# Patient Record
Sex: FEMALE | Race: WHITE | Employment: OTHER | URBAN - METROPOLITAN AREA
[De-identification: names, ages, dates, MRNs, and addresses within clinical notes are randomized per-mention and may not be internally consistent; named-entity substitution may affect disease eponyms.]

---

## 2017-01-02 ENCOUNTER — THERAPY VISIT (OUTPATIENT)
Dept: PHYSICAL THERAPY | Facility: CLINIC | Age: 77
End: 2017-01-02
Payer: COMMERCIAL

## 2017-01-02 DIAGNOSIS — M25.511 RIGHT SHOULDER PAIN: Primary | ICD-10-CM

## 2017-01-02 PROCEDURE — 97110 THERAPEUTIC EXERCISES: CPT | Mod: GP | Performed by: PHYSICAL THERAPIST

## 2017-01-02 PROCEDURE — 97112 NEUROMUSCULAR REEDUCATION: CPT | Mod: GP | Performed by: PHYSICAL THERAPIST

## 2017-01-04 ENCOUNTER — TELEPHONE (OUTPATIENT)
Dept: INTERNAL MEDICINE | Facility: CLINIC | Age: 77
End: 2017-01-04

## 2017-01-04 NOTE — TELEPHONE ENCOUNTER
Patient left a message on voice mail saying she is thinking of changing to the [facility] because it is so much closer to her home.  Just wants your recommendation on who to see?

## 2017-01-30 DIAGNOSIS — A41.01 METHICILLIN SUSCEPTIBLE STAPHYLOCOCCUS AUREUS SEPTICEMIA (H): Primary | ICD-10-CM

## 2017-01-30 NOTE — TELEPHONE ENCOUNTER
Minocycline      Last Written Prescription Date: 04/26/16  Last Fill Quantity: 60,  # refills: 5   Last Office Visit with G, P or [facility] prescribing provider: 11/29/16

## 2017-02-01 RX ORDER — MINOCYCLINE HYDROCHLORIDE 50 MG/1
50 CAPSULE ORAL 2 TIMES DAILY
Qty: 60 CAPSULE | Refills: 5 | Status: SHIPPED | OUTPATIENT
Start: 2017-02-01 | End: 2017-08-30

## 2017-02-08 NOTE — PROGRESS NOTES
Subjective:    HPI                    Objective:    System    Physical Exam    General     ROS    Assessment/Plan:      DISCHARGE REPORT    Progress reporting period is from 12/26/2016 to 1/2/2017.       SUBJECTIVE  Patient's current status is unknown.  She did not complete therapy as planned.  Subjective note when patient was last seen on 1/2/2017: Shoulder is feeling better.  HEP is going well.      OBJECTIVE  Patient has failed to return to therapy so current objective findings are unknown.  The objective findings below are from DOS 1/2/2017: AROM: 120 deg elevation.     ASSESSMENT/PLAN  Updated problem list and treatment plan: Diagnosis 1:  Right Shoulder Pain    STG/LTGs have been met or progress has been made towards goals:  Yes  Assessment of Progress: The patient has not returned to therapy. Current status is unknown.  Self Management Plans:  Patient has been instructed in a home treatment program.  Patient is independent in a home treatment program.    D/C PT since Berenice has not returned.

## 2017-03-15 ENCOUNTER — OFFICE VISIT (OUTPATIENT)
Dept: PEDIATRICS | Facility: CLINIC | Age: 77
End: 2017-03-15
Payer: COMMERCIAL

## 2017-03-15 VITALS
SYSTOLIC BLOOD PRESSURE: 130 MMHG | WEIGHT: 293 LBS | HEART RATE: 86 BPM | TEMPERATURE: 98.4 F | HEIGHT: 64 IN | OXYGEN SATURATION: 97 % | BODY MASS INDEX: 50.02 KG/M2 | DIASTOLIC BLOOD PRESSURE: 84 MMHG

## 2017-03-15 DIAGNOSIS — E78.5 HYPERLIPIDEMIA LDL GOAL <100: ICD-10-CM

## 2017-03-15 DIAGNOSIS — Z23 NEED FOR PROPHYLACTIC VACCINATION AGAINST STREPTOCOCCUS PNEUMONIAE (PNEUMOCOCCUS): ICD-10-CM

## 2017-03-15 DIAGNOSIS — B02.29 POSTHERPETIC NEURALGIA: Primary | ICD-10-CM

## 2017-03-15 PROCEDURE — 90670 PCV13 VACCINE IM: CPT | Performed by: PEDIATRICS

## 2017-03-15 PROCEDURE — 99214 OFFICE O/P EST MOD 30 MIN: CPT | Mod: 25 | Performed by: PEDIATRICS

## 2017-03-15 PROCEDURE — 90471 IMMUNIZATION ADMIN: CPT | Performed by: PEDIATRICS

## 2017-03-15 RX ORDER — SIMVASTATIN 40 MG
40 TABLET ORAL AT BEDTIME
Qty: 90 TABLET | Refills: 3 | Status: ON HOLD | OUTPATIENT
Start: 2017-03-15 | End: 2017-08-09

## 2017-03-15 RX ORDER — LIDOCAINE 50 MG/G
PATCH TOPICAL
Qty: 60 PATCH | Refills: 2 | Status: ON HOLD | OUTPATIENT
Start: 2017-03-15 | End: 2017-06-20

## 2017-03-15 RX ORDER — GABAPENTIN 600 MG/1
1200 TABLET ORAL 3 TIMES DAILY
Qty: 540 TABLET | Refills: 3 | Status: ON HOLD | OUTPATIENT
Start: 2017-03-15 | End: 2017-06-21

## 2017-03-15 RX ORDER — PREGABALIN 75 MG/1
75 CAPSULE ORAL 2 TIMES DAILY
Qty: 90 CAPSULE | Refills: 1 | Status: ON HOLD | OUTPATIENT
Start: 2017-03-15 | End: 2017-06-21

## 2017-03-15 NOTE — MR AVS SNAPSHOT
"              After Visit Summary   3/15/2017    Berenice Chaudhari    MRN: [id number]           Patient Information     Date Of Birth          1940        Visit Information        Provider Department      3/15/2017 11:00 AM Nelyl Pearl MD [facility]        Today's Diagnoses     Post herpetic neuralgia        Postherpetic neuralgia        Hyperlipidemia LDL goal <100          Care Instructions    Please let me know how your are doing in 3-4 weeks.  If still not relief I can get you a referral to the pain clinic.    Follow up with ID, Dr. Judge    Follow up in 6 months or sooner if needed.        Follow-ups after your visit        Who to contact     If you have questions or need follow up information about today's clinic visit or your schedule please contact [facility] directly at [phone].  Normal or non-critical lab and imaging results will be communicated to you by MyChart, letter or phone within 4 business days after the clinic has received the results. If you do not hear from us within 7 days, please contact the clinic through MyChart or phone. If you have a critical or abnormal lab result, we will notify you by phone as soon as possible.  Submit refill requests through [address] or call your pharmacy and they will forward the refill request to us. Please allow 3 business days for your refill to be completed.          Additional Information About Your Visit        MyChart Information     [facility] lets you send messages to your doctor, view your test results, renew your prescriptions, schedule appointments and more. To sign up, go to [email or web address] . Click on \"Log in\" on the left side of the screen, which will take you to the Welcome page. Then click on \"Sign up Now\" on the right side of the page.     You will be asked to enter the access code listed below, as well as some personal information. Please follow the directions to create your username and password.   " "  Your access code is: [email or web address]  Expires: 2017 11:28 AM     Your access code will  in 90 days. If you need help or a new code, please call your [facility] or [phone].        Care EveryWhere ID     This is your Care EveryWhere ID. This could be used by other organizations to access your [place] medical records  [id number]        Your Vitals Were     Pulse Temperature Height Pulse Oximetry Breastfeeding? BMI (Body Mass Index)    86 98.4  F (36.9  C) (Oral) 5' 4\" (1.626 m) 97% No 55.96 kg/m2       Blood Pressure from Last 3 Encounters:   03/15/17 130/84   16 128/72   16 130/72    Weight from Last 3 Encounters:   03/15/17 (!) 326 lb (147.9 kg)   03/10/16 (!) 326 lb (147.9 kg)   03/31/15 (!) 340 lb (154.2 kg)              Today, you had the following     No orders found for display         Today's Medication Changes          These changes are accurate as of: 3/15/17 11:28 AM.  If you have any questions, ask your nurse or doctor.               Start taking these medicines.        Dose/Directions    pregabalin 75 MG capsule   Commonly known as:  LYRICA   Used for:  Post herpetic neuralgia   Started by:  Nelly Pearl MD        Dose:  75 mg   Take 1 capsule (75 mg) by mouth 2 times daily After 1 week, increase to 150mg (2 tablets) twice daily   Quantity:  90 capsule   Refills:  1         Stop taking these medicines if you haven't already. Please contact your care team if you have questions.     cephALEXin 500 MG capsule   Commonly known as:  KEFLEX   Stopped by:  Nelly Pearl MD                Where to get your medicines      These medications were sent to [id number] - [place] - [address]  AT [address]  [address] [place] [address]     Phone:  [phone]     gabapentin 600 MG tablet    lidocaine 5 % Patch    simvastatin 40 MG tablet         Some of these will need a paper prescription and others can be bought " over the counter.  Ask your nurse if you have questions.     Bring a paper prescription for each of these medications     pregabalin 75 MG capsule                Primary Care Provider Fax #    Cecille [facility] [phone]       No address on file        Thank you!     Thank you for choosing [facility]  for your care. Our goal is always to provide you with excellent care. Hearing back from our patients is one way we can continue to improve our services. Please take a few minutes to complete the written survey that you may receive in the mail after your visit with us. Thank you!             Your Updated Medication List - Protect others around you: Learn how to safely use, store and throw away your medicines at www.disposemymeds.org.          This list is accurate as of: 3/15/17 11:28 AM.  Always use your most recent med list.                   Brand Name Dispense Instructions for use    amLODIPine 10 MG tablet    NORVASC    90 tablet    Take 1 tablet (10 mg) by mouth every evening       atenolol 50 MG tablet    TENORMIN    90 tablet    Take 1 tablet (50 mg) by mouth daily       buPROPion 300 MG 24 hr tablet    WELLBUTRIN XL    90 tablet    Take 1 tablet (300 mg) by mouth every morning       CENTRUM SILVER per tablet      Take 1 tablet by mouth daily       gabapentin 600 MG tablet    NEURONTIN    540 tablet    Take 2 tablets (1,200 mg) by mouth 3 times daily       hydrOXYzine 25 MG tablet    ATARAX     Take 25 mg by mouth nightly as needed       lidocaine 5 % Patch    LIDODERM    60 patch    Apply up to 3 patches to painful area at once for up to 12 h within a 24 h period.  Remove after 12 hours.       minocycline 50 MG capsule    MINOCIN    60 capsule    Take 1 capsule (50 mg) by mouth 2 times daily       nortriptyline 25 MG capsule    PAMELOR    60 capsule    Take 2 capsules (50 mg) by mouth At Bedtime       pantoprazole 40 MG EC tablet    PROTONIX    90 tablet    Take 1 tablet (40 mg) by mouth  daily       pregabalin 75 MG capsule    LYRICA    90 capsule    Take 1 capsule (75 mg) by mouth 2 times daily After 1 week, increase to 150mg (2 tablets) twice daily       simvastatin 40 MG tablet    ZOCOR    90 tablet    Take 1 tablet (40 mg) by mouth At Bedtime       venlafaxine 150 MG 24 hr capsule    EFFEXOR-XR    90 capsule    Take 1 capsule (150 mg) by mouth daily

## 2017-03-15 NOTE — PROGRESS NOTES
Screening Questionnaire for Adult Immunization    Are you sick today?   No   Do you have allergies to medications, food, a vaccine component or latex?   Yes   Have you ever had a serious reaction after receiving a vaccination?   No   Do you have a long-term health problem with heart disease, lung disease, asthma, kidney disease, metabolic disease (e.g. diabetes), anemia, or other blood disorder?   No   Do you have cancer, leukemia, HIV/AIDS, or any other immune system problem?   No   In the past 3 months, have you taken medications that affect  your immune system, such as prednisone, other steroids, or anticancer drugs; drugs for the treatment of rheumatoid arthritis, Crohn s disease, or psoriasis; or have you had radiation treatments?   No   Have you had a seizure, or a brain or other nervous system problem?   No   During the past year, have you received a transfusion of blood or blood     products, or been given immune (gamma) globulin or antiviral drug?   No   For women: Are you pregnant or is there a chance you could become        pregnant during the next month?   No   Have you received any vaccinations in the past 4 weeks?   No     Immunization questionnaire was positive for at least one answer.  Notified AF.      MNVFC doesn't apply on this patient    Per orders of Dr. Pearl, injection of Prevnar 13 given by Roopa Tomlinson. Patient instructed to remain in clinic for 20 minutes afterwards, and to report any adverse reaction to me immediately.       Screening performed by Roopa Tomlinson on 3/15/2017 at 11:34 AM.

## 2017-03-15 NOTE — PATIENT INSTRUCTIONS
Please let me know how your are doing in 3-4 weeks.  If still not relief I can get you a referral to the pain clinic.    Follow up with ID, Dr. Judge    Follow up in 6 months or sooner if needed.

## 2017-03-15 NOTE — NURSING NOTE
"Chief Complaint   Patient presents with     Derm Problem       Initial /84 (BP Location: Right arm, Patient Position: Chair, Cuff Size: Adult Regular)  Pulse 86  Temp 98.4  F (36.9  C) (Oral)  Ht 5' 4\" (1.626 m)  Wt (!) 326 lb (147.9 kg)  SpO2 97%  Breastfeeding? No  BMI 55.96 kg/m2 Estimated body mass index is 55.96 kg/(m^2) as calculated from the following:    Height as of this encounter: 5' 4\" (1.626 m).    Weight as of this encounter: 326 lb (147.9 kg).  Medication Reconciliation: complete  "

## 2017-03-15 NOTE — PROGRESS NOTES
"  SUBJECTIVE:                                                    Berenice Chaudhari is a 77 year old female who presents to clinic today for the following health issues:    Pain management for neuralgia in left shoulder. Taking max dose of Gabapentin, Nortriptyline, Tylenol PRN with 6 hr short term relief, waking up at hs in pain. Has tried Lidocaine patches and Salon pas. She is interested in medical marijuana - states friend with similar issues and it is helping her.  She has not been to the pain clinic for this.    Would like to continue care with you instead of Dr Corrigan, our location is more convenient.     Patient states she is fairly healthy overall, but when she gets sick she often ends up in the hospital.  Commonly for cellulitits - has many anitbiotics allergies.    She is supposed to follow with ID q6-12 months for chronic minocycline for recurrent left hip infection.  She has not seen them in over 2 years.     Problem list and histories reviewed & adjusted, as indicated.  Additional history: as documented    Reviewed and updated as needed this visit by clinical staff  Tobacco       Reviewed and updated as needed this visit by Provider         ROS:  Constitutional, HEENT, cardiovascular, pulmonary, gi and gu systems are negative, except as otherwise noted.    OBJECTIVE:                                                    /84 (BP Location: Right arm, Patient Position: Chair, Cuff Size: Adult Regular)  Pulse 86  Temp 98.4  F (36.9  C) (Oral)  Ht 5' 4\" (1.626 m)  Wt (!) 326 lb (147.9 kg)  SpO2 97%  Breastfeeding? No  BMI 55.96 kg/m2  Body mass index is 55.96 kg/(m^2).  GENERAL APPEARANCE: healthy, alert and no distress  CV: regular rates and rhythm, normal S1 S2, no S3 or S4 and no murmur, click or rub    Diagnostic Test Results:  none      ASSESSMENT/PLAN:                                                        1. Postherpetic neuralgia  Discussed with patient that I do not prescribe medical marijuana - I " recommend trying lyrica to see if this can help.  We did discuss her seeing the pain clinic.  Reviewed state of [place] indications for medical ángel - she may fall under intractable pain, but there are still treatment options that haven't been tried and she would need evaluation by pain specicialist.    - gabapentin (NEURONTIN) 600 MG tablet; Take 2 tablets (1,200 mg) by mouth 3 times daily  Dispense: 540 tablet; Refill: 3  - lidocaine (LIDODERM) 5 % Patch; Apply up to 3 patches to painful area at once for up to 12 h within a 24 h period.  Remove after 12 hours.  Dispense: 60 patch; Refill: 2  - pregabalin (LYRICA) 75 MG capsule; Take 1 capsule (75 mg) by mouth 2 times daily After 1 week, increase to 150mg (2 tablets) twice daily  Dispense: 90 capsule; Refill: 1    2. Hyperlipidemia LDL goal <100  - simvastatin (ZOCOR) 40 MG tablet; Take 1 tablet (40 mg) by mouth At Bedtime  Dispense: 90 tablet; Refill: 3    3. Need for prophylactic vaccination against Streptococcus pneumoniae (pneumococcus)  - PNEUMOCOCCAL CONJ VACCINE 13 VALENT IM (PREVNAR 13)  - ADMIN 1st VACCINE    Patient Instructions   Please let me know how your are doing in 3-4 weeks.  If still not relief I can get you a referral to the pain clinic.    Follow up with ID, Dr. Judge    Follow up in 6 months or sooner if needed.      Nelly Pearl MD  [facility] [place]

## 2017-03-15 NOTE — LETTER
My Depression Action Plan  Name: Berenice Chaudhari   Date of Birth 1940  Date: 3/15/2017    My doctor: [facility]   My clinic: [facility]  [address]  [address]  [place] [address] [phone]          GREEN    ZONE   Good Control    What it looks like:     Things are going generally well. You have normal up s and down s. You may even feel depressed from time to time, but bad moods usually last less than a day.   What you need to do:  1. Continue to care for yourself (see self care plan)  2. Check your depression survival kit and update it as needed  3. Follow your physician s recommendations including any medication.  4. Do not stop taking medication unless you consult with your physician first.           YELLOW         ZONE Getting Worse    What it looks like:     Depression is starting to interfere with your life.     It may be hard to get out of bed; you may be starting to isolate yourself from others.    Symptoms of depression are starting to last most all day and this has happened for several days.     You may have suicidal thoughts but they are not constant.   What you need to do:     1. Call your care team, your response to treatment will improve if you keep your care team informed of your progress. Yellow periods are signs an adjustment may need to be made.     2. Continue your self-care, even if you have to fake it!    3. Talk to someone in your support network    4. Open up your depression survival kit           RED    ZONE Medical Alert - Get Help    What it looks like:     Depression is seriously interfering with your life.     You may experience these or other symptoms: You can t get out of bed most days, can t work or engage in other necessary activities, you have trouble taking care of basic hygiene, or basic responsibilities, thoughts of suicide or death that will not go away, self-injurious behavior.     What you need to do:  1. Call  your care team and request a same-day appointment. If they are not available (weekends or after hours) call your local crisis line, emergency room or 911.      Electronically signed by: Roopa Tomlinson, March 15, 2017    Depression Self Care Plan / Survival Kit    Self-Care for Depression  Here s the deal. Your body and mind are really not as separate as most people think.  What you do and think affects how you feel and how you feel influences what you do and think. This means if you do things that people who feel good do, it will help you feel better.  Sometimes this is all it takes.  There is also a place for medication and therapy depending on how severe your depression is, so be sure to consult with your medical provider and/ or Behavioral Health Consultant if your symptoms are worsening or not improving.     In order to better manage my stress, I will:    Exercise  Get some form of exercise, every day. This will help reduce pain and release endorphins, the  feel good  chemicals in your brain. This is almost as good as taking antidepressants!  This is not the same as joining a gym and then never going! (they count on that by the way ) It can be as simple as just going for a walk or doing some gardening, anything that will get you moving.      Hygiene   Maintain good hygiene (Get out of bed in the morning, Make your bed, Brush your teeth, Take a shower, and Get dressed like you were going to work, even if you are unemployed).  If your clothes don't fit try to get ones that do.    Diet  I will strive to eat foods that are good for me, drink plenty of water, and avoid excessive sugar, caffeine, alcohol, and other mood-altering substances.  Some foods that are helpful in depression are: complex carbohydrates, B vitamins, flaxseed, fish or fish oil, fresh fruits and vegetables.    Psychotherapy  I agree to participate in Individual Therapy (if recommended).    Medication  If prescribed medications, I agree to  take them.  Missing doses can result in serious side effects.  I understand that drinking alcohol, or other illicit drug use, may cause potential side effects.  I will not stop my medication abruptly without first discussing it with my provider.    Staying Connected With Others  I will stay in touch with my friends, family members, and my primary care provider/team.    Use your imagination  Be creative.  We all have a creative side; it doesn t matter if it s oil painting, sand castles, or mud pies! This will also kick up the endorphins.    Witness Beauty  (AKA stop and smell the roses) Take a look outside, even in mid-winter. Notice colors, textures. Watch the squirrels and birds.     Service to others  Be of service to others.  There is always someone else in need.  By helping others we can  get out of ourselves  and remember the really important things.  This also provides opportunities for practicing all the other parts of the program.    Humor  Laugh and be silly!  Adjust your TV habits for less news and crime-drama and more comedy.    Control your stress  Try breathing deep, massage therapy, biofeedback, and meditation. Find time to relax each day.     My support system    Clinic Contact:  Phone number:    Contact 1:  Phone number:    Contact 2:  Phone number:    Pentecostal/:  Phone number:    Therapist:  Phone number:    Local crisis center:    Phone number:    Other community support:  Phone number:

## 2017-03-16 ASSESSMENT — PATIENT HEALTH QUESTIONNAIRE - PHQ9: SUM OF ALL RESPONSES TO PHQ QUESTIONS 1-9: 3

## 2017-03-30 ENCOUNTER — TELEPHONE (OUTPATIENT)
Dept: PEDIATRICS | Facility: CLINIC | Age: 77
End: 2017-03-30

## 2017-03-30 NOTE — TELEPHONE ENCOUNTER
Patient calls.states she was supposed to call with an update on how Lyrica is working for her.  She states that it is working great.  She has increased the dose as directed and is currently taking 150 mg bid.  There is a refill on this, she is aware.    Melissa Abdullahi RN  Message handled by Nurse Triage.

## 2017-04-04 ENCOUNTER — TELEPHONE (OUTPATIENT)
Dept: INTERNAL MEDICINE | Facility: CLINIC | Age: 77
End: 2017-04-04

## 2017-04-04 NOTE — TELEPHONE ENCOUNTER
Panel Management Review      Patient has the following on her problem list: None      Composite cancer screening  Chart review shows that this patient is due/due soon for the following DEXA  Summary:    Patient is due/failing the following:   DEXA    Action needed:   Patient needs referral/order: DEXA    Type of outreach:    Sent letter.    Questions for provider review:    None                                                                                                                                     Eleanor Infante CMA       Chart routed to CLOSED .

## 2017-04-04 NOTE — LETTER
[facility]  [address], [address]  [place], [place]  [address]                                            TEL:[phone]  FAX:[phone]      Berenice Chaudhari  [address]  [place] [address]          April 4, 2017      Dear Berenice,    In order to ensure we are providing the best quality care, we have reviewed your chart and see that you are due for:     1. DEXA    Please call the clinic at your earliest convenience to schedule an appointment.   Thank you for trusting us with your health care.          Sincerely,      Seda Suresh M.D.

## 2017-05-14 DIAGNOSIS — B02.29 POST HERPETIC NEURALGIA: ICD-10-CM

## 2017-05-15 DIAGNOSIS — E78.5 HYPERLIPIDEMIA LDL GOAL <100: ICD-10-CM

## 2017-05-15 RX ORDER — SIMVASTATIN 40 MG
TABLET ORAL
Qty: 90 TABLET | Refills: 3 | Status: ON HOLD | OUTPATIENT
Start: 2017-05-15 | End: 2017-06-21

## 2017-05-15 RX ORDER — NORTRIPTYLINE HCL 25 MG
CAPSULE ORAL
Qty: 60 CAPSULE | Refills: 11 | Status: SHIPPED | OUTPATIENT
Start: 2017-05-15 | End: 2017-11-27

## 2017-05-20 ENCOUNTER — TELEPHONE (OUTPATIENT)
Dept: INTERNAL MEDICINE | Facility: CLINIC | Age: 77
End: 2017-05-20

## 2017-05-20 DIAGNOSIS — B02.29 POSTHERPETIC NEURALGIA: ICD-10-CM

## 2017-05-20 RX ORDER — PREGABALIN 75 MG/1
75 CAPSULE ORAL 2 TIMES DAILY
Qty: 90 CAPSULE | Refills: 1 | Status: CANCELLED | OUTPATIENT
Start: 2017-05-20

## 2017-05-20 NOTE — TELEPHONE ENCOUNTER
Clinic Action Needed: Yes.     Reason for Call: Please call patient's , Ricardo at [phone]. Requests to discuss patient's refill for Lyrica that was requested on 05/16/17 by Pharmacy.     Patient Recommendations/Teaching:Please call back if you do not hear from clinic or any further concerns.     Routed to:RN Pool.     Thank you.     Mónica Joshi RN [facility]

## 2017-05-20 NOTE — TELEPHONE ENCOUNTER
pregabalin (LYRICA) 75 MG capsule      Last Written Prescription Date:  03-  Last Fill Quantity: 90,   # refills: 1  Last Office Visit with [facility], [facility] or [facility] prescribing provider: 03-  Future Office visit:       Routing refill request to provider for review/approval because:  pregabalin (LYRICA) 75 MG capsule

## 2017-05-22 ENCOUNTER — TELEPHONE (OUTPATIENT)
Dept: PEDIATRICS | Facility: CLINIC | Age: 77
End: 2017-05-22

## 2017-05-22 RX ORDER — PREGABALIN 150 MG/1
150 CAPSULE ORAL 2 TIMES DAILY
Qty: 60 CAPSULE | Refills: 5 | Status: SHIPPED | OUTPATIENT
Start: 2017-05-22 | End: 2017-11-27

## 2017-05-22 NOTE — TELEPHONE ENCOUNTER
Printed, signed, in my outbox.    Nelly Pearl MD  Internal Medicine/Pediatrics  [facility]

## 2017-05-22 NOTE — TELEPHONE ENCOUNTER
Did PA under cover my meds. Medication was denied states it cannot be covered by plan. Routing to pcp.    Berenice Chaudhari ([place]: [id number])  Lyrica 75MG capsules  Status: Question Response - N/A  Created: May 22nd, 2017    Tamika Daniels MA

## 2017-05-22 NOTE — TELEPHONE ENCOUNTER
Received PA form. Filled out form and had Dr. Pearl sign it. I faxed the form to [phone] and placed in PA waiting bin until we receive a response.    Tamika Daniels MA

## 2017-05-22 NOTE — TELEPHONE ENCOUNTER
Please let patient know.  She may want to talk with her insurance since it looks like she was getting this before.    She may have to pay out of pocket if not covered.    Nelly Pearl MD  Internal Medicine/Pediatrics  [facility]

## 2017-05-22 NOTE — TELEPHONE ENCOUNTER
PA needed for Lyrica 75 mg capsules  Insurance is [employer] [place].    I will call insurance # listed in chart and ask for a PA form. Once received I will start the PA process.    Tamika Daniels MA

## 2017-05-22 NOTE — TELEPHONE ENCOUNTER
Refill request not received until 5/20. Sent to provider for approval in refill encounter. Will call once approved by provider.   Catarina Diggs RN

## 2017-05-22 NOTE — TELEPHONE ENCOUNTER
Insurance # listed in chart is not correct. I will call the patient pharmacy to get the insurance information. Patient's pharmacy is [facility] in [facility].      Tamika Daniels MA

## 2017-05-22 NOTE — TELEPHONE ENCOUNTER
"Spoke to the help desk on cover my meds because I was confused why I didn't get a \"Denied\" icon it just states it is not covered by the patients plan. The  said sometimes this is a error in the site and that they will send me a hard copy via fax. Once PA form is received I will start PA process. Sorry for the confusion. Routing to Dr. Pearl for a FYI.    Tamika Daniels MA    "

## 2017-05-22 NOTE — TELEPHONE ENCOUNTER
I spoke to pharmacy the Insurance is [employer] # [phone]. I will start the PA process under cover my meds.    Tamika Daniels MA

## 2017-05-22 NOTE — TELEPHONE ENCOUNTER
Routing refill request to provider for review/approval because:  Drug not on the [facility] refill protocol And instructions need to be changed    Catarina Diggs RN

## 2017-05-23 NOTE — TELEPHONE ENCOUNTER
"PA response received. Insurance states \" The plan does not require a PA. The plans limit for this medication is not being exceeded at the current prescribed dose. Please verify the quanity and day supply. This medication is covered for 8 capsules per day.\" Routing response to Dr. Pearl. Placed PA in abstraction.    Tamika Daniels MA    "

## 2017-06-20 ENCOUNTER — HOSPITAL ENCOUNTER (INPATIENT)
Facility: CLINIC | Age: 77
LOS: 2 days | Discharge: HOME | DRG: 871 | End: 2017-06-23
Attending: INTERNAL MEDICINE | Admitting: INTERNAL MEDICINE
Payer: COMMERCIAL

## 2017-06-20 DIAGNOSIS — B02.29 POSTHERPETIC NEURALGIA: ICD-10-CM

## 2017-06-20 DIAGNOSIS — L03.116 CELLULITIS OF LEFT LOWER EXTREMITY: ICD-10-CM

## 2017-06-20 PROCEDURE — 36415 COLL VENOUS BLD VENIPUNCTURE: CPT | Performed by: INTERNAL MEDICINE

## 2017-06-20 PROCEDURE — 80053 COMPREHEN METABOLIC PANEL: CPT | Performed by: INTERNAL MEDICINE

## 2017-06-20 PROCEDURE — 96366 THER/PROPH/DIAG IV INF ADDON: CPT

## 2017-06-20 PROCEDURE — 87088 URINE BACTERIA CULTURE: CPT | Performed by: INTERNAL MEDICINE

## 2017-06-20 PROCEDURE — 36415 COLL VENOUS BLD VENIPUNCTURE: CPT

## 2017-06-20 PROCEDURE — 87186 SC STD MICRODIL/AGAR DIL: CPT | Performed by: INTERNAL MEDICINE

## 2017-06-20 PROCEDURE — 99285 EMERGENCY DEPT VISIT HI MDM: CPT | Mod: 25

## 2017-06-20 PROCEDURE — 85025 COMPLETE CBC W/AUTO DIFF WBC: CPT | Performed by: INTERNAL MEDICINE

## 2017-06-20 PROCEDURE — 87086 URINE CULTURE/COLONY COUNT: CPT | Performed by: INTERNAL MEDICINE

## 2017-06-20 PROCEDURE — 96365 THER/PROPH/DIAG IV INF INIT: CPT

## 2017-06-20 PROCEDURE — 87040 BLOOD CULTURE FOR BACTERIA: CPT | Performed by: INTERNAL MEDICINE

## 2017-06-20 PROCEDURE — 81001 URINALYSIS AUTO W/SCOPE: CPT | Performed by: INTERNAL MEDICINE

## 2017-06-20 RX ORDER — AZTREONAM 2 G/1
2 INJECTION, POWDER, LYOPHILIZED, FOR SOLUTION INTRAMUSCULAR; INTRAVENOUS ONCE
Status: DISCONTINUED | OUTPATIENT
Start: 2017-06-20 | End: 2017-06-20

## 2017-06-20 RX ORDER — LEVOFLOXACIN 5 MG/ML
750 INJECTION, SOLUTION INTRAVENOUS ONCE
Status: COMPLETED | OUTPATIENT
Start: 2017-06-20 | End: 2017-06-21

## 2017-06-20 RX ORDER — SODIUM CHLORIDE 9 MG/ML
1000 INJECTION, SOLUTION INTRAVENOUS CONTINUOUS
Status: DISCONTINUED | OUTPATIENT
Start: 2017-06-20 | End: 2017-06-21

## 2017-06-20 ASSESSMENT — ENCOUNTER SYMPTOMS
FEVER: 1
CONFUSION: 1
HALLUCINATIONS: 1

## 2017-06-20 NOTE — LETTER
Transition Communication Hand-off for Care Transitions to Next Level of Care Provider    Name: Berenice Chaudhari  MRN #: [id number]  Primary Care Provider: Nelly Pearl     Primary Clinic: [facility] [address] [place] [address]     Reason for Hospitalization:  Cellulitis of left lower extremity [L03.116]  Admit Date/Time: 6/20/2017 10:05 PM  Discharge Date: 6/23/17  Payor Source: Payor: BCBS / Plan: BCBS OUT OF STATE / Product Type: Indemnity /     Readmission Assessment Measure (HAJA) Risk Score/category:  NONE            Reason for Communication Hand-off Referral: Fragility, Obesity --345lbs, admitted for LLE cellulitis     Discharge Plan:       Concern for non-adherence with plan of care:   Y/N No  Discharge Needs Assessment:      Already enrolled in Tele-monitoring program and name of program:  n/a  Follow-up specialty is recommended: No    Follow-up plan:  No future appointments.    Any outstanding tests or procedures:  The patient does need to follow up with her primary care physician within 1 week, needs a basic metabolic panel done at that time.            Key Recommendations:  Please speak to and work on a weight loss plan with this pt.   Should she be recommended for a Bariatric Physician  Nutrition consult, support on meeting weight loss goals,    Noemi Sanchez, RN, BSN, PHN, CTS  Care Transitions Specialist  [facility]      AVS/Discharge Summary is the source of truth; this is a helpful guide for improved communication of patient story

## 2017-06-20 NOTE — TELEPHONE ENCOUNTER
lidocaine (LIDODERM) 5 % Patch      Last Written Prescription Date: 3/15/2017  Last Fill Quantity: 60, # refills: 2  Last Office Visit with [facility] or [facility] prescribing provider: 3/15/2017       Creatinine   Date Value Ref Range Status   10/27/2016 1.19 (H) 0.52 - 1.04 mg/dL Final

## 2017-06-20 NOTE — IP AVS SNAPSHOT
MRN:[id number]                      After Visit Summary   6/20/2017    Berenice Chaudhari    MRN: [id number]           Thank you!     Thank you for choosing [facility] for your care. Our goal is always to provide you with excellent care. Hearing back from our patients is one way we can continue to improve our services. Please take a few minutes to complete the written survey that you may receive in the mail after you visit. If you would like to speak to someone directly about your visit please contact Patient Relations at [phone]. Thank you!          Patient Information     Date Of Birth          1940        Designated Caregiver       Most Recent Value    Caregiver    Will someone help with your care after discharge? yes    Name of designated caregiver Ricardo Chaudhari    Phone number of caregiver [phone]    Caregiver address [place], [place]      About your hospital stay     You were admitted on:  June 21, 2017 You last received care in the:  [facility]    You were discharged on:  June 23, 2017       Who to Call     For medical emergencies, please call 911.  For non-urgent questions about your medical care, please call your primary care provider or clinic, [phone]          Attending Provider     Provider Specialty    Heather Harris MD Emergency Medicine    AustwellReinaldo MD Internal Medicine       Primary Care Provider Office Phone # Fax #    Nelly Pearl [phone]      After Care Instructions     Activity       Your activity upon discharge: activity as tolerated            Diet       Follow this diet upon discharge: Regular                  Follow-up Appointments     Follow-up and recommended labs and tests        Follow up with primary care provider, Nelly Pearl, within 7 days for hospital follow- up.  The following labs/tests are recommended: BMP in 7 days.                  Your next 10 appointments already  "scheduled     2017 11:00 AM CDT   Office Visit with Nelly Pearl MD   [facility] [place] ([facility])    [address]  [address]  [place] [address] [phone]           Bring a current list of meds and any records pertaining to this visit.  For Physicals, please bring immunization records and any forms needing to be filled out.  Please arrive 10 minutes early to complete paperwork.              Pending Results     Date and Time Order Name Status Description    2017 2250 Blood culture Preliminary     2017 2225 Blood culture Preliminary     2017 2225 Urine Culture Aerobic Bacterial Preliminary             Statement of Approval     Ordered          17 2432  I have reviewed and agree with all the recommendations and orders detailed in this document.  EFFECTIVE NOW     Approved and electronically signed by:  Wilder Odom DO             Admission Information     Date & Time Provider Department Dept. Phone    2017 Reinaldo Guzmán MD [facility] [phone]      Your Vitals Were     Blood Pressure Pulse Temperature Respirations Weight Pulse Oximetry    136/43 (BP Location: Left arm) 78 97.8  F (36.6  C) (Oral) 18 156.5 kg (345 lb) 95%    BMI (Body Mass Index)                   59.22 kg/m2           MyChart Information     [employer] lets you send messages to your doctor, view your test results, renew your prescriptions, schedule appointments and more. To sign up, go to [email or web address] . Click on \"Log in\" on the left side of the screen, which will take you to the Welcome page. Then click on \"Sign up Now\" on the right side of the page.     You will be asked to enter the access code listed below, as well as some personal information. Please follow the directions to create your username and password.     Your access code is: [email or web address]  Expires: 2017 10:12 PM     Your access code will  in 90 days. If " you need help or a new code, please call your [facility] clinic or [phone].        Care EveryWhere ID     This is your Care EveryWhere ID. This could be used by other organizations to access your [facility] medical records  [id number]        Equal Access to Services     LAILAPAULINE ANDRE : Hadii aad ku hadnatividadranjith Monicajohn, wacliftonda luqadaha, qaybta kaalmada adekeshiada, calin mckayfredericmariano hoyt. So [facility] [phone].    ATENCIÓN: Si habla español, tiene a dickey disposición servicios gratuitos de asistencia lingüística. Juan Antonioame al [phone].    We comply with applicable federal civil rights laws and [place] laws. We do not discriminate on the basis of race, color, national origin, age, disability sex, sexual orientation or gender identity.               Review of your medicines      START taking        Dose / Directions    levofloxacin 500 MG tablet   Commonly known as:  LEVAQUIN   Used for:  Cellulitis of left lower extremity        Dose:  500 mg   Take 1 tablet (500 mg) by mouth daily for 7 days   Quantity:  7 tablet   Refills:  0         CONTINUE these medicines which have NOT CHANGED        Dose / Directions    amLODIPine 10 MG tablet   Commonly known as:  NORVASC   Used for:  Essential hypertension with goal blood pressure less than 140/90        Dose:  10 mg   Take 1 tablet (10 mg) by mouth every evening   Quantity:  90 tablet   Refills:  3       atenolol 50 MG tablet   Commonly known as:  TENORMIN   Used for:  Essential hypertension        Dose:  50 mg   Take 1 tablet (50 mg) by mouth daily   Quantity:  90 tablet   Refills:  3       buPROPion 300 MG 24 hr tablet   Commonly known as:  WELLBUTRIN XL   Used for:  Major depressive disorder, recurrent, in full remission (H)        Dose:  300 mg   Take 1 tablet (300 mg) by mouth every morning   Quantity:  90 tablet   Refills:  1       CENTRUM SILVER per tablet        Dose:  1 tablet   Take 1 tablet by mouth daily   Refills:  0       CEPHALEXIN PO         Dose:  500 mg   Take 500 mg by mouth 3 times daily as needed (when cellulitis is coming on)   Refills:  0       gabapentin 600 MG tablet   Commonly known as:  NEURONTIN   Used for:  Postherpetic neuralgia        Dose:  1200 mg   Take 2 tablets (1,200 mg) by mouth 3 times daily   Quantity:  540 tablet   Refills:  3       hydrOXYzine 25 MG tablet   Commonly known as:  ATARAX        Dose:  25 mg   Take 25 mg by mouth nightly as needed   Refills:  0       lidocaine 5 % Patch   Commonly known as:  LIDODERM   Used for:  Postherpetic neuralgia        Apply up to 3 patches to painful area at once for up to 12 h within a 24 h period.  Remove after 12 hours.   Quantity:  60 patch   Refills:  2       minocycline 50 MG capsule   Commonly known as:  MINOCIN   Used for:  Methicillin susceptible Staphylococcus aureus septicemia (H)        Dose:  50 mg   Take 1 capsule (50 mg) by mouth 2 times daily   Quantity:  60 capsule   Refills:  5       nortriptyline 25 MG capsule   Commonly known as:  PAMELOR   Used for:  Post herpetic neuralgia        TAKE 2 CAPSULES(50 MG) BY MOUTH AT BEDTIME   Quantity:  60 capsule   Refills:  11       pantoprazole 40 MG EC tablet   Commonly known as:  PROTONIX   Used for:  GERD (gastroesophageal reflux disease)        Dose:  40 mg   Take 1 tablet (40 mg) by mouth daily   Quantity:  90 tablet   Refills:  3       pregabalin 150 MG capsule   Commonly known as:  LYRICA   Used for:  Postherpetic neuralgia        Dose:  150 mg   Take 1 capsule (150 mg) by mouth 2 times daily   Quantity:  60 capsule   Refills:  5       simvastatin 40 MG tablet   Commonly known as:  ZOCOR   Used for:  Hyperlipidemia LDL goal <100        Dose:  40 mg   Take 1 tablet (40 mg) by mouth At Bedtime   Quantity:  90 tablet   Refills:  3       venlafaxine 150 MG 24 hr capsule   Commonly known as:  EFFEXOR-XR   Used for:  Major depressive disorder, recurrent episode, moderate (H)        Dose:  150 mg   Take 1 capsule (150 mg) by mouth  daily   Quantity:  90 capsule   Refills:  1            Where to get your medicines      These medications were sent to [place], [place] - [address] [address]  [address] [address]     Phone:  [phone]     levofloxacin 500 MG tablet         These medications were sent to [id number] - [place] - [address]  AT [address]  [address] [place] [address]     Phone:  [phone]     gabapentin 600 MG tablet    lidocaine 5 % Patch                Protect others around you: Learn how to safely use, store and throw away your medicines at www.disposemymeds.org.             Medication List: This is a list of all your medications and when to take them. Check marks below indicate your daily home schedule. Keep this list as a reference.      Medications           Morning Afternoon Evening Bedtime As Needed    amLODIPine 10 MG tablet   Commonly known as:  NORVASC   Take 1 tablet (10 mg) by mouth every evening   Last time this was given:  10 mg on 6/22/2017  7:53 PM                                atenolol 50 MG tablet   Commonly known as:  TENORMIN   Take 1 tablet (50 mg) by mouth daily   Last time this was given:  50 mg on 6/23/2017  9:16 AM                                buPROPion 300 MG 24 hr tablet   Commonly known as:  WELLBUTRIN XL   Take 1 tablet (300 mg) by mouth every morning   Last time this was given:  300 mg on 6/23/2017  9:15 AM                                CENTRUM SILVER per tablet   Take 1 tablet by mouth daily                                CEPHALEXIN PO   Take 500 mg by mouth 3 times daily as needed (when cellulitis is coming on)                                gabapentin 600 MG tablet   Commonly known as:  NEURONTIN   Take 2 tablets (1,200 mg) by mouth 3 times daily   Last time this was given:  1,200 mg on 6/23/2017  2:18 PM                                hydrOXYzine 25 MG tablet   Commonly known as:   ATARAX   Take 25 mg by mouth nightly as needed                                levofloxacin 500 MG tablet   Commonly known as:  LEVAQUIN   Take 1 tablet (500 mg) by mouth daily for 7 days                                lidocaine 5 % Patch   Commonly known as:  LIDODERM   Apply up to 3 patches to painful area at once for up to 12 h within a 24 h period.  Remove after 12 hours.   Last time this was given:  1 patch on 6/22/2017  8:48 AM                                minocycline 50 MG capsule   Commonly known as:  MINOCIN   Take 1 capsule (50 mg) by mouth 2 times daily   Last time this was given:  50 mg on 6/23/2017  9:17 AM                                nortriptyline 25 MG capsule   Commonly known as:  PAMELOR   TAKE 2 CAPSULES(50 MG) BY MOUTH AT BEDTIME                                pantoprazole 40 MG EC tablet   Commonly known as:  PROTONIX   Take 1 tablet (40 mg) by mouth daily   Last time this was given:  40 mg on 6/23/2017  9:15 AM                                pregabalin 150 MG capsule   Commonly known as:  LYRICA   Take 1 capsule (150 mg) by mouth 2 times daily   Last time this was given:  150 mg on 6/23/2017  9:15 AM                                simvastatin 40 MG tablet   Commonly known as:  ZOCOR   Take 1 tablet (40 mg) by mouth At Bedtime   Last time this was given:  40 mg on 6/22/2017  9:15 PM                                venlafaxine 150 MG 24 hr capsule   Commonly known as:  EFFEXOR-XR   Take 1 capsule (150 mg) by mouth daily                                          More Information        Methicillin-Resistant Staphylococcus aureus (MRSA)  What is MRSA?  Staphylococcus aureus bacteria or  staph  is a very common germ. It is found on the skin or in the nose of many people. Sometimes the bacteria causes no problem, or it causes a mild infection. But it can also cause severe infections of the skin, lungs, blood, or other organs or tissues. Some staph infections can be easily treated with antibiotics.  But one type of staph, Methicillin-Resistant staphylococcus areas (MRSA) can t. It s called Methicillin-Resistant because the antibiotic methicillin, which used to be effective treatment, no longer works. MRSA is common in hospitals and nursing homes or long-term care facilities. It is also spreading among healthy children and adults outside the health care system. There are two different ways MRSA can appear in the body:    Colonization: When a person carries the MRSA bacteria (often in nose or on skin), but is healthy. This person can spread MRSA to others.    Infection: When a person gets sick because of the bacteria, it's called being infected with MRSA. This person can also spread MRSA to others. If not treated properly, MRSA infections can be very serious.    What are the symptoms of MRSA infection?  MRSA skin infections start as small red bumps on the skin that look like pimples or spider bites. The small bumps usually get larger and become swollen, painful, warm to the touch and filled with pus. MRSA can also start in other ways, and it can spread deeper into the body. Common places and symptoms include:    Urinary tract: pain and burning when urinating, the need to urinate more often, fever    Blood: high fever, chills, nausea and vomiting, shortness of breath, confusion    Bone, muscle, or other tissue infections    Lungs: pneumonia in one or both lungs    Surgical wound infections    Heart: Infection of the lining of the heart called endocarditis  Who s at risk?  Anyone can get a staph infection. But certain factors make infection more likely, including:    A current or recent stay in the hospital    Living in a nursing home or long-term care facility or other crowded areas, like  barracks or care home    Taking antibiotics    Having certain health conditions, such as diabetes or HIV    Getting kidney dialysis    Sharing sports equipment, razors or other sharp objects  How does MRSA spread?  MRSA  usually spreads through skin-to-skin contact, whether through contact with an infected person or on the hands of health care workers who work with infected patients. MRSA can also spread through contact with contaminated objects, such as cart handles and bedrails or shared towels or athletic equipment.  How is MRSA treated?  MRSA infections are usually treated with antibiotics. These may be in pill form or through a vein (intravenous or IV). If you have a skin abscess, we may drain it.   Patients who test positive for MRSA colonization may go through a process called decolonization. We apply a topical antibiotic inside your nose to kill the bacteria. We may also wash your skin with a special soap.  Controlling and Preventing MRSA: In the hospital  Hospitals and nursing homes control and prevent MRSA by doing the following:    Handwashing. This is the single most important way to prevent the spread of germs.    Protective clothing. Health care workers and visitors may wear gloves and a gown when entering the room of a patient with MRSA. They remove these items before leaving.    Avoid antibiotic overuse. Too much use can cause germs to resist some antibiotics.    Monitoring. Hospitals monitor the spread of MRSA and educate all staff on the best ways to prevent it.  What you can do as a patient    Ask all hospital staff to wash their hands before touching you. Don t be afraid to speak up!    Wash your own hands often with soap and warm water, or use an alcohol-based hand gel. This is especially important:    After using the bathroom    After touching a bandage    Before eating    Encourage family and friends to wash hands as well    If you need to have a test done, such as an X-ray, follow instructions from staff. You may need to change into a clean hospital gown and wash your hands just before leaving your room.  Controlling and Preventing MRSA: at Home  Patients:    Wash your hands often with soap and warm water, or  use an alcohol-based hand gel. This is especially important:    After using the bathroom    After touching a bandage    Before eating    Follow instructions we give you for caring for surgical wounds or any tubes that you have, such as a catheter or dialysis port.    Keep cuts and scrapes clean and covered until they heal.    Do not share towels, razors, clothing or athletic equipment.    Take all antibiotics your doctor prescribed. Don t take half doses or stop the antibiotics, even if you feel better.  Caregivers:    Wash your hands well with soap and warm water before and after any contact with the patient. You can use an alcohol-based hand gel if your hands aren t visibly dirty.    Wear disposable gloves when changing a bandage, touching an infected wound or handling dirty laundry. Throw away the gloves after each use. Then wash your hands well.    Change the patient s bedding once a week, or more often if it s soiled with feces or body fluids. Wash and dry it alone in a washer and dryer using the warmest temperatures recommended on the labels. Use liquid bleach during the wash cycle if the label permits.    Clean surfaces like tabletops and sinks really well. Keep bathrooms, toilets and bedside commodes clean. A mixture of 1/4 cup of bleach to 1 quart of water works great for this.  Understanding drug resistance  Hard-to-kill (resistant) germs, such as MRSA, develop when antibiotics are taken longer than needed. They can also develop when antibiotics are taken when they aren't needed, or are not taken exactly as directed. This is because any germs that survive treatment with an antibiotic can multiply and thus create more resistant germs. The more often antibiotics are used, the more chances resistant germs have to develop. This is why your care team may not prescribe antibiotics unless he or she is certain that they are needed.  Date Last Reviewed: 10/1/2016    3273-1080 [employer]. [address] [place] [address]  [place] [address]. All rights reserved. This information is not intended as a substitute for professional medical care. Always follow your healthcare professional's instructions.                Weight Management: Getting Started  Healthy bodies come in all shapes and sizes. Not all bodies are made to be thin. For some people, a healthy weight is higher than the average weight listed on weight charts. Your healthcare provider can help you decide on a healthy weight for you.    Reasons to lose weight  Losing weight can help with some health problems, such as high blood pressure, heart disease, diabetes, sleep apnea, and arthritis. You may also feel more energy.  Set your long-term goal  Your goal doesn't even have to be a specific weight. You may decide on a fitness goal (such as being able to walk 10 miles a week), or a health goal (such as lowering your blood pressure). Choose a goal that is measurable and reasonable, so you know when you've reached it. A goal of reaching a BMI of less than 25 is not always reasonable (or possible).   Make an action plan  Habits don t change overnight. Setting your goals too high can leave you feeling discouraged if you can t reach them. Be realistic. Choose one or two small changes you can make now. Set an action plan for how you are going to make these changes. When you can stick to this plan, keep making a few more small changes. Taking small steps will help you stay on the path to success.  Track your progress  Write down your goals. Then, keep a daily record of your progress. Write down what you eat and how active you are. This record lets you look back on how much you ve done. It may also help when you re feeling frustrated. Reward yourself for success. Even if you don t reach every goal, give yourself credit for what you do get done.  Get support  Encouragement from others can help make losing weight easier. Ask your family members and friends for support. They may  even want to join you. Also look to your healthcare provider, registered dietitian, and  for help. Your local hospital can give you more information about nutrition, exercise, and weight loss.  Date Last Reviewed: 1/31/2016 2000-2017 [employer]. [address] [place] [address], [place] [address]. All rights reserved. This information is not intended as a substitute for professional medical care. Always follow your healthcare professional's instructions.                Weight Management: Overcoming Your Barriers  You may have many reasons why you re not ready to lose weight. You may not feel you have the time or the skills. You may be afraid of losing weight and gaining it back again. Well, you can lose weight. And you can keep the weight off, if you make changes slowly and stick with them. Remember that you may never find the perfect time to lose weight. Decide that the right time to be healthier is now.    Common barriers  Barrier 1: I don t want to deny myself.  Barrier Buster: You don t have to! Moderation is the key:    Watch portion sizes and know when you're eating more than one serving.    Plan to ask for a doggy bag when you eat out.    Have just one.    Choose lower-fat and lower-calorie versions of your favorites.    Use a small plate instead of a normal-sized plate.   Barrier 2: I lost weight before but I gained it right back.  Barrier Buster: Make this time different:    List what worked and didn t work last time and what you can try this time.    Choose changes that you are willing to stick with.    Work exercise into your weight-loss plan.    Be realistic about what is possible. Your plan has to fit into your life in a balanced way that works for you.   Barrier 3: I don t have the time to be active.  Barrier Buster: It takes just a few minutes a day!    Be active with a pet or the kids.    Block off activity time in your schedule.    Borrow some time that you usually spend  watching TV.    You are too important not to take time to exercise--it is your life!   Feel good about yourself  Do you eat more because you feel bad about yourself, then feel even worse as you gain weight? This is a  vicious cycle.  Breaking this cycle is not easy. You may need group support or counseling. Always remember that you are a valuable person, no matter what size or shape you are.  Do you have a health problem? If so, don t use it as an excuse for not losing weight. Ask your healthcare provider or dietitian about methods to lose weight that are safe for you. For example, even if you have severe arthritis, it may be easier for you to exercise in a pool. Get advice from a .    Date Last Reviewed: 2/2/2016 2000-2017 [employer]. [address] [place] [address]. All rights reserved. This information is not intended as a substitute for professional medical care. Always follow your healthcare professional's instructions.                Working with a Healthcare Provider Who Specializes in Obesity  Some healthcare providers focus on treating people who are obese. They are called bariatric healthcare providers or bariatricians. Some may also be bariatric surgeons. These healthcare providers are trained to do surgery that aids in weight loss.  A general healthcare provider can offer some treatments for weight loss. But a bariatric healthcare provider has more training in how to treat obesity. These healthcare providers have had special training after medical school. Many of them have additional training to do weight loss surgery.  What is obesity?  Obesity is when body fat is above a certain level. Body mass index (BMI) is a common way to measure obesity. BMI is a method of screening for a weight category using height and weight for calculation. A BMI of 25 to 29.9 means overweight. A BMI higher than 30 means that the patient is obese. Your healthcare provider can  calculate your BMI for you. You can also ask your healthcare provider to teach you how to calculate BMI yourself.  Why see a healthcare provider who specializes in obesity?  If you are obese, it s important to you get treatment. Obesity can lead to a number of serious health conditions, including:    Diabetes    Arthritis    High blood pressure    Heart disease    Stroke    Sleep apnea    Liver disease    Certain lung diseases    Certain cancers  You may begin your treatment with your primary healthcare provider. But if you need more help, you may want to see a bariatric healthcare provider. He or she may have new ideas or methods for weight loss that can help you. Some bariatric healthcare providers can also serve as a primary healthcare provider.  What to expect at your first visit  During your initial visit, your bariatric healthcare provider may:    Take a medical history, including your history of nutrition, exercise, and weight loss    Do a physical exam, including BMI, waist circumference, and blood pressure    Look at your health problems related to obesity    Look at you for other medical problems that might cause weight gain    Find out how ready you are to begin an exercise program    Find out if you need tests    Help you make realistic weight loss goals    Give you a nutrition plan    Tell you to keep a food diary    Find out if you need a weight-loss medicine  Your bariatric healthcare provider will also give you information about:    Healthy eating habits    Healthy exercise habits    How to change health behaviors    How mental health affects obesity    The complications of obesity    The benefits and risks of medicines  Creating a treatment plan for you  Your healthcare provider will create a treatment plan for you based on your medical needs and preferences. At each follow-up visit, your healthcare provider will check your progress. He or she will make changes to your treatment as needed. If you  aren t losing enough weight, your healthcare provider will advise other changes. As you lose weight and your health improves, your healthcare provider might change some of your medicines.  Your bariatric healthcare provider may order some tests to check health factors related to obesity, such as:    Tests for diabetes, like fasting blood glucose    Lipid and cholesterol levels    Thyroid-stimulating hormone levels    Liver blood tests    Kidney function blood tests    Vitamin D levels    Electrocardiogram, to look at your heart rhythm    Exercise testing, to see how well your heart works during exercise    Resting metabolic rate, to look at how many calories you burn at rest  Your healthcare provider will also talk with you about your changing needs. For example, if your weight loss stops or you regain weight, he or she may talk with you about weight loss surgery.     How to find a healthcare provider  Talk with your primary healthcare provider. He or she may be able to refer you to a bariatric healthcare provider. The [employer], formerly known as American Society of Bariat[employer]icians, has an online listing of healthcare providers. You can search for a healthcare provider in your area.   Date Last Reviewed: 2/14/2016 2000-2017 [employer]. [address] [place] [address], [place] [address]. All rights reserved. This information is not intended as a substitute for professional medical care. Always follow your healthcare professional's instructions.

## 2017-06-20 NOTE — IP AVS SNAPSHOT
"    [facility]: [phone]                                              INTERAGENCY TRANSFER FORM - PHYSICIAN ORDERS   2017                    Hospital Admission Date: 2017  EDDIE MONTEIRO   : 1940  Sex: Female        Attending Provider: Reinaldo Guzmán MD     Allergies:  Ceftriaxone, Nafcillin, Penicillins, Vancomycin, Codeine, Clindamycin, Zithromax [Azithromycin]    Infection:  MRSA-Contact Isolation   Service:  GENERAL [facility]    Ht:  1.626 m (5' 4\")   Wt:  156.5 kg (345 lb)   Admission Wt:  156.5 kg (345 lb)    BMI:  59.22 kg/m 2   BSA:  2.66 m 2            Patient PCP Information     Provider PCP Type    Nelly Pearl MD General      ED Clinical Impression     Diagnosis Description Comment Added By Time Added    Cellulitis of left lower extremity [L03.116] Cellulitis of left lower extremity [L03.116]  Heather Harris MD 2017  1:21 AM      Hospital Problems as of 2017              Priority Class Noted POA    Cellulitis Medium  2017 Yes      Non-Hospital Problems as of 2017              Priority Class Noted    Urticaria   1993    Generalized osteoarthrosis, unspecified site   2005    Obesity   2005    Essential hypertension with goal blood pressure less than 140/90   2005    Allergic rhinitis   2005    Erythroderma desquamativum   Unknown    Advanced directives, counseling/discussion   2011    Hyperlipidemia LDL goal <100   2011    Post herpetic neuralgia   3/15/2012    Health Care Home   2012    Chronic renal disease   2013    Anxiety   2013    GERD (gastroesophageal reflux disease) Medium  11/15/2013    Major depressive disorder, recurrent episode, in full remission (HCC)   2014    Severe obesity (BMI >= 40) (H) Medium  2014    Cough   2014    Cellulitis of right lower extremity   2014    CKD (chronic kidney disease) stage 3, GFR 30-59 ml/min Medium  3/4/2016    Right shoulder " pain Medium  12/26/2016      Code Status History     Date Active Date Inactive Code Status Order ID Comments User Context    6/23/2017  4:43 PM  Full Code [id number]  Wilder Odom,  Outpatient    6/21/2017  4:11 AM 6/23/2017  4:43 PM Full Code [id number]  Reinaldo Guzmán MD Inpatient    11/4/2014  8:31 PM 11/7/2014  7:23 PM Full Code [id number]  Randy Alas MD Inpatient    9/30/2014  3:57 PM 11/4/2014  8:31 PM Full Code [id number]  Ruddy Chavez MD Outpatient    9/26/2014  2:35 PM 9/30/2014  3:57 PM Full Code [id number]  Fani Ortiz PA-C Inpatient    8/12/2012 12:51 AM 8/25/2012  7:24 PM DNR [id number]  Skip Moe DO Inpatient    8/12/2012 12:23 AM 8/12/2012 12:51 AM Full Code [id number]  Skip Moe DO ED         Medication Review      START taking        Dose / Directions Comments    levofloxacin 500 MG tablet   Commonly known as:  LEVAQUIN   Used for:  Cellulitis of left lower extremity        Dose:  500 mg   Take 1 tablet (500 mg) by mouth daily for 7 days   Quantity:  7 tablet   Refills:  0          CONTINUE these medications which have NOT CHANGED        Dose / Directions Comments    amLODIPine 10 MG tablet   Commonly known as:  NORVASC   Used for:  Essential hypertension with goal blood pressure less than 140/90        Dose:  10 mg   Take 1 tablet (10 mg) by mouth every evening   Quantity:  90 tablet   Refills:  3        atenolol 50 MG tablet   Commonly known as:  TENORMIN   Used for:  Essential hypertension        Dose:  50 mg   Take 1 tablet (50 mg) by mouth daily   Quantity:  90 tablet   Refills:  3    Pt due back to clinic Oct 2017       buPROPion 300 MG 24 hr tablet   Commonly known as:  WELLBUTRIN XL   Used for:  Major depressive disorder, recurrent, in full remission (H)        Dose:  300 mg   Take 1 tablet (300 mg) by mouth every morning   Quantity:  90 tablet   Refills:  1        CENTRUM SILVER per tablet        Dose:  1 tablet   Take 1 tablet by mouth daily    Refills:  0        CEPHALEXIN PO        Dose:  500 mg   Take 500 mg by mouth 3 times daily as needed (when cellulitis is coming on)   Refills:  0        gabapentin 600 MG tablet   Commonly known as:  NEURONTIN   Used for:  Postherpetic neuralgia        Dose:  1200 mg   Take 2 tablets (1,200 mg) by mouth 3 times daily   Quantity:  540 tablet   Refills:  3        hydrOXYzine 25 MG tablet   Commonly known as:  ATARAX        Dose:  25 mg   Take 25 mg by mouth nightly as needed   Refills:  0        lidocaine 5 % Patch   Commonly known as:  LIDODERM   Used for:  Postherpetic neuralgia        Apply up to 3 patches to painful area at once for up to 12 h within a 24 h period.  Remove after 12 hours.   Quantity:  60 patch   Refills:  2        minocycline 50 MG capsule   Commonly known as:  MINOCIN   Used for:  Methicillin susceptible Staphylococcus aureus septicemia (H)        Dose:  50 mg   Take 1 capsule (50 mg) by mouth 2 times daily   Quantity:  60 capsule   Refills:  5        nortriptyline 25 MG capsule   Commonly known as:  PAMELOR   Used for:  Post herpetic neuralgia        TAKE 2 CAPSULES(50 MG) BY MOUTH AT BEDTIME   Quantity:  60 capsule   Refills:  11        pantoprazole 40 MG EC tablet   Commonly known as:  PROTONIX   Used for:  GERD (gastroesophageal reflux disease)        Dose:  40 mg   Take 1 tablet (40 mg) by mouth daily   Quantity:  90 tablet   Refills:  3        pregabalin 150 MG capsule   Commonly known as:  LYRICA   Used for:  Postherpetic neuralgia        Dose:  150 mg   Take 1 capsule (150 mg) by mouth 2 times daily   Quantity:  60 capsule   Refills:  5        simvastatin 40 MG tablet   Commonly known as:  ZOCOR   Used for:  Hyperlipidemia LDL goal <100        Dose:  40 mg   Take 1 tablet (40 mg) by mouth At Bedtime   Quantity:  90 tablet   Refills:  3        venlafaxine 150 MG 24 hr capsule   Commonly known as:  EFFEXOR-XR   Used for:  Major depressive disorder, recurrent episode, moderate (H)         Dose:  150 mg   Take 1 capsule (150 mg) by mouth daily   Quantity:  90 capsule   Refills:  1                After Care     Activity       Your activity upon discharge: activity as tolerated       Diet       Follow this diet upon discharge: Regular             Your next 10 appointments already scheduled     Jun 30, 2017 11:00 AM CDT   [facility]    [address] [place] [address]  [address]  [facility] [id number] [phone]           Bring a current list of meds and any records pertaining to this visit.  For Physicals, please bring immunization records and any forms needing to be filled out.  Please arrive 10 minutes early to complete paperwork.              Follow-Up Appointment Instructions     Future Labs/Procedures    Follow-up and recommended labs and tests      Comments:    Follow up with primary care provider, Nelly Pearl, within 7 days for hospital follow- up.  The following labs/tests are recommended: BMP in 7 days.      Follow-Up Appointment Instructions     Follow-up and recommended labs and tests        Follow up with primary care provider, Nelly Pearl, within 7 days for hospital follow- up.  The following labs/tests are recommended: BMP in 7 days.             Statement of Approval     Ordered          06/23/17 1644  I have reviewed and agree with all the recommendations and orders detailed in this document.  EFFECTIVE NOW     Approved and electronically signed by:  Wilder Odom DO

## 2017-06-21 ENCOUNTER — APPOINTMENT (OUTPATIENT)
Dept: GENERAL RADIOLOGY | Facility: CLINIC | Age: 77
DRG: 871 | End: 2017-06-21
Attending: INTERNAL MEDICINE
Payer: COMMERCIAL

## 2017-06-21 DIAGNOSIS — B02.29 POSTHERPETIC NEURALGIA: ICD-10-CM

## 2017-06-21 PROBLEM — L03.90 CELLULITIS: Status: ACTIVE | Noted: 2017-06-21

## 2017-06-21 LAB
ALBUMIN SERPL-MCNC: 3.5 G/DL (ref 3.4–5)
ALBUMIN UR-MCNC: NEGATIVE MG/DL
ALP SERPL-CCNC: 111 U/L (ref 40–150)
ALT SERPL W P-5'-P-CCNC: 45 U/L (ref 0–50)
ANION GAP SERPL CALCULATED.3IONS-SCNC: 8 MMOL/L (ref 3–14)
APPEARANCE UR: CLEAR
AST SERPL W P-5'-P-CCNC: 41 U/L (ref 0–45)
BACTERIA #/AREA URNS HPF: ABNORMAL /HPF
BASOPHILS # BLD AUTO: 0.1 10E9/L (ref 0–0.2)
BASOPHILS NFR BLD AUTO: 0.2 %
BILIRUB SERPL-MCNC: 0.9 MG/DL (ref 0.2–1.3)
BILIRUB UR QL STRIP: NEGATIVE
BUN SERPL-MCNC: 20 MG/DL (ref 7–30)
CALCIUM SERPL-MCNC: 7.9 MG/DL (ref 8.5–10.1)
CHLORIDE SERPL-SCNC: 103 MMOL/L (ref 94–109)
CO2 BLDCOV-SCNC: 25 MMOL/L (ref 21–28)
CO2 SERPL-SCNC: 26 MMOL/L (ref 20–32)
COLOR UR AUTO: YELLOW
CREAT SERPL-MCNC: 1.11 MG/DL (ref 0.52–1.04)
CREAT SERPL-MCNC: 1.14 MG/DL (ref 0.52–1.04)
DIFFERENTIAL METHOD BLD: ABNORMAL
EOSINOPHIL # BLD AUTO: 0 10E9/L (ref 0–0.7)
EOSINOPHIL NFR BLD AUTO: 0.1 %
ERYTHROCYTE [DISTWIDTH] IN BLOOD BY AUTOMATED COUNT: 14.2 % (ref 10–15)
GFR SERPL CREATININE-BSD FRML MDRD: 46 ML/MIN/1.7M2
GFR SERPL CREATININE-BSD FRML MDRD: 48 ML/MIN/1.7M2
GLUCOSE SERPL-MCNC: 123 MG/DL (ref 70–99)
GLUCOSE UR STRIP-MCNC: NEGATIVE MG/DL
HCT VFR BLD AUTO: 39.6 % (ref 35–47)
HGB BLD-MCNC: 12.5 G/DL (ref 11.7–15.7)
HGB UR QL STRIP: NEGATIVE
IMM GRANULOCYTES # BLD: 0.2 10E9/L (ref 0–0.4)
IMM GRANULOCYTES NFR BLD: 1.1 %
KETONES UR STRIP-MCNC: 5 MG/DL
LACTATE BLD-SCNC: 0.7 MMOL/L (ref 0.7–2.1)
LEUKOCYTE ESTERASE UR QL STRIP: NEGATIVE
LYMPHOCYTES # BLD AUTO: 0.7 10E9/L (ref 0.8–5.3)
LYMPHOCYTES NFR BLD AUTO: 3 %
MAGNESIUM SERPL-MCNC: 1.7 MG/DL (ref 1.6–2.3)
MCH RBC QN AUTO: 29.8 PG (ref 26.5–33)
MCHC RBC AUTO-ENTMCNC: 31.6 G/DL (ref 31.5–36.5)
MCV RBC AUTO: 95 FL (ref 78–100)
MONOCYTES # BLD AUTO: 0.8 10E9/L (ref 0–1.3)
MONOCYTES NFR BLD AUTO: 3.5 %
MUCOUS THREADS #/AREA URNS LPF: PRESENT /LPF
NEUTROPHILS # BLD AUTO: 20.6 10E9/L (ref 1.6–8.3)
NEUTROPHILS NFR BLD AUTO: 92.1 %
NITRATE UR QL: NEGATIVE
NRBC # BLD AUTO: 0 10*3/UL
NRBC BLD AUTO-RTO: 0 /100
PCO2 BLDV: 41 MM HG (ref 40–50)
PH BLDV: 7.4 PH (ref 7.32–7.43)
PH UR STRIP: 7 PH (ref 5–7)
PLATELET # BLD AUTO: 127 10E9/L (ref 150–450)
PLATELET # BLD AUTO: 156 10E9/L (ref 150–450)
PO2 BLDV: 35 MM HG (ref 25–47)
POTASSIUM SERPL-SCNC: 4.2 MMOL/L (ref 3.4–5.3)
PROT SERPL-MCNC: 7.9 G/DL (ref 6.8–8.8)
RBC # BLD AUTO: 4.19 10E12/L (ref 3.8–5.2)
RBC #/AREA URNS AUTO: <1 /HPF (ref 0–2)
SAO2 % BLDV FROM PO2: 67 %
SODIUM SERPL-SCNC: 137 MMOL/L (ref 133–144)
SP GR UR STRIP: 1.01 (ref 1–1.03)
URN SPEC COLLECT METH UR: ABNORMAL
UROBILINOGEN UR STRIP-MCNC: NORMAL MG/DL (ref 0–2)
WBC # BLD AUTO: 22.4 10E9/L (ref 4–11)
WBC #/AREA URNS AUTO: <1 /HPF (ref 0–2)

## 2017-06-21 PROCEDURE — 99207 ZZC CDG-MDM COMPONENT: MEETS LOW - DOWN CODED: CPT | Performed by: INTERNAL MEDICINE

## 2017-06-21 PROCEDURE — 36415 COLL VENOUS BLD VENIPUNCTURE: CPT | Performed by: INTERNAL MEDICINE

## 2017-06-21 PROCEDURE — 25000132 ZZH RX MED GY IP 250 OP 250 PS 637: Performed by: INTERNAL MEDICINE

## 2017-06-21 PROCEDURE — 87040 BLOOD CULTURE FOR BACTERIA: CPT | Performed by: INTERNAL MEDICINE

## 2017-06-21 PROCEDURE — 82565 ASSAY OF CREATININE: CPT | Performed by: INTERNAL MEDICINE

## 2017-06-21 PROCEDURE — 85049 AUTOMATED PLATELET COUNT: CPT | Performed by: INTERNAL MEDICINE

## 2017-06-21 PROCEDURE — 25000128 H RX IP 250 OP 636: Performed by: INTERNAL MEDICINE

## 2017-06-21 PROCEDURE — 83735 ASSAY OF MAGNESIUM: CPT | Performed by: INTERNAL MEDICINE

## 2017-06-21 PROCEDURE — 82803 BLOOD GASES ANY COMBINATION: CPT

## 2017-06-21 PROCEDURE — 12000000 ZZH R&B MED SURG/OB

## 2017-06-21 PROCEDURE — 99222 1ST HOSP IP/OBS MODERATE 55: CPT | Mod: AI | Performed by: INTERNAL MEDICINE

## 2017-06-21 PROCEDURE — 83605 ASSAY OF LACTIC ACID: CPT

## 2017-06-21 PROCEDURE — 71020 XR CHEST 2 VW: CPT

## 2017-06-21 RX ORDER — GABAPENTIN 600 MG/1
1200 TABLET ORAL 3 TIMES DAILY
Qty: 540 TABLET | Refills: 3 | Status: ON HOLD | OUTPATIENT
Start: 2017-06-21 | End: 2017-08-10

## 2017-06-21 RX ORDER — PROCHLORPERAZINE MALEATE 5 MG
5 TABLET ORAL EVERY 6 HOURS PRN
Status: DISCONTINUED | OUTPATIENT
Start: 2017-06-21 | End: 2017-06-23 | Stop reason: HOSPADM

## 2017-06-21 RX ORDER — LEVOFLOXACIN 5 MG/ML
500 INJECTION, SOLUTION INTRAVENOUS EVERY 24 HOURS
Status: DISCONTINUED | OUTPATIENT
Start: 2017-06-21 | End: 2017-06-23 | Stop reason: HOSPADM

## 2017-06-21 RX ORDER — POTASSIUM CHLORIDE 1.5 G/1.58G
20-40 POWDER, FOR SOLUTION ORAL
Status: DISCONTINUED | OUTPATIENT
Start: 2017-06-21 | End: 2017-06-23 | Stop reason: HOSPADM

## 2017-06-21 RX ORDER — AMOXICILLIN 250 MG
1-2 CAPSULE ORAL 2 TIMES DAILY PRN
Status: DISCONTINUED | OUTPATIENT
Start: 2017-06-21 | End: 2017-06-23 | Stop reason: HOSPADM

## 2017-06-21 RX ORDER — GABAPENTIN 600 MG/1
1200 TABLET ORAL 3 TIMES DAILY
Status: DISCONTINUED | OUTPATIENT
Start: 2017-06-21 | End: 2017-06-23 | Stop reason: HOSPADM

## 2017-06-21 RX ORDER — PREGABALIN 75 MG/1
75 CAPSULE ORAL 2 TIMES DAILY
Status: DISCONTINUED | OUTPATIENT
Start: 2017-06-21 | End: 2017-06-21

## 2017-06-21 RX ORDER — LIDOCAINE 50 MG/G
1-3 PATCH TOPICAL
Status: DISCONTINUED | OUTPATIENT
Start: 2017-06-22 | End: 2017-06-23 | Stop reason: HOSPADM

## 2017-06-21 RX ORDER — POTASSIUM CHLORIDE 29.8 MG/ML
20 INJECTION INTRAVENOUS
Status: DISCONTINUED | OUTPATIENT
Start: 2017-06-21 | End: 2017-06-23 | Stop reason: HOSPADM

## 2017-06-21 RX ORDER — ACETAMINOPHEN 325 MG/1
650 TABLET ORAL EVERY 4 HOURS PRN
Status: DISCONTINUED | OUTPATIENT
Start: 2017-06-21 | End: 2017-06-23 | Stop reason: HOSPADM

## 2017-06-21 RX ORDER — PANTOPRAZOLE SODIUM 40 MG/1
40 TABLET, DELAYED RELEASE ORAL DAILY
Status: DISCONTINUED | OUTPATIENT
Start: 2017-06-21 | End: 2017-06-23 | Stop reason: HOSPADM

## 2017-06-21 RX ORDER — BUPROPION HYDROCHLORIDE 150 MG/1
300 TABLET ORAL EVERY MORNING
Status: DISCONTINUED | OUTPATIENT
Start: 2017-06-21 | End: 2017-06-23 | Stop reason: HOSPADM

## 2017-06-21 RX ORDER — ONDANSETRON 4 MG/1
4 TABLET, ORALLY DISINTEGRATING ORAL EVERY 6 HOURS PRN
Status: DISCONTINUED | OUTPATIENT
Start: 2017-06-21 | End: 2017-06-23 | Stop reason: HOSPADM

## 2017-06-21 RX ORDER — MAGNESIUM SULFATE HEPTAHYDRATE 40 MG/ML
4 INJECTION, SOLUTION INTRAVENOUS EVERY 4 HOURS PRN
Status: DISCONTINUED | OUTPATIENT
Start: 2017-06-21 | End: 2017-06-23 | Stop reason: HOSPADM

## 2017-06-21 RX ORDER — PREGABALIN 75 MG/1
150 CAPSULE ORAL 2 TIMES DAILY
Status: DISCONTINUED | OUTPATIENT
Start: 2017-06-21 | End: 2017-06-23 | Stop reason: HOSPADM

## 2017-06-21 RX ORDER — SIMVASTATIN 40 MG
40 TABLET ORAL AT BEDTIME
Status: DISCONTINUED | OUTPATIENT
Start: 2017-06-21 | End: 2017-06-23 | Stop reason: HOSPADM

## 2017-06-21 RX ORDER — ATENOLOL 50 MG/1
50 TABLET ORAL DAILY
Status: DISCONTINUED | OUTPATIENT
Start: 2017-06-21 | End: 2017-06-23 | Stop reason: HOSPADM

## 2017-06-21 RX ORDER — POTASSIUM CHLORIDE 7.45 MG/ML
10 INJECTION INTRAVENOUS
Status: DISCONTINUED | OUTPATIENT
Start: 2017-06-21 | End: 2017-06-23 | Stop reason: HOSPADM

## 2017-06-21 RX ORDER — NALOXONE HYDROCHLORIDE 0.4 MG/ML
.1-.4 INJECTION, SOLUTION INTRAMUSCULAR; INTRAVENOUS; SUBCUTANEOUS
Status: DISCONTINUED | OUTPATIENT
Start: 2017-06-21 | End: 2017-06-23 | Stop reason: HOSPADM

## 2017-06-21 RX ORDER — BISACODYL 10 MG
10 SUPPOSITORY, RECTAL RECTAL DAILY PRN
Status: DISCONTINUED | OUTPATIENT
Start: 2017-06-21 | End: 2017-06-23 | Stop reason: HOSPADM

## 2017-06-21 RX ORDER — PROCHLORPERAZINE 25 MG
12.5 SUPPOSITORY, RECTAL RECTAL EVERY 12 HOURS PRN
Status: DISCONTINUED | OUTPATIENT
Start: 2017-06-21 | End: 2017-06-23 | Stop reason: HOSPADM

## 2017-06-21 RX ORDER — AMLODIPINE BESYLATE 10 MG/1
10 TABLET ORAL EVERY EVENING
Status: DISCONTINUED | OUTPATIENT
Start: 2017-06-21 | End: 2017-06-23 | Stop reason: HOSPADM

## 2017-06-21 RX ORDER — VENLAFAXINE HYDROCHLORIDE 150 MG/1
150 TABLET, EXTENDED RELEASE ORAL DAILY
Status: DISCONTINUED | OUTPATIENT
Start: 2017-06-21 | End: 2017-06-23 | Stop reason: HOSPADM

## 2017-06-21 RX ORDER — SODIUM CHLORIDE 9 MG/ML
INJECTION, SOLUTION INTRAVENOUS CONTINUOUS
Status: DISCONTINUED | OUTPATIENT
Start: 2017-06-21 | End: 2017-06-22

## 2017-06-21 RX ORDER — ONDANSETRON 2 MG/ML
4 INJECTION INTRAMUSCULAR; INTRAVENOUS EVERY 6 HOURS PRN
Status: DISCONTINUED | OUTPATIENT
Start: 2017-06-21 | End: 2017-06-23 | Stop reason: HOSPADM

## 2017-06-21 RX ORDER — NORTRIPTYLINE HCL 25 MG
25-50 CAPSULE ORAL
Status: DISCONTINUED | OUTPATIENT
Start: 2017-06-21 | End: 2017-06-23 | Stop reason: HOSPADM

## 2017-06-21 RX ORDER — POTASSIUM CHLORIDE 1500 MG/1
20-40 TABLET, EXTENDED RELEASE ORAL
Status: DISCONTINUED | OUTPATIENT
Start: 2017-06-21 | End: 2017-06-23 | Stop reason: HOSPADM

## 2017-06-21 RX ORDER — POTASSIUM CL/LIDO/0.9 % NACL 10MEQ/0.1L
10 INTRAVENOUS SOLUTION, PIGGYBACK (ML) INTRAVENOUS
Status: DISCONTINUED | OUTPATIENT
Start: 2017-06-21 | End: 2017-06-23 | Stop reason: HOSPADM

## 2017-06-21 RX ADMIN — GABAPENTIN 1200 MG: 600 TABLET, FILM COATED ORAL at 16:03

## 2017-06-21 RX ADMIN — LEVOFLOXACIN 500 MG: 5 INJECTION, SOLUTION INTRAVENOUS at 21:27

## 2017-06-21 RX ADMIN — BUPROPION HYDROCHLORIDE 300 MG: 150 TABLET, FILM COATED, EXTENDED RELEASE ORAL at 16:03

## 2017-06-21 RX ADMIN — ACETAMINOPHEN 650 MG: 325 TABLET, FILM COATED ORAL at 08:48

## 2017-06-21 RX ADMIN — SODIUM CHLORIDE 1000 ML: 9 INJECTION, SOLUTION INTRAVENOUS at 01:40

## 2017-06-21 RX ADMIN — ENOXAPARIN SODIUM 40 MG: 40 INJECTION SUBCUTANEOUS at 08:49

## 2017-06-21 RX ADMIN — LEVOFLOXACIN 750 MG: 5 INJECTION, SOLUTION INTRAVENOUS at 00:09

## 2017-06-21 RX ADMIN — SODIUM CHLORIDE: 9 INJECTION, SOLUTION INTRAVENOUS at 21:03

## 2017-06-21 RX ADMIN — SODIUM CHLORIDE: 9 INJECTION, SOLUTION INTRAVENOUS at 11:10

## 2017-06-21 RX ADMIN — PREGABALIN 150 MG: 75 CAPSULE ORAL at 21:01

## 2017-06-21 RX ADMIN — ACETAMINOPHEN 650 MG: 325 TABLET, FILM COATED ORAL at 04:32

## 2017-06-21 RX ADMIN — PANTOPRAZOLE SODIUM 40 MG: 40 TABLET, DELAYED RELEASE ORAL at 15:09

## 2017-06-21 RX ADMIN — SODIUM CHLORIDE 1000 ML: 9 INJECTION, SOLUTION INTRAVENOUS at 00:10

## 2017-06-21 RX ADMIN — AMLODIPINE BESYLATE 10 MG: 10 TABLET ORAL at 21:01

## 2017-06-21 RX ADMIN — SIMVASTATIN 40 MG: 40 TABLET, FILM COATED ORAL at 21:01

## 2017-06-21 RX ADMIN — ATENOLOL 50 MG: 50 TABLET ORAL at 15:09

## 2017-06-21 RX ADMIN — SODIUM CHLORIDE: 9 INJECTION, SOLUTION INTRAVENOUS at 04:24

## 2017-06-21 RX ADMIN — VENLAFAXINE HYDROCHLORIDE 150 MG: 150 TABLET, EXTENDED RELEASE ORAL at 16:04

## 2017-06-21 RX ADMIN — GABAPENTIN 1200 MG: 600 TABLET, FILM COATED ORAL at 21:01

## 2017-06-21 RX ADMIN — ACETAMINOPHEN 650 MG: 325 TABLET, FILM COATED ORAL at 17:32

## 2017-06-21 ASSESSMENT — ACTIVITIES OF DAILY LIVING (ADL)
COGNITION: 1 - ATTENTION OR MEMORY DEFICITS
RETIRED_EATING: 0-->INDEPENDENT
TRANSFERRING: 1-->ASSISTIVE EQUIPMENT
NUMBER_OF_TIMES_PATIENT_HAS_FALLEN_WITHIN_LAST_SIX_MONTHS: 3
BATHING: 1-->ASSISTIVE EQUIPMENT
WHICH_OF_THE_ABOVE_FUNCTIONAL_RISKS_HAD_A_RECENT_ONSET_OR_CHANGE?: AMBULATION;TRANSFERRING;TOILETING;BATHING
AMBULATION: 3-->ASSISTIVE EQUIPMENT AND PERSON
RETIRED_COMMUNICATION: 0-->UNDERSTANDS/COMMUNICATES WITHOUT DIFFICULTY
SWALLOWING: 2-->DIFFICULTY SWALLOWING FOODS
FALL_HISTORY_WITHIN_LAST_SIX_MONTHS: YES
TOILETING: 1-->ASSISTIVE EQUIPMENT
DRESS: 0-->INDEPENDENT

## 2017-06-21 NOTE — ED NOTES
[facility]  ED Nurse Handoff Report    Berenice Chaudhari is a 77 year old female   ED Chief complaint: Fever  . ED Diagnosis:   Final diagnoses:   None     Allergies:   Allergies   Allergen Reactions     Ceftriaxone Anaphylaxis and Rash     Rocephin given. Developed rash to back and abd, awaiting to see if it improves with d/c of rocephin.        Nafcillin Rash     diffuse severe rash in hospital 2/05     Penicillins Anaphylaxis     Vancomycin Anaphylaxis and Rash     Codeine Fatigue     Sleeps continuously     Clindamycin Rash     received information from patient     Zithromax [Azithromycin] Rash       Code Status: Full Code  Activity level - Baseline/Home:  Stand with Assist. Activity Level - Current:   Total Care. Lift room needed: Yes. Bariatric: No (345lbs)    Needed: No   Isolation: No. Infection: MRSA in past. Pt states had DNA/blood test done in 2008 and was cleared of MRSA. Test was done at a different hospital and hasn't carried over to here.    Vital Signs:   Vitals:    06/20/17 2345 06/21/17 0015 06/21/17 0017 06/21/17 0056   BP:  160/81     Pulse:       Resp:       Temp:       TempSrc:       SpO2: 100%  100%    Weight:    (!) 156.5 kg (345 lb)       Cardiac Rhythm:  ,      Pain level:    Patient confused: Yes. Patient Falls Risk: Yes.   Elimination Status: Has voided (Straight cath for UA)   Patient Report - Initial Complaint: Fall, Confusion. Focused Assessment:   Cardiac Review of Systems (Cardiac) - Cardiac Signs/Symptoms: fatigue; denies; other (see comments) (HTN)  Chest Pain Assessment - Rating (0-10): 0  Cardiac - Cardiac WDL:  WDL except AB     22:20 Respiratory Respiratory - Respiratory WDL:  WDL except; all Rhythm/Pattern: tachypnea; no shortness of breath reported; shallow Lung Fields: All Fields Cough Frequency: frequent Cough Type: productive Sputum Color: yellow; green Throughout All Lung Fields: crackles coarse; rhonchi AB    22:20 Musculoskeletal Musculoskeletal -  Musculoskeletal WDL:  WDL except; all General Mobility: significantly impaired LLE Extremity Movement: active ROM moderately impaired RLE Extremity Movement: active ROM moderately impaired LLE Weight-Bearing Status: non weight-bearing (No head of femur) RLE Weight-Bearing Status: partial weight-bearing AB    22:20 Neurological Cognitive/Perceptual/Neuro - Cognitive/Neuro/Behavioral WDL:  WDL except Neurological Comment: Per , has been hallucinating today. Pt asked him to put a blanket on the people in the corner while at home in the bathroom.  Pupils (CN II) - Pupil PERRLA: yes  Kilmichael Coma Scale - Best Eye Response: 4-->(E4) spontaneous Best Motor Response: 6-->(M6) obeys commands Best Verbal Response: 4-->(V4) confused Kilmichael Coma Scale Score: 14       22:20 Skin Color/Condition Skin - Skin WDL:  WDL except Skin Integrity: abrasion(s); other (see comments) (L Knee, Redness) AB         Tests Performed: UA, Labs, Xray. Abnormal Results: WBC 22.4. Labs not resulted. UA normal, xray no infiltrate.  Treatments provided: Fluid, Abx   Family Comments: Baseline oriented per family. Some hallucinations  OBS brochure/video discussed/provided to patient:  N/A  ED Medications:   Medications   0.9% sodium chloride BOLUS (1,000 mLs Intravenous New Bag 6/21/17 0010)     Followed by   0.9% sodium chloride infusion (not administered)   levofloxacin (LEVAQUIN) infusion 750 mg (750 mg Intravenous New Bag 6/21/17 0009)     Drips infusing:  Yes (IVF and Abx)         ED Nurse Name/Phone Number: Lakisha Cabrera,   1:03 AM    RECEIVING UNIT ED HANDOFF REVIEW    Above ED Nurse Handoff Report was reviewed: Yes  Reviewed by: Randy Christianson on June 21, 2017 at 3:20 AM

## 2017-06-21 NOTE — PROGRESS NOTES
Patient seen and examined.  Chart reviewed.  Please see admission H&P by Dr. Guzmán from earlier today for details.

## 2017-06-21 NOTE — ED NOTES
Pt fell today around 1730, skin tear on left foot. C/o low grade fevers today. Pt c/o productive cough for 2 weeks of greenish yellow phlegm.

## 2017-06-21 NOTE — PLAN OF CARE
Problem: Goal Outcome Summary  Goal: Goal Outcome Summary  Outcome: Improving  A&O. VSS; fever from nights resolved. No complaints of pain. RLE cellulitis improving, mild reddened area behind R knee. Continues on IV Levaquin. Wheelchair bound at baseline; up to BSC w/ A2 and walker. Voiding in small amounts; bladder scanned for a PVR of 875cc; straight cathed for 800c. Pt DTV. Tolerating diet, minimal appetite. BC pending. ID consult pending. Possible discharge home this evening. Will continue to monitor.

## 2017-06-21 NOTE — PLAN OF CARE
Problem: Goal Outcome Summary  Goal: Goal Outcome Summary  Outcome: No Change  VS: BP slightly elevated. Tmax 101, given tylenol, down to 100.9, blankets removed. Oriented x4 but confused and hallucinating at times. Assist x2 with lift. Heavy assist x3 to commode with walker, partial weight bearing of left extremity per pt. Pt unable to void on commode, bladder scanned for >999, straight cathed for 950 maycol urine. RLE cellulitis red/hot. Denied pain.

## 2017-06-21 NOTE — CONSULTS
IMPRESSION:   1.  A 77-year-old female, very well known to me with complicated long-term infection and medical history, now admitted with apparent acute sepsis including confusion and low-grade fever, appears to be evolving a probable right leg cellulitis, although not entirely clear, cultures are all pending, is clinically improved after a dose of Levaquin.   2.  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  Has not had any episodes the last 2-1/2 years.   3.  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 Judge who is retired.   4.  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   5.  Listed allergies to penicillin, cephalosporins, clindamycin, Zithromax, vancomycin, has tolerated carbapenems and daptomycin, has been able to tolerate vancomycin with proper preventive measures.   6.  History of congestive heart failure.     7.  Mild chronic renal insufficiency.   8.  Chronic postherpetic neuralgia.   9.  Morbid obesity.      RECOMMENDATIONS:   1.  Will do meropenem as though treating a leg cellulitis obviously over broad but based on her prior tolerance is a logical drug, Levaquin probably not ideal if she truly is evolving the leg cellulitis.   2.  Follow up on blood cultures, fever, mentation, appearance of her right leg and labs and readjust treatment accordingly.   3.  For now, continue the minocycline, she said she is open to the idea of possibly stopping, overall it seems likely that that infection is not an active problem.      HISTORY:  This is a 74-year-old female seen in consultation due to apparent leg cellulitis and sepsis.  She is extremely well known to me over several years.  She has a complex long-term medical history among other things, having a chronic total hip arthroplasty infection which has  been on suppressive minocycline due to Staph aureus.  She has had prior MRSA, but not for many years.  She has had a history of multiple episodes of sepsis and leg cellulitis.  She has multiple drug allergies complicating treatment, but has generally tolerated carbapenems, quinolones, macrolides and daptomycin as options to use.  She currently presents with acute episode of confusion without any focal or localizing symptoms.  She does not really think her leg is that much different, although there is some appearance of erythema and slightly more swollen in her right leg.  She has no other localizing symptoms and feels better today.      PAST MEDICAL HISTORY:  Long and complicated.  See multiple other notes for details.  She has had some degree of congestive heart failure and postherpetic neuralgia and has definite chronic stasis disease, history of multiply recurrent cellulitis.      ALLERGIES:  Numerous listed allergies.  Penicillin and cephalosporins seem clearcut and true allergies.  Has tolerated minocycline long-term and listed clindamycin allergy, I am not familiar with the results occur there.  She cannot remember exactly but thinks it was a rash.  She has tolerated linezolid and daptomycin when given.  Vancomycin at one point cause major renal failure, in fact, on a second occasion did as well, so does not have a true allergy, but has been avoided generally.  History of Zithromax allergy, but I think she has had that previously and subsequently without reaction, although it is unclear.      SOCIAL AND FAMILY HISTORY:  The prior MRSA as noted.  No significant alcohol or tobacco use.  Long-term health care contact, has been extensive, but not in recent years, not as much.      MEDICATIONS:  Medication list reviewed.      REVIEW OF SYSTEMS:  Developed acute confusion and malaise that brought her to the hospital.  No particular focal or localizing symptoms currently.  States she feels well and wants to go home.       PHYSICAL EXAMINATION:   GENERAL:  The patient appears her usual self, does not seem that confused at present.   VITAL SIGNS:  She is afebrile.  Vital signs otherwise currently normal.   HEENT:  No thrush or intraoral lesions.  Pupils reactive.   NECK:  Supple and nontender, no lymphadenopathy.   HEART:  Regular rhythm, no significant murmur, not that tachycardic at present.   LUNGS:  Clear.   ABDOMEN:  Soft and nontender.  She is quite obese.   EXTREMITIES:  Has an area of erythema in her right leg and slightly warm to the touch, somewhat hard to tell, but probably early cellulitis as a logical unifying diagnosis.      LABORATORY DATA:  White count 22,000.  Blood cultures pending.  UA unremarkable.  Chest x-ray negative.      Thank you very much for consultation.  I will follow the patient with you.         TOOTIE SUNSHINE MD             D: 2017 16:57   T: 2017 17:14   MT: [id number]      Name:     EDDIE MONTEIRO   MRN:      [id number]        Account:       [id number]   :      1940           Consult Date:  2017      Document: [id number]       cc: Nelly Pearl MD

## 2017-06-21 NOTE — H&P
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  She currently does not have a ball component to that joint and is pivot only status on that leg. She has multiple drug allergies, hypertension, interstitial lung disease, hypercholesterolemia, history of shingles, GERD, depression, allergic rhinitis, and recurrent left lower extremity cellulitis.  She presented to the emergency department for evaluation of fever that started at 2 PM yesterday, fall at home, hallucinations, and confusion.  Emergency department workup suggests infection probably due to right lower extremity cellulitis.    Problem list:    1.  Suspect right lower extremity cellulitis.  There is erythema behind the knee and in the lateral lower leg.  She has associated leukocytosis and low-grade fever.  She has numerous drug allergies including penicillins, cephalosporins, clindamycin, and vancomycin.  She's been told by her infectious disease doctor that the vancomycin reaction was probably not an allergy.  She was, evidently, on prolonged vancomycin as part of her MRSA hip infection.  Levaquin was started in the emergency department and I will continue that for now.  If condition worsens, consider addition of vancomycin.      2.  Metabolic encephalopathy with hallucinations and confusion.  This is likely due to early cellulitis.      3.  History of MRSA of left hip.    4. Weakness.    5.  Stable medical conditions include hypertension, hypercholesterolemia, and GERD.    Full code  Lovenox for DVT prophylaxis  Disposition: Admit as inpatient       Code Status:   Full Code         Primary Care  Physician:   Nelly Pearl [phone]         Chief Complaint:   Fever, hallucinations, confusion    History is obtained from Berenice, her , Dr. 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  She currently does not have a ball component to that joint and is pivot only status on that leg. She has multiple drug allergies, hypertension, interstitial lung disease, hypercholesterolemia, history of shingles, GERD, depression, allergic rhinitis, and recurrent left lower extremity cellulitis.  She presented to the emergency department for evaluation of fever that started at 2 PM yesterday, fall at home, hallucinations, and confusion.  Emergency department workup Showed a low-grade fever with temperature 99.8 degrees.  White blood cell count was 22.4.  Physical exam showed evolving erythema medial and right lower leg.           Past Medical History:     Patient Active Problem List   Diagnosis     Generalized osteoarthrosis, unspecified site     Urticaria     Obesity     Essential hypertension with goal blood pressure less than 140/90     Allergic rhinitis     Erythroderma desquamativum     Advanced directives, counseling/discussion     Hyperlipidemia LDL goal <100     Post herpetic neuralgia     Health Care Home     Chronic renal disease     Anxiety     GERD (gastroesophageal reflux disease)     Major depressive disorder, recurrent episode, in full remission (HCC)     Cough     Severe obesity (BMI >= 40) (H)     Cellulitis of right lower extremity     CKD (chronic kidney disease) stage 3, GFR 30-59 ml/min     Right shoulder pain      Past Medical History:   Diagnosis Date     Allergic rhinitis, cause unspecified      Cellulitis and abscess of leg, except foot recurrent , both legs    begins with fever, nausea,  diarrhea, vomiting & & the cellulitis may be visible a day or 2 later     Chronic pain     On chronic fentanyl patch.     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  less likely = raction to vancomycin      Generalized osteoarthrosis, unspecified site     films 9/04: L hhip bad; both knees mod severe medial OA     Generalized osteoarthrosis, unspecified site     L hip = advanced OA;; R knee bad also     GERD (gastroesophageal reflux disease) 11/15/2013     Herpes zoster without mention of complication 9/03    L shoulder; still has neuralgia     Hyperlipidemia      Hypertension      Methicillin susceptible Staphylococcus aureus septicemia (H) after L hip injection 205    ARDS, renal failure, staph sepsis after a hip joint injection     Obesity, unspecified      Other diseases of lung, not elsewhere classified 07/93    Interstitial lung process     Pneumonia, organism unspecified      Unspecified essential hypertension      Urticaria, unspecified 07/93    Diffuse severe (hospitalized).  (+) skin biopsy by Dr. Figueroa             Past Surgical History:     Past Surgical History:   Procedure Laterality Date     BIOPSY       C NONSPECIFIC PROCEDURE      Extensive oral (dental) surgery     C NONSPECIFIC PROCEDURE      Remote T&A     C NONSPECIFIC PROCEDURE  1/03    LAP CHOLY     C NONSPECIFIC PROCEDURE      L hip I + D x 2  4/05     C NONSPECIFIC PROCEDURE  2/05    colonoscopy     C NONSPECIFIC PROCEDURE      L hip I + D several other times      CATARACT IOL, RT/LT Right 2012     CHOLECYSTECTOMY       ENT SURGERY       PHACOEMULSIFICATION CLEAR CORNEA WITH STANDARD INTRAOCULAR LENS IMPLANT Left 3/10/2016    Procedure: PHACOEMULSIFICATION CLEAR CORNEA WITH STANDARD INTRAOCULAR LENS IMPLANT;  Surgeon: Dwight Metcalf MD;  Location:   [facility]            Home Medications:     Prior to Admission medications    Medication Sig Last Dose Taking? Auth Provider   CEPHALEXIN PO Take 500 mg by mouth daily as needed  Yes Reported, Patient   pregabalin (LYRICA) 150 MG capsule Take 1 capsule (150 mg) by mouth 2 times daily   Nelly Pearl MD   nortriptyline (PAMELOR) 25 MG capsule TAKE 2 CAPSULES(50 MG) BY MOUTH AT BEDTIME   Nelly Pearl MD   simvastatin (ZOCOR) 40 MG tablet TAKE 1 TABLET(40 MG) BY MOUTH AT BEDTIME   Nelly Pearl MD   gabapentin (NEURONTIN) 600 MG tablet Take 2 tablets (1,200 mg) by mouth 3 times daily   Nelly Pearl MD   lidocaine (LIDODERM) 5 % Patch Apply up to 3 patches to painful area at once for up to 12 h within a 24 h period.  Remove after 12 hours.   Nelly Pearl MD   simvastatin (ZOCOR) 40 MG tablet Take 1 tablet (40 mg) by mouth At Bedtime   Nelly Pearl MD   pregabalin (LYRICA) 75 MG capsule Take 1 capsule (75 mg) by mouth 2 times daily After 1 week, increase to 150mg (2 tablets) twice daily   Nelly Pearl MD   minocycline (MINOCIN) 50 MG capsule Take 1 capsule (50 mg) by mouth 2 times daily   Jim Corrigan MD   buPROPion (WELLBUTRIN XL) 300 MG 24 hr tablet Take 1 tablet (300 mg) by mouth every morning   Jim Corrigan MD   atenolol (TENORMIN) 50 MG tablet Take 1 tablet (50 mg) by mouth daily   Jim Corrigan MD   venlafaxine (EFFEXOR-XR) 150 MG 24 hr capsule Take 1 capsule (150 mg) by mouth daily   Jim Corrigan MD   pantoprazole (PROTONIX) 40 MG enteric coated tablet Take 1 tablet (40 mg) by mouth daily   Jim Corrigan MD   amLODIPine (NORVASC) 10 MG tablet Take 1 tablet (10 mg) by mouth every evening   Jim Corrigan MD   Multiple Vitamins-Minerals (CENTRUM SILVER) per tablet Take 1 tablet by mouth daily   Jim Corrigan MD   hydrOXYzine (ATARAX) 25 MG tablet Take 25 mg by mouth nightly as needed    Reported, Patient            Allergies:     Allergies    Allergen Reactions     Ceftriaxone Anaphylaxis and Rash     Rocephin given. Developed rash to back and abd, awaiting to see if it improves with d/c of rocephin.        Nafcillin Rash     diffuse severe rash in hospital 2/05     Penicillins Anaphylaxis     Vancomycin Anaphylaxis and Rash     Codeine Fatigue     Sleeps continuously     Clindamycin Rash     received information from patient     Zithromax [Azithromycin] Rash            Social History:     Social History   Substance Use Topics     Smoking status: Former Smoker     Smokeless tobacco: Never Used      Comment: started smoking 20's- quit in 1990     Alcohol use No      Comment: RARELY             Family History:     Family History   Problem Relation Age of Onset     Myocardial Infarction Father 63              Review of Systems:   The 10 point Review of Systems is negative other than as noted in the HPI.           Physical Exam:   Blood pressure 139/73, pulse 78, temperature 99.8  F (37.7  C), temperature source Oral, resp. rate 24, weight (!) 156.5 kg (345 lb), SpO2 99 %, not currently breastfeeding.  345 lbs 0 oz      GENERAL: Pleasant and cooperative. No acute distress. Severe obesity.  Some confusion.  EYES: Pupils equal and round. No scleral erythema or icterus.  ENT: External ears are normal without deformity. Posterior oropharynx is without erythem, swelling, or exudate.  NECK: Supple. No masses or swelling. No tenderness. Thyroid is normal without mass or tenderness.  CHEST: Clear to auscultation. Normal breath sounds. No retractions.   CV: Regular rate and rhythm. No JVD. Pulses normal.  ABDOMEN: Bowel sounds present. No tenderness. No masses or hernia.  EXTREMETIES: No clubbing, cyanosis, or ischemia.  SKIN: Warm and dry to touch. No wounds or rashes. Right leg is with emerging erythema medially behind the right knee and laterally in the lower leg  NEUROLOGIC: Strength and sensation are normal. Deep tendon reflexes are normal. Cranial nerves are  normal.             Data:   All new lab and imaging data was reviewed.     Results for orders placed or performed during the hospital encounter of 06/20/17 (from the past 24 hour(s))   UA with Microscopic   Result Value Ref Range    Color Urine Yellow     Appearance Urine Clear     Glucose Urine Negative NEG mg/dL    Bilirubin Urine Negative NEG    Ketones Urine 5 (A) NEG mg/dL    Specific Gravity Urine 1.012 1.003 - 1.035    Blood Urine Negative NEG    pH Urine 7.0 5.0 - 7.0 pH    Protein Albumin Urine Negative NEG mg/dL    Urobilinogen mg/dL Normal 0.0 - 2.0 mg/dL    Nitrite Urine Negative NEG    Leukocyte Esterase Urine Negative NEG    Source Catheterized Urine     WBC Urine <1 0 - 2 /HPF    RBC Urine <1 0 - 2 /HPF    Bacteria Urine Few (A) NEG /HPF    Mucous Urine Present (A) NEG /LPF   Urine Culture Aerobic Bacterial   Result Value Ref Range    Specimen Description Catheterized Urine     Special Requests Specimen received in preservative     Culture Micro Pending     Micro Report Status Pending    CBC with platelets differential   Result Value Ref Range    WBC 22.4 (H) 4.0 - 11.0 10e9/L    RBC Count 4.19 3.8 - 5.2 10e12/L    Hemoglobin 12.5 11.7 - 15.7 g/dL    Hematocrit 39.6 35.0 - 47.0 %    MCV 95 78 - 100 fl    MCH 29.8 26.5 - 33.0 pg    MCHC 31.6 31.5 - 36.5 g/dL    RDW 14.2 10.0 - 15.0 %    Platelet Count 156 150 - 450 10e9/L    Diff Method Automated Method     % Neutrophils 92.1 %    % Lymphocytes 3.0 %    % Monocytes 3.5 %    % Eosinophils 0.1 %    % Basophils 0.2 %    % Immature Granulocytes 1.1 %    Nucleated RBCs 0 0 /100    Absolute Neutrophil 20.6 (H) 1.6 - 8.3 10e9/L    Absolute Lymphocytes 0.7 (L) 0.8 - 5.3 10e9/L    Absolute Monocytes 0.8 0.0 - 1.3 10e9/L    Absolute Eosinophils 0.0 0.0 - 0.7 10e9/L    Absolute Basophils 0.1 0.0 - 0.2 10e9/L    Abs Immature Granulocytes 0.2 0 - 0.4 10e9/L    Absolute Nucleated RBC 0.0    Comprehensive metabolic panel   Result Value Ref Range    Sodium 137 133 -  144 mmol/L    Potassium 4.2 3.4 - 5.3 mmol/L    Chloride 103 94 - 109 mmol/L    Carbon Dioxide 26 20 - 32 mmol/L    Anion Gap 8 3 - 14 mmol/L    Glucose 123 (H) 70 - 99 mg/dL    Urea Nitrogen 20 7 - 30 mg/dL    Creatinine 1.14 (H) 0.52 - 1.04 mg/dL    GFR Estimate 46 (L) >60 mL/min/1.7m2    GFR Estimate If Black 56 (L) >60 mL/min/1.7m2    Calcium 7.9 (L) 8.5 - 10.1 mg/dL    Bilirubin Total 0.9 0.2 - 1.3 mg/dL    Albumin 3.5 3.4 - 5.0 g/dL    Protein Total 7.9 6.8 - 8.8 g/dL    Alkaline Phosphatase 111 40 - 150 U/L    ALT 45 0 - 50 U/L    AST 41 0 - 45 U/L   Blood culture   Result Value Ref Range    Specimen Description Blood Left Hand     Special Requests Aerobic and anaerobic bottles received     Culture Micro Pending     Micro Report Status Pending    Blood culture   Result Value Ref Range    Specimen Description Blood Right Shoulder     Special Requests Aerobic and anaerobic bottles received     Culture Micro Pending     Micro Report Status Pending    XR Chest 2 Views    Narrative    XR CHEST 2 VW  6/21/2017 12:34 AM     INDICATION: Fever.    COMPARISON: 3/31/2015.      Impression    IMPRESSION: No new focal air-space disease or other significant  change. Mild interstitial prominence in both lungs is stable. Heart  size is near the upper limits of normal. Degenerative changes thoracic  spine.    DANG DOUGLAS MD   ISTAT gases lactate madeleine POCT   Result Value Ref Range    Ph Venous 7.40 7.32 - 7.43 pH    PCO2 Venous 41 40 - 50 mm Hg    PO2 Venous 35 25 - 47 mm Hg    Bicarbonate Venous 25 21 - 28 mmol/L    O2 Sat Venous 67 %    Lactic Acid 0.7 0.7 - 2.1 mmol/L

## 2017-06-21 NOTE — PROGRESS NOTES
SPIRITUAL HEALTH SERVICES Progress Note  [facility]      I attempted to visit the Patient Berenice per  request and informed her Yesi no longer works here at [place]. I explained [facility] is here for her support and offered to return when her family was gone. Berenice agreed. I will f/u tomorrow.         Alba Frausto   Intern  [phone]

## 2017-06-21 NOTE — PROGRESS NOTES
Patient requires contact precautions due to MRSA in Toe 08/04/06, please contact Infection Prevention with any questions or concerns at [phone].

## 2017-06-21 NOTE — TELEPHONE ENCOUNTER
gabapentin (NEURONTIN) 600 MG tablet  Last Written Prescription Date:  3/15/17  Last Fill Quantity: 540,   # refills: 3  [place] [facility] or  Health prescribing provider: 3/15/17  Future Office visit:       Routing refill request to provider for review/approval because:  Drug not on the [facility] or [facility] refill protocol or controlled substance

## 2017-06-21 NOTE — ED PROVIDER NOTES
History     Chief Complaint:  Fever    HPI     History is provided by the patient's  as the patient is confused.     Berenice Chaudhari is a 77 year old female who presents with her  for fall evaluation and a fever. The patient first noticed a fever at 1400 today. She got in her recliner to rest. She then wanted to go to the bathroom. Her  notes transferring the patient to her wheelchair was difficult. At 170, the patient was in the bathroom, did not sit on toilet right, so she slid down the wall, scraped her left foot, and hurt her left knee. After the fall, the patient thought there was other people in the bathroom and told her  put a blanket over them. Her  states visual hallucinations like this are typical for the patient when she has a fever and infection. Also, the patient typically has fevers when she has cellulitis. Of note, the patient is being treated with Minocycline for infection control as she has immune suppression.     Allergies:  Ceftriaxone, anaphylaxis and rash  Nafcillin, rash  Penicillins, anaphylaxis  Vancomycin, anaphylaxis and rash  Codeine, fatigue  Clindamycin, rash  Zithromax, rash     Medications:    Pregabalin (lyrica) 150 mg capsule  Nortriptyline (pamelor) 25 mg capsule  Simvastatin (zocor) 40 mg tablet  Gabapentin (neurontin) 600 mg tablet  Lidocaine (lidoderm) 5 % patch  Simvastatin (zocor) 40 mg tablet  Pregabalin (lyrica) 75 mg capsule  Minocycline (minocin) 50 mg capsule  Bupropion (wellbutrin xl) 300 mg 24 hr tablet  Atenolol (tenormin) 50 mg tablet  Venlafaxine (effexor-xr) 150 mg 24 hr capsule  Pantoprazole (protonix) 40 mg enteric coated tablet  Amlodipine (norvasc) 10 mg tablet  Multiple vitamins-minerals (centrum silver) per tablet  Hydroxyzine (atarax) 25 mg tablet    Past Medical History:    CKD  Anxiety  Post herpetic neuralgia  Chronic renal disease  Allergic rhinitis, cause unspecified   Cellulitis and abscess of leg, except foot    Chronic pain   Contact dermatitis and other eczema, due to unspecified cause   Depression   Erythroderma desquamativum   Generalized osteoarthrosis, unspecified site   Generalized osteoarthrosis, unspecified site   GERD (gastroesophageal reflux disease)   Herpes zoster without mention of complication   Hyperlipidemia   Hypertension   Methicillin susceptible Staphylococcus aureus septicemia (H)   Obesity, unspecified   Other diseases of lung, not elsewhere classified   Pneumonia, organism unspecified   Unspecified essential hypertension   Urticaria, unspecified       Past Surgical History:    Remote T&A  Phacoemulsification clear cornea with standard intraocular lens implant  Colonoscopy  Cataract  Cholecystectomy    Family History:    Father: MI    Social History:  Former Smoker, started smoking 20's, quit in 1990  Negative for alcohol use.  Marital Status:   [2]     Review of Systems   Constitutional: Positive for fever.   Psychiatric/Behavioral: Positive for confusion and hallucinations.   All other systems reviewed and are negative.      Physical Exam   First Vitals:  Patient Vitals for the past 24 hrs:   BP Temp Temp src Pulse Resp SpO2   06/20/17 2218 (!) 113/91 99.4  F (37.4  C) Oral 78 24 98 %          Physical Exam   Constitutional: She is cooperative.   Very heavy   HENT:   Right Ear: Tympanic membrane normal.   Left Ear: Tympanic membrane normal.   Mouth/Throat: Oropharynx is clear and moist and mucous membranes are normal.   Eyes: Conjunctivae are normal.   Neck: Normal range of motion.   Cardiovascular: Regular rhythm and normal heart sounds.    Pulmonary/Chest:   occasional cough and upper airway congestion   Abdominal: Soft. Normal appearance and bowel sounds are normal. There is no rebound and no guarding.   Musculoskeletal:        Left knee: She exhibits erythema.   Lymphadenopathy:     She has no cervical adenopathy.   Neurological: She is alert.   Skin: Skin is warm and dry. Abrasion  noted.   Abrasion left heel   Psychiatric: Cognition and memory are impaired.   Clearly confused at times       Emergency Department Course   Imaging:  Radiographic findings were communicated with the patient who voiced understanding of the findings.    XR Chest 2 Views  No new focal air-space disease or other significant change. Mild interstitial prominence in both lungs is stable. Heart size is near the upper limits of normal. Degenerative changes thoracic spine. As per radiology.     Laboratory:  Urinalysis: Ketones: 5, Bacteria: Few, Mucous: Present, o/w negative.   Urine culture: pending    Blood culture: pending  Blood culture: pending    CBC: pending  CMP: pending    ISTAT Lactic acid: 0.72    Interventions:  0009 Levofloxacin 750 mg IV  0010 NS 1 L IV    Emergency Department Course:  Nursing notes and vitals reviewed. I performed an exam of the patient as documented above.     The above workup was undertaken.    0000 I reevaluated the patient and provided an update in regards to her ED course.      0120: I consulted with Dr. Guzmán regarding the appropriate course of treatment for the patient.     Findings and plan explained to the Patient who consents to admission. Discussed the patient with Dr. Guzmán, who will admit the patient to a medical bed for further monitoring, evaluation, and treatment.      Impression & Plan    Medical Decision Making:  Berenice Chaudhari is a 77 year old female with a history of a recurrent cellulitis, presents now with fever, weakness, and altered mental status. She has some fairly unimpressive erythema in the left knee area that  says is often how her cellulitis starts, but is typically rapidly progressive. Here she is obviously confused. With her history I treated aggressively. She has multiple antibiotic allergies, so IV Levofloxacin was given as well as an IV bolus. I think she requires hospital admission. I discussed the case with Dr. Guzmán, of the hospitalist  service, who has kindly agreed to admit the patient for further evaluation and management.     Diagnosis:    ICD-10-CM    1. Cellulitis of left lower extremity L03.116        Disposition:  Admitted to Medicine    I, Linda Pinon, am serving as a scribe on 6/20/2017 at 10:19 PM to personally document services performed by Heather Harris MD based on my observations and the provider's statements to me.     Linda Pinon  6/20/2017   [facility] EMERGENCY DEPARTMENT       Heather Harris MD  06/21/17 0127

## 2017-06-21 NOTE — PHARMACY-ADMISSION MEDICATION HISTORY
Admission medication history interview status for this patient is complete. See [facility] admission navigator for allergy information, prior to admission medications and immunization status.     Medication history interview source(s):Patient  Medication history resources (including written lists, pill bottles, clinic record):[facility]  Primary pharmacy:[facility]    Changes made to PTA medication list:  Added: none  Deleted: none  Changed: none    Actions taken by pharmacist (provider contacted, etc):None     Additional medication history information:None    Medication reconciliation/reorder completed by provider prior to medication history? No    For patients on insulin therapy: N (Y/N)  Lantus/levemir/NPH/Mix 70/30 dose:   (Y/N) (see below)   Sliding scale Novolog Y/N  If Yes, do you have a baseline novolog pre-meal dose:  units with meals   Patients eat three meals a day:   Y/N     Any Barriers to therapy:  cost of medications/comfortable with giving injections (if applicable)/ comfortable and confident with current diabetes regimen:       Prior to Admission medications    Medication Sig Last Dose Taking? Auth Provider   gabapentin (NEURONTIN) 600 MG tablet Take 2 tablets (1,200 mg) by mouth 3 times daily 6/20/2017 at Unknown time Yes Nelly Pearl MD   CEPHALEXIN PO Take 500 mg by mouth 3 times daily as needed (when cellulitis is coming on)   Yes Reported, Patient   pregabalin (LYRICA) 150 MG capsule Take 1 capsule (150 mg) by mouth 2 times daily 6/20/2017 at Unknown time Yes Nelly Pearl MD   nortriptyline (PAMELOR) 25 MG capsule TAKE 2 CAPSULES(50 MG) BY MOUTH AT BEDTIME 6/19/2017 at Unknown time Yes Nelly Pearl MD   lidocaine (LIDODERM) 5 % Patch Apply up to 3 patches to painful area at once for up to 12 h within a 24 h period.  Remove after 12 hours. prn Yes Nelly Pearl MD   simvastatin (ZOCOR) 40 MG tablet Take 1 tablet (40 mg) by mouth At Bedtime 6/20/2017 at Unknown  time Yes Nelly Pearl MD   minocycline (MINOCIN) 50 MG capsule Take 1 capsule (50 mg) by mouth 2 times daily 6/20/2017 at Unknown time Yes Jim Corrigan MD   buPROPion (WELLBUTRIN XL) 300 MG 24 hr tablet Take 1 tablet (300 mg) by mouth every morning 6/20/2017 at Unknown time Yes Jim Corrigan MD   atenolol (TENORMIN) 50 MG tablet Take 1 tablet (50 mg) by mouth daily 6/20/2017 at Unknown time Yes Jim Corrigan MD   venlafaxine (EFFEXOR-XR) 150 MG 24 hr capsule Take 1 capsule (150 mg) by mouth daily 6/20/2017 at Unknown time Yes Jim Corrigan MD   pantoprazole (PROTONIX) 40 MG enteric coated tablet Take 1 tablet (40 mg) by mouth daily 6/20/2017 at Unknown time Yes Jim Corrigan MD   amLODIPine (NORVASC) 10 MG tablet Take 1 tablet (10 mg) by mouth every evening 6/19/2017 at Unknown time Yes Jim Corrigan MD   Multiple Vitamins-Minerals (CENTRUM SILVER) per tablet Take 1 tablet by mouth daily 6/20/2017 at Unknown time Yes Jim Corrigan MD   hydrOXYzine (ATARAX) 25 MG tablet Take 25 mg by mouth nightly as needed    Reported, Patient

## 2017-06-21 NOTE — ED NOTES
Bed: ED39  Expected date: 6/20/17  Expected time: 9:48 PM  Means of arrival: Ambulance  Comments:  BRIEN

## 2017-06-21 NOTE — CONSULTS
ID consult dictated IMP 1 76 yo acute sepsis, ? R leg cellulitis    REc frank (has historically james). Await cxs

## 2017-06-22 LAB
ANION GAP SERPL CALCULATED.3IONS-SCNC: 6 MMOL/L (ref 3–14)
BASOPHILS # BLD AUTO: 0.1 10E9/L (ref 0–0.2)
BASOPHILS NFR BLD AUTO: 0.7 %
BUN SERPL-MCNC: 13 MG/DL (ref 7–30)
CALCIUM SERPL-MCNC: 8.5 MG/DL (ref 8.5–10.1)
CHLORIDE SERPL-SCNC: 109 MMOL/L (ref 94–109)
CO2 SERPL-SCNC: 27 MMOL/L (ref 20–32)
CREAT SERPL-MCNC: 1.13 MG/DL (ref 0.52–1.04)
DIFFERENTIAL METHOD BLD: ABNORMAL
EOSINOPHIL # BLD AUTO: 0.4 10E9/L (ref 0–0.7)
EOSINOPHIL NFR BLD AUTO: 5.4 %
ERYTHROCYTE [DISTWIDTH] IN BLOOD BY AUTOMATED COUNT: 14.4 % (ref 10–15)
GFR SERPL CREATININE-BSD FRML MDRD: 47 ML/MIN/1.7M2
GLUCOSE SERPL-MCNC: 86 MG/DL (ref 70–99)
HCT VFR BLD AUTO: 38.2 % (ref 35–47)
HGB BLD-MCNC: 12.2 G/DL (ref 11.7–15.7)
IMM GRANULOCYTES # BLD: 0 10E9/L (ref 0–0.4)
IMM GRANULOCYTES NFR BLD: 0.3 %
LYMPHOCYTES # BLD AUTO: 1.1 10E9/L (ref 0.8–5.3)
LYMPHOCYTES NFR BLD AUTO: 14.7 %
MAGNESIUM SERPL-MCNC: 1.7 MG/DL (ref 1.6–2.3)
MCH RBC QN AUTO: 30.3 PG (ref 26.5–33)
MCHC RBC AUTO-ENTMCNC: 31.9 G/DL (ref 31.5–36.5)
MCV RBC AUTO: 95 FL (ref 78–100)
MONOCYTES # BLD AUTO: 0.7 10E9/L (ref 0–1.3)
MONOCYTES NFR BLD AUTO: 9.4 %
NEUTROPHILS # BLD AUTO: 5 10E9/L (ref 1.6–8.3)
NEUTROPHILS NFR BLD AUTO: 69.5 %
NRBC # BLD AUTO: 0 10*3/UL
NRBC BLD AUTO-RTO: 0 /100
PLATELET # BLD AUTO: 121 10E9/L (ref 150–450)
POTASSIUM SERPL-SCNC: 3.8 MMOL/L (ref 3.4–5.3)
RBC # BLD AUTO: 4.02 10E12/L (ref 3.8–5.2)
SODIUM SERPL-SCNC: 142 MMOL/L (ref 133–144)
WBC # BLD AUTO: 7.2 10E9/L (ref 4–11)

## 2017-06-22 PROCEDURE — 25000125 ZZHC RX 250: Performed by: INTERNAL MEDICINE

## 2017-06-22 PROCEDURE — 25000132 ZZH RX MED GY IP 250 OP 250 PS 637: Performed by: INTERNAL MEDICINE

## 2017-06-22 PROCEDURE — 85025 COMPLETE CBC W/AUTO DIFF WBC: CPT | Performed by: INTERNAL MEDICINE

## 2017-06-22 PROCEDURE — 80048 BASIC METABOLIC PNL TOTAL CA: CPT | Performed by: INTERNAL MEDICINE

## 2017-06-22 PROCEDURE — 83735 ASSAY OF MAGNESIUM: CPT | Performed by: INTERNAL MEDICINE

## 2017-06-22 PROCEDURE — 84132 ASSAY OF SERUM POTASSIUM: CPT | Performed by: INTERNAL MEDICINE

## 2017-06-22 PROCEDURE — 12000000 ZZH R&B MED SURG/OB

## 2017-06-22 PROCEDURE — 99232 SBSQ HOSP IP/OBS MODERATE 35: CPT | Performed by: INTERNAL MEDICINE

## 2017-06-22 PROCEDURE — 25000128 H RX IP 250 OP 636: Performed by: INTERNAL MEDICINE

## 2017-06-22 PROCEDURE — 36415 COLL VENOUS BLD VENIPUNCTURE: CPT | Performed by: INTERNAL MEDICINE

## 2017-06-22 RX ORDER — LIDOCAINE 50 MG/G
PATCH TOPICAL
Qty: 60 PATCH | Refills: 2 | Status: ON HOLD | OUTPATIENT
Start: 2017-06-22 | End: 2018-04-10

## 2017-06-22 RX ORDER — MINOCYCLINE HYDROCHLORIDE 50 MG/1
50 CAPSULE ORAL 2 TIMES DAILY
Status: DISCONTINUED | OUTPATIENT
Start: 2017-06-22 | End: 2017-06-23 | Stop reason: HOSPADM

## 2017-06-22 RX ADMIN — SODIUM CHLORIDE: 9 INJECTION, SOLUTION INTRAVENOUS at 08:58

## 2017-06-22 RX ADMIN — ENOXAPARIN SODIUM 40 MG: 40 INJECTION SUBCUTANEOUS at 08:48

## 2017-06-22 RX ADMIN — ENOXAPARIN SODIUM 40 MG: 40 INJECTION SUBCUTANEOUS at 21:15

## 2017-06-22 RX ADMIN — PANTOPRAZOLE SODIUM 40 MG: 40 TABLET, DELAYED RELEASE ORAL at 08:47

## 2017-06-22 RX ADMIN — Medication 2 G: at 09:12

## 2017-06-22 RX ADMIN — PREGABALIN 150 MG: 75 CAPSULE ORAL at 19:53

## 2017-06-22 RX ADMIN — LIDOCAINE 1 PATCH: 50 PATCH CUTANEOUS at 08:48

## 2017-06-22 RX ADMIN — ATENOLOL 50 MG: 50 TABLET ORAL at 08:53

## 2017-06-22 RX ADMIN — VENLAFAXINE HYDROCHLORIDE 150 MG: 150 TABLET, EXTENDED RELEASE ORAL at 08:47

## 2017-06-22 RX ADMIN — AMLODIPINE BESYLATE 10 MG: 10 TABLET ORAL at 19:53

## 2017-06-22 RX ADMIN — GABAPENTIN 1200 MG: 600 TABLET, FILM COATED ORAL at 14:59

## 2017-06-22 RX ADMIN — PREGABALIN 150 MG: 75 CAPSULE ORAL at 08:47

## 2017-06-22 RX ADMIN — SIMVASTATIN 40 MG: 40 TABLET, FILM COATED ORAL at 21:15

## 2017-06-22 RX ADMIN — GABAPENTIN 1200 MG: 600 TABLET, FILM COATED ORAL at 19:53

## 2017-06-22 RX ADMIN — MINOCYCLINE HYDROCHLORIDE 50 MG: 50 CAPSULE ORAL at 19:53

## 2017-06-22 RX ADMIN — MINOCYCLINE HYDROCHLORIDE 50 MG: 50 CAPSULE ORAL at 09:12

## 2017-06-22 RX ADMIN — LEVOFLOXACIN 500 MG: 5 INJECTION, SOLUTION INTRAVENOUS at 21:15

## 2017-06-22 RX ADMIN — GUAIFENESIN AND DEXTROMETHORPHAN HYDROBROMIDE 1 TABLET: 600; 30 TABLET, EXTENDED RELEASE ORAL at 22:33

## 2017-06-22 RX ADMIN — GABAPENTIN 1200 MG: 600 TABLET, FILM COATED ORAL at 08:47

## 2017-06-22 RX ADMIN — POTASSIUM CHLORIDE 20 MEQ: 1500 TABLET, EXTENDED RELEASE ORAL at 08:47

## 2017-06-22 RX ADMIN — BUPROPION HYDROCHLORIDE 300 MG: 150 TABLET, FILM COATED, EXTENDED RELEASE ORAL at 08:47

## 2017-06-22 NOTE — TELEPHONE ENCOUNTER
Prescription approved per [facility] Refill Protocol.    Due for follow up 9/2017 per last office visit. Refilled till then.    Dennise Nick, MSN, RN-BC

## 2017-06-22 NOTE — PROGRESS NOTES
[facility]  Hospitalist Progress Note  Wilder Odom, DO 06/22/2017    Reason for Stay (Diagnosis): Right leg cellulitis         Assessment and Plan:      Summary of Stay: Berenice Chaudhari is a 77 year old female admitted on 6/20/2017 with Right leg cellulitis    Problem List:   1. Right leg cellulitis.  Continue Levaquin.  Restart Minocycline.  ID following.  2. HTN.  Continue Atenolol, Norvasc.  3. Hyperlipidemia.  Continue Zocor.  4. GERD.  Protonix.  5. Depression.  Effexor, Wellbutrin.  6. Urinary retention.  Place mtz catheter.  7. Obesity.  Would benefit from weight loss long term.  8. Chronic kidney disease.  Stage 3.  Creatinine at baseline.  Avoid nephrotoxins.  DVT Prophylaxis: Enoxaparin (Lovenox) SQ  Code Status: Full Code  Discharge Dispo: Home  Estimated Disch Date / # of Days until Disch: 2-4        Interval History (Subjective):      Denies CP, SOB, F/C, N/V, or diarrhea.                  Physical Exam:      Last Vital Signs:  /78  Pulse 78  Temp 97.6  F (36.4  C) (Oral)  Resp 18  Wt (!) 156.5 kg (345 lb)  SpO2 100%  BMI 59.22 kg/m2    Gen:  NAD, A&Ox3.  Eyes:  PERRL, sclera anicteric.  OP:  MMM, no lesions.  Neck:  Supple.  CV:  Regular, no murmurs.  Lung:  CTA b/l, normal effort.  Ab:  +BS, soft.  Skin:  Warm, dry to touch.  Right leg has area of erythema.  Ext:  Moderate non pitting edema LE b/l.           Medications:      All current medications were reviewed with changes reflected in problem list.         Data:      All new lab and imaging data was reviewed.   Labs:    Recent Labs  Lab 06/22/17  0703      POTASSIUM 3.8   CHLORIDE 109   CO2 27   ANIONGAP 6   GLC 86   BUN 13   CR 1.13*   GFRESTIMATED 47*   GFRESTBLACK 57*   SHILOH 8.5       Recent Labs  Lab 06/22/17  0703   WBC 7.2   HGB 12.2   HCT 38.2   MCV 95   *      Imaging:   No results found for this or any previous visit (from the past 24 hour(s)).

## 2017-06-22 NOTE — PLAN OF CARE
Problem: Goal Outcome Summary  Goal: Goal Outcome Summary  Outcome: No Change  Pt alert and oriented. Denied pain and declined repositioning per 1st rounds.  Right leg slight redness noted lower leg moving up to above knee, warm to touch.   Left heal bruise noted and redness under breasts noted and gauze intact.    96.7, 16, 84, 126/51, 94%RA. Will continue to monitor.

## 2017-06-22 NOTE — PLAN OF CARE
Problem: Goal Outcome Summary  Goal: Goal Outcome Summary  Outcome: No Change  Day RN  VS monitored, contact ISO, blanchable redness to side/back of R LE, bruises on L side of body from fall at home, 2 open skin tears on L heel, pt c/o of these the most, covered w/mepilex, up w/A2 stand pivot to w/c and toilet, IV Levaquin and PO Minocycline cont, voiding but retaining, obtained order from Dr. Odom for mtz, placed this afternoon, Potassium and Magnesium replaced, rechecks ordered for tomorrow, denies pain, home upon d/c, will cont to monitor.

## 2017-06-22 NOTE — PROGRESS NOTES
SPIRITUAL HEALTH SERVICES Progress Note  [facility]     Attempted to visit with Pt. Berenice, she no longer needed [facility] services.    I and other Chaplains remain available per pt/family/staff request.     Alba Frausto   Intern  [phone]

## 2017-06-22 NOTE — PLAN OF CARE
Problem: Goal Outcome Summary  Goal: Goal Outcome Summary  Outcome: Improving  Vital signs stable.  Lungs clear, diminished in bases, dyspnea with activity.  Bowel sounds hyperactive, bm this evening.Voiding in large amounts  Pt uses wheelchair at home.Bilateral leg edema with slight redness to right leg.Medicated with tylenol for pain in legs.  On iv levaquin, awaiting blood cultures.  Max assist of 2 to transfer from bed to wheelchair.  Contact isolation precautions.for MRSA  Discussed with pt.Handout given to pt with information.

## 2017-06-22 NOTE — PROGRESS NOTES
[facility]  Infectious Disease Progress Note          Assessment and Plan:   IMPRESSION:   1.  A 77-year-old female, very well known to me with complicated long-term infection and medical history, now admitted with apparent acute sepsis including confusion and low-grade fever, appears to be evolving a probable right leg cellulitis, although not entirely clear, cultures are all pending, is clinically improved after a dose of Levaquin.   2.  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  Has not had any episodes the last 2-1/2 years.   3.  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 Judge who is retired.   4.  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   5.  Listed allergies to penicillin, cephalosporins, clindamycin, Zithromax, vancomycin, has tolerated carbapenems and daptomycin, has been able to tolerate vancomycin with proper preventive measures.   6.  History of congestive heart failure.     7.  Mild chronic renal insufficiency.   8.  Chronic postherpetic neuralgia.   9.  Morbid obesity.       RECOMMENDATIONS:   1.  Usually on her do meropenem as though treating a leg cellulitis obviously excess broad, but based on her prior tolerance is a logical drug, Levaquin probably not ideal if she truly is evolving the leg cellulitis, but is improved and allows oral conversion so same for now, if gets worse switch to frank   2.  Follow up on blood cultures, fever, mentation, appearance of her right leg and labs and readjust treatment accordingly.   3.  For now, continue the minocycline long term, she said she is open to the idea of possibly stopping, overall it seems likely that that infection is not an active problem.   4 Anxious to go but would keep until tomorrow at least and get BC back  etc        Interval History:   no new complaints and doing well; no cp, sob, n/v/d, or abd pain.leg feels Ok, no further fever , BC neg so far              Medications:       amLODIPine  10 mg Oral QPM     atenolol  50 mg Oral Daily     buPROPion  300 mg Oral QAM     gabapentin  1,200 mg Oral TID     lidocaine  1-3 patch Transdermal Q24H     pantoprazole  40 mg Oral Daily     pregabalin  150 mg Oral BID     simvastatin  40 mg Oral At Bedtime     venlafaxine  150 mg Oral Daily     enoxaparin  40 mg Subcutaneous Q24H     levofloxacin  500 mg Intravenous Q24H     lidocaine   Transdermal Q24h     lidocaine   Transdermal Q8H                  Physical Exam:   Blood pressure 126/51, pulse 78, temperature 96.7  F (35.9  C), resp. rate 16, weight (!) 156.5 kg (345 lb), SpO2 94 %, not currently breastfeeding.  Wt Readings from Last 2 Encounters:   06/21/17 (!) 156.5 kg (345 lb)   03/15/17 (!) 147.9 kg (326 lb)     Vital Signs with Ranges  Temp:  [96.7  F (35.9  C)-99.1  F (37.3  C)] 96.7  F (35.9  C)  Heart Rate:  [66-84] 84  Resp:  [16-22] 16  BP: (113-142)/(39-54) 126/51  SpO2:  [94 %-100 %] 94 %    Constitutional: Awake, alert, cooperative, no apparent distress   Lungs: Clear to auscultation bilaterally, no crackles or wheezing   Cardiovascular: Regular rate and rhythm, normal S1 and S2, and no murmur noted   Abdomen: Normal bowel sounds, soft, non-distended, non-tender   Skin: No rashes, no cyanosis, same edema sl red R leg about same   Other:           Data:   All microbiology laboratory data reviewed.  Recent Labs   Lab Test  06/22/17   0703  06/21/17 0658  06/20/17   2330  10/27/16   1133   WBC  7.2   --   22.4*  6.2   HGB  12.2   --   12.5  12.7   HCT  38.2   --   39.6  39.4   MCV  95   --   95  96   PLT  121*  127*  156  163     Recent Labs   Lab Test  06/21/17 0658  06/20/17   2330  10/27/16   1133   CR  1.11*  1.14*  1.19*     Recent Labs   Lab Test  03/18/15   1600   SED  27     Recent Labs   Lab Test   06/21/17   0001  06/20/17   2330  06/20/17   2304  03/31/15   2035  03/31/15   2033  11/04/14   1850  11/04/14   1825  09/26/14   1058  09/26/14   0855   CULT  No growth after 1 day  No growth after 1 day  Culture in progress  No growth  No growth  No growth  No growth  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

## 2017-06-23 ENCOUNTER — APPOINTMENT (OUTPATIENT)
Dept: GENERAL RADIOLOGY | Facility: CLINIC | Age: 77
DRG: 871 | End: 2017-06-23
Attending: INTERNAL MEDICINE
Payer: COMMERCIAL

## 2017-06-23 VITALS
DIASTOLIC BLOOD PRESSURE: 43 MMHG | WEIGHT: 293 LBS | OXYGEN SATURATION: 95 % | HEART RATE: 78 BPM | SYSTOLIC BLOOD PRESSURE: 136 MMHG | RESPIRATION RATE: 18 BRPM | TEMPERATURE: 97.8 F | BODY MASS INDEX: 59.22 KG/M2

## 2017-06-23 LAB
MAGNESIUM SERPL-MCNC: 1.9 MG/DL (ref 1.6–2.3)
NT-PROBNP SERPL-MCNC: 2571 PG/ML (ref 0–1800)
POTASSIUM SERPL-SCNC: 3.7 MMOL/L (ref 3.4–5.3)

## 2017-06-23 PROCEDURE — 25000132 ZZH RX MED GY IP 250 OP 250 PS 637: Performed by: INTERNAL MEDICINE

## 2017-06-23 PROCEDURE — 83880 ASSAY OF NATRIURETIC PEPTIDE: CPT | Performed by: INTERNAL MEDICINE

## 2017-06-23 PROCEDURE — 83735 ASSAY OF MAGNESIUM: CPT | Performed by: INTERNAL MEDICINE

## 2017-06-23 PROCEDURE — 99239 HOSP IP/OBS DSCHRG MGMT >30: CPT | Performed by: INTERNAL MEDICINE

## 2017-06-23 PROCEDURE — 84132 ASSAY OF SERUM POTASSIUM: CPT | Performed by: INTERNAL MEDICINE

## 2017-06-23 PROCEDURE — 25000128 H RX IP 250 OP 636: Performed by: INTERNAL MEDICINE

## 2017-06-23 PROCEDURE — 71010 XR CHEST PORT 1 VW: CPT

## 2017-06-23 PROCEDURE — 36415 COLL VENOUS BLD VENIPUNCTURE: CPT | Performed by: INTERNAL MEDICINE

## 2017-06-23 PROCEDURE — 25000125 ZZHC RX 250: Performed by: INTERNAL MEDICINE

## 2017-06-23 RX ORDER — LEVOFLOXACIN 500 MG/1
500 TABLET, FILM COATED ORAL DAILY
Qty: 7 TABLET | Refills: 0 | Status: SHIPPED | OUTPATIENT
Start: 2017-06-23 | End: 2017-06-30

## 2017-06-23 RX ADMIN — ENOXAPARIN SODIUM 40 MG: 40 INJECTION SUBCUTANEOUS at 09:17

## 2017-06-23 RX ADMIN — ACETAMINOPHEN 650 MG: 325 TABLET, FILM COATED ORAL at 09:15

## 2017-06-23 RX ADMIN — POTASSIUM CHLORIDE 20 MEQ: 1500 TABLET, EXTENDED RELEASE ORAL at 11:50

## 2017-06-23 RX ADMIN — PANTOPRAZOLE SODIUM 40 MG: 40 TABLET, DELAYED RELEASE ORAL at 09:15

## 2017-06-23 RX ADMIN — MINOCYCLINE HYDROCHLORIDE 50 MG: 50 CAPSULE ORAL at 09:17

## 2017-06-23 RX ADMIN — PREGABALIN 150 MG: 75 CAPSULE ORAL at 09:15

## 2017-06-23 RX ADMIN — GABAPENTIN 1200 MG: 600 TABLET, FILM COATED ORAL at 14:18

## 2017-06-23 RX ADMIN — Medication 2 G: at 11:50

## 2017-06-23 RX ADMIN — GABAPENTIN 1200 MG: 600 TABLET, FILM COATED ORAL at 09:15

## 2017-06-23 RX ADMIN — BUPROPION HYDROCHLORIDE 300 MG: 150 TABLET, FILM COATED, EXTENDED RELEASE ORAL at 09:15

## 2017-06-23 RX ADMIN — VENLAFAXINE HYDROCHLORIDE 150 MG: 150 TABLET, EXTENDED RELEASE ORAL at 09:15

## 2017-06-23 RX ADMIN — ATENOLOL 50 MG: 50 TABLET ORAL at 09:16

## 2017-06-23 NOTE — CONSULTS
"Care Transitions Team:  met with pt. at bedside. Pt. Identified as high risk for readmission identified by Obesity/BMI 59.22 admitted with suspected LE cellulitis.   Care Transition assessment completed, demographics verified as correct.  Pt.'s PCP is Nelly Pearl of [facility] clinic.   Pt. reports living with a supportive . She continues to add that her mobility at baseline is a \"few steps and then pivot transfers.\"   Pt identified as needing 2 assist with pivot, over night shift,  Pt. explains that with a fever, \"which I have now, I am weaker.\"  Pt. is not anticipating any increased needs at home in regards to her mobility.  She has identifying  her  as a support, equipment in home is Zachary lift, Stair -chair elevator, grab bars in all bathrooms.   Pt weight is identified as a barrier.  BMI 59.22.  Information on getting started and support has been added to AVS.    If pt increases in weakness, PT/OT consult would be recommended.   Discharge follow up appointment has been scheduled     Jun 30, 2017 11:00 AM CDT   Office Visit with Nelly Pearl MD   [facility]    [address]  [address]  [place] [address] [phone]          This has been verified with pt and agree'd upon.   PCP handoff to be completed on discharge   Danielle Gann RN BSN CTS  Care Transitions Team  [phone]          "

## 2017-06-23 NOTE — PLAN OF CARE
Problem: Skin and Soft Tissue Infection (Adult)  Goal: Signs and Symptoms of Listed Potential Problems Will be Absent or Manageable (Skin and Soft Tissue Infection)  Signs and symptoms of listed potential problems will be absent or manageable by discharge/transition of care (reference Skin and Soft Tissue Infection (Adult) CPG).   Outcome: Improving  A&O x4. VSS. LS CTA all fields. BS active x4. Mild pinkness noted to RLE behind knee. CMS intact. Interdry placed under bilateral breasts and bilateral knees. Miconazole powder ordered to be placed under breasts and behind knees when arrives on floor. james PO well. up with A2 and pivot to W/C. Denied pain. voiding in good amts via mtz catheter, patient has urinary retention. May d/c home today if blood cx are negative.

## 2017-06-23 NOTE — PLAN OF CARE
Problem: Goal Outcome Summary  Goal: Goal Outcome Summary  Outcome: Improving  A&O. VSS. No complaints of pain. Up with A2 pivoting to wheelchair, pivoting from wheelchair to toilet. Parks patent, draining. Pt had large BM this shift. Redness to RLE improving. Tolerating diet, minimal appetite. Continues on IV Levaquin and Oral Minocycline. BC and UC pending. Possible discharge to home tomorrow. Will continue to monitor.

## 2017-06-23 NOTE — PROGRESS NOTES
[facility]  Infectious Disease Progress Note          Assessment and Plan:   IMPRESSION:   1.  A 77-year-old female, very well known to me with complicated long-term infection and medical history, now admitted with apparent acute sepsis including confusion and low-grade fever, appears to be evolving a probable right leg cellulitis, although not entirely clear, cultures are all pending, is clinically improved after a dose of Levaquin.   2.  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  Has not had any episodes the last 2-1/2 years.   3.  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 Judge who is retired.   4.  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   5.  Listed allergies to penicillin, cephalosporins, clindamycin, Zithromax, vancomycin, has tolerated carbapenems and daptomycin, has been able to tolerate vancomycin with proper preventive measures.   6.  History of congestive heart failure.     7.  Mild chronic renal insufficiency.   8.  Chronic postherpetic neuralgia.   9.  Morbid obesity.   10 UC +, UA nl, colonization      RECOMMENDATIONS:   1. UC not likely sig, but covered  2 fever resolved, cxs neg,OK home 1 week polevaquin         Interval History:   no new complaints and doing well; no cp, sob, n/v/d, or abd pain.leg feels Ok, no further fever , BC neg so far UC E coli              Medications:       minocycline  50 mg Oral BID     enoxaparin  40 mg Subcutaneous Q12H     amLODIPine  10 mg Oral QPM     atenolol  50 mg Oral Daily     buPROPion  300 mg Oral QAM     gabapentin  1,200 mg Oral TID     lidocaine  1-3 patch Transdermal Q24H     pantoprazole  40 mg Oral Daily     pregabalin  150 mg Oral BID     simvastatin  40 mg Oral At Bedtime     venlafaxine  150 mg Oral Daily      levofloxacin  500 mg Intravenous Q24H     lidocaine   Transdermal Q24h     lidocaine   Transdermal Q8H                  Physical Exam:   Blood pressure 154/50, pulse 78, temperature 98.7  F (37.1  C), temperature source Oral, resp. rate 18, weight (!) 156.5 kg (345 lb), SpO2 92 %, not currently breastfeeding.  Wt Readings from Last 2 Encounters:   06/21/17 (!) 156.5 kg (345 lb)   03/15/17 (!) 147.9 kg (326 lb)     Vital Signs with Ranges  Temp:  [98.7  F (37.1  C)-100  F (37.8  C)] 98.7  F (37.1  C)  Heart Rate:  [72-79] 79  Resp:  [18] 18  BP: ()/(50-68) 154/50  SpO2:  [92 %-97 %] 92 %    Constitutional: Awake, alert, cooperative, no apparent distress   Lungs: Clear to auscultation bilaterally, no crackles or wheezing   Cardiovascular: Regular rate and rhythm, normal S1 and S2, and no murmur noted   Abdomen: Normal bowel sounds, soft, non-distended, non-tender   Skin: No rashes, no cyanosis, same edema sl red R leg about same   Other:           Data:   All microbiology laboratory data reviewed.  Recent Labs   Lab Test  06/22/17   0703  06/21/17   0658  06/20/17   2330  10/27/16   1133   WBC  7.2   --   22.4*  6.2   HGB  12.2   --   12.5  12.7   HCT  38.2   --   39.6  39.4   MCV  95   --   95  96   PLT  121*  127*  156  163     Recent Labs   Lab Test  06/22/17   0703  06/21/17   0658  06/20/17   2330   CR  1.13*  1.11*  1.14*     Recent Labs   Lab Test  03/18/15   1600   SED  27     Recent Labs   Lab Test  06/21/17   0001  06/20/17   2330  06/20/17   2304  03/31/15   2035  03/31/15   2033  11/04/14   1850  11/04/14   1825  09/26/14   1058  09/26/14   0855   CULT  No growth after 2 days  No growth after 2 days  >100,000 colonies/mL Escherichia coli Additional susceptibilities in progress  6/23  <10,000 colonies/mL Escherichia coli Susceptibility testing in progress  Additional susceptibilities in progress 6/23  <10,000 colonies/mL Strain 2 Escherichia coli Susceptibility testing in progress  *  No growth  No  growth  No growth  No growth  Cultured on the 3rd day of incubation: Facchristineamia hominis  Critical Value/Significant Value, preliminary result only, called to and read   back by Ruthie Nixon RN [id number] 9/29/14 @0330 AV  *  No growth

## 2017-06-23 NOTE — DISCHARGE SUMMARY
PRIMARY CARE PHYSICIAN:  NONE GIVEN.        DATE OF ADMISSION:  06/20/2017.        DATE OF DISCHARGE:  06/23/2017.        DISCHARGE DIAGNOSES:   1.  Right lower extremity cellulitis   2.  Urinary retention.   3.  Obesity.   4.  Chronic kidney disease, stage III.   5.  Hypertension.   6.  Hyperlipidemia.   7.  Gastroesophageal reflux disease.   8.  Depression.   9.  Acute encephalopathy, resolved.   10.  Sepsis due to cellulitis.       PROCEDURES PERFORMED DURING HOSPITALIZATION:  Include:    1.  On 06/20/2017, a urinalysis that did have a few bacteria.    2.  Urine culture has grown greater than 100,000 colonies of E. coli.    3.  Blood cultures have shown no growth to date, but are still in the preliminary stages.   4.  Chest x-ray from 06/21/2017 shows no acute cardiopulmonary disease.  There is mild interstitial prominence of both lungs that was stable from previous.    5.  Repeat chest x-ray on 06/23/2017 showed mixed interstitial and airspace opacities over both lungs that was not significantly changed from previous.      HISTORY OF HOSPITALIZATION:  Berenice Chaudhari is a 77-year-old female who initially presented to the hospital with fever.  Please see admission history and physical for details.  The patient was found to have acute right lower extremity cellulitis.  Also noted to have encephalopathy.  The patient was started on antibiotics with Levaquin.  Was seen in consultation by Infectious Disease specialist due to previous history of multiple infections.  She was continued on Levaquin during hospital stay, also restarted on her usual home minocycline.  Has improved markedly by the day of discharge and will be allowed to discharge home with 7 more days of Levaquin in addition to her chronic minocycline dosing.        PHYSICAL EXAMINATION ON THE DAY OF DISCHARGE, 06/23/2017:   VITAL SIGNS:  Temperature 97.8, heart rate 71, blood pressure 136/43, respiratory rate 18, oxygen saturation 95%.   IN GENERAL:  No  acute distress, awake, alert and oriented x3.   HEENT:  Eyes:  Pupils equal, round, react to light.  Sclerae are nonicteric.  Oropharynx has moist mucous membranes, no lesions.   NECK:  Supple.   HEART:  Regular, no murmurs.   LUNGS:  Clear to auscultation bilaterally.  The patient is using normal respiratory effort to breathe.   ABDOMEN:  Has positive bowel sounds, soft.   SKIN:  Warm and dry to touch, mild erythema on right leg that is improved from previous.      DISCHARGE MEDICATIONS:   1.  Levaquin 500 mg a day for the next 7 days.   2.  Gabapentin 1200 mg 3 times a day.   3.  Cephalexin that she takes just if needed to try to prevent cellulitis, can continue to use this in the future if needed.   4.  Lyrica 150 mg twice a day.   5.  Nortriptyline 50 mg a day.   6.  Simvastatin 40 mg a day.   7.  Lidoderm patch as needed.   8.  Minocycline 50 mg twice a day.   9.  Wellbutrin extended release 300 mg a day.   10.  Atenolol 50 mg a day.   11.  Effexor extended release 150 mg a day.   12.  Protonix 40 mg a day.   13.  Norvasc 10 mg a day.   14.  Multivitamin once a day.      DISCHARGE DIET:  Is as tolerated.      DISCHARGE ACTIVITY:  Is as tolerated.      DISCHARGE FOLLOWUP:  The patient does need to follow up with her primary care physician within 1 week, needs a basic metabolic panel done at that time.      ALLERGIES:  The patient is allergic to ceftriaxone, nafcillin, penicillin, vancomycin, codeine, clindamycin and azithromycin.      TESTS PENDING AT TIME OF DISCHARGE:  The patient does have blood cultures from 06/20/2017 and 06/21/2017 that have shown no growth to date, but are still in the preliminary stages.  Also a urine culture that is still in the preliminary stages has shown E. coli.       Time spent working with the patient working on discharge on the day of discharge is 35 minutes.         SEJAL ALEJANDRA DO             D: 06/23/2017 16:49   T: 06/23/2017 17:13   MT: #[id number]      Name:     CAYETANO  EDDIE   MRN:      [id number]        Account:        [id number]   :      1940           Admit Date:                                       Discharge Date:       Document: [id number]

## 2017-06-23 NOTE — PLAN OF CARE
Problem: Goal Outcome Summary  Goal: Goal Outcome Summary  Outcome: Improving  Slight fever, given Tylenol. Coarse, infrequent nonproductive cough, denies sob, 92% on room air. Portable chest xray is ordered. Denies any pain. K and Mg replaced this shift.  Parks in place due to retention. ID following. Continue to monitor.

## 2017-06-24 LAB
BACTERIA SPEC CULT: ABNORMAL
Lab: ABNORMAL
MICRO REPORT STATUS: ABNORMAL
MICROORGANISM SPEC CULT: ABNORMAL
SPECIMEN SOURCE: ABNORMAL

## 2017-06-24 NOTE — PROGRESS NOTES
Transition Communication Hand-off for Care Transitions to Next Level of Care Provider    Name: Berenice Chaudhari  MRN #: [id number]  Primary Care Provider: Nelly Pearl     Primary Clinic: [facility] [address] [place] [address]     Reason for Hospitalization:  Cellulitis of left lower extremity [L03.116]  Admit Date/Time: 6/20/2017 10:05 PM  Discharge Date: 6/23/17  Payor Source: Payor: BCBS / Plan: BCBS OUT OF STATE / Product Type: Indemnity /     Readmission Assessment Measure (HAJA) Risk Score/category:  NONE            Reason for Communication Hand-off Referral: Fragility, Obesity --345lbs, admitted for LLE cellulitis     Discharge Plan:       Concern for non-adherence with plan of care:   Y/N No  Discharge Needs Assessment:      Already enrolled in Tele-monitoring program and name of program:  n/a  Follow-up specialty is recommended: No    Follow-up plan:  No future appointments.    Any outstanding tests or procedures:  The patient does need to follow up with her primary care physician within 1 week, needs a basic metabolic panel done at that time.            Key Recommendations:  Please speak to and work on a weight loss plan with this pt.   Should she be recommended for a Bariatric Physician  Nutrition consult, support on meeting weight loss goals,    Noemi Sanchez, RN, BSN, PHN, CTS  Care Transitions Specialist  [facility]      AVS/Discharge Summary is the source of truth; this is a helpful guide for improved communication of patient story

## 2017-06-24 NOTE — PLAN OF CARE
Problem: Goal Outcome Summary  Goal: Goal Outcome Summary  Outcome: Adequate for Discharge Date Met:  06/23/17  Pt ready for dc.  here and reviewed discharge instructions. levaquin filled and given to pt. Pt will take one tablet starting tonight. Iv sl dc'd. Up with sba of one to transfer from bed to w/c. Then w/c to bathroom. Voided x 1 after mtz dc'd. Pt feels she emptied completely. 200 cc output. Tolerating reg diet. mepilex drsg on L heel, intact, c/d/i, no drainage. Cloths under folds of abdomen and under breasts. scds on. Lungs coarse, nonprod cough, on  Room air, had cxr done earlier. Pt states she has nasal drip. Denies pain and nothing given for pain. K and MG both replaced earlier today. Pt really wanted to dc home today.

## 2017-06-26 ENCOUNTER — CARE COORDINATION (OUTPATIENT)
Dept: CARE COORDINATION | Facility: CLINIC | Age: 77
End: 2017-06-26

## 2017-06-26 ENCOUNTER — TELEPHONE (OUTPATIENT)
Dept: PEDIATRICS | Facility: CLINIC | Age: 77
End: 2017-06-26

## 2017-06-26 NOTE — TELEPHONE ENCOUNTER
Please contact patient for In-patient follow up.  [phone] (home)     Visit date: [id number]  Diagnosis listed: Cellulitis Of Left Lower Extremity  Number of visits in past 12 months: 0 ED / 1 IP

## 2017-06-26 NOTE — LETTER
[place] CARE COORDINATION  Critical access hospital0 [facility] [address]  Phone: [phone]      June 26, 2017      Berenice Chaudhari  [address] [place] [address]    Dear Berenice,  I am the Clinic Care Coordinator that works with your primary care provider's clinic. I wanted to thank you for spending the time to talk with me.  Below is a description of what Clinic Care Coordination is and how I can further assist you.     The Clinic Care Coordinator role is a Registered Nurse and/or  who understands the health care system. The goal of Clinic Care Coordination is to help you manage your health and improve access to the [facility] system in the most efficient manner.  The Registered Nurse can assist you in meeting your health care goals by providing education, coordinating services, and strengthening the communication among your providers. The  can assist you with financial, behavioral, psychosocial, and chemical dependency and counseling/psychiatric resources.    Please feel free to keep this letter and contact information to contact me at [phone] with any further questions or concerns that may arise. We at [facility] are focused on providing you with the highest-quality healthcare experience possible and that all starts with you.       Sincerely,     Claire Raes    Enclosed: I have enclosed a copy of a 24 Hour Access Plan. This has helpful phone numbers for you to call when needed. Please keep this in an easy to access place to use as needed.        24 Hour Access Plan    Presenting Problem Signs and Symptoms Treatment Plan    Questions or concerns during clinic hours    I will call the clinic directly     Questions or concerns outside clinic hours    I will call the 24 hour nurse line at [phone]    Patient needs to schedule an appointment    I will call the 24 hour scheduling team at [phone] or clinic directly    Same day treatment     I will call the  clinic first, nurse line if after hours, urgent care and express care if needed                          Clinic Care Coordinator: Claire Barclay, RN Care Coordinator [phone]

## 2017-06-26 NOTE — PROGRESS NOTES
Clinic Care Coordination Contact  [facility]/Voicemail    Referral Source: CTS - see under letters tab - dated 06/21/2017.  Patient admitted 06/20-06/23/2017 for cellulitis of LLE.   Reason for hand-off:  Fragility, obesity - 345lb  HAJA Risk - NONE  Clinical Data: Care Coordinator Outreach  Outreach attempted x 1.  Spoke briefly to patient.  Introduced her to Care Coordination.   She reports that she already spoke to Triage RN earlier today for Hospital F/U and has future appointment for Hospital F/U scheduled for Friday 06/30/2017.  She denied any current questions, concerns or needs from writer.   DECLINED care coordination services at this time.     Plan: Care Coordinator will mail out care coordination introduction letter with care coordinator contact information and explanation of care coordination services. Care Coordinator will do no further outreaches at this time.  Placed on DECLINE status.     Claire Barclay, RN  [facility]  Clinic Care Coordinator - [place] Locations   Direct:  [phone] (voicemail available)

## 2017-06-26 NOTE — TELEPHONE ENCOUNTER
"Hospital/TCU/ED for chronic condition Discharge Protocol    \"Hi, my name is Torie Allison, a registered nurse, and I am calling from [facility].  I am calling to follow up and see how things are going for you after your recent emergency visit/hospital/TCU stay.\"    Tell me how you are doing now that you are home?\" Doing fine\". Test came back negative for cellulitis\".      Discharge Instructions    \"Let's review your discharge instructions.  What is/are the follow-up recommendations?  Pt. Response: f/u with PCP w/in 7days    \"Has an appointment with your primary care provider been scheduled?\"   Yes. (confirm)    \"When you see the provider, I would recommend that you bring your medications with you.\"    Medications    \"Tell me what changed about your medicines when you discharged?\"    Changes to chronic meds?    0-1    \"What questions do you have about your medications?\"    None     New diagnoses of heart failure, COPD, diabetes, or MI?    No              Medication reconciliation completed? Yes  Was MTM referral placed (*Make sure to put transitions as reason for referral)?   No    Call Summary    \"What questions or concerns do you have about your recent visit and your follow-up care?\"     none    \"If you have questions or things don't continue to improve, we encourage you contact us through the main clinic number (give number).  Even if the clinic is not open, triage nurses are available 24/7 to help you.     We would like you to know that our clinic has extended hours (provide information).  We also have urgent care (provide details on closest location and hours/contact info)\"      \"Thank you for your time and take care!\"       Torie TOWNSEND RN, BSN, PHN  [facility] RN        "

## 2017-06-27 LAB
BACTERIA SPEC CULT: NO GROWTH
BACTERIA SPEC CULT: NO GROWTH
Lab: NORMAL
Lab: NORMAL
MICRO REPORT STATUS: NORMAL
MICRO REPORT STATUS: NORMAL
SPECIMEN SOURCE: NORMAL
SPECIMEN SOURCE: NORMAL

## 2017-06-30 ENCOUNTER — OFFICE VISIT (OUTPATIENT)
Dept: PEDIATRICS | Facility: CLINIC | Age: 77
End: 2017-06-30
Payer: COMMERCIAL

## 2017-06-30 VITALS
DIASTOLIC BLOOD PRESSURE: 72 MMHG | HEART RATE: 78 BPM | OXYGEN SATURATION: 94 % | HEIGHT: 64 IN | WEIGHT: 293 LBS | SYSTOLIC BLOOD PRESSURE: 110 MMHG | BODY MASS INDEX: 50.02 KG/M2 | TEMPERATURE: 98.1 F

## 2017-06-30 DIAGNOSIS — R09.82 POST-NASAL DRIP: ICD-10-CM

## 2017-06-30 DIAGNOSIS — R79.89 ELEVATED SERUM CREATININE: Primary | ICD-10-CM

## 2017-06-30 DIAGNOSIS — A49.9 RECURRENT BACTERIAL INFECTION: ICD-10-CM

## 2017-06-30 DIAGNOSIS — M62.81 GENERALIZED MUSCLE WEAKNESS: ICD-10-CM

## 2017-06-30 DIAGNOSIS — L03.115 CELLULITIS OF RIGHT LOWER EXTREMITY: ICD-10-CM

## 2017-06-30 DIAGNOSIS — R25.1 TREMOR: ICD-10-CM

## 2017-06-30 DIAGNOSIS — R53.81 PHYSICAL DECONDITIONING: ICD-10-CM

## 2017-06-30 PROCEDURE — 84439 ASSAY OF FREE THYROXINE: CPT | Performed by: PEDIATRICS

## 2017-06-30 PROCEDURE — 99214 OFFICE O/P EST MOD 30 MIN: CPT | Performed by: PEDIATRICS

## 2017-06-30 PROCEDURE — 80053 COMPREHEN METABOLIC PANEL: CPT | Performed by: PEDIATRICS

## 2017-06-30 PROCEDURE — 36415 COLL VENOUS BLD VENIPUNCTURE: CPT | Performed by: PEDIATRICS

## 2017-06-30 PROCEDURE — 84443 ASSAY THYROID STIM HORMONE: CPT | Performed by: PEDIATRICS

## 2017-06-30 RX ORDER — FLUTICASONE PROPIONATE 50 MCG
2 SPRAY, SUSPENSION (ML) NASAL DAILY
Qty: 3 BOTTLE | Refills: 11 | Status: ON HOLD | OUTPATIENT
Start: 2017-06-30 | End: 2018-04-10

## 2017-06-30 NOTE — NURSING NOTE
"No chief complaint on file.      Initial /72 (BP Location: Right arm, Cuff Size: Adult Large)  Pulse 78  Temp 98.1  F (36.7  C) (Oral)  Ht 5' 4\" (1.626 m)  Wt (!) 582 lb (264 kg)  SpO2 94%  BMI 99.9 kg/m2 Estimated body mass index is 99.9 kg/(m^2) as calculated from the following:    Height as of this encounter: 5' 4\" (1.626 m).    Weight as of this encounter: 582 lb (264 kg).  Medication Reconciliation: complete   Snehal Henry MA    "

## 2017-06-30 NOTE — PROGRESS NOTES
"  SUBJECTIVE:                                                    Berenice Chaudhari is a 77 year old female who presents to clinic today for the following health issues:          Hospital Follow-up Visit:    Hospital/Nursing Home/IP Rehab Facility: [facility]  Date of Admission: 06-  Date of Discharge: 06-  Reason(s) for Admission: Fever.            Problems taking medications regularly:  None       Medication changes since discharge: Pt was put on Levaquin        Problems adhering to non-medication therapy:  None    Summary of hospitalization:  [facility] discharge summary reviewed  Diagnostic Tests/Treatments reviewed.  Follow up needed: none  Other Healthcare Providers Involved in Patient s Care:         None  Update since discharge: improved.     Patient feeling good today - eating drinking normally.  She has no fever/chills since discharge.  No leg symptoms - she states they weren't convinced it was cellulitis in the hospital, but due to her past infections they were concerned.    Reviewed final lab cultures w/ patient - blood negative - urine with e. Coli sensitive to levoquin.    Patient c/o today of ongoing chronic post nasal drip - currently using mucinex.  Has not used nasal sprays for this.    Also c/o occassional tremor - when going from sitting to standing sometimes her legs will shake a little bit and then goes away.  She has not fallen.  Occ arms involved too.  She states there is a FH of \"familial tremors\" mostly in the men in her family though.  This started before she went into the hospital.  Denies seizures, no new medications.  She does not do much activity and it is hard for her to transfer from one place to another. Reviewed labs in hospital - electrolytes stable.     Post Discharge Medication Reconciliation: discharge medications reconciled, continue medications without change.  Plan of care communicated with patient and family     Coding guidelines for this " "visit:  Type of Medical   Decision Making Face-to-Face Visit       within 7 Days of discharge Face-to-Face Visit        within 14 days of discharge   Moderate Complexity 04148 [id number]   High Complexity 50177 [id number]            Problem list and histories reviewed & adjusted, as indicated.  Additional history: as documented        Reviewed and updated as needed this visit by clinical staff       Reviewed and updated as needed this visit by Provider         ROS:  Constitutional, HEENT, cardiovascular, pulmonary, gi and gu systems are negative, except as otherwise noted.    OBJECTIVE:     /72 (BP Location: Right arm, Cuff Size: Adult Large)  Pulse 78  Temp 98.1  F (36.7  C) (Oral)  Ht 5' 4\" (1.626 m)  Wt (!) 582 lb (264 kg)  SpO2 94%  BMI 99.9 kg/m2  Body mass index is 99.9 kg/(m^2).  GENERAL APPEARANCE: healthy, alert and no distress  EYES: PERRL, EOMI  RESP: lungs clear to auscultation - no rales, rhonchi or wheezes  CV: regular rates and rhythm, normal S1 S2, no S3 or S4 and no murmur, click or rub  SKIN: no suspicious lesions or rashes  NEURO: Normal strength and tone, mentation intact, speech normal, cranial nerves 2-12 intact and rapid alternating movements normal.  Very slight resting tremor in extended hands, no intention tremor with finger/nose/finger    Diagnostic Test Results:  none     ASSESSMENT/PLAN:       1. Elevated serum creatinine  Recheck - basline is slightly above normal  - Comprehensive metabolic panel    2. Cellulitis of right lower extremity  resolved    3. Post-nasal drip  Try flonase and use over the counter antihistamines seasonally.  - fluticasone (FLONASE) 50 MCG/ACT spray; Spray 2 sprays into both nostrils daily  Dispense: 3 Bottle; Refill: 11    4. Tremor  Most likely from deconditining - will check thyroid and hepatics function - I would like to try physical therapy first and see if this helps improve this.  Neuro exam unremarkable.  If continues will have her see neurology.  - " Comprehensive metabolic panel  - TSH with free T4 reflex  - HOME CARE NURSING REFERRAL    5. Generalized muscle weakness  - HOME CARE NURSING REFERRAL    6. Physical deconditioning  - HOME CARE NURSING REFERRAL    7. Recurrent bacterial infection  Patient ID retired, Nu - need new referral for ongoing recurrent infections and chronic suppression with minocycline.  - INFECTIOUS DISEASE REFERRAL    Patient Instructions   Labs today.    Start flonase for post nasal drip.    You will be contacted by homecare for physical therapy and occupational therapy.    Nelly Pearl MD  Internal Medicine/Pediatrics  [facility]          Nelly Pearl MD  [facility]

## 2017-06-30 NOTE — MR AVS SNAPSHOT
After Visit Summary   6/30/2017    Berenice Chaudhari    MRN: [id number]           Patient Information     Date Of Birth          1940        Visit Information        Provider Department      6/30/2017 11:00 AM Nelly Pearl MD [facility]        Today's Diagnoses     Elevated serum creatinine    -  1    Cellulitis of right lower extremity        Post-nasal drip        Tremor        Generalized muscle weakness        Physical deconditioning          Care Instructions    Labs today.    Start flonase for post nasal drip.    [facility].    Nelly Pearl MD  Internal Medicine/Pediatrics  [facility]              Follow-ups after your visit        Additional Services     HOME CARE NURSING REFERRAL       **Order classes of: [place] [facility], [facility] and [facility] will route to the Home Care and Hospice Referral Pool.  Home Care or Hospice will then contact the patient to schedule their appointment.**    If you do not hear from Home Care and Hospice, or you would like to call to schedule, please call the referring place of service that your provider has listed below.  ______________________________________________________________________    Your provider has referred you to: [facility]: [facility] [phone]   [email or web address]    Extended Emergency Contact Information  Primary Emergency Contact: Ricardo Chaudhari  Address: [address] [place] [address] [place] [address] [facility]  Home Phone: [phone]  Work Phone: [phone]  Mobile Phone: [phone]  Relation: Spouse    Patient Anticipated Discharge Date: discharge 6/23/17   RN, PT, HHA to begin 24 - 48 hours after discharge.  PLEASE EVALUATE AND TREAT (Evaluation timeline is 24 - 48 hrs. Please call if there is need for a variance to this timeline).    REASON FOR REFERRAL: Assessment &  Treatment: PT and OT    ADDITIONAL SERVICES NEEDED: OT    OTHER PERTINENT INFORMATION: Patient was last seen by provider on 6/30/17 for  Hospital f/u.    Current Outpatient Prescriptions:  fluticasone (FLONASE) 50 MCG/ACT spray, Spray 2 sprays into both nostrils daily, Disp: 3 Bottle, Rfl: 11  levofloxacin (LEVAQUIN) 500 MG tablet, Take 1 tablet (500 mg) by mouth daily for 7 days, Disp: 7 tablet, Rfl: 0  lidocaine (LIDODERM) 5 % Patch, Apply up to 3 patches to painful area at once for up to 12 h within a 24 h period.  Remove after 12 hours., Disp: 60 patch, Rfl: 2  gabapentin (NEURONTIN) 600 MG tablet, Take 2 tablets (1,200 mg) by mouth 3 times daily, Disp: 540 tablet, Rfl: 3  pregabalin (LYRICA) 150 MG capsule, Take 1 capsule (150 mg) by mouth 2 times daily, Disp: 60 capsule, Rfl: 5  nortriptyline (PAMELOR) 25 MG capsule, TAKE 2 CAPSULES(50 MG) BY MOUTH AT BEDTIME, Disp: 60 capsule, Rfl: 11  simvastatin (ZOCOR) 40 MG tablet, Take 1 tablet (40 mg) by mouth At Bedtime, Disp: 90 tablet, Rfl: 3  minocycline (MINOCIN) 50 MG capsule, Take 1 capsule (50 mg) by mouth 2 times daily, Disp: 60 capsule, Rfl: 5  atenolol (TENORMIN) 50 MG tablet, Take 1 tablet (50 mg) by mouth daily, Disp: 90 tablet, Rfl: 3  venlafaxine (EFFEXOR-XR) 150 MG 24 hr capsule, Take 1 capsule (150 mg) by mouth daily, Disp: 90 capsule, Rfl: 1  pantoprazole (PROTONIX) 40 MG enteric coated tablet, Take 1 tablet (40 mg) by mouth daily, Disp: 90 tablet, Rfl: 3  amLODIPine (NORVASC) 10 MG tablet, Take 1 tablet (10 mg) by mouth every evening, Disp: 90 tablet, Rfl: 3  Multiple Vitamins-Minerals (CENTRUM SILVER) per tablet, Take 1 tablet by mouth daily, Disp: , Rfl:   hydrOXYzine (ATARAX) 25 MG tablet, Take 25 mg by mouth nightly as needed , Disp: , Rfl:   CEPHALEXIN PO, Take 500 mg by mouth 3 times daily as needed (when cellulitis is coming on) , Disp: , Rfl:   buPROPion (WELLBUTRIN XL) 300 MG 24 hr tablet, Take 1 tablet (300 mg) by mouth every morning, Disp:  90 tablet, Rfl: 1      Patient Active Problem List:     Generalized osteoarthrosis, unspecified site     Urticaria     Obesity     Essential hypertension with goal blood pressure less than 140/90     Allergic rhinitis     Erythroderma desquamativum     Advanced directives, counseling/discussion     Hyperlipidemia LDL goal <100     Post herpetic neuralgia     Health Care Home     Chronic renal disease     Anxiety     GERD (gastroesophageal reflux disease)     Major depressive disorder, recurrent episode, in full remission (HCC)     Cough     Severe obesity (BMI >= 40) (H)     Cellulitis of right lower extremity     CKD (chronic kidney disease) stage 3, GFR 30-59 ml/min     Right shoulder pain     Cellulitis      Documentation of Face to Face and Certification for Home Health Services    I certify that patient, Berenice Chaudhari is under my care and that I, or a Nurse Practitioner or Physician's Assistant working with me, had a face-to-face encounter that meets the physician face-to-face encounter requirements with this patient on: 6/30/2017.    This encounter with the patient was in whole, or in part, for the following medical condition, which is the primary reason for Home Health Care: weakness and tremor, deconditiong. I would like home physical therapy and OT help with showers and any other needs deemed necessary.  Patient cannot leave house unless  home and he works.    I certify that, based on my findings, the following services are medically necessary Home Health Services: Occupational Therapy and Physical Therapy    My clinical findings support the need for the above services because: Occupational Therapy Services are needed to assess and treat cognitive ability and address ADL safety due to impairment in deconditioning/weakness/weight. and Physical Therapy Services are needed to assess and treat the following functional impairments: weakness/tremors.    Further, I certify that my clinical findings support  that this patient is homebound (i.e. absences from home require considerable and taxing effort and are for medical reasons or Mandaen services or infrequently or of short duration when for other reasons) because: Requires assistance of another person or specialized equipment to access medical services because patient: Is unable to exit home safely on own due to: wheelchair..    Based on the above findings, I certify that this patient is confined to the home and needs intermittent skilled nursing care, physical therapy and/or speech therapy.  The patient is under my care, and I have initiated the establishment of the plan of care.  This patient will be followed by a physician who will periodically review the plan of care.    Physician/Provider to provide follow up care: Nelly Pearl [place] certified Physician at time of discharge: Nelly Pearl    Please be aware that coverage of these services is subject to the terms and limitations of your health insurance plan.  Call member services at your health plan with any benefit or coverage questions.                  Who to contact     If you have questions or need follow up information about today's clinic visit or your schedule please contact [facility] directly at [phone].  Normal or non-critical lab and imaging results will be communicated to you by MyChart, letter or phone within 4 business days after the clinic has received the results. If you do not hear from us within 7 days, please contact the clinic through [employer] or phone. If you have a critical or abnormal lab result, we will notify you by phone as soon as possible.  Submit refill requests through [employer] or call your pharmacy and they will forward the refill request to us. Please allow 3 business days for your refill to be completed.          Additional Information About Your Visit        [employer] Information     [employer] lets you send messages to your doctor, view your  "test results, renew your prescriptions, schedule appointments and more. To sign up, go to [email or web address] . Click on \"Log in\" on the left side of the screen, which will take you to the Welcome page. Then click on \"Sign up Now\" on the right side of the page.     You will be asked to enter the access code listed below, as well as some personal information. Please follow the directions to create your username and password.     Your access code is: [email or web address]  Expires: 2017 10:12 PM     Your access code will  in 90 days. If you need help or a new code, please call your [facility] clinic or [phone].        Care EveryWhere ID     This is your Care EveryWhere ID. This could be used by other organizations to access your [facility] medical records  [id number]        Your Vitals Were     Pulse Temperature Height Pulse Oximetry BMI (Body Mass Index)       78 98.1  F (36.7  C) (Oral) 5' 4\" (1.626 m) 94% 99.9 kg/m2        Blood Pressure from Last 3 Encounters:   17 110/72   17 136/43   03/15/17 130/84    Weight from Last 3 Encounters:   17 (!) 582 lb (264 kg)   17 (!) 345 lb (156.5 kg)   03/15/17 (!) 326 lb (147.9 kg)              We Performed the Following     Comprehensive metabolic panel     HOME CARE NURSING REFERRAL     TSH with free T4 reflex          Today's Medication Changes          These changes are accurate as of: 17 11:50 AM.  If you have any questions, ask your nurse or doctor.               Start taking these medicines.        Dose/Directions    fluticasone 50 MCG/ACT spray   Commonly known as:  FLONASE   Used for:  Post-nasal drip   Started by:  Nelly Pearl MD        Dose:  2 spray   Spray 2 sprays into both nostrils daily   Quantity:  3 Bottle   Refills:  11            Where to get your medicines      These medications were sent to [facility] - [place], [place] - [facility]  AT [facility] & [address] [place] [address]     Phone:  [phone]     fluticasone 50 MCG/ACT spray                Primary Care Provider Office Phone # Fax #    Nelly Pearl [phone]       [facility] [address] [place] [address]        Equal Access to Services     [facility]: Hadii aad ku hadasho Soomaali, waaxda luqadaha, qaybta kaalmada adeegyada, calin cappsin hayaan aderadha rejimariano devinen . So [facility] [phone].    ATENCIÓN: Si habla español, tiene a dickey disposición servicios gratuitos de asistencia lingüística. [facility] [phone].    We comply with applicable federal civil rights laws and [place] laws. We do not discriminate on the basis of race, color, national origin, age, disability sex, sexual orientation or gender identity.            Thank you!     [facility]. Our goal is always to provide you with excellent care. Hearing back from our patients is one way we can continue to improve our services. Please take a few minutes to complete the written survey that you may receive in the mail after your visit with us. Thank you!             Your Updated Medication List - Protect others around you: Learn how to safely use, store and throw away your medicines at www.disposemymeds.org.          This list is accurate as of: 6/30/17 11:50 AM.  Always use your most recent med list.                   Brand Name Dispense Instructions for use Diagnosis    amLODIPine 10 MG tablet    NORVASC    90 tablet    Take 1 tablet (10 mg) by mouth every evening    Essential hypertension with goal blood pressure less than 140/90       atenolol 50 MG tablet    TENORMIN    90 tablet    Take 1 tablet (50 mg) by mouth daily    Essential hypertension       buPROPion 300 MG 24 hr tablet    WELLBUTRIN XL    90 tablet    Take 1 tablet (300 mg) by mouth every morning    Major depressive disorder, recurrent, in full remission (H)       CENTRUM SILVER per tablet       Take 1 tablet by mouth daily        CEPHALEXIN PO      Take 500 mg by mouth 3 times daily as needed (when cellulitis is coming on)        fluticasone 50 MCG/ACT spray    FLONASE    3 Bottle    Spray 2 sprays into both nostrils daily    Post-nasal drip       gabapentin 600 MG tablet    NEURONTIN    540 tablet    Take 2 tablets (1,200 mg) by mouth 3 times daily    Postherpetic neuralgia       hydrOXYzine 25 MG tablet    ATARAX     Take 25 mg by mouth nightly as needed        levofloxacin 500 MG tablet    LEVAQUIN    7 tablet    Take 1 tablet (500 mg) by mouth daily for 7 days    Cellulitis of left lower extremity       lidocaine 5 % Patch    LIDODERM    60 patch    Apply up to 3 patches to painful area at once for up to 12 h within a 24 h period.  Remove after 12 hours.    Postherpetic neuralgia       minocycline 50 MG capsule    MINOCIN    60 capsule    Take 1 capsule (50 mg) by mouth 2 times daily    Methicillin susceptible Staphylococcus aureus septicemia (H)       nortriptyline 25 MG capsule    PAMELOR    60 capsule    TAKE 2 CAPSULES(50 MG) BY MOUTH AT BEDTIME    Post herpetic neuralgia       pantoprazole 40 MG EC tablet    PROTONIX    90 tablet    Take 1 tablet (40 mg) by mouth daily    GERD (gastroesophageal reflux disease)       pregabalin 150 MG capsule    LYRICA    60 capsule    Take 1 capsule (150 mg) by mouth 2 times daily    Postherpetic neuralgia       simvastatin 40 MG tablet    ZOCOR    90 tablet    Take 1 tablet (40 mg) by mouth At Bedtime    Hyperlipidemia LDL goal <100       venlafaxine 150 MG 24 hr capsule    EFFEXOR-XR    90 capsule    Take 1 capsule (150 mg) by mouth daily    Major depressive disorder, recurrent episode, moderate (H)

## 2017-06-30 NOTE — PATIENT INSTRUCTIONS
Labs today.    Start flonase for post nasal drip.    You will be contacted by homecare for physical therapy and occupational therapy.    Nelly Pearl MD  Internal Medicine/Pediatrics  [facility]

## 2017-07-01 LAB
ALBUMIN SERPL-MCNC: 3.3 G/DL (ref 3.4–5)
ALP SERPL-CCNC: 97 U/L (ref 40–150)
ALT SERPL W P-5'-P-CCNC: 33 U/L (ref 0–50)
ANION GAP SERPL CALCULATED.3IONS-SCNC: 9 MMOL/L (ref 3–14)
AST SERPL W P-5'-P-CCNC: 31 U/L (ref 0–45)
BILIRUB SERPL-MCNC: 0.5 MG/DL (ref 0.2–1.3)
BUN SERPL-MCNC: 18 MG/DL (ref 7–30)
CALCIUM SERPL-MCNC: 8.8 MG/DL (ref 8.5–10.1)
CHLORIDE SERPL-SCNC: 107 MMOL/L (ref 94–109)
CO2 SERPL-SCNC: 26 MMOL/L (ref 20–32)
CREAT SERPL-MCNC: 1.29 MG/DL (ref 0.52–1.04)
GFR SERPL CREATININE-BSD FRML MDRD: 40 ML/MIN/1.7M2
GLUCOSE SERPL-MCNC: 122 MG/DL (ref 70–99)
POTASSIUM SERPL-SCNC: 4 MMOL/L (ref 3.4–5.3)
PROT SERPL-MCNC: 7.7 G/DL (ref 6.8–8.8)
SODIUM SERPL-SCNC: 142 MMOL/L (ref 133–144)
T4 FREE SERPL-MCNC: 1.37 NG/DL (ref 0.76–1.46)
TSH SERPL DL<=0.005 MIU/L-ACNC: 4.27 MU/L (ref 0.4–4)

## 2017-07-03 DIAGNOSIS — R79.89 ELEVATED SERUM CREATININE: Primary | ICD-10-CM

## 2017-07-03 DIAGNOSIS — E03.8 SUBCLINICAL HYPOTHYROIDISM: ICD-10-CM

## 2017-07-05 ENCOUNTER — TELEPHONE (OUTPATIENT)
Dept: PEDIATRICS | Facility: CLINIC | Age: 77
End: 2017-07-05

## 2017-07-05 NOTE — TELEPHONE ENCOUNTER
[facility] Nurse Kamini calling from [phone]:    Requesting orders for:  - PT eval    Verbal OK given as per nursing protocol.    Altagracia VANCE, RN  Triage Nurse

## 2017-07-13 DIAGNOSIS — R79.89 ELEVATED SERUM CREATININE: ICD-10-CM

## 2017-07-13 LAB
ANION GAP SERPL CALCULATED.3IONS-SCNC: 6 MMOL/L (ref 3–14)
BUN SERPL-MCNC: 13 MG/DL (ref 7–30)
CALCIUM SERPL-MCNC: 8.7 MG/DL (ref 8.5–10.1)
CHLORIDE SERPL-SCNC: 109 MMOL/L (ref 94–109)
CO2 SERPL-SCNC: 27 MMOL/L (ref 20–32)
CREAT SERPL-MCNC: 1.09 MG/DL (ref 0.52–1.04)
GFR SERPL CREATININE-BSD FRML MDRD: 49 ML/MIN/1.7M2
GLUCOSE SERPL-MCNC: 117 MG/DL (ref 70–99)
POTASSIUM SERPL-SCNC: 4.1 MMOL/L (ref 3.4–5.3)
SODIUM SERPL-SCNC: 142 MMOL/L (ref 133–144)

## 2017-07-13 PROCEDURE — 80048 BASIC METABOLIC PNL TOTAL CA: CPT | Performed by: PEDIATRICS

## 2017-07-13 PROCEDURE — 36415 COLL VENOUS BLD VENIPUNCTURE: CPT | Performed by: PEDIATRICS

## 2017-07-21 ENCOUNTER — TELEPHONE (OUTPATIENT)
Dept: PEDIATRICS | Facility: CLINIC | Age: 77
End: 2017-07-21

## 2017-07-21 NOTE — TELEPHONE ENCOUNTER
Alba, OT with [facility].  She can be reached at .  Asking for OT orders for 2 x week for 4 weeks for home safety, adaptive equipment, ADLS, wheelchair., evaluation and pressure relief.  A verbal ok as given for the order.    Melissa Abdullahi, LEXIE  Message handled by Nurse Triage.

## 2017-07-24 DIAGNOSIS — Z71.89 ADVANCED DIRECTIVES, COUNSELING/DISCUSSION: Primary | ICD-10-CM

## 2017-07-25 NOTE — PROGRESS NOTES
Completed POLST dated 7/24/17 received. Please clarify that pending code status is appropriate and sign. . Please close encounter when done.     Routing to Dr. Pearl.    BRYANNA Gamble RN

## 2017-07-26 DIAGNOSIS — Z71.89 ADVANCED DIRECTIVES, COUNSELING/DISCUSSION: Primary | ICD-10-CM

## 2017-07-31 DIAGNOSIS — Z53.9 DIAGNOSIS NOT YET DEFINED: Primary | ICD-10-CM

## 2017-07-31 PROCEDURE — G0180 MD CERTIFICATION HHA PATIENT: HCPCS | Performed by: PEDIATRICS

## 2017-08-01 DIAGNOSIS — F33.1 MAJOR DEPRESSIVE DISORDER, RECURRENT EPISODE, MODERATE (H): ICD-10-CM

## 2017-08-01 RX ORDER — VENLAFAXINE HYDROCHLORIDE 150 MG/1
CAPSULE, EXTENDED RELEASE ORAL
Qty: 90 CAPSULE | Refills: 0 | Status: SHIPPED | OUTPATIENT
Start: 2017-08-01 | End: 2017-10-28

## 2017-08-01 NOTE — TELEPHONE ENCOUNTER
Venlafaxine    Last Written Prescription Date: 10/19/16  Last Fill Quantity: 90, # refills: 1  Last Office Visit with [facility], [facility] or [facility] prescribing provider: 11/29/16        BP Readings from Last 3 Encounters:   06/30/17 110/72   06/23/17 136/43   03/15/17 130/84     Pulse: (for Fetzima)  Creatinine   Date Value Ref Range Status   07/13/2017 1.09 (H) 0.52 - 1.04 mg/dL Final   ]    Last PHQ-9 score on record=   PHQ-9 SCORE 3/15/2017   Total Score -   Total Score 3         Labs showing if normal/abnormal  Data Unavailable  Lab Results   Component Value Date    AST 31 06/30/2017    ALT 33 06/30/2017      Lab Results   Component Value Date    CHOL 133 10/27/2016    TRIG 102 10/27/2016    HDL 53 10/27/2016    LDL 60 10/27/2016    VLDL 31 (H) 09/04/2014    CHOLHDLRATIO 3.5 09/04/2014

## 2017-08-02 DIAGNOSIS — I10 ESSENTIAL HYPERTENSION WITH GOAL BLOOD PRESSURE LESS THAN 140/90: Primary | ICD-10-CM

## 2017-08-02 RX ORDER — METOPROLOL SUCCINATE 50 MG/1
50 TABLET, EXTENDED RELEASE ORAL DAILY
Qty: 90 TABLET | Refills: 3 | Status: ON HOLD | OUTPATIENT
Start: 2017-08-02 | End: 2017-08-10

## 2017-08-07 ENCOUNTER — TELEPHONE (OUTPATIENT)
Dept: PEDIATRICS | Facility: CLINIC | Age: 77
End: 2017-08-07

## 2017-08-07 ENCOUNTER — APPOINTMENT (OUTPATIENT)
Dept: NUCLEAR MEDICINE | Facility: CLINIC | Age: 77
DRG: 309 | End: 2017-08-07
Attending: EMERGENCY MEDICINE
Payer: COMMERCIAL

## 2017-08-07 ENCOUNTER — HOSPITAL ENCOUNTER (INPATIENT)
Facility: CLINIC | Age: 77
LOS: 3 days | Discharge: HOME HEALTH | DRG: 309 | End: 2017-08-10
Attending: EMERGENCY MEDICINE | Admitting: INTERNAL MEDICINE
Payer: COMMERCIAL

## 2017-08-07 ENCOUNTER — APPOINTMENT (OUTPATIENT)
Dept: GENERAL RADIOLOGY | Facility: CLINIC | Age: 77
DRG: 309 | End: 2017-08-07
Attending: EMERGENCY MEDICINE
Payer: COMMERCIAL

## 2017-08-07 DIAGNOSIS — I48.92 ATRIAL FLUTTER WITH RAPID VENTRICULAR RESPONSE (H): ICD-10-CM

## 2017-08-07 DIAGNOSIS — N17.9 AKI (ACUTE KIDNEY INJURY) (H): ICD-10-CM

## 2017-08-07 DIAGNOSIS — E78.5 HYPERLIPIDEMIA LDL GOAL <100: ICD-10-CM

## 2017-08-07 DIAGNOSIS — B02.29 POSTHERPETIC NEURALGIA: ICD-10-CM

## 2017-08-07 DIAGNOSIS — E66.01 MORBID OBESITY, UNSPECIFIED OBESITY TYPE (H): Primary | ICD-10-CM

## 2017-08-07 DIAGNOSIS — I10 ESSENTIAL HYPERTENSION WITH GOAL BLOOD PRESSURE LESS THAN 140/90: ICD-10-CM

## 2017-08-07 LAB
ALBUMIN UR-MCNC: NEGATIVE MG/DL
AMORPH CRY #/AREA URNS HPF: ABNORMAL /HPF
ANION GAP SERPL CALCULATED.3IONS-SCNC: 10 MMOL/L (ref 3–14)
APPEARANCE UR: ABNORMAL
BACTERIA #/AREA URNS HPF: ABNORMAL /HPF
BASOPHILS # BLD AUTO: 0.1 10E9/L (ref 0–0.2)
BASOPHILS NFR BLD AUTO: 1.1 %
BILIRUB UR QL STRIP: NEGATIVE
BUN SERPL-MCNC: 18 MG/DL (ref 7–30)
CALCIUM SERPL-MCNC: 9.1 MG/DL (ref 8.5–10.1)
CHLORIDE SERPL-SCNC: 105 MMOL/L (ref 94–109)
CO2 SERPL-SCNC: 23 MMOL/L (ref 20–32)
COLOR UR AUTO: YELLOW
CREAT SERPL-MCNC: 1.73 MG/DL (ref 0.52–1.04)
D DIMER PPP FEU-MCNC: 0.8 UG/ML FEU (ref 0–0.5)
DIFFERENTIAL METHOD BLD: NORMAL
EOSINOPHIL # BLD AUTO: 0.4 10E9/L (ref 0–0.7)
EOSINOPHIL NFR BLD AUTO: 6.2 %
ERYTHROCYTE [DISTWIDTH] IN BLOOD BY AUTOMATED COUNT: 13.6 % (ref 10–15)
GFR SERPL CREATININE-BSD FRML MDRD: 29 ML/MIN/1.7M2
GLUCOSE SERPL-MCNC: 153 MG/DL (ref 70–99)
GLUCOSE UR STRIP-MCNC: NEGATIVE MG/DL
HCT VFR BLD AUTO: 43.3 % (ref 35–47)
HGB BLD-MCNC: 14.2 G/DL (ref 11.7–15.7)
HGB UR QL STRIP: NEGATIVE
HYALINE CASTS #/AREA URNS LPF: 6 /LPF (ref 0–2)
IMM GRANULOCYTES # BLD: 0 10E9/L (ref 0–0.4)
IMM GRANULOCYTES NFR BLD: 0.5 %
KETONES UR STRIP-MCNC: NEGATIVE MG/DL
LACTATE SERPL-SCNC: 0.8 MMOL/L (ref 0.4–2)
LACTATE SERPL-SCNC: 2.7 MMOL/L (ref 0.4–2)
LEUKOCYTE ESTERASE UR QL STRIP: NEGATIVE
LYMPHOCYTES # BLD AUTO: 1.6 10E9/L (ref 0.8–5.3)
LYMPHOCYTES NFR BLD AUTO: 24.6 %
MCH RBC QN AUTO: 30.1 PG (ref 26.5–33)
MCHC RBC AUTO-ENTMCNC: 32.8 G/DL (ref 31.5–36.5)
MCV RBC AUTO: 92 FL (ref 78–100)
MONOCYTES # BLD AUTO: 0.8 10E9/L (ref 0–1.3)
MONOCYTES NFR BLD AUTO: 11.8 %
MUCOUS THREADS #/AREA URNS LPF: PRESENT /LPF
NEUTROPHILS # BLD AUTO: 3.5 10E9/L (ref 1.6–8.3)
NEUTROPHILS NFR BLD AUTO: 55.8 %
NITRATE UR QL: NEGATIVE
NRBC # BLD AUTO: 0 10*3/UL
NRBC BLD AUTO-RTO: 0 /100
NT-PROBNP SERPL-MCNC: 3423 PG/ML (ref 0–1800)
PH UR STRIP: 5 PH (ref 5–7)
PLATELET # BLD AUTO: 289 10E9/L (ref 150–450)
POTASSIUM SERPL-SCNC: 4 MMOL/L (ref 3.4–5.3)
RBC # BLD AUTO: 4.72 10E12/L (ref 3.8–5.2)
RBC #/AREA URNS AUTO: <1 /HPF (ref 0–2)
SODIUM SERPL-SCNC: 138 MMOL/L (ref 133–144)
SP GR UR STRIP: 1.01 (ref 1–1.03)
SQUAMOUS #/AREA URNS AUTO: <1 /HPF (ref 0–1)
TROPONIN I SERPL-MCNC: 0.02 UG/L (ref 0–0.04)
TROPONIN I SERPL-MCNC: 0.03 UG/L (ref 0–0.04)
URN SPEC COLLECT METH UR: ABNORMAL
UROBILINOGEN UR STRIP-MCNC: 0 MG/DL (ref 0–2)
WBC # BLD AUTO: 6.3 10E9/L (ref 4–11)
WBC #/AREA URNS AUTO: 3 /HPF (ref 0–2)

## 2017-08-07 PROCEDURE — 99285 EMERGENCY DEPT VISIT HI MDM: CPT | Mod: 25

## 2017-08-07 PROCEDURE — 83605 ASSAY OF LACTIC ACID: CPT | Performed by: EMERGENCY MEDICINE

## 2017-08-07 PROCEDURE — 36415 COLL VENOUS BLD VENIPUNCTURE: CPT

## 2017-08-07 PROCEDURE — 96372 THER/PROPH/DIAG INJ SC/IM: CPT

## 2017-08-07 PROCEDURE — 25000128 H RX IP 250 OP 636: Performed by: EMERGENCY MEDICINE

## 2017-08-07 PROCEDURE — A9540 TC99M MAA: HCPCS | Performed by: EMERGENCY MEDICINE

## 2017-08-07 PROCEDURE — 87040 BLOOD CULTURE FOR BACTERIA: CPT | Performed by: EMERGENCY MEDICINE

## 2017-08-07 PROCEDURE — 25000125 ZZHC RX 250: Performed by: EMERGENCY MEDICINE

## 2017-08-07 PROCEDURE — 96365 THER/PROPH/DIAG IV INF INIT: CPT

## 2017-08-07 PROCEDURE — 96376 TX/PRO/DX INJ SAME DRUG ADON: CPT

## 2017-08-07 PROCEDURE — 84484 ASSAY OF TROPONIN QUANT: CPT | Performed by: EMERGENCY MEDICINE

## 2017-08-07 PROCEDURE — 85379 FIBRIN DEGRADATION QUANT: CPT | Performed by: EMERGENCY MEDICINE

## 2017-08-07 PROCEDURE — 71020 XR CHEST 2 VW: CPT

## 2017-08-07 PROCEDURE — 81001 URINALYSIS AUTO W/SCOPE: CPT | Performed by: EMERGENCY MEDICINE

## 2017-08-07 PROCEDURE — 99223 1ST HOSP IP/OBS HIGH 75: CPT | Mod: AI | Performed by: INTERNAL MEDICINE

## 2017-08-07 PROCEDURE — 78580 LUNG PERFUSION IMAGING: CPT

## 2017-08-07 PROCEDURE — 85025 COMPLETE CBC W/AUTO DIFF WBC: CPT | Performed by: EMERGENCY MEDICINE

## 2017-08-07 PROCEDURE — 83880 ASSAY OF NATRIURETIC PEPTIDE: CPT | Performed by: EMERGENCY MEDICINE

## 2017-08-07 PROCEDURE — 93005 ELECTROCARDIOGRAM TRACING: CPT

## 2017-08-07 PROCEDURE — 12000007 ZZH R&B INTERMEDIATE

## 2017-08-07 PROCEDURE — 36415 COLL VENOUS BLD VENIPUNCTURE: CPT | Performed by: EMERGENCY MEDICINE

## 2017-08-07 PROCEDURE — 34300033 ZZH RX 343: Performed by: EMERGENCY MEDICINE

## 2017-08-07 PROCEDURE — 80048 BASIC METABOLIC PNL TOTAL CA: CPT | Performed by: EMERGENCY MEDICINE

## 2017-08-07 RX ORDER — ADENOSINE 3 MG/ML
6 INJECTION, SOLUTION INTRAVENOUS ONCE
Status: COMPLETED | OUTPATIENT
Start: 2017-08-07 | End: 2017-08-07

## 2017-08-07 RX ORDER — DILTIAZEM HYDROCHLORIDE 5 MG/ML
25 INJECTION INTRAVENOUS ONCE
Status: COMPLETED | OUTPATIENT
Start: 2017-08-07 | End: 2017-08-07

## 2017-08-07 RX ADMIN — Medication 3.2 MCI.: at 18:12

## 2017-08-07 RX ADMIN — ENOXAPARIN SODIUM 160 MG: 80 INJECTION SUBCUTANEOUS at 22:26

## 2017-08-07 RX ADMIN — DILTIAZEM HYDROCHLORIDE 5 MG/HR: 5 INJECTION INTRAVENOUS at 22:25

## 2017-08-07 RX ADMIN — ADENOSINE 6 MG: 3 INJECTION, SOLUTION INTRAVENOUS at 21:06

## 2017-08-07 RX ADMIN — DILTIAZEM HYDROCHLORIDE 25 MG: 5 INJECTION INTRAVENOUS at 21:27

## 2017-08-07 RX ADMIN — SODIUM CHLORIDE 2000 ML: 9 INJECTION, SOLUTION INTRAVENOUS at 19:30

## 2017-08-07 ASSESSMENT — ENCOUNTER SYMPTOMS
VOMITING: 0
SHORTNESS OF BREATH: 1
DIARRHEA: 1
PALPITATIONS: 0
FEVER: 1
NAUSEA: 0

## 2017-08-07 NOTE — TELEPHONE ENCOUNTER
[facility] calling. Has 6-7 days of on and off fevers. . Clammy and sweaty. Cold. Recently treated for cellulitis. Patient feels tired and weak. Hasn't been eating well. /78 and very difficult to hear. Skin tone is slow to respond. Denies pain. Recommended ER for patient. Advised concern for dehydration or infection. Nurse advised patient.   Catarina Diggs RN

## 2017-08-07 NOTE — IP AVS SNAPSHOT
[place] [address] [facility]    [address]    [facility] [address]    Phone:  [phone]    Fax:  [phone]                                       After Visit Summary   8/7/2017    Berenice Chaudhari    MRN: [id number]           After Visit Summary Signature Page     I have received my discharge instructions, and my questions have been answered. I have discussed any challenges I see with this plan with the nurse or doctor.    ..........................................................................................................................................  Patient/Patient Representative Signature      ..........................................................................................................................................  Patient Representative Print Name and Relationship to Patient    ..................................................               ................................................  Date                                            Time    ..........................................................................................................................................  Reviewed by Signature/Title    ...................................................              ..............................................  Date                                                            Time

## 2017-08-07 NOTE — IP AVS SNAPSHOT
MRN:[id number]                      After Visit Summary   8/7/2017    Berenice Chaudhari    MRN: [id number]           Thank you!     Thank you for choosing [facility] for your care. Our goal is always to provide you with excellent care. Hearing back from our patients is one way we can continue to improve our services. Please take a few minutes to complete the written survey that you may receive in the mail after you visit. If you would like to speak to someone directly about your visit please contact Patient Relations at [phone]. Thank you!          Patient Information     Date Of Birth          1940        Designated Caregiver       Most Recent Value    Caregiver    Will someone help with your care after discharge? no      About your hospital stay     You were admitted on:  August 7, 2017 You last received care in the:  [place] [address] [facility]    You were discharged on:  August 10, 2017        Reason for your hospital stay       Atrial flutter with RVR                  Who to Call     For medical emergencies, please call 911.  For non-urgent questions about your medical care, please call your primary care provider or clinic, [phone]          Attending Provider     Provider Specialty    Sarah Michael MD Emergency Medicine    [facility], Chris Fernandez MD Internal Medicine    [facility]       Primary Care Provider Office Phone # Fax #    Nelly Pearl [phone]      After Care Instructions     Activity       Your activity upon discharge: activity as tolerated            Diet       Follow this diet upon discharge: Orders Placed This Encounter      Combination Diet Low Saturated Fat Na <2400mg Diet, No Caffeine Diet                  Follow-up Appointments     Follow-up and recommended labs and tests        Follow up with primary care provider, Nelly Pearl, within 7 days   Follow up with cardiology as  scheduled  Resume Home health services            Follow-up and recommended labs and tests        INR recheck within 3-5 days with Home care RN                  Additional Services     Home Care OT Referral for Hospital Discharge       Resume home OT visits.    Your provider has ordered home care - occupational therapy. If you have not been contacted within 2 days of your discharge please call the department phone number listed on the top of this document.            Home Care PT Referral for Hospital Discharge       PT to eval and treat    Your provider has ordered home care - physical therapy. If you have not been contacted within 2 days of your discharge please call the department phone number listed on the top of this document.            Home care nursing referral       RN skilled nursing visit. RN to assess vital signs and weight and respiratory and cardiac status.  RN to provide lab draws.    Your provider has ordered home care nursing services. If you have not been contacted within 2 days of your discharge please call the inpatient department phone number at [phone] .            Sleep Home Study Referral       Please arrange sleep studyy as outpatient            Follow-Up with Cardiac Advanced Practice Provider                 Future tests that were ordered for you     Holter Monitor 24 hour - Adult                 Further instructions from your care team         Atrial Flutter    Atrial flutter means that your heart is beating very fast. It is caused by a problem in the electrical pathways of the heart. It can be a sign of heart disease or other health problems that affect your heart.  Palpitations are the most common symptom of atrial flutter. This is the feeling that your heart is fluttering or beating fast or hard. When your heart beats too fast, it doesn t pump blood very well. This can cause other symptoms, including:    Anxiety    Fatigue    Shortness of breath    Chest  pain    Dizziness    Fainting  If this is the first time you ve had atrial flutter, and you don t have heart or lung disease, it may never happen again. But in most cases, atrial flutter comes and goes. It can last from a few hours to a couple of days. Sometimes the atrial flutter doesn t ever go away. This is chronic atrial flutter.  Atrial flutter may be caused by heart disease. It may also be caused by other conditions that affect the heart:    Coronary artery disease (arteriosclerosis)    High blood pressure    Disease of the heart valves    Enlarged heart  Atrial flutter can occur without heart disease. This may be because of:    Overactive thyroid (hyperthyroid)    Chronic lung disease (COPD, emphysema, or bronchitis)    Alcohol use    Drugs or medicines that stimulate the heart. These include cocaine, amphetamines, diet pills, some decongestant cold medicines, caffeine, and nicotine.    Infection  Treating or removing these causes will make it more likely that treatment for atrial flutter will work. It will also make it less likely that atrial flutter will come back.  Atrial flutter can happen along with another abnormal rhythm called atrial fibrillation. The risk for stroke is higher with these conditions. Getting treatment can reduce your risk.  Home care  Follow these guidelines when caring for yourself at home:    Go back to your usual activities as soon as you feel back to normal.    If you smoke, stop smoking. Talk with your healthcare provider or call a local stop-smoking program for help.    Don t take drugs or medicines that stimulate your heart. These include cocaine, amphetamines, diet pills, some decongestant cold medicines, caffeine, and nicotine.    Your provider may have prescribed medicine to stop atrial fibrillation from coming back. Take this medicine exactly as directed. Some medicines must be taken every day to work as they should.    Your provider may also have prescribed warfarin to  lower your risk for stroke. Have your blood tested regularly as advised by your healthcare provider. This will help make sure the dose is right for you.  Follow-up care  Follow up with your healthcare provider, or as advised.  When should I call my healthcare provider?  Call your healthcare provider right away if any of these occur:    Shortness of breath gets worse    Swelling in either leg    Unexpected weight gain    Chest pain or pressure    Near fainting or fainting    You feel like your heart is fluttering, or beating fast or hard    Fever of 100.4 F (38 C) or higher, or as directed by your healthcare provider    Cough with dark-colored or bloody mucus  Also call your provider right away if you have signs of stroke:    Weakness or numbness of an arm or leg or one side of the face    Difficulty speaking or seeing    Extreme drowsiness, confusion, dizziness, or fainting   Date Last Reviewed: 5/1/2016 2000-2017 [employer]. [address] [place] [address]. All rights reserved. This information is not intended as a substitute for professional medical care. Always follow your healthcare professional's instructions.      Review new medication infor given if review needed or questions    Warfarin Instruction     You have started taking a medicine called warfarin. This is a blood-thinning medicine (anticoagulant). It helps prevent and treat blood clots.      Before leaving the hospital, make sure you know how much to take and how long to take it.      You will need regular blood tests to make sure your blood is clotting safely. It is very important to see your doctor for regular blood tests.    Talk to your doctor before taking any new medicine (this includes over-the-counter drugs and herbal products). Many medicines can interact with warfarin. This may cause more bleeding or too much clotting.     Eating a lot of vitamin K--found in green, leafy vegetables--can change the way warfarin  "works in your body. Do NOT avoid these foods. Instead, try to eat the same amount each day.     Bleeding is the most common side-effect of warfarin. You may notice bleeding gums, a bloody nose, bruises and bleeding longer when you cut yourself. See a doctor at once if:   o You cough up blood  o You find blood in your stool (poop)  o You have a deep cut, or a cut that bleeds longer than 10 minutes   o You have a bad cut, hard fall, accident or hit your head (go to urgent care or the emergency room).    For women who can get pregnant: This medicine can harm an unborn baby. Be very careful not to get pregnant while taking this medicine. If you think you might be pregnant, call your doctor right away.    For more information, read \"Guide to Warfarin Therapy,  the booklet you received in the hospital.        Pending Results     Date and Time Order Name Status Description    8/7/2017 1622 Blood culture Preliminary     8/7/2017 1622 Blood culture Preliminary             Statement of Approval     Ordered          08/10/17 1209  I have reviewed and agree with all the recommendations and orders detailed in this document.  EFFECTIVE NOW     Approved and electronically signed by:  Michelle Jennings MD             Admission Information     Date & Time Provider Department Dept. Phone    8/7/2017 Zaynab Stephenson MD [place] [address] [facility] [phone]      Your Vitals Were     Blood Pressure Pulse Temperature Respirations Weight Pulse Oximetry    108/87 (BP Location: Right arm) 94 97.1  F (36.2  C) (Oral) 20 151 kg (332 lb 14.4 oz) 96%    BMI (Body Mass Index)                   57.14 kg/m2           [employer] Information     [employer] lets you send messages to your doctor, view your test results, renew your prescriptions, schedule appointments and more. To sign up, go to [email or web address] . Click on \"Log in\" on the left side of the screen, which will take you to the Welcome page. Then click on \"Sign up Now\" on the " right side of the page.     You will be asked to enter the access code listed below, as well as some personal information. Please follow the directions to create your username and password.     Your access code is: [email or web address]  Expires: 2017 10:12 PM     Your access code will  in 90 days. If you need help or a new code, please call your [facility] or [phone].        Care EveryWhere ID     This is your Care EveryWhere ID. This could be used by other organizations to access your [facility] medical records  [id number]        Equal Access to Services     [facility]: Hadsanchez osman Sojohn, wayamilet caicedo, nemesio walters, calin sue . So [facility] [phone].    ATENCIÓN: Si habla español, tiene a dickey disposición servicios gratuitos de asistencia lingüística. Jonathon al [phone].    We comply with applicable federal civil rights laws and [place] laws. We do not discriminate on the basis of race, color, national origin, age, disability sex, sexual orientation or gender identity.               Review of your medicines      START taking        Dose / Directions    diltiazem 30 MG tablet   Commonly known as:  CARDIZEM        Dose:  30 mg   Take 1 tablet (30 mg) by mouth 4 times daily   Quantity:  120 tablet   Refills:  0       pravastatin 10 MG tablet   Commonly known as:  PRAVACHOL   Used for:  Hyperlipidemia LDL goal <100        Dose:  10 mg   Take 1 tablet (10 mg) by mouth At Bedtime   Quantity:  30 tablet   Refills:  0       warfarin 5 MG tablet   Commonly known as:  COUMADIN        Dose:  5 mg   Take 1 tablet (5 mg) by mouth daily   Quantity:  30 tablet   Refills:  0         CONTINUE these medicines which may have CHANGED, or have new prescriptions. If we are uncertain of the size of tablets/capsules you have at home, strength may be listed as something that might have changed.        Dose / Directions    gabapentin 600 MG tablet   Commonly known  as:  NEURONTIN   This may have changed:  how much to take   Used for:  Postherpetic neuralgia        Dose:  600 mg   Take 1 tablet (600 mg) by mouth 3 times daily   Quantity:  540 tablet   Refills:  3       metoprolol 50 MG 24 hr tablet   Commonly known as:  TOPROL-XL   This may have changed:  when to take this   Used for:  Essential hypertension with goal blood pressure less than 140/90        Dose:  50 mg   Take 1 tablet (50 mg) by mouth 2 times daily   Quantity:  60 tablet   Refills:  0         CONTINUE these medicines which have NOT CHANGED        Dose / Directions    buPROPion 300 MG 24 hr tablet   Commonly known as:  WELLBUTRIN XL   Used for:  Major depressive disorder, recurrent, in full remission (H)        Dose:  300 mg   Take 1 tablet (300 mg) by mouth every morning   Quantity:  90 tablet   Refills:  1       CENTRUM SILVER per tablet        Dose:  1 tablet   Take 1 tablet by mouth daily   Refills:  0       CEPHALEXIN PO        Dose:  500 mg   Take 500 mg by mouth 3 times daily as needed (when cellulitis is coming on)   Refills:  0       fluticasone 50 MCG/ACT spray   Commonly known as:  FLONASE   Used for:  Post-nasal drip        Dose:  2 spray   Spray 2 sprays into both nostrils daily   Quantity:  3 Bottle   Refills:  11       lidocaine 5 % Patch   Commonly known as:  LIDODERM   Used for:  Postherpetic neuralgia        Apply up to 3 patches to painful area at once for up to 12 h within a 24 h period.  Remove after 12 hours.   Quantity:  60 patch   Refills:  2       minocycline 50 MG capsule   Commonly known as:  MINOCIN   Used for:  Methicillin susceptible Staphylococcus aureus septicemia (H)        Dose:  50 mg   Take 1 capsule (50 mg) by mouth 2 times daily   Quantity:  60 capsule   Refills:  5       nortriptyline 25 MG capsule   Commonly known as:  PAMELOR   Used for:  Post herpetic neuralgia        TAKE 2 CAPSULES(50 MG) BY MOUTH AT BEDTIME   Quantity:  60 capsule   Refills:  11       pantoprazole 40  MG EC tablet   Commonly known as:  PROTONIX   Used for:  GERD (gastroesophageal reflux disease)        Dose:  40 mg   Take 1 tablet (40 mg) by mouth daily   Quantity:  90 tablet   Refills:  3       pregabalin 150 MG capsule   Commonly known as:  LYRICA   Used for:  Postherpetic neuralgia        Dose:  150 mg   Take 1 capsule (150 mg) by mouth 2 times daily   Quantity:  60 capsule   Refills:  5       venlafaxine 150 MG 24 hr capsule   Commonly known as:  EFFEXOR-XR   Used for:  Major depressive disorder, recurrent episode, moderate (H)        TAKE 1 CAPSULE(150 MG) BY MOUTH DAILY   Quantity:  90 capsule   Refills:  0         STOP taking     amLODIPine 10 MG tablet   Commonly known as:  NORVASC           atenolol 50 MG tablet   Commonly known as:  TENORMIN           simvastatin 40 MG tablet   Commonly known as:  ZOCOR                Where to get your medicines      These medications were sent to [id number] - [place] - [address] [facility]  AT [address] [place] [address]     Phone:  [phone]     diltiazem 30 MG tablet    metoprolol 50 MG 24 hr tablet    pravastatin 10 MG tablet    warfarin 5 MG tablet         Some of these will need a paper prescription and others can be bought over the counter. Ask your nurse if you have questions.     You don't need a prescription for these medications     gabapentin 600 MG tablet                Protect others around you: Learn how to safely use, store and throw away your medicines at www.disposemymeds.org.             Medication List: This is a list of all your medications and when to take them. Check marks below indicate your daily home schedule. Keep this list as a reference.      Medications           Morning Afternoon Evening Bedtime As Needed    buPROPion 300 MG 24 hr tablet   Commonly known as:  WELLBUTRIN XL   Take 1 tablet (300 mg) by mouth every morning   Last time this was given:  300 mg on 8/10/2017  8:03 AM             8am                       CENTRUM SILVER per tablet   Take 1 tablet by mouth daily            8am                       CEPHALEXIN PO   Take 500 mg by mouth 3 times daily as needed (when cellulitis is coming on)                            As instructed       diltiazem 30 MG tablet   Commonly known as:  CARDIZEM   Take 1 tablet (30 mg) by mouth 4 times daily   Last time this was given:  30 mg on 8/10/2017  8:02 AM            6am       12 noon       6pm       [place]           fluticasone 50 MCG/ACT spray   Commonly known as:  FLONASE   Spray 2 sprays into both nostrils daily   Last time this was given:  2 sprays on 8/10/2017  8:11 AM            8am                       gabapentin 600 MG tablet   Commonly known as:  NEURONTIN   Take 1 tablet (600 mg) by mouth 3 times daily   Last time this was given:  600 mg on 8/10/2017  8:03 AM            8am       2pm       8pm               lidocaine 5 % Patch   Commonly known as:  LIDODERM   Apply up to 3 patches to painful area at once for up to 12 h within a 24 h period.  Remove after 12 hours.            Per instructions                       metoprolol 50 MG 24 hr tablet   Commonly known as:  TOPROL-XL   Take 1 tablet (50 mg) by mouth 2 times daily   Last time this was given:  50 mg on 8/10/2017  8:03 AM            8am           8pm               minocycline 50 MG capsule   Commonly known as:  MINOCIN   Take 1 capsule (50 mg) by mouth 2 times daily            8am           8pm               nortriptyline 25 MG capsule   Commonly known as:  PAMELOR   TAKE 2 CAPSULES(50 MG) BY MOUTH AT BEDTIME   Last time this was given:  50 mg on 8/9/2017  9:57 PM                        10 pm           pantoprazole 40 MG EC tablet   Commonly known as:  PROTONIX   Take 1 tablet (40 mg) by mouth daily   Last time this was given:  40 mg on 8/10/2017  6:39 AM            7am                       pravastatin 10 MG tablet   Commonly known as:  PRAVACHOL   Take 1 tablet (10 mg) by mouth At  Bedtime                                pregabalin 150 MG capsule   Commonly known as:  LYRICA   Take 1 capsule (150 mg) by mouth 2 times daily   Last time this was given:  150 mg on 8/10/2017  8:03 AM            8am           8pm               venlafaxine 150 MG 24 hr capsule   Commonly known as:  EFFEXOR-XR   TAKE 1 CAPSULE(150 MG) BY MOUTH DAILY            8am                       warfarin 5 MG tablet   Commonly known as:  COUMADIN   Take 1 tablet (5 mg) by mouth daily   Last time this was given:  5 mg on 8/9/2017  5:23 PM                    6pm

## 2017-08-08 ENCOUNTER — APPOINTMENT (OUTPATIENT)
Dept: CARDIOLOGY | Facility: CLINIC | Age: 77
DRG: 309 | End: 2017-08-08
Attending: INTERNAL MEDICINE
Payer: COMMERCIAL

## 2017-08-08 PROBLEM — I48.92 ATRIAL FLUTTER (H): Status: ACTIVE | Noted: 2017-08-08

## 2017-08-08 PROBLEM — I48.92 ATRIAL FLUTTER WITH RAPID VENTRICULAR RESPONSE (H): Status: ACTIVE | Noted: 2017-08-08

## 2017-08-08 LAB
ANION GAP SERPL CALCULATED.3IONS-SCNC: 8 MMOL/L (ref 3–14)
BUN SERPL-MCNC: 18 MG/DL (ref 7–30)
CALCIUM SERPL-MCNC: 8.2 MG/DL (ref 8.5–10.1)
CHLORIDE SERPL-SCNC: 109 MMOL/L (ref 94–109)
CO2 SERPL-SCNC: 25 MMOL/L (ref 20–32)
CREAT SERPL-MCNC: 1.75 MG/DL (ref 0.52–1.04)
GFR SERPL CREATININE-BSD FRML MDRD: 28 ML/MIN/1.7M2
GLUCOSE BLDC GLUCOMTR-MCNC: 120 MG/DL (ref 70–99)
GLUCOSE SERPL-MCNC: 121 MG/DL (ref 70–99)
INR PPP: 1.19 (ref 0.86–1.14)
INR PPP: 1.2 (ref 0.86–1.14)
INTERPRETATION ECG - MUSE: NORMAL
INTERPRETATION ECG - MUSE: NORMAL
MAGNESIUM SERPL-MCNC: 1.7 MG/DL (ref 1.6–2.3)
POTASSIUM SERPL-SCNC: 3.5 MMOL/L (ref 3.4–5.3)
SODIUM SERPL-SCNC: 142 MMOL/L (ref 133–144)
TSH SERPL DL<=0.005 MIU/L-ACNC: 1.36 MU/L (ref 0.4–4)

## 2017-08-08 PROCEDURE — 25500064 ZZH RX 255 OP 636: Performed by: INTERNAL MEDICINE

## 2017-08-08 PROCEDURE — 25000128 H RX IP 250 OP 636: Performed by: INTERNAL MEDICINE

## 2017-08-08 PROCEDURE — 84443 ASSAY THYROID STIM HORMONE: CPT | Performed by: INTERNAL MEDICINE

## 2017-08-08 PROCEDURE — 25000132 ZZH RX MED GY IP 250 OP 250 PS 637: Performed by: INTERNAL MEDICINE

## 2017-08-08 PROCEDURE — 83735 ASSAY OF MAGNESIUM: CPT | Performed by: INTERNAL MEDICINE

## 2017-08-08 PROCEDURE — 00000146 ZZHCL STATISTIC GLUCOSE BY METER IP

## 2017-08-08 PROCEDURE — 40000264 ECHO COMPLETE WITH LUMASON

## 2017-08-08 PROCEDURE — 25000125 ZZHC RX 250: Performed by: INTERNAL MEDICINE

## 2017-08-08 PROCEDURE — 12000007 ZZH R&B INTERMEDIATE

## 2017-08-08 PROCEDURE — 36415 COLL VENOUS BLD VENIPUNCTURE: CPT | Performed by: INTERNAL MEDICINE

## 2017-08-08 PROCEDURE — 99233 SBSQ HOSP IP/OBS HIGH 50: CPT | Performed by: INTERNAL MEDICINE

## 2017-08-08 PROCEDURE — 99222 1ST HOSP IP/OBS MODERATE 55: CPT | Mod: 25 | Performed by: INTERNAL MEDICINE

## 2017-08-08 PROCEDURE — 80048 BASIC METABOLIC PNL TOTAL CA: CPT | Performed by: INTERNAL MEDICINE

## 2017-08-08 PROCEDURE — 93306 TTE W/DOPPLER COMPLETE: CPT | Mod: 26 | Performed by: INTERNAL MEDICINE

## 2017-08-08 PROCEDURE — 85610 PROTHROMBIN TIME: CPT | Performed by: INTERNAL MEDICINE

## 2017-08-08 PROCEDURE — G0378 HOSPITAL OBSERVATION PER HR: HCPCS

## 2017-08-08 RX ORDER — NORTRIPTYLINE HYDROCHLORIDE 50 MG/1
50 CAPSULE ORAL AT BEDTIME
Status: DISCONTINUED | OUTPATIENT
Start: 2017-08-08 | End: 2017-08-10 | Stop reason: HOSPADM

## 2017-08-08 RX ORDER — AMOXICILLIN 250 MG
1-2 CAPSULE ORAL 2 TIMES DAILY
Status: DISCONTINUED | OUTPATIENT
Start: 2017-08-08 | End: 2017-08-10 | Stop reason: HOSPADM

## 2017-08-08 RX ORDER — GABAPENTIN 600 MG/1
1200 TABLET ORAL 3 TIMES DAILY
Status: DISCONTINUED | OUTPATIENT
Start: 2017-08-08 | End: 2017-08-09

## 2017-08-08 RX ORDER — METOPROLOL TARTRATE 1 MG/ML
2.5 INJECTION, SOLUTION INTRAVENOUS EVERY 4 HOURS PRN
Status: DISCONTINUED | OUTPATIENT
Start: 2017-08-08 | End: 2017-08-10 | Stop reason: HOSPADM

## 2017-08-08 RX ORDER — DOXYCYCLINE HYCLATE 50 MG/1
50 CAPSULE ORAL DAILY
Status: DISCONTINUED | OUTPATIENT
Start: 2017-08-08 | End: 2017-08-10 | Stop reason: HOSPADM

## 2017-08-08 RX ORDER — POLYETHYLENE GLYCOL 3350 17 G/17G
17 POWDER, FOR SOLUTION ORAL DAILY PRN
Status: DISCONTINUED | OUTPATIENT
Start: 2017-08-08 | End: 2017-08-10 | Stop reason: HOSPADM

## 2017-08-08 RX ORDER — DILTIAZEM HYDROCHLORIDE 30 MG/1
30 TABLET, FILM COATED ORAL EVERY 6 HOURS SCHEDULED
Status: DISCONTINUED | OUTPATIENT
Start: 2017-08-08 | End: 2017-08-10 | Stop reason: HOSPADM

## 2017-08-08 RX ORDER — NALOXONE HYDROCHLORIDE 0.4 MG/ML
.1-.4 INJECTION, SOLUTION INTRAMUSCULAR; INTRAVENOUS; SUBCUTANEOUS
Status: DISCONTINUED | OUTPATIENT
Start: 2017-08-08 | End: 2017-08-10 | Stop reason: HOSPADM

## 2017-08-08 RX ORDER — ONDANSETRON 2 MG/ML
4 INJECTION INTRAMUSCULAR; INTRAVENOUS EVERY 6 HOURS PRN
Status: DISCONTINUED | OUTPATIENT
Start: 2017-08-08 | End: 2017-08-10 | Stop reason: HOSPADM

## 2017-08-08 RX ORDER — SIMVASTATIN 10 MG
10 TABLET ORAL AT BEDTIME
Status: DISCONTINUED | OUTPATIENT
Start: 2017-08-08 | End: 2017-08-10

## 2017-08-08 RX ORDER — ONDANSETRON 4 MG/1
4 TABLET, ORALLY DISINTEGRATING ORAL EVERY 6 HOURS PRN
Status: DISCONTINUED | OUTPATIENT
Start: 2017-08-08 | End: 2017-08-10 | Stop reason: HOSPADM

## 2017-08-08 RX ORDER — VENLAFAXINE HYDROCHLORIDE 150 MG/1
150 TABLET, EXTENDED RELEASE ORAL
Status: DISCONTINUED | OUTPATIENT
Start: 2017-08-08 | End: 2017-08-10 | Stop reason: HOSPADM

## 2017-08-08 RX ORDER — WARFARIN SODIUM 7.5 MG/1
7.5 TABLET ORAL
Status: COMPLETED | OUTPATIENT
Start: 2017-08-08 | End: 2017-08-08

## 2017-08-08 RX ORDER — BUPROPION HYDROCHLORIDE 150 MG/1
300 TABLET ORAL EVERY MORNING
Status: DISCONTINUED | OUTPATIENT
Start: 2017-08-08 | End: 2017-08-10 | Stop reason: HOSPADM

## 2017-08-08 RX ORDER — FLUTICASONE PROPIONATE 50 MCG
2 SPRAY, SUSPENSION (ML) NASAL DAILY
Status: DISCONTINUED | OUTPATIENT
Start: 2017-08-08 | End: 2017-08-10 | Stop reason: HOSPADM

## 2017-08-08 RX ORDER — SIMVASTATIN 40 MG
40 TABLET ORAL AT BEDTIME
Status: DISCONTINUED | OUTPATIENT
Start: 2017-08-08 | End: 2017-08-08

## 2017-08-08 RX ORDER — LIDOCAINE 50 MG/G
3 PATCH TOPICAL
Status: DISCONTINUED | OUTPATIENT
Start: 2017-08-08 | End: 2017-08-10 | Stop reason: HOSPADM

## 2017-08-08 RX ORDER — PREGABALIN 75 MG/1
150 CAPSULE ORAL 2 TIMES DAILY
Status: DISCONTINUED | OUTPATIENT
Start: 2017-08-08 | End: 2017-08-10 | Stop reason: HOSPADM

## 2017-08-08 RX ORDER — METOPROLOL SUCCINATE 50 MG/1
50 TABLET, EXTENDED RELEASE ORAL 2 TIMES DAILY
Status: DISCONTINUED | OUTPATIENT
Start: 2017-08-08 | End: 2017-08-10 | Stop reason: HOSPADM

## 2017-08-08 RX ORDER — WARFARIN SODIUM 10 MG/1
10 TABLET ORAL ONCE
Status: COMPLETED | OUTPATIENT
Start: 2017-08-08 | End: 2017-08-08

## 2017-08-08 RX ORDER — ACETAMINOPHEN 325 MG/1
650 TABLET ORAL EVERY 4 HOURS PRN
Status: DISCONTINUED | OUTPATIENT
Start: 2017-08-08 | End: 2017-08-10 | Stop reason: HOSPADM

## 2017-08-08 RX ORDER — PANTOPRAZOLE SODIUM 40 MG/1
40 TABLET, DELAYED RELEASE ORAL
Status: DISCONTINUED | OUTPATIENT
Start: 2017-08-08 | End: 2017-08-10 | Stop reason: HOSPADM

## 2017-08-08 RX ORDER — SODIUM CHLORIDE 9 MG/ML
INJECTION, SOLUTION INTRAVENOUS CONTINUOUS
Status: ACTIVE | OUTPATIENT
Start: 2017-08-08 | End: 2017-08-08

## 2017-08-08 RX ADMIN — NORTRIPTYLINE HYDROCHLORIDE 50 MG: 50 CAPSULE ORAL at 21:27

## 2017-08-08 RX ADMIN — GABAPENTIN 1200 MG: 600 TABLET, FILM COATED ORAL at 07:55

## 2017-08-08 RX ADMIN — METOPROLOL SUCCINATE 50 MG: 50 TABLET, EXTENDED RELEASE ORAL at 20:22

## 2017-08-08 RX ADMIN — DILTIAZEM HYDROCHLORIDE 30 MG: 30 TABLET, FILM COATED ORAL at 18:07

## 2017-08-08 RX ADMIN — VENLAFAXINE HYDROCHLORIDE 150 MG: 150 TABLET, EXTENDED RELEASE ORAL at 07:58

## 2017-08-08 RX ADMIN — SIMVASTATIN 10 MG: 10 TABLET, FILM COATED ORAL at 00:48

## 2017-08-08 RX ADMIN — GABAPENTIN 1200 MG: 600 TABLET, FILM COATED ORAL at 00:40

## 2017-08-08 RX ADMIN — PANTOPRAZOLE SODIUM 40 MG: 40 TABLET, DELAYED RELEASE ORAL at 07:02

## 2017-08-08 RX ADMIN — PREGABALIN 150 MG: 75 CAPSULE ORAL at 07:56

## 2017-08-08 RX ADMIN — SULFUR HEXAFLUORIDE 2 ML: KIT at 11:37

## 2017-08-08 RX ADMIN — SODIUM CHLORIDE: 9 INJECTION, SOLUTION INTRAVENOUS at 00:31

## 2017-08-08 RX ADMIN — WARFARIN SODIUM 10 MG: 10 TABLET ORAL at 02:48

## 2017-08-08 RX ADMIN — WARFARIN SODIUM 7.5 MG: 7.5 TABLET ORAL at 17:38

## 2017-08-08 RX ADMIN — METOPROLOL SUCCINATE 50 MG: 50 TABLET, EXTENDED RELEASE ORAL at 00:41

## 2017-08-08 RX ADMIN — DILTIAZEM HYDROCHLORIDE 5 MG/HR: 5 INJECTION INTRAVENOUS at 11:48

## 2017-08-08 RX ADMIN — PREGABALIN 150 MG: 75 CAPSULE ORAL at 01:07

## 2017-08-08 RX ADMIN — GABAPENTIN 1200 MG: 600 TABLET, FILM COATED ORAL at 15:27

## 2017-08-08 RX ADMIN — PREGABALIN 150 MG: 75 CAPSULE ORAL at 20:22

## 2017-08-08 RX ADMIN — METOPROLOL SUCCINATE 50 MG: 50 TABLET, EXTENDED RELEASE ORAL at 07:56

## 2017-08-08 RX ADMIN — DILTIAZEM HYDROCHLORIDE 30 MG: 30 TABLET, FILM COATED ORAL at 11:45

## 2017-08-08 RX ADMIN — BUPROPION HYDROCHLORIDE 300 MG: 150 TABLET, FILM COATED, EXTENDED RELEASE ORAL at 07:54

## 2017-08-08 RX ADMIN — GABAPENTIN 1200 MG: 600 TABLET, FILM COATED ORAL at 21:27

## 2017-08-08 RX ADMIN — DOXYCYCLINE HYCLATE 50 MG: 50 CAPSULE ORAL at 07:55

## 2017-08-08 RX ADMIN — SODIUM CHLORIDE: 9 INJECTION, SOLUTION INTRAVENOUS at 10:04

## 2017-08-08 RX ADMIN — SIMVASTATIN 10 MG: 10 TABLET, FILM COATED ORAL at 21:27

## 2017-08-08 RX ADMIN — NORTRIPTYLINE HYDROCHLORIDE 50 MG: 50 CAPSULE ORAL at 00:41

## 2017-08-08 RX ADMIN — FLUTICASONE PROPIONATE 2 SPRAY: 50 SPRAY, METERED NASAL at 07:59

## 2017-08-08 RX ADMIN — METOPROLOL TARTRATE 2.5 MG: 5 INJECTION INTRAVENOUS at 20:31

## 2017-08-08 NOTE — H&P
CHIEF COMPLAINT:  Tachycardia.  The patient was found by a home health nurse to have an elevated heart rate.  She was asymptomatic.      HISTORY OF PRESENT ILLNESS:  Ms. Berenice Chaudhari is a 77-year-old female who presented to the hospital today for tachycardia.  She apparently had a home health care worker at her house today.  They were checking her heart rate and routine vital signs, and found that her heart rate was in the 140s.  They directed her to come to the [facility] Emergency Department for evaluation.  The patient had no symptoms.  She has never had an elevated heart rate like this in the past that she is aware of.      She has no known history of cardiac arrhythmia.      On arrival to the ER, vital signs were notable for a heart rate of 145.  Blood pressure 130/85.  Afebrile.  Saturation 100% on room air.  Heart rate remained consistently in the 145 range while in the Emergency Department.  Initially it was felt to be sinus tachycardia by the ER provider, but after adenosine was given, the ventricular rate slowed down, and clear flutter waves were visible.  I did review this rhythm strip as well, showing what appeared to be saw tooth waves after adenosine was given.  Rhythm strip appeared most consistent with atrial flutter.  The patient has no symptoms.  She is being admitted to the Hospitalist Service.      PAST MEDICAL HISTORY:   1.  Cellulitis.   2.  Morbid obesity.   3.  Hypertension.   4.  Hyperlipidemia.   5.  Apparent staph infection of hip in the past, leaving her essentially wheelchair-bound.  She can stand and pivot, however.  She is cared for at home by her .   6.  Morbid obesity with BMI of 62.   7.  History of reflux disease.   8.  Previous cholecystectomy.      CURRENT MEDICATIONS:   1.  Bupropion.   2.  Flonase.   3.  Gabapentin.   4.  Lidoderm pain patch.   5.  Metoprolol XL.   6.  Minocycline.   7.  Nortriptyline.   8.  Pantoprazole.   9.  Lyrica.   10.  Simvastatin.   11.   Effexor.   12.  Amlodipine.   13.  Atenolol.   14.  Keflex as needed when cellulitis occurs.   15.  Multivitamin.      ALLERGIES:   1.  Ceftriaxone.   2.  Nafcillin.   3.  Penicillin.   4.  Vancomycin.   5.  Codeine.   6.  Clindamycin.   7.  Azithromycin.      FAMILY HISTORY:  Reviewed.  Nothing contributory to this admission.      SOCIAL HISTORY:  The patient denies smoking or drinking.      REVIEW OF SYSTEMS:  See HPI for details.  Comprehensive greater than 10-point review of systems otherwise negative besides that detailed above.      PHYSICAL EXAM:   VITAL SIGNS:  Blood pressure is currently 115/90 with a heart rate of 145.  Respiratory rate 21.  Saturation 98% on room air.  Afebrile.   GENERAL:  The patient appears nontoxic and in no acute distress.  She appears awake, alert and oriented x3.  She feels well.  Does not notice that her heart rate is in the 140s.   HEENT:  Head is atraumatic.  Sclerae white.  Eyelids normal.  Conjunctivae normal.  Extraocular movements are intact.   NECK:  Supple.  No cervical or supraclavicular lymphadenopathy.   HEART:  Regular rhythm.  Tachycardic.  No significant murmurs.  No lower extremity edema.   LUNGS:  Clear to auscultation bilaterally.  No intercostal retractions.  No conversational dyspnea.   ABDOMEN:  Nontender, nondistended.  Soft.  No masses.  No organomegaly.   EXTREMITIES:  No edema.   SKIN:  No rash.  No jaundice.  Skin is dry to touch.   NEUROLOGIC:  Cranial nerves II through XII are intact.  Moves all extremities appropriately.  Sensation intact to light touch in the upper and lower extremities bilaterally.   PSYCHIATRIC:  The patient is awake, alert and oriented x3.  Mood and affect are normal and appropriate.      LABORATORY AND IMAGING DATA:  Reviewed above in HPI.      IMPRESSION:  Ms. Chaudhari is a 77-year-old female with a history of obesity, hypertension, hyperlipidemia, who presents to the hospital today for asymptomatic tachycardia noted by a home health  aide.  She came to [facility] Emergency Department for evaluation.      Labs notable for an elevated creatinine consistent with acute renal failure, and rhythm strip findings consistent with atrial flutter.      1.  Atrial flutter with rapid ventricular response.  No known history of this, but the patient is completely asymptomatic currently.   2.  Acute renal failure.   3.  Morbid obesity.   4.  Hypertension.   5.  Hyperlipidemia.   6.  Recent admission to the hospital in 2017 for right lower extremity cellulitis.   7.  Chronic kidney disease, baseline creatinine near 1.0 to 1.2.      PLAN:   1.  Admit to inpatient status in the setting of atrial flutter with rapid ventricular response and need for diltiazem drip.   2.  Diltiazem drip for rate control.   3.  Continue metoprolol XL, but we will increase her dose from 50 once a day to 50 twice a day.   4.  Check magnesium and TSH levels.   5.  I discussed with her the role of anticoagulation.  I recommend anticoagulation, given her risk factors, for stroke prevention.  She is agreeable to this.  We discussed warfarin versus the newer anticoagulants.  She prefers to try warfarin.  We will order a dose tonight.  She was given a dose of therapeutic Lovenox by the Emergency Department physician this evening.   6.  If fails to control heart rate with medications or fails to convert to a sinus rhythm, would consult Cardiology.  I have not placed a consult yet.   7.  Telemetry monitoring.   8.  Check echocardiogram.   9.  IV fluid hydration.  Repeat labs in the morning.   10.  Hold home dose of amlodipine.  Would instead prefer to use an AV erich rate blocking medications for both heart rate control and control of her blood pressure in the setting of her atrial flutter.         PRIMITIVO BROCK MD             D: 2017 23:34   T: 2017 01:04   MT: [id number]      Name:     EDDIE MONTEIRO   MRN:      [id number]        Account:      [id number]   :       1940           Admitted:     [id number]      Document: [id number]       cc: Nelly Pearl MD

## 2017-08-08 NOTE — CONSULTS
REQUESTING PHYSICIAN:  Dr. Stephenson.      REASON FOR CONSULTATION:  Atrial fibrillation, asymptomatic.      HISTORY OF PRESENT ILLNESS:  Ms. Berenice Chaudhari is a pleasant 77-year-old lady who was noted to have an elevated heart rate, completely asymptomatic.  Her home health care worker checked her heart rate and routine vitals and found it to be in the 140s and directed her to the emergency room.  Ms. Chaudhari had no cardiorespiratory complaints of chest pain, shortness of breath, lightheadedness, presyncope, syncope, PND, orthopnea, palpitations or pedal edema.  She has no known cardiac history or history of cardiac arrhythmia.  She has never been checked for obstructive sleep apnea but does have morbid obesity with body mass index of 57.      In the emergency room adenosine was given for what was felt to be sinus tachycardia and flutter waves became visible.  She was admitted for atrial fibrillation.      She was appropriately started on systemic anticoagulation and a diltiazem drip was started.  She is on metoprolol at home reportedly as well as atenolol for some reason according to her intake medication list.      PAST MEDICAL HISTORY:  Remarkable for cellulitis, morbid obesity, hypertension, dyslipidemia, staph infection of the hip leaving her wheelchair bound, history of GERD, prior cholecystectomy.      MEDICATIONS:  As an outpatient include bupropion, Flonase, Gabapentin, Lidoderm patch, metoprolol XL, minocycline, nortriptyline, pantoprazole, Lyrica, simvastatin, Effexor, amlodipine, atenolol, Keflex and multivitamin.      ALLERGIES:  Ceftriaxone, Astelin, penicillin, vancomycin, codeine, clindamycin and azithromycin.      FAMILY HISTORY:  Reviewed and noncontributory to this admission.      SOCIAL HISTORY:  The patient denies tobacco or significant alcohol use.      REVIEW OF SYSTEMS:  Per admitting H&P of Dr. Booker dated 8/7/2017 and included in the patient's online medical record.      PHYSICAL EXAMINATION:    VITAL SIGNS:  Blood pressure is 139/58, heart rate is 90 and irregularly irregular, oxygen saturation is 98% on room air.   GENERAL:  This is a morbidly obese lady in no apparent distress.  She is alert and oriented x3.   HEENT:  Unremarkable.  Pupils are equal, equal and reactive to light and accommodation.  Extraocular motions are intact.  Mucous membranes are moist.  There is no jaundice or icterus.   CHEST:  Clear to auscultation bilaterally anteriorly.  The patient is having an echocardiogram done.   CARDIAC:  S1, S2 are regular without appreciable murmurs, rubs or gallops but are distant and sensitivity is reduced.  Jugular venous pressure cannot be assessed.  Carotids are normal in upstroke, volume and contour.   ABDOMEN:  Benign.  Bowel sounds are normal.   EXTREMITIES:  Without edema.   NEUROLOGIC:  Motor and sensory grossly intact.   MUSCULOSKELETAL:  No cyanosis or clubbing.  The patient moves all extremities equally.     SKIN:  There are no rashes or cyanosis.      LABORATORY DATA:  Reviewed with a potassium of 3.5, sodium 142, creatinine is 1.75, BUN 18, glucose 121.  Calcium is 8.2.  Magnesium was 1.7.  TSH was 1.36, D-dimer 0.8.  Troponin 0.024.  White count 6.3, hemoglobin 14.2 and platelets 289.  A nuclear medicine lung scan showed no evidence of pulmonary embolus.      ASSESSMENT AND PLAN:  A 77-year-old woman with completely asymptomatic atrial fibrillation.  She started on anticoagulation by her primary service in the form of warfarin.  She is on her home metoprolol as well as a diltiazem drip.      I would recommend rate control strategy and changing to diltiazem 30 mg q.i.d. to begin with and titrating down the drip to off as tolerated.  This can be done in combination with her metoprolol.      Simvastatin, dose needs to be adjusted and already has in the setting of a concomitant diltiazem use, appropriately.      I would recommend overnight oximetry if she is still in the hospital and  certainly a full sleep study at discharge.  She can follow up with the nurse practitioner in 2 weeks post-discharge with a 24-hour Holter to be done to assess rate control sometime following hospital discharge.      We will continue to follow from a distance and checkup her heart rate.  Otherwise, we will return her to the care of her primary service and be available for questions as needed.      TOTAL TIME:  45 minutes, 40 in coordination of care and counseling.         MAXIME HERNDON MD             D: 2017 11:08   T: 2017 11:33   MT: MARK#[id number]      Name:     EDDIE MONTEIRO   MRN:      [id number]        Account:       [id number]   :      1940           Consult Date:  2017      Document: [id number]

## 2017-08-08 NOTE — UTILIZATION REVIEW
"    Admission Status; Secondary Review Determination         Under the authority of the Utilization Management Committee, the utilization review process indicated a secondary review on the above patient.  The review outcome is based on review of the medical records, discussions with staff, and applying clinical experience noted on the date of the review.        ()      Inpatient Status Appropriate - This patient's medical care is consistent with medical management for inpatient care and reasonable inpatient medical practice.      (x) Observation Status Appropriate - This patient does not meet hospital inpatient criteria and is placed in observation status. If this patient's primary payer is Medicare and was admitted as an inpatient, Condition Code 44 should be used and patient status changed to \"observation\".   () Admission Status NOT Appropriate - This patient's medical care is not consistent with medical management for Inpatient or Observation Status.          RATIONALE FOR DETERMINATION     \"77-year-old female with a history of obesity, hypertension, hyperlipidemia, who presents to the hospital today for asymptomatic tachycardia noted by a home health aide.  She came to [facility] Emergency Department for evaluation.\"    Pt was noted to have a flutter and was started on IV diltiazem drip. Her troponin was stable at 0.026. Cardiology had switched the patient to oral diltiazem and she has been started on PO coumadin. Her TTE is pending. The patient may be discharging later today if her rate is well controlled on oral meds and hence observation status is appropriate. I discussed with Dr Stephenson, the attending MD.    The severity of illness, intensity of service provided, expected LOS make it appropriate for hospital observation.        The information on this document is developed by the utilization review team in order for the business office to ensure compliance.  This only denotes the appropriateness of " proper admission status and does not reflect the quality of care rendered.         The definitions of Inpatient Status and Observation Status used in making the determination above are those provided in the CMS Coverage Manual, Chapter 1 and Chapter 6, section 70.4.      Sincerely,     FLAQUITO OLVERA MD    Physician Advisor  Utilization Review/ Case Management  [facility].

## 2017-08-08 NOTE — ED NOTES
[facility]  ED Nurse Handoff Report    Berenice Chaudhari is a 77 year old female who comes in from PT when they noted she was tachycardic.  EKG (after adenosine admin) shows atrial flutter. Diltiazem bolus and drip administered.  Pt is normally wheelchair bound.  Alert and oriented.    ED Chief complaint: Tachycardia  . ED Diagnosis:   Final diagnoses:   None     Allergies:   Allergies   Allergen Reactions     Ceftriaxone Anaphylaxis and Rash     Rocephin given. Developed rash to back and abd, awaiting to see if it improves with d/c of rocephin.        Nafcillin Rash     diffuse severe rash in hospital 2/05     Penicillins Anaphylaxis     Vancomycin Anaphylaxis and Rash     Codeine Fatigue     Sleeps continuously     Clindamycin Rash     received information from patient     Zithromax [Azithromycin] Rash       Code Status: DNR  Activity level - Baseline/Home:  Total Care. Activity Level - Current:   Total Care. Lift room needed: Yes. Bariatric: Yes   Needed: No   Isolation: Yes Infection: Not Applicable  MRSA.     Vital Signs:   Vitals:    08/07/17 2140 08/07/17 2142 08/07/17 2150 08/07/17 2200   BP: 109/88  118/76 (!) 127/94   Pulse:       Resp: 17 18 16 17   Temp:       TempSrc:       SpO2: 98% 95% 95% 96%   Weight:           Cardiac Rhythm:  ,   Cardiac  Cardiac Rhythm: Sinus tachycardia  Pain level:    Patient confused: No. Patient Falls Risk: Yes.   Elimination Status: Has voided   Patient Report - Initial Complaint: tachycardia. Focused Assessment: tachycardia, generalized weakness  Tests Performed: EKG, blood, urine, VQ Abnormal Results: atrial flutter on EKG  Treatments provided: Diltiazem drip, lovenox,  Family Comments:  at bedside  OBS brochure/video discussed/provided to patient:  N/A  ED Medications:   Medications   diltiazem (CARDIZEM) 125 mg in NaCl 0.9 % 125 mL infusion (5 mg/hr Intravenous New Bag 8/7/17 0725)   0.9% sodium chloride BOLUS (0 mLs Intravenous Stopped  8/7/17 2235)   technetium pertechnetate with albumin (Tc99m MAA) radioisotope injection 3 mCi (3.2 mCi Intravenous Given 8/7/17 1812)   adenosine (ADENOCARD) injection 6 mg (6 mg Intravenous Given 8/7/17 2106)   diltiazem (CARDIZEM) injection 25 mg (25 mg Intravenous Given 8/7/17 2127)   enoxaparin (LOVENOX) injection 160 mg (160 mg Subcutaneous Given 8/7/17 2226)     Drips infusing:  Yes  For the majority of the shift this patient was Green. Interventions performed were ..     Severe Sepsis OR Septic Shock Diagnosis Present: No      ED Nurse Name/Phone Number: Jonathan Seaver,   10:37 PM  RECEIVING UNIT ED HANDOFF REVIEW    Above ED Nurse Handoff Report was reviewed: YES  Reviewed by: Catalina Ceron on August 7, 2017 at 11:32 PM

## 2017-08-08 NOTE — PLAN OF CARE
Pevited on rt leg to cammode x1.  Declines getting up to chair.  Appetite good.  HR down to 70-80s consistantly once po dilt started.  IV dilt. stopped. at 1300  Tele flutter

## 2017-08-08 NOTE — PROGRESS NOTES
[facility]    Hospitalist Progress Note    Date of Service (when I saw the patient): 08/08/2017    Assessment & Plan   Ms. Chaudhari is a 77-year-old female with a history of obesity, hypertension, hyperlipidemia, who presents to the hospital today for asymptomatic tachycardia noted by a home health aide. She came to [facility] Emergency Department for evaluation. Labs notable for an elevated creatinine consistent with acute renal failure, and rhythm strip findings consistent with atrial flutter.       1.  Atrial flutter with rapid ventricular response.  No known history of this, but the patient is completely asymptomatic currently. She was started on Cardizem drip. Metoprolol XL  was also increased to 50 mg BID. Cardiology was consulted and added Cardizem 30 mg q6. Appreciate input. Will wean off Cardizem drip as able. She was started on coumadin for anticoagulation.     2.  CKD. Baseline creatinine around 1.1-1.3. Creatinine 1.75 today. Will monitor    3.  Morbid obesity.     4.  Hypertension. Metoprolol XL increased to 50 mg BID for aflutter with RVR. Also added po Cardizem. Will monitor BP    5.  Hyperlipidemia. Will continue statin     6.  Recent admission to the hospital in 06/2017 for right lower extremity cellulitis.     DVT Prophylaxis: Warfarin  Code Status: Full Code    Disposition: Expected discharge in 1-2 days if her heart rate is controlled.     Zaynab Stephenson MD    Interval History   Patient seen and examined. She stated that she has no shortness of breath or palpitations. She has no fever. Denies cough or fever    -Data reviewed today: I reviewed all new labs and imaging results over the last 24 hours. I personally reviewed the EKG tracing showing atrial flutter with RVR.    Physical Exam   Temp: 96.6  F (35.9  C) Temp src: Oral BP: 139/58 Pulse: 146 Heart Rate: 81 Resp: 20 SpO2: 98 % O2 Device: None (Room air)    Vitals:    08/07/17 2115 08/08/17 0509   Weight: (!) 165.1 kg (364  lb) (!) 151 kg (332 lb 14.4 oz)     Vital Signs with Ranges  Temp:  [95.7  F (35.4  C)-97.8  F (36.6  C)] 96.6  F (35.9  C)  Pulse:  [146] 146  Heart Rate:  [] 81  Resp:  [16-34] 20  BP: ()/() 139/58  SpO2:  [93 %-100 %] 98 %  I/O last 3 completed shifts:  In: 784 [P.O.:250; I.V.:534]  Out: -     GEN:  Alert, oriented x 3, appears comfortable, NAD.  HEENT:  Normocephalic/atraumatic, no scleral icterus, no nasal discharge, mouth moist.  CV:  Regular rate and rhythm, no murmur or JVD.  S1 + S2 noted, no S3 or S4.  LUNGS:  Clear to auscultation bilaterally without rales/rhonchi/wheezing/retractions.  Symmetric chest rise on inhalation noted.  ABD:  Active bowel sounds, soft, non-tender/non-distended.  No rebound/guarding/rigidity.  EXT:  No edema or cyanosis.  Hands/feet warm to touch with good signs of peripheral perfusion.  No joint synovitis noted.  SKIN:  Dry to touch, no exanthems noted in the visualized areas.  NEURO:  Symmetric muscle strength, sensation to touch grossly intact.  No new focal deficits appreciated.    Medications     diltiazem (CARDIZEM) infusion ADULT 5 mg/hr (08/08/17 1148)     - MEDICATION INSTRUCTIONS -       Warfarin Therapy Reminder         buPROPion  300 mg Oral QAM     fluticasone  2 spray Both Nostrils Daily     gabapentin  1,200 mg Oral TID     metoprolol  50 mg Oral BID     doxycycline  50 mg Oral Daily     nortriptyline  50 mg Oral At Bedtime     pantoprazole  40 mg Oral QAM AC     pregabalin  150 mg Oral BID     venlafaxine  150 mg Oral Daily with breakfast     senna-docusate  1-2 tablet Oral BID     simvastatin  10 mg Oral At Bedtime     [START ON 8/9/2017] lidocaine   Transdermal Q24H     lidocaine   Transdermal Q8H     warfarin  7.5 mg Oral ONCE at 18:00     diltiazem  30 mg Oral Q6H DAVID       Data     Recent Labs  Lab 08/08/17  0725 08/08/17  0105 08/07/17  2215 08/07/17  1616   WBC  --   --   --  6.3   HGB  --   --   --  14.2   MCV  --   --   --  92   PLT  --    --   --  289   INR 1.19* 1.20*  --   --      --   --  138   POTASSIUM 3.5  --   --  4.0   CHLORIDE 109  --   --  105   CO2 25  --   --  23   BUN 18  --   --  18   CR 1.75*  --   --  1.73*   ANIONGAP 8  --   --  10   SHILOH 8.2*  --   --  9.1   *  --   --  153*   TROPI  --   --  0.026 0.024       Recent Results (from the past 24 hour(s))   XR Chest 2 Views    Narrative    CHEST TWO VIEWS  8/7/2017 5:10 PM     HISTORY: Shortness of breath.    COMPARISON: 6/23/2017.      Impression    IMPRESSION: Borderline cardiomegaly, unchanged. Lungs are clear.  Normal pulmonary vascularity, improved since the previous exam.  Infiltrates have resolved. No pleural effusions.    INOCENCIO LITTLE MD   NM Lung Scan Perfusion Particulate    Narrative    NM LUNG SCAN PERFUSION PARTICULATE  8/7/2017 6:57 PM      HISTORY: Tachycardia. Elevated D-dimer.    TECHNIQUE: Perfusion imaging of the lungs performed following  intravenous administration of 3.2 mCi technetium 99m MAA.    COMPARISON: Chest x-ray 8/7/2017.    FINDINGS: Images are somewhat degraded by tissue attenuation. No  definite perfusion defects. The accompanying chest x-ray shows no  pulmonary parenchymal abnormalities.      Impression    IMPRESSION: No evidence of pulmonary embolus.      SEJAL RAMOS MD

## 2017-08-08 NOTE — H&P
See dictated H&P    Admit for aflutter with RVR    Plan  dilt gtt  PO metoprolol  TTE   Tele monitoring  Warfarin for AC  If unable to control HR obtain cards consult (not yet ordered)

## 2017-08-08 NOTE — PROGRESS NOTES
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

## 2017-08-08 NOTE — PHARMACY-ADMISSION MEDICATION HISTORY
Admission medication history interview status for this patient is complete. See [facility] admission navigator for allergy information, prior to admission medications and immunization status.     Medication history interview source(s):Patient  Medication history resources (including written lists, pill bottles, clinic record):[facility] List  Primary pharmacy:[facility]    Changes made to [facility] medication list:  Added: None  Deleted: Hydroxyzine  Changed: None    Actions taken by pharmacist (provider contacted, etc):None     Additional medication history information:None    Medication reconciliation/reorder completed by provider prior to medication history? No    Do you take OTC medications (eg tylenol, ibuprofen, fish oil, eye/ear drops, etc)? Y    Prior to Admission medications    Medication Sig Last Dose Taking? Auth Provider   metoprolol (TOPROL-XL) 50 MG 24 hr tablet Take 1 tablet (50 mg) by mouth daily Past Week at Unknown time Yes Nelly Pearl MD   venlafaxine (EFFEXOR-XR) 150 MG 24 hr capsule TAKE 1 CAPSULE(150 MG) BY MOUTH DAILY 8/7/2017 at am Yes Nelly Pearl MD   fluticasone (FLONASE) 50 MCG/ACT spray Spray 2 sprays into both nostrils daily 8/5/2017 at Unknown time Yes Nelly Pearl MD   lidocaine (LIDODERM) 5 % Patch Apply up to 3 patches to painful area at once for up to 12 h within a 24 h period.  Remove after 12 hours. Past Week at Unknown time Yes Nelly Pearl MD   gabapentin (NEURONTIN) 600 MG tablet Take 2 tablets (1,200 mg) by mouth 3 times daily 8/7/2017 at x1 Yes Nelly Pearl MD   pregabalin (LYRICA) 150 MG capsule Take 1 capsule (150 mg) by mouth 2 times daily 8/7/2017 at am Yes Nelly Pearl MD   nortriptyline (PAMELOR) 25 MG capsule TAKE 2 CAPSULES(50 MG) BY MOUTH AT BEDTIME Past Week at Unknown time Yes Nelly Pearl MD   simvastatin (ZOCOR) 40 MG tablet Take 1 tablet (40 mg) by mouth At Bedtime Past Week at Unknown time Yes Dae  Nelly Kramer MD   minocycline (MINOCIN) 50 MG capsule Take 1 capsule (50 mg) by mouth 2 times daily 8/7/2017 at am Yes Jim Corrigan MD   buPROPion (WELLBUTRIN XL) 300 MG 24 hr tablet Take 1 tablet (300 mg) by mouth every morning 8/7/2017 at am Yes Jim Corrigan MD   atenolol (TENORMIN) 50 MG tablet Take 1 tablet (50 mg) by mouth daily 8/7/2017 at am Yes Jim Corrigan MD   pantoprazole (PROTONIX) 40 MG enteric coated tablet Take 1 tablet (40 mg) by mouth daily 8/7/2017 at am Yes Jim Corrigan MD   amLODIPine (NORVASC) 10 MG tablet Take 1 tablet (10 mg) by mouth every evening Past Week at Unknown time Yes Jim Corrigan MD   Multiple Vitamins-Minerals (CENTRUM SILVER) per tablet Take 1 tablet by mouth daily 8/7/2017 at am Yes Jim Corrigan MD   CEPHALEXIN PO Take 500 mg by mouth 3 times daily as needed (when cellulitis is coming on)  Unknown at Unknown time  Reported, Patient

## 2017-08-08 NOTE — PLAN OF CARE
Problem: Goal Outcome Summary  Goal: Goal Outcome Summary  Outcome: Improving  Tele remains A-Fib with HR now in the lower 100's on 10 mg/hr of IV Diltiazem.  BP is stable at 129/56.  Oral metoprolol and coumadin started tonight.  Patient was given written information for both the medications.  Patient is wheel chair bound at baseline.  Has not voided this shift, was bladder scanned for 115 cc's.  Patient stated that she urinated prior to coming up to the floor when in the ED.  Blood cultures are pending.  Skin folds are red and powder was applied.  Plan is for an Echo today and to continue with heart rate control.  Continue with POC.

## 2017-08-08 NOTE — PHARMACY-ANTICOAGULATION SERVICE
Clinical Pharmacy - Warfarin Dosing Consult     Pharmacy has been consulted to manage this patient s warfarin therapy.  Indication: Atrial Fibrillation  Therapy Goal: INR 2-3  Warfarin Prior to Admission: No  Significant drug interactions: Doxycycline  Recent documented change in oral intake/nutrition: Unknown  Dose Comments: New Dx A Fib    INR   Date Value Ref Range Status   08/08/2017 1.20 (H) 0.86 - 1.14 Final   08/17/2012 1.18 (H) 0.86 - 1.14 Final       Recommend warfarin 10mg overnight [overnight admission].  Pharmacy will monitor Berenice Chaudhari daily and order warfarin doses to achieve specified goal.      Please contact pharmacy as soon as possible if the warfarin needs to be held for a procedure or if the warfarin goals change.

## 2017-08-09 LAB
ANION GAP SERPL CALCULATED.3IONS-SCNC: 8 MMOL/L (ref 3–14)
ANION GAP SERPL CALCULATED.3IONS-SCNC: 9 MMOL/L (ref 3–14)
BUN SERPL-MCNC: 20 MG/DL (ref 7–30)
BUN SERPL-MCNC: 22 MG/DL (ref 7–30)
CALCIUM SERPL-MCNC: 8.1 MG/DL (ref 8.5–10.1)
CALCIUM SERPL-MCNC: 8.1 MG/DL (ref 8.5–10.1)
CHLORIDE SERPL-SCNC: 109 MMOL/L (ref 94–109)
CHLORIDE SERPL-SCNC: 111 MMOL/L (ref 94–109)
CO2 SERPL-SCNC: 23 MMOL/L (ref 20–32)
CO2 SERPL-SCNC: 23 MMOL/L (ref 20–32)
CREAT SERPL-MCNC: 1.6 MG/DL (ref 0.52–1.04)
CREAT SERPL-MCNC: 1.75 MG/DL (ref 0.52–1.04)
CREAT UR-MCNC: 162 MG/DL
FRACT EXCRET NA UR+SERPL-RTO: 0.2 %
GFR SERPL CREATININE-BSD FRML MDRD: 28 ML/MIN/1.7M2
GFR SERPL CREATININE-BSD FRML MDRD: 31 ML/MIN/1.7M2
GLUCOSE SERPL-MCNC: 110 MG/DL (ref 70–99)
GLUCOSE SERPL-MCNC: 130 MG/DL (ref 70–99)
INR PPP: 1.44 (ref 0.86–1.14)
LACTATE BLD-SCNC: 1.2 MMOL/L (ref 0.7–2.1)
POTASSIUM SERPL-SCNC: 3.8 MMOL/L (ref 3.4–5.3)
POTASSIUM SERPL-SCNC: 4.3 MMOL/L (ref 3.4–5.3)
SODIUM SERPL-SCNC: 141 MMOL/L (ref 133–144)
SODIUM SERPL-SCNC: 142 MMOL/L (ref 133–144)
SODIUM UR-SCNC: 31 MMOL/L

## 2017-08-09 PROCEDURE — 25000125 ZZHC RX 250: Performed by: HOSPITALIST

## 2017-08-09 PROCEDURE — 25000128 H RX IP 250 OP 636: Performed by: INTERNAL MEDICINE

## 2017-08-09 PROCEDURE — 84300 ASSAY OF URINE SODIUM: CPT | Performed by: INTERNAL MEDICINE

## 2017-08-09 PROCEDURE — 12000007 ZZH R&B INTERMEDIATE

## 2017-08-09 PROCEDURE — 80048 BASIC METABOLIC PNL TOTAL CA: CPT | Performed by: INTERNAL MEDICINE

## 2017-08-09 PROCEDURE — 25000132 ZZH RX MED GY IP 250 OP 250 PS 637: Performed by: INTERNAL MEDICINE

## 2017-08-09 PROCEDURE — 99233 SBSQ HOSP IP/OBS HIGH 50: CPT | Performed by: INTERNAL MEDICINE

## 2017-08-09 PROCEDURE — 83605 ASSAY OF LACTIC ACID: CPT | Performed by: INTERNAL MEDICINE

## 2017-08-09 PROCEDURE — 25000128 H RX IP 250 OP 636: Performed by: HOSPITALIST

## 2017-08-09 PROCEDURE — G0378 HOSPITAL OBSERVATION PER HR: HCPCS

## 2017-08-09 PROCEDURE — 99212 OFFICE O/P EST SF 10 MIN: CPT

## 2017-08-09 PROCEDURE — 82570 ASSAY OF URINE CREATININE: CPT | Performed by: INTERNAL MEDICINE

## 2017-08-09 PROCEDURE — 25000125 ZZHC RX 250: Performed by: INTERNAL MEDICINE

## 2017-08-09 PROCEDURE — 36415 COLL VENOUS BLD VENIPUNCTURE: CPT | Performed by: INTERNAL MEDICINE

## 2017-08-09 PROCEDURE — 85610 PROTHROMBIN TIME: CPT | Performed by: INTERNAL MEDICINE

## 2017-08-09 RX ORDER — WARFARIN SODIUM 5 MG/1
5 TABLET ORAL DAILY
Qty: 30 TABLET | Refills: 0 | Status: SHIPPED | OUTPATIENT
Start: 2017-08-09 | End: 2017-08-10

## 2017-08-09 RX ORDER — WARFARIN SODIUM 5 MG/1
5 TABLET ORAL
Status: COMPLETED | OUTPATIENT
Start: 2017-08-09 | End: 2017-08-09

## 2017-08-09 RX ORDER — GABAPENTIN 600 MG/1
600 TABLET ORAL 3 TIMES DAILY
Status: DISCONTINUED | OUTPATIENT
Start: 2017-08-09 | End: 2017-08-10 | Stop reason: HOSPADM

## 2017-08-09 RX ORDER — SODIUM CHLORIDE 9 MG/ML
INJECTION, SOLUTION INTRAVENOUS CONTINUOUS
Status: DISCONTINUED | OUTPATIENT
Start: 2017-08-09 | End: 2017-08-10

## 2017-08-09 RX ORDER — SIMVASTATIN 10 MG
10 TABLET ORAL AT BEDTIME
Qty: 30 TABLET | Refills: 0 | Status: SHIPPED | OUTPATIENT
Start: 2017-08-09 | End: 2017-08-10

## 2017-08-09 RX ORDER — DILTIAZEM HYDROCHLORIDE 30 MG/1
30 TABLET, FILM COATED ORAL 4 TIMES DAILY
Qty: 120 TABLET | Refills: 0 | Status: SHIPPED | OUTPATIENT
Start: 2017-08-09 | End: 2017-08-10

## 2017-08-09 RX ADMIN — BUPROPION HYDROCHLORIDE 300 MG: 150 TABLET, FILM COATED, EXTENDED RELEASE ORAL at 07:47

## 2017-08-09 RX ADMIN — DILTIAZEM HYDROCHLORIDE 5 MG/HR: 5 INJECTION INTRAVENOUS at 01:18

## 2017-08-09 RX ADMIN — GABAPENTIN 600 MG: 600 TABLET, FILM COATED ORAL at 15:54

## 2017-08-09 RX ADMIN — METOPROLOL TARTRATE 2.5 MG: 5 INJECTION INTRAVENOUS at 18:33

## 2017-08-09 RX ADMIN — DOXYCYCLINE HYCLATE 50 MG: 50 CAPSULE ORAL at 07:48

## 2017-08-09 RX ADMIN — SODIUM CHLORIDE: 9 INJECTION, SOLUTION INTRAVENOUS at 07:46

## 2017-08-09 RX ADMIN — GABAPENTIN 600 MG: 600 TABLET, FILM COATED ORAL at 21:57

## 2017-08-09 RX ADMIN — NORTRIPTYLINE HYDROCHLORIDE 50 MG: 50 CAPSULE ORAL at 21:57

## 2017-08-09 RX ADMIN — PREGABALIN 150 MG: 75 CAPSULE ORAL at 07:48

## 2017-08-09 RX ADMIN — SODIUM CHLORIDE 250 ML: 9 INJECTION, SOLUTION INTRAVENOUS at 10:05

## 2017-08-09 RX ADMIN — METOPROLOL TARTRATE 2.5 MG: 5 INJECTION INTRAVENOUS at 00:32

## 2017-08-09 RX ADMIN — DILTIAZEM HYDROCHLORIDE 30 MG: 30 TABLET, FILM COATED ORAL at 10:24

## 2017-08-09 RX ADMIN — VENLAFAXINE HYDROCHLORIDE 150 MG: 150 TABLET, EXTENDED RELEASE ORAL at 07:47

## 2017-08-09 RX ADMIN — WARFARIN SODIUM 5 MG: 5 TABLET ORAL at 17:23

## 2017-08-09 RX ADMIN — PANTOPRAZOLE SODIUM 40 MG: 40 TABLET, DELAYED RELEASE ORAL at 06:41

## 2017-08-09 RX ADMIN — FLUTICASONE PROPIONATE 2 SPRAY: 50 SPRAY, METERED NASAL at 07:48

## 2017-08-09 RX ADMIN — DILTIAZEM HYDROCHLORIDE 30 MG: 30 TABLET, FILM COATED ORAL at 17:23

## 2017-08-09 RX ADMIN — SODIUM CHLORIDE: 9 INJECTION, SOLUTION INTRAVENOUS at 16:06

## 2017-08-09 RX ADMIN — PREGABALIN 150 MG: 75 CAPSULE ORAL at 20:14

## 2017-08-09 RX ADMIN — METOPROLOL SUCCINATE 50 MG: 50 TABLET, EXTENDED RELEASE ORAL at 20:14

## 2017-08-09 RX ADMIN — GABAPENTIN 600 MG: 600 TABLET, FILM COATED ORAL at 07:48

## 2017-08-09 RX ADMIN — METOPROLOL SUCCINATE 50 MG: 50 TABLET, EXTENDED RELEASE ORAL at 10:24

## 2017-08-09 RX ADMIN — DILTIAZEM HYDROCHLORIDE 30 MG: 30 TABLET, FILM COATED ORAL at 00:31

## 2017-08-09 RX ADMIN — SIMVASTATIN 10 MG: 10 TABLET, FILM COATED ORAL at 21:57

## 2017-08-09 NOTE — PROGRESS NOTES
[facility]    Hospitalist Progress Note    Date of Service (when I saw the patient): 08/09/2017    Assessment & Plan   Ms. Chaudhari is a 77-year-old female with a history of obesity, hypertension, hyperlipidemia, who presents to the hospital today for asymptomatic tachycardia noted by a home health aide. She came to [facility] Emergency Department for evaluation. Labs notable for an elevated creatinine consistent with acute renal failure, and rhythm strip findings consistent with atrial flutter.       1.  Atrial flutter with rapid ventricular response. Heart rate improving  -- No known history of this, but the patient is completely asymptomatic.  --She was initially started on Cardizem drip. Metoprolol XL  was also increased to 50 mg BID. Cardiology was consulted and added Cardizem 30 mg q6. Now off Cardizem drip.   --Started on coumadin for anticoagulation.   --BP dropped earlier today due to the above meds. On IV fluid.     2.  Acute kidney injury on CKD.   --Baseline creatinine around 1.1-1.3. Creatinine 1.75 today.  %FENA 0.2 suggestive prerenal JAMI.  --On normal saline at 100 ml/hr.   --Will monitor renal function    3.  Morbid obesity.     4.  Hypertension.   --Metoprolol XL increased to 50 mg BID for aflutter with RVR. Also added po Cardizem 30 mg po q6.   --Her blood pressure dropped with Cardizem drip. She was given IV fluid bolus. Now off Cardizem drip.   --Discussed with Dr. Huitron who recommended discharge on Toprol XL 50 mg bid and Cardizem 30 mg q6. Will d/c amlodipine because of addition of Cardizem po.     5.  Hyperlipidemia. Simvastatin dose decreased from 40 to 10 mg po daily due to interaction with Cardizem.     6.  Recent admission to the hospital in 06/2017 for right lower extremity cellulitis.     DVT Prophylaxis: Warfarin    Code Status: Full Code    Disposition: Expected discharge tomorrow if HR remains controlled and  BP and renal function improves.     Zaynab  MD Seema    Interval History   Patient seen and examined. She stated that she has no shortness of breath or palpitations. She has no fever. Denies cough or fever    -Data reviewed today: I reviewed all new labs and imaging results over the last 24 hours. I personally reviewed the EKG tracing showing atrial flutter with RVR.    Physical Exam   Temp: 97.1  F (36.2  C) Temp src: Oral BP: 119/66 Pulse: 125 Heart Rate: 115 Resp: 18 SpO2: 96 % O2 Device: None (Room air)    Vitals:    08/07/17 2115 08/08/17 0509   Weight: (!) 165.1 kg (364 lb) (!) 151 kg (332 lb 14.4 oz)     Vital Signs with Ranges  Temp:  [96.2  F (35.7  C)-98.4  F (36.9  C)] 97.1  F (36.2  C)  Pulse:  [125-126] 125  Heart Rate:  [] 115  Resp:  [12-18] 18  BP: ()/() 119/66  SpO2:  [95 %-98 %] 96 %  I/O last 3 completed shifts:  In: 700 [P.O.:700]  Out: 150 [Urine:150]    GEN:  Alert, oriented x 3, appears comfortable, NAD.  HEENT:  Normocephalic/atraumatic, no scleral icterus, no nasal discharge, mouth moist.  CV:  Regular rate and rhythm, no murmur or JVD.  S1 + S2 noted, no S3 or S4.  LUNGS:  Clear to auscultation bilaterally without rales/rhonchi/wheezing/retractions.  Symmetric chest rise on inhalation noted.  ABD:  Active bowel sounds, soft, non-tender/non-distended.  No rebound/guarding/rigidity.  EXT:  No edema or cyanosis.  Hands/feet warm to touch with good signs of peripheral perfusion.  No joint synovitis noted.  SKIN:  Dry to touch, no exanthems noted in the visualized areas.  NEURO:  Symmetric muscle strength, sensation to touch grossly intact.  No new focal deficits appreciated.    Medications     diltiazem (CARDIZEM) infusion ADULT Stopped (08/09/17 0405)     NaCl 100 mL/hr at 08/09/17 1606     - MEDICATION INSTRUCTIONS -       Warfarin Therapy Reminder         gabapentin  600 mg Oral TID     warfarin  5 mg Oral ONCE at 18:00     buPROPion  300 mg Oral QAM     fluticasone  2 spray Both Nostrils Daily     metoprolol  50  mg Oral BID     doxycycline  50 mg Oral Daily     nortriptyline  50 mg Oral At Bedtime     pantoprazole  40 mg Oral QAM AC     pregabalin  150 mg Oral BID     venlafaxine  150 mg Oral Daily with breakfast     senna-docusate  1-2 tablet Oral BID     simvastatin  10 mg Oral At Bedtime     lidocaine   Transdermal Q24H     lidocaine   Transdermal Q8H     diltiazem  30 mg Oral Q6H [facility]       Data     Recent Labs  Lab 08/09/17  0510 08/08/17  0725 08/08/17  0105 08/07/17  2215 08/07/17  1616   WBC  --   --   --   --  6.3   HGB  --   --   --   --  14.2   MCV  --   --   --   --  92   PLT  --   --   --   --  289   INR 1.44* 1.19* 1.20*  --   --     142  --   --  138   POTASSIUM 3.8 3.5  --   --  4.0   CHLORIDE 109 109  --   --  105   CO2 23 25  --   --  23   BUN 20 18  --   --  18   CR 1.75* 1.75*  --   --  1.73*   ANIONGAP 9 8  --   --  10   SHILOH 8.1* 8.2*  --   --  9.1   * 121*  --   --  153*   TROPI  --   --   --  0.026 0.024       No results found for this or any previous visit (from the past 24 hour(s)).

## 2017-08-09 NOTE — PLAN OF CARE
Pevit to chair ,per her baseline ability. IVF infusing with bp improved to 110/65  -120 range a-flutter .  Chronic rash/red area behind knees.   Wnd  used and dry clothes applied to keep skin apart.

## 2017-08-09 NOTE — PROGRESS NOTES
Focus: skin folds  D: Per MD note: 77-year-old female with a history of morbid obesity, hypertension, hyperlipidemia, who presents to the hospital today for asymptomatic tachycardia noted by a home health aide. She came to [facility] Emergency Department for evaluation. Labs notable for an elevated creatinine consistent with acute renal failure, and rhythm strip findings consistent with atrial flutter.     Nurse concerned regarding skin folds and possible pressure injury or infection.   Bilateral breast skin folds: Left scattered superficial open wounds, both odorous, erosion, moisture, erythema consistent with fungal.  Bilateral posterior knee skin folds: superficial epidermal erosion, odorous, fungal, urticaria.    I: 30 min to assess and provide skin care , discussion POC with pt, nurses  A/P: superficial skin erosion due to moisture, friction, obesity, fungal infection.    Skin care to all skin folds: BID  1. Swipe to cleanse skin with warm moist cloth and Flores cleanser  2. Lightly dust with MicroGuard powder, wipe into skin to spread, Less is More!  3. Apply light layer of Critic aid paste to any open wounds or raw skin  4. Encourage pt to turn side to side to prevent pressure injury    Consult completed.

## 2017-08-09 NOTE — PLAN OF CARE
PRIMARY DIAGNOSIS: CHEST PAIN  OUTPATIENT/OBSERVATION GOALS TO BE MET BEFORE DISCHARGE:  1. Ruled out acute coronary syndrome (negative or stable Troponin):  No, trops 0.024 then 0.026  2. Pain Status: Denies  3. Appropriate provocative testing performed:   - Interpretation of cardiac rhythm per telemetry tech: Uncontrolled Atrial flutter (hr 130's)    4. Cleared by Consultants (if applicable): No  5. Return to near baseline physical activity: Yes  Discharge Planner Nurse   Safe discharge environment identified: Yes  Barriers to discharge: HR uncontrolled, currently on cardizem drip       Entered by: Mariella Brizuela 08/09/2017 1:33 AM     Nurse to notify provider when observation goals have been met and patient is ready for discharge.  Pt restarted on cardizem gtt @ 0120 8/9, currently running at 5 mL/hr.

## 2017-08-09 NOTE — PLAN OF CARE
RN- VSS, afeb. Denies pain. Alert and oriented. Up to BSC with pivot and assist of 1. Does not want to sit in chair. Afib/Flutter? Metoprolol 2.5mg IV given. Also on PO metoprolol. Pt hoping to DC home tomorrow.

## 2017-08-09 NOTE — PROGRESS NOTES
Spoke to pt re: overnight sleep study. Pt is observation status and thus declined the study due to the fact that insurance would not pay for it. Pt states she will see her own doctor for study. RN was present in room during this discussion.     Nery Bee RRT

## 2017-08-09 NOTE — PLAN OF CARE
PRIMARY DIAGNOSIS: CHEST PAIN  OUTPATIENT/OBSERVATION GOALS TO BE MET BEFORE DISCHARGE:  1. Ruled out acute coronary syndrome (negative or stable Troponin):  No, trops 0.024 then 0.026  2. Pain Status: Denies  3. Appropriate provocative testing performed:   - Interpretation of cardiac rhythm per telemetry tech: Uncontrolled Atrial flutter (hr 130's)     4. Cleared by Consultants (if applicable): No  5. Return to near baseline physical activity: Yes  Discharge Planner Nurse   Safe discharge environment identified: Yes  Barriers to discharge: HR uncontrolled, possible ANA this AM.  Pt NPO since 0430.       Entered by: Mariella Brizuela 08/09/2017 1:33 AM     Nurse to notify provider when observation goals have been met and patient is ready for discharge.  Cardizem gtt stopped at 0400, BP 80's systolic.    HR still uncontrolled 110-130, BP fluctuating, see flowsheet for details.  Will continue with POC.

## 2017-08-10 VITALS
BODY MASS INDEX: 57.14 KG/M2 | DIASTOLIC BLOOD PRESSURE: 113 MMHG | TEMPERATURE: 97.1 F | OXYGEN SATURATION: 96 % | WEIGHT: 293 LBS | RESPIRATION RATE: 20 BRPM | HEART RATE: 94 BPM | SYSTOLIC BLOOD PRESSURE: 136 MMHG

## 2017-08-10 LAB
ANION GAP SERPL CALCULATED.3IONS-SCNC: 8 MMOL/L (ref 3–14)
BUN SERPL-MCNC: 21 MG/DL (ref 7–30)
CALCIUM SERPL-MCNC: 8.3 MG/DL (ref 8.5–10.1)
CHLORIDE SERPL-SCNC: 109 MMOL/L (ref 94–109)
CO2 SERPL-SCNC: 24 MMOL/L (ref 20–32)
CREAT SERPL-MCNC: 1.45 MG/DL (ref 0.52–1.04)
GFR SERPL CREATININE-BSD FRML MDRD: 35 ML/MIN/1.7M2
GLUCOSE SERPL-MCNC: 118 MG/DL (ref 70–99)
INR PPP: 2.49 (ref 0.86–1.14)
POTASSIUM SERPL-SCNC: 3.9 MMOL/L (ref 3.4–5.3)
SODIUM SERPL-SCNC: 141 MMOL/L (ref 133–144)

## 2017-08-10 PROCEDURE — 25000132 ZZH RX MED GY IP 250 OP 250 PS 637: Performed by: INTERNAL MEDICINE

## 2017-08-10 PROCEDURE — 80048 BASIC METABOLIC PNL TOTAL CA: CPT | Performed by: INTERNAL MEDICINE

## 2017-08-10 PROCEDURE — 36415 COLL VENOUS BLD VENIPUNCTURE: CPT | Performed by: INTERNAL MEDICINE

## 2017-08-10 PROCEDURE — 93226 XTRNL ECG REC<48 HR SCAN A/R: CPT | Performed by: INTERNAL MEDICINE

## 2017-08-10 PROCEDURE — 25000128 H RX IP 250 OP 636: Performed by: INTERNAL MEDICINE

## 2017-08-10 PROCEDURE — 93227 XTRNL ECG REC<48 HR R&I: CPT | Performed by: INTERNAL MEDICINE

## 2017-08-10 PROCEDURE — 99238 HOSP IP/OBS DSCHRG MGMT 30/<: CPT | Performed by: INTERNAL MEDICINE

## 2017-08-10 PROCEDURE — 85610 PROTHROMBIN TIME: CPT | Performed by: INTERNAL MEDICINE

## 2017-08-10 RX ORDER — DILTIAZEM HYDROCHLORIDE 30 MG/1
30 TABLET, FILM COATED ORAL 4 TIMES DAILY
Qty: 120 TABLET | Refills: 0 | Status: SHIPPED | OUTPATIENT
Start: 2017-08-10 | End: 2017-08-14

## 2017-08-10 RX ORDER — METOPROLOL SUCCINATE 50 MG/1
50 TABLET, EXTENDED RELEASE ORAL 2 TIMES DAILY
Qty: 60 TABLET | Refills: 0 | Status: SHIPPED | OUTPATIENT
Start: 2017-08-10 | End: 2017-08-14

## 2017-08-10 RX ORDER — GABAPENTIN 600 MG/1
600 TABLET ORAL 3 TIMES DAILY
Qty: 540 TABLET | Refills: 3
Start: 2017-08-10 | End: 2017-08-11

## 2017-08-10 RX ORDER — WARFARIN SODIUM 1 MG/1
1 TABLET ORAL
Status: DISCONTINUED | OUTPATIENT
Start: 2017-08-10 | End: 2017-08-10 | Stop reason: HOSPADM

## 2017-08-10 RX ORDER — PRAVASTATIN SODIUM 10 MG
10 TABLET ORAL AT BEDTIME
Status: DISCONTINUED | OUTPATIENT
Start: 2017-08-10 | End: 2017-08-10 | Stop reason: HOSPADM

## 2017-08-10 RX ORDER — PRAVASTATIN SODIUM 10 MG
10 TABLET ORAL AT BEDTIME
Qty: 30 TABLET | Refills: 0 | Status: SHIPPED | OUTPATIENT
Start: 2017-08-10 | End: 2017-08-14

## 2017-08-10 RX ORDER — WARFARIN SODIUM 5 MG/1
5 TABLET ORAL DAILY
Qty: 30 TABLET | Refills: 0 | Status: SHIPPED | OUTPATIENT
Start: 2017-08-10 | End: 2017-08-14

## 2017-08-10 RX ADMIN — GABAPENTIN 600 MG: 600 TABLET, FILM COATED ORAL at 08:03

## 2017-08-10 RX ADMIN — METOPROLOL SUCCINATE 50 MG: 50 TABLET, EXTENDED RELEASE ORAL at 08:03

## 2017-08-10 RX ADMIN — DOXYCYCLINE HYCLATE 50 MG: 50 CAPSULE ORAL at 08:03

## 2017-08-10 RX ADMIN — DILTIAZEM HYDROCHLORIDE 30 MG: 30 TABLET, FILM COATED ORAL at 00:58

## 2017-08-10 RX ADMIN — BUPROPION HYDROCHLORIDE 300 MG: 150 TABLET, FILM COATED, EXTENDED RELEASE ORAL at 08:03

## 2017-08-10 RX ADMIN — PREGABALIN 150 MG: 75 CAPSULE ORAL at 08:03

## 2017-08-10 RX ADMIN — PANTOPRAZOLE SODIUM 40 MG: 40 TABLET, DELAYED RELEASE ORAL at 06:39

## 2017-08-10 RX ADMIN — DILTIAZEM HYDROCHLORIDE 30 MG: 30 TABLET, FILM COATED ORAL at 13:46

## 2017-08-10 RX ADMIN — DILTIAZEM HYDROCHLORIDE 30 MG: 30 TABLET, FILM COATED ORAL at 08:02

## 2017-08-10 RX ADMIN — VENLAFAXINE HYDROCHLORIDE 150 MG: 150 TABLET, EXTENDED RELEASE ORAL at 08:04

## 2017-08-10 RX ADMIN — FLUTICASONE PROPIONATE 2 SPRAY: 50 SPRAY, METERED NASAL at 08:11

## 2017-08-10 RX ADMIN — SODIUM CHLORIDE: 9 INJECTION, SOLUTION INTRAVENOUS at 02:02

## 2017-08-10 NOTE — PLAN OF CARE
Problem: Goal Outcome Summary  Goal: Goal Outcome Summary  Outcome: No Change  Patient A&Ox3.  VSS with exception of HR.  On telemetry:  Uncont Afib 120s/130s w/BBB at start of shift. Was made inpatient. See MAR for meds given.  BP stable, 107/64 initially.  Patient vitals were assessed prior to 0000 dose of PO cardizem.  BP was 104/59 and .  Parameter on PO cardizem was for the cardizem drip only and to hold for HR <110.  Discussed with fellow nurses, decided to give PO does or cardizem.  BP was rechecked later prior to getting OOB to use BSC, was 158/86.  BP rechecked 25 minutes after returning to bed, 99/67, pt sleeping.  Remains on room air.  Has NS running at 100ml/hr left upper arm.  Tolerating low fat, low NA, no caffeine diet.  Abdominal folds remain red, moist and odorous.  Skin care provided.  WOC following.  Up stand/pivot to BSC.  Plan is to monitor HR.  Monitor INR, goal 2-3.  WOC to follow. Will continue to monitor this patient.

## 2017-08-10 NOTE — PLAN OF CARE
dischged after instructions reviewed with understanding acknowledged.  Halter monitor placed  with instructions per RT

## 2017-08-10 NOTE — PLAN OF CARE
Problem: Discharge Planning  Goal: Discharge Planning (Adult, OB, Behavioral, Peds)  Outcome: No Change  PRIMARY DIAGNOSIS: CHEST PAIN Afib/RVR  OUTPATIENT/OBSERVATION GOALS TO BE MET BEFORE DISCHARGE:  1. Ruled out acute coronary syndrome (negative or stable Troponin):  Yes  2. Pain Status: Denies pain  3. Appropriate provocative testing performed:  - Interpretation of cardiac rhythm per telemetry tech: Uncont afib 120's/130s, BBB     4. Cleared by Consultants (if applicable):No, WOC seeing pt for redness under skin folds  5. Return to near baseline physical activity: No Up S/P to BSC with two assist  Discharge Planner Nurse   Safe discharge environment identified: No  Barriers to discharge: Yes, heart rate still not controlled       Entered by: Mara Summers 08/09/2017 9:42 PM     Please review provider order for any additional goals.   Nurse to notify provider when observation goals have been met and patient is ready for discharge.  Received this patient at start of shift.  A&Ox4.  VSS with exception of uncontrolled HR in 120s.  Uncont Afib 120s w/BBB on telemetry.  Asymptomatic.  See MAR for meds given. Denies chest pain.  Remains on room air.  Low fat low NA diet, no caffeine.  Multiple areas of redness under skin folds, seen by WOC.  Continues with IVF at 100ml/hr.  Up S/P to the BSC only.  Per prior notes, the plan is to dc amanda on toporol XL 50mg bid, Cardizem 30mg Q6.  Will follow up with cardiologist.  Needs sleep study at home.  Will continue to monitor this patient.

## 2017-08-10 NOTE — PROGRESS NOTES
Pt asx. HR better controlled in 70-80s... Afib/flutter. inr therapeutic will d/c home on po dilt 30mg qid with increased dose of toprol xl 50mg po bid. JAMI also improved. Will switch simvastatin to pravastatin due to risk of toxicity with dilt and simva. OK to d/c home. Rx e-prescribed. Home care Rn to assist with lab draws.

## 2017-08-10 NOTE — PROGRESS NOTES
[facility]  Met with pt to discuss plans to resume home care.  Pt to be discharged home today and has agreed to have [facility] resume services.  Patient care support center processing referral.  Pt verbalized understanding that resumption of care visit is scheduled for 8/12/17.  Pt has 24 hour phone number for [facility] for any questions or concerns.

## 2017-08-10 NOTE — PHARMACY-ANTICOAGULATION SERVICE
Clinical Pharmacy- Warfarin Discharge Note  This patient is currently on warfarin for the treatment of Atrial fibrillation.  INR Goal= 2-3  Expected length of therapy undetermined.    Anticoagulation Dose History     Recent Dosing and Labs Latest Ref Rng & Units 8/17/2012 8/8/2017 8/8/2017 8/8/2017 8/8/2017 8/9/2017 8/10/2017    Warfarin 5 mg - - - - - - 5 mg -    Warfarin 7.5 mg - - - - - 7.5 mg - -    Warfarin 10 mg - - - 10 mg - - - -    INR 0.86 - 1.14 1.18(H) 1.20(H) - 1.19(H) - 1.44(H) 2.49(H)            Agree with discharging the patient on a warfarin regimen of 5 mg daily with a prescription for warfarin 5mg tablets.      The patient should have an INR checked in 3 days. (set up to do with home care RN).

## 2017-08-10 NOTE — DISCHARGE INSTRUCTIONS
Atrial Flutter    Atrial flutter means that your heart is beating very fast. It is caused by a problem in the electrical pathways of the heart. It can be a sign of heart disease or other health problems that affect your heart.  Palpitations are the most common symptom of atrial flutter. This is the feeling that your heart is fluttering or beating fast or hard. When your heart beats too fast, it doesn t pump blood very well. This can cause other symptoms, including:    Anxiety    Fatigue    Shortness of breath    Chest pain    Dizziness    Fainting  If this is the first time you ve had atrial flutter, and you don t have heart or lung disease, it may never happen again. But in most cases, atrial flutter comes and goes. It can last from a few hours to a couple of days. Sometimes the atrial flutter doesn t ever go away. This is chronic atrial flutter.  Atrial flutter may be caused by heart disease. It may also be caused by other conditions that affect the heart:    Coronary artery disease (arteriosclerosis)    High blood pressure    Disease of the heart valves    Enlarged heart  Atrial flutter can occur without heart disease. This may be because of:    Overactive thyroid (hyperthyroid)    Chronic lung disease (COPD, emphysema, or bronchitis)    Alcohol use    Drugs or medicines that stimulate the heart. These include cocaine, amphetamines, diet pills, some decongestant cold medicines, caffeine, and nicotine.    Infection  Treating or removing these causes will make it more likely that treatment for atrial flutter will work. It will also make it less likely that atrial flutter will come back.  Atrial flutter can happen along with another abnormal rhythm called atrial fibrillation. The risk for stroke is higher with these conditions. Getting treatment can reduce your risk.  Home care  Follow these guidelines when caring for yourself at home:    Go back to your usual activities as soon as you feel back to normal.    If  you smoke, stop smoking. Talk with your healthcare provider or call a local stop-smoking program for help.    Don t take drugs or medicines that stimulate your heart. These include cocaine, amphetamines, diet pills, some decongestant cold medicines, caffeine, and nicotine.    Your provider may have prescribed medicine to stop atrial fibrillation from coming back. Take this medicine exactly as directed. Some medicines must be taken every day to work as they should.    Your provider may also have prescribed warfarin to lower your risk for stroke. Have your blood tested regularly as advised by your healthcare provider. This will help make sure the dose is right for you.  Follow-up care  Follow up with your healthcare provider, or as advised.  When should I call my healthcare provider?  Call your healthcare provider right away if any of these occur:    Shortness of breath gets worse    Swelling in either leg    Unexpected weight gain    Chest pain or pressure    Near fainting or fainting    You feel like your heart is fluttering, or beating fast or hard    Fever of 100.4 F (38 C) or higher, or as directed by your healthcare provider    Cough with dark-colored or bloody mucus  Also call your provider right away if you have signs of stroke:    Weakness or numbness of an arm or leg or one side of the face    Difficulty speaking or seeing    Extreme drowsiness, confusion, dizziness, or fainting   Date Last Reviewed: 5/1/2016 2000-2017 [employer]. [address] [place] [address], [place] [address]. All rights reserved. This information is not intended as a substitute for professional medical care. Always follow your healthcare professional's instructions.      Review new medication infor given if review needed or questions

## 2017-08-11 ENCOUNTER — TELEPHONE (OUTPATIENT)
Dept: PEDIATRICS | Facility: CLINIC | Age: 77
End: 2017-08-11

## 2017-08-11 ENCOUNTER — CARE COORDINATION (OUTPATIENT)
Dept: CARDIOLOGY | Facility: CLINIC | Age: 77
End: 2017-08-11

## 2017-08-11 DIAGNOSIS — B02.29 POSTHERPETIC NEURALGIA: ICD-10-CM

## 2017-08-11 RX ORDER — GABAPENTIN 600 MG/1
1200 TABLET ORAL 3 TIMES DAILY
Qty: 540 TABLET | Refills: 3 | COMMUNITY
Start: 2017-08-11 | End: 2017-11-20

## 2017-08-11 NOTE — TELEPHONE ENCOUNTER
Ok - that makes sense.    I can't find any documentation or reason why dose was decreased in hospital.    I think safe to resume previous dose.    Nelly Pearl MD  Internal Medicine/Pediatrics  [facility]

## 2017-08-11 NOTE — PROGRESS NOTES
Called patient's  Ricardo to discuss any post hospital d/c questions he may have, review medication changes, and confirm f/u appts. Patient's  denied any questions regarding new medications or changes to some of patient's current medications that patient was taking prior to admission. Patient's  denied that patient had any SOB, chest pain, or light headedness. RN confirmed with patient's  that we would like patient to schedule a f/u apt with IMMANUEL. Patient's  advised to call clinic with any cardiac related questions or concerns and to schedule f/u apt. Patient's  verbalized understanding and agreed with plan. RN offered to transfer patient's  to scheduling to arrange f/u apt, but he advised he would call back to schedule. RN confirmed that patient's  has correct number for scheduling.

## 2017-08-11 NOTE — TELEPHONE ENCOUNTER
"Dr. Pearl - patient was taking Gabapentin 600 mg tid before IP stay, was discharged with 300 mg tid. They have a call out to the hospital. Would you advise on ok to go back to 600 mg tid.   Call back patient on [phone].         Hospital/TCU/ED for chronic condition Discharge Protocol    \"Hi, my name is Natypily Escobar, a registered nurse, and I am calling from [facility].  I am calling to follow up and see how things are going for you after your recent emergency visit/hospital/TCU stay.\"    Tell me how you are doing now that you are home?\" Doing well.       Discharge Instructions    \"Let's review your discharge instructions.  What is/are the follow-up recommendations?  Pt. Response: w/ PCP & Cardiology    \"Has an appointment with your primary care provider been scheduled?\"   Scheduled office visit on 8/14/17. Cardiology is scheduled in 2 weeks per .     \"When you see the provider, I would recommend that you bring your medications with you.\"    Medications    \"Tell me what changed about your medicines when you discharged?\"    Changes to chronic meds?    0-1    \"What questions do you have about your medications?\"    None     New diagnoses of heart failure, COPD, diabetes, or MI?    No            On warfarin: \"Were you given any recommendations for follow-up with the anticoagulation clinic?\" Yes - Anticoagulation clinic appointment is already scheduled at appropriate interval    Medication reconciliation completed? Yes  Was MTM referral placed (*Make sure to put transitions as reason for referral)?   No    Call Summary    \"What questions or concerns do you have about your recent visit and your follow-up care?\"     none    \"If you have questions or things don't continue to improve, we encourage you contact us through the main clinic number (give number).  Even if the clinic is not open, triage nurses are available 24/7 to help you.     We would like you to know that our clinic has extended hours " "(provide information).  We also have urgent care (provide details on closest location and hours/contact info)\"      \"Thank you for your time and take care!\"           "

## 2017-08-11 NOTE — TELEPHONE ENCOUNTER
I apologize, I did not transcribe the right prescription doses in my previous message.     Previously pt was on Gabapentin 1,200 mg tid. On discharge it was decreased to 600 mg tid. Patient was unsure if the medication was decreased on error or not.     Should patient change to Gabapentin 600 mg tid or go back to her pre-hospital dose of 1,200 mg tid?

## 2017-08-11 NOTE — DISCHARGE SUMMARY
[facility]  Discharge Summary        Berenice Chaudhari MRN# [id number]   YOB: 1940 Age: 77 year old     Date of Admission:  8/7/2017  Date of Discharge:  8/10/2017  2:32 PM  Admitting Physician:  Zaynab Stephenson MD  Discharge Physician: Michelle Jennings MD  Discharging Service: Hospitalist     Primary Provider: Nelly Mg  Primary Care Physician Phone Number: [phone]         Discharge Diagnoses/Problem Oriented Hospital Course (Providers):    Berenice Chaudhari was admitted on 8/7/2017 by Zaynab Stephenson MD and I would refer you to their history and physical.       I saw and evaluated the patient     Discharge diagnoses:  1. Atrial flutter with RVR: New diagnosis  2. Acute kidney injury on chronic kidney disease  3. Morbid obesity  4. Hypertension  5. Hyperlipidemia    Hospital course:  1. Atrial flutter with RVR: Patient is a 77-year-old female with a history of hypertension, hyperlipidemia, and chronic kidney disease who presents with asymptomatic tachycardia that was noted by a home physical therapist.  She was encouraged to come to the ED for evaluation.  On evaluation, she was noted to be in atrial flutter with RVR. Patient was admitted and placed on a diltiazem drip.  She is chronically on Toprol-XL 50 mg p.o. daily but was increased to b.i.d.  Cardiology was consulted and recommended having short acting diltiazem 30 mg p.o. q.i.d.  Her heart rate did improve and it was in the 70s to 80s on the day of discharge.  She was low normotensive but otherwise asymptomatic.  Cardiology did recommend a Holter monitor to assess her heart rate as she was asymptomatic.  Cardiology follow-up has already been arranged.  Stroke prophylaxis was also discussed and patient was placed on Coumadin.  Her INR was therapeutic on the day of discharge.  2. Acute kidney injury on chronic kidney disease: this was felt to be prerenal.  A FENA was checked which came back a 0.2% which was  suggestive of prerenal acute kidney injury.  With gentle volume resuscitation, her creatinine did improve to 1.45 on the day of discharge.  Baseline creatinine is 1.1-1.3.  3. Hyperlipidemia: Patient was chronically on simvastatin 40 mg p.o. q.h.s. but with the addition of diltiazem, this was transitioned to pravastatin 10 mg p.o. q.h.s. due to concerns of toxicities and possibility of statin-induced myopathy.  Pharmacy input was appreciated.    Disposition: Discharged to home with .  Home RN and PT was ordered at discharge.  She will need an INR check within the next 3-5 days.             Pending Results:        Unresulted Labs Ordered in the Past 30 Days of this Admission     Date and Time Order Name Status Description    8/7/2017 1622 Blood culture Preliminary     8/7/2017 1622 Blood culture Preliminary             Discharge Instructions and Follow-Up:      Follow-up Appointments     Follow-up and recommended labs and tests        Follow up with primary care provider, Nelly Pearl, within 7 days   Follow up with cardiology as scheduled  Resume Home health services            Follow-up and recommended labs and tests        INR recheck within 3-5 days with Home care RN                      Discharge Disposition:      Discharged to home         Discharge Medications:        Discharge Medication List as of 8/10/2017  1:37 PM      START taking these medications    Details   pravastatin (PRAVACHOL) 10 MG tablet Take 1 tablet (10 mg) by mouth At Bedtime, Disp-30 tablet, R-0, E-Prescribe         CONTINUE these medications which have CHANGED    Details   gabapentin (NEURONTIN) 600 MG tablet Take 1 tablet (600 mg) by mouth 3 times daily, Disp-540 tablet, R-3, No Print Out      metoprolol (TOPROL-XL) 50 MG 24 hr tablet Take 1 tablet (50 mg) by mouth 2 times daily, Disp-60 tablet, R-0, E-Prescribe      warfarin (COUMADIN) 5 MG tablet Take 1 tablet (5 mg) by mouth daily, Disp-30 tablet, R-0, E-Prescribe       diltiazem (CARDIZEM) 30 MG tablet Take 1 tablet (30 mg) by mouth 4 times daily, Disp-120 tablet, R-0, E-Prescribe         CONTINUE these medications which have NOT CHANGED    Details   venlafaxine (EFFEXOR-XR) 150 MG 24 hr capsule TAKE 1 CAPSULE(150 MG) BY MOUTH DAILY, Disp-90 capsule, R-0, E-Prescribe      fluticasone (FLONASE) 50 MCG/ACT spray Spray 2 sprays into both nostrils daily, Disp-3 Bottle, R-11, E-Prescribe      lidocaine (LIDODERM) 5 % Patch Apply up to 3 patches to painful area at once for up to 12 h within a 24 h period.  Remove after 12 hours.Disp-60 patch, [id number]      pregabalin (LYRICA) 150 MG capsule Take 1 capsule (150 mg) by mouth 2 times daily, Disp-60 capsule, R-5, Local Print      nortriptyline (PAMELOR) 25 MG capsule TAKE 2 CAPSULES(50 MG) BY MOUTH AT BEDTIME, Disp-60 capsule, R-11, E-Prescribe      minocycline (MINOCIN) 50 MG capsule Take 1 capsule (50 mg) by mouth 2 times daily, Disp-60 capsule, R-5, E-Prescribe      buPROPion (WELLBUTRIN XL) 300 MG 24 hr tablet Take 1 tablet (300 mg) by mouth every morning, Disp-90 tablet, R-1, E-Prescribe      pantoprazole (PROTONIX) 40 MG enteric coated tablet Take 1 tablet (40 mg) by mouth daily, Disp-90 tablet, R-3, E-Prescribe      Multiple Vitamins-Minerals (CENTRUM SILVER) per tablet Take 1 tablet by mouth daily, Historical      CEPHALEXIN PO Take 500 mg by mouth 3 times daily as needed (when cellulitis is coming on) , Historical         STOP taking these medications       simvastatin (ZOCOR) 10 MG tablet Comments:   Reason for Stopping:         simvastatin (ZOCOR) 40 MG tablet Comments:   Reason for Stopping:         atenolol (TENORMIN) 50 MG tablet Comments:   Reason for Stopping:         amLODIPine (NORVASC) 10 MG tablet Comments:   Reason for Stopping:                 Allergies:         Allergies   Allergen Reactions     Ceftriaxone Anaphylaxis and Rash     Rocephin given. Developed rash to back and abd, awaiting to see if it improves  with d/c of rocephin.        Nafcillin Rash     diffuse severe rash in hospital 2/05     Penicillins Anaphylaxis     Vancomycin Anaphylaxis and Rash     Codeine Fatigue     Sleeps continuously     Clindamycin Rash     received information from patient     Zithromax [Azithromycin] Rash           Consultations This Hospital Stay:      Consultation during this admission received from cardiology           Image Results From This Hospital Stay (For Non-EPIC Providers):        Results for orders placed or performed during the hospital encounter of 08/07/17   XR Chest 2 Views    Narrative    CHEST TWO VIEWS  8/7/2017 5:10 PM     HISTORY: Shortness of breath.    COMPARISON: 6/23/2017.      Impression    IMPRESSION: Borderline cardiomegaly, unchanged. Lungs are clear.  Normal pulmonary vascularity, improved since the previous exam.  Infiltrates have resolved. No pleural effusions.    INOCENCIO LITTLE MD   NM Lung Scan Perfusion Particulate    Narrative    NM LUNG SCAN PERFUSION PARTICULATE  8/7/2017 6:57 PM      HISTORY: Tachycardia. Elevated D-dimer.    TECHNIQUE: Perfusion imaging of the lungs performed following  intravenous administration of 3.2 mCi technetium 99m MAA.    COMPARISON: Chest x-ray 8/7/2017.    FINDINGS: Images are somewhat degraded by tissue attenuation. No  definite perfusion defects. The accompanying chest x-ray shows no  pulmonary parenchymal abnormalities.      Impression    IMPRESSION: No evidence of pulmonary embolus.      SEJAL RAMOS MD

## 2017-08-11 NOTE — TELEPHONE ENCOUNTER
Please contact patient for In-patient follow up.  [phone] (home) NONE (work)    Visit date: 8/10/17  Diagnosis listed:Morbid Obesity, Unspecified Obesity Type (H), Atrial Flutter With Rapid Ventricular Response  Number of visits in past 12 months:2    Randee Hinojosa,   [facility]

## 2017-08-11 NOTE — TELEPHONE ENCOUNTER
D/C summary states 600mg TID and so does last prescription sent by hospital.    Not sure where she is seeing the 300mg TID, but she should still be on 600mg TID.    Nelly Pearl MD  Internal Medicine/Pediatrics  [facility]

## 2017-08-14 ENCOUNTER — OFFICE VISIT (OUTPATIENT)
Dept: PEDIATRICS | Facility: CLINIC | Age: 77
End: 2017-08-14
Payer: COMMERCIAL

## 2017-08-14 ENCOUNTER — DOCUMENTATION ONLY (OUTPATIENT)
Dept: PEDIATRICS | Facility: CLINIC | Age: 77
End: 2017-08-14

## 2017-08-14 VITALS
DIASTOLIC BLOOD PRESSURE: 70 MMHG | HEART RATE: 93 BPM | TEMPERATURE: 97.9 F | SYSTOLIC BLOOD PRESSURE: 132 MMHG | OXYGEN SATURATION: 98 %

## 2017-08-14 DIAGNOSIS — E78.5 HYPERLIPIDEMIA LDL GOAL <100: ICD-10-CM

## 2017-08-14 DIAGNOSIS — I10 ESSENTIAL HYPERTENSION WITH GOAL BLOOD PRESSURE LESS THAN 140/90: ICD-10-CM

## 2017-08-14 DIAGNOSIS — I48.92 ATRIAL FLUTTER WITH RAPID VENTRICULAR RESPONSE (H): Primary | ICD-10-CM

## 2017-08-14 DIAGNOSIS — N17.9 ACUTE KIDNEY INJURY (H): ICD-10-CM

## 2017-08-14 PROCEDURE — 85610 PROTHROMBIN TIME: CPT | Performed by: PEDIATRICS

## 2017-08-14 PROCEDURE — 36415 COLL VENOUS BLD VENIPUNCTURE: CPT | Performed by: PEDIATRICS

## 2017-08-14 PROCEDURE — 99496 TRANSJ CARE MGMT HIGH F2F 7D: CPT | Performed by: PEDIATRICS

## 2017-08-14 PROCEDURE — 80048 BASIC METABOLIC PNL TOTAL CA: CPT | Performed by: PEDIATRICS

## 2017-08-14 RX ORDER — DILTIAZEM HYDROCHLORIDE 30 MG/1
30 TABLET, FILM COATED ORAL 4 TIMES DAILY
Qty: 480 TABLET | Refills: 3 | Status: SHIPPED | OUTPATIENT
Start: 2017-08-14 | End: 2017-09-14 | Stop reason: DRUGHIGH

## 2017-08-14 RX ORDER — METOPROLOL SUCCINATE 50 MG/1
50 TABLET, EXTENDED RELEASE ORAL 2 TIMES DAILY
Qty: 180 TABLET | Refills: 3 | Status: SHIPPED | OUTPATIENT
Start: 2017-08-14 | End: 2017-11-20

## 2017-08-14 RX ORDER — WARFARIN SODIUM 5 MG/1
5 TABLET ORAL DAILY
Qty: 90 TABLET | Refills: 3 | Status: SHIPPED | OUTPATIENT
Start: 2017-08-14 | End: 2017-11-20

## 2017-08-14 RX ORDER — PRAVASTATIN SODIUM 10 MG
10 TABLET ORAL AT BEDTIME
Qty: 90 TABLET | Refills: 3 | Status: SHIPPED | OUTPATIENT
Start: 2017-08-14 | End: 2017-11-17

## 2017-08-14 NOTE — PATIENT INSTRUCTIONS
Labs today.    INR clinic referral - you will start working with the INR clinic nurse to adjust your coumadin (warfarin).    Follow up with cardiology as previously schedule - see below.

## 2017-08-14 NOTE — NURSING NOTE
"No chief complaint on file.      Initial /70  Pulse 93  Temp 97.9  F (36.6  C) (Oral)  SpO2 98% Estimated body mass index is 57.14 kg/(m^2) as calculated from the following:    Height as of 6/30/17: 5' 4\" (1.626 m).    Weight as of 8/8/17: 332 lb 14.4 oz (151 kg).  Medication Reconciliation: complete   Snehal Henry MA    "

## 2017-08-14 NOTE — PROGRESS NOTES
Alexa with [facility] calling that they need to resume home care orders for 2 times a week for 3 weeks. New to Coumadin. PT and OT Eval. HHA 2 times a week for 3 weeks to assist with shower. Verbal given.   Catarina Diggs RN

## 2017-08-14 NOTE — MR AVS SNAPSHOT
After Visit Summary   8/14/2017    Berenice Chaudhari    MRN: [id number]           Patient Information     Date Of Birth          1940        Visit Information        Provider Department      8/14/2017 4:20 PM Nelly Pearl MD [facility]        Today's Diagnoses     Atrial flutter with rapid ventricular response (H)    -  1    Acute kidney injury (H)        Hyperlipidemia LDL goal <100        Essential hypertension with goal blood pressure less than 140/90          Care Instructions    Labs today.    INR clinic referral - you will start working with the INR clinic nurse to adjust your coumadin (warfarin).    Follow up with cardiology as previously schedule - see below.          Follow-ups after your visit        Additional Services     INR CLINIC REFERRAL       Your provider has referred you to INR Services.    Please be aware that coverage of these services is subject to the terms and limitations of your health insurance plan.  Call member services at your health plan with any benefit or coverage questions.    Indication for Anticoagulation: Other: atrial flutter  If nonstandard INR is desired, indicate goal range and explanation:   Expected Duration of Therapy: Lifetime                  Your next 10 appointments already scheduled     Aug 29, 2017  2:30 PM CDT   Return Discharge with PANCHO Clark CNP   [facility] [place] [facility] ([facility])    [address] [place] [id number] [phone]              Who to contact     If you have questions or need follow up information about today's clinic visit or your schedule please contact [facility] directly at [phone].  Normal or non-critical lab and imaging results will be communicated to you by MyChart, letter or phone within 4 business days after the clinic has received the results. If you do not hear from us within 7 days, please contact the clinic  "through [employer] or phone. If you have a critical or abnormal lab result, we will notify you by phone as soon as possible.  Submit refill requests through [employer] or call your pharmacy and they will forward the refill request to us. Please allow 3 business days for your refill to be completed.          Additional Information About Your Visit        [employer] Information     [employer] lets you send messages to your doctor, view your test results, renew your prescriptions, schedule appointments and more. To sign up, go to [email or web address] . Click on \"Log in\" on the left side of the screen, which will take you to the Welcome page. Then click on \"Sign up Now\" on the right side of the page.     You will be asked to enter the access code listed below, as well as some personal information. Please follow the directions to create your username and password.     Your access code is: [email or web address]  Expires: 2017 10:12 PM     Your access code will  in 90 days. If you need help or a new code, please call your [place] clinic or [phone].        Care EveryWhere ID     This is your Care EveryWhere ID. This could be used by other organizations to access your [place] medical records  [id number]        Your Vitals Were     Pulse Temperature Pulse Oximetry             93 97.9  F (36.6  C) (Oral) 98%          Blood Pressure from Last 3 Encounters:   17 132/70   08/10/17 (!) 136/113   17 110/72    Weight from Last 3 Encounters:   17 (!) 332 lb 14.4 oz (151 kg)   17 (!) 582 lb (264 kg)   17 (!) 345 lb (156.5 kg)              We Performed the Following     Basic metabolic panel     INR CLINIC REFERRAL     INR          Where to get your medicines      These medications were sent to [id number] - [place] - [address]  AT [address] [facility]  [address] [place] [address]     Phone:  [phone]     diltiazem 30 MG tablet    metoprolol 50 MG 24 hr " tablet    pravastatin 10 MG tablet    warfarin 5 MG tablet          Primary Care Provider Office Phone # Fax #    Nelly Pearl [phone] [address] [place] [address]        Equal Access to Services     PAULINE POWELL : Hadii aad ku mary aliceo Soalenaali, waaxda luqadaha, qaybta kaalmada aderadhayada, waxkarson lazaron jayjay severino lavalentinalatoya evelina. So [facility] [phone].    ATENCIÓN: Si habla español, tiene a dickey disposición servicios gratuitos de asistencia lingüística. Llame al [phone].    We comply with applicable federal civil rights laws and [place] laws. We do not discriminate on the basis of race, color, national origin, age, disability sex, sexual orientation or gender identity.            Thank you!     Thank you for choosing [facility]  for your care. Our goal is always to provide you with excellent care. Hearing back from our patients is one way we can continue to improve our services. Please take a few minutes to complete the written survey that you may receive in the mail after your visit with us. Thank you!             Your Updated Medication List - Protect others around you: Learn how to safely use, store and throw away your medicines at www.disposemymeds.org.          This list is accurate as of: 8/14/17  4:46 PM.  Always use your most recent med list.                   Brand Name Dispense Instructions for use Diagnosis    buPROPion 300 MG 24 hr tablet    WELLBUTRIN XL    90 tablet    Take 1 tablet (300 mg) by mouth every morning    Major depressive disorder, recurrent, in full remission (H)       CENTRUM SILVER per tablet      Take 1 tablet by mouth daily        CEPHALEXIN PO      Take 500 mg by mouth 3 times daily as needed (when cellulitis is coming on)        diltiazem 30 MG tablet    CARDIZEM    480 tablet    Take 1 tablet (30 mg) by mouth 4 times daily    Atrial flutter with rapid ventricular response (H)       fluticasone 50 MCG/ACT spray     FLONASE    3 Bottle    Spray 2 sprays into both nostrils daily    Post-nasal drip       lidocaine 5 % Patch    LIDODERM    60 patch    Apply up to 3 patches to painful area at once for up to 12 h within a 24 h period.  Remove after 12 hours.    Postherpetic neuralgia       metoprolol 50 MG 24 hr tablet    TOPROL-XL    180 tablet    Take 1 tablet (50 mg) by mouth 2 times daily    Essential hypertension with goal blood pressure less than 140/90       minocycline 50 MG capsule    MINOCIN    60 capsule    Take 1 capsule (50 mg) by mouth 2 times daily    Methicillin susceptible Staphylococcus aureus septicemia (H)       NEURONTIN 600 MG tablet   Generic drug:  gabapentin     540 tablet    Take 2 tablets (1,200 mg) by mouth 3 times daily    Postherpetic neuralgia       nortriptyline 25 MG capsule    PAMELOR    60 capsule    TAKE 2 CAPSULES(50 MG) BY MOUTH AT BEDTIME    Post herpetic neuralgia       pantoprazole 40 MG EC tablet    PROTONIX    90 tablet    Take 1 tablet (40 mg) by mouth daily    GERD (gastroesophageal reflux disease)       pravastatin 10 MG tablet    PRAVACHOL    90 tablet    Take 1 tablet (10 mg) by mouth At Bedtime    Hyperlipidemia LDL goal <100       pregabalin 150 MG capsule    LYRICA    60 capsule    Take 1 capsule (150 mg) by mouth 2 times daily    Postherpetic neuralgia       venlafaxine 150 MG 24 hr capsule    EFFEXOR-XR    90 capsule    TAKE 1 CAPSULE(150 MG) BY MOUTH DAILY    Major depressive disorder, recurrent episode, moderate (H)       warfarin 5 MG tablet    COUMADIN    90 tablet    Take 1 tablet (5 mg) by mouth daily    Atrial flutter with rapid ventricular response (H)

## 2017-08-14 NOTE — PROGRESS NOTES
SUBJECTIVE:                                                    Berenice Chaudhari is a 77 year old female who presents to clinic today for the following health issues:          Hospital Follow-up Visit:    Hospital/Nursing Home/IP Rehab Facility: [facility]  Date of Admission: 8-7-2017  Date of Discharge: 08-  Reason(s) for Admission: Atrial flutter            Problems taking medications regularly:  None       Medication changes since discharge: None       Problems adhering to non-medication therapy:  None    Summary of hospitalization:  [facility] discharge summary reviewed  Diagnostic Tests/Treatments reviewed.  Follow up needed: none  Other Healthcare Providers Involved in Patient s Care:         Homecare and Specialist appointment - cardiology  Update since discharge: improved.   Patient doing well at home.  Home nursing has been out.  She is asymptomatic (as she was at the onset).    Tolerated new medications well.  No chest pain , sob or headaches.     She is very concerned about her kidney function.    Had mild lower extremity swelling after discharge, slowly improving.     Post Discharge Medication Reconciliation: discharge medications reconciled, continue medications without change.  Plan of care communicated with patient     Coding guidelines for this visit:  Type of Medical   Decision Making Face-to-Face Visit       within 7 Days of discharge Face-to-Face Visit        within 14 days of discharge   Moderate Complexity [id number]   High Complexity [id number]                  Problem list and histories reviewed & adjusted, as indicated.  Additional history: as documented        Reviewed and updated as needed this visit by clinical staff       Reviewed and updated as needed this visit by Provider         ROS:  Constitutional, HEENT, cardiovascular, pulmonary, gi and gu systems are negative, except as otherwise noted.      OBJECTIVE:   /70  Pulse 93  Temp 97.9  F (36.6  C)  (Oral)  SpO2 98%  There is no height or weight on file to calculate BMI.  GENERAL APPEARANCE: healthy, alert and no distress  RESP: lungs clear to auscultation - no rales, rhonchi or wheezes  CV: regular rates and rhythm, normal S1 S2, no S3 or S4 and no murmur, click or rub  EXT: trace edema    Diagnostic Test Results:  none     ASSESSMENT/PLAN:       1. Atrial flutter with rapid ventricular response (H)  Rate controlled, needs INR check, goal 2-3. FOLLOW UP with cardiology. Holter results pending.  - INR  - INR CLINIC REFERRAL  - warfarin (COUMADIN) 5 MG tablet; Take 1 tablet (5 mg) by mouth daily  Dispense: 90 tablet; Refill: 3  - diltiazem (CARDIZEM) 30 MG tablet; Take 1 tablet (30 mg) by mouth 4 times daily  Dispense: 480 tablet; Refill: 3    2. Acute kidney injury (H)  Recheck today - will call with results - she is very worried.  - Basic metabolic panel    3. Hyperlipidemia LDL goal <100  refill  - pravastatin (PRAVACHOL) 10 MG tablet; Take 1 tablet (10 mg) by mouth At Bedtime  Dispense: 90 tablet; Refill: 3    4. Essential hypertension with goal blood pressure less than 140/90  Controlled, refill  - metoprolol (TOPROL-XL) 50 MG 24 hr tablet; Take 1 tablet (50 mg) by mouth 2 times daily  Dispense: 180 tablet; Refill: 3    Patient Instructions   Labs today.    INR clinic referral - you will start working with the INR clinic nurse to adjust your coumadin (warfarin).    Follow up with cardiology as previously schedule - see below.      Nelly Pearl MD  [facility]

## 2017-08-15 ENCOUNTER — ANTICOAGULATION THERAPY VISIT (OUTPATIENT)
Dept: PEDIATRICS | Facility: CLINIC | Age: 77
End: 2017-08-15
Payer: COMMERCIAL

## 2017-08-15 DIAGNOSIS — Z79.01 LONG-TERM (CURRENT) USE OF ANTICOAGULANTS: ICD-10-CM

## 2017-08-15 DIAGNOSIS — I48.92 ATRIAL FLUTTER WITH RAPID VENTRICULAR RESPONSE (H): ICD-10-CM

## 2017-08-15 DIAGNOSIS — I48.92 ATRIAL FLUTTER (H): ICD-10-CM

## 2017-08-15 LAB
ANION GAP SERPL CALCULATED.3IONS-SCNC: 8 MMOL/L (ref 3–14)
BUN SERPL-MCNC: 11 MG/DL (ref 7–30)
CALCIUM SERPL-MCNC: 8.9 MG/DL (ref 8.5–10.1)
CHLORIDE SERPL-SCNC: 106 MMOL/L (ref 94–109)
CO2 SERPL-SCNC: 26 MMOL/L (ref 20–32)
CREAT SERPL-MCNC: 1.14 MG/DL (ref 0.52–1.04)
GFR SERPL CREATININE-BSD FRML MDRD: 46 ML/MIN/1.7M2
GLUCOSE SERPL-MCNC: 87 MG/DL (ref 70–99)
INR PPP: 3.75 (ref 0.86–1.14)
POTASSIUM SERPL-SCNC: 4 MMOL/L (ref 3.4–5.3)
SODIUM SERPL-SCNC: 140 MMOL/L (ref 133–144)

## 2017-08-15 PROCEDURE — 99207 ZZC NO CHARGE NURSE ONLY: CPT | Performed by: PEDIATRICS

## 2017-08-15 NOTE — PROGRESS NOTES
ANTICOAGULATION FOLLOW-UP CLINIC VISIT    Patient Name:  Berenice Chaudhari  Date:  8/15/2017  Contact Type:  Telephone/ to pt.  INR done 8-14-17 per PMD    SUBJECTIVE:     Patient Findings     Positives Initiation of therapy    Comments Pt says [facility] will be making first HV 8-16-17           OBJECTIVE    INR   Date Value Ref Range Status   08/14/2017 3.75 (H) 0.86 - 1.14 Final       ASSESSMENT / PLAN  INR assessment SUPRA    Recheck INR In: 1 DAY    INR Location Clinic      Anticoagulation Summary as of 8/15/2017     INR goal 2.0-3.0   Today's INR 3.75! (8/14/2017)   Maintenance plan No maintenance plan   Full instructions 8/15: 2.5 mg; Otherwise No maintenance plan   Next INR check 8/16/2017   Target end date Indefinite    Indications   Atrial flutter (H) [I48.92]  Atrial flutter with rapid ventricular response (H) [I48.92]  Long-term (current) use of anticoagulants [Z79.01] [Z79.01]         Anticoagulation Episode Summary     INR check location     Preferred lab     Send INR reminders to [facility]    Comments 5mg tabs; HS dosing, [facility] 8-15-17      Anticoagulation Care Providers     Provider Role Specialty Phone number    Nelly Pearl MD Referring Internal Medicine [phone]            See the Encounter Report to view Anticoagulation Flowsheet and Dosing Calendar (Go to Encounters tab in chart review, and find the Anticoagulation Therapy Visit)    Dosage adjustment made based on physician directed care plan.    Emperatriz Garcia RN

## 2017-08-15 NOTE — MR AVS SNAPSHOT
Berenice RAJIV Chaudhari   8/15/2017   Anticoagulation Therapy Visit    Description:  77 year old female   Provider:  Nelly Pearl MD   Department:  [facility]           INR as of 8/15/2017     Today's INR 3.75! (8/14/2017)      Anticoagulation Summary as of 8/15/2017     INR goal 2.0-3.0   Today's INR 3.75! (8/14/2017)   Full instructions 8/15: 2.5 mg; Otherwise No maintenance plan   Next INR check 8/16/2017    Indications   Atrial flutter (H) [I48.92]  Atrial flutter with rapid ventricular response (H) [I48.92]  Long-term (current) use of anticoagulants [Z79.01] [Z79.01]         August 2017 Details    Sun Mon Tue Wed Thu Fri Sat       1               2               3               4               5                 6               7               8               9               10               11               12                 13               14               15      2.5 mg   See details      16            17               18               19                 20               21               22               23               24               25               26                 27               28               29               30               31                  Date Details   08/15 This INR check       Date of next INR:  8/16/2017         How to take your warfarin dose     To take:  2.5 mg Take 0.5 of a 5 mg tablet.

## 2017-08-16 ENCOUNTER — ANTICOAGULATION THERAPY VISIT (OUTPATIENT)
Dept: NURSING | Facility: CLINIC | Age: 77
End: 2017-08-16
Payer: COMMERCIAL

## 2017-08-16 ENCOUNTER — TELEPHONE (OUTPATIENT)
Dept: PEDIATRICS | Facility: CLINIC | Age: 77
End: 2017-08-16

## 2017-08-16 ENCOUNTER — DOCUMENTATION ONLY (OUTPATIENT)
Dept: CARE COORDINATION | Facility: CLINIC | Age: 77
End: 2017-08-16

## 2017-08-16 DIAGNOSIS — I48.92 ATRIAL FLUTTER WITH RAPID VENTRICULAR RESPONSE (H): ICD-10-CM

## 2017-08-16 DIAGNOSIS — Z79.01 LONG-TERM (CURRENT) USE OF ANTICOAGULANTS: ICD-10-CM

## 2017-08-16 DIAGNOSIS — I48.92 ATRIAL FLUTTER (H): ICD-10-CM

## 2017-08-16 LAB — INR PPP: 4.5

## 2017-08-16 PROCEDURE — 99207 ZZC NO CHARGE NURSE ONLY: CPT | Performed by: INTERNAL MEDICINE

## 2017-08-16 NOTE — PROGRESS NOTES
[facility] now requests orders and shares plan of care/discharge summaries for some patients through [facility].  Please REPLY TO THIS MESSAGE in order to give authorization for orders when needed.  This is considered a verbal order, you will still receive a faxed copy of orders for signature.  Thank you for your assistance in improving collaboration for our patients.    ORDER, OT tx 1w3 for equipment needs, home safety and equipment for ADL/IADLs, use of stair glide, shower transfers.     MD SUMMARY/PLAN OF CARE, The plan will also include falls prevention plan, monitor and treat pain, monitor skin integrity, and to achieve the following goals. Goals//1. Pt will demo safety with transfers to/from bed, toilet, shower, stair lift in 4 wks. 2. Pt will demo safety and ind with g/h tasks and UE/LE dressing using appropriate equipment/mod techniques in 4 wks.     Shahrzad Jesus, OTR/L  [email or web address]  [phone]

## 2017-08-16 NOTE — TELEPHONE ENCOUNTER
[facility] calling Jenni. Her INR today is 4.5   Dose is 2.5 yesterday but normally takes 5 daily. No changes in diet. [phone] If after 5:00 call patient.  Catarina Diggs RN

## 2017-08-16 NOTE — TELEPHONE ENCOUNTER
[facility] nurse contacted at 4:30 pm with Warfarin instructions and to recheck INR in 1 day.   Contacted patient and instructed to hold Warfarin today; also should eat dark green veggies today.     See INR encounter for further details.     Bee Campuzano RN  [facility]

## 2017-08-16 NOTE — MR AVS SNAPSHOT
Berenice RAJIV Chaudhari   8/16/2017   Anticoagulation Therapy Visit    Description:  77 year old female   Provider:  Nelly Pearl MD   Department:  Rm Nurse           INR as of 8/16/2017     Today's INR 4.5!      Anticoagulation Summary as of 8/16/2017     INR goal 2.0-3.0   Today's INR 4.5!   Full instructions 8/16: Hold; Otherwise No maintenance plan   Next INR check 8/17/2017    Indications   Atrial flutter (H) [I48.92]  Atrial flutter with rapid ventricular response (H) [I48.92]  Long-term (current) use of anticoagulants [Z79.01] [Z79.01]         Contact Numbers     [facility]  Please call to cancel and/or reschedule your appointment, or with any problems or questions regarding your therapy.  Anticoagulation Nurse: [phone]  Main Clinic: [phone]             August 2017 Details    Sun Mon Tue Wed Thu Fri Sat       1               2               3               4               5                 6               7               8               9               10               11               12                 13               14               15               16      Hold   See details      17            18               19                 20               21               22               23               24               25               26                 27               28               29               30               31                  Date Details   08/16 This INR check       Date of next INR:  8/17/2017         How to take your warfarin dose     Hold Do not take your warfarin dose. See the Details table to the right for additional instructions.

## 2017-08-16 NOTE — PROGRESS NOTES
ANTICOAGULATION FOLLOW-UP    Patient Name:  Berenice Chaudhari  Date:  8/16/2017  Contact Type:  Telephone/  Homecare    SUBJECTIVE:     Patient Findings     Positives Change in medications (Warfarin started 8/8/17; 10mg at 4 am and 7.5mg in evening), Hospital admission (discharged from [facility] 8/10/17)           OBJECTIVE    INR   Date Value Ref Range Status   08/16/2017 4.5  Final       ASSESSMENT / PLAN  INR assessment SUPRA    Recheck INR In: 1 DAY    INR Location Home[facility] INR      Anticoagulation Summary as of 8/16/2017     INR goal 2.0-3.0   Today's INR 4.5!   Maintenance plan No maintenance plan   Full instructions 8/16: Hold; Otherwise No maintenance plan   Next INR check 8/17/2017   Target end date Indefinite    Indications   Atrial flutter (H) [I48.92]  Atrial flutter with rapid ventricular response (H) [I48.92]  Long-term (current) use of anticoagulants [Z79.01] [Z79.01]         Anticoagulation Episode Summary     INR check location     Preferred lab     Send INR reminders to [facility]    Comments 5mg tabs; HS dosing, [facility] 8-15-17      Anticoagulation Care Providers     Provider Role Specialty Phone number    Nelly Pearl MD Referring Internal Medicine [phone]            See the Encounter Report to view Anticoagulation Flowsheet and Dosing Calendar (Go to Encounters tab in chart review, and find the Anticoagulation Therapy Visit)    Dosage adjustment made based on physician directed care plan.    Pt visited by [facility] today, call received from Jenni with INR result.   Warfarin orders and f/u INR discussed with the Homecare nurse.       Bee Campuzano RN

## 2017-08-17 ENCOUNTER — ANTICOAGULATION THERAPY VISIT (OUTPATIENT)
Dept: PEDIATRICS | Facility: CLINIC | Age: 77
End: 2017-08-17
Payer: COMMERCIAL

## 2017-08-17 DIAGNOSIS — I48.92 ATRIAL FLUTTER (H): ICD-10-CM

## 2017-08-17 DIAGNOSIS — I48.92 ATRIAL FLUTTER WITH RAPID VENTRICULAR RESPONSE (H): ICD-10-CM

## 2017-08-17 DIAGNOSIS — Z79.01 LONG-TERM (CURRENT) USE OF ANTICOAGULANTS: ICD-10-CM

## 2017-08-17 LAB — INR PPP: 3.3

## 2017-08-17 PROCEDURE — 99207 ZZC NO CHARGE NURSE ONLY: CPT | Performed by: PEDIATRICS

## 2017-08-17 NOTE — PROGRESS NOTES
ANTICOAGULATION FOLLOW-UP CLINIC VISIT    Patient Name:  Berenice Chaudhari  Date:  8/17/2017  Contact Type:  Telephone/ [facility] nurse, Ruthie, calling from [phone]  INR today at 3.3, held one dose yesterday as advised, increased greens in diet yesterday, denies any complications/concerns.  After huddling with LEXIE Awan advised [facility] nurse: warfarin 2.5 mg today, 5 mg on Fri, Sat & Sun. Recheck INR on Mon & call us back in the afternoon with result. Sending to pcp as FYI as well.    SUBJECTIVE:     Patient Findings     Positives No Problem Findings           OBJECTIVE    INR   Date Value Ref Range Status   08/16/2017 4.5  Final       ASSESSMENT / PLAN  No question data found.  Anticoagulation Summary as of 8/17/2017     INR goal 2.0-3.0   Today's INR    Next INR check    Target end date Indefinite    Indications   Atrial flutter (H) [I48.92]  Atrial flutter with rapid ventricular response (H) [I48.92]  Long-term (current) use of anticoagulants [Z79.01] [Z79.01]         Anticoagulation Episode Summary     INR check location     Preferred lab     Send INR reminders to [facility]    Comments 5mg tabs; HS dosing, [facility] 8-15-17      Anticoagulation Care Providers     Provider Role Specialty Phone number    Nelly Pearl MD Referring Internal Medicine [phone]            See the Encounter Report to view Anticoagulation Flowsheet and Dosing Calendar (Go to Encounters tab in chart review, and find the Anticoagulation Therapy Visit)      Nani Coffman RN

## 2017-08-17 NOTE — MR AVS SNAPSHOT
Berenice RAJIV Chaudhari   8/17/2017   Anticoagulation Therapy Visit    Description:  77 year old female   Provider:  Nelly Pearl MD   Department:  [facility]           INR as of 8/17/2017     Today's INR 3.3!      Anticoagulation Summary as of 8/17/2017     INR goal 2.0-3.0   Today's INR 3.3!   Full instructions 8/17: 2.5 mg; 8/18: 5 mg; 8/19: 5 mg; 8/20: 5 mg; Otherwise No maintenance plan   Next INR check 8/21/2017    Indications   Atrial flutter (H) [I48.92]  Atrial flutter with rapid ventricular response (H) [I48.92]  Long-term (current) use of anticoagulants [Z79.01] [Z79.01]         August 2017 Details    Sun Mon Tue Wed Thu Fri Sat       1               2               3               4               5                 6               7               8               9               10               11               12                 13               14               15               16               17      2.5 mg   See details      18      5 mg         19      5 mg           20      5 mg         21            22               23               24               25               26                 27               28               29               30               31                  Date Details   08/17 This INR check       Date of next INR:  8/21/2017         How to take your warfarin dose     To take:  2.5 mg Take 0.5 of a 5 mg tablet.    To take:  5 mg Take 1 of the 5 mg tablets.

## 2017-08-17 NOTE — PROGRESS NOTES
jules Rogers with [facility] calls.  She can be reached at [phone].  States that patient is still trying to manage her other medical issues and the IRN as she is on warfarin and is asking to d/c the physical therapy order and the nurse manager will resume care and reorder physical therapy when patient is stable.  Patient has just completed home physical therapy already and want sot wait to restart when stable.  The order is epic.    Melissa Abdullahi RN  Message handled by Nurse Triage.

## 2017-08-21 ENCOUNTER — TELEPHONE (OUTPATIENT)
Dept: PEDIATRICS | Facility: CLINIC | Age: 77
End: 2017-08-21

## 2017-08-21 ENCOUNTER — DOCUMENTATION ONLY (OUTPATIENT)
Dept: PEDIATRICS | Facility: CLINIC | Age: 77
End: 2017-08-21

## 2017-08-21 ENCOUNTER — ANTICOAGULATION THERAPY VISIT (OUTPATIENT)
Dept: NURSING | Facility: CLINIC | Age: 77
End: 2017-08-21
Payer: COMMERCIAL

## 2017-08-21 DIAGNOSIS — I48.92 ATRIAL FLUTTER (H): ICD-10-CM

## 2017-08-21 DIAGNOSIS — Z79.01 LONG-TERM (CURRENT) USE OF ANTICOAGULANTS: ICD-10-CM

## 2017-08-21 DIAGNOSIS — I48.92 ATRIAL FLUTTER WITH RAPID VENTRICULAR RESPONSE (H): ICD-10-CM

## 2017-08-21 LAB — INR PPP: 5

## 2017-08-21 PROCEDURE — 99207 ZZC NO CHARGE NURSE ONLY: CPT | Performed by: PEDIATRICS

## 2017-08-21 NOTE — TELEPHONE ENCOUNTER
Call received from [facility] nurse, Jocelyne, at [phone]:    - INR today is 5.4  - took coumadin 2.5 mg on 08/17, 5 mg on 08/18, 08/19 & 08/20  - didn't miss dose  - denies any bleeding or bruising    Please advise on coumadin dosing. Thanks.     Notes from last INR reading on 08/17:  INR today at 3.3, held one dose yesterday as advised, increased greens in diet yesterday, denies any complications/concerns.  After huddling with LEXIE Awan advised [facility] nurse: warfarin 2.5 mg today, 5 mg on Fri, Sat & Sun. Recheck INR on Mon & call us back in the afternoon with result. Sending to pcp as OSCAR as well.    Cha RN  Triage Nurse

## 2017-08-21 NOTE — PROGRESS NOTES
Jocelyne, RN with [facility] calling to report INR of 5.0. She left a voicemail on the INR RN line.     Let her know INR RN comes at 1 pm. Will send high priority note for her to address. She agrees with plan.

## 2017-08-21 NOTE — TELEPHONE ENCOUNTER
Contacted Jocelyne with [facility]; Jocelyne states the INR was 5.0 today.     Bee Campuzano RN  [facility]

## 2017-08-21 NOTE — MR AVS SNAPSHOT
Berenice RAJIV Chaudhari   8/21/2017   Anticoagulation Therapy Visit    Description:  77 year old female   Provider:  Nelly Pearl MD   Department:  [facility] Nurse           INR as of 8/21/2017     Today's INR 5.0!      Anticoagulation Summary as of 8/21/2017     INR goal 2.0-3.0   Today's INR 5.0!   Full instructions 8/21: Hold; 8/22: Hold; 8/23: 2.5 mg; Otherwise No maintenance plan   Next INR check 8/24/2017    Indications   Atrial flutter (H) [I48.92]  Atrial flutter with rapid ventricular response (H) [I48.92]  Long-term (current) use of anticoagulants [Z79.01] [Z79.01]         Contact Numbers     [facility]  Please call  [phone] to cancel and/or reschedule your appointment   Please call  [phone] with any problems or questions regarding your therapy.        August 2017 Details    Sun Mon Tue Wed Thu Fri Sat       1               2               3               4               5                 6               7               8               9               10               11               12                 13               14               15               16               17               18               19                 20               21      Hold   See details      22      Hold         23      2.5 mg         24            25               26                 27               28               29               30               31                  Date Details   08/21 This INR check       Date of next INR:  8/24/2017         How to take your warfarin dose     To take:  2.5 mg Take 0.5 of a 5 mg tablet.    Hold Do not take your warfarin dose. See the Details table to the right for additional instructions.

## 2017-08-22 NOTE — PROGRESS NOTES
Contacted Jocelyne earlier today.   See INR encounter for further details.     Bee Campuzano RN  [facility]

## 2017-08-22 NOTE — PROGRESS NOTES
ANTICOAGULATION FOLLOW-UP    Patient Name:  Berenice Chaudhari  Date:  8/21/2017  Contact Type:  Telephone/ [facility]    SUBJECTIVE:     Patient Findings     Positives Change in diet/appetite (eating green veggies 3x weekly)           OBJECTIVE    INR   Date Value Ref Range Status   08/21/2017 5.0  Final       ASSESSMENT / PLAN  INR assessment SUPRA    Recheck INR In: 3 DAYS    INR Location [facility] INR      Anticoagulation Summary as of 8/21/2017     INR goal 2.0-3.0   Today's INR 5.0!   Maintenance plan No maintenance plan   Full instructions 8/21: Hold; 8/22: Hold; 8/23: 2.5 mg; Otherwise No maintenance plan   Next INR check 8/24/2017   Target end date Indefinite    Indications   Atrial flutter (H) [I48.92]  Atrial flutter with rapid ventricular response (H) [I48.92]  Long-term (current) use of anticoagulants [Z79.01] [Z79.01]         Anticoagulation Episode Summary     INR check location     Preferred lab     Send INR reminders to  ANTICOAG CLINIC    Comments 5mg tabs; HS dosing, [facility] 8-15-17      Anticoagulation Care Providers     Provider Role Specialty Phone number    Nelly Pearl MD Referring Internal Medicine [phone]            See the Encounter Report to view Anticoagulation Flowsheet and Dosing Calendar (Go to Encounters tab in chart review, and find the Anticoagulation Therapy Visit)    Dosage adjustment made based on physician directed care plan.    Pt visited by [facility] today, call received from Jocelyne with INR result.   Warfarin orders and f/u INR discussed with the [facility] nurse.       Bee Campuzano RN

## 2017-08-24 ENCOUNTER — ANTICOAGULATION THERAPY VISIT (OUTPATIENT)
Dept: NURSING | Facility: CLINIC | Age: 77
End: 2017-08-24
Payer: COMMERCIAL

## 2017-08-24 DIAGNOSIS — I48.92 ATRIAL FLUTTER WITH RAPID VENTRICULAR RESPONSE (H): ICD-10-CM

## 2017-08-24 DIAGNOSIS — I48.92 ATRIAL FLUTTER (H): ICD-10-CM

## 2017-08-24 DIAGNOSIS — Z79.01 LONG-TERM (CURRENT) USE OF ANTICOAGULANTS: ICD-10-CM

## 2017-08-24 LAB — INR PPP: 1.7

## 2017-08-24 PROCEDURE — 99207 ZZC NO CHARGE NURSE ONLY: CPT

## 2017-08-24 NOTE — PROGRESS NOTES
ANTICOAGULATION FOLLOW-UP     Patient Name:  Berenice Chaudhari  Date:  8/24/2017  Contact Type:  Telephone  Call received from [facility] nurse, with INR result.   Follow up instructions given over the phone to Home Care nurse for coumadin management.    SUBJECTIVE:     Patient Findings     Positives Initiation of therapy           OBJECTIVE    INR   Date Value Ref Range Status   08/24/2017 1.7  Final       ASSESSMENT / PLAN  INR assessment SUB    Recheck INR In: 4 DAYS    INR Location [facility] INR      Anticoagulation Summary as of 8/24/2017     INR goal 2.0-3.0   Today's INR 1.7!   Maintenance plan No maintenance plan   Full instructions 8/24: 5 mg; 8/25: 2.5 mg; 8/26: 2.5 mg; 8/27: 2.5 mg; Otherwise No maintenance plan   Next INR check 8/28/2017   Target end date Indefinite    Indications   Atrial flutter (H) [I48.92]  Atrial flutter with rapid ventricular response (H) [I48.92]  Long-term (current) use of anticoagulants [Z79.01] [Z79.01]         Anticoagulation Episode Summary     INR check location     Preferred lab     Send INR reminders to  [facility] CL[facility]    Comments 5mg tabs; HS dosing, [facility] 8-15-17      Anticoagulation Care Providers     Provider Role Specialty Phone number    Nelly Pearl MD Referring Internal Medicine [phone]            See the Encounter Report to view Anticoagulation Flowsheet and Dosing Calendar (Go to Encounters tab in chart review, and find the Anticoagulation Therapy Visit)        Mary Butcher RN

## 2017-08-24 NOTE — MR AVS SNAPSHOT
Berenice RAJIV Chaudhari   8/24/2017 10:00 AM   Anticoagulation Therapy Visit    Description:  77 year old female   Provider:   ANTICOAGULATION CLINIC   Department:   Nurse           INR as of 8/24/2017     Today's INR 1.7!      Anticoagulation Summary as of 8/24/2017     INR goal 2.0-3.0   Today's INR 1.7!   Full instructions 8/24: 5 mg; 8/25: 2.5 mg; 8/26: 2.5 mg; 8/27: 2.5 mg; Otherwise No maintenance plan   Next INR check 8/28/2017    Indications   Atrial flutter (H) [I48.92]  Atrial flutter with rapid ventricular response (H) [I48.92]  Long-term (current) use of anticoagulants [Z79.01] [Z79.01]         Your next Anticoagulation Clinic appointment(s)     Aug 28, 2017  2:45 PM CDT   Anticoagulation Visit with  ANTICOAGULATION CLINIC   [facility] ([facility])    [address] [place] [address]  [address]  [facility] [address] [phone]              Contact Numbers     [facility]  Please call  [phone] to cancel and/or reschedule your appointment   Please call  [phone] with any problems or questions regarding your therapy.        August 2017 Details    Sun Mon Tue Wed Thu Fri Sat       1               2               3               4               5                 6               7               8               9               10               11               12                 13               14               15               16               17               18               19                 20               21               22               23               24      5 mg   See details      25      2.5 mg         26      2.5 mg           27      2.5 mg         28            29               30               31                  Date Details   08/24 This INR check       Date of next INR:  8/28/2017         How to take your warfarin dose     To take:  2.5 mg Take 0.5 of a 5 mg tablet.    To take:  5 mg Take 1 of the 5 mg tablets.

## 2017-08-28 ENCOUNTER — ANTICOAGULATION THERAPY VISIT (OUTPATIENT)
Dept: NURSING | Facility: CLINIC | Age: 77
End: 2017-08-28
Payer: COMMERCIAL

## 2017-08-28 DIAGNOSIS — I48.92 ATRIAL FLUTTER WITH RAPID VENTRICULAR RESPONSE (H): ICD-10-CM

## 2017-08-28 DIAGNOSIS — Z79.01 LONG-TERM (CURRENT) USE OF ANTICOAGULANTS: ICD-10-CM

## 2017-08-28 DIAGNOSIS — I48.92 ATRIAL FLUTTER (H): ICD-10-CM

## 2017-08-28 LAB — INR PPP: 1.6

## 2017-08-28 PROCEDURE — 99207 ZZC NO CHARGE NURSE ONLY: CPT

## 2017-08-28 NOTE — PROGRESS NOTES
ANTICOAGULATION FOLLOW-UP     Patient Name:  Berenice Chaudhari  Date:  8/28/2017  Contact Type:  Telephone  Call received from [facility] nurse, with INR result.   Follow up instructions given over the phone to Home Care nurse for coumadin management.    SUBJECTIVE:     Patient Findings     Positives No Problem Findings, Unexplained INR or factor level change           OBJECTIVE    INR   Date Value Ref Range Status   08/28/2017 1.6  Final       ASSESSMENT / PLAN  INR assessment SUB    Recheck INR In: 3 DAYS    INR Location [facility] INR      Anticoagulation Summary as of 8/28/2017     INR goal 2.0-3.0   Today's INR 1.6!   Maintenance plan No maintenance plan   Full instructions 8/28: 5 mg; 8/29: 2.5 mg; 8/30: 2.5 mg; Otherwise No maintenance plan   Next INR check 8/31/2017   Target end date Indefinite    Indications   Atrial flutter (H) [I48.92]  Atrial flutter with rapid ventricular response (H) [I48.92]  Long-term (current) use of anticoagulants [Z79.01] [Z79.01]         Anticoagulation Episode Summary     INR check location     Preferred lab     Send INR reminders to  [facility]    Comments 5mg tabs; HS dosing, [facility] 8-15-17      Anticoagulation Care Providers     Provider Role Specialty Phone number    Nelly Pearl MD Referring Internal Medicine [phone]            See the Encounter Report to view Anticoagulation Flowsheet and Dosing Calendar (Go to Encounters tab in chart review, and find the Anticoagulation Therapy Visit)        Mary Butcher RN

## 2017-08-28 NOTE — MR AVS SNAPSHOT
Berenice VANCE Fara   8/28/2017 2:45 PM   Anticoagulation Therapy Visit    Description:  77 year old female   Provider:  [facility]   Department:  [facility] Nurse           INR as of 8/28/2017     Today's INR 1.6!      Anticoagulation Summary as of 8/28/2017     INR goal 2.0-3.0   Today's INR 1.6!   Full instructions 8/28: 5 mg; 8/29: 2.5 mg; 8/30: 2.5 mg; Otherwise No maintenance plan   Next INR check 8/31/2017    Indications   Atrial flutter (H) [I48.92]  Atrial flutter with rapid ventricular response (H) [I48.92]  Long-term (current) use of anticoagulants [Z79.01] [Z79.01]         Contact Numbers     [facility]  Please call  [phone] to cancel and/or reschedule your appointment   Please call  [phone] with any problems or questions regarding your therapy.        August 2017 Details    Sun Mon Tue Wed Thu Fri Sat       1               2               3               4               5                 6               7               8               9               10               11               12                 13               14               15               16               17               18               19                 20               21               22               23               24               25               26                 27               28      5 mg   See details      29      2.5 mg         30      2.5 mg         31               Date Details   08/28 This INR check       Date of next INR:  8/31/2017         How to take your warfarin dose     To take:  2.5 mg Take 0.5 of a 5 mg tablet.    To take:  5 mg Take 1 of the 5 mg tablets.

## 2017-08-30 DIAGNOSIS — A41.01 METHICILLIN SUSCEPTIBLE STAPHYLOCOCCUS AUREUS SEPTICEMIA (H): ICD-10-CM

## 2017-08-30 RX ORDER — MINOCYCLINE HYDROCHLORIDE 50 MG/1
CAPSULE ORAL
Qty: 60 CAPSULE | Refills: 11 | Status: SHIPPED | OUTPATIENT
Start: 2017-08-30 | End: 2017-11-27

## 2017-08-30 NOTE — TELEPHONE ENCOUNTER
Monocycline      Last Written Prescription Date: 02/01/17  Last Fill Quantity: 60,  # refills: 5   Last Office Visit with [facility] or [facility] prescribing provider: 08/14/17 Nelly Pearl in [place]    This should probably be forwarded to Nelly Pearl in [place], since she is the patient's PCP  Patient has not seen  since 07/28/16

## 2017-08-31 ENCOUNTER — ANTICOAGULATION THERAPY VISIT (OUTPATIENT)
Dept: NURSING | Facility: CLINIC | Age: 77
End: 2017-08-31
Payer: COMMERCIAL

## 2017-08-31 DIAGNOSIS — Z79.01 LONG-TERM (CURRENT) USE OF ANTICOAGULANTS: ICD-10-CM

## 2017-08-31 DIAGNOSIS — I48.92 ATRIAL FLUTTER WITH RAPID VENTRICULAR RESPONSE (H): ICD-10-CM

## 2017-08-31 DIAGNOSIS — I48.92 ATRIAL FLUTTER (H): ICD-10-CM

## 2017-08-31 LAB — INR PPP: 1.6

## 2017-08-31 PROCEDURE — 99207 ZZC NO CHARGE NURSE ONLY: CPT

## 2017-09-01 ENCOUNTER — TELEPHONE (OUTPATIENT)
Dept: PEDIATRICS | Facility: CLINIC | Age: 77
End: 2017-09-01

## 2017-09-01 NOTE — PROGRESS NOTES
ANTICOAGULATION FOLLOW-UP     Patient Name:  Berenice Chaudhari  Date:  8/31/2017  Contact Type:  Telephone  Call received from [facility] nurse, with INR result.   Follow up instructions given over the phone to Home Care nurse for coumadin management.    SUBJECTIVE:     Patient Findings     Positives Initiation of therapy           OBJECTIVE    INR   Date Value Ref Range Status   08/31/2017 1.6  Final       ASSESSMENT / PLAN  INR assessment SUB    Recheck INR In: 1 WEEK    INR Location [facility] INR      Anticoagulation Summary as of 8/31/2017     INR goal 2.0-3.0   Today's INR 1.6!   Maintenance plan 2.5 mg (5 mg x 0.5) on Mon, Wed, Fri; 5 mg (5 mg x 1) all other days   Full instructions 2.5 mg on Mon, Wed, Fri; 5 mg all other days   Weekly total 27.5 mg   Plan last modified Mary Butcher, RN (8/31/2017)   Next INR check 9/7/2017   Target end date Indefinite    Indications   Atrial flutter (H) [I48.92]  Atrial flutter with rapid ventricular response (H) [I48.92]  Long-term (current) use of anticoagulants [Z79.01] [Z79.01]         Anticoagulation Episode Summary     INR check location     Preferred lab     Send INR reminders to [facility]    Comments 5mg tabs; HS dosing, [facility] 8-15-17      Anticoagulation Care Providers     Provider Role Specialty Phone number    Nelly Pearl MD Referring Internal Medicine [phone]            See the Encounter Report to view Anticoagulation Flowsheet and Dosing Calendar (Go to Encounters tab in chart review, and find the Anticoagulation Therapy Visit)        Mary Butcher RN

## 2017-09-01 NOTE — TELEPHONE ENCOUNTER
[facility] Nurse Merlene calling from [phone]:    Requesting orders for:  - Nursing once a week x 2 weeks, 2 prns  - HHA twice a week x weeks    Verbal OK given as per nursing protocol.    Altagracia VANCE RN  Triage Nurse

## 2017-09-05 ENCOUNTER — OFFICE VISIT (OUTPATIENT)
Dept: PEDIATRICS | Facility: CLINIC | Age: 77
End: 2017-09-05
Payer: COMMERCIAL

## 2017-09-05 VITALS
SYSTOLIC BLOOD PRESSURE: 132 MMHG | OXYGEN SATURATION: 98 % | TEMPERATURE: 97.7 F | HEART RATE: 63 BPM | DIASTOLIC BLOOD PRESSURE: 78 MMHG

## 2017-09-05 DIAGNOSIS — M79.89 LEG SWELLING: Primary | ICD-10-CM

## 2017-09-05 DIAGNOSIS — R13.10 DYSPHAGIA, UNSPECIFIED TYPE: ICD-10-CM

## 2017-09-05 DIAGNOSIS — R05.9 COUGH: ICD-10-CM

## 2017-09-05 DIAGNOSIS — N18.30 CKD (CHRONIC KIDNEY DISEASE) STAGE 3, GFR 30-59 ML/MIN (H): ICD-10-CM

## 2017-09-05 LAB
ANION GAP SERPL CALCULATED.3IONS-SCNC: 8 MMOL/L (ref 3–14)
BUN SERPL-MCNC: 19 MG/DL (ref 7–30)
CALCIUM SERPL-MCNC: 8.8 MG/DL (ref 8.5–10.1)
CHLORIDE SERPL-SCNC: 107 MMOL/L (ref 94–109)
CO2 SERPL-SCNC: 27 MMOL/L (ref 20–32)
CREAT SERPL-MCNC: 1.14 MG/DL (ref 0.52–1.04)
GFR SERPL CREATININE-BSD FRML MDRD: 46 ML/MIN/1.7M2
GLUCOSE SERPL-MCNC: 90 MG/DL (ref 70–99)
NT-PROBNP SERPL-MCNC: 1088 PG/ML (ref 0–450)
POTASSIUM SERPL-SCNC: 4.2 MMOL/L (ref 3.4–5.3)
SODIUM SERPL-SCNC: 142 MMOL/L (ref 133–144)

## 2017-09-05 PROCEDURE — 80048 BASIC METABOLIC PNL TOTAL CA: CPT | Performed by: INTERNAL MEDICINE

## 2017-09-05 PROCEDURE — 83880 ASSAY OF NATRIURETIC PEPTIDE: CPT | Performed by: INTERNAL MEDICINE

## 2017-09-05 PROCEDURE — 36415 COLL VENOUS BLD VENIPUNCTURE: CPT | Performed by: INTERNAL MEDICINE

## 2017-09-05 PROCEDURE — 99214 OFFICE O/P EST MOD 30 MIN: CPT | Performed by: INTERNAL MEDICINE

## 2017-09-05 ASSESSMENT — PATIENT HEALTH QUESTIONNAIRE - PHQ9: SUM OF ALL RESPONSES TO PHQ QUESTIONS 1-9: 7

## 2017-09-05 NOTE — NURSING NOTE
"Chief Complaint   Patient presents with     Swelling     Palpitations     URI       Initial /78  Pulse 63  Temp 97.7  F (36.5  C) (Oral)  SpO2 98% Estimated body mass index is 57.14 kg/(m^2) as calculated from the following:    Height as of 6/30/17: 5' 4\" (1.626 m).    Weight as of 8/8/17: 332 lb 14.4 oz (151 kg).  Medication Reconciliation: complete     Tamika Daniels MA    "

## 2017-09-05 NOTE — MR AVS SNAPSHOT
After Visit Summary   9/5/2017    Berenice Chaudhari    MRN: [id number]           Patient Information     Date Of Birth          1940        Visit Information        Provider Department      9/5/2017 1:40 PM Ruba Cuevas MD [facility]        Today's Diagnoses     Leg swelling    -  1    Cough        Dysphagia, unspecified type        CKD (chronic kidney disease) stage 3, GFR 30-59 ml/min           Follow-ups after your visit        Additional Services     GASTROENTEROLOGY ADULT REF PROCEDURE ONLY       Last Lab Result: Creatinine (mg/dL)       Date                     Value                 08/14/2017               1.14 (H)         ----------  There is no height or weight on file to calculate BMI.      Patient will be contacted to schedule procedure.     Please be aware that coverage of these services is subject to the terms and limitations of your health insurance plan.  Call member services at your health plan with any benefit or coverage questions.  Any procedures must be performed at a [place] facility OR coordinated by your clinic's referral office.    Please bring the following with you to your appointment:    (1) Any X-Rays, CTs or MRIs which have been performed.  Contact the facility where they were done to arrange for  prior to your scheduled appointment.    (2) List of current medications   (3) This referral request   (4) Any documents/labs given to you for this referral                  Your next 10 appointments already scheduled     Sep 06, 2017  3:30 PM CDT   New Sleep Patient with Nitish Patel MD   [facility] ([facility])    [address]  [address]  [facility] [place] [facility] [address] [phone]            Sep 07, 2017  4:00 PM CDT   Anticoagulation Visit with  ANTICOAGULATION CLINIC   [facility]    [address]  [address]  [facility] [place] [facility] [id number] [phone]     "        Sep 12, 2017  3:00 PM CDT   Return Discharge with PANCHO Trevino CNP   [facility] [place] ([facility])    [id number]  [place] [address] [phone]              Who to contact     If you have questions or need follow up information about today's clinic visit or your schedule please contact [facility] directly at [phone].  Normal or non-critical lab and imaging results will be communicated to you by MyChart, letter or phone within 4 business days after the clinic has received the results. If you do not hear from us within 7 days, please contact the clinic through MyChart or phone. If you have a critical or abnormal lab result, we will notify you by phone as soon as possible.  Submit refill requests through [email or web address] or call your pharmacy and they will forward the refill request to us. Please allow 3 business days for your refill to be completed.          Additional Information About Your Visit        [facility] Information     [email or web address] lets you send messages to your doctor, view your test results, renew your prescriptions, schedule appointments and more. To sign up, go to [email or web address] . Click on \"Log in\" on the left side of the screen, which will take you to the Welcome page. Then click on \"Sign up Now\" on the right side of the page.     You will be asked to enter the access code listed below, as well as some personal information. Please follow the directions to create your username and password.     Your access code is: [email or web address]  Expires: 2017 10:12 PM     Your access code will  in 90 days. If you need help or a new code, please call your [place] clinic or [phone].        Care EveryWhere ID     This is your Care EveryWhere ID. This could be used by other organizations to access your [place] medical records  [id number]        Your Vitals Were     Pulse Temperature Pulse Oximetry "             63 97.7  F (36.5  C) (Oral) 98%          Blood Pressure from Last 3 Encounters:   09/05/17 132/78   08/14/17 132/70   08/10/17 (!) 136/113    Weight from Last 3 Encounters:   08/08/17 (!) 332 lb 14.4 oz (151 kg)   06/30/17 (!) 582 lb (264 kg)   06/21/17 (!) 345 lb (156.5 kg)              We Performed the Following     Basic metabolic panel     BNP-N terminal pro     GASTROENTEROLOGY ADULT REF PROCEDURE ONLY        Primary Care Provider Office Phone # Fax #    Nelly Pearl [phone] [address] [place] [address]        Equal Access to Services     [facility] : Hadii irvin wheato Estephanie, waaxda luqadaha, qaybta kaalmada adeegyada, calin sue . So [facility] [phone].    ATENCIÓN: Si habla español, tiene a dickey disposición servicios gratuitos de asistencia lingüística. [facility] [phone].    We comply with applicable federal civil rights laws and [place] laws. We do not discriminate on the basis of race, color, national origin, age, disability sex, sexual orientation or gender identity.            Thank you!     Thank you for choosing [facility]  for your care. Our goal is always to provide you with excellent care. Hearing back from our patients is one way we can continue to improve our services. Please take a few minutes to complete the written survey that you may receive in the mail after your visit with us. Thank you!             Your Updated Medication List - Protect others around you: Learn how to safely use, store and throw away your medicines at www.disposemymeds.org.          This list is accurate as of: 9/5/17  2:31 PM.  Always use your most recent med list.                   Brand Name Dispense Instructions for use Diagnosis    buPROPion 300 MG 24 hr tablet    WELLBUTRIN XL    90 tablet    Take 1 tablet (300 mg) by mouth every morning    Major depressive disorder, recurrent, in full remission (H)        CENTRUM SILVER per tablet      Take 1 tablet by mouth daily        CEPHALEXIN PO      Take 500 mg by mouth 3 times daily as needed (when cellulitis is coming on)        diltiazem 30 MG tablet    CARDIZEM    480 tablet    Take 1 tablet (30 mg) by mouth 4 times daily    Atrial flutter with rapid ventricular response (H)       fluticasone 50 MCG/ACT spray    FLONASE    3 Bottle    Spray 2 sprays into both nostrils daily    Post-nasal drip       lidocaine 5 % Patch    LIDODERM    60 patch    Apply up to 3 patches to painful area at once for up to 12 h within a 24 h period.  Remove after 12 hours.    Postherpetic neuralgia       metoprolol 50 MG 24 hr tablet    TOPROL-XL    180 tablet    Take 1 tablet (50 mg) by mouth 2 times daily    Essential hypertension with goal blood pressure less than 140/90       minocycline 50 MG capsule    MINOCIN/DYNACIN    60 capsule    TAKE 1 CAPSULE(50 MG) BY MOUTH TWICE DAILY    Methicillin susceptible Staphylococcus aureus septicemia (H)       NEURONTIN 600 MG tablet   Generic drug:  gabapentin     540 tablet    Take 2 tablets (1,200 mg) by mouth 3 times daily    Postherpetic neuralgia       nortriptyline 25 MG capsule    PAMELOR    60 capsule    TAKE 2 CAPSULES(50 MG) BY MOUTH AT BEDTIME    Post herpetic neuralgia       pantoprazole 40 MG EC tablet    PROTONIX    90 tablet    Take 1 tablet (40 mg) by mouth daily    GERD (gastroesophageal reflux disease)       pravastatin 10 MG tablet    PRAVACHOL    90 tablet    Take 1 tablet (10 mg) by mouth At Bedtime    Hyperlipidemia LDL goal <100       pregabalin 150 MG capsule    LYRICA    60 capsule    Take 1 capsule (150 mg) by mouth 2 times daily    Postherpetic neuralgia       venlafaxine 150 MG 24 hr capsule    EFFEXOR-XR    90 capsule    TAKE 1 CAPSULE(150 MG) BY MOUTH DAILY    Major depressive disorder, recurrent episode, moderate (H)       warfarin 5 MG tablet    COUMADIN    90 tablet    Take 1 tablet (5 mg) by mouth daily    Atrial flutter  with rapid ventricular response (H)

## 2017-09-05 NOTE — PROGRESS NOTES
"  SUBJECTIVE:   Berenice Chaudhari is a 77 year old female who presents to clinic today for the following health issues:  Berenice has a past medical history of hypertension, hyperlipidemia, morbid obesity, Acute kidney injury on chronic kidney disease. On 08/07/2017 the patient was admitted for a fib with RVR, as well as acute on chronic renal failure.  She was rate controlled with verapamil QID and metoprolol, and started on coumadin. Her renal function appeared to be prerenal and improved with improvement of her heart function as well as gentle rehydration.      She presents to the clinic for the chief complaint of leg swelling and  Cough.   Since her admission to the hospital, she has had edema in her lower extermities that she did not have prior. She states she has had swelling in the past \"due to my kidneys\", but it typically resolves on its own.  She denies any chest pain or shortness of breath.  She denies any palpitations but states she was asymptomatic when in afib with rvr as well.      She has also developed a persistent cough that developed sometime last year. She states that she feels like popcorn is stuck in the back of her throat which causes her to cough and sneeze.  She feels there is constantly something in the back of her throat that is \"rattling\". She saw Dr. Campa who prescribed her Flonase which she uses in the morning and night time. She notes that this has helped some but has not resolved the cough.  Over the past month she feels the cough has gotten worse, causing her to have spells of coughing associated with not being able to catch her breath.  She had a CXR and VQ scan while hospitalized that were both normal.       She notes that one consistent trigger for her cough is eating. When she tries to eat she feels the food gets stuck and then she chokes and her food comes back up. She typically has significant coughing while this is happening.  She notes this occurs with solids, and drinking " after she eats helps the food go down.  The cough is worse when she is lying down, eating and sometimes talking. It keeps her up at night which is sometimes accompanied by shortness of breath . If she coughs long enough she gets winded.  The cough is productive; the phlegm is described as green and thick. Sometimes in the morning there will be a stripe of blood in it. Eliminating different types of food had no effect on the cough.     The patient denies any heart burn or reflux. Her  feels that her stamina hasn't returned since being in the hospital and worries that this is her new low.      RESPIRATORY SYMPTOMS      Duration: 5 months and more    Description  nasal congestion and feels like it is in her lower neck upper chest. And it makes her choke. It makes a popping noise when she coughs.    Severity: severe    Accompanying signs and symptoms: None    History (predisposing factors):  none    Precipitating or alleviating factors: None    Therapies tried and outcome:  flonase and it helps slightly        Problem list and histories reviewed & adjusted, as indicated.  Additional history: as documented    Patient Active Problem List   Diagnosis     Generalized osteoarthrosis, unspecified site     Urticaria     Obesity     Essential hypertension with goal blood pressure less than 140/90     Allergic rhinitis     Erythroderma desquamativum     Advanced directives, counseling/discussion     Hyperlipidemia LDL goal <100     Post herpetic neuralgia     Health Care Home     Chronic renal disease     Anxiety     GERD (gastroesophageal reflux disease)     Major depressive disorder, recurrent episode, in full remission (HCC)     Cough     Severe obesity (BMI >= 40) (H)     Cellulitis of right lower extremity     CKD (chronic kidney disease) stage 3, GFR 30-59 ml/min     Right shoulder pain     Cellulitis     Atrial flutter (H)     Atrial flutter with rapid ventricular response (H)     Long-term (current) use of  anticoagulants [Z79.01]     Past Surgical History:   Procedure Laterality Date     BIOPSY       C NONSPECIFIC PROCEDURE      Extensive oral (dental) surgery     C NONSPECIFIC PROCEDURE      Remote T&A     C NONSPECIFIC PROCEDURE  1/03    LAP CHOLY     C NONSPECIFIC PROCEDURE      L hip I + D x 2  4/05     C NONSPECIFIC PROCEDURE  2/05    colonoscopy     C NONSPECIFIC PROCEDURE      L hip I + D several other times      CATARACT IOL, RT/LT Right 2012     CHOLECYSTECTOMY       ENT SURGERY       PHACOEMULSIFICATION CLEAR CORNEA WITH STANDARD INTRAOCULAR LENS IMPLANT Left 3/10/2016    Procedure: PHACOEMULSIFICATION CLEAR CORNEA WITH STANDARD INTRAOCULAR LENS IMPLANT;  Surgeon: Dwight Metcalf MD;  Location[facility]       Social History   Substance Use Topics     Smoking status: Former Smoker     Smokeless tobacco: Never Used      Comment: started smoking 20's- quit in 1990     Alcohol use No      Comment: RARELY     Family History   Problem Relation Age of Onset     Myocardial Infarction Father 63         Current Outpatient Prescriptions   Medication Sig Dispense Refill     minocycline (MINOCIN/DYNACIN) 50 MG capsule TAKE 1 CAPSULE(50 MG) BY MOUTH TWICE DAILY 60 capsule 11     pravastatin (PRAVACHOL) 10 MG tablet Take 1 tablet (10 mg) by mouth At Bedtime 90 tablet 3     metoprolol (TOPROL-XL) 50 MG 24 hr tablet Take 1 tablet (50 mg) by mouth 2 times daily 180 tablet 3     warfarin (COUMADIN) 5 MG tablet Take 1 tablet (5 mg) by mouth daily 90 tablet 3     diltiazem (CARDIZEM) 30 MG tablet Take 1 tablet (30 mg) by mouth 4 times daily 480 tablet 3     gabapentin (NEURONTIN) 600 MG tablet Take 2 tablets (1,200 mg) by mouth 3 times daily 540 tablet 3     venlafaxine (EFFEXOR-XR) 150 MG 24 hr capsule TAKE 1 CAPSULE(150 MG) BY MOUTH DAILY 90 capsule 0     fluticasone (FLONASE) 50 MCG/ACT spray Spray 2 sprays into both nostrils daily 3 Bottle 11     lidocaine (LIDODERM) 5 % Patch Apply up to 3 patches to painful area at  once for up to 12 h within a 24 h period.  Remove after 12 hours. 60 patch 2     CEPHALEXIN PO Take 500 mg by mouth 3 times daily as needed (when cellulitis is coming on)        pregabalin (LYRICA) 150 MG capsule Take 1 capsule (150 mg) by mouth 2 times daily 60 capsule 5     nortriptyline (PAMELOR) 25 MG capsule TAKE 2 CAPSULES(50 MG) BY MOUTH AT BEDTIME 60 capsule 11     buPROPion (WELLBUTRIN XL) 300 MG 24 hr tablet Take 1 tablet (300 mg) by mouth every morning 90 tablet 1     pantoprazole (PROTONIX) 40 MG enteric coated tablet Take 1 tablet (40 mg) by mouth daily 90 tablet 3     Multiple Vitamins-Minerals (CENTRUM SILVER) per tablet Take 1 tablet by mouth daily       Allergies   Allergen Reactions     Ceftriaxone Anaphylaxis and Rash     Rocephin given. Developed rash to back and abd, awaiting to see if it improves with d/c of rocephin.        Nafcillin Rash     diffuse severe rash in hospital 2/05     Penicillins Anaphylaxis     Vancomycin Anaphylaxis and Rash     Codeine Fatigue     Sleeps continuously     Clindamycin Rash     received information from patient     Zithromax [Azithromycin] Rash         Reviewed and updated as needed this visit by clinical staff     Reviewed and updated as needed this visit by Provider         ROS:  Constitutional, HEENT, cardiovascular, pulmonary, gi and gu systems are negative, except as otherwise noted.    MS: POSITIVE or edema  RESP: POSITIVE for cough, shortness of breath , sneezing   : POSITIVE for dysphagia     This document serves as a record of the services and decisions personally performed and made by Ruba Cuevas MD. It was created on her behalf by Rhoda Doe, a trained medical scribe. The creation of this document is based the provider's statements to the medical scribe.    Rhoda Doe September 5, 2017 2:05 PM  OBJECTIVE:   /78  Pulse 63  Temp 97.7  F (36.5  C) (Oral)  SpO2 98%  There is no height or weight on file to calculate BMI.  GENERAL:  chronically ill appearing, wheelchair bound, morbid obesity.  Pt with upper airway noise with speaking at times.    HENT: ear canals and TM's normal, nose and mouth without ulcers or lesions  RESP: lungs clear to auscultation - no rales, rhonchi or wheezes. Transmitted upper airway sounds.    CV: regular rate and rhythm, normal S1 S2, no S3 or S4, no murmur, click or rub  ABDOMEN: Obese, unable to assess HSM or masses.  Non tender to palpation.   MS: no gross musculoskeletal defects noted, 2+ edema left lower extremity  1+ edema right lower extremity   PSYCH: mentation appears normal, affect normal/bright      Diagnostic Test Results:  No results found for this or any previous visit (from the past 24 hour(s)).    ASSESSMENT/PLAN:     (M79.89) Leg swelling  (primary encounter diagnosis)  -unclear if this is CHF or simply venous insufficiency  -BNP was elevated at 3423 while hospitalized, echo showed EF 50-55% with apical akinesis and mild to moderate LVH  -will recheck BNP today, as pt is worried about renal function we will also check bmp  -encouraged support stockings.   Plan: Basic metabolic panel, BNP-N terminal pro            (R05) Cough  -- this is worsening of a long term problem for patient  --check bnp to be sure the cough is not due to fluid overload and heart failure  -by history cough is concerning for reflux considering relationship to eating and dysphagia to solids  -also could be due to a swallowing issue?    -reassured by normal CXR and VQ scan; I do not believe further imaging is warranted at this time    (R13.10) Dysphagia, unspecified type  -pt with symptoms of dysphagia consistent with possible esophageal stricture  -will schedule EGD  -cough is concerning for reflux as the cause; pt already on PPI  Plan: GASTROENTEROLOGY ADULT REF PROCEDURE ONLY           (N18.3) CKD (chronic kidney disease) stage 3, GFR 30-59 ml/min  -- pt concerned that CKD is the cause for her leg swelling  -will recheck renal  function today   Plan: Basic metabolic panel        Follow up with PCP after EGD    The information in this document, created by the medical scribe for me, accurately reflects the services I personally performed and the decisions made by me. I have reviewed and approved this document for accuracy prior to leaving the patient care area.  Ruba Cuevas MD  [facility]

## 2017-09-06 DIAGNOSIS — F33.42 MAJOR DEPRESSIVE DISORDER, RECURRENT, IN FULL REMISSION (H): ICD-10-CM

## 2017-09-06 NOTE — TELEPHONE ENCOUNTER
buPROPion (WELLBUTRIN XL) 300 MG 24 hr tablet       Last Written Prescription Date: 12/1/2016  Last Fill Quantity: 90; # refills: 1  Last Office Visit with [facility], [facility] or [facility] prescribing provider:  9/5/2017        Last PHQ-9 score on record=   PHQ-9 SCORE 9/5/2017   Total Score -   Total Score 7       Lab Results   Component Value Date    AST 31 06/30/2017     Lab Results   Component Value Date    ALT 33 06/30/2017

## 2017-09-07 ENCOUNTER — TELEPHONE (OUTPATIENT)
Dept: PEDIATRICS | Facility: CLINIC | Age: 77
End: 2017-09-07

## 2017-09-07 ENCOUNTER — ANTICOAGULATION THERAPY VISIT (OUTPATIENT)
Dept: NURSING | Facility: CLINIC | Age: 77
End: 2017-09-07
Payer: COMMERCIAL

## 2017-09-07 DIAGNOSIS — Z79.01 LONG-TERM (CURRENT) USE OF ANTICOAGULANTS: ICD-10-CM

## 2017-09-07 DIAGNOSIS — I48.92 ATRIAL FLUTTER WITH RAPID VENTRICULAR RESPONSE (H): ICD-10-CM

## 2017-09-07 DIAGNOSIS — I48.92 ATRIAL FLUTTER (H): ICD-10-CM

## 2017-09-07 LAB — INR PPP: 2.4

## 2017-09-07 PROCEDURE — 99207 ZZC NO CHARGE NURSE ONLY: CPT

## 2017-09-07 RX ORDER — WARFARIN SODIUM 2.5 MG/1
TABLET ORAL
Qty: 135 TABLET | Refills: 1 | Status: SHIPPED | OUTPATIENT
Start: 2017-09-07 | End: 2017-09-14

## 2017-09-07 NOTE — TELEPHONE ENCOUNTER
LEXIE Gaitan with [facility] calls.  She can be reached at [phone].    Asking for an order for eval and treat for SN, OT and HHA.  A verbal ok was given over the phone.  Melissa Abdullahi RN  Message handled by Nurse Triage.

## 2017-09-07 NOTE — TELEPHONE ENCOUNTER
Agree with 4 days prior patient should stop coumadin.      Nelly Pearl MD  Internal Medicine/Pediatrics  [facility]

## 2017-09-07 NOTE — TELEPHONE ENCOUNTER
Call from Corrie with [facility].  They will be scheduling patient for upper endoscopy and are looking for coumadin instructions.  They recommended 4 days prior.  Patient takes 5 mg coumadin daily for a-fib.  They will schedule patient accordingly to the instructions.  Please advise.  Corrie can be reached at [phone].    Meilssa Abdullahi RN  Message handled by Nurse Triage.

## 2017-09-07 NOTE — MR AVS SNAPSHOT
Berenice RAJIV Chaudhari   9/7/2017 4:00 PM   Anticoagulation Therapy Visit    Description:  77 year old female   Provider:   ANTICOAGULATION CLINIC   Department:   Nurse           INR as of 9/7/2017     Today's INR 2.4      Anticoagulation Summary as of 9/7/2017     INR goal 2.0-3.0   Today's INR 2.4   Full instructions 2.5 mg on Mon, Wed, Fri; 5 mg all other days   Next INR check 9/14/2017    Indications   Atrial flutter (H) [I48.92]  Atrial flutter with rapid ventricular response (H) [I48.92]  Long-term (current) use of anticoagulants [Z79.01] [Z79.01]         Your next Anticoagulation Clinic appointment(s)     Sep 07, 2017  4:00 PM CDT   Anticoagulation Visit with  ANTICOAGULATION CLINIC   [facility] ([facility])    [address] [place] [address]  [address]  [facility] [address] [phone]              Contact Numbers     [facility]  Please call  [phone] to cancel and/or reschedule your appointment   Please call  [phone] with any problems or questions regarding your therapy.        September 2017 Details    Sun Mon Tue Wed Thu Fri Sat          1               2                 3               4               5               6               7      5 mg   See details      8      2.5 mg         9      5 mg           10      5 mg         11      2.5 mg         12      5 mg         13      2.5 mg         14            15               16                 17               18               19               20               21               22               23                 24               25               26               27               28               29               30                Date Details   09/07 This INR check       Date of next INR:  9/14/2017         How to take your warfarin dose     To take:  2.5 mg Take 0.5 of a 5 mg tablet.    To take:  5 mg Take 1 of the 5 mg tablets.

## 2017-09-07 NOTE — PROGRESS NOTES
ANTICOAGULATION FOLLOW-UP     Patient Name:  Berenice Chaudhari  Date:  9/7/2017  Contact Type:  Telephone  Call received from [facility] nurse, with INR result.   Follow up instructions given over the phone to [facility] nurse for coumadin management.    SUBJECTIVE:     Patient Findings     Positives No Problem Findings           OBJECTIVE    INR   Date Value Ref Range Status   09/07/2017 2.4  Final       ASSESSMENT / PLAN  INR assessment THER    Recheck INR In: 1 WEEK    INR Location [facility] INR      Anticoagulation Summary as of 9/7/2017     INR goal 2.0-3.0   Today's INR 2.4   Maintenance plan 2.5 mg (5 mg x 0.5) on Mon, Wed, Fri; 5 mg (5 mg x 1) all other days   Full instructions 2.5 mg on Mon, Wed, Fri; 5 mg all other days   Weekly total 27.5 mg   No change documented Mary Butcher RN   Plan last modified Mary Butcher RN (8/31/2017)   Next INR check 9/14/2017   Target end date Indefinite    Indications   Atrial flutter (H) [I48.92]  Atrial flutter with rapid ventricular response (H) [I48.92]  Long-term (current) use of anticoagulants [Z79.01] [Z79.01]         Anticoagulation Episode Summary     INR check location     Preferred lab     Send INR reminders to [facility]    Comments 5mg tabs; HS dosing, [facility] [phone]      Anticoagulation Care Providers     Provider Role Specialty Phone number    Nelly Pearl MD Referring Internal Medicine [phone]            See the Encounter Report to view Anticoagulation Flowsheet and Dosing Calendar (Go to Encounters tab in chart review, and find the Anticoagulation Therapy Visit)        Mary Butcher RN

## 2017-09-08 RX ORDER — BUPROPION HYDROCHLORIDE 300 MG/1
300 TABLET ORAL EVERY MORNING
Qty: 90 TABLET | Refills: 1 | Status: SHIPPED | OUTPATIENT
Start: 2017-09-08 | End: 2017-12-21

## 2017-09-12 ENCOUNTER — OFFICE VISIT (OUTPATIENT)
Dept: CARDIOLOGY | Facility: CLINIC | Age: 77
End: 2017-09-12
Attending: INTERNAL MEDICINE
Payer: COMMERCIAL

## 2017-09-12 VITALS — DIASTOLIC BLOOD PRESSURE: 70 MMHG | HEIGHT: 64 IN | HEART RATE: 64 BPM | SYSTOLIC BLOOD PRESSURE: 126 MMHG

## 2017-09-12 DIAGNOSIS — E78.5 HYPERLIPIDEMIA LDL GOAL <100: ICD-10-CM

## 2017-09-12 DIAGNOSIS — I48.92 ATRIAL FLUTTER WITH RAPID VENTRICULAR RESPONSE (H): ICD-10-CM

## 2017-09-12 DIAGNOSIS — I10 ESSENTIAL HYPERTENSION WITH GOAL BLOOD PRESSURE LESS THAN 140/90: Primary | ICD-10-CM

## 2017-09-12 PROCEDURE — 93000 ELECTROCARDIOGRAM COMPLETE: CPT | Performed by: NURSE PRACTITIONER

## 2017-09-12 PROCEDURE — 99214 OFFICE O/P EST MOD 30 MIN: CPT | Mod: 25 | Performed by: NURSE PRACTITIONER

## 2017-09-12 RX ORDER — DILTIAZEM HYDROCHLORIDE 120 MG/1
120 CAPSULE, COATED, EXTENDED RELEASE ORAL DAILY
Qty: 90 CAPSULE | Refills: 3 | Status: SHIPPED | OUTPATIENT
Start: 2017-09-12 | End: 2018-01-11

## 2017-09-12 RX ORDER — FUROSEMIDE 20 MG
20 TABLET ORAL DAILY
Qty: 90 TABLET | Refills: 3 | Status: SHIPPED | OUTPATIENT
Start: 2017-09-12 | End: 2017-12-21

## 2017-09-12 NOTE — PATIENT INSTRUCTIONS
-Change diltiazem to long acting once daily  -Trail of furosemide (water pill)  -Follow up in [place] in about 2 weeks  Call me with questions [phone]

## 2017-09-12 NOTE — LETTER
9/12/2017    Nelly Pearl MD  [address] [place] [address]    RE: Berenice Chaudhari       Dear Colleague,    I had the pleasure of seeing Berenice Chaudhari in the [facility].    HPI and Plan:   See dictation    Orders Placed This Encounter   Procedures     Basic metabolic panel     Follow-Up with Cardiac Advanced Practice Provider     EKG 12-lead complete w/read - Clinics (performed today)       Orders Placed This Encounter   Medications     diltiazem (CARDIZEM CD/CARTIA XT) 120 MG 24 hr capsule     Sig: Take 1 capsule (120 mg) by mouth daily     Dispense:  90 capsule     Refill:  3     furosemide (LASIX) 20 MG tablet     Sig: Take 1 tablet (20 mg) by mouth daily     Dispense:  90 tablet     Refill:  3       There are no discontinued medications.      Encounter Diagnoses   Name Primary?     Atrial flutter with rapid ventricular response (H)      Essential hypertension with goal blood pressure less than 140/90 Yes     Hyperlipidemia LDL goal <100        CURRENT MEDICATIONS:  Current Outpatient Prescriptions   Medication Sig Dispense Refill     diltiazem (CARDIZEM CD/CARTIA XT) 120 MG 24 hr capsule Take 1 capsule (120 mg) by mouth daily 90 capsule 3     furosemide (LASIX) 20 MG tablet Take 1 tablet (20 mg) by mouth daily 90 tablet 3     buPROPion (WELLBUTRIN XL) 300 MG 24 hr tablet Take 1 tablet (300 mg) by mouth every morning 90 tablet 1     warfarin (COUMADIN) 2.5 MG tablet Take by mouth daily. 2.5 mg (1 tablet) on Mon, Wed, Fri; 5 mg (2 tablets) all other days (SEE COMMENTS) 135 tablet 1     minocycline (MINOCIN/DYNACIN) 50 MG capsule TAKE 1 CAPSULE(50 MG) BY MOUTH TWICE DAILY 60 capsule 11     pravastatin (PRAVACHOL) 10 MG tablet Take 1 tablet (10 mg) by mouth At Bedtime 90 tablet 3     metoprolol (TOPROL-XL) 50 MG 24 hr tablet Take 1 tablet (50 mg) by mouth 2 times daily 180 tablet 3     warfarin (COUMADIN) 5 MG tablet Take 1 tablet (5 mg) by mouth daily 90 tablet 3      diltiazem (CARDIZEM) 30 MG tablet Take 1 tablet (30 mg) by mouth 4 times daily 480 tablet 3     gabapentin (NEURONTIN) 600 MG tablet Take 2 tablets (1,200 mg) by mouth 3 times daily 540 tablet 3     venlafaxine (EFFEXOR-XR) 150 MG 24 hr capsule TAKE 1 CAPSULE(150 MG) BY MOUTH DAILY 90 capsule 0     fluticasone (FLONASE) 50 MCG/ACT spray Spray 2 sprays into both nostrils daily 3 Bottle 11     lidocaine (LIDODERM) 5 % Patch Apply up to 3 patches to painful area at once for up to 12 h within a 24 h period.  Remove after 12 hours. 60 patch 2     CEPHALEXIN PO Take 500 mg by mouth 3 times daily as needed (when cellulitis is coming on)        pregabalin (LYRICA) 150 MG capsule Take 1 capsule (150 mg) by mouth 2 times daily 60 capsule 5     nortriptyline (PAMELOR) 25 MG capsule TAKE 2 CAPSULES(50 MG) BY MOUTH AT BEDTIME 60 capsule 11     pantoprazole (PROTONIX) 40 MG enteric coated tablet Take 1 tablet (40 mg) by mouth daily 90 tablet 3     Multiple Vitamins-Minerals (CENTRUM SILVER) per tablet Take 1 tablet by mouth daily         ALLERGIES     Allergies   Allergen Reactions     Ceftriaxone Anaphylaxis and Rash     Rocephin given. Developed rash to back and abd, awaiting to see if it improves with d/c of rocephin.        Nafcillin Rash     diffuse severe rash in hospital 2/05     Penicillins Anaphylaxis     Vancomycin Anaphylaxis and Rash     Codeine Fatigue     Sleeps continuously     Clindamycin Rash     received information from patient     Zithromax [Azithromycin] Rash       PAST MEDICAL HISTORY:  Past Medical History:   Diagnosis Date     Allergic rhinitis, cause unspecified      Cellulitis and abscess of leg, except foot recurrent , both legs    begins with fever, nausea, diarrhea, vomiting & & the cellulitis may be visible a day or 2 later     Chronic pain     On chronic fentanyl patch.     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  less likely = raction to vancomycin      Generalized osteoarthrosis, unspecified site     films 9/04: L hhip bad; both knees mod severe medial OA     Generalized osteoarthrosis, unspecified site     L hip = advanced OA;; R knee bad also     GERD (gastroesophageal reflux disease) 11/15/2013     Herpes zoster without mention of complication 9/03    L shoulder; still has neuralgia     Hyperlipidemia      Hypertension      Methicillin susceptible Staphylococcus aureus septicemia (H) after L hip injection 205    ARDS, renal failure, staph sepsis after a hip joint injection     Obesity, unspecified      Other diseases of lung, not elsewhere classified 07/93    Interstitial lung process     Pneumonia, organism      Unspecified essential hypertension      Urticaria, unspecified 07/93    Diffuse severe (hospitalized).  (+) skin biopsy by Dr. Figueroa       PAST SURGICAL HISTORY:  Past Surgical History:   Procedure Laterality Date     BIOPSY       C NONSPECIFIC PROCEDURE      Extensive oral (dental) surgery     C NONSPECIFIC PROCEDURE      Remote T&A     C NONSPECIFIC PROCEDURE  1/03    LAP CHOLY     C NONSPECIFIC PROCEDURE      L hip I + D x 2  4/05     C NONSPECIFIC PROCEDURE  2/05    colonoscopy     C NONSPECIFIC PROCEDURE      L hip I + D several other times      CATARACT IOL, RT/LT Right 2012     CHOLECYSTECTOMY       ENT SURGERY       PHACOEMULSIFICATION CLEAR CORNEA WITH STANDARD INTRAOCULAR LENS IMPLANT Left 3/10/2016    Procedure: PHACOEMULSIFICATION CLEAR CORNEA WITH STANDARD INTRAOCULAR LENS IMPLANT;  Surgeon: Dwight Metcalf MD;  Location: [facility]       FAMILY HISTORY:  Family History   Problem Relation Age of Onset     Myocardial Infarction Father 63       SOCIAL HISTORY:  Social History     Social History     Marital status:      Spouse name: N/A     Number of children: N/A     Years of education:  "N/A     Social History Main Topics     Smoking status: Former Smoker     Packs/day: 2.00     Years: 22.00     Types: Cigarettes     Start date: 1967     Quit date: 1990     Smokeless tobacco: Never Used      Comment: started smoking 20's- quit in 1990     Alcohol use No      Comment: RARELY     Drug use: No     Sexual activity: No     Other Topics Concern     None     Social History Narrative       Review of Systems:  Skin:  Negative       Eyes:  Positive for glasses    ENT:  Positive for nasal congestion post nasal drip  Respiratory:  Positive for cough;dyspnea at rest from post nasal drip   Cardiovascular:    edema;Positive for new  Gastroenterology: Negative      Genitourinary:  Negative      Musculoskeletal:  Negative      Neurologic:  Negative      Psychiatric:  Positive for depression    Heme/Lymph/Imm:  Positive for allergies    Endocrine:  Negative        Physical Exam:  Vitals: /70 (BP Location: Other (Comment))  Pulse 64  Ht 1.626 m (5' 4\")      Cardiology Clinic Progress Note  Berenice Chaudhari MRN# [id number]   YOB: 1940 Age: 77 year old     Reason For Visit: Hospital follow up           History of Present Illness:    Berenice Chaudhari is a pleasant 77 year old with PMH for HTN, DLD, obesity and CRI who presented in clinic today for post hospital follow-up visit.  She was hospitalized in August and seen by Dr. Huitron. Visit recently she was noted to be tachycardic. She was brought to the emergency room and found to be in rapid atrial flutter. She was started on anticoagulation and a diltiazem drip. Her diltiazem was transitioned to oral.  Overnight oximetry was recommended as an outpatient. Rate control of her arrhythmia was suggested.  She was discharged with a Holter monitor.       Echocardiogram showed:     The rhythm was atrial flutter with a controlled ventricular rate at rest.  The ascending aorta is Mildly dilated at 4.1 cm. (The upper limit of normal is  3.7cm for aortic " root diameter.)  The left ventricle is normal in size with mild to moderate concentric left  ventricular hypertrophy (LVH).  Left ventricular systolic function is mildly reduced. The visual ejection  fraction is estimated at 50-55% due to apical akinesis. There is no thrombus  seen in the left ventricle apex.  There is mild trileaflet aortic sclerosis with no AS or AR.  There is no mitral regurgitation or stenosis.  There is trace to mild tricuspid regurgitation.  The right ventricular systolic pressure is normal approximated at 22mmHg plus  the right atrial pressure and the inferior vena cava is not dilated.  Compared to the prior study dated 9-6-2014, there have been no changes.    Holter monitor showed initially she was in atrial flutter with average heart rate of 90. She did convert to sinus rhythm wearing the monitor. She had a 2.7 second conversion pause.    In clinic today Berenice tells me she's been well. She's had no palpitations lightheadedness dizziness. She remains rather immobile and is in a motorized wheelchair.      PHYSICAL EXAM:  VITALS:  /70 HR 64  GENERAL: NAD  HEENT: Normocephalic, atraumatic  NECK: Supple   CHEST: CTAB  CARDIAC: S1, S2, RRR  ABDOMEN: Obese  EXTREMITIES: 1+ edema                    Assessment and Plan:     1. Atrial flutter/fibrillation. Now in sinus rhythm. Continue anticoagulation. Continue diltiazem but change to LA for ease of administration. Asymptomatic.  2. Suspected sleep apnea.  Sleep study.  3. Chronic renal insufficiency. Stable.   4. Hypertension. Well-controlled.     Follow-up in 2 weeks with labs      Thank you for allowing me to participate in this delightful patient's care.      This note was completed in part using voice recognition software. Although reviewed after completion, some word and grammatical errors may occur.    Thank you for allowing me to participate in the care of your patient.    Sincerely,     Adela Mars, NP, APRN CNP      [facility]

## 2017-09-12 NOTE — MR AVS SNAPSHOT
After Visit Summary   9/12/2017    Berenice Chaudhari    MRN: [id number]           Patient Information     Date Of Birth          1940        Visit Information        Provider Department      9/12/2017 3:00 PM Adela Mars APRN CNP [facility]        Today's Diagnoses     Essential hypertension with goal blood pressure less than 140/90    -  1    Atrial flutter with rapid ventricular response (H)        Hyperlipidemia LDL goal <100          Care Instructions    -Change diltiazem to long acting once daily  -Trail of furosemide (water pill)  -Follow up in [place] in about 2 weeks  Call me with questions [phone]          Follow-ups after your visit        Additional Services     Follow-Up with Cardiac Advanced Practice Provider                 Future tests that were ordered for you today     Open Future Orders        Priority Expected Expires Ordered    Follow-Up with Cardiac Advanced Practice Provider Routine 9/27/2017 9/12/2019 9/12/2017    Basic metabolic panel Routine 9/27/2017 9/12/2018 9/12/2017            Who to contact     If you have questions or need follow up information about today's clinic visit or your schedule please contact [facility] directly at [phone].  Normal or non-critical lab and imaging results will be communicated to you by MyChart, letter or phone within 4 business days after the clinic has received the results. If you do not hear from us within 7 days, please contact the clinic through [employer] or phone. If you have a critical or abnormal lab result, we will notify you by phone as soon as possible.  Submit refill requests through [employer] or call your pharmacy and they will forward the refill request to us. Please allow 3 business days for your refill to be completed.          Additional Information About Your Visit        MyChart Information     [employer] lets you send  "messages to your doctor, view your test results, renew your prescriptions, schedule appointments and more. To sign up, go to [email or web address] . Click on \"Log in\" on the left side of the screen, which will take you to the Welcome page. Then click on \"Sign up Now\" on the right side of the page.     You will be asked to enter the access code listed below, as well as some personal information. Please follow the directions to create your username and password.     Your access code is: [email or web address]  Expires: 2017 10:12 PM     Your access code will  in 90 days. If you need help or a new code, please call your [facility] clinic or [phone].        Care EveryWhere ID     This is your Care EveryWhere ID. This could be used by other organizations to access your [facility] medical records  [id number]        Your Vitals Were     Pulse Height                64 1.626 m (5' 4\")           Blood Pressure from Last 3 Encounters:   17 126/70   17 132/78   17 132/70    Weight from Last 3 Encounters:   17 (!) 151 kg (332 lb 14.4 oz)   17 (!) 264 kg (582 lb)   17 (!) 156.5 kg (345 lb)              We Performed the Following     EKG 12-lead complete w/read - Clinics (performed today)     Follow-Up with Cardiac Advanced Practice Provider          Today's Medication Changes          These changes are accurate as of: 17  3:59 PM.  If you have any questions, ask your nurse or doctor.               Start taking these medicines.        Dose/Directions    diltiazem 120 MG 24 hr capsule   Commonly known as:  CARDIZEM CD/CARTIA XT   Used for:  Atrial flutter with rapid ventricular response (H)   Started by:  Adela Mars APRN CNP        Dose:  120 mg   Take 1 capsule (120 mg) by mouth daily   Quantity:  90 capsule   Refills:  3       furosemide 20 MG tablet   Commonly known as:  LASIX   Used for:  Atrial flutter with rapid ventricular response (H)   Started by:  Jerad" Adela Linton, PANCHO CNP        Dose:  20 mg   Take 1 tablet (20 mg) by mouth daily   Quantity:  90 tablet   Refills:  3            Where to get your medicines      These medications were sent to [id number] - [place] - [address]  AT [address]  [address] [place] [address]     Phone:  [phone]     diltiazem 120 MG 24 hr capsule    furosemide 20 MG tablet                Primary Care Provider Office Phone # Fax #    Nelly Pearl [phone] [address] [place] [address]        Equal Access to Services     [facility]: Hadii aad ku hadasho Soomaali, waaxda luqadaha, qaybta kaalmada aderadhayada, calin sue . So [facility] [phone].    ATENCIÓN: Si habla español, tiene a dickey disposición servicios gratuitos de asistencia lingüística. [facility] [phone].    We comply with applicable federal civil rights laws and [place] laws. We do not discriminate on the basis of race, color, national origin, age, disability sex, sexual orientation or gender identity.            Thank you!     Thank you for choosing [facility] [place]  for your care. Our goal is always to provide you with excellent care. Hearing back from our patients is one way we can continue to improve our services. Please take a few minutes to complete the written survey that you may receive in the mail after your visit with us. Thank you!             Your Updated Medication List - Protect others around you: Learn how to safely use, store and throw away your medicines at www.disposemymeds.org.          This list is accurate as of: 9/12/17  3:59 PM.  Always use your most recent med list.                   Brand Name Dispense Instructions for use Diagnosis    buPROPion 300 MG 24 hr tablet    WELLBUTRIN XL    90 tablet    Take 1 tablet (300 mg) by mouth every morning    Major depressive disorder,  recurrent, in full remission (H)       CENTRUM SILVER per tablet      Take 1 tablet by mouth daily        CEPHALEXIN PO      Take 500 mg by mouth 3 times daily as needed (when cellulitis is coming on)        diltiazem 120 MG 24 hr capsule    CARDIZEM CD/CARTIA XT    90 capsule    Take 1 capsule (120 mg) by mouth daily    Atrial flutter with rapid ventricular response (H)       diltiazem 30 MG tablet    CARDIZEM    480 tablet    Take 1 tablet (30 mg) by mouth 4 times daily    Atrial flutter with rapid ventricular response (H)       fluticasone 50 MCG/ACT spray    FLONASE    3 Bottle    Spray 2 sprays into both nostrils daily    Post-nasal drip       furosemide 20 MG tablet    LASIX    90 tablet    Take 1 tablet (20 mg) by mouth daily    Atrial flutter with rapid ventricular response (H)       lidocaine 5 % Patch    LIDODERM    60 patch    Apply up to 3 patches to painful area at once for up to 12 h within a 24 h period.  Remove after 12 hours.    Postherpetic neuralgia       metoprolol 50 MG 24 hr tablet    TOPROL-XL    180 tablet    Take 1 tablet (50 mg) by mouth 2 times daily    Essential hypertension with goal blood pressure less than 140/90       minocycline 50 MG capsule    MINOCIN/DYNACIN    60 capsule    TAKE 1 CAPSULE(50 MG) BY MOUTH TWICE DAILY    Methicillin susceptible Staphylococcus aureus septicemia (H)       NEURONTIN 600 MG tablet   Generic drug:  gabapentin     540 tablet    Take 2 tablets (1,200 mg) by mouth 3 times daily    Postherpetic neuralgia       nortriptyline 25 MG capsule    PAMELOR    60 capsule    TAKE 2 CAPSULES(50 MG) BY MOUTH AT BEDTIME    Post herpetic neuralgia       pantoprazole 40 MG EC tablet    PROTONIX    90 tablet    Take 1 tablet (40 mg) by mouth daily    GERD (gastroesophageal reflux disease)       pravastatin 10 MG tablet    PRAVACHOL    90 tablet    Take 1 tablet (10 mg) by mouth At Bedtime    Hyperlipidemia LDL goal <100       pregabalin 150 MG capsule    LYRICA    60  capsule    Take 1 capsule (150 mg) by mouth 2 times daily    Postherpetic neuralgia       venlafaxine 150 MG 24 hr capsule    EFFEXOR-XR    90 capsule    TAKE 1 CAPSULE(150 MG) BY MOUTH DAILY    Major depressive disorder, recurrent episode, moderate (H)       * warfarin 5 MG tablet    COUMADIN    90 tablet    Take 1 tablet (5 mg) by mouth daily    Atrial flutter with rapid ventricular response (H)       * warfarin 2.5 MG tablet    COUMADIN    135 tablet    Take by mouth daily. 2.5 mg (1 tablet) on Mon, Wed, Fri; 5 mg (2 tablets) all other days (SEE COMMENTS)    Atrial flutter with rapid ventricular response (H), Long-term (current) use of anticoagulants       * Notice:  This list has 2 medication(s) that are the same as other medications prescribed for you. Read the directions carefully, and ask your doctor or other care provider to review them with you.

## 2017-09-13 DIAGNOSIS — Z53.9 DIAGNOSIS NOT YET DEFINED: Primary | ICD-10-CM

## 2017-09-13 PROCEDURE — G0179 MD RECERTIFICATION HHA PT: HCPCS | Performed by: PEDIATRICS

## 2017-09-13 NOTE — PROGRESS NOTES
HPI and Plan:   See dictation    Orders Placed This Encounter   Procedures     Basic metabolic panel     Follow-Up with Cardiac Advanced Practice Provider     EKG 12-lead complete w/read - Clinics (performed today)       Orders Placed This Encounter   Medications     diltiazem (CARDIZEM CD/CARTIA XT) 120 MG 24 hr capsule     Sig: Take 1 capsule (120 mg) by mouth daily     Dispense:  90 capsule     Refill:  3     furosemide (LASIX) 20 MG tablet     Sig: Take 1 tablet (20 mg) by mouth daily     Dispense:  90 tablet     Refill:  3       There are no discontinued medications.      Encounter Diagnoses   Name Primary?     Atrial flutter with rapid ventricular response (H)      Essential hypertension with goal blood pressure less than 140/90 Yes     Hyperlipidemia LDL goal <100        CURRENT MEDICATIONS:  Current Outpatient Prescriptions   Medication Sig Dispense Refill     diltiazem (CARDIZEM CD/CARTIA XT) 120 MG 24 hr capsule Take 1 capsule (120 mg) by mouth daily 90 capsule 3     furosemide (LASIX) 20 MG tablet Take 1 tablet (20 mg) by mouth daily 90 tablet 3     buPROPion (WELLBUTRIN XL) 300 MG 24 hr tablet Take 1 tablet (300 mg) by mouth every morning 90 tablet 1     warfarin (COUMADIN) 2.5 MG tablet Take by mouth daily. 2.5 mg (1 tablet) on Mon, Wed, Fri; 5 mg (2 tablets) all other days (SEE COMMENTS) 135 tablet 1     minocycline (MINOCIN/DYNACIN) 50 MG capsule TAKE 1 CAPSULE(50 MG) BY MOUTH TWICE DAILY 60 capsule 11     pravastatin (PRAVACHOL) 10 MG tablet Take 1 tablet (10 mg) by mouth At Bedtime 90 tablet 3     metoprolol (TOPROL-XL) 50 MG 24 hr tablet Take 1 tablet (50 mg) by mouth 2 times daily 180 tablet 3     warfarin (COUMADIN) 5 MG tablet Take 1 tablet (5 mg) by mouth daily 90 tablet 3     diltiazem (CARDIZEM) 30 MG tablet Take 1 tablet (30 mg) by mouth 4 times daily 480 tablet 3     gabapentin (NEURONTIN) 600 MG tablet Take 2 tablets (1,200 mg) by mouth 3 times daily 540 tablet 3     venlafaxine  (EFFEXOR-XR) 150 MG 24 hr capsule TAKE 1 CAPSULE(150 MG) BY MOUTH DAILY 90 capsule 0     fluticasone (FLONASE) 50 MCG/ACT spray Spray 2 sprays into both nostrils daily 3 Bottle 11     lidocaine (LIDODERM) 5 % Patch Apply up to 3 patches to painful area at once for up to 12 h within a 24 h period.  Remove after 12 hours. 60 patch 2     CEPHALEXIN PO Take 500 mg by mouth 3 times daily as needed (when cellulitis is coming on)        pregabalin (LYRICA) 150 MG capsule Take 1 capsule (150 mg) by mouth 2 times daily 60 capsule 5     nortriptyline (PAMELOR) 25 MG capsule TAKE 2 CAPSULES(50 MG) BY MOUTH AT BEDTIME 60 capsule 11     pantoprazole (PROTONIX) 40 MG enteric coated tablet Take 1 tablet (40 mg) by mouth daily 90 tablet 3     Multiple Vitamins-Minerals (CENTRUM SILVER) per tablet Take 1 tablet by mouth daily         ALLERGIES     Allergies   Allergen Reactions     Ceftriaxone Anaphylaxis and Rash     Rocephin given. Developed rash to back and abd, awaiting to see if it improves with d/c of rocephin.        Nafcillin Rash     diffuse severe rash in hospital 2/05     Penicillins Anaphylaxis     Vancomycin Anaphylaxis and Rash     Codeine Fatigue     Sleeps continuously     Clindamycin Rash     received information from patient     Zithromax [Azithromycin] Rash       PAST MEDICAL HISTORY:  Past Medical History:   Diagnosis Date     Allergic rhinitis, cause unspecified      Cellulitis and abscess of leg, except foot recurrent , both legs    begins with fever, nausea, diarrhea, vomiting & & the cellulitis may be visible a day or 2 later     Chronic pain     On chronic fentanyl patch.     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  less likely = raction to vancomycin      Generalized osteoarthrosis, unspecified site     films 9/04: L hhip  bad; both knees mod severe medial OA     Generalized osteoarthrosis, unspecified site     L hip = advanced OA;; R knee bad also     GERD (gastroesophageal reflux disease) 11/15/2013     Herpes zoster without mention of complication 9/03    L shoulder; still has neuralgia     Hyperlipidemia      Hypertension      Methicillin susceptible Staphylococcus aureus septicemia (H) after L hip injection 205    ARDS, renal failure, staph sepsis after a hip joint injection     Obesity, unspecified      Other diseases of lung, not elsewhere classified 07/93    Interstitial lung process     Pneumonia, organism      Unspecified essential hypertension      Urticaria, unspecified 07/93    Diffuse severe (hospitalized).  (+) skin biopsy by Dr. Figueroa       PAST SURGICAL HISTORY:  Past Surgical History:   Procedure Laterality Date     BIOPSY       C NONSPECIFIC PROCEDURE      Extensive oral (dental) surgery     C NONSPECIFIC PROCEDURE      Remote T&A     C NONSPECIFIC PROCEDURE  1/03    LAP CHOLY     C NONSPECIFIC PROCEDURE      L hip I + D x 2  4/05     C NONSPECIFIC PROCEDURE  2/05    colonoscopy     C NONSPECIFIC PROCEDURE      L hip I + D several other times      CATARACT IOL, RT/LT Right 2012     CHOLECYSTECTOMY       ENT SURGERY       PHACOEMULSIFICATION CLEAR CORNEA WITH STANDARD INTRAOCULAR LENS IMPLANT Left 3/10/2016    Procedure: PHACOEMULSIFICATION CLEAR CORNEA WITH STANDARD INTRAOCULAR LENS IMPLANT;  Surgeon: Dwight Metcalf MD;  Location: [facility]       FAMILY HISTORY:  Family History   Problem Relation Age of Onset     Myocardial Infarction Father 63       SOCIAL HISTORY:  Social History     Social History     Marital status:      Spouse name: N/A     Number of children: N/A     Years of education: N/A     Social History Main Topics     Smoking status: Former Smoker     Packs/day: 2.00     Years: 22.00     Types: Cigarettes     Start date: 1967     Quit date: 1990     Smokeless tobacco: Never Used       "Comment: started smoking 20's- quit in 1990     Alcohol use No      Comment: RARELY     Drug use: No     Sexual activity: No     Other Topics Concern     None     Social History Narrative       Review of Systems:  Skin:  Negative       Eyes:  Positive for glasses    ENT:  Positive for nasal congestion post nasal drip  Respiratory:  Positive for cough;dyspnea at rest from post nasal drip   Cardiovascular:    edema;Positive for new  Gastroenterology: Negative      Genitourinary:  Negative      Musculoskeletal:  Negative      Neurologic:  Negative      Psychiatric:  Positive for depression    Heme/Lymph/Imm:  Positive for allergies    Endocrine:  Negative        Physical Exam:  Vitals: /70 (BP Location: Other (Comment))  Pulse 64  Ht 1.626 m (5' 4\")                "

## 2017-09-14 ENCOUNTER — ANTICOAGULATION THERAPY VISIT (OUTPATIENT)
Dept: NURSING | Facility: CLINIC | Age: 77
End: 2017-09-14
Payer: COMMERCIAL

## 2017-09-14 DIAGNOSIS — I48.92 ATRIAL FLUTTER WITH RAPID VENTRICULAR RESPONSE (H): ICD-10-CM

## 2017-09-14 DIAGNOSIS — I48.92 ATRIAL FLUTTER (H): ICD-10-CM

## 2017-09-14 DIAGNOSIS — Z79.01 LONG-TERM (CURRENT) USE OF ANTICOAGULANTS: ICD-10-CM

## 2017-09-14 LAB — INR PPP: 1.1

## 2017-09-14 PROCEDURE — 99207 ZZC NO CHARGE NURSE ONLY: CPT

## 2017-09-14 RX ORDER — WARFARIN SODIUM 2.5 MG/1
TABLET ORAL
Qty: 135 TABLET | Refills: 1 | COMMUNITY
Start: 2017-09-14 | End: 2017-09-21

## 2017-09-14 NOTE — MR AVS SNAPSHOT
Berenice Chaudhari   9/14/2017 3:30 PM   Anticoagulation Therapy Visit    Description:  77 year old female   Provider:   [facility]   Department:   Nurse           INR as of 9/14/2017     Today's INR 1.1!      Anticoagulation Summary as of 9/14/2017     INR goal 2.0-3.0   Today's INR 1.1!   Full instructions 5 mg every day   Next INR check 9/21/2017    Indications   Atrial flutter (H) [I48.92]  Atrial flutter with rapid ventricular response (H) [I48.92]  Long-term (current) use of anticoagulants [Z79.01] [Z79.01]         Your next Anticoagulation Clinic appointment(s)     Sep 14, 2017  3:30 PM CDT   Anticoagulation Visit with  ANTICOAGULATION CLINIC   [facility]    [address] [place] [address]  [address]  [facility] [address] [phone]            Sep 21, 2017  3:15 PM CDT   Anticoagulation Visit with  ANTICOAGULATION CLINIC   [facility] ([facility]    [address] [place] [address]  [address]  [facility] [address] [phone]              Contact Numbers     [facility]  Please call  [phone] to cancel and/or reschedule your appointment   Please call  [phone] with any problems or questions regarding your therapy.        September 2017 Details    Sun Mon Tue Wed Thu Fri Sat          1               2                 3               4               5               6               7               8               9                 10               11               12               13               14      5 mg   See details      15      5 mg         16      5 mg           17      5 mg         18      5 mg         19      5 mg         20      5 mg         21            22               23                 24               25               26               27               28               29               30                Date Details   09/14 This INR check       Date of next INR:  9/21/2017         How to take your  warfarin dose     To take:  5 mg Take 1 of the 5 mg tablets.

## 2017-09-14 NOTE — PROGRESS NOTES
ANTICOAGULATION FOLLOW-UP CLINIC VISIT    Patient Name:  Berenice Chaudhari  Date:  9/14/2017  Contact Type:  Telephone/ Alyssia [facility], [phone]    SUBJECTIVE:     Patient Findings     Positives Change in medications (Started furosemide, increased dose of diltiazem.  No interactions with these meds and warfarin per [facility]), Change in diet/appetite (Usual brussel sprouts on Saturday), Unexplained INR or factor level change           OBJECTIVE    INR   Date Value Ref Range Status   09/14/2017 1.1  Final       ASSESSMENT / PLAN  INR assessment SUB    Recheck INR In: 1 WEEK    INR Location [facility]      Anticoagulation Summary as of 9/14/2017     INR goal 2.0-3.0   Today's INR 1.1!   Maintenance plan 5 mg (5 mg x 1) every day   Full instructions 5 mg every day   Weekly total 35 mg   Plan last modified Emperatriz Garcia (9/14/2017)   Next INR check 9/21/2017   Target end date Indefinite    Indications   Atrial flutter (H) [I48.92]  Atrial flutter with rapid ventricular response (H) [I48.92]  Long-term (current) use of anticoagulants [Z79.01] [Z79.01]         Anticoagulation Episode Summary     INR check location     Preferred lab     Send INR reminders to  [facility]    Comments 5mg tabs; HS dosing, [facility] [place] [phone]      Anticoagulation Care Providers     Provider Role Specialty Phone number    Nelly Pearl MD Referring Internal Medicine [phone]            See the Encounter Report to view Anticoagulation Flowsheet and Dosing Calendar (Go to Encounters tab in chart review, and find the Anticoagulation Therapy Visit)    Dosage adjustment made based on physician directed care plan.  Next INR with next [facility] nurse visit    Emperatriz Garcia RN

## 2017-09-21 ENCOUNTER — ANTICOAGULATION THERAPY VISIT (OUTPATIENT)
Dept: NURSING | Facility: CLINIC | Age: 77
End: 2017-09-21
Payer: COMMERCIAL

## 2017-09-21 DIAGNOSIS — I48.92 ATRIAL FLUTTER (H): ICD-10-CM

## 2017-09-21 DIAGNOSIS — Z79.01 LONG-TERM (CURRENT) USE OF ANTICOAGULANTS: ICD-10-CM

## 2017-09-21 DIAGNOSIS — I48.92 ATRIAL FLUTTER WITH RAPID VENTRICULAR RESPONSE (H): ICD-10-CM

## 2017-09-21 LAB — INR PPP: 2.6

## 2017-09-21 PROCEDURE — 99207 ZZC NO CHARGE NURSE ONLY: CPT

## 2017-09-21 RX ORDER — WARFARIN SODIUM 2.5 MG/1
TABLET ORAL
Qty: 135 TABLET | Refills: 1
Start: 2017-09-21 | End: 2017-11-20

## 2017-09-21 NOTE — MR AVS SNAPSHOT
Berenice RAJIV Chaudhari   9/21/2017 3:15 PM   Anticoagulation Therapy Visit    Description:  77 year old female   Provider:  [facility]   Department:   Nurse           INR as of 9/21/2017     Today's INR 2.6      Anticoagulation Summary as of 9/21/2017     INR goal 2.0-3.0   Today's INR 2.6   Full instructions 5 mg every day   Next INR check 9/28/2017    Indications   Atrial flutter (H) [I48.92]  Atrial flutter with rapid ventricular response (H) [I48.92]  Long-term (current) use of anticoagulants [Z79.01] [Z79.01]         Your next Anticoagulation Clinic appointment(s)     Sep 28, 2017  2:45 PM CDT   Anticoagulation Visit with  ANTICOAGULATION CLINIC   [facility] ([facility])    [address] [place] [address]  [address]  [facility] [address] [phone]              Contact Numbers     [facility]  Please call  [phone] to cancel and/or reschedule your appointment   Please call  [phone] with any problems or questions regarding your therapy.        September 2017 Details    Sun Mon Tue Wed Thu Fri Sat          1               2                 3               4               5               6               7               8               9                 10               11               12               13               14               15               16                 17               18               19               20               21      5 mg   See details      22      5 mg         23      5 mg           24      5 mg         25      5 mg         26      5 mg         27      5 mg         28            29               30                Date Details   09/21 This INR check       Date of next INR:  9/28/2017         How to take your warfarin dose     To take:  5 mg Take 1 of the 5 mg tablets.

## 2017-09-21 NOTE — PROGRESS NOTES
ANTICOAGULATION FOLLOW-UP     Patient Name:  Berenice Chaudhari  Date:  9/21/2017  Contact Type:  Telephone  Call received from [facility] nurse, with INR result.   Follow up instructions given over the phone to [facility] nurse for coumadin management.    SUBJECTIVE:     Patient Findings     Positives No Problem Findings           OBJECTIVE    INR   Date Value Ref Range Status   09/21/2017 2.6  Final       ASSESSMENT / PLAN  INR assessment THER    Recheck INR In: 1 WEEK    INR Location [facility]      Anticoagulation Summary as of 9/21/2017     INR goal 2.0-3.0   Today's INR 2.6   Maintenance plan 5 mg (5 mg x 1) every day   Full instructions 5 mg every day   Weekly total 35 mg   No change documented Mary Butcher RN   Plan last modified Emperatriz Garcia (9/14/2017)   Next INR check 9/28/2017   Target end date Indefinite    Indications   Atrial flutter (H) [I48.92]  Atrial flutter with rapid ventricular response (H) [I48.92]  Long-term (current) use of anticoagulants [Z79.01] [Z79.01]         Anticoagulation Episode Summary     INR check location     Preferred lab     Send INR reminders to  [facility]    Comments 5mg tabs; HS dosing, [facility] [place] [phone]      Anticoagulation Care Providers     Provider Role Specialty Phone number    Nelly Pearl MD Referring Internal Medicine [phone]            See the Encounter Report to view Anticoagulation Flowsheet and Dosing Calendar (Go to Encounters tab in chart review, and find the Anticoagulation Therapy Visit)        Mary Butcher RN

## 2017-09-28 ENCOUNTER — ANTICOAGULATION THERAPY VISIT (OUTPATIENT)
Dept: NURSING | Facility: CLINIC | Age: 77
End: 2017-09-28
Payer: COMMERCIAL

## 2017-09-28 DIAGNOSIS — Z79.01 LONG-TERM (CURRENT) USE OF ANTICOAGULANTS: ICD-10-CM

## 2017-09-28 DIAGNOSIS — I48.92 ATRIAL FLUTTER WITH RAPID VENTRICULAR RESPONSE (H): ICD-10-CM

## 2017-09-28 DIAGNOSIS — I48.92 ATRIAL FLUTTER (H): ICD-10-CM

## 2017-09-28 LAB — INR PPP: 4

## 2017-09-28 PROCEDURE — 99207 ZZC NO CHARGE NURSE ONLY: CPT

## 2017-09-28 NOTE — PROGRESS NOTES
ANTICOAGULATION FOLLOW-UP     Patient Name:  Berenice Chaudhari  Date:  9/28/2017  Contact Type:  Telephone  Call received from [facility] nurse, with INR result.   Follow up instructions given over the phone to [facility] nurse for coumadin management.    SUBJECTIVE:     Patient Findings     Positives No Problem Findings, Unexplained INR or factor level change           OBJECTIVE    INR   Date Value Ref Range Status   09/28/2017 4.0  Final       ASSESSMENT / PLAN  INR assessment THER    Recheck INR In: 1 WEEK    INR Location [facility]      Anticoagulation Summary as of 9/28/2017     INR goal 2.0-3.0   Today's INR 4.0!   Maintenance plan 5 mg (5 mg x 1) every day   Full instructions 9/28: Hold; Otherwise 5 mg every day   Weekly total 35 mg   Plan last modified Emperatriz Garcia (9/14/2017)   Next INR check 10/5/2017   Target end date Indefinite    Indications   Atrial flutter (H) [I48.92]  Atrial flutter with rapid ventricular response (H) [I48.92]  Long-term (current) use of anticoagulants [Z79.01] [Z79.01]         Anticoagulation Episode Summary     INR check location     Preferred lab     Send INR reminders to [facility]    Comments 5mg tabs; HS dosing, [facility] [place] [phone]      Anticoagulation Care Providers     Provider Role Specialty Phone number    Nelly Pearl MD Referring Internal Medicine [phone]            See the Encounter Report to view Anticoagulation Flowsheet and Dosing Calendar (Go to Encounters tab in chart review, and find the Anticoagulation Therapy Visit)        Mary Butcher RN

## 2017-09-29 NOTE — PROGRESS NOTES
Cardiology Clinic Progress Note  Berenice Chaudhari MRN# [id number]   YOB: 1940 Age: 77 year old     Reason For Visit: Hospital follow up           History of Present Illness:    Berenice Chaudhari is a pleasant 77 year old with PMH for HTN, DLD, obesity and CRI who presented in clinic today for post hospital follow-up visit.  She was hospitalized in August and seen by Dr. Huitron. Visit recently she was noted to be tachycardic. She was brought to the emergency room and found to be in rapid atrial flutter. She was started on anticoagulation and a diltiazem drip. Her diltiazem was transitioned to oral.  Overnight oximetry was recommended as an outpatient. Rate control of her arrhythmia was suggested.  She was discharged with a Holter monitor.       Echocardiogram showed:     The rhythm was atrial flutter with a controlled ventricular rate at rest.  The ascending aorta is Mildly dilated at 4.1 cm. (The upper limit of normal is  3.7cm for aortic root diameter.)  The left ventricle is normal in size with mild to moderate concentric left  ventricular hypertrophy (LVH).  Left ventricular systolic function is mildly reduced. The visual ejection  fraction is estimated at 50-55% due to apical akinesis. There is no thrombus  seen in the left ventricle apex.  There is mild trileaflet aortic sclerosis with no AS or AR.  There is no mitral regurgitation or stenosis.  There is trace to mild tricuspid regurgitation.  The right ventricular systolic pressure is normal approximated at 22mmHg plus  the right atrial pressure and the inferior vena cava is not dilated.  Compared to the prior study dated 9-6-2014, there have been no changes.    Holter monitor showed initially she was in atrial flutter with average heart rate of 90. She did convert to sinus rhythm wearing the monitor. She had a 2.7 second conversion pause.    In clinic today Berenice tells me she's been well. She's had no palpitations lightheadedness dizziness.  She remains rather immobile and is in a motorized wheelchair.      PHYSICAL EXAM:  VITALS:  /70 HR 64  GENERAL: NAD  HEENT: Normocephalic, atraumatic  NECK: Supple   CHEST: CTAB  CARDIAC: S1, S2, RRR  ABDOMEN: Obese  EXTREMITIES: 1+ edema                    Assessment and Plan:     1. Atrial flutter/fibrillation. Now in sinus rhythm. Continue anticoagulation. Continue diltiazem but change to LA for ease of administration. Asymptomatic.  2. Suspected sleep apnea.  Sleep study.  3. Chronic renal insufficiency. Stable.   4. Hypertension. Well-controlled.     Follow-up in 2 weeks with labs      Thank you for allowing me to participate in this delightful patient's care.      This note was completed in part using voice recognition software. Although reviewed after completion, some word and grammatical errors may occur.    Adela Mars, NP, APRN, CNP

## 2017-10-02 ENCOUNTER — OFFICE VISIT (OUTPATIENT)
Dept: SLEEP MEDICINE | Facility: CLINIC | Age: 77
End: 2017-10-02
Payer: COMMERCIAL

## 2017-10-02 VITALS
WEIGHT: 293 LBS | RESPIRATION RATE: 15 BRPM | SYSTOLIC BLOOD PRESSURE: 149 MMHG | HEART RATE: 63 BPM | HEIGHT: 64 IN | OXYGEN SATURATION: 95 % | DIASTOLIC BLOOD PRESSURE: 81 MMHG | BODY MASS INDEX: 50.02 KG/M2

## 2017-10-02 DIAGNOSIS — E66.01 MORBID OBESITY WITH BMI OF 50.0-59.9, ADULT (H): ICD-10-CM

## 2017-10-02 DIAGNOSIS — G47.9 SLEEP DISTURBANCE: Primary | ICD-10-CM

## 2017-10-02 PROCEDURE — 99244 OFF/OP CNSLTJ NEW/EST MOD 40: CPT | Performed by: INTERNAL MEDICINE

## 2017-10-02 RX ORDER — ZOLPIDEM TARTRATE 5 MG/1
TABLET ORAL
Qty: 1 TABLET | Refills: 0 | Status: SHIPPED | OUTPATIENT
Start: 2017-10-02 | End: 2017-11-20

## 2017-10-02 NOTE — MR AVS SNAPSHOT
"              After Visit Summary   10/2/2017    Berenice Chaudhari    MRN: [id number]           Patient Information     Date Of Birth          1940        Visit Information        Provider Department      10/2/2017 5:00 PM Anam Trent MD [facility] [place]        Today's Diagnoses     Sleep disturbance    -  1    Morbid obesity with BMI of 50.0-59.9, adult (H)          Care Instructions        MY TREATMENT INFORMATION FOR SLEEP DISTURBANCE-  Berenice Chaudhari    DOCTOR : Anam Trent  SLEEP CENTER :  [place]  MY CONTACT NUMBER:[phone]        If I haven't had a sleep study yet, what can I expect?  A personal story from [employer]  [email or web address]/watch?v=AxPLmlRpnCs      Suspected sleep apnea: Sleep study ordered.    Follow up in sleep clinic 1-2 weeks after sleep study to discuss results of sleep study and treatment options.    Patient was advised not to drive if drowsy or sleepy.    Frequently asked questions:  1. What is Obstructive Sleep Apnea (MACKENZIE)? MACKENZIE is the most common type of sleep apnea. Apnea literally means, \"without breath.\" It is characterized by repetitive pauses in breathing, despite continued effort to breathe, and is usually associated with a reduction in blood oxygen saturation. Apneas can last 10 to over 60 seconds. It is caused by narrowing or collapse of the upper airway as muscles relax during sleep. Severity of sleep apnea is determined by frequency of breathing events and their effect on your sleep and oxygen levels determined during sleep testing.   2. What are the consequences of MACKENZIE? Symptoms include: daytime sleepiness- possibly increasing the risk of falling asleep while driving, unrefreshing/restless sleep, snoring, insomnia, waking frequently to urinate, waking with heartburn or reflux, reduced concentration and memory, and morning headaches. Other health consequences may include development of high blood pressure and other cardiovascular disease " in persons who are susceptible. Untreated MACKENZIE  can contribute to heart disease, stroke and diabetes.   3. What are the treatment options? In most situations, sleep apnea is a lifelong disease that must be managed with daily therapy. Medications are not effective for sleep apnea and surgery is generally not performed until other therapies have been tried. Therapy is usually tailored to the individual patient based on many factors including your wishes as well as severity of sleep apnea and severity of obesity. Continuous Positive Airway (CPAP) is the most reliable treatment. An oral device to hold your jaw forward is usually the next most reliable option. Other options include postioning devices (to keep you off your back), weight loss, and surgery including a tongue pacing device. There is more detail about some of these options below.            1. CPAP-  WHAT DOES IT DO AND HOW CAN I LEARN TO WEAR IT?                               BEFORE I START, CAN I WATCH A MOVIE TO GET A PLAN ON HOW TO USE CPAP?  https://www.[employer].com/watch?g=r3I19pw177A      Continuous positive airway pressure, or CPAP, is the most effective treatment for obstructive sleep apnea. It works by blowing room air, through a mask, to hold your throat open. A decision to use CPAP is a major step forward in the pursuit of a healthier life. The successful use of CPAP will help you breathe easier, sleep better and live healthier. You can choose CPAP equipment from any durable medical equipment provider that meets your needs.  Using CPAP can be a positive experience if you keep these gould points in mind:  1. Commitment  CPAP is not a quick fix for your problem. It involves a long-term commitment to improve your sleep and your health.    2. Communication  Stay in close communication with both your sleep doctor and your CPAP supplier. Ask lots of questions and seek help when you need it.    3. Consistency  Use CPAP all night, every night and for every  "nap. You will receive the maximum health benefits from CPAP when you use it every time that you sleep. This will also make it easier for your body to adjust to the treatment.    4. Correction  The first machine and mask that you try may not be the best ones for you. Work with your sleep doctor and your CPAP supplier to make corrections to your equipment selection. Ask about trying a different type of machine or mask if you have ongoing problems. Make sure that your mask is a good fit and learn to use your equipment properly.    5. Challenge  Tell a family member or close friend to ask you each morning if you used your CPAP the previous night. Have someone to challenge you to give it your best effort.    6. Connection   Your adjustment to CPAP will be easier if you are able to connect with others who use the same treatment. Ask your sleep doctor if there is a support group in your area for people who have sleep apnea, or look for one on the Internet.  7. Comfort   Increase your level of comfort by using a saline spray, decongestant or heated humidifier if CPAP irritates your nose, mouth or throat. Use your unit's \"ramp\" setting to slowly get used to the air pressure level. There may be soft pads you can buy that will fit over your mask straps. Look on www.CPAP.com for accessories that can help make CPAP use more comfortable.  8. Cleaning   Clean your mask, tubing and headgear on a regular basis. Put this time in your schedule so that you don't forget to do it. Check and replace the filters for your CPAP unit and humidifier.    9. Completion   Although you are never finished with CPAP therapy, you should reward yourself by celebrating the completion of your first month of treatment. Expect this first month to be your hardest period of adjustment. It will involve some trial and error as you find the machine, mask and pressure settings that are right for you.    10. Continuation  After your first month of treatment, " continue to make a daily commitment to use your CPAP all night, every night and for every nap.    CPAP-Tips to starting with success:  Begin using your CPAP for short periods of time during the day while you watch TV or read.    Use CPAP every night and for every nap. Using it less often reduces the health benefits and makes it harder for your body to get used to it.    Make small adjustments to your mask, tubing, straps and headgear until you get the right fit. Tightening the mask may actually worsen the leak.  If it leaves significant marks on your face or irritates the bridge of your nose, it may not be the best mask for you.  Speak with the person who supplied the mask and consider trying other masks. Insurances will allow you to try different masks during the first month of starting CPAP.  Insurance also covers a new mask, hose and filter about every 6 months.    Use a saline nasal spray to ease mild nasal congestion. Neti-Pot or saline nasal rinses may also help. Nasal gel sprays can help reduce nasal dryness.  Biotene mouthwash can be helpful to protect your teeth if you experience frequent dry mouth.  Dry mouth may be a sign of air escaping out of your mouth or out of the mask in the case of a full face mask.  Speak with your provider if you expect that is the case.     Take a nasal decongestant to relieve more severe nasal or sinus congestion.  Do not use Afrin (oxymetazoline) nasal spray more than 3 days in a row.  Speak with your sleep doctor if your nasal congestion is chronic.    Use a heated humidifier that fits your CPAP model to enhance your breathing comfort. Adjust the heat setting up if you get a dry nose or throat, down if you get condensation in the hose or mask.  Position the CPAP lower than you so that any condensation in the hose drains back into the machine rather than towards the mask.    Try a system that uses nasal pillows if traditional masks give you problems.    Clean your mask, tubing  and headgear once a week. Make sure the equipment dries fully.    Regularly check and replace the filters for your CPAP unit and humidifier.    Work closely with your sleep provider and your CPAP supplier to make sure that you have the machine, mask and air pressure setting that works best for you. It is better to stop using it and call your provider to solve problems than to lay awake all night frustrated with the device.    BESIDES CPAP, WHAT OTHER THERAPIES ARE THERE?      Positioning Device  Positioning devices are generally used when sleep apnea is mild and only occurs on your back.This example shows a pillow that straps around the waist. It may be appropriate for those whose sleep study shows milder sleep apnea that occurs primarily when lying flat on one's back. Preliminary studies have shown benefit but effectiveness at home may need to be verified by a home sleep test. These devices are generally not covered by medical insurance.                      Oral Appliance  What is oral appliance therapy?  An oral appliance is a small acrylic device that fits over the upper and lower teeth or tongue (similar to an orthodontic retainer or a mouth guard). This device slightly advances the lower jaw or tongue, which moves the base of the tongue forward, opens the airway, improves breathing and can effectively treat snoring and obstructive sleep apnea sleep apnea. The appliance is fabricated and customized by a qualified dentist with experience in treating snoring and sleep apnea. Oral appliances are usually well tolerated and have relatively high compliance by patients1, 2, 3.  When is an oral appliance indicated?  Oral appliance therapy is recommended as a first-line treatment for patients with primary snoring, mild sleep apnea, and for patients with moderate sleep apnea who prefer appliance therapy to use of CPAP4, 5. Severity of sleep apnea is determined by sleep testing and is based on the number of respiratory  events per hour of sleep.   How successful is oral appliance therapy?  The success rate of oral appliance therapy in patients with mild sleep apnea is 75-80% while in patients with moderate sleep apnea it is 50-70%. The chance of success in patients with severe sleep apnea is 40-50%. The research also shows that oral appliances have a beneficial effect on the cardiovascular health of MACKENZIE patients at the same magnitude as CPAP therapy7.  Oral appliances should be a second-line treatment in cases of severe sleep apnea, but if not completely successful then a combination therapy utilizing CPAP plus oral appliance therapy may be effective. Oral appliances tend to be effective in a broad range of patients although studies show that the patients who have the highest success are females, younger patients, those with milder disease, and less severe obesity. 3, 6.   The chances of success are lower in patients who have more severe MACKENZIE, are older, and those who are morbidly obese.     Example of an oral appliance   Finding a dentist that practices dental sleep medicine  Specific training is available through the American Academy of Dental Sleep Medicine for dentists interested in working in the field of sleep. To find a dentist who is educated in the field of sleep and the use of oral appliances, near you, visit the Web site of the American Academy of Dental Sleep Medicine; also see   http://www.accpstorage.org/newOrganization/patients/oralAppliances.pdf  To search for a dentist certified in these practices:  Http://aadsm.org/FindADentist.aspx?1  1. Breezy, et al. Objectively measured vs self-reported compliance during oral appliance therapy for sleep-disordered breathing. Chest 2013; 144(5): 9277-4077.  2. Mindy et al. Objective measurement of compliance during oral appliance therapy for sleep-disordered breathing. Thorax 2013; 68(1): 91-96.  3. Segundo et al. Mandibular advancement devices in 620 men and women  with MACKENZIE and snoring: tolerability and predictors of treatment success. Chest 2004; 125: 0104-2990.  4. Dae et al. Oral appliances for snoring and MACKENZIE: a review. Sleep 2006; 29: 244-262.  5. Chad et al. Oral appliance treatment for MACKENZIE: an update. J Clin Sleep Med 2014; 10(2): 215-227.  6. Bethany et al. Predictors of OSAH treatment outcome. J Dent Res 2007; 86: 7098-5372.    Nasal Valves                 Nasal valves may not be effective if you have frequent nasal congestion or have difficulty breathing through your nose. They may be an option for mild apnea if other options are not well tolerated. The efficacy of these devices is generally less than CPAP or oral appliances.      Weight Loss:    Weight management is a personal decision.  If you are interested in exploring weight loss strategies, the following discussion covers the impact on weight loss on sleep apnea and the approaches that may be successful.    Weight loss decreases severity of sleep apnea in most people with obesity. For those with mild obesity who have developed snoring with weight gain, even 15-30 pound weight loss can improve and occasionally eliminate sleep apnea.  Structured and life-long dietary and health habits are necessary to lose weight and keep healthier weight levels.     Though there may be significant health benefits from weight loss, long-term weight loss is very difficult to achieve- studies show success with dietary management in less than 10% of people. In addition, substantial weight loss may require years of dietary control and may be difficult if patients have severe obesity. In these cases, surgical management may be considered.  Finally, older individuals who have tolerated obesity without health complications may be less likely to benefit from weight loss strategies.    Your BMI is There is no height or weight on file to calculate BMI.  Body mass index (BMI) is one way to tell whether you are at a healthy  weight, overweight, or obese. It measures your weight in relation to your height.  A BMI of 18.5 to 24.9 is in the healthy range. A person with a BMI of 25 to 29.9 is considered overweight, and someone with a BMI of 30 or greater is considered obese. More than two-thirds of American adults are considered overweight or obese.  Being overweight or obese increases the risk for further weight gain. Excess weight may lead to heart disease and diabetes.  Creating and following plans for healthy eating and physical activity may help you improve your health.  Weight control is part of healthy lifestyle and includes exercise, emotional health, and healthy eating habits. Careful eating habits lifelong are the mainstay of weight control. Though there are significant health benefits from weight loss, long-term weight loss with diet alone may be very difficult to achieve- studies show long-term success with dietary management in less than 10% of people. Attaining a healthy weight may be especially difficult to achieve in those with severe obesity. In some cases, medications, devices and surgical management might be considered.  What can you do?  If you are overweight or obese and are interested in methods for weight loss, you should discuss this with your provider.     Consider reducing daily calorie intake by 500 calories.     Keep a food journal.     Avoiding skipping meals, consider cutting portions instead.    Diet combined with exercise helps maintain muscle while optimizing fat loss. Strength training is particularly important for building and maintaining muscle mass. Exercise helps reduce stress, increase energy, and improves fitness. Increasing exercise without diet control, however, may not burn enough calories to loose weight.       Start walking three days a week 10-20 minutes at a time    Work towards walking thirty minutes five days a week     Eventually, increase the speed of your walking for 1-2 minutes at  time    In addition, we recommend that you review healthy lifestyles and methods for weight loss available through the National Institutes of Health patient information sites:  http://win.niddk.nih.gov/publications/index.htm    And look into health and wellness programs that may be available through your health insurance provider, employer, local community center, or [employer] club.    Weight management plan: Patient was referred to their PCP to discuss a diet and exercise plan.    Surgery:    Upper Airway Surgery for MACKENZIE  Surgery for MACKENZIE is a second-line treatment option in the management of sleep apnea.  Surgery should be considered for patients who are having a difficult time tolerating CPAP.    Surgery for MACKENZIE is directed at areas that are responsible for narrowing or complete obstruction of the airway during sleep.  There are a wide range of procedures available to enlarge and/or stabilize the airway to prevent blockage of breathing in the three major areas where it can occur: the palate, tongue, and nasal regions.  Successful surgical treatment depends on the accurate identification of the factors responsible for obstructive sleep apnea in each person.  A personalized approach is required because there is no single treatment that works well for everyone.  Because of anatomic variation, consultation with an examination by a sleep surgeon is a critical first step in determining what surgical options are best for each patient.  In some cases, examination during sedation may be recommended in order to guide the selection of procedures.  Patients will be counseled about risks and benefits as well as the typical recovery course after surgery. Surgery is typically not a cure for a person s MACKENZIE.  However, surgery will often significantly improve one s MACKENZIE severity (termed  success rate ).  Even in the absence of a cure, surgery will decrease the cardiovascular risk associated with OSA7; improve overall quality of life8  (sleepiness, functionality, sleep quality, etc).          Palate Procedures:  Patients with MACKENZIE often have narrowing of their airway in the region of their tonsils and uvula.  The goals of palate procedures are to widen the airway in this region as well as to help the tissues resist collapse.  Modern palate procedure techniques focus on tissue conservation and soft tissue rearrangement, rather than tissue removal.  Often the uvula is preserved in this procedure. Residual sleep apnea is common in patient after pharyngoplasty with an average reduction in sleep apnea events of 33%2.      Tongue Procedures:  While patients are awake, the muscles that surround the throat are active and keep this region open for breathing. These muscles relax during sleep, allowing the tongue and other structures to collapse and block breathing.  There are several different tongue procedures available.  Selection of a tongue base procedure depends on characteristics seen on physical exam.  Generally, procedures are aimed at removing bulky tissues in this area or preventing the back of the tongue from falling back during sleep.  Success rates for tongue surgery range from 50-62%3.    Hypoglossal Nerve Stimulation:  Hypoglossal nerve stimulation has recently received approval from the [place] Food and Drug Administration for the treatment of obstructive sleep apnea.  This is based on research showing that the system was safe and effective in treating sleep apnea6.  Results showed that the median AHI score decreased 68%, from 29.3 to 9.0. This therapy uses an implant system that senses breathing patterns and delivers mild stimulation to airway muscles, which keeps the airway open during sleep.  The system consists of three fully implanted components: a small generator (similar in size to a pacemaker), a breathing sensor, and a stimulation lead.  Using a small handheld remote, a patient turns the therapy on before bed and off upon  awakening.    Candidates for this device must be greater than 22 years of age, have moderate to severe MACKENZIE (AHI between 20-65), BMI less than 32, have tried CPAP/oral appliance without success, and have appropriate upper airway anatomy (determined by a sleep endoscopy performed by Dr. Schmitt).    Hypoglossal Nerve Stimulation Pathway:    The sleep surgeon s office will work with the patient through the insurance prior-authorization process (including communications and appeals).    Nasal Procedures:  Nasal obstruction can interfere with nasal breathing during the day and night.  Studies have shown that relief of nasal obstruction can improve the ability of some patients to tolerate positive airway pressure therapy for obstructive sleep apnea1.  Treatment options include medications such as nasal saline, topical corticosteroid and antihistamine sprays, and oral medications such as antihistamines or decongestants. Non-surgical treatments can include external nasal dilators for selected patients. If these are not successful by themselves, surgery can improve the nasal airway either alone or in combination with these other options.      Combination Procedures:  Combination of surgical procedures and other treatments may be recommended, particularly if patients have more than one area of narrowing or persistent positional disease.  The success rate of combination surgery ranges from 66-80%2,3.      1. Rosina TENA. The Role of the Nose in Snoring and Obstructive Sleep Apnoea: An Update.  Eur Arch Otorhinolaryngol. 2011; 268: 1365-73.  2.  Meghna SM; Ramiro JA; Jade JR; Pallanch JF; Jose MB; Logan SG; Daya MCDANIELS. Surgical modifications of the upper airway for obstructive sleep apnea in adults: a systematic review and meta-analysis. SLEEP 2010;33(10):8851-8817. Pretty GARCIA. Hypopharyngeal surgery in obstructive sleep apnea: an evidence-based medicine review.  Arch Otolaryngol Head Neck Surg. 2006  Feb;132(2):206-13.  3. Tu YH1, Anita Y, Tavares KEVIN. The efficacy of anatomically based multilevel surgery for obstructive sleep apnea. Otolaryngol Head Neck Surg. 2003 Oct;129(4):327-35.  4. Kezirian E, Goldberg A. Hypopharyngeal Surgery in Obstructive Sleep Apnea: An Evidence-Based Medicine Review. Arch Otolaryngol Head Neck Surg. 2006 Feb;132(2):206-13.  5. Chery BILL et al. Upper-Airway Stimulation for Obstructive Sleep Apnea.  N Engl J Med. 2014 Jan 9;370(2):139-49.  6. Marva Y et al. Increased Incidence of Cardiovascular Disease in Middle-aged Men with Obstructive Sleep Apnea. Am J Respir Crit Care Med; 2002 166: 159-165  7. Belkys FLORES et al. Studying Life Effects and Effectiveness of Palatopharyngoplasty (SLEEP) study: Subjective Outcomes of Isolated Uvulopalatopharyngoplasty. Otolaryngol Head Neck Surg. 2011; 144: 623-631.                Follow-ups after your visit        Your next 10 appointments already scheduled     Nov 01, 2017   Procedure with Sophia Casanova MD   [facility] ([facility])    [address] [place] [id number] [phone]           [facility] is located at [address] [place]              Future tests that were ordered for you today     Open Future Orders        Priority Expected Expires Ordered    Comprehensive Sleep Study Routine  3/31/2018 10/2/2017            Who to contact     If you have questions or need follow up information about today's clinic visit or your schedule please contact [facility] directly at [phone].  Normal or non-critical lab and imaging results will be communicated to you by MyChart, letter or phone within 4 business days after the clinic has received the results. If you do not hear from us within 7 days, please contact the clinic through MyChart or phone. If you have a critical or abnormal lab result, we will notify you by phone as soon as possible.  Submit  "refill requests through [employer] or call your pharmacy and they will forward the refill request to us. Please allow 3 business days for your refill to be completed.          Additional Information About Your Visit        [employer]ova Information     [employer] lets you send messages to your doctor, view your test results, renew your prescriptions, schedule appointments and more. To sign up, go to [email or web address] [facility] . Click on \"Log in\" on the left side of the screen, which will take you to the Welcome page. Then click on \"Sign up Now\" on the right side of the page.     You will be asked to enter the access code listed below, as well as some personal information. Please follow the directions to create your username and password.     Your access code is: [email or web address]  Expires: 2017  6:04 PM     Your access code will  in 90 days. If you need help or a new code, please call your [facility] clinic or [phone].        Care EveryWhere ID     This is your Care EveryWhere ID. This could be used by other organizations to access your [facility] medical records  [id number]        Your Vitals Were     Pulse Respirations Height Pulse Oximetry          63 15 1.626 m (5' 4\") 95%         Blood Pressure from Last 3 Encounters:   10/02/17 149/81   17 126/70   17 132/78    Weight from Last 3 Encounters:   17 (!) 151 kg (332 lb 14.4 oz)   17 (!) 264 kg (582 lb)   17 (!) 156.5 kg (345 lb)                 Today's Medication Changes          These changes are accurate as of: 10/2/17  6:04 PM.  If you have any questions, ask your nurse or doctor.               Start taking these medicines.        Dose/Directions    zolpidem 5 MG tablet   Commonly known as:  AMBIEN   Used for:  Sleep disturbance        Take tablet by mouth 15 minutes prior to sleep, for Sleep Study   Quantity:  1 tablet   Refills:  0            Where to get your medicines      Some of these will need a paper prescription and others can " be bought over the counter.  Ask your nurse if you have questions.     Bring a paper prescription for each of these medications     zolpidem 5 MG tablet                Primary Care Provider Office Phone # Fax #    Nelly Pearl [phone] [address] [place] [id number]        Equal Access to Services     [facility]: Hadii aad ku hadasho Soomaali, waaxda luqadaha, qaybta kaalmada adeegyada, waxay jefryin hayaan aderadha rejimariano lahayley . So [facility] [phone].    ATENCIÓN: Si habla español, tiene a dickey disposición servicios gratuitos de asistencia lingüística. [facility] [phone].    We comply with applicable federal civil rights laws and [place] laws. We do not discriminate on the basis of race, color, national origin, age, disability, sex, sexual orientation, or gender identity.            Thank you!     Thank you for choosing [facility]  for your care. Our goal is always to provide you with excellent care. Hearing back from our patients is one way we can continue to improve our services. Please take a few minutes to complete the written survey that you may receive in the mail after your visit with us. Thank you!             Your Updated Medication List - Protect others around you: Learn how to safely use, store and throw away your medicines at www.disposemymeds.org.          This list is accurate as of: 10/2/17  6:04 PM.  Always use your most recent med list.                   Brand Name Dispense Instructions for use Diagnosis    buPROPion 300 MG 24 hr tablet    WELLBUTRIN XL    90 tablet    Take 1 tablet (300 mg) by mouth every morning    Major depressive disorder, recurrent, in full remission (H)       CENTRUM SILVER per tablet      Take 1 tablet by mouth daily        CEPHALEXIN PO      Take 500 mg by mouth 3 times daily as needed (when cellulitis is coming on)        diltiazem 120 MG 24 hr capsule    CARDIZEM CD/CARTIA XT    90 capsule    Take  1 capsule (120 mg) by mouth daily    Atrial flutter with rapid ventricular response (H)       fluticasone 50 MCG/ACT spray    FLONASE    3 Bottle    Spray 2 sprays into both nostrils daily    Post-nasal drip       furosemide 20 MG tablet    LASIX    90 tablet    Take 1 tablet (20 mg) by mouth daily    Atrial flutter with rapid ventricular response (H)       lidocaine 5 % Patch    LIDODERM    60 patch    Apply up to 3 patches to painful area at once for up to 12 h within a 24 h period.  Remove after 12 hours.    Postherpetic neuralgia       metoprolol 50 MG 24 hr tablet    TOPROL-XL    180 tablet    Take 1 tablet (50 mg) by mouth 2 times daily    Essential hypertension with goal blood pressure less than 140/90       minocycline 50 MG capsule    MINOCIN/DYNACIN    60 capsule    TAKE 1 CAPSULE(50 MG) BY MOUTH TWICE DAILY    Methicillin susceptible Staphylococcus aureus septicemia (H)       NEURONTIN 600 MG tablet   Generic drug:  gabapentin     540 tablet    Take 2 tablets (1,200 mg) by mouth 3 times daily    Postherpetic neuralgia       nortriptyline 25 MG capsule    PAMELOR    60 capsule    TAKE 2 CAPSULES(50 MG) BY MOUTH AT BEDTIME    Post herpetic neuralgia       pantoprazole 40 MG EC tablet    PROTONIX    90 tablet    Take 1 tablet (40 mg) by mouth daily    GERD (gastroesophageal reflux disease)       pravastatin 10 MG tablet    PRAVACHOL    90 tablet    Take 1 tablet (10 mg) by mouth At Bedtime    Hyperlipidemia LDL goal <100       pregabalin 150 MG capsule    LYRICA    60 capsule    Take 1 capsule (150 mg) by mouth 2 times daily    Postherpetic neuralgia       venlafaxine 150 MG 24 hr capsule    EFFEXOR-XR    90 capsule    TAKE 1 CAPSULE(150 MG) BY MOUTH DAILY    Major depressive disorder, recurrent episode, moderate (H)       * warfarin 5 MG tablet    COUMADIN    90 tablet    Take 1 tablet (5 mg) by mouth daily    Atrial flutter with rapid ventricular response (H)       * warfarin 2.5 MG tablet    COUMADIN     135 tablet    Take by mouth 5 mg qd or as directed by INR clinic    Atrial flutter with rapid ventricular response (H), Long-term (current) use of anticoagulants       zolpidem 5 MG tablet    AMBIEN    1 tablet    Take tablet by mouth 15 minutes prior to sleep, for Sleep Study    Sleep disturbance       * Notice:  This list has 2 medication(s) that are the same as other medications prescribed for you. Read the directions carefully, and ask your doctor or other care provider to review them with you.

## 2017-10-02 NOTE — PROGRESS NOTES
Sleep Center [facility]  Outpatient Sleep Medicine Consultation  October 2, 2017      Name: Berenice Chaudhari MRN# [id number]   Age: 77 year old YOB: 1940     Date of Consultation: October 2, 2017  Consultation is requested by: Isela Huitron MD  [address]  [place] [address]  Primary care provider: Nelly Pearl  [place] clinic: [facility]         Reason for Sleep Consult:     Berenice Chaudhari is a 77 year old female nightly snoring and poor quality of sleep         Assessment and Plan:     Summary Sleep Diagnoses/Recommendations:    1. Sleep Disturbance:  High suspicion of sleep disordered breathing based on patient's symptoms (snoring, excessive daytime sleepiness, witnessed apneas), high BMI, neck circumference and oropharyngeal examination in setting of atrial flutter with RVR. Will schedule PSG with PAP titration in second half if patient meets criteria for AMCKENZIE in first half of PSG with TCM CO2. We also discussed the pathophysiology of sleep disordered breathing and the importance of treating it if S/he should have it. Patient is advised not to drive if he/she feels drowsy or sleepy.  Follow up after sleep study to discuss the result of sleep study and treatment options.  NB: patient is wheelchair but can transfer to bed, left hip missing.    2. Morbid Obesity:  Counseled regarding weight loss through diet modification and increased physical activity. Patient was given instuctions of weight loss and advised to follow up her PCP for further weight loss interventions.    3.  Hypertension: elevated blood pressure may be related to untreated sleep apnea.  - Advised adherence to anti-hypertensive medications, if prescribed        Orders Placed This Encounter   Procedures     Comprehensive Sleep Study    Ambien 5 mg x 1    Summary Counseling:  See instructions    Counseling included a comprehensive review of diagnostic and therapeutic strategies as well as risks of  inadequate therapy.  Educational materials provided in instructions.    All questions were answered.  The patient indicates understanding of the above issues and agrees with the plan set forth.           History of Present Illness:     Berenice Chaudhari is a 77 year old female with history of atrial flutter RVR, hypertension, , morbid obesity, CKD, chronic anemia, depression and anxiety who presents to the [facility] in [place] with complains of sleep disturbance. I was asked to see Ms. Chaudhari regarding sleep apnea in setting of atrial flutter RVR by Dr. Huitron.   Patient complains mild snoring, productive cough at night. She denies witnessed apnea, waking up gasping air or choking. She has excessive daytime sleepiness and tiredness. She denies difficulty falling and staying asleep.  She has exertional dyspnea and when she talks and gets dyspneic. She denies cp or chest flutter. She has leg swelling, treated diuretics and it worked. 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 She can transfer from wheelchair to bed as long the bed is lowered.  She has postnasal drip and cough and she was referred for GI evaluation and endoscopy.    Please see below for sleep ROS details.    PREVIOUS IN- LAB or HOME SLEEP STUDIES:   None     SLEEP-WAKE SCHEDULE:     Berenice Chaudhari     -Describes themself as a night person;      -ON WEEKDAYS and ON WEEKENDS, goes to sleep at 11:00 PM to 12 AM during the week; awakens  8:00-9 AM without an alarm; falls asleep in 20 minutes; denies difficulty falling asleep.     -Awakens 1-2 times a night for 5 minutes before falling back to sleep; awakens to external stimuli (dogs and cats).      -Total sleep time: 8-9 hours per night.    -Naps 7 times/days per week for 20-60 minutes, feels refreshed after naps; takes no inadvertant naps.       BEDTIME ACTIVITIES AND SHIFT WORK:    Berenice Chaudhari    -does read in bed and does not use electronics in bed and watch TV  in bed.     -does not do shift work.  She is retired.       SCALES       SLEEP APNEA: Stopbang score: 4       INSOMNIA:  Insomnia severity score: N/A       SLEEPINESS: [place] sleepiness scale (ESS):  14   Drowsy driving/near accidents: Non           PHQ9: N/A    SLEEP COMPLAINTS:   Snoring- mild occasional  Witness apnea: No  Gasping/Choking: No  Excessive daytime sleep: Yes  Toss/turn: No  Excessive tiredness/fatigue:  Yes  Morning headaches: No  Dry mouth/throat: No  Dyspnea: Yes  Coexisting Lung disease: No    Coexisting Heart disease: Yes    Does patient have a bed partner: Yes  Has bed partner been sleeping separately because of snoring:  Yes            RLS Screen: When you try to relax in the evening or sleep at  night, do you ever have unpleasant, restless feelings in your  legs that can be relieved by walking or movement? No    Periodic limb movement: No    Narcolepsy:       denies sudden urges of sleep attacks     denies cataplexy     denies sleep paralysis      denies hallucinations     Sleep Behaviors:     denies leg symptoms/movements     denies motor restlessness     denies night terrors     denies bruxism, she has dentures and removes at night     denies automatic behaviors    Other subjective complaints:     denies anxiety or rumination      denies pain and discomfort at  night     denies waking up with heart pounding or racing     denies GERD/heartburn         Parasomnia:   NREM - denies recurrent persistent confusional arousal, night eating, sleep walking or sleep terrors   REM  - denies dream enactment; injuries. She use to have nightmares, none for long time.    Safety: None             Medications:     Current Outpatient Prescriptions   Medication Sig     warfarin (COUMADIN) 2.5 MG tablet Take by mouth 5 mg qd or as directed by INR clinic     diltiazem (CARDIZEM CD/CARTIA XT) 120 MG 24 hr capsule Take 1 capsule (120 mg) by mouth daily     furosemide (LASIX) 20 MG tablet Take 1 tablet (20  mg) by mouth daily     buPROPion (WELLBUTRIN XL) 300 MG 24 hr tablet Take 1 tablet (300 mg) by mouth every morning     minocycline (MINOCIN/DYNACIN) 50 MG capsule TAKE 1 CAPSULE(50 MG) BY MOUTH TWICE DAILY     pravastatin (PRAVACHOL) 10 MG tablet Take 1 tablet (10 mg) by mouth At Bedtime     metoprolol (TOPROL-XL) 50 MG 24 hr tablet Take 1 tablet (50 mg) by mouth 2 times daily     warfarin (COUMADIN) 5 MG tablet Take 1 tablet (5 mg) by mouth daily     gabapentin (NEURONTIN) 600 MG tablet Take 2 tablets (1,200 mg) by mouth 3 times daily     venlafaxine (EFFEXOR-XR) 150 MG 24 hr capsule TAKE 1 CAPSULE(150 MG) BY MOUTH DAILY     fluticasone (FLONASE) 50 MCG/ACT spray Spray 2 sprays into both nostrils daily     lidocaine (LIDODERM) 5 % Patch Apply up to 3 patches to painful area at once for up to 12 h within a 24 h period.  Remove after 12 hours.     CEPHALEXIN PO Take 500 mg by mouth 3 times daily as needed (when cellulitis is coming on)      pregabalin (LYRICA) 150 MG capsule Take 1 capsule (150 mg) by mouth 2 times daily     nortriptyline (PAMELOR) 25 MG capsule TAKE 2 CAPSULES(50 MG) BY MOUTH AT BEDTIME     pantoprazole (PROTONIX) 40 MG enteric coated tablet Take 1 tablet (40 mg) by mouth daily     Multiple Vitamins-Minerals (CENTRUM SILVER) per tablet Take 1 tablet by mouth daily     No current facility-administered medications for this visit.         Medication that can affect sleep: Wellbutrin, Lyrica, Nortriptyline     Allergies   Allergen Reactions     Ceftriaxone Anaphylaxis and Rash     Rocephin given. Developed rash to back and abd, awaiting to see if it improves with d/c of rocephin.        Nafcillin Rash     diffuse severe rash in hospital 2/05     Penicillins Anaphylaxis     Vancomycin Anaphylaxis and Rash     Codeine Fatigue     Sleeps continuously     Clindamycin Rash     received information from patient     Zithromax [Azithromycin] Rash            Past Medical History:     Does not need 02 supplement  at night     Past Medical History:   Diagnosis Date     Allergic rhinitis, cause unspecified      Anxiety 6/13/2013     Atrial flutter with rapid ventricular response (H) 8/8/2017     Cellulitis and abscess of leg, except foot recurrent , both legs    begins with fever, nausea, diarrhea, vomiting & & the cellulitis may be visible a day or 2 later     Chronic pain     On chronic fentanyl patch.     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  less likely = raction to vancomycin      Essential hypertension with goal blood pressure less than 140/90 1/12/2005     Problem list name updated by automated process. Provider to review     Generalized osteoarthrosis, unspecified site     films 9/04: L hhip bad; both knees mod severe medial OA     GERD (gastroesophageal reflux disease) 11/15/2013     Herpes zoster without mention of complication 9/03    L shoulder; still has neuralgia     Hyperlipidemia LDL goal <100 9/16/2011     Methicillin susceptible Staphylococcus aureus septicemia (H) after L hip injection 205    ARDS, renal failure, staph sepsis after a hip joint injection     Obesity 1/12/2005     Problem list name updated by automated process. Provider to review     Other diseases of lung, not elsewhere classified 07/93    Interstitial lung process     Pneumonia, organism      Urticaria, unspecified 07/93    Diffuse severe (hospitalized).  (+) skin biopsy by Dr. Figueroa               Past Surgical History:    Yes previous upper airway surgery: tonsillectomy     Past Surgical History:   Procedure Laterality Date     BIOPSY       C NONSPECIFIC PROCEDURE      Extensive oral (dental) surgery     C NONSPECIFIC PROCEDURE      Remote T&A     C NONSPECIFIC PROCEDURE  1/03    LAP CHOLY     C NONSPECIFIC PROCEDURE      L hip I +  "D x 2  4/05     C NONSPECIFIC PROCEDURE  2/05    colonoscopy     C NONSPECIFIC PROCEDURE      L hip I + D several other times      CATARACT IOL, RT/LT Right 2012     CHOLECYSTECTOMY       ENT SURGERY       PHACOEMULSIFICATION CLEAR CORNEA WITH STANDARD INTRAOCULAR LENS IMPLANT Left 3/10/2016    Procedure: PHACOEMULSIFICATION CLEAR CORNEA WITH STANDARD INTRAOCULAR LENS IMPLANT;  Surgeon: Dwight Metcalf MD;  [facility]            Social History:     Social History   Substance Use Topics     Smoking status: Former Smoker     Packs/day: 2.00     Years: 22.00     Types: Cigarettes     Start date: 1967     Quit date: 1990     Smokeless tobacco: Never Used      Comment: started smoking 20's- quit in 1990     Alcohol use No      Comment: RARELY         Chemical History:     Tobacco: No     Uses 2 cups/day of coffee. Last caffeine intake is usually before 11 am    EtOH: No   Recreational Drugs: No    Psych Hx:   Anxiety and depression    Current dangers to self or others: None           Family History:     Family History   Problem Relation Age of Onset     Myocardial Infarction Father 63        Sleep Family Hx:        RLS- No  MACKENZIE - No  Insomnia - No  Parasomnia - No         Review of Systems:     A complete 10 point review of systems was negative other than HPI or as commented below:   Patient denies chest pain, abdominal pain, n&v, fever, chills, dysuria, leg pain. Patient is also denies ear pain, sore throat,  running nose.  Patient has vision change, sinus pain, postnasal drip, dry cough, dyspnea with activity, leg swelling, wheezing, muscle and joint swelling, depression.      Berenice Chaudhari has gained 15-20 pounds in 10 years.            Physical Examination:   /81  Pulse 63  Resp 15  Ht 1.626 m (5' 4\")  Wt (!) 150.6 kg (332 lb)  SpO2 95%  BMI 56.99 kg/m2     Neck Circumference: 47 cm   Constitutional: . Awake, alert, cooperative, in no apparent distress  Mood: euthymic; affect congruent with " full range and intensity.  Attention/Concentration:  Normal   Eyes: Pupils round and reactive. No icterus.  ENT: Mallampati Class: IV.   Tonsillar Stage: 0  surgically removed  Clear nasal passages. Enlarged inferior turbinates. No deviated septum.  Oropharynx: No high arched palate. No pharyngeal erythema or exudates, elongated uvula. lateral narrowing  Tongue: No macroglossia   Dentition: absent  Dentures: Yes  Neck: Supple, no thyroid enlargement.   Cardiovascular: Regular S1 and S2, no gallops or murmurs.   Pulmonary:  Chest symmetric, lungs clear bilaterally and no crackles, wheezes or rales.  Abdomen: Soft, obese, non tender.  Extremities:  trace pedal edema. Left hip missing, unable to lift left hip.  Muscle/joint: Strength and tone normal   Skin:  No rash or significant lesions.   Neurologic: Alert, oriented x3, sitting in wheelchair           Data: All pertinent previous laboratory data reviewed     Lab Results   Component Value Date    PH 7.42 09/26/2014    PH 7.41 08/23/2009    PO2 68 (L) 09/26/2014    PO2 85 08/23/2009    PCO2 40 09/26/2014    PCO2 41 08/23/2009    HCO3 26 09/26/2014    HCO3 25 08/23/2009    CHIP 1.6 09/26/2014     Lab Results   Component Value Date    TSH 1.36 08/08/2017    TSH 4.27 (H) 06/30/2017     Lab Results   Component Value Date    GLC 90 09/05/2017    GLC 87 08/14/2017     Lab Results   Component Value Date    HGB 14.2 08/07/2017    HGB 12.2 06/22/2017     Lab Results   Component Value Date    BUN 19 09/05/2017    BUN 11 08/14/2017    CR 1.14 (H) 09/05/2017    CR 1.14 (H) 08/14/2017     Lab Results   Component Value Date    CO2 27 09/05/2017    CO2 26 08/14/2017     Lab Results   Component Value Date    MARGI 855 (H) 08/24/2009         Echocardiography: 8/7/17  The rhythm was atrial flutter with a controlled ventricular rate at rest.  The ascending aorta is Mildly dilated at 4.1 cm. (The upper limit of normal is  3.7cm for aortic root diameter.)  The left ventricle is normal in  size with mild to moderate concentric left  ventricular hypertrophy (LVH).  Left ventricular systolic function is mildly reduced. The visual ejection  fraction is estimated at 50-55% due to apical akinesis. There is no thrombus  seen in the left ventricle apex.  There is mild trileaflet aortic sclerosis with no AS or AR.  There is no mitral regurgitation or stenosis.  There is trace to mild tricuspid regurgitation.  The right ventricular systolic pressure is normal approximated at 22mmHg plus  the right atrial pressure and the inferior vena cava is not dilated.    Chest x-ray: 8/7/2017 5:10 PM      IMPRESSION: Borderline cardiomegaly, unchanged. Lungs are clear.  Normal pulmonary vascularity, improved since the previous exam.  Infiltrates have resolved. No pleural effusions.       PFT: 8/4/15  FVC  71%  FEV1 72%  FEV/%         Copy to: Nelly Pearl  Copy to: Isela Trent MD 10/2/2017   [facility] [place] [facility] [address], [place] [address]   [phone] Clinic    Total time spent with patient: 55 minutes with this patient today in which 25 minutes was spent in counseling/coordination of care and going over planned testing and recommendations.

## 2017-10-02 NOTE — PATIENT INSTRUCTIONS
"    MY TREATMENT INFORMATION FOR SLEEP DISTURBANCE-  Berenice Chaudhari    DOCTOR : Anam [facility]  SLEEP CENTER :  [place]  MY CONTACT NUMBER:[phone]        If I haven't had a sleep study yet, what can I expect?  A personal story from [place]  https://www.[employer].com/watch?v=AxPLmlRpnCs      Suspected sleep apnea: Sleep study ordered.    Follow up in sleep clinic 1-2 weeks after sleep study to discuss results of sleep study and treatment options.    Patient was advised not to drive if drowsy or sleepy.    Frequently asked questions:  1. What is Obstructive Sleep Apnea (MACKENZIE)? MACKENZIE is the most common type of sleep apnea. Apnea literally means, \"without breath.\" It is characterized by repetitive pauses in breathing, despite continued effort to breathe, and is usually associated with a reduction in blood oxygen saturation. Apneas can last 10 to over 60 seconds. It is caused by narrowing or collapse of the upper airway as muscles relax during sleep. Severity of sleep apnea is determined by frequency of breathing events and their effect on your sleep and oxygen levels determined during sleep testing.   2. What are the consequences of MACKENZIE? Symptoms include: daytime sleepiness- possibly increasing the risk of falling asleep while driving, unrefreshing/restless sleep, snoring, insomnia, waking frequently to urinate, waking with heartburn or reflux, reduced concentration and memory, and morning headaches. Other health consequences may include development of high blood pressure and other cardiovascular disease in persons who are susceptible. Untreated MACKENZIE  can contribute to heart disease, stroke and diabetes.   3. What are the treatment options? In most situations, sleep apnea is a lifelong disease that must be managed with daily therapy. Medications are not effective for sleep apnea and surgery is generally not performed until other therapies have been tried. Therapy is usually tailored to the individual patient based on " many factors including your wishes as well as severity of sleep apnea and severity of obesity. Continuous Positive Airway (CPAP) is the most reliable treatment. An oral device to hold your jaw forward is usually the next most reliable option. Other options include postioning devices (to keep you off your back), weight loss, and surgery including a tongue pacing device. There is more detail about some of these options below.            1. CPAP-  WHAT DOES IT DO AND HOW CAN I LEARN TO WEAR IT?                               BEFORE I START, CAN I WATCH A MOVIE TO GET A PLAN ON HOW TO USE CPAP?  https://www.Apportable.com/watch?o=p6I34co852S      Continuous positive airway pressure, or CPAP, is the most effective treatment for obstructive sleep apnea. It works by blowing room air, through a mask, to hold your throat open. A decision to use CPAP is a major step forward in the pursuit of a healthier life. The successful use of CPAP will help you breathe easier, sleep better and live healthier. You can choose CPAP equipment from any durable medical equipment provider that meets your needs.  Using CPAP can be a positive experience if you keep these gould points in mind:  1. Commitment  CPAP is not a quick fix for your problem. It involves a long-term commitment to improve your sleep and your health.    2. Communication  Stay in close communication with both your sleep doctor and your CPAP supplier. Ask lots of questions and seek help when you need it.    3. Consistency  Use CPAP all night, every night and for every nap. You will receive the maximum health benefits from CPAP when you use it every time that you sleep. This will also make it easier for your body to adjust to the treatment.    4. Correction  The first machine and mask that you try may not be the best ones for you. Work with your sleep doctor and your CPAP supplier to make corrections to your equipment selection. Ask about trying a different type of machine or mask if  "you have ongoing problems. Make sure that your mask is a good fit and learn to use your equipment properly.    5. Challenge  Tell a family member or close friend to ask you each morning if you used your CPAP the previous night. Have someone to challenge you to give it your best effort.    6. Connection   Your adjustment to CPAP will be easier if you are able to connect with others who use the same treatment. Ask your sleep doctor if there is a support group in your area for people who have sleep apnea, or look for one on the Internet.  7. Comfort   Increase your level of comfort by using a saline spray, decongestant or heated humidifier if CPAP irritates your nose, mouth or throat. Use your unit's \"ramp\" setting to slowly get used to the air pressure level. There may be soft pads you can buy that will fit over your mask straps. Look on www.CPAP.com for accessories that can help make CPAP use more comfortable.  8. Cleaning   Clean your mask, tubing and headgear on a regular basis. Put this time in your schedule so that you don't forget to do it. Check and replace the filters for your CPAP unit and humidifier.    9. Completion   Although you are never finished with CPAP therapy, you should reward yourself by celebrating the completion of your first month of treatment. Expect this first month to be your hardest period of adjustment. It will involve some trial and error as you find the machine, mask and pressure settings that are right for you.    10. Continuation  After your first month of treatment, continue to make a daily commitment to use your CPAP all night, every night and for every nap.    CPAP-Tips to starting with success:  Begin using your CPAP for short periods of time during the day while you watch TV or read.    Use CPAP every night and for every nap. Using it less often reduces the health benefits and makes it harder for your body to get used to it.    Make small adjustments to your mask, tubing, straps " and headgear until you get the right fit. Tightening the mask may actually worsen the leak.  If it leaves significant marks on your face or irritates the bridge of your nose, it may not be the best mask for you.  Speak with the person who supplied the mask and consider trying other masks. Insurances will allow you to try different masks during the first month of starting CPAP.  Insurance also covers a new mask, hose and filter about every 6 months.    Use a saline nasal spray to ease mild nasal congestion. Neti-Pot or saline nasal rinses may also help. Nasal gel sprays can help reduce nasal dryness.  Biotene mouthwash can be helpful to protect your teeth if you experience frequent dry mouth.  Dry mouth may be a sign of air escaping out of your mouth or out of the mask in the case of a full face mask.  Speak with your provider if you expect that is the case.     Take a nasal decongestant to relieve more severe nasal or sinus congestion.  Do not use Afrin (oxymetazoline) nasal spray more than 3 days in a row.  Speak with your sleep doctor if your nasal congestion is chronic.    Use a heated humidifier that fits your CPAP model to enhance your breathing comfort. Adjust the heat setting up if you get a dry nose or throat, down if you get condensation in the hose or mask.  Position the CPAP lower than you so that any condensation in the hose drains back into the machine rather than towards the mask.    Try a system that uses nasal pillows if traditional masks give you problems.    Clean your mask, tubing and headgear once a week. Make sure the equipment dries fully.    Regularly check and replace the filters for your CPAP unit and humidifier.    Work closely with your sleep provider and your CPAP supplier to make sure that you have the machine, mask and air pressure setting that works best for you. It is better to stop using it and call your provider to solve problems than to lay awake all night frustrated with the  device.    BESIDES CPAP, WHAT OTHER THERAPIES ARE THERE?      Positioning Device  Positioning devices are generally used when sleep apnea is mild and only occurs on your back.This example shows a pillow that straps around the waist. It may be appropriate for those whose sleep study shows milder sleep apnea that occurs primarily when lying flat on one's back. Preliminary studies have shown benefit but effectiveness at home may need to be verified by a home sleep test. These devices are generally not covered by medical insurance.                      Oral Appliance  What is oral appliance therapy?  An oral appliance is a small acrylic device that fits over the upper and lower teeth or tongue (similar to an orthodontic retainer or a mouth guard). This device slightly advances the lower jaw or tongue, which moves the base of the tongue forward, opens the airway, improves breathing and can effectively treat snoring and obstructive sleep apnea sleep apnea. The appliance is fabricated and customized by a qualified dentist with experience in treating snoring and sleep apnea. Oral appliances are usually well tolerated and have relatively high compliance by patients1, 2, 3.  When is an oral appliance indicated?  Oral appliance therapy is recommended as a first-line treatment for patients with primary snoring, mild sleep apnea, and for patients with moderate sleep apnea who prefer appliance therapy to use of CPAP4, 5. Severity of sleep apnea is determined by sleep testing and is based on the number of respiratory events per hour of sleep.   How successful is oral appliance therapy?  The success rate of oral appliance therapy in patients with mild sleep apnea is 75-80% while in patients with moderate sleep apnea it is 50-70%. The chance of success in patients with severe sleep apnea is 40-50%. The research also shows that oral appliances have a beneficial effect on the cardiovascular health of MACKENZIE patients at the same magnitude  as CPAP therapy7.  Oral appliances should be a second-line treatment in cases of severe sleep apnea, but if not completely successful then a combination therapy utilizing CPAP plus oral appliance therapy may be effective. Oral appliances tend to be effective in a broad range of patients although studies show that the patients who have the highest success are females, younger patients, those with milder disease, and less severe obesity. 3, 6.   The chances of success are lower in patients who have more severe MACKENZIE, are older, and those who are morbidly obese.     Example of an oral appliance   Finding a dentist that practices dental sleep medicine  Specific training is available through the American Academy of Dental Sleep Medicine for dentists interested in working in the field of sleep. To find a dentist who is educated in the field of sleep and the use of oral appliances, near you, visit the Web site of the American Academy of Dental Sleep Medicine; also see   http://www.accpstorage.org/newOrganization/patients/oralAppliances.pdf  To search for a dentist certified in these practices:  Http://aadsm.org/FindADentist.aspx?1  1. Breezy et al. Objectively measured vs self-reported compliance during oral appliance therapy for sleep-disordered breathing. Chest 2013; 144(5): 1717-6506.  2. Mindy, et al. Objective measurement of compliance during oral appliance therapy for sleep-disordered breathing. Thorax 2013; 68(1): 91-96.  3. Segundo, et al. Mandibular advancement devices in 620 men and women with MACKENZIE and snoring: tolerability and predictors of treatment success. Chest 2004; 125: 4491-8144.  4. Dae, et al. Oral appliances for snoring and MACKENZIE: a review. Sleep 2006; 29: 244-262.  5. Chad, et al. Oral appliance treatment for MACKENZIE: an update. J Clin Sleep Med 2014; 10(2): 215-227.  6. Bethany et al. Predictors of OSAH treatment outcome. J Dent Res 2007; 86: 6830-9652.    Nasal Valves                  Nasal valves may not be effective if you have frequent nasal congestion or have difficulty breathing through your nose. They may be an option for mild apnea if other options are not well tolerated. The efficacy of these devices is generally less than CPAP or oral appliances.      Weight Loss:    Weight management is a personal decision.  If you are interested in exploring weight loss strategies, the following discussion covers the impact on weight loss on sleep apnea and the approaches that may be successful.    Weight loss decreases severity of sleep apnea in most people with obesity. For those with mild obesity who have developed snoring with weight gain, even 15-30 pound weight loss can improve and occasionally eliminate sleep apnea.  Structured and life-long dietary and health habits are necessary to lose weight and keep healthier weight levels.     Though there may be significant health benefits from weight loss, long-term weight loss is very difficult to achieve- studies show success with dietary management in less than 10% of people. In addition, substantial weight loss may require years of dietary control and may be difficult if patients have severe obesity. In these cases, surgical management may be considered.  Finally, older individuals who have tolerated obesity without health complications may be less likely to benefit from weight loss strategies.    Your BMI is There is no height or weight on file to calculate BMI.  Body mass index (BMI) is one way to tell whether you are at a healthy weight, overweight, or obese. It measures your weight in relation to your height.  A BMI of 18.5 to 24.9 is in the healthy range. A person with a BMI of 25 to 29.9 is considered overweight, and someone with a BMI of 30 or greater is considered obese. More than two-thirds of American adults are considered overweight or obese.  Being overweight or obese increases the risk for further weight gain. Excess weight may lead to  heart disease and diabetes.  Creating and following plans for healthy eating and physical activity may help you improve your health.  Weight control is part of healthy lifestyle and includes exercise, emotional health, and healthy eating habits. Careful eating habits lifelong are the mainstay of weight control. Though there are significant health benefits from weight loss, long-term weight loss with diet alone may be very difficult to achieve- studies show long-term success with dietary management in less than 10% of people. Attaining a healthy weight may be especially difficult to achieve in those with severe obesity. In some cases, medications, devices and surgical management might be considered.  What can you do?  If you are overweight or obese and are interested in methods for weight loss, you should discuss this with your provider.     Consider reducing daily calorie intake by 500 calories.     Keep a food journal.     Avoiding skipping meals, consider cutting portions instead.    Diet combined with exercise helps maintain muscle while optimizing fat loss. Strength training is particularly important for building and maintaining muscle mass. Exercise helps reduce stress, increase energy, and improves fitness. Increasing exercise without diet control, however, may not burn enough calories to loose weight.       Start walking three days a week 10-20 minutes at a time    Work towards walking thirty minutes five days a week     Eventually, increase the speed of your walking for 1-2 minutes at time    In addition, we recommend that you review healthy lifestyles and methods for weight loss available through the National Institutes of Health patient information sites:  http://win.niddk.nih.gov/publications/index.htm    And look into health and wellness programs that may be available through your health insurance provider, employer, local community center, or [employer].    Weight management plan: Patient was referred  to their PCP to discuss a diet and exercise plan.    Surgery:    Upper Airway Surgery for MACKENZIE  Surgery for MACKENZIE is a second-line treatment option in the management of sleep apnea.  Surgery should be considered for patients who are having a difficult time tolerating CPAP.    Surgery for MACKENZIE is directed at areas that are responsible for narrowing or complete obstruction of the airway during sleep.  There are a wide range of procedures available to enlarge and/or stabilize the airway to prevent blockage of breathing in the three major areas where it can occur: the palate, tongue, and nasal regions.  Successful surgical treatment depends on the accurate identification of the factors responsible for obstructive sleep apnea in each person.  A personalized approach is required because there is no single treatment that works well for everyone.  Because of anatomic variation, consultation with an examination by a sleep surgeon is a critical first step in determining what surgical options are best for each patient.  In some cases, examination during sedation may be recommended in order to guide the selection of procedures.  Patients will be counseled about risks and benefits as well as the typical recovery course after surgery. Surgery is typically not a cure for a person s MACKENZIE.  However, surgery will often significantly improve one s MACKENZIE severity (termed  success rate ).  Even in the absence of a cure, surgery will decrease the cardiovascular risk associated with OSA7; improve overall quality of life8 (sleepiness, functionality, sleep quality, etc).          Palate Procedures:  Patients with MACKENZIE often have narrowing of their airway in the region of their tonsils and uvula.  The goals of palate procedures are to widen the airway in this region as well as to help the tissues resist collapse.  Modern palate procedure techniques focus on tissue conservation and soft tissue rearrangement, rather than tissue removal.  Often the  uvula is preserved in this procedure. Residual sleep apnea is common in patient after pharyngoplasty with an average reduction in sleep apnea events of 33%2.      Tongue Procedures:  While patients are awake, the muscles that surround the throat are active and keep this region open for breathing. These muscles relax during sleep, allowing the tongue and other structures to collapse and block breathing.  There are several different tongue procedures available.  Selection of a tongue base procedure depends on characteristics seen on physical exam.  Generally, procedures are aimed at removing bulky tissues in this area or preventing the back of the tongue from falling back during sleep.  Success rates for tongue surgery range from 50-62%3.    Hypoglossal Nerve Stimulation:  Hypoglossal nerve stimulation has recently received approval from the [place] Food and Drug Administration for the treatment of obstructive sleep apnea.  This is based on research showing that the system was safe and effective in treating sleep apnea6.  Results showed that the median AHI score decreased 68%, from 29.3 to 9.0. This therapy uses an implant system that senses breathing patterns and delivers mild stimulation to airway muscles, which keeps the airway open during sleep.  The system consists of three fully implanted components: a small generator (similar in size to a pacemaker), a breathing sensor, and a stimulation lead.  Using a small handheld remote, a patient turns the therapy on before bed and off upon awakening.    Candidates for this device must be greater than 22 years of age, have moderate to severe MACKENZIE (AHI between 20-65), BMI less than 32, have tried CPAP/oral appliance without success, and have appropriate upper airway anatomy (determined by a sleep endoscopy performed by Dr. Schmitt).    Hypoglossal Nerve Stimulation Pathway:    The sleep surgeon s office will work with the patient through the insurance prior-authorization  process (including communications and appeals).    Nasal Procedures:  Nasal obstruction can interfere with nasal breathing during the day and night.  Studies have shown that relief of nasal obstruction can improve the ability of some patients to tolerate positive airway pressure therapy for obstructive sleep apnea1.  Treatment options include medications such as nasal saline, topical corticosteroid and antihistamine sprays, and oral medications such as antihistamines or decongestants. Non-surgical treatments can include external nasal dilators for selected patients. If these are not successful by themselves, surgery can improve the nasal airway either alone or in combination with these other options.      Combination Procedures:  Combination of surgical procedures and other treatments may be recommended, particularly if patients have more than one area of narrowing or persistent positional disease.  The success rate of combination surgery ranges from 66-80%2,3.      1. Rosina TENA. The Role of the Nose in Snoring and Obstructive Sleep Apnoea: An Update.  Eur Arch Otorhinolaryngol. 2011; 268: 1365-73.  2.  Meghna SM; Ramiro JA; Jade JR; Pallanch JF; Jose MB; Logan SG; Daya MCDANIELS. Surgical modifications of the upper airway for obstructive sleep apnea in adults: a systematic review and meta-analysis. SLEEP 2010;33(10):5733-3187. Pretty GARCIA. Hypopharyngeal surgery in obstructive sleep apnea: an evidence-based medicine review.  Arch Otolaryngol Head Neck Surg. 2006 Feb;132(2):206-13.  3. Tu YH1, Anita Y, Tavares KEVIN. The efficacy of anatomically based multilevel surgery for obstructive sleep apnea. Otolaryngol Head Neck Surg. 2003 Oct;129(4):327-35.  4. Kezirian E, Goldberg A. Hypopharyngeal Surgery in Obstructive Sleep Apnea: An Evidence-Based Medicine Review. Arch Otolaryngol Head Neck Surg. 2006 Feb;132(2):206-13.  5. Chery BILL et al. Upper-Airway Stimulation for Obstructive Sleep Apnea.  N Engl J Med. 2014 Madi  9;370(2):139-49.  6. Marva Y et al. Increased Incidence of Cardiovascular Disease in Middle-aged Men with Obstructive Sleep Apnea. Am J Respir Crit Care Med; 2002 166: 159-165  7. Belkys FLORES et al. Studying Life Effects and Effectiveness of Palatopharyngoplasty (SLEEP) study: Subjective Outcomes of Isolated Uvulopalatopharyngoplasty. Otolaryngol Head Neck Surg. 2011; 144: 623-631.

## 2017-10-02 NOTE — NURSING NOTE
"Chief Complaint   Patient presents with     Consult     Aflutter with rapid responses, Ref by Cardiologist       Initial /81  Pulse 63  Resp 15  Ht 1.626 m (5' 4\")  SpO2 95% Estimated body mass index is 57.14 kg/(m^2) as calculated from the following:    Height as of 6/30/17: 1.626 m (5' 4\").    Weight as of 8/8/17: 151 kg (332 lb 14.4 oz).  Medication Reconciliation: incomplete       Neck 47cm  18.5in  Ess 14    Noemi Conklin LPN/CMA  "

## 2017-10-04 DIAGNOSIS — K21.9 GASTROESOPHAGEAL REFLUX DISEASE WITHOUT ESOPHAGITIS: Primary | ICD-10-CM

## 2017-10-04 RX ORDER — PANTOPRAZOLE SODIUM 40 MG/1
TABLET, DELAYED RELEASE ORAL
Qty: 90 TABLET | Refills: 3 | Status: SHIPPED | OUTPATIENT
Start: 2017-10-04 | End: 2018-01-11

## 2017-10-05 ENCOUNTER — ANTICOAGULATION THERAPY VISIT (OUTPATIENT)
Dept: NURSING | Facility: CLINIC | Age: 77
End: 2017-10-05
Payer: COMMERCIAL

## 2017-10-05 DIAGNOSIS — I48.92 ATRIAL FLUTTER WITH RAPID VENTRICULAR RESPONSE (H): ICD-10-CM

## 2017-10-05 DIAGNOSIS — Z79.01 LONG-TERM (CURRENT) USE OF ANTICOAGULANTS: ICD-10-CM

## 2017-10-05 DIAGNOSIS — I48.92 ATRIAL FLUTTER (H): ICD-10-CM

## 2017-10-05 DIAGNOSIS — Z53.9 DIAGNOSIS NOT YET DEFINED: Primary | ICD-10-CM

## 2017-10-05 LAB — INR PPP: 4.2

## 2017-10-05 PROCEDURE — G0179 MD RECERTIFICATION HHA PT: HCPCS | Performed by: PEDIATRICS

## 2017-10-05 PROCEDURE — 99207 ZZC NO CHARGE NURSE ONLY: CPT

## 2017-10-05 NOTE — PROGRESS NOTES
ANTICOAGULATION FOLLOW-UP     Patient Name:  Berenice Chaudhari  Date:  10/5/2017  Contact Type:  Telephone  Call received from [facility] nurse, with INR result.   Follow up instructions given over the phone to Home Care nurse for coumadin management.    SUBJECTIVE:     Patient Findings     Positives No Problem Findings, Unexplained INR or factor level change           OBJECTIVE    INR   Date Value Ref Range Status   10/05/2017 4.2  Final       ASSESSMENT / PLAN  INR assessment SUPRA    Recheck INR In: 1 WEEK    INR Location [facility]R      Anticoagulation Summary as of 10/5/2017     INR goal 2.0-3.0   Today's INR 4.2!   Maintenance plan 5 mg (5 mg x 1) every day   Full instructions 10/5: Hold; 10/6: 2.5 mg; 10/9: 2.5 mg; Otherwise 5 mg every day   Weekly total 35 mg   Plan last modified Emperatriz Garcia (9/14/2017)   Next INR check 10/12/2017   Target end date Indefinite    Indications   Atrial flutter (H) [I48.92]  Atrial flutter with rapid ventricular response (H) [I48.92]  Long-term (current) use of anticoagulants [Z79.01] [Z79.01]         Anticoagulation Episode Summary     INR check location     Preferred lab     Send INR reminders to [facility]    Comments 5mg tabs; HS dosing, [facility] [phone]      Anticoagulation Care Providers     Provider Role Specialty Phone number    Nelly Pearl MD Referring Internal Medicine [phone]            See the Encounter Report to view Anticoagulation Flowsheet and Dosing Calendar (Go to Encounters tab in chart review, and find the Anticoagulation Therapy Visit)        Mary Butcher RN

## 2017-10-05 NOTE — MR AVS SNAPSHOT
Bereniec VANCE Chaudhari   10/5/2017 4:30 PM   Anticoagulation Therapy Visit    Description:  77 year old female   Provider:   [facility]   Department:  [facility]           INR as of 10/5/2017     Today's INR 4.2!      Anticoagulation Summary as of 10/5/2017     INR goal 2.0-3.0   Today's INR 4.2!   Full instructions 10/5: Hold; 10/6: 2.5 mg; 10/9: 2.5 mg; Otherwise 5 mg every day   Next INR check 10/12/2017    Indications   Atrial flutter (H) [I48.92]  Atrial flutter with rapid ventricular response (H) [I48.92]  Long-term (current) use of anticoagulants [Z79.01] [Z79.01]         Your next Anticoagulation Clinic appointment(s)     Oct 05, 2017  4:30 PM CDT   Anticoagulation Visit with  ANTICOAGULATION CLINIC   [facility] ([facility])    [address]  [address]  [place] [address] [phone]              Contact Numbers     [facility]  Please call  [phone] to cancel and/or reschedule your appointment   Please call  [phone] with any problems or questions regarding your therapy.        October 2017 Details    Sun Mon Tue Wed Thu Fri Sat     1               2               3               4               5      Hold   See details      6      2.5 mg         7      5 mg           8      5 mg         9      2.5 mg         10      5 mg         11      5 mg         12            13               14                 15               16               17               18               19               20               21                 22               23               24               25               26               27               28                 29               30               31                    Date Details   10/05 This INR check       Date of next INR:  10/12/2017         How to take your warfarin dose     To take:  2.5 mg Take 0.5 of a 5 mg tablet.    To take:  5 mg Take 1 of the 5 mg tablets.    Hold Do not take your warfarin dose. See the  Details table to the right for additional instructions.

## 2017-10-12 ENCOUNTER — TELEPHONE (OUTPATIENT)
Dept: PEDIATRICS | Facility: CLINIC | Age: 77
End: 2017-10-12

## 2017-10-12 ENCOUNTER — ANTICOAGULATION THERAPY VISIT (OUTPATIENT)
Dept: NURSING | Facility: CLINIC | Age: 77
End: 2017-10-12
Payer: COMMERCIAL

## 2017-10-12 DIAGNOSIS — I48.92 ATRIAL FLUTTER WITH RAPID VENTRICULAR RESPONSE (H): ICD-10-CM

## 2017-10-12 DIAGNOSIS — I48.92 ATRIAL FLUTTER (H): ICD-10-CM

## 2017-10-12 DIAGNOSIS — Z79.01 LONG-TERM (CURRENT) USE OF ANTICOAGULANTS: ICD-10-CM

## 2017-10-12 LAB — INR PPP: 5.2

## 2017-10-12 PROCEDURE — 99207 ZZC NO CHARGE NURSE ONLY: CPT

## 2017-10-12 NOTE — PROGRESS NOTES
ANTICOAGULATION FOLLOW-UP     Patient Name:  Berenice Chaudhari  Date:  10/12/2017  Contact Type:  Telephone  Call received from [facility] nurse, with INR result.   Follow up instructions given over the phone to Home Care nurse for coumadin management.    SUBJECTIVE:     Patient Findings     Positives Antibiotic use or infection, Unexplained INR or factor level change    Comments She took one tablet of Cephalexin on Tuesday because she thought she might be getting cellulitis,  But then decided she wasn't, so that was all she took. She has this on hand prn.   Homecare nurse says she doesn't have any signs of cellulitis.    Warfarin dose was decreased last week and INR went up.           OBJECTIVE    INR   Date Value Ref Range Status   10/12/2017 5.2  Final       ASSESSMENT / PLAN  INR assessment SUPRA    Recheck INR In: 1 WEEK    INR Location [facility]R      Anticoagulation Summary as of 10/12/2017     INR goal 2.0-3.0   Today's INR 5.2!   Maintenance plan 2.5 mg (5 mg x 0.5) on Mon, Wed, Fri; 5 mg (5 mg x 1) all other days   Full instructions 10/12: Hold; 10/13: Hold; Otherwise 2.5 mg on Mon, Wed, Fri; 5 mg all other days   Weekly total 27.5 mg   Plan last modified Mary Butcher, RN (10/12/2017)   Next INR check 10/19/2017   Target end date Indefinite    Indications   Atrial flutter (H) [I48.92]  Atrial flutter with rapid ventricular response (H) [I48.92]  Long-term (current) use of anticoagulants [Z79.01] [Z79.01]         Anticoagulation Episode Summary     INR check location     Preferred lab     Send INR reminders to  [facility]    Comments 5mg tabs; HS dosing, [facility] [phone]      Anticoagulation Care Providers     Provider Role Specialty Phone number    Nelly Pearl MD Referring Internal Medicine [phone]            See the Encounter Report to view Anticoagulation Flowsheet and Dosing Calendar (Go to Encounters tab in chart review, and find the Anticoagulation Therapy  Visit)        Mary Butcher RN

## 2017-10-12 NOTE — MR AVS SNAPSHOT
Berenice Chaudhari   10/12/2017 1:15 PM   Anticoagulation Therapy Visit    Description:  77 year old female   Provider:  [facility]   Department:  [facility]           INR as of 10/12/2017     Today's INR 5.2!      Anticoagulation Summary as of 10/12/2017     INR goal 2.0-3.0   Today's INR 5.2!   Full instructions 10/12: Hold; 10/13: Hold; Otherwise 2.5 mg on Mon, Wed, Fri; 5 mg all other days   Next INR check 10/19/2017    Indications   Atrial flutter (H) [I48.92]  Atrial flutter with rapid ventricular response (H) [I48.92]  Long-term (current) use of anticoagulants [Z79.01] [Z79.01]         Contact Numbers     [facility]  Please call  [phone] to cancel and/or reschedule your appointment   Please call  [phone] with any problems or questions regarding your therapy.        October 2017 Details    Sun Mon Tue Wed Thu Fri Sat     1               2               3               4               5               6               7                 8               9               10               11               12      Hold   See details      13      Hold         14      5 mg           15      5 mg         16      2.5 mg         17      5 mg         18      2.5 mg         19            20               21                 22               23               24               25               26               27               28                 29               30               31                    Date Details   10/12 This INR check       Date of next INR:  10/19/2017         How to take your warfarin dose     To take:  2.5 mg Take 0.5 of a 5 mg tablet.    To take:  5 mg Take 1 of the 5 mg tablets.    Hold Do not take your warfarin dose. See the Details table to the right for additional instructions.

## 2017-10-12 NOTE — TELEPHONE ENCOUNTER
Call form Berenice with [facility].  She can be reached at [phone].  Asking for SN 1 x week for 4 weeks with 2 prns.  A verbal ok was given.  Melissa Abdullahi RN  Message handled by Nurse Triage.

## 2017-10-19 ENCOUNTER — ANTICOAGULATION THERAPY VISIT (OUTPATIENT)
Dept: NURSING | Facility: CLINIC | Age: 77
End: 2017-10-19
Payer: COMMERCIAL

## 2017-10-19 DIAGNOSIS — I48.92 ATRIAL FLUTTER (H): ICD-10-CM

## 2017-10-19 DIAGNOSIS — Z79.01 LONG-TERM (CURRENT) USE OF ANTICOAGULANTS: ICD-10-CM

## 2017-10-19 DIAGNOSIS — I48.92 ATRIAL FLUTTER WITH RAPID VENTRICULAR RESPONSE (H): ICD-10-CM

## 2017-10-19 LAB — INR PPP: 3.6

## 2017-10-19 PROCEDURE — 99207 ZZC NO CHARGE NURSE ONLY: CPT

## 2017-10-19 NOTE — PROGRESS NOTES
ANTICOAGULATION FOLLOW-UP     Patient Name:  Berenice Chaudhari  Date:  10/19/2017  Contact Type:  Telephone  Call received from [facility] nurse, with INR result.   Follow up instructions given over the phone to Home Care nurse for coumadin management.    SUBJECTIVE:     Patient Findings     Positives No Problem Findings, Unexplained INR or factor level change    Comments Not on antibiotic, no signs of cellulitis.           OBJECTIVE    INR   Date Value Ref Range Status   10/19/2017 3.6  Final       ASSESSMENT / PLAN  INR assessment SUPRA    Recheck INR In: 1 WEEK    INR Location [facility]R      Anticoagulation Summary as of 10/19/2017     INR goal 2.0-3.0   Today's INR 3.6!   Maintenance plan 2.5 mg (5 mg x 0.5) every day   Full instructions 2.5 mg every day   Weekly total 17.5 mg   Plan last modified Mary Butcher RN (10/19/2017)   Next INR check 10/26/2017   Target end date Indefinite    Indications   Atrial flutter (H) [I48.92]  Atrial flutter with rapid ventricular response (H) [I48.92]  Long-term (current) use of anticoagulants [Z79.01] [Z79.01]         Anticoagulation Episode Summary     INR check location     Preferred lab     Send INR reminders to [facility]    Comments 5mg tabs; HS dosing, [facility] [phone]      Anticoagulation Care Providers     Provider Role Specialty Phone number    Nelly Pearl MD Referring Internal Medicine [phone]            See the Encounter Report to view Anticoagulation Flowsheet and Dosing Calendar (Go to Encounters tab in chart review, and find the Anticoagulation Therapy Visit)        Mary Butcher RN

## 2017-10-19 NOTE — MR AVS SNAPSHOT
Berenice RAJIV Chaudhari   10/19/2017 1:15 PM   Anticoagulation Therapy Visit    Description:  77 year old female   Provider:  [facility]   Department:  [facility]           INR as of 10/19/2017     Today's INR 3.6!      Anticoagulation Summary as of 10/19/2017     INR goal 2.0-3.0   Today's INR 3.6!   Full instructions 2.5 mg every day   Next INR check 10/26/2017    Indications   Atrial flutter (H) [I48.92]  Atrial flutter with rapid ventricular response (H) [I48.92]  Long-term (current) use of anticoagulants [Z79.01] [Z79.01]         Contact Numbers     [facility]  Please call  [phone] to cancel and/or reschedule your appointment   Please call  [phone] with any problems or questions regarding your therapy.        October 2017 Details    Sun Mon Tue Wed Thu Fri Sat     1               2               3               4               5               6               7                 8               9               10               11               12               13               14                 15               16               17               18               19      2.5 mg   See details      20      2.5 mg         21      2.5 mg           22      2.5 mg         23      2.5 mg         24      2.5 mg         25      2.5 mg         26            27               28                 29               30               31                    Date Details   10/19 This INR check       Date of next INR:  10/26/2017         How to take your warfarin dose     To take:  2.5 mg Take 0.5 of a 5 mg tablet.

## 2017-10-25 ENCOUNTER — OFFICE VISIT (OUTPATIENT)
Dept: PEDIATRICS | Facility: CLINIC | Age: 77
End: 2017-10-25
Payer: COMMERCIAL

## 2017-10-25 VITALS
TEMPERATURE: 98.6 F | DIASTOLIC BLOOD PRESSURE: 68 MMHG | WEIGHT: 293 LBS | RESPIRATION RATE: 20 BRPM | BODY MASS INDEX: 51.91 KG/M2 | HEIGHT: 63 IN | OXYGEN SATURATION: 94 % | SYSTOLIC BLOOD PRESSURE: 126 MMHG | HEART RATE: 72 BPM

## 2017-10-25 DIAGNOSIS — R05.3 CHRONIC COUGH: ICD-10-CM

## 2017-10-25 DIAGNOSIS — I48.92 ATRIAL FLUTTER, UNSPECIFIED TYPE (H): ICD-10-CM

## 2017-10-25 DIAGNOSIS — Z01.818 PREOP GENERAL PHYSICAL EXAM: Primary | ICD-10-CM

## 2017-10-25 DIAGNOSIS — I10 ESSENTIAL HYPERTENSION WITH GOAL BLOOD PRESSURE LESS THAN 140/90: ICD-10-CM

## 2017-10-25 LAB — INR PPP: 1.1 (ref 0.86–1.14)

## 2017-10-25 PROCEDURE — 99214 OFFICE O/P EST MOD 30 MIN: CPT | Performed by: PEDIATRICS

## 2017-10-25 PROCEDURE — 80048 BASIC METABOLIC PNL TOTAL CA: CPT | Performed by: PEDIATRICS

## 2017-10-25 PROCEDURE — 85610 PROTHROMBIN TIME: CPT | Performed by: PEDIATRICS

## 2017-10-25 PROCEDURE — 36415 COLL VENOUS BLD VENIPUNCTURE: CPT | Performed by: PEDIATRICS

## 2017-10-25 NOTE — PROGRESS NOTES
[facility]  [address]  [address]  [place] [address]  [phone]  Dept: [phone]    PRE-OP EVALUATION:  Today's date: 10/25/2017    Berenice Chaudhari (: 1940) presents for pre-operative evaluation assessment as requested by Dr. Ribeiro.  She requires evaluation and anesthesia risk assessment prior to undergoing surgery/procedure for treatment of chronic cough.  Proposed procedure: endoscopy    Date of Surgery/ Procedure:   Time of Surgery/ Procedure: 12:30 arrival time at 10:30  Hospital/Surgical Facility: [facility]  Fax number for surgical facility:   Primary Physician: Nelly Pearl  Type of Anesthesia Anticipated: General    Patient has a Health Care Directive or Living Will:  YES not notarized    Preop Questions 10/25/2017   1.  Do you have a history of heart attack, stroke, stent, bypass or surgery on an artery in the head, neck, heart or legs? YES    2.  Do you ever have any pain or discomfort in your chest? No   3.  Do you have a history of  Heart Failure? No   4.   Are you troubled by shortness of breath when:  walking on a level surface, or up a slight hill, or at night? YES - worse with cough fit or a long sentence   5.  Do you currently have a cold, bronchitis or other respiratory infection? No   6.  Do you have a cough, shortness of breath, or wheezing? YES - chronic cough x 1 year   7.  Do you sometimes get pains in the calves of your legs when you walk? No   8. Do you or anyone in your family have previous history of blood clots? No   9.  Do you or does anyone in your family have a serious bleeding problem such as prolonged bleeding following surgeries or cuts? No   10. Have you ever had problems with anemia or been told to take iron pills? No   11. Have you had any abnormal blood loss such as black, tarry or bloody stools, or abnormal vaginal bleeding? No   12. Have you ever had a blood transfusion? YES    13. Have you or  any of your relatives ever had problems with anesthesia? No   14. Do you have sleep apnea, excessive snoring or daytime drowsiness? No   15. Do you have any prosthetic heart valves? No   16. Do you have prosthetic joints? No   17. Is there any chance that you may be pregnant? No           HPI:                                                      Brief HPI related to upcoming procedure: Patient with chronic cough x 1 year - pulmonary workup negative for etiology.  Patient would like to pursue endoscopy.      See problem list for active medical problems.  Problems all longstanding and stable, except as noted/documented.  See ROS for pertinent symptoms related to these conditions.                                                                                                  .  HYPERTENSION - Patient has longstanding history of mod-severe HTN , currently denies any symptoms referable to elevated blood pressure. Specifically denies chest pain, palpitations, dyspnea, orthopnea, PND or peripheral edema. Blood pressure readings have been in normal range. Current medication regimen is as listed below. Patient denies any side effects of medication.                                                                                                                                                                                          .    MEDICAL HISTORY:                                                    Patient Active Problem List    Diagnosis Date Noted     Gastroesophageal reflux disease without esophagitis 10/04/2017     Priority: Medium     Long-term (current) use of anticoagulants [Z79.01] 08/15/2017     Priority: Medium     Atrial flutter (H) 08/08/2017     Priority: Medium     Atrial flutter with rapid ventricular response (H) 08/08/2017     Priority: Medium     Cellulitis 06/21/2017     Priority: Medium     Right shoulder pain 12/26/2016     Priority: Medium     CKD (chronic kidney disease) stage 3, GFR 30-59 ml/min  03/04/2016     Priority: Medium     Cellulitis of right lower extremity 11/04/2014     Priority: Medium     Cough 09/30/2014     Priority: Medium     Severe obesity (BMI >= 40) (H) 09/30/2014     Priority: Medium     Major depressive disorder, recurrent episode, in full remission (HCC) 04/09/2014     Priority: Medium     Anxiety 06/13/2013     Priority: Medium     Chronic renal disease 01/05/2013     Priority: Medium     Health Care Home 08/27/2012     Priority: Medium     EMERGENCY CARE PLAN  Presenting Problem Signs and Symptoms Treatment Plan    Questions or conerns during clinic hours    I will call the clinic directly     Questions or conerns outside clinic hours    I will call the 24 hour nurse line at [phone]    Patient needs to schedule an appointment    I will call the 24 hour scheduling team at [phone] or clinic directly    Same day treatment     I will call the clinic first, nurse line if after hours, urgent care and express care if needed                          DX V65.8 REPLACED WITH [id number] HEALTH CARE HOME (04/08/2013)       Post herpetic neuralgia 03/15/2012     Priority: Medium     Hyperlipidemia LDL goal <100 09/16/2011     Priority: Medium     Advanced directives, counseling/discussion 05/11/2011     Priority: Medium     Parent voices understanding and acceptance of this advice and will call back if any further questions or concerns.         Erythroderma desquamativum      Priority: Medium     see hospital records;; may have has toxic shock syndrome & upon recovery had total body desquamation ..  less likely = raction to vancomycin        Generalized osteoarthrosis, unspecified site 01/12/2005     Priority: Medium     films 9/04: L hhip bad; both knees mod severe medial OA       Obesity 01/12/2005     Priority: Medium     Problem list name updated by automated process. Provider to review       Essential hypertension with goal blood pressure less than 140/90 01/12/2005     Priority: Medium      Problem list name updated by automated process. Provider to review       Allergic rhinitis 01/12/2005     Priority: Medium     Problem list name updated by automated process. Provider to review       Urticaria 07/01/1993     Priority: Medium     Diffuse severe (hospitalized).  (+) skin biopsy by Dr. Figueroa  Problem list name updated by automated process. Provider to review        Past Medical History:   Diagnosis Date     Allergic rhinitis, cause unspecified      Anxiety 6/13/2013     Atrial flutter with rapid ventricular response (H) 8/8/2017     Cellulitis and abscess of leg, except foot recurrent , both legs    begins with fever, nausea, diarrhea, vomiting & & the cellulitis may be visible a day or 2 later     Chronic pain     On chronic fentanyl patch.     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  less likely = raction to vancomycin      Essential hypertension with goal blood pressure less than 140/90 1/12/2005     Problem list name updated by automated process. Provider to review     Generalized osteoarthrosis, unspecified site     films 9/04: L hhip bad; both knees mod severe medial OA     GERD (gastroesophageal reflux disease) 11/15/2013     Herpes zoster without mention of complication 9/03    L shoulder; still has neuralgia     Hyperlipidemia LDL goal <100 9/16/2011     Methicillin susceptible Staphylococcus aureus septicemia (H) after L hip injection 205    ARDS, renal failure, staph sepsis after a hip joint injection     Obesity 1/12/2005     Problem list name updated by automated process. Provider to review     Other diseases of lung, not elsewhere classified 07/93    Interstitial lung process     Pneumonia, organism unspecified(486)      Urticaria, unspecified 07/93    Diffuse severe  (hospitalized).  (+) skin biopsy by Dr. Figueroa     Past Surgical History:   Procedure Laterality Date     BIOPSY       C NONSPECIFIC PROCEDURE      Extensive oral (dental) surgery     C NONSPECIFIC PROCEDURE      Remote T&A     C NONSPECIFIC PROCEDURE  1/03    LAP CHOLY     C NONSPECIFIC PROCEDURE      L hip I + D x 2  4/05     C NONSPECIFIC PROCEDURE  2/05    colonoscopy     C NONSPECIFIC PROCEDURE      L hip I + D several other times      CATARACT IOL, RT/LT Right 2012     CHOLECYSTECTOMY       ENT SURGERY       PHACOEMULSIFICATION CLEAR CORNEA WITH STANDARD INTRAOCULAR LENS IMPLANT Left 3/10/2016    Procedure: PHACOEMULSIFICATION CLEAR CORNEA WITH STANDARD INTRAOCULAR LENS IMPLANT;  Surgeon: Dwight Metcalf MD;  Location: [facility]     Current Outpatient Prescriptions   Medication Sig Dispense Refill     pantoprazole (PROTONIX) 40 MG EC tablet TAKE 1 TABLET(40 MG) BY MOUTH DAILY 90 tablet 3     zolpidem (AMBIEN) 5 MG tablet Take tablet by mouth 15 minutes prior to sleep, for Sleep Study 1 tablet 0     warfarin (COUMADIN) 2.5 MG tablet Take by mouth 5 mg qd or as directed by INR clinic 135 tablet 1     diltiazem (CARDIZEM CD/CARTIA XT) 120 MG 24 hr capsule Take 1 capsule (120 mg) by mouth daily 90 capsule 3     furosemide (LASIX) 20 MG tablet Take 1 tablet (20 mg) by mouth daily 90 tablet 3     buPROPion (WELLBUTRIN XL) 300 MG 24 hr tablet Take 1 tablet (300 mg) by mouth every morning 90 tablet 1     minocycline (MINOCIN/DYNACIN) 50 MG capsule TAKE 1 CAPSULE(50 MG) BY MOUTH TWICE DAILY 60 capsule 11     pravastatin (PRAVACHOL) 10 MG tablet Take 1 tablet (10 mg) by mouth At Bedtime 90 tablet 3     metoprolol (TOPROL-XL) 50 MG 24 hr tablet Take 1 tablet (50 mg) by mouth 2 times daily 180 tablet 3     warfarin (COUMADIN) 5 MG tablet Take 1 tablet (5 mg) by mouth daily 90 tablet 3     gabapentin (NEURONTIN) 600 MG tablet Take 2 tablets (1,200 mg) by mouth 3 times daily 540 tablet 3     venlafaxine (EFFEXOR-XR)  "150 MG 24 hr capsule TAKE 1 CAPSULE(150 MG) BY MOUTH DAILY 90 capsule 0     fluticasone (FLONASE) 50 MCG/ACT spray Spray 2 sprays into both nostrils daily 3 Bottle 11     lidocaine (LIDODERM) 5 % Patch Apply up to 3 patches to painful area at once for up to 12 h within a 24 h period.  Remove after 12 hours. 60 patch 2     CEPHALEXIN PO Take 500 mg by mouth 3 times daily as needed (when cellulitis is coming on)        pregabalin (LYRICA) 150 MG capsule Take 1 capsule (150 mg) by mouth 2 times daily 60 capsule 5     nortriptyline (PAMELOR) 25 MG capsule TAKE 2 CAPSULES(50 MG) BY MOUTH AT BEDTIME 60 capsule 11     Multiple Vitamins-Minerals (CENTRUM SILVER) per tablet Take 1 tablet by mouth daily       OTC products: None, except as noted above    Allergies   Allergen Reactions     Ceftriaxone Anaphylaxis and Rash     Rocephin given. Developed rash to back and abd, awaiting to see if it improves with d/c of rocephin.        Nafcillin Rash     diffuse severe rash in hospital 2/05     Penicillins Anaphylaxis     Vancomycin Anaphylaxis and Rash     Codeine Fatigue     Sleeps continuously     Clindamycin Rash     received information from patient     Zithromax [Azithromycin] Rash      Latex Allergy: NO    Social History   Substance Use Topics     Smoking status: Former Smoker     Packs/day: 2.00     Years: 22.00     Types: Cigarettes     Start date: 1967     Quit date: 1990     Smokeless tobacco: Never Used      Comment: started smoking 22's- quit in 1990     Alcohol use No      Comment: RARELY     History   Drug Use No       REVIEW OF SYSTEMS:                                                    Constitutional, HEENT, cardiovascular, pulmonary, gi and gu systems are negative, except as otherwise noted.      EXAM:                                                    /68  Pulse 72  Temp 98.6  F (37  C) (Oral)  Resp 20  Ht 5' 3.25\" (1.607 m)  Wt (!) 322 lb (146.1 kg)  SpO2 94%  BMI 56.59 kg/m2    GENERAL APPEARANCE: " healthy, alert and no distress     EYES: EOMI, PERRL     HENT: ear canals and TM's normal and nose and mouth without ulcers or lesions     NECK: no adenopathy, no asymmetry, masses, or scars and thyroid normal to palpation     RESP: lungs clear to auscultation - no rales, rhonchi or wheezes     CV: regular rates and rhythm, normal S1 S2, no S3 or S4 and no murmur, click or rub     ABDOMEN:  soft, nontender, no HSM or masses and bowel sounds normal     PSYCH: mentation appears normal. and affect normal/bright    DIAGNOSTICS:                                                    EKG: appears normal, NSR, normal axis, normal intervals, no acute ST/T changes c/w ischemia, no LVH by voltage criteria, Right Bundle Branch Block, unchanged from previous tracings  Serum Potassium  Serum Creatinine  INR        IMPRESSION:                                                    Reason for surgery/procedure: chronic cough    The proposed surgical procedure is considered LOW risk.    REVISED CARDIAC RISK INDEX  The patient has the following serious cardiovascular risks for perioperative complications such as (MI, PE, VFib and 3  AV Block):  No serious cardiac risks  INTERPRETATION: 0 risks: Class I (very low risk - 0.4% complication rate)    The patient has the following additional risks for perioperative complications:  No identified additional risks      ICD-10-CM    1. Preop general physical exam Z01.818 Basic metabolic panel   2. Chronic cough R05 Basic metabolic panel   3. Essential hypertension with goal blood pressure less than 140/90 I10    4. Atrial flutter, unspecified type (H) I48.92 INR       RECOMMENDATIONS:                                                          --Patient is to take all scheduled medications on the day of surgery EXCEPT for modifications listed below.    Anticoagulant or Antiplatelet Medication Use  WARFARIN: Stop 5 days prior to surgery          APPROVAL GIVEN to proceed with proposed procedure, without  further diagnostic evaluation       Signed Electronically by: Nelly Pearl MD    Copy of this evaluation report is provided to requesting physician.    [place] Preop Guidelinesin[place]

## 2017-10-25 NOTE — LETTER
[facility]  [address]  [place] [address]                  [phone]   October 27, 2017    Berenice Chaudhari  [address]  [place] [address]      Dear Berenice,    Here is a summary of your recent test results:    Your eletrolytes and kidney function are stable.    Your test results are enclosed.      Please contact me if you have any questions.           Thank you very much for choosing [facility]    Best regards,    Nelyl Pearl MD        Results for orders placed or performed in visit on 10/25/17   Basic metabolic panel   Result Value Ref Range    Sodium 141 133 - 144 mmol/L    Potassium 4.0 3.4 - 5.3 mmol/L    Chloride 106 94 - 109 mmol/L    Carbon Dioxide 27 20 - 32 mmol/L    Anion Gap 8 3 - 14 mmol/L    Glucose 141 (H) 70 - 99 mg/dL    Urea Nitrogen 23 7 - 30 mg/dL    Creatinine 1.31 (H) 0.52 - 1.04 mg/dL    GFR Estimate 40 (L) >60 mL/min/1.7m2    GFR Estimate If Black 48 (L) >60 mL/min/1.7m2    Calcium 8.7 8.5 - 10.1 mg/dL   INR   Result Value Ref Range    INR 1.10 0.86 - 1.14

## 2017-10-25 NOTE — MR AVS SNAPSHOT
After Visit Summary   10/25/2017    Berenice Chaudhari    MRN: [id number]           Patient Information     Date Of Birth          1940        Visit Information        Provider Department      10/25/2017 11:20 AM Nelly Pearl MD [facility]        Today's Diagnoses     Preop general physical exam    -  1    Chronic cough        Essential hypertension with goal blood pressure less than 140/90        Atrial flutter, unspecified type (H)          Care Instructions    Pre-op Instructions:  -One week prior to surgery: NO aspirin or ibuprofen products (Advil, excerdrin, etc)  -OK to use tylenol for aches and pains    FIVE days prior to surgery (11/4/17) - STOP taking your warfarin      Before Your Surgery      Call your surgeon if there is any change in your health. This includes signs of a cold or flu (such as a sore throat, runny nose, cough, rash or fever).    Do not smoke, drink alcohol or take over the counter medicine (unless your surgeon or primary care doctor tells you to) for the 24 hours before and after surgery.    If you take prescribed drugs: Follow your doctor s orders about which medicines to take and which to stop until after surgery.    Eating and drinking prior to surgery: follow the instructions from your surgeon    Take a shower or bath the night before surgery. Use the soap your surgeon gave you to gently clean your skin. If you do not have soap from your surgeon, use your regular soap. Do not shave or scrub the surgery site.  Wear clean pajamas and have clean sheets on your bed.           Follow-ups after your visit        Your next 10 appointments already scheduled     Oct 26, 2017  4:15 PM CDT   Anticoagulation Visit with  ANTICOAGULATION CLINIC   [facility] [place] ([facility])    [address] [place] [address]  [address]  [facility] [address]   [phone]            Nov 09, 2017   Procedure with Buck Ribeiro MD   [facility]  "Services (--)    [address]  [facility] [id number] [phone]              Who to contact     If you have questions or need follow up information about today's clinic visit or your schedule please contact [facility] directly at [phone].  Normal or non-critical lab and imaging results will be communicated to you by MyChart, letter or phone within 4 business days after the clinic has received the results. If you do not hear from us within 7 days, please contact the clinic through MyChart or phone. If you have a critical or abnormal lab result, we will notify you by phone as soon as possible.  Submit refill requests through Deed or call your pharmacy and they will forward the refill request to us. Please allow 3 business days for your refill to be completed.          Additional Information About Your Visit        MyChart Information     Deed lets you send messages to your doctor, view your test results, renew your prescriptions, schedule appointments and more. To sign up, go to [email or web address] . Click on \"Log in\" on the left side of the screen, which will take you to the Welcome page. Then click on \"Sign up Now\" on the right side of the page.     You will be asked to enter the access code listed below, as well as some personal information. Please follow the directions to create your username and password.     Your access code is: [email or web address]  Expires: 2017  6:04 PM     Your access code will  in 90 days. If you need help or a new code, please call your [place] clinic or [phone].        Care EveryWhere ID     This is your Care EveryWhere ID. This could be used by other organizations to access your [place] medical records  [id number]        Your Vitals Were     Pulse Temperature Respirations Height Pulse Oximetry BMI (Body Mass Index)    72 98.6  F (37  C) (Oral) 20 5' 3.25\" (1.607 m) 94% 56.59 kg/m2       Blood Pressure from Last 3 Encounters: "   10/25/17 126/68   10/02/17 149/81   09/12/17 126/70    Weight from Last 3 Encounters:   10/25/17 (!) 322 lb (146.1 kg)   10/02/17 (!) 332 lb (150.6 kg)   08/08/17 (!) 332 lb 14.4 oz (151 kg)              We Performed the Following     Basic metabolic panel        Primary Care Provider Office Phone # Fax #    Nelly Pearl [phone] [address] [place] [address]        Equal Access to Services     [facility]: Hadii aad ku hadasho Soomaali, waaxda luqadaha, qaybta kaalmada selena, calin sue . So [facility] [phone].    ATENCIÓN: Si habla español, tiene a dickey disposición servicios gratuitos de asistencia lingüística. [facility] [phone].    We comply with applicable federal civil rights laws and [place] laws. We do not discriminate on the basis of race, color, national origin, age, disability, sex, sexual orientation, or gender identity.            Thank you!     Thank you for choosing [facility]  for your care. Our goal is always to provide you with excellent care. Hearing back from our patients is one way we can continue to improve our services. Please take a few minutes to complete the written survey that you may receive in the mail after your visit with us. Thank you!             Your Updated Medication List - Protect others around you: Learn how to safely use, store and throw away your medicines at www.disposemymeds.org.          This list is accurate as of: 10/25/17 11:58 AM.  Always use your most recent med list.                   Brand Name Dispense Instructions for use Diagnosis    buPROPion 300 MG 24 hr tablet    WELLBUTRIN XL    90 tablet    Take 1 tablet (300 mg) by mouth every morning    Major depressive disorder, recurrent, in full remission (H)       CENTRUM SILVER per tablet      Take 1 tablet by mouth daily        CEPHALEXIN PO      Take 500 mg by mouth 3 times daily as needed (when cellulitis is coming  on)        diltiazem 120 MG 24 hr capsule    CARDIZEM CD/CARTIA XT    90 capsule    Take 1 capsule (120 mg) by mouth daily    Atrial flutter with rapid ventricular response (H)       fluticasone 50 MCG/ACT spray    FLONASE    3 Bottle    Spray 2 sprays into both nostrils daily    Post-nasal drip       furosemide 20 MG tablet    LASIX    90 tablet    Take 1 tablet (20 mg) by mouth daily    Atrial flutter with rapid ventricular response (H)       lidocaine 5 % Patch    LIDODERM    60 patch    Apply up to 3 patches to painful area at once for up to 12 h within a 24 h period.  Remove after 12 hours.    Postherpetic neuralgia       metoprolol 50 MG 24 hr tablet    TOPROL-XL    180 tablet    Take 1 tablet (50 mg) by mouth 2 times daily    Essential hypertension with goal blood pressure less than 140/90       minocycline 50 MG capsule    MINOCIN/DYNACIN    60 capsule    TAKE 1 CAPSULE(50 MG) BY MOUTH TWICE DAILY    Methicillin susceptible Staphylococcus aureus septicemia (H)       NEURONTIN 600 MG tablet   Generic drug:  gabapentin     540 tablet    Take 2 tablets (1,200 mg) by mouth 3 times daily    Postherpetic neuralgia       nortriptyline 25 MG capsule    PAMELOR    60 capsule    TAKE 2 CAPSULES(50 MG) BY MOUTH AT BEDTIME    Post herpetic neuralgia       pantoprazole 40 MG EC tablet    PROTONIX    90 tablet    TAKE 1 TABLET(40 MG) BY MOUTH DAILY    Gastroesophageal reflux disease without esophagitis       pravastatin 10 MG tablet    PRAVACHOL    90 tablet    Take 1 tablet (10 mg) by mouth At Bedtime    Hyperlipidemia LDL goal <100       pregabalin 150 MG capsule    LYRICA    60 capsule    Take 1 capsule (150 mg) by mouth 2 times daily    Postherpetic neuralgia       venlafaxine 150 MG 24 hr capsule    EFFEXOR-XR    90 capsule    TAKE 1 CAPSULE(150 MG) BY MOUTH DAILY    Major depressive disorder, recurrent episode, moderate (H)       * warfarin 5 MG tablet    COUMADIN    90 tablet    Take 1 tablet (5 mg) by mouth daily     Atrial flutter with rapid ventricular response (H)       * warfarin 2.5 MG tablet    COUMADIN    135 tablet    Take by mouth 5 mg qd or as directed by INR clinic    Atrial flutter with rapid ventricular response (H), Long-term (current) use of anticoagulants       zolpidem 5 MG tablet    AMBIEN    1 tablet    Take tablet by mouth 15 minutes prior to sleep, for Sleep Study    Sleep disturbance       * Notice:  This list has 2 medication(s) that are the same as other medications prescribed for you. Read the directions carefully, and ask your doctor or other care provider to review them with you.

## 2017-10-25 NOTE — NURSING NOTE
"Chief Complaint   Patient presents with     Pre-Op Exam       Initial /52  Pulse 72  Temp 98.6  F (37  C) (Oral)  Resp 20  Ht 5' 3.25\" (1.607 m)  Wt (!) 322 lb (146.1 kg)  SpO2 94%  BMI 56.59 kg/m2 Estimated body mass index is 56.59 kg/(m^2) as calculated from the following:    Height as of this encounter: 5' 3.25\" (1.607 m).    Weight as of this encounter: 322 lb (146.1 kg).  Medication Reconciliation: complete   Karyna BURROWS, CMA,AAMA      "

## 2017-10-25 NOTE — PATIENT INSTRUCTIONS
Pre-op Instructions:  -One week prior to surgery: NO aspirin or ibuprofen products (Advil, excerdrin, etc)  -OK to use tylenol for aches and pains    FIVE days prior to surgery (11/4/17) - STOP taking your warfarin      Before Your Surgery      Call your surgeon if there is any change in your health. This includes signs of a cold or flu (such as a sore throat, runny nose, cough, rash or fever).    Do not smoke, drink alcohol or take over the counter medicine (unless your surgeon or primary care doctor tells you to) for the 24 hours before and after surgery.    If you take prescribed drugs: Follow your doctor s orders about which medicines to take and which to stop until after surgery.    Eating and drinking prior to surgery: follow the instructions from your surgeon    Take a shower or bath the night before surgery. Use the soap your surgeon gave you to gently clean your skin. If you do not have soap from your surgeon, use your regular soap. Do not shave or scrub the surgery site.  Wear clean pajamas and have clean sheets on your bed.

## 2017-10-26 ENCOUNTER — ANTICOAGULATION THERAPY VISIT (OUTPATIENT)
Dept: NURSING | Facility: CLINIC | Age: 77
End: 2017-10-26
Payer: COMMERCIAL

## 2017-10-26 DIAGNOSIS — Z79.01 LONG-TERM (CURRENT) USE OF ANTICOAGULANTS: ICD-10-CM

## 2017-10-26 DIAGNOSIS — I48.92 ATRIAL FLUTTER (H): ICD-10-CM

## 2017-10-26 DIAGNOSIS — I48.92 ATRIAL FLUTTER WITH RAPID VENTRICULAR RESPONSE (H): ICD-10-CM

## 2017-10-26 LAB
ANION GAP SERPL CALCULATED.3IONS-SCNC: 8 MMOL/L (ref 3–14)
BUN SERPL-MCNC: 23 MG/DL (ref 7–30)
CALCIUM SERPL-MCNC: 8.7 MG/DL (ref 8.5–10.1)
CHLORIDE SERPL-SCNC: 106 MMOL/L (ref 94–109)
CO2 SERPL-SCNC: 27 MMOL/L (ref 20–32)
CREAT SERPL-MCNC: 1.31 MG/DL (ref 0.52–1.04)
GFR SERPL CREATININE-BSD FRML MDRD: 40 ML/MIN/1.7M2
GLUCOSE SERPL-MCNC: 141 MG/DL (ref 70–99)
POTASSIUM SERPL-SCNC: 4 MMOL/L (ref 3.4–5.3)
SODIUM SERPL-SCNC: 141 MMOL/L (ref 133–144)

## 2017-10-26 PROCEDURE — 99207 ZZC NO CHARGE NURSE ONLY: CPT

## 2017-10-26 NOTE — MR AVS SNAPSHOT
Berenice VANCE Chaudhari   10/26/2017 4:15 PM   Anticoagulation Therapy Visit    Description:  77 year old female   Provider:   [facility]   Department:  [facility]           INR as of 10/26/2017     Today's INR 1.10! (10/25/2017)      Anticoagulation Summary as of 10/26/2017     INR goal 2.0-3.0   Today's INR 1.10! (10/25/2017)   Full instructions 10/26: 5 mg; 10/27: 5 mg; 11/4: Hold; 11/5: Hold; 11/6: Hold; 11/7: Hold; 11/8: Hold; Otherwise 5 mg on Mon; 2.5 mg all other days   Next INR check 11/2/2017    Indications   Atrial flutter (H) [I48.92]  Atrial flutter with rapid ventricular response (H) [I48.92]  Long-term (current) use of anticoagulants [Z79.01] [Z79.01]         Your next Anticoagulation Clinic appointment(s)     Nov 02, 2017  4:00 PM CDT   Anticoagulation Visit with  ANTICOAGULATION CLINIC   [facility] ([facility])    [address] [place] [address] [address]  [facility] [address] [phone]              Contact Numbers     [facility]  Please call  [phone] to cancel and/or reschedule your appointment   Please call  [phone] with any problems or questions regarding your therapy.        October 2017 Details    Sun Mon Tue Wed Thu Fri Sat     1               2               3               4               5               6               7                 8               9               10               11               12               13               14                 15               16               17               18               19               20               21                 22               23               24               25               26      5 mg   See details      27      5 mg         28      2.5 mg           29      2.5 mg         30      5 mg         31      2.5 mg              Date Details   10/26 This INR check               How to take your warfarin dose     To take:  2.5 mg Take 0.5 of a 5 mg tablet.    To take:  5 mg Take 1 of  the 5 mg tablets.           November 2017 Details    Sun Mon Tue Wed Thu Fri Sat        1      2.5 mg         2            3               4                 5               6               7               8               9               10               11                 12               13               14               15               16               17               18                 19               20               21               22               23               24               25                 26               27               28               29               30                  Date Details   No additional details    Date of next INR:  11/2/2017         How to take your warfarin dose     To take:  2.5 mg Take 0.5 of a 5 mg tablet.

## 2017-10-26 NOTE — PROGRESS NOTES
ANTICOAGULATION FOLLOW-UP     Patient Name:  Berenice Chaudhari  Date:  10/26/2017  Contact Type:  Telephone  Instructions given to patient over the phone. She verbalized understanding.      SUBJECTIVE:     Patient Findings     Positives No Problem Findings, Unexplained INR or factor level change    Comments Denies missing any doses or increased Vit K intake.             OBJECTIVE    INR   Date Value Ref Range Status   10/25/2017 1.10 0.86 - 1.14 Final     Comment:     This test is intended for monitoring Coumadin therapy.  Results are not   accurate in patients with prolonged INR due to factor deficiency.         ASSESSMENT / PLAN  INR assessment SUB    Recheck INR In: 1 WEEK    INR Location Clinic Had INR done at  lab yesterday after an office visit with PCP     Anticoagulation Summary as of 10/26/2017     INR goal 2.0-3.0   Today's INR 1.10! (10/25/2017)   Maintenance plan 5 mg (5 mg x 1) on Mon; 2.5 mg (5 mg x 0.5) all other days   Full instructions 10/26: 5 mg; 10/27: 5 mg; 11/4: Hold; 11/5: Hold; 11/6: Hold; 11/7: Hold; 11/8: Hold; Otherwise 5 mg on Mon; 2.5 mg all other days   Weekly total 20 mg   Plan last modified Mary Butcher RN (10/26/2017)   Next INR check 11/2/2017   Target end date Indefinite    Indications   Atrial flutter (H) [I48.92]  Atrial flutter with rapid ventricular response (H) [I48.92]  Long-term (current) use of anticoagulants [Z79.01] [Z79.01]         Anticoagulation Episode Summary     INR check location     Preferred lab     Send INR reminders to [facility]    Comments 5mg tabs; HS dosing, [facility] [phone]      Anticoagulation Care Providers     Provider Role Specialty Phone number    Nelly Pearl MD Referring Internal Medicine [phone]            See the Encounter Report to view Anticoagulation Flowsheet and Dosing Calendar (Go to Encounters tab in chart review, and find the Anticoagulation Therapy Visit)        Mary Butcher, LEXIE

## 2017-10-28 DIAGNOSIS — F33.1 MAJOR DEPRESSIVE DISORDER, RECURRENT EPISODE, MODERATE (H): ICD-10-CM

## 2017-11-01 RX ORDER — VENLAFAXINE HYDROCHLORIDE 150 MG/1
CAPSULE, EXTENDED RELEASE ORAL
Qty: 90 CAPSULE | Refills: 0 | Status: SHIPPED | OUTPATIENT
Start: 2017-11-01 | End: 2018-01-11

## 2017-11-01 NOTE — TELEPHONE ENCOUNTER
Requested Prescriptions   Pending Prescriptions Disp Refills     venlafaxine (EFFEXOR-XR) 150 MG 24 hr capsule 90 capsule 0    Serotonin-Norepinephrine Reuptake Inhibitors  Failed    10/28/2017  6:22 PM       Failed - PHQ-9 score of less than 5 in past 6 months    Please review PHQ-9 score.          Failed - Normal serum creatinine on file in past 12 months    Recent Labs   Lab Test  10/25/17   1159   CR  1.31*            Passed - Blood pressure under 140/90    BP Readings from Last 3 Encounters:   10/25/17 126/68   10/02/17 149/81   09/12/17 126/70                Passed - Patient is age 18 or older       Passed - No active pregnancy on record       Passed - No positive pregnancy test in past 12 months       Passed - Recent (6 mo) or future visit with authorizing provider's specialty    Patient had office visit in the last 6 months or has a visit in the next 30 days with authorizing provider.  See chart review.             Routing refill request to provider for review/approval because:  Phq9>4, creatinine elevated and bp above parameters

## 2017-11-02 ENCOUNTER — ANTICOAGULATION THERAPY VISIT (OUTPATIENT)
Dept: NURSING | Facility: CLINIC | Age: 77
End: 2017-11-02
Payer: COMMERCIAL

## 2017-11-02 DIAGNOSIS — I48.92 ATRIAL FLUTTER (H): ICD-10-CM

## 2017-11-02 DIAGNOSIS — I48.92 ATRIAL FLUTTER WITH RAPID VENTRICULAR RESPONSE (H): ICD-10-CM

## 2017-11-02 DIAGNOSIS — Z79.01 LONG-TERM (CURRENT) USE OF ANTICOAGULANTS: ICD-10-CM

## 2017-11-02 PROCEDURE — 99207 ZZC NO CHARGE NURSE ONLY: CPT

## 2017-11-02 NOTE — PROGRESS NOTES
ANTICOAGULATION FOLLOW-UP     Patient Name:  Berenice Chaudhari  Date:  11/2/2017  Contact Type:  Telephone    SUBJECTIVE:     Patient Findings     Comments Patient's  Ricardo called.  Patient is having an EGD under general anesthesia on Thursday 11/9/17.  She will start holding warfarin on Saturday.  She wants to skip her INR check today - she only needs dosing for today and tomorrow.  Instructions given to Ricardo over the phone. He verbalized understanding and said he wrote them down.             OBJECTIVE    INR   Date Value Ref Range Status   10/25/2017 1.10 0.86 - 1.14 Final     Comment:     This test is intended for monitoring Coumadin therapy.  Results are not   accurate in patients with prolonged INR due to factor deficiency.         ASSESSMENT / PLAN  Recheck INR In: 2 WEEKS      Anticoagulation Summary as of 11/2/2017     INR goal 2.0-3.0   Today's INR No new INR was available at the time of this encounter.   Maintenance plan 5 mg (5 mg x 1) on Mon; 2.5 mg (5 mg x 0.5) all other days   Full instructions 11/4: Hold; 11/5: Hold; 11/6: Hold; 11/7: Hold; 11/8: Hold; 11/10: 5 mg; Otherwise 5 mg on Mon; 2.5 mg all other days   Weekly total 20 mg   Plan last modified Mary Butcher RN (10/26/2017)   Next INR check 11/16/2017   Target end date Indefinite    Indications   Atrial flutter (H) [I48.92]  Atrial flutter with rapid ventricular response (H) [I48.92]  Long-term (current) use of anticoagulants [Z79.01] [Z79.01]         Anticoagulation Episode Summary     INR check location     Preferred lab     Send INR reminders to  [facility]    Comments 5mg tabs; HS dosing, [facility] [phone]      Anticoagulation Care Providers     Provider Role Specialty Phone number    Nelly Pearl MD Referring Internal Medicine [phone]            See the Encounter Report to view Anticoagulation Flowsheet and Dosing Calendar (Go to Encounters tab in chart review, and find the Anticoagulation Therapy  Visit)        Mary Butcher RN

## 2017-11-02 NOTE — MR AVS SNAPSHOT
Berenice Chaudhari   11/2/2017 4:00 PM   Anticoagulation Therapy Visit    Description:  77 year old female   Provider:  [facility]   Department:  [facility]           INR as of 11/2/2017     Today's INR No new INR was available at the time of this encounter.      Anticoagulation Summary as of 11/2/2017     INR goal 2.0-3.0   Today's INR No new INR was available at the time of this encounter.   Full instructions 11/4: Hold; 11/5: Hold; 11/6: Hold; 11/7: Hold; 11/8: Hold; 11/10: 5 mg; Otherwise 5 mg on Mon; 2.5 mg all other days   Next INR check 11/16/2017    Indications   Atrial flutter (H) [I48.92]  Atrial flutter with rapid ventricular response (H) [I48.92]  Long-term (current) use of anticoagulants [Z79.01] [Z79.01]         Contact Numbers     [facility]  Please call  [phone] to cancel and/or reschedule your appointment   Please call  [phone] with any problems or questions regarding your therapy.        November 2017 Details    Sun Mon Tue Wed Thu Fri Sat        1               2      2.5 mg   See details      3      2.5 mg         4      Hold           5      Hold         6      Hold         7      Hold         8      Hold         9      2.5 mg         10      5 mg         11      2.5 mg           12      2.5 mg         13      5 mg         14      2.5 mg         15      2.5 mg         16            17               18                 19               20               21               22               23               24               25                 26               27               28               29               30                  Date Details   11/02 This INR check       Date of next INR:  11/16/2017         How to take your warfarin dose     To take:  2.5 mg Take 0.5 of a 5 mg tablet.    To take:  5 mg Take 1 of the 5 mg tablets.    Hold Do not take your warfarin dose. See the Details table to the right for additional instructions.

## 2017-11-07 ENCOUNTER — DOCUMENTATION ONLY (OUTPATIENT)
Dept: CARE COORDINATION | Facility: CLINIC | Age: 77
End: 2017-11-07

## 2017-11-07 NOTE — PROGRESS NOTES
[facility] now requests orders and shares plan of care/discharge summaries for some patients through [employer].  Please REPLY TO THIS MESSAGE in order to give authorization for orders when needed.  This is considered a verbal order, you will still receive a faxed copy of orders for signature.  Thank you for your assistance in improving collaboration for our patients.    ORDER  SN 1 x w x 5 and 3 as needed    MD SUMMARY/PLAN OF CARE  Pt needs continueation of services due to resp status, lab draws, and f/u post endoscopy procedure this week.

## 2017-11-08 NOTE — H&P (VIEW-ONLY)
[facility]  [address]  [address]  [place] [address]  [phone]  Dept: [phone]    PRE-OP EVALUATION:  Today's date: 10/25/2017    Berenice Chaudhari (: 1940) presents for pre-operative evaluation assessment as requested by Dr. Ribeiro.  She requires evaluation and anesthesia risk assessment prior to undergoing surgery/procedure for treatment of chronic cough.  Proposed procedure: endoscopy    Date of Surgery/ Procedure:   Time of Surgery/ Procedure: 12:30 arrival time at 10:30  Hospital/Surgical Facility: [facility]  Fax number for surgical facility:   Primary Physician: Nelly Pearl  Type of Anesthesia Anticipated: General    Patient has a Health Care Directive or Living Will:  YES not notarized    Preop Questions 10/25/2017   1.  Do you have a history of heart attack, stroke, stent, bypass or surgery on an artery in the head, neck, heart or legs? YES    2.  Do you ever have any pain or discomfort in your chest? No   3.  Do you have a history of  Heart Failure? No   4.   Are you troubled by shortness of breath when:  walking on a level surface, or up a slight hill, or at night? YES - worse with cough fit or a long sentence   5.  Do you currently have a cold, bronchitis or other respiratory infection? No   6.  Do you have a cough, shortness of breath, or wheezing? YES - chronic cough x 1 year   7.  Do you sometimes get pains in the calves of your legs when you walk? No   8. Do you or anyone in your family have previous history of blood clots? No   9.  Do you or does anyone in your family have a serious bleeding problem such as prolonged bleeding following surgeries or cuts? No   10. Have you ever had problems with anemia or been told to take iron pills? No   11. Have you had any abnormal blood loss such as black, tarry or bloody stools, or abnormal vaginal bleeding? No   12. Have you ever had a blood transfusion? YES    13. Have you or  any of your relatives ever had problems with anesthesia? No   14. Do you have sleep apnea, excessive snoring or daytime drowsiness? No   15. Do you have any prosthetic heart valves? No   16. Do you have prosthetic joints? No   17. Is there any chance that you may be pregnant? No           HPI:                                                      Brief HPI related to upcoming procedure: Patient with chronic cough x 1 year - pulmonary workup negative for etiology.  Patient would like to pursue endoscopy.      See problem list for active medical problems.  Problems all longstanding and stable, except as noted/documented.  See ROS for pertinent symptoms related to these conditions.                                                                                                  .  HYPERTENSION - Patient has longstanding history of mod-severe HTN , currently denies any symptoms referable to elevated blood pressure. Specifically denies chest pain, palpitations, dyspnea, orthopnea, PND or peripheral edema. Blood pressure readings have been in normal range. Current medication regimen is as listed below. Patient denies any side effects of medication.                                                                                                                                                                                          .    MEDICAL HISTORY:                                                    Patient Active Problem List    Diagnosis Date Noted     Gastroesophageal reflux disease without esophagitis 10/04/2017     Priority: Medium     Long-term (current) use of anticoagulants [Z79.01] 08/15/2017     Priority: Medium     Atrial flutter (H) 08/08/2017     Priority: Medium     Atrial flutter with rapid ventricular response (H) 08/08/2017     Priority: Medium     Cellulitis 06/21/2017     Priority: Medium     Right shoulder pain 12/26/2016     Priority: Medium     CKD (chronic kidney disease) stage 3, GFR 30-59 ml/min  03/04/2016     Priority: Medium     Cellulitis of right lower extremity 11/04/2014     Priority: Medium     Cough 09/30/2014     Priority: Medium     Severe obesity (BMI >= 40) (H) 09/30/2014     Priority: Medium     Major depressive disorder, recurrent episode, in full remission (HCC) 04/09/2014     Priority: Medium     Anxiety 06/13/2013     Priority: Medium     Chronic renal disease 01/05/2013     Priority: Medium     Health Care Home 08/27/2012     Priority: Medium     EMERGENCY CARE PLAN  Presenting Problem Signs and Symptoms Treatment Plan    Questions or conerns during clinic hours    I will call the clinic directly     Questions or conerns outside clinic hours    I will call the 24 hour nurse line at [phone]    Patient needs to schedule an appointment    I will call the 24 hour scheduling team at [phone] or clinic directly    Same day treatment     I will call the clinic first, nurse line if after hours, urgent care and express care if needed                          DX V65.8 REPLACED WITH [id number] HEALTH CARE HOME (04/08/2013)       Post herpetic neuralgia 03/15/2012     Priority: Medium     Hyperlipidemia LDL goal <100 09/16/2011     Priority: Medium     Advanced directives, counseling/discussion 05/11/2011     Priority: Medium     Parent voices understanding and acceptance of this advice and will call back if any further questions or concerns.         Erythroderma desquamativum      Priority: Medium     see hospital records;; may have has toxic shock syndrome & upon recovery had total body desquamation ..  less likely = raction to vancomycin        Generalized osteoarthrosis, unspecified site 01/12/2005     Priority: Medium     films 9/04: L hhip bad; both knees mod severe medial OA       Obesity 01/12/2005     Priority: Medium     Problem list name updated by automated process. Provider to review       Essential hypertension with goal blood pressure less than 140/90 01/12/2005     Priority: Medium      Problem list name updated by automated process. Provider to review       Allergic rhinitis 01/12/2005     Priority: Medium     Problem list name updated by automated process. Provider to review       Urticaria 07/01/1993     Priority: Medium     Diffuse severe (hospitalized).  (+) skin biopsy by Dr. Figueroa  Problem list name updated by automated process. Provider to review        Past Medical History:   Diagnosis Date     Allergic rhinitis, cause unspecified      Anxiety 6/13/2013     Atrial flutter with rapid ventricular response (H) 8/8/2017     Cellulitis and abscess of leg, except foot recurrent , both legs    begins with fever, nausea, diarrhea, vomiting & & the cellulitis may be visible a day or 2 later     Chronic pain     On chronic fentanyl patch.     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  less likely = raction to vancomycin      Essential hypertension with goal blood pressure less than 140/90 1/12/2005     Problem list name updated by automated process. Provider to review     Generalized osteoarthrosis, unspecified site     films 9/04: L hhip bad; both knees mod severe medial OA     GERD (gastroesophageal reflux disease) 11/15/2013     Herpes zoster without mention of complication 9/03    L shoulder; still has neuralgia     Hyperlipidemia LDL goal <100 9/16/2011     Methicillin susceptible Staphylococcus aureus septicemia (H) after L hip injection 205    ARDS, renal failure, staph sepsis after a hip joint injection     Obesity 1/12/2005     Problem list name updated by automated process. Provider to review     Other diseases of lung, not elsewhere classified 07/93    Interstitial lung process     Pneumonia, organism unspecified(486)      Urticaria, unspecified 07/93    Diffuse severe  (hospitalized).  (+) skin biopsy by Dr. Figueroa     Past Surgical History:   Procedure Laterality Date     BIOPSY       C NONSPECIFIC PROCEDURE      Extensive oral (dental) surgery     C NONSPECIFIC PROCEDURE      Remote T&A     C NONSPECIFIC PROCEDURE  1/03    LAP CHOLY     C NONSPECIFIC PROCEDURE      L hip I + D x 2  4/05     C NONSPECIFIC PROCEDURE  2/05    colonoscopy     C NONSPECIFIC PROCEDURE      L hip I + D several other times      CATARACT IOL, RT/LT Right 2012     CHOLECYSTECTOMY       ENT SURGERY       PHACOEMULSIFICATION CLEAR CORNEA WITH STANDARD INTRAOCULAR LENS IMPLANT Left 3/10/2016    Procedure: PHACOEMULSIFICATION CLEAR CORNEA WITH STANDARD INTRAOCULAR LENS IMPLANT;  Surgeon: Dwight Metcalf MD;  [facility]     Current Outpatient Prescriptions   Medication Sig Dispense Refill     pantoprazole (PROTONIX) 40 MG EC tablet TAKE 1 TABLET(40 MG) BY MOUTH DAILY 90 tablet 3     zolpidem (AMBIEN) 5 MG tablet Take tablet by mouth 15 minutes prior to sleep, for Sleep Study 1 tablet 0     warfarin (COUMADIN) 2.5 MG tablet Take by mouth 5 mg qd or as directed by INR clinic 135 tablet 1     diltiazem (CARDIZEM CD/CARTIA XT) 120 MG 24 hr capsule Take 1 capsule (120 mg) by mouth daily 90 capsule 3     furosemide (LASIX) 20 MG tablet Take 1 tablet (20 mg) by mouth daily 90 tablet 3     buPROPion (WELLBUTRIN XL) 300 MG 24 hr tablet Take 1 tablet (300 mg) by mouth every morning 90 tablet 1     minocycline (MINOCIN/DYNACIN) 50 MG capsule TAKE 1 CAPSULE(50 MG) BY MOUTH TWICE DAILY 60 capsule 11     pravastatin (PRAVACHOL) 10 MG tablet Take 1 tablet (10 mg) by mouth At Bedtime 90 tablet 3     metoprolol (TOPROL-XL) 50 MG 24 hr tablet Take 1 tablet (50 mg) by mouth 2 times daily 180 tablet 3     warfarin (COUMADIN) 5 MG tablet Take 1 tablet (5 mg) by mouth daily 90 tablet 3     gabapentin (NEURONTIN) 600 MG tablet Take 2 tablets (1,200 mg) by mouth 3 times daily 540 tablet 3     venlafaxine (EFFEXOR-XR)  "150 MG 24 hr capsule TAKE 1 CAPSULE(150 MG) BY MOUTH DAILY 90 capsule 0     fluticasone (FLONASE) 50 MCG/ACT spray Spray 2 sprays into both nostrils daily 3 Bottle 11     lidocaine (LIDODERM) 5 % Patch Apply up to 3 patches to painful area at once for up to 12 h within a 24 h period.  Remove after 12 hours. 60 patch 2     CEPHALEXIN PO Take 500 mg by mouth 3 times daily as needed (when cellulitis is coming on)        pregabalin (LYRICA) 150 MG capsule Take 1 capsule (150 mg) by mouth 2 times daily 60 capsule 5     nortriptyline (PAMELOR) 25 MG capsule TAKE 2 CAPSULES(50 MG) BY MOUTH AT BEDTIME 60 capsule 11     Multiple Vitamins-Minerals (CENTRUM SILVER) per tablet Take 1 tablet by mouth daily       OTC products: None, except as noted above    Allergies   Allergen Reactions     Ceftriaxone Anaphylaxis and Rash     Rocephin given. Developed rash to back and abd, awaiting to see if it improves with d/c of rocephin.        Nafcillin Rash     diffuse severe rash in hospital 2/05     Penicillins Anaphylaxis     Vancomycin Anaphylaxis and Rash     Codeine Fatigue     Sleeps continuously     Clindamycin Rash     received information from patient     Zithromax [Azithromycin] Rash      Latex Allergy: NO    Social History   Substance Use Topics     Smoking status: Former Smoker     Packs/day: 2.00     Years: 22.00     Types: Cigarettes     Start date: 1967     Quit date: 1990     Smokeless tobacco: Never Used      Comment: started smoking 22's- quit in 1990     Alcohol use No      Comment: RARELY     History   Drug Use No       REVIEW OF SYSTEMS:                                                    Constitutional, HEENT, cardiovascular, pulmonary, gi and gu systems are negative, except as otherwise noted.      EXAM:                                                    /68  Pulse 72  Temp 98.6  F (37  C) (Oral)  Resp 20  Ht 5' 3.25\" (1.607 m)  Wt (!) 322 lb (146.1 kg)  SpO2 94%  BMI 56.59 kg/m2    GENERAL APPEARANCE: " healthy, alert and no distress     EYES: EOMI, PERRL     HENT: ear canals and TM's normal and nose and mouth without ulcers or lesions     NECK: no adenopathy, no asymmetry, masses, or scars and thyroid normal to palpation     RESP: lungs clear to auscultation - no rales, rhonchi or wheezes     CV: regular rates and rhythm, normal S1 S2, no S3 or S4 and no murmur, click or rub     ABDOMEN:  soft, nontender, no HSM or masses and bowel sounds normal     PSYCH: mentation appears normal. and affect normal/bright    DIAGNOSTICS:                                                    EKG: appears normal, NSR, normal axis, normal intervals, no acute ST/T changes c/w ischemia, no LVH by voltage criteria, Right Bundle Branch Block, unchanged from previous tracings  Serum Potassium  Serum Creatinine  INR        IMPRESSION:                                                    Reason for surgery/procedure: chronic cough    The proposed surgical procedure is considered LOW risk.    REVISED CARDIAC RISK INDEX  The patient has the following serious cardiovascular risks for perioperative complications such as (MI, PE, VFib and 3  AV Block):  No serious cardiac risks  INTERPRETATION: 0 risks: Class I (very low risk - 0.4% complication rate)    The patient has the following additional risks for perioperative complications:  No identified additional risks      ICD-10-CM    1. Preop general physical exam Z01.818 Basic metabolic panel   2. Chronic cough R05 Basic metabolic panel   3. Essential hypertension with goal blood pressure less than 140/90 I10    4. Atrial flutter, unspecified type (H) I48.92 INR       RECOMMENDATIONS:                                                          --Patient is to take all scheduled medications on the day of surgery EXCEPT for modifications listed below.    Anticoagulant or Antiplatelet Medication Use  WARFARIN: Stop 5 days prior to surgery          APPROVAL GIVEN to proceed with proposed procedure, without  further diagnostic evaluation       Signed Electronically by: Nelly Pearl MD    Copy of this evaluation report is provided to requesting physician.    [place] Preop Guidelinesinr

## 2017-11-09 ENCOUNTER — ANESTHESIA (OUTPATIENT)
Dept: SURGERY | Facility: CLINIC | Age: 77
End: 2017-11-09
Payer: COMMERCIAL

## 2017-11-09 ENCOUNTER — ANESTHESIA EVENT (OUTPATIENT)
Dept: SURGERY | Facility: CLINIC | Age: 77
End: 2017-11-09
Payer: COMMERCIAL

## 2017-11-09 ENCOUNTER — HOSPITAL ENCOUNTER (OUTPATIENT)
Facility: CLINIC | Age: 77
Discharge: HOME | End: 2017-11-09
Attending: INTERNAL MEDICINE | Admitting: INTERNAL MEDICINE
Payer: COMMERCIAL

## 2017-11-09 ENCOUNTER — SURGERY (OUTPATIENT)
Age: 77
End: 2017-11-09

## 2017-11-09 VITALS
SYSTOLIC BLOOD PRESSURE: 153 MMHG | RESPIRATION RATE: 18 BRPM | TEMPERATURE: 97.6 F | DIASTOLIC BLOOD PRESSURE: 94 MMHG | OXYGEN SATURATION: 99 % | HEIGHT: 63 IN

## 2017-11-09 LAB
INR PPP: 1.07 (ref 0.86–1.14)
UPPER GI ENDOSCOPY: NORMAL

## 2017-11-09 PROCEDURE — 85610 PROTHROMBIN TIME: CPT | Performed by: ANESTHESIOLOGY

## 2017-11-09 PROCEDURE — 71000027 ZZH RECOVERY PHASE 2 EACH 15 MINS: Performed by: INTERNAL MEDICINE

## 2017-11-09 PROCEDURE — 25000125 ZZHC RX 250: Performed by: NURSE ANESTHETIST, CERTIFIED REGISTERED

## 2017-11-09 PROCEDURE — 27210794 ZZH OR GENERAL SUPPLY STERILE: Performed by: INTERNAL MEDICINE

## 2017-11-09 PROCEDURE — 25000128 H RX IP 250 OP 636: Performed by: ANESTHESIOLOGY

## 2017-11-09 PROCEDURE — 37000008 ZZH ANESTHESIA TECHNICAL FEE, 1ST 30 MIN: Performed by: INTERNAL MEDICINE

## 2017-11-09 PROCEDURE — 40000306 ZZH STATISTIC PRE PROC ASSESS II: Performed by: INTERNAL MEDICINE

## 2017-11-09 PROCEDURE — 36415 COLL VENOUS BLD VENIPUNCTURE: CPT | Performed by: ANESTHESIOLOGY

## 2017-11-09 PROCEDURE — 40000063 ZZH STATISTIC EGD (OR PROCEDURE): Performed by: INTERNAL MEDICINE

## 2017-11-09 PROCEDURE — 36000050 ZZH SURGERY LEVEL 2 1ST 30 MIN: Performed by: INTERNAL MEDICINE

## 2017-11-09 PROCEDURE — 25000128 H RX IP 250 OP 636: Performed by: NURSE ANESTHETIST, CERTIFIED REGISTERED

## 2017-11-09 RX ORDER — SODIUM CHLORIDE, SODIUM LACTATE, POTASSIUM CHLORIDE, CALCIUM CHLORIDE 600; 310; 30; 20 MG/100ML; MG/100ML; MG/100ML; MG/100ML
INJECTION, SOLUTION INTRAVENOUS CONTINUOUS
Status: DISCONTINUED | OUTPATIENT
Start: 2017-11-09 | End: 2017-11-09 | Stop reason: HOSPADM

## 2017-11-09 RX ORDER — FENTANYL CITRATE 50 UG/ML
INJECTION, SOLUTION INTRAMUSCULAR; INTRAVENOUS PRN
Status: DISCONTINUED | OUTPATIENT
Start: 2017-11-09 | End: 2017-11-09

## 2017-11-09 RX ORDER — ONDANSETRON 4 MG/1
4 TABLET, ORALLY DISINTEGRATING ORAL EVERY 6 HOURS PRN
Status: DISCONTINUED | OUTPATIENT
Start: 2017-11-09 | End: 2017-11-09 | Stop reason: HOSPADM

## 2017-11-09 RX ORDER — ONDANSETRON 2 MG/ML
4 INJECTION INTRAMUSCULAR; INTRAVENOUS
Status: DISCONTINUED | OUTPATIENT
Start: 2017-11-09 | End: 2017-11-09 | Stop reason: HOSPADM

## 2017-11-09 RX ORDER — LIDOCAINE 40 MG/G
CREAM TOPICAL
Status: DISCONTINUED | OUTPATIENT
Start: 2017-11-09 | End: 2017-11-09 | Stop reason: HOSPADM

## 2017-11-09 RX ORDER — NALOXONE HYDROCHLORIDE 0.4 MG/ML
.1-.4 INJECTION, SOLUTION INTRAMUSCULAR; INTRAVENOUS; SUBCUTANEOUS
Status: DISCONTINUED | OUTPATIENT
Start: 2017-11-09 | End: 2017-11-09 | Stop reason: HOSPADM

## 2017-11-09 RX ORDER — KETAMINE HYDROCHLORIDE 10 MG/ML
INJECTION, SOLUTION INTRAMUSCULAR; INTRAVENOUS PRN
Status: DISCONTINUED | OUTPATIENT
Start: 2017-11-09 | End: 2017-11-09

## 2017-11-09 RX ORDER — ONDANSETRON 2 MG/ML
4 INJECTION INTRAMUSCULAR; INTRAVENOUS EVERY 6 HOURS PRN
Status: DISCONTINUED | OUTPATIENT
Start: 2017-11-09 | End: 2017-11-09 | Stop reason: HOSPADM

## 2017-11-09 RX ORDER — PROPOFOL 10 MG/ML
INJECTION, EMULSION INTRAVENOUS PRN
Status: DISCONTINUED | OUTPATIENT
Start: 2017-11-09 | End: 2017-11-09

## 2017-11-09 RX ORDER — FLUMAZENIL 0.1 MG/ML
0.2 INJECTION, SOLUTION INTRAVENOUS
Status: DISCONTINUED | OUTPATIENT
Start: 2017-11-09 | End: 2017-11-09 | Stop reason: HOSPADM

## 2017-11-09 RX ADMIN — MIDAZOLAM HYDROCHLORIDE 2 MG: 1 INJECTION, SOLUTION INTRAMUSCULAR; INTRAVENOUS at 12:48

## 2017-11-09 RX ADMIN — MIDAZOLAM HYDROCHLORIDE 1 MG: 1 INJECTION, SOLUTION INTRAMUSCULAR; INTRAVENOUS at 12:40

## 2017-11-09 RX ADMIN — PROPOFOL 10 MG: 10 INJECTION, EMULSION INTRAVENOUS at 12:46

## 2017-11-09 RX ADMIN — SODIUM CHLORIDE, POTASSIUM CHLORIDE, SODIUM LACTATE AND CALCIUM CHLORIDE: 600; 310; 30; 20 INJECTION, SOLUTION INTRAVENOUS at 12:40

## 2017-11-09 RX ADMIN — PROPOFOL 10 MG: 10 INJECTION, EMULSION INTRAVENOUS at 12:52

## 2017-11-09 RX ADMIN — KETAMINE HCL-NACL SOLN PREF SY 50 MG/5ML-0.9% (10MG/ML) 10 MG: 10 SOLUTION PREFILLED SYRINGE at 12:45

## 2017-11-09 RX ADMIN — FENTANYL CITRATE 50 MCG: 50 INJECTION, SOLUTION INTRAMUSCULAR; INTRAVENOUS at 12:43

## 2017-11-09 RX ADMIN — PROPOFOL 10 MG: 10 INJECTION, EMULSION INTRAVENOUS at 12:49

## 2017-11-09 NOTE — ANESTHESIA PREPROCEDURE EVALUATION
Anesthesia Evaluation     . Pt has had prior anesthetic. Type: General    No history of anesthetic complications          ROS/MED HX    ENT/Pulmonary:  - neg pulmonary ROS     Neurologic:  - neg neurologic ROS     Cardiovascular:     (+) hypertension----. : . . . :. .       METS/Exercise Tolerance:     Hematologic: Comments: Lab Test        11/09/17     10/25/17     10/19/17      --          08/07/17 06/22/17 06/21/17 06/20/17                       1125          1159                             --           1616          0703          0658          2330          WBC           --           --           --           --          6.3          7.2           --          22.4*         HGB           --           --           --           --          14.2         12.2          --          12.5          MCV           --           --           --           --          92           95            --          95            PLT           --           --           --           --          289          121*         127*         156           INR          1.07         1.10         3.6            < >         --           --           --           --            < > = values in this interval not displayed.                  Lab Test        10/25/17     09/05/17     08/14/17                       1159          1434          1651          NA           141          142          140           POTASSIUM    4.0          4.2          4.0           CHLORIDE     106          107          106           CO2          27           27           26            BUN          23           19           11            CR           1.31*        1.14*        1.14*         ANIONGAP     8            8            8             SHILOH          8.7          8.8          8.9           GLC          141*         90           87                    Musculoskeletal:  - neg musculoskeletal ROS       GI/Hepatic:     (+) GERD Asymptomatic on medication,        Renal/Genitourinary:     (+) chronic renal disease, type: CRI, Pt requires dialysis,       Endo:  - neg endo ROS   (+) Obesity, .      Psychiatric:  - neg psychiatric ROS       Infectious Disease:  - neg infectious disease ROS       Malignancy:         Other:    (+) H/O Chronic Pain,                   Physical Exam  Normal systems: cardiovascular and pulmonary    Airway   Mallampati: III  TM distance: >3 FB  Neck ROM: full    Dental   (+) upper dentures and lower dentures    Cardiovascular       Pulmonary                     Anesthesia Plan      History & Physical Review      ASA Status:  3 .    NPO Status:  > 8 hours    Plan for MAC Reason for MAC:  Deep or markedly invasive procedure (G8)         Postoperative Care      Consents                          .

## 2017-11-09 NOTE — IP AVS SNAPSHOT
"                  MRN:[id number]                      After Visit Summary   11/9/2017    Berenice Chaudhari    MRN: [id number]           Thank you!     Thank you for choosing [facility] for your care. Our goal is always to provide you with excellent care. Hearing back from our patients is one way we can continue to improve our services. Please take a few minutes to complete the written survey that you may receive in the mail after you visit. If you would like to speak to someone directly about your visit please contact Patient Relations at [phone]. Thank you!          Patient Information     Date Of Birth          1940        About your hospital stay     You were admitted on:  November 9, 2017 You last received care in the:  [facility] PreOP/PostOP    You were discharged on:  November 9, 2017       Who to Call     For medical emergencies, please call 911.  For non-urgent questions about your medical care, please call your primary care provider or clinic, [phone]  For questions related to your surgery, please call your surgery clinic        Attending Provider     Provider Specialty    Buck Ribeiro MD Gastroenterology       Primary Care Provider Office Phone # Fax #    Nelly Pearl [phone]      Pending Results     No orders found from 11/7/2017 to 11/10/2017.            Admission Information     Date & Time Provider Department Dept. Phone    11/9/2017 Buck Ribeiro MD [facility] PreOP/PostOP [phone]      Your Vitals Were     Blood Pressure Temperature Respirations Height Pulse Oximetry       127/73 99.6  F (37.6  C) (Temporal) 16 1.607 m (5' 3.27\") 100%       MyChart Information     [employer] lets you send messages to your doctor, view your test results, renew your prescriptions, schedule appointments and more. To sign up, go to [email or web address] . Click on \"Log in\" on the left side of the screen, which will take you to the Welcome " "page. Then click on \"Sign up Now\" on the right side of the page.     You will be asked to enter the access code listed below, as well as some personal information. Please follow the directions to create your username and password.     Your access code is: [email or web address]  Expires: 2017  5:04 PM     Your access code will  in 90 days. If you need help or a new code, please call your [facility] clinic or [phone].        Care EveryWhere ID     This is your Care EveryWhere ID. This could be used by other organizations to access your [facility] medical records  [id number]        Equal Access to Services     PAULINE POWELL : Marnie Hummel, david caicedo, nemesio walters, calin hoyt. So [facility] [phone].    ATENCIÓN: Si habla español, tiene a dickey disposición servicios gratuitos de asistencia lingüística. [facility] [phone].    We comply with applicable federal civil rights laws and [place] laws. We do not discriminate on the basis of race, color, national origin, age, disability, sex, sexual orientation, or gender identity.               Review of your medicines      CONTINUE these medicines which may have CHANGED, or have new prescriptions. If we are uncertain of the size of tablets/capsules you have at home, strength may be listed as something that might have changed.        Dose / Directions    metoprolol 50 MG 24 hr tablet   Commonly known as:  TOPROL-XL   This may have changed:  when to take this   Used for:  Essential hypertension with goal blood pressure less than 140/90        Dose:  50 mg   Take 1 tablet (50 mg) by mouth 2 times daily   Quantity:  180 tablet   Refills:  3         CONTINUE these medicines which have NOT CHANGED        Dose / Directions    buPROPion 300 MG 24 hr tablet   Commonly known as:  WELLBUTRIN XL   Used for:  Major depressive disorder, recurrent, in full remission (H)        Dose:  300 mg   Take 1 tablet (300 mg) by " mouth every morning   Quantity:  90 tablet   Refills:  1       CENTRUM SILVER per tablet        Dose:  1 tablet   Take 1 tablet by mouth daily   Refills:  0       CEPHALEXIN PO        Dose:  500 mg   Take 500 mg by mouth 3 times daily as needed (when cellulitis is coming on)   Refills:  0       diltiazem 120 MG 24 hr capsule   Commonly known as:  CARDIZEM CD/CARTIA XT   Used for:  Atrial flutter with rapid ventricular response (H)        Dose:  120 mg   Take 1 capsule (120 mg) by mouth daily   Quantity:  90 capsule   Refills:  3       fluticasone 50 MCG/ACT spray   Commonly known as:  FLONASE   Used for:  Post-nasal drip        Dose:  2 spray   Spray 2 sprays into both nostrils daily   Quantity:  3 Bottle   Refills:  11       furosemide 20 MG tablet   Commonly known as:  LASIX   Used for:  Atrial flutter with rapid ventricular response (H)        Dose:  20 mg   Take 1 tablet (20 mg) by mouth daily   Quantity:  90 tablet   Refills:  3       lidocaine 5 % Patch   Commonly known as:  LIDODERM   Used for:  Postherpetic neuralgia        Apply up to 3 patches to painful area at once for up to 12 h within a 24 h period.  Remove after 12 hours.   Quantity:  60 patch   Refills:  2       minocycline 50 MG capsule   Commonly known as:  MINOCIN/DYNACIN   Used for:  Methicillin susceptible Staphylococcus aureus septicemia (H)        TAKE 1 CAPSULE(50 MG) BY MOUTH TWICE DAILY   Quantity:  60 capsule   Refills:  11       NEURONTIN 600 MG tablet   Used for:  Postherpetic neuralgia   Generic drug:  gabapentin        Dose:  1200 mg   Take 2 tablets (1,200 mg) by mouth 3 times daily   Quantity:  540 tablet   Refills:  3       nortriptyline 25 MG capsule   Commonly known as:  PAMELOR   Used for:  Post herpetic neuralgia        TAKE 2 CAPSULES(50 MG) BY MOUTH AT BEDTIME   Quantity:  60 capsule   Refills:  11       pantoprazole 40 MG EC tablet   Commonly known as:  PROTONIX   Used for:  Gastroesophageal reflux disease without esophagitis         TAKE 1 TABLET(40 MG) BY MOUTH DAILY   Quantity:  90 tablet   Refills:  3       pravastatin 10 MG tablet   Commonly known as:  PRAVACHOL   Used for:  Hyperlipidemia LDL goal <100        Dose:  10 mg   Take 1 tablet (10 mg) by mouth At Bedtime   Quantity:  90 tablet   Refills:  3       pregabalin 150 MG capsule   Commonly known as:  LYRICA   Used for:  Postherpetic neuralgia        Dose:  150 mg   Take 1 capsule (150 mg) by mouth 2 times daily   Quantity:  60 capsule   Refills:  5       venlafaxine 150 MG 24 hr capsule   Commonly known as:  EFFEXOR-XR   Used for:  Major depressive disorder, recurrent episode, moderate (H)        TAKE 1 CAPSULE(150 MG) BY MOUTH DAILY   Quantity:  90 capsule   Refills:  0       * warfarin 5 MG tablet   Commonly known as:  COUMADIN   Used for:  Atrial flutter with rapid ventricular response (H)        Dose:  5 mg   Take 1 tablet (5 mg) by mouth daily   Quantity:  90 tablet   Refills:  3       * warfarin 2.5 MG tablet   Commonly known as:  COUMADIN   Used for:  Atrial flutter with rapid ventricular response (H), Long-term (current) use of anticoagulants        Take by mouth 5 mg qd or as directed by INR clinic   Quantity:  135 tablet   Refills:  1       zolpidem 5 MG tablet   Commonly known as:  AMBIEN   Used for:  Sleep disturbance        Take tablet by mouth 15 minutes prior to sleep, for Sleep Study   Quantity:  1 tablet   Refills:  0       * Notice:  This list has 2 medication(s) that are the same as other medications prescribed for you. Read the directions carefully, and ask your doctor or other care provider to review them with you.             Protect others around you: Learn how to safely use, store and throw away your medicines at www.disposemymeds.org.             Medication List: This is a list of all your medications and when to take them. Check marks below indicate your daily home schedule. Keep this list as a reference.      Medications           Morning Afternoon  Evening Bedtime As Needed    buPROPion 300 MG 24 hr tablet   Commonly known as:  WELLBUTRIN XL   Take 1 tablet (300 mg) by mouth every morning                                CENTRUM SILVER per tablet   Take 1 tablet by mouth daily                                CEPHALEXIN PO   Take 500 mg by mouth 3 times daily as needed (when cellulitis is coming on)                                diltiazem 120 MG 24 hr capsule   Commonly known as:  CARDIZEM CD/CARTIA XT   Take 1 capsule (120 mg) by mouth daily                                fluticasone 50 MCG/ACT spray   Commonly known as:  FLONASE   Spray 2 sprays into both nostrils daily                                furosemide 20 MG tablet   Commonly known as:  LASIX   Take 1 tablet (20 mg) by mouth daily                                lidocaine 5 % Patch   Commonly known as:  LIDODERM   Apply up to 3 patches to painful area at once for up to 12 h within a 24 h period.  Remove after 12 hours.                                metoprolol 50 MG 24 hr tablet   Commonly known as:  TOPROL-XL   Take 1 tablet (50 mg) by mouth 2 times daily                                minocycline 50 MG capsule   Commonly known as:  MINOCIN/DYNACIN   TAKE 1 CAPSULE(50 MG) BY MOUTH TWICE DAILY                                NEURONTIN 600 MG tablet   Take 2 tablets (1,200 mg) by mouth 3 times daily   Generic drug:  gabapentin                                nortriptyline 25 MG capsule   Commonly known as:  PAMELOR   TAKE 2 CAPSULES(50 MG) BY MOUTH AT BEDTIME                                pantoprazole 40 MG EC tablet   Commonly known as:  PROTONIX   TAKE 1 TABLET(40 MG) BY MOUTH DAILY                                pravastatin 10 MG tablet   Commonly known as:  PRAVACHOL   Take 1 tablet (10 mg) by mouth At Bedtime                                pregabalin 150 MG capsule   Commonly known as:  LYRICA   Take 1 capsule (150 mg) by mouth 2 times daily                                venlafaxine 150 MG 24 hr  capsule   Commonly known as:  EFFEXOR-XR   TAKE 1 CAPSULE(150 MG) BY MOUTH DAILY                                * warfarin 5 MG tablet   Commonly known as:  COUMADIN   Take 1 tablet (5 mg) by mouth daily                                * warfarin 2.5 MG tablet   Commonly known as:  COUMADIN   Take by mouth 5 mg qd or as directed by INR clinic                                zolpidem 5 MG tablet   Commonly known as:  AMBIEN   Take tablet by mouth 15 minutes prior to sleep, for Sleep Study                                * Notice:  This list has 2 medication(s) that are the same as other medications prescribed for you. Read the directions carefully, and ask your doctor or other care provider to review them with you.

## 2017-11-09 NOTE — IP AVS SNAPSHOT
[facility] PreOP/PostOP    [address]    [facility] [address]    Phone:  [phone]    Fax:  [phone]                                       After Visit Summary   11/9/2017    Berenice Chaudhari    MRN: [id number]           After Visit Summary Signature Page     I have received my discharge instructions, and my questions have been answered. I have discussed any challenges I see with this plan with the nurse or doctor.    ..........................................................................................................................................  Patient/Patient Representative Signature      ..........................................................................................................................................  Patient Representative Print Name and Relationship to Patient    ..................................................               ................................................  Date                                            Time    ..........................................................................................................................................  Reviewed by Signature/Title    ...................................................              ..............................................  Date                                                            Time

## 2017-11-09 NOTE — H&P
Pre-Endoscopy History and Physical     Berenice Chaudhari MRN# [id number]   YOB: 1940 Age: 77 year old     Date of Procedure: 11/9/2017  Primary care provider: Nelly Pearl  Type of Endoscopy: Gastroscopy with possible biopsy, possible dilation  Reason for Procedure: reflux  Type of Anesthesia Anticipated: Conscious Sedation    HPI:    Berenice is a 77 year old female who will be undergoing the above procedure.      A history and physical has been performed. The patient's medications and allergies have been reviewed. The risks and benefits of the procedure and the sedation options and risks were discussed with the patient.  All questions were answered and informed consent was obtained.      She denies a personal or family history of anesthesia complications or bleeding disorders.     Patient Active Problem List   Diagnosis     Generalized osteoarthrosis, unspecified site     Urticaria     Obesity     Essential hypertension with goal blood pressure less than 140/90     Allergic rhinitis     Erythroderma desquamativum     Advanced directives, counseling/discussion     Hyperlipidemia LDL goal <100     Post herpetic neuralgia     Health Care Home     Chronic renal disease     Anxiety     Major depressive disorder, recurrent episode, in full remission (HCC)     Cough     Severe obesity (BMI >= 40) (H)     Cellulitis of right lower extremity     CKD (chronic kidney disease) stage 3, GFR 30-59 ml/min     Right shoulder pain     Cellulitis     Atrial flutter (H)     Atrial flutter with rapid ventricular response (H)     Long-term (current) use of anticoagulants [Z79.01]     Gastroesophageal reflux disease without esophagitis        Past Medical History:   Diagnosis Date     Allergic rhinitis, cause unspecified      Anxiety 6/13/2013     Arrhythmia     atrial flutter     Atrial flutter with rapid ventricular response (H) 8/8/2017     Cellulitis and abscess of leg, except foot recurrent , both legs    begins  with fever, nausea, diarrhea, vomiting & & the cellulitis may be visible a day or 2 later     Chronic pain     On chronic fentanyl patch.     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  less likely = raction to vancomycin      Essential hypertension with goal blood pressure less than 140/90 1/12/2005     Problem list name updated by automated process. Provider to review     Generalized osteoarthrosis, unspecified site     films 9/04: L hhip bad; both knees mod severe medial OA     GERD (gastroesophageal reflux disease) 11/15/2013     Herpes zoster without mention of complication 9/03    L shoulder; still has neuralgia     Hyperlipidemia LDL goal <100 9/16/2011     Methicillin susceptible Staphylococcus aureus septicemia (H) after L hip injection 205    ARDS, renal failure, staph sepsis after a hip joint injection     Obesity 1/12/2005     Problem list name updated by automated process. Provider to review     Other chronic pain      Other diseases of lung, not elsewhere classified 07/93    Interstitial lung process     Pneumonia, organism unspecified(486)      Urticaria, unspecified 07/93    Diffuse severe (hospitalized).  (+) skin biopsy by Dr. Figueroa        Past Surgical History:   Procedure Laterality Date     BIOPSY       C NONSPECIFIC PROCEDURE      Extensive oral (dental) surgery     C NONSPECIFIC PROCEDURE      Remote T&A     C NONSPECIFIC PROCEDURE  1/03    LAP CHOLY     C NONSPECIFIC PROCEDURE      L hip I + D x 2  4/05     C NONSPECIFIC PROCEDURE  2/05    colonoscopy     C NONSPECIFIC PROCEDURE      L hip I + D several other times      CATARACT IOL, RT/LT Right 2012     CHOLECYSTECTOMY       ENT SURGERY       PHACOEMULSIFICATION CLEAR CORNEA WITH STANDARD INTRAOCULAR LENS IMPLANT Left 3/10/2016     Procedure: PHACOEMULSIFICATION CLEAR CORNEA WITH STANDARD INTRAOCULAR LENS IMPLANT;  Surgeon: Dwight Metcalf MD;  Location: [facility]       Social History   Substance Use Topics     Smoking status: Former Smoker     Packs/day: 2.00     Years: 22.00     Types: Cigarettes     Start date: 1967     Quit date: 1990     Smokeless tobacco: Never Used      Comment: started smoking 22's- quit in 1990     Alcohol use No      Comment: RARELY       Family History   Problem Relation Age of Onset     Myocardial Infarction Father 63       Prior to Admission medications    Medication Sig Start Date End Date Taking? Authorizing Provider   venlafaxine (EFFEXOR-XR) 150 MG 24 hr capsule TAKE 1 CAPSULE(150 MG) BY MOUTH DAILY 11/1/17  Yes Nelly Pearl MD   pantoprazole (PROTONIX) 40 MG EC tablet TAKE 1 TABLET(40 MG) BY MOUTH DAILY 10/4/17  Yes Nelly Pearl MD   warfarin (COUMADIN) 2.5 MG tablet Take by mouth 5 mg qd or as directed by INR clinic 9/21/17  Yes Nelly Pearl MD   diltiazem (CARDIZEM CD/CARTIA XT) 120 MG 24 hr capsule Take 1 capsule (120 mg) by mouth daily 9/12/17  Yes Adela Mars APRN CNP   furosemide (LASIX) 20 MG tablet Take 1 tablet (20 mg) by mouth daily 9/12/17  Yes Adela Mars APRN CNP   buPROPion (WELLBUTRIN XL) 300 MG 24 hr tablet Take 1 tablet (300 mg) by mouth every morning 9/8/17  Yes Nelly Pearl MD   minocycline (MINOCIN/DYNACIN) 50 MG capsule TAKE 1 CAPSULE(50 MG) BY MOUTH TWICE DAILY 8/30/17  Yes Nelly Pearl MD   pravastatin (PRAVACHOL) 10 MG tablet Take 1 tablet (10 mg) by mouth At Bedtime 8/14/17  Yes Nelly Pearl MD   metoprolol (TOPROL-XL) 50 MG 24 hr tablet Take 1 tablet (50 mg) by mouth 2 times daily  Patient taking differently: Take 50 mg by mouth daily  8/14/17  Yes Nelly Pearl MD   warfarin (COUMADIN) 5 MG tablet Take 1 tablet (5 mg) by mouth daily 8/14/17  Yes Nelly Pearl MD   gabapentin (NEURONTIN)  "600 MG tablet Take 2 tablets (1,200 mg) by mouth 3 times daily 8/11/17  Yes Nelly Pearl MD   lidocaine (LIDODERM) 5 % Patch Apply up to 3 patches to painful area at once for up to 12 h within a 24 h period.  Remove after 12 hours. 6/22/17  Yes Nelly Pearl MD   pregabalin (LYRICA) 150 MG capsule Take 1 capsule (150 mg) by mouth 2 times daily 5/22/17  Yes Nelly Pearl MD   nortriptyline (PAMELOR) 25 MG capsule TAKE 2 CAPSULES(50 MG) BY MOUTH AT BEDTIME 5/15/17  Yes Nelly Pearl MD   Multiple Vitamins-Minerals (CENTRUM SILVER) per tablet Take 1 tablet by mouth daily 11/11/14  Yes Jim Corrigan MD   zolpidem (AMBIEN) 5 MG tablet Take tablet by mouth 15 minutes prior to sleep, for Sleep Study 10/2/17   Anam Trent MD   fluticasone (FLONASE) 50 MCG/ACT spray Spray 2 sprays into both nostrils daily 6/30/17   Nelly Pearl MD   CEPHALEXIN PO Take 500 mg by mouth 3 times daily as needed (when cellulitis is coming on)     Reported, Patient       Allergies   Allergen Reactions     Ceftriaxone Anaphylaxis and Rash     Rocephin given. Developed rash to back and abd, awaiting to see if it improves with d/c of rocephin.        Nafcillin Rash     diffuse severe rash in hospital 2/05     Penicillins Anaphylaxis     Vancomycin Anaphylaxis and Rash     Codeine Fatigue     Sleeps continuously     Clindamycin Rash     received information from patient     Zithromax [Azithromycin] Rash        REVIEW OF SYSTEMS:   5 point ROS negative except as noted above in HPI, including Gen., Resp., CV, GI &  system review.    PHYSICAL EXAM:   BP 99/86  Temp 97  F (36.1  C)  Resp 20  Ht 1.607 m (5' 3.27\")  SpO2 97% Estimated body mass index is 56.59 kg/(m^2) as calculated from the following:    Height as of 10/25/17: 1.607 m (5' 3.25\").    Weight as of 10/25/17: 146.1 kg (322 lb).   GENERAL APPEARANCE: alert, and oriented  MENTAL STATUS: alert  AIRWAY EXAM: Mallampatti Class II (visualization " of the soft palate, fauces, and uvula)  RESP: lungs clear to auscultation - no rales, rhonchi or wheezes  CV: regular rates and rhythm  DIAGNOSTICS:    Not indicated    IMPRESSION   ASA Class 3 - Severe systemic disease, but not incapacitating    PLAN:   Plan for Gastroscopy with possible biopsy, possible dilation. We discussed the risks, benefits and alternatives and the patient wished to proceed.    The above has been forwarded to the consulting provider.      Signed Electronically by: Buck Ribeiro  November 9, 2017

## 2017-11-09 NOTE — LETTER
October 16, 2017      Berenice Chaudhari  [address]  [place] [address]        Thank you for choosing [facility]. You are scheduled for the following service.    You will need to have a History and Physical Exam done within 30 days of this scheduled procedure. Please arrange this with your primary care physician.      Date:  11/09/2017 Thursday    Procedure:   UPPER ENDOSCOPY-EGD    Doctor: Dr. Buck Ribeiro                       Arrival Time:  10:30 AM  *check in at the Surgery Center desk*     Procedure Time: 12:00 PM      Location:   [facility]      Surgery Center (on the [place] side [place] the [facility] [place]       [address] [place], [place] [address]      PRE-PROCEDURE CHECKLIST    If you have diabetes, ask your regular doctor for diet and medication restrictions.  If you take any antiplatelet or anticoagulant medications (such as Coumadin, Lovenox, Plavix, etc.) and have not already discussed this, please call your primary physician for advice on holding this medication.  If you take Aspirin, you may continue to do so.  If you are or may be pregnant, please discuss the risks and benefits of this procedure with your doctor.  You must arrange for a ride for the day of your exam. If you fail to arrange transportation with a responsible adult, your procedure will need to be cancelled and rescheduled. Taxi, bus and medical transport are not acceptable unless you have a responsible adult that you know & trust with you. Please arrange for this  to be able to pick you up in our department, approximately one hour after your scheduled procedure, if they are not able to stay with you.    Canceling or rescheduling  If you must cancel or reschedule your appointment, please call [phone] as soon as possible.      Upper Endoscopy or Esophagogastroduodenoscopy (EGD) is a test performed to evaluate symptoms of persistent abdominal pain, nausea, vomiting and  difficulty swallowing. It may also be used to treat various conditions of the upper gastrointestinal tract, such as bleeding, narrowing or abnormal growths.     What happens during an upper endoscopy?  Plan to spend up to two hours at the endoscopy center the day of your procedure. The exam itself takes about 5 to 10 minutes.    Before the exam:  - You will change into a gown.   - Your medical history and medication list will be reviewed with you.  - An anesthesiologist and a nurse anesthetist will meet with you and insert an intravenous (IV) line into your hand or arm.  - The doctor performing your procedure will talk to you and give you a consent form to sign.    During the exam:  - Medicine will be given through the IV line.   - Your heart rate and oxygen levels will be monitored. If your blood pressure is low, you may be given fluids through the IV line.   - The doctor will insert a flexible, hollow tube, called an endoscope, into your mouth and will advance it slowly through the esophagus, stomach and duodenum (the first part of your small intestine).   - If you have difficulty swallowing, and the doctor finds a narrowing in your esophagus, it may be possible for the area to be expanded (dilated) during the exam.   - If abnormal tissue is found, the doctor may remove it through the endoscope (biopsy it) for closer examination. The tissue removal is painless.    After the exam:  - Any tissue samples removed during the exam will be sent to a lab for evaluation. It may take 5 to 7 working days for you to be notified of the results  - The doctor will prepare a full report for the physician who referred you for the upper endoscopy.   - The doctor will talk with you about the initial results of your exam.   - You may feel bloated after the procedure. That is normal and should not last long.   - Your throat may feel sore for a short time.   - Following the exam, you may resume your normal diet. Avoid alcohol until the  next day.   - You may resume your regular activities the day after the procedure.   - Medication given during the exam will prohibit you from driving for the rest of the day.  - A nurse will provide you with complete discharge instructions before you leave the Surgery Center. Be sure to ask the nurse for specific instructions if you take blood thinners such as Aspirin , Coumadin , Lovenox , Plavix , etc.         PREPARATION    To ensure a successful exam, please follow all instructions carefully.      The night before your exam:    STOP eating solid foods at 11:45 pm.     Clear liquids are okay to drink (examples: Gatorade , apple juice, clear broth, etc.).     DO NOT drink red liquids or alcoholic beverages.    The day of your exam:    STOP drinking clear liquids 4 hours before your exam.     You may take your usual medications with 4 oz. of water, but it needs to be at least 4 hours prior to your procedure.    When you leave for the procedure:    Bring a list of all of your current medications, including any allergy or over-the-counter medications..     Bring a photo ID as well as up-to-date insurance information, such as your insurance card and any referral forms that might be required by your payer.      DIRECTIONS TO THE SURGERY CENTER    From the north ([facility])  Take [address] south, exit on [address] . Get into the left hand payton, turn left (east), go one-half mile to [address] and turn left. Go north to the first stoplight, take a right on [address] and follow it to the Surgery Center entrance.    From the south ([facility])  Take 35N to the [address]E split and exit on [address] [address]. On [address] , turn left (west) to [address]. Turn right (north) on [address]. Go north to the first stoplight, take a right on [address] and follow it to the Surgery Center entrance.    From the east via 35E ([facility])  Take [address]E south to [address] [address]  exit. Turn right on [address]. Go west to [address]. Turn right (north) on [address]. Go to the first stoplight, take a right and follow on [address] to the Surgery Center entrance.    From the east via [address] ([place])  Take [address] west to [address]. Turn left (south) on [address] to [address]. Turn left (east) on [address] and follow it to the Surgery Center entrance.    From the west via [address] ([place], [place])  Take [address] east to [address]. Turn right (south) on [address] to [address]. Turn left (east) on [address] and follow it to the Surgery Center entrance.

## 2017-11-09 NOTE — ANESTHESIA POSTPROCEDURE EVALUATION
Patient: Berenice Chaudhari    Procedure(s):  ESOPHAGOSCOPY, GASTROSCOPY, DUODENOSCOPY (EGD) (fv)   - Wound Class: II-Clean Contaminated    Diagnosis:Dysphagia, unspecified type  Diagnosis Additional Information: Procedure:                Upper GI endoscopy   Indications:              Esophageal reflux     Anesthesia Type:  MAC    Note:  Anesthesia Post Evaluation    Patient location during evaluation: PACU  Patient participation: Able to fully participate in evaluation  Level of consciousness: awake and alert  Pain management: adequate  Airway patency: patent  Cardiovascular status: acceptable  Respiratory status: acceptable  Hydration status: acceptable  PONV: none     Anesthetic complications: None          Last vitals:  Vitals:    11/09/17 1310 11/09/17 1312 11/09/17 1315   BP: 125/69 127/73 127/73   Resp: 16 16 16   Temp: 99.6  F (37.6  C)     SpO2: 100% 100% 100%         Electronically Signed By: Finesse Diggs MD  November 9, 2017  1:47 PM

## 2017-11-09 NOTE — ANESTHESIA CARE TRANSFER NOTE
Patient: Berenice Chaudhari    Procedure(s):  ESOPHAGOSCOPY, GASTROSCOPY, DUODENOSCOPY (EGD) (fv)   - Wound Class: II-Clean Contaminated    Diagnosis: Dysphagia, unspecified type  Diagnosis Additional Information: No value filed.    Anesthesia Type:   MAC     Note:  Airway :Nasal Cannula  Patient transferred to:Phase II  Comments: Patients meets criteria for phase 2 recovery. VSS. Report to RN      Vitals: (Last set prior to Anesthesia Care Transfer)    CRNA VITALS  11/9/2017 1231 - 11/9/2017 1306      11/9/2017             Pulse: 80    SpO2: 93 %                Electronically Signed By: PANCHO Cunha CRNA  November 9, 2017  1:06 PM

## 2017-11-13 ENCOUNTER — TELEPHONE (OUTPATIENT)
Dept: NURSING | Facility: CLINIC | Age: 77
End: 2017-11-13

## 2017-11-13 ENCOUNTER — TELEPHONE (OUTPATIENT)
Dept: PEDIATRICS | Facility: CLINIC | Age: 77
End: 2017-11-13

## 2017-11-13 NOTE — TELEPHONE ENCOUNTER
Anti-reflux diet would be permanent.    Recommend follow up appt to discuss next steps in care.    Nelly Pearl MD  Internal Medicine/Pediatrics  [facility]

## 2017-11-13 NOTE — TELEPHONE ENCOUNTER
calling. Recommended at GI that patient do an anti-reflux diet. Permanent or temporary? Also, what is next step for cough and swallowing difficulty? [phone] ok to vinod.   Catarina Diggs RN

## 2017-11-14 NOTE — TELEPHONE ENCOUNTER
Request form [facility] to do INR on 11/14/17 - approved by [facility].   Recent Endoscopy with prior Warfarin hold.     Bee Campuzano RN  [facility]

## 2017-11-16 ENCOUNTER — ANTICOAGULATION THERAPY VISIT (OUTPATIENT)
Dept: NURSING | Facility: CLINIC | Age: 77
End: 2017-11-16
Payer: COMMERCIAL

## 2017-11-16 DIAGNOSIS — I48.92 ATRIAL FLUTTER (H): ICD-10-CM

## 2017-11-16 DIAGNOSIS — Z79.01 LONG-TERM (CURRENT) USE OF ANTICOAGULANTS: ICD-10-CM

## 2017-11-16 DIAGNOSIS — I48.92 ATRIAL FLUTTER WITH RAPID VENTRICULAR RESPONSE (H): ICD-10-CM

## 2017-11-16 LAB — INR PPP: 1.8

## 2017-11-16 PROCEDURE — 99207 ZZC NO CHARGE NURSE ONLY: CPT

## 2017-11-16 NOTE — PROGRESS NOTES
ANTICOAGULATION FOLLOW-UP     Patient Name:  Berenice Chaudhari  Date:  11/16/2017  Contact Type:  Telephone  Call received from [facility] nurse, with INR result.   Follow up instructions given over the phone to Home Care nurse for warfarin management.  Patient is being discharged from Homecare today.  She has an appointment with Dr. Pearl on 11/20/17 and will have her INR checked that day also.    SUBJECTIVE:     Patient Findings     Positives Intentional hold of therapy    Comments Had an EGD under general anesthesia on 11/9/17.  Warfarin held for 5 days.           OBJECTIVE    INR   Date Value Ref Range Status   11/16/2017 1.8  Final       ASSESSMENT / PLAN  INR assessment SUB    Recheck INR In: 4 DAYS    INR Location Homecare INR      Anticoagulation Summary as of 11/16/2017     INR goal 2.0-3.0   Today's INR 1.8!   Maintenance plan 5 mg (5 mg x 1) on Mon; 2.5 mg (5 mg x 0.5) all other days   Full instructions 11/16: 5 mg; 11/17: 5 mg; Otherwise 5 mg on Mon; 2.5 mg all other days   Weekly total 20 mg   Plan last modified Mary Butcher, RN (10/26/2017)   Next INR check 11/20/2017   Target end date Indefinite    Indications   Atrial flutter (H) [I48.92]  Atrial flutter with rapid ventricular response (H) [I48.92]  Long-term (current) use of anticoagulants [Z79.01] [Z79.01]         Anticoagulation Episode Summary     INR check location     Preferred lab     Send INR reminders to [facility]    Comments 5mg tabs; HS dosing, [facility] [phone]      Anticoagulation Care Providers     Provider Role Specialty Phone number    Nelly Pearl MD Referring Internal Medicine [phone]            See the Encounter Report to view Anticoagulation Flowsheet and Dosing Calendar (Go to Encounters tab in chart review, and find the Anticoagulation Therapy Visit)        Mary Butcher RN

## 2017-11-16 NOTE — MR AVS SNAPSHOT
Berenice RAJIV Chaudhari   11/16/2017 1:30 PM   Anticoagulation Therapy Visit    Description:  77 year old female   Provider:   ANTICOAGULATION CLINIC   Department:  [facility]           INR as of 11/16/2017     Today's INR 1.8!      Anticoagulation Summary as of 11/16/2017     INR goal 2.0-3.0   Today's INR 1.8!   Full instructions 11/16: 5 mg; 11/17: 5 mg; Otherwise 5 mg on Mon; 2.5 mg all other days   Next INR check 11/20/2017    Indications   Atrial flutter (H) [I48.92]  Atrial flutter with rapid ventricular response (H) [I48.92]  Long-term (current) use of anticoagulants [Z79.01] [Z79.01]         Your next Anticoagulation Clinic appointment(s)     Nov 20, 2017  4:00 PM CST   Anticoagulation Visit with  ANTICOAGULATION CLINIC   [facility]    [address]  [address]  [place] [id number] [phone]              Contact Numbers     [facility]  Please call  [phone] to cancel and/or reschedule your appointment   Please call  [phone] with any problems or questions regarding your therapy.        November 2017 Details    Sun Mon Tue Wed Thu Fri Sat        1               2               3               4                 5               6               7               8               9               10               11                 12               13               14               15               16      5 mg   See details      17      5 mg         18      2.5 mg           19      2.5 mg         20            21               22               23               24               25                 26               27               28               29               30                  Date Details   11/16 This INR check       Date of next INR:  11/20/2017         How to take your warfarin dose     To take:  2.5 mg Take 0.5 of a 5 mg tablet.    To take:  5 mg Take 1 of the 5 mg tablets.

## 2017-11-20 ENCOUNTER — APPOINTMENT (OUTPATIENT)
Dept: GENERAL RADIOLOGY | Facility: CLINIC | Age: 77
End: 2017-11-20
Attending: EMERGENCY MEDICINE
Payer: COMMERCIAL

## 2017-11-20 ENCOUNTER — HOSPITAL ENCOUNTER (INPATIENT)
Facility: CLINIC | Age: 77
LOS: 4 days | Discharge: HOME | End: 2017-11-24
Attending: EMERGENCY MEDICINE | Admitting: INTERNAL MEDICINE
Payer: COMMERCIAL

## 2017-11-20 DIAGNOSIS — I50.9 CONGESTIVE HEART FAILURE, UNSPECIFIED CONGESTIVE HEART FAILURE CHRONICITY, UNSPECIFIED CONGESTIVE HEART FAILURE TYPE: ICD-10-CM

## 2017-11-20 DIAGNOSIS — R79.89 TROPONIN I ABOVE REFERENCE RANGE: ICD-10-CM

## 2017-11-20 DIAGNOSIS — L03.116 CELLULITIS OF LEFT LOWER EXTREMITY: ICD-10-CM

## 2017-11-20 DIAGNOSIS — M62.81 GENERALIZED MUSCLE WEAKNESS: ICD-10-CM

## 2017-11-20 LAB
ALBUMIN SERPL-MCNC: 2.6 G/DL (ref 3.4–5)
ALBUMIN UR-MCNC: NEGATIVE MG/DL
ALP SERPL-CCNC: 99 U/L (ref 40–150)
ALT SERPL W P-5'-P-CCNC: 24 U/L (ref 0–50)
ANION GAP SERPL CALCULATED.3IONS-SCNC: 7 MMOL/L (ref 3–14)
APPEARANCE UR: CLEAR
AST SERPL W P-5'-P-CCNC: 23 U/L (ref 0–45)
BASOPHILS # BLD AUTO: 0 10E9/L (ref 0–0.2)
BASOPHILS NFR BLD AUTO: 0.2 %
BILIRUB SERPL-MCNC: 0.9 MG/DL (ref 0.2–1.3)
BILIRUB UR QL STRIP: NEGATIVE
BUN SERPL-MCNC: 15 MG/DL (ref 7–30)
CALCIUM SERPL-MCNC: 7.9 MG/DL (ref 8.5–10.1)
CHLORIDE SERPL-SCNC: 106 MMOL/L (ref 94–109)
CO2 SERPL-SCNC: 23 MMOL/L (ref 20–32)
COLOR UR AUTO: YELLOW
CREAT SERPL-MCNC: 1.24 MG/DL (ref 0.52–1.04)
DIFFERENTIAL METHOD BLD: ABNORMAL
EOSINOPHIL # BLD AUTO: 0 10E9/L (ref 0–0.7)
EOSINOPHIL NFR BLD AUTO: 0.1 %
ERYTHROCYTE [DISTWIDTH] IN BLOOD BY AUTOMATED COUNT: 15.1 % (ref 10–15)
GFR SERPL CREATININE-BSD FRML MDRD: 42 ML/MIN/1.7M2
GLUCOSE SERPL-MCNC: 148 MG/DL (ref 70–99)
GLUCOSE UR STRIP-MCNC: NEGATIVE MG/DL
HCT VFR BLD AUTO: 40.4 % (ref 35–47)
HGB BLD-MCNC: 12.8 G/DL (ref 11.7–15.7)
HGB UR QL STRIP: NEGATIVE
HYALINE CASTS #/AREA URNS LPF: 1 /LPF (ref 0–2)
IMM GRANULOCYTES # BLD: 0.2 10E9/L (ref 0–0.4)
IMM GRANULOCYTES NFR BLD: 1.1 %
INR PPP: 1.57 (ref 0.86–1.14)
KETONES UR STRIP-MCNC: NEGATIVE MG/DL
LACTATE BLD-SCNC: 1 MMOL/L (ref 0.7–2)
LACTATE BLD-SCNC: NORMAL MMOL/L (ref 0.7–2)
LEUKOCYTE ESTERASE UR QL STRIP: NEGATIVE
LIPASE SERPL-CCNC: 72 U/L (ref 73–393)
LYMPHOCYTES # BLD AUTO: 0.6 10E9/L (ref 0.8–5.3)
LYMPHOCYTES NFR BLD AUTO: 3.2 %
MCH RBC QN AUTO: 29.2 PG (ref 26.5–33)
MCHC RBC AUTO-ENTMCNC: 31.7 G/DL (ref 31.5–36.5)
MCV RBC AUTO: 92 FL (ref 78–100)
MONOCYTES # BLD AUTO: 0.8 10E9/L (ref 0–1.3)
MONOCYTES NFR BLD AUTO: 4.1 %
MUCOUS THREADS #/AREA URNS LPF: PRESENT /LPF
NEUTROPHILS # BLD AUTO: 18.1 10E9/L (ref 1.6–8.3)
NEUTROPHILS NFR BLD AUTO: 91.3 %
NITRATE UR QL: NEGATIVE
NRBC # BLD AUTO: 0 10*3/UL
NRBC BLD AUTO-RTO: 0 /100
NT-PROBNP SERPL-MCNC: 7034 PG/ML (ref 0–1800)
PH UR STRIP: 7 PH (ref 5–7)
PLATELET # BLD AUTO: 164 10E9/L (ref 150–450)
POTASSIUM SERPL-SCNC: 4 MMOL/L (ref 3.4–5.3)
PROCALCITONIN SERPL-MCNC: 0.37 NG/ML
PROT SERPL-MCNC: 7.2 G/DL (ref 6.8–8.8)
RBC # BLD AUTO: 4.38 10E12/L (ref 3.8–5.2)
RBC #/AREA URNS AUTO: 1 /HPF (ref 0–2)
SODIUM SERPL-SCNC: 136 MMOL/L (ref 133–144)
SOURCE: ABNORMAL
SP GR UR STRIP: 1.01 (ref 1–1.03)
TROPONIN I SERPL-MCNC: 0.13 UG/L (ref 0–0.04)
UROBILINOGEN UR STRIP-MCNC: 0 MG/DL (ref 0–2)
WBC # BLD AUTO: 19.8 10E9/L (ref 4–11)
WBC #/AREA URNS AUTO: <1 /HPF (ref 0–2)

## 2017-11-20 PROCEDURE — 85610 PROTHROMBIN TIME: CPT | Performed by: EMERGENCY MEDICINE

## 2017-11-20 PROCEDURE — 99223 1ST HOSP IP/OBS HIGH 75: CPT | Mod: AI | Performed by: PHYSICIAN ASSISTANT

## 2017-11-20 PROCEDURE — 12000007 ZZH R&B INTERMEDIATE

## 2017-11-20 PROCEDURE — 81001 URINALYSIS AUTO W/SCOPE: CPT | Performed by: EMERGENCY MEDICINE

## 2017-11-20 PROCEDURE — 25000132 ZZH RX MED GY IP 250 OP 250 PS 637: Performed by: EMERGENCY MEDICINE

## 2017-11-20 PROCEDURE — 25000128 H RX IP 250 OP 636: Performed by: EMERGENCY MEDICINE

## 2017-11-20 PROCEDURE — 36415 COLL VENOUS BLD VENIPUNCTURE: CPT | Performed by: EMERGENCY MEDICINE

## 2017-11-20 PROCEDURE — 96366 THER/PROPH/DIAG IV INF ADDON: CPT

## 2017-11-20 PROCEDURE — 87040 BLOOD CULTURE FOR BACTERIA: CPT | Performed by: EMERGENCY MEDICINE

## 2017-11-20 PROCEDURE — 83605 ASSAY OF LACTIC ACID: CPT | Performed by: EMERGENCY MEDICINE

## 2017-11-20 PROCEDURE — 96375 TX/PRO/DX INJ NEW DRUG ADDON: CPT

## 2017-11-20 PROCEDURE — 96365 THER/PROPH/DIAG IV INF INIT: CPT

## 2017-11-20 PROCEDURE — 83880 ASSAY OF NATRIURETIC PEPTIDE: CPT | Performed by: EMERGENCY MEDICINE

## 2017-11-20 PROCEDURE — 83690 ASSAY OF LIPASE: CPT | Performed by: EMERGENCY MEDICINE

## 2017-11-20 PROCEDURE — 84484 ASSAY OF TROPONIN QUANT: CPT | Performed by: EMERGENCY MEDICINE

## 2017-11-20 PROCEDURE — 71010 XR CHEST PORT 1 VW: CPT

## 2017-11-20 PROCEDURE — 80053 COMPREHEN METABOLIC PANEL: CPT | Performed by: EMERGENCY MEDICINE

## 2017-11-20 PROCEDURE — 93005 ELECTROCARDIOGRAM TRACING: CPT

## 2017-11-20 PROCEDURE — 25000132 ZZH RX MED GY IP 250 OP 250 PS 637: Performed by: PHYSICIAN ASSISTANT

## 2017-11-20 PROCEDURE — 84145 PROCALCITONIN (PCT): CPT | Performed by: PHYSICIAN ASSISTANT

## 2017-11-20 PROCEDURE — 99285 EMERGENCY DEPT VISIT HI MDM: CPT | Mod: 25

## 2017-11-20 PROCEDURE — 85025 COMPLETE CBC W/AUTO DIFF WBC: CPT | Performed by: EMERGENCY MEDICINE

## 2017-11-20 RX ORDER — WARFARIN SODIUM 5 MG/1
5 TABLET ORAL ONCE
Status: COMPLETED | OUTPATIENT
Start: 2017-11-20 | End: 2017-11-20

## 2017-11-20 RX ORDER — NORTRIPTYLINE HYDROCHLORIDE 50 MG/1
50 CAPSULE ORAL AT BEDTIME
Status: DISCONTINUED | OUTPATIENT
Start: 2017-11-20 | End: 2017-11-24 | Stop reason: HOSPADM

## 2017-11-20 RX ORDER — AMOXICILLIN 250 MG
1 CAPSULE ORAL 2 TIMES DAILY PRN
Status: DISCONTINUED | OUTPATIENT
Start: 2017-11-20 | End: 2017-11-24 | Stop reason: HOSPADM

## 2017-11-20 RX ORDER — LEVOFLOXACIN 5 MG/ML
500 INJECTION, SOLUTION INTRAVENOUS EVERY 24 HOURS
Status: DISCONTINUED | OUTPATIENT
Start: 2017-11-21 | End: 2017-11-24 | Stop reason: HOSPADM

## 2017-11-20 RX ORDER — PANTOPRAZOLE SODIUM 40 MG/1
40 TABLET, DELAYED RELEASE ORAL
Status: DISCONTINUED | OUTPATIENT
Start: 2017-11-21 | End: 2017-11-24 | Stop reason: HOSPADM

## 2017-11-20 RX ORDER — LIDOCAINE 40 MG/G
CREAM TOPICAL
Status: DISCONTINUED | OUTPATIENT
Start: 2017-11-20 | End: 2017-11-24 | Stop reason: HOSPADM

## 2017-11-20 RX ORDER — BUPROPION HYDROCHLORIDE 150 MG/1
300 TABLET ORAL EVERY MORNING
Status: DISCONTINUED | OUTPATIENT
Start: 2017-11-21 | End: 2017-11-24 | Stop reason: HOSPADM

## 2017-11-20 RX ORDER — FUROSEMIDE 10 MG/ML
40 INJECTION INTRAMUSCULAR; INTRAVENOUS ONCE
Status: COMPLETED | OUTPATIENT
Start: 2017-11-20 | End: 2017-11-20

## 2017-11-20 RX ORDER — METOPROLOL SUCCINATE 50 MG/1
50 TABLET, EXTENDED RELEASE ORAL EVERY EVENING
Status: DISCONTINUED | OUTPATIENT
Start: 2017-11-20 | End: 2017-11-24 | Stop reason: HOSPADM

## 2017-11-20 RX ORDER — POLYETHYLENE GLYCOL 3350 17 G/17G
17 POWDER, FOR SOLUTION ORAL DAILY PRN
Status: DISCONTINUED | OUTPATIENT
Start: 2017-11-20 | End: 2017-11-24 | Stop reason: HOSPADM

## 2017-11-20 RX ORDER — PRAVASTATIN SODIUM 10 MG
10 TABLET ORAL AT BEDTIME
Status: DISCONTINUED | OUTPATIENT
Start: 2017-11-20 | End: 2017-11-24 | Stop reason: HOSPADM

## 2017-11-20 RX ORDER — FUROSEMIDE 10 MG/ML
20 INJECTION INTRAMUSCULAR; INTRAVENOUS
Status: DISCONTINUED | OUTPATIENT
Start: 2017-11-21 | End: 2017-11-20

## 2017-11-20 RX ORDER — GABAPENTIN 600 MG/1
600 TABLET ORAL 3 TIMES DAILY
Status: DISCONTINUED | OUTPATIENT
Start: 2017-11-20 | End: 2017-11-24 | Stop reason: HOSPADM

## 2017-11-20 RX ORDER — LEVOFLOXACIN 5 MG/ML
750 INJECTION, SOLUTION INTRAVENOUS ONCE
Status: COMPLETED | OUTPATIENT
Start: 2017-11-20 | End: 2017-11-20

## 2017-11-20 RX ORDER — FUROSEMIDE 10 MG/ML
40 INJECTION INTRAMUSCULAR; INTRAVENOUS DAILY
Status: DISCONTINUED | OUTPATIENT
Start: 2017-11-21 | End: 2017-11-22

## 2017-11-20 RX ORDER — LIDOCAINE 50 MG/G
1-3 PATCH TOPICAL
Status: DISCONTINUED | OUTPATIENT
Start: 2017-11-21 | End: 2017-11-24 | Stop reason: HOSPADM

## 2017-11-20 RX ORDER — SODIUM CHLORIDE 9 MG/ML
1000 INJECTION, SOLUTION INTRAVENOUS CONTINUOUS
Status: DISCONTINUED | OUTPATIENT
Start: 2017-11-20 | End: 2017-11-20

## 2017-11-20 RX ORDER — VENLAFAXINE HYDROCHLORIDE 150 MG/1
150 TABLET, EXTENDED RELEASE ORAL
Status: DISCONTINUED | OUTPATIENT
Start: 2017-11-21 | End: 2017-11-24 | Stop reason: HOSPADM

## 2017-11-20 RX ORDER — PREGABALIN 75 MG/1
150 CAPSULE ORAL 2 TIMES DAILY
Status: DISCONTINUED | OUTPATIENT
Start: 2017-11-20 | End: 2017-11-24 | Stop reason: HOSPADM

## 2017-11-20 RX ORDER — AMOXICILLIN 250 MG
2 CAPSULE ORAL 2 TIMES DAILY PRN
Status: DISCONTINUED | OUTPATIENT
Start: 2017-11-20 | End: 2017-11-24 | Stop reason: HOSPADM

## 2017-11-20 RX ORDER — NALOXONE HYDROCHLORIDE 0.4 MG/ML
.1-.4 INJECTION, SOLUTION INTRAMUSCULAR; INTRAVENOUS; SUBCUTANEOUS
Status: DISCONTINUED | OUTPATIENT
Start: 2017-11-20 | End: 2017-11-24 | Stop reason: HOSPADM

## 2017-11-20 RX ORDER — ACETAMINOPHEN 325 MG/1
650 TABLET ORAL EVERY 4 HOURS PRN
Status: DISCONTINUED | OUTPATIENT
Start: 2017-11-20 | End: 2017-11-24 | Stop reason: HOSPADM

## 2017-11-20 RX ORDER — ASPIRIN 81 MG/1
324 TABLET, CHEWABLE ORAL ONCE
Status: COMPLETED | OUTPATIENT
Start: 2017-11-20 | End: 2017-11-20

## 2017-11-20 RX ORDER — DILTIAZEM HYDROCHLORIDE 120 MG/1
120 CAPSULE, COATED, EXTENDED RELEASE ORAL DAILY
Status: DISCONTINUED | OUTPATIENT
Start: 2017-11-21 | End: 2017-11-24 | Stop reason: HOSPADM

## 2017-11-20 RX ADMIN — FUROSEMIDE 40 MG: 10 INJECTION, SOLUTION INTRAVENOUS at 20:46

## 2017-11-20 RX ADMIN — NORTRIPTYLINE HYDROCHLORIDE 50 MG: 50 CAPSULE ORAL at 22:51

## 2017-11-20 RX ADMIN — SODIUM CHLORIDE 1000 ML: 9 INJECTION, SOLUTION INTRAVENOUS at 18:21

## 2017-11-20 RX ADMIN — WARFARIN SODIUM 5 MG: 5 TABLET ORAL at 22:51

## 2017-11-20 RX ADMIN — GABAPENTIN 600 MG: 600 TABLET, FILM COATED ORAL at 22:52

## 2017-11-20 RX ADMIN — LEVOFLOXACIN 750 MG: 5 INJECTION, SOLUTION INTRAVENOUS at 19:11

## 2017-11-20 RX ADMIN — METOPROLOL SUCCINATE 50 MG: 50 TABLET, EXTENDED RELEASE ORAL at 22:52

## 2017-11-20 RX ADMIN — PRAVASTATIN SODIUM 10 MG: 10 TABLET ORAL at 22:51

## 2017-11-20 RX ADMIN — PREGABALIN 150 MG: 75 CAPSULE ORAL at 22:51

## 2017-11-20 RX ADMIN — ASPIRIN 81 MG 324 MG: 81 TABLET ORAL at 20:13

## 2017-11-20 ASSESSMENT — ENCOUNTER SYMPTOMS
COUGH: 1
FEVER: 1
SHORTNESS OF BREATH: 1
PALPITATIONS: 0
FATIGUE: 1
SORE THROAT: 1
WHEEZING: 1

## 2017-11-20 NOTE — LETTER
"Transition Communication Hand-off for Care Transitions to Next Level of Care Provider    Name: Berenice Chaudhari  MRN #: [id number]  Primary Care Provider: Nelly Pearl  Primary Care MD Name: Nelly Pearl  Primary Clinic: [address] [place] [address] [address] [place] [address]  Primary Care Clinic Name: [facility]  Reason for Hospitalization:  Generalized muscle weakness [M62.81]  Cellulitis of left lower extremity [L03.116]  Troponin I above reference range [R74.8]  Congestive heart failure, unspecified congestive heart failure chronicity, unspecified congestive heart failure type (H) [I50.9]  Admit Date/Time: 11/20/2017  5:24 PM  Discharge Date: ***  Payor Source: Payor: BCBS / Plan: BCBS OUT OF STATE / Product Type: Indemnity /     Readmission Assessment Measure (HAJA) Risk Score/category: ***    Plan of Care Goals/Milestone Events:   Patient Concerns: ***   Patient Goals:   Short-term ***   Long-term ***   Medical Goals   Short-term ***   Long-term ***         Reason for Communication Hand-off Referral: {CCREASONCMCTNHANDOFFREFERRALCTS:02997}    Discharge Plan:       Concern for non-adherence with plan of care:   Y/N ***  Discharge Needs Assessment:  Needs       Most Recent Value    Equipment Currently Used at Home wheelchair, manual, wheelchair, power, shower chair, grab bar [bed rails, stair lift]    Transportation Available van, wheelchair accessible          Already enrolled in Tele-monitoring program and name of program:  ***  Follow-up specialty is recommended: {YES / NO:96819::\"Yes\"}    Follow-up plan:  Future Appointments  Date Time Provider Department Center   11/23/2017 6:00 AM Ebony Dela Cruz, OT [facility]   11/27/2017 1:20 PM Nelly Pearl MD [facility]   12/1/2017 11:45 AM [facility]   12/1/2017 12:50 PM Heather Harley Sm, APRN CNP [facility]       Any outstanding tests or procedures:              Key Recommendations:  Pt is admitted for LLE cellulitis and CHF. She " is being treated with IV lasix and IV levo. She follows a low Na diet at home. She is unable to weigh herself at home as she is unable to stand for long time periods. She lives at home with her  who primarily cares for her. She may need home care on dc and would like [facility]. She anticipates discharging back home with . Awaiting PT/OT recommendations.    She will follow up with [facility] clinic on dc.    Urvashi Douglas    AVS/Discharge Summary is the source of truth; this is a helpful guide for improved communication of patient story

## 2017-11-20 NOTE — LETTER
Transition Communication Hand-off for Care Transitions to Next Level of Care Provider    Name: Berenice Chaudhari  MRN #: [id number]  Primary Care Provider: Nelly Pearl  Primary Care MD Name: Nlely Pearl  Primary Clinic: [address] [place] [address] [place] [address]  [facility] Name: [facility]  Reason for Hospitalization:  Generalized muscle weakness [M62.81]  Cellulitis of left lower extremity [L03.116]  Troponin I above reference range [R74.8]  Congestive heart failure, unspecified congestive heart failure chronicity, unspecified congestive heart failure type (H) [I50.9]  Admit Date/Time: 11/20/2017  5:24 PM  Discharge Date: 11/24/17  Payor Source: Payor: BCBS / Plan: BCBS OUT OF STATE / Product Type: Indemnity /     Readmission Assessment Measure (HAJA) Risk Score/category: Elevated           Reason for Communication Hand-off Referral: Admission diagnoses: CHF  Admission diagnoses: PN    Discharge Plan:       Concern for non-adherence with plan of care:   No  Discharge Needs Assessment:  Needs       Most Recent Value    Equipment Currently Used at Home wheelchair, manual, wheelchair, power, shower chair, grab bar [bed rails, stair lift]    Transportation Available van, wheelchair accessible    # of Referrals Placed by [facility]    Home [facility] [phone], Fax: [phone]          Already enrolled in Tele-monitoring program and name of program:  NA  Follow-up specialty is recommended: No    Follow-up plan:  Future Appointments  Date Time Provider Department Center   11/27/2017 1:20 PM Nelly Pearl MD [facility]   12/1/2017 11:45 AM [facility]   12/1/2017 12:50 PM Heahter Harley Sm, APRN CNP [facility]       Any outstanding tests or procedures:        Referrals     Future Labs/Procedures    Home care nursing referral     Comments:    RN skilled nursing visit. RN to assess home safety.  HC to check INR 11/27/17.  Your provider has ordered home  care nursing services. If you have not been contacted within 2 days of your discharge please call the inpatient department phone number at [phone] .    Home Care OT Referral for Hospital Discharge     Comments:    OT to eval and treat    Your provider has ordered home care - occupational therapy. If you have not been contacted within 2 days of your discharge please call the department phone number listed on the top of this document.    Home Care PT Referral for Hospital Discharge     Comments:    PT to eval and treat    Your provider has ordered home care - physical therapy. If you have not been contacted within 2 days of your discharge please call the department phone number listed on the top of this document.            Key Recommendations:    Pt was admitted for LLE cellulitis and CHF. She was being treated with IV lasix and IV levo. She follows a low Na diet at home. She is unable to weigh herself at home as she is unable to stand for long time periods. She lives at home with her  who primarily cares for her.  She was discharged to home with  and  Home RN, PT, OT. She will follow up with PCP and [facility] clinic on dc.    Arline Anderson RN CTS    AVS/Discharge Summary is the source of truth; this is a helpful guide for improved communication of patient story

## 2017-11-20 NOTE — IP AVS SNAPSHOT
[place] [address] [facility]    [address]    [facility] [address]    Phone:  [phone]    Fax:  [phone]                                       After Visit Summary   11/20/2017    Berenice Chaudhari    MRN: [id number]           After Visit Summary Signature Page     I have received my discharge instructions, and my questions have been answered. I have discussed any challenges I see with this plan with the nurse or doctor.    ..........................................................................................................................................  Patient/Patient Representative Signature      ..........................................................................................................................................  Patient Representative Print Name and Relationship to Patient    ..................................................               ................................................  Date                                            Time    ..........................................................................................................................................  Reviewed by Signature/Title    ...................................................              ..............................................  Date                                                            Time

## 2017-11-20 NOTE — IP AVS SNAPSHOT
MRN:[id number]                      After Visit Summary   11/20/2017    Berenice Chaudhari    MRN: [id number]           Thank you!     Thank you for choosing [facility] for your care. Our goal is always to provide you with excellent care. Hearing back from our patients is one way we can continue to improve our services. Please take a few minutes to complete the written survey that you may receive in the mail after you visit. If you would like to speak to someone directly about your visit please contact Patient Relations at [phone]. Thank you!          Patient Information     Date Of Birth          1940        Designated Caregiver       Most Recent Value    Caregiver    Will someone help with your care after discharge? yes    Name of designated caregiver Ricardo Chaudhari    Phone number of caregiver [phone]    Caregiver address [facility] [facility] [address]      About your hospital stay     You were admitted on:  November 20, 2017 You last received care in the:  [facility]    You were discharged on:  November 24, 2017        Reason for your hospital stay       LLE cellulitis                  Who to Call     For medical emergencies, please call 911.  For non-urgent questions about your medical care, please call your primary care provider or clinic, [phone]          Attending Provider     Provider Specialty    Jefry Paredes MD Emergency Medicine    Velma, Norma Streeter MD Internal Medicine       Primary Care Provider Office Phone # Fax #    Nelly Pearl [phone]      After Care Instructions     Activity       Your activity upon discharge: activity as tolerated            Diet       Follow this diet upon discharge:  Diet Regular Diet Adult; 2 gm NA Diet                  Follow-up Appointments     Follow-up and recommended labs and tests        Follow up with primary care provider, Nelly Pearl, within 5 - 7  days for hospital follow- up.  The following labs/tests are recommended: INR.                  Your next 10 appointments already scheduled     Nov 27, 2017  1:20 PM CST   SHORT with Nelly Pearl MD   [facility]    [address]  [address]  [facility] [address]   [phone]            Dec 01, 2017 11:45 AM CST   LAB with RU LAB   [facility] AT [place] ([facility])    [address]  [place] [place] [facility] [address] [phone]           Please do not eat 10-12 hours before your appointment if you are coming in fasting for labs on lipids, cholesterol, or glucose (sugar). This does not apply to pregnant women. Water, hot tea and black coffee (with nothing added) are okay. Do not drink other fluids, diet soda or chew gum.            Dec 01, 2017 12:50 PM CST   CORE Enrollment with PANCHO Christianson CNP   [facility] ([facility])    [address]  [facility] [id number] [phone]              Additional Services     Home Care OT Referral for Hospital Discharge       OT to eval and treat    Your provider has ordered home care - occupational therapy. If you have not been contacted within 2 days of your discharge please call the department phone number listed on the top of this document.            Home Care PT Referral for Hospital Discharge       PT to eval and treat    Your provider has ordered home care - physical therapy. If you have not been contacted within 2 days of your discharge please call the department phone number listed on the top of this document.            Home care nursing referral       RN skilled nursing visit. RN to assess home safety.  HC to check INR 11/27/17.  Your provider has ordered home care nursing services. If you have not been contacted within 2 days of your discharge please call the inpatient department phone  number at [phone] .                  Further instructions from your care team         Discharge Instructions for Cellulitis  You have been diagnosed with cellulitis. This is an infection in the deepest layer of the skin. In some cases, the infection also affects the muscle. Cellulitis is caused by bacteria. The bacteria can enter the body through broken skin. This can happen with a cut, scratch, animal bite, or an insect bite that has been scratched. You may have been treated in the hospital with antibiotics and fluids. You will likely be given a prescription for antibiotics to take at home. This sheet will help you take care of yourself at home.  Home care  When you are home:    Take the prescribed antibiotic medicine you are given as directed until it is gone. Take it even if you feel better. It treats the infection and stops it from returning. Not taking all the medicine can make future infections hard to treat.    Keep the infected area clean.    When possible, raise the infected area above the level of your heart. This helps keep swelling down.    Talk with your healthcare provider if you are in pain. Ask what kind of over-the-counter medicine you can take for pain.    Apply clean bandages as advised.    Take your temperature once a day for a week.    Wash your hands often to prevent spreading the infection.  In the future, wash your hands before and after you touch cuts, scratches, or bandages. This will help prevent infection.   When to call your healthcare provider  Call your healthcare provider immediately if you have any of the following:    Difficulty or pain when moving the joints above or below the infected area    Discharge or pus draining from the area    Fever of 100.4 F (38 C) or higher, or as directed by your healthcare provider    Pain that gets worse in or around the infected     Redness that gets worse in or around the infected area, particularly if the area of redness expands to a wider  area    Shaking chills    Swelling of the infected area    Vomiting   Date Last Reviewed: 8/1/2016 2000-2017 [employer]. [address] [place] [address], [place] [address]. All rights reserved. This information is not intended as a substitute for professional medical care. Always follow your healthcare professional's instructions.        Left- or Right- Side Congestive Heart Failure (CHF)    The heart is a large muscle. It is a pump that circulates blood throughout the body. Blood carries oxygen to all of the organs, including the brain, muscles, and skin. After your body takes the oxygen out of the blood, the blood returns to the heart. The right side of the heart collects the blood from the body and pumps it to the lungs. In the lungs, it gets fresh oxygen and gives up carbon dioxide. The oxygen-rich blood from the lungs then returns to the left side of the heart, where it is pumped back out to the rest of your body, starting the process all over.  Congestive heart failure (CHF) occurs when the heart muscle is weakened. This affects the pumping action of the heart. Heart failure can affect the right side of the heart or the left side. But heart failure may affect not only the right side of the heart or only the left side. Although it may have started on one side, it can and often eventually does affect both sides.  Right-side heart failure  When the right side of the heart is weakened, it can t handle the blood it is getting from the rest of the body. This blood returns to the heart through veins. When too much pressure builds up in the veins, fluid leaks out into the tissues. Gravity then causes that fluid to move to those parts of the body that are the lowest. So one of the first symptoms of right-side CHF can include swelling in the feet and ankles. If the condition gets worse, the swelling can even go up past the knees. Sometimes it gets so severe, the liver can get congested as well.  Left-side heart  failure  When the left side of the heart is weakened, it can t handle the blood it gets from the lungs. Pressure then builds up in the veins of the lungs, causing fluid to leak into the lung tissues. This may cause CHF and pulmonary edema. This causes you to feel short of breath, weak, or dizzy. These symptoms are often worse with exertion, such as when climbing stairs or walking up hills. Lying with your head flat is uncomfortable and can make your breathing worse. This may make sleeping difficult. You may need to use extra pillows to elevate your upper body to sleep well. The same is true when just resting during the daytime.  There are many causes of heart failure including:    Coronary artery disease    Past heart attack (also known as acute myocardial infarction, or AMI)    High blood pressure    Damaged heart valve    Diabetes    Obesity    Cigarette smoking    Alcohol abuse  Heart failure is a chronic condition. There is no cure. The purpose of medical treatment is to improve the pumping action of the heart. The main way to do this is to remove excess water from the body. A number of medicines can help reach this goal, improve symptoms, and prevent the heart from becoming weaker. Sometimes, heart failure can become so severe that a device is placed in the heart to help with pumping. Another major goal is to better treat the causes of heart failure, such as diabetes and high blood pressure, by making changes in your lifestyle and maximizing medical control when needed.  Home care  Follow these guidelines when caring for yourself at home:    Check your weight every day. This is very important because a sudden increase in weight gain could mean worsening heart failure. Keep these things in mind:    Use the same scale every day.    Weigh yourself at the same time every day.    Make sure the scale is on a hard floor surface, not on a rug or carpet.    Keep a record of your weight every day so your healthcare  provider can see it. If you are not given a log sheet for this, keep a separate journal for this purpose.     Cut back on the amount of salt (sodium) you eat. Follow your healthcare provider's recommendation on how much salt or sodium you should have each day.    Avoid high-salt foods. These include olives, pickles, smoked meats, salted potato chips, and most prepared foods.    Don't add salt to your food at the table. Use only small amounts of salt when cooking.    Read the labels carefully on food packages to learn how much salt or sodium is in each serving in the package. Remember, a can or package of food may contain more than 1 serving. So if you eat all the food in the package, you may be getting more salt than you think.    Follow your healthcare provider's recommendations about how much fluid you should have. Be aware that some foods, such as soup, pudding, and juicy fruits like oranges or melons, contain liquid. You'll need to count the liquid in those foods as part of your daily fluid intake. Your provider can help you with this.    Stop smoking.    Cut back on how much alcohol you drink.    Lose weight if you are overweight. The excess weight adds a lot of stress on the workload of the heart.    Stay active. Talk with your provider about an exercise program that is safe for your heart.    Keep your feet elevated to reduce swelling. Ask your provider about support hose as a preventive treatment for daytime leg swelling.  Besides taking your medicine as instructed, an important part of treatment is lifestyle changes. These include diet, physical activity, stopping smoking, and weight control.  Improve your diet by including more fresh foods, cutting back on how much sugar and saturated fat you eat, and eating fewer processed foods and less salt.  Follow-up care  Follow up with your healthcare provider, or as advised.  Make sure to keep any appointments that were made for you. These can help better control  your congestive heart failure. You will need to follow up with your provider on a routine basis to make sure your heart failure is well managed.  If an X-ray, ECG, or other tests were done, you will be told of any new findings that may affect your care.  Call 911  Call 911 if you:    Become severely short of breath    Feel lightheaded, or feel like you might pass out or faint    Have chest pain or discomfort that is different than usual, the medicines your doctor told you to use for this don't help, or the pain lasts longer than 10 to 15 minutes    You suddenly develop a rapid heart rate  When to seek medical advice  The following may be signs that your heart failure is getting worse. Call your healthcare provider right away if any of these happen:    Sudden weight gain. This means 3 or more pounds in one day, or 5 or more pounds in 1 week.    Trouble breathing not related to being active    New or increased swelling of your legs or ankles    Swelling or pain in your abdomen    Breathing trouble at night. This means waking up short of breath or needing more pillows to breathe.    Frequent coughing that doesn t go away    Feeling much more tired than usual  Date Last Reviewed: 1/4/2016 2000-2017 [employer]). [address] [place] [address], [place] [address]. All rights reserved. This information is not intended as a substitute for professional medical care. Always follow your healthcare professional's instructions.      You will be discharged home with [facility]. If you have not heard from them within 48 hours of dc please call them at [phone].    Warfarin Instruction     You have started taking a medicine called warfarin. This is a blood-thinning medicine (anticoagulant). It helps prevent and treat blood clots.      Before leaving the hospital, make sure you know how much to take and how long to take it.      You will need regular blood tests to make sure your blood is clotting safely. It is very important  "to see your doctor for regular blood tests.    Talk to your doctor before taking any new medicine (this includes over-the-counter drugs and herbal products). Many medicines can interact with warfarin. This may cause more bleeding or too much clotting.     Eating a lot of vitamin K--found in green, leafy vegetables--can change the way warfarin works in your body. Do NOT avoid these foods. Instead, try to eat the same amount each day.     Bleeding is the most common side-effect of warfarin. You may notice bleeding gums, a bloody nose, bruises and bleeding longer when you cut yourself. See a doctor at once if:   o You cough up blood  o You find blood in your stool (poop)  o You have a deep cut, or a cut that bleeds longer than 10 minutes   o You have a bad cut, hard fall, accident or hit your head (go to urgent care or the emergency room).    For women who can get pregnant: This medicine can harm an unborn baby. Be very careful not to get pregnant while taking this medicine. If you think you might be pregnant, call your doctor right away.    For more information, read \"Guide to Warfarin Therapy,  the booklet you received in the hospital.        General Recommendations To Control Heart Failure When You Get Home       Heart Failure Instructions for Patients and Families: Please read and check off each of these important instructions as you do them when you get home.          Weight and Symptoms       ___ Put a scale in your bathroom.    ___ Post a weight chart or calendar next to your scale.    ___ Weigh yourself everyday as soon as you get up in the morning (before breakfast).  You should only be wearing your pajamas.  Write your weight on the chart/calendar.  ___ Bring your weight chart/calendar with you to all appointments.  ___ Call your doctor or nurse practitioner if you gain 2 pounds (in 1 day) or 5 pounds in (1 week) from your goal  good  weight.  Your good weight is also called your  dry  weight.  Your doctor or " nurse will tell you what your good weight should be.    ___ Call your doctor or nurse practitioner if you have shortness of breath that gets worse over time, leg swelling or fatigue.       Medications and Diet       ___ Make sure to take your medication as prescribed.    ___ Bring a current list of your medication and all of your medicine bottles with you to all appointments.    ___ Limit fluids if you still have swelling or shortness of breath, or if your doctor tells you to do so.    ___ Eat less than 2000 mg of sodium (salt) every day. Read food labels, and do not add salt to meals. Remember, if you eat less salt you retain less fluid.  ___ Follow a heart healthy diet that is low in saturated fat.        Activity and Suggested Lifestyle Changes      ___ Stay active. Talk to your doctor about an exercise program that is safe for your heart.  ___ Stop smoking. Reduce alcohol use.      ___ Lose weight if you are overweight. Extra weight puts a lot of stress on the heart.                 Control for Leg Swelling     ___ Keep your legs elevated (raised) as needed for swelling. If swelling is uncomfortable or elevation doesn t help, ask your doctor about using ACE wraps or support stockings.        What is the [facility]?  Cardiomyopathy  Optimization  Rehabilitation  Education    The [facility] is a heart failure specialty clinic within [facility].  It is an outpatient disease management program that is based on a phase-by-phase approach, which is tailored to each patient s individual needs.  The cardiologist, nurse practitioner, physician assistant and nurses provide an ongoing outpatient care and treatment plan that guides heart failure and cardiomyopathy patients from evaluation and education to stabilization. This team works with your current primary care doctor and cardiologist to help you:    - Avoid hospitalizations  - Slow the progression of the disease  - Improve length and quality of  life  - Know who and when to call if heart failure symptoms appear  - Receive easy access to quality health care and advice  - Better understand your condition and treatment  - Decrease the tremendous cost burden of heart failure care  - Detect future heart problems before they become life threatening                                 Follow-up Appointments     ___ An appointment with the [facility] may already have been made for you (see section   Your next appointments already scheduled ).  If you have a question about your appointment, would like to speak with a C.O.R.E. nurse, or would like to become a [facility] Clinic patient, please call one of the following locations:     - [facility]):                                             [phone]  - [facility]):                                            [phone]  - [facility] ([place]):                 [phone]      ___ Your C.O.R.E. Clinic Team will continue to educate you on your heart failure and may adjust medications based on your vital signs, lab work, and how you are feeling.  Therefore, it is very important to bring the following to all C.O.R.E. appointments:    - An accurate list of your medications  - Your medicine bottles  - Your weight chart/calendar                   Pending Results     Date and Time Order Name Status Description    11/20/2017 1809 Blood culture Preliminary     11/20/2017 1804 Blood culture Preliminary             Statement of Approval     Ordered          11/24/17 1139  I have reviewed and agree with all the recommendations and orders detailed in this document.  EFFECTIVE NOW     Approved and electronically signed by:  Yousif Ruiz MD             Admission Information     Date & Time Provider Department Dept. Phone    11/20/2017 Norma Lino MD [place] [address] Medical Surgical [phone]      Your Vitals Were     Blood  "Pressure Pulse Temperature Respirations Height Weight    115/59 (BP Location: Right arm) 77 97.9  F (36.6  C) (Oral) 16 1.676 m (5' 6\") 155.7 kg (343 lb 3.2 oz)    Pulse Oximetry BMI (Body Mass Index)                98% 55.39 kg/m2          [email or web address] Information     [employer] lets you send messages to your doctor, view your test results, renew your prescriptions, schedule appointments and more. To sign up, go to [email or web address] . Click on \"Log in\" on the left side of the screen, which will take you to the Welcome page. Then click on \"Sign up Now\" on the right side of the page.     You will be asked to enter the access code listed below, as well as some personal information. Please follow the directions to create your username and password.     Your access code is: [email or web address]  Expires: 2017  5:04 PM     Your access code will  in 90 days. If you need help or a new code, please call your [place] clinic or [phone].        Care EveryWhere ID     This is your Care EveryWhere ID. This could be used by other organizations to access your [facility] medical records  [id number]        Equal Access to Services     EDMUND POWELL AH: Marnie osman Sojohn, waaxda luqadaha, qaybta kaalmada adeegyada, calin hoyt. So [facility] [phone].    ATENCIÓN: Si habla español, tiene a dickey disposición servicios gratuitos de asistencia lingüística. Llivan al [phone].    We comply with applicable federal civil rights laws and [place] laws. We do not discriminate on the basis of race, color, national origin, age, disability, sex, sexual orientation, or gender identity.               Review of your medicines      START taking        Dose / Directions    levofloxacin 500 MG tablet   Commonly known as:  LEVAQUIN   Used for:  Cellulitis of left lower extremity        Dose:  500 mg   Take 1 tablet (500 mg) by mouth daily for 6 days   Quantity:  6 tablet   Refills:  0         CONTINUE " these medicines which have NOT CHANGED        Dose / Directions    buPROPion 300 MG 24 hr tablet   Commonly known as:  WELLBUTRIN XL   Used for:  Major depressive disorder, recurrent, in full remission (H)        Dose:  300 mg   Take 1 tablet (300 mg) by mouth every morning   Quantity:  90 tablet   Refills:  1       diltiazem 120 MG 24 hr capsule   Commonly known as:  CARDIZEM CD/CARTIA XT   Used for:  Atrial flutter with rapid ventricular response (H)        Dose:  120 mg   Take 1 capsule (120 mg) by mouth daily   Quantity:  90 capsule   Refills:  3       fluticasone 50 MCG/ACT spray   Commonly known as:  FLONASE   Used for:  Post-nasal drip        Dose:  2 spray   Spray 2 sprays into both nostrils daily   Quantity:  3 Bottle   Refills:  11       furosemide 20 MG tablet   Commonly known as:  LASIX   Used for:  Atrial flutter with rapid ventricular response (H)        Dose:  20 mg   Take 1 tablet (20 mg) by mouth daily   Quantity:  90 tablet   Refills:  3       GABAPENTIN PO        Dose:  600 mg   Take 600 mg by mouth 3 times daily   Refills:  0       lidocaine 5 % Patch   Commonly known as:  LIDODERM   Used for:  Postherpetic neuralgia        Apply up to 3 patches to painful area at once for up to 12 h within a 24 h period.  Remove after 12 hours.   Quantity:  60 patch   Refills:  2       METOPROLOL SUCCINATE ER PO        Dose:  50 mg   Take 50 mg by mouth every evening   Refills:  0       minocycline 50 MG capsule   Commonly known as:  MINOCIN/DYNACIN   Used for:  Methicillin susceptible Staphylococcus aureus septicemia (H)        TAKE 1 CAPSULE(50 MG) BY MOUTH TWICE DAILY   Quantity:  60 capsule   Refills:  11       nortriptyline 25 MG capsule   Commonly known as:  PAMELOR   Used for:  Post herpetic neuralgia        TAKE 2 CAPSULES(50 MG) BY MOUTH AT BEDTIME   Quantity:  60 capsule   Refills:  11       pantoprazole 40 MG EC tablet   Commonly known as:  PROTONIX   Used for:  Gastroesophageal reflux disease without  esophagitis        TAKE 1 TABLET(40 MG) BY MOUTH DAILY   Quantity:  90 tablet   Refills:  3       pravastatin 10 MG tablet   Commonly known as:  PRAVACHOL   Used for:  Hyperlipidemia LDL goal <100        Dose:  10 mg   Take 1 tablet (10 mg) by mouth At Bedtime Needs labs before next refill   Quantity:  30 tablet   Refills:  0       pregabalin 150 MG capsule   Commonly known as:  LYRICA   Used for:  Postherpetic neuralgia        Dose:  150 mg   Take 1 capsule (150 mg) by mouth 2 times daily   Quantity:  60 capsule   Refills:  5       venlafaxine 150 MG 24 hr capsule   Commonly known as:  EFFEXOR-XR   Used for:  Major depressive disorder, recurrent episode, moderate (H)        TAKE 1 CAPSULE(150 MG) BY MOUTH DAILY   Quantity:  90 capsule   Refills:  0       WARFARIN SODIUM PO        Take by mouth daily 5 mg Mondays, 2.5 mg all other days or as directed by INR clinic   Refills:  0         STOP taking     CEPHALEXIN PO                Where to get your medicines      These medications were sent to [facility] [address] [facility]  [address] [facility] [address]     Phone:  [phone]     levofloxacin 500 MG tablet               ANTIBIOTIC INSTRUCTION     You've Been Prescribed an Antibiotic - Now What?  Your healthcare team thinks that you or your loved one might have an infection. Some infections can be treated with antibiotics, which are powerful, life-saving drugs. Like all medications, antibiotics have side effects and should only be used when necessary. There are some important things you should know about your antibiotic treatment.      Your healthcare team may run tests before you start taking an antibiotic.    Your team may take samples (e.g., from your blood, urine or other areas) to run tests to look for bacteria. These test can be important to determine if you need an antibiotic at all and, if you do, which antibiotic will work best.      Within a few days,  your healthcare team might change or even stop your antibiotic.    Your team may start you on an antibiotic while they are working to find out what is making you sick.    Your team might change your antibiotic because test results show that a different antibiotic would be better to treat your infection.    In some cases, once your team has more information, they learn that you do not need an antibiotic at all. They may find out that you don't have an infection, or that the antibiotic you're taking won't work against your infection. For example, an infection caused by a virus can't be treated with antibiotics. Staying on an antibiotic when you don't need it is more likely to be harmful than helpful.      You may experience side effects from your antibiotic.    Like all medications, antibiotics have side effects. Some of these can be serious.    Let you healthcare team know if you have any known allergies when you are admitted to the hospital.    One significant side effect of nearly all antibiotics is the risk of severe and sometimes deadly diarrhea caused by Clostridium difficile (C. Difficile). This occurs when a person takes antibiotics because some good germs are destroyed. Antibiotic use allows C. diificile to take over, putting patients at high risk for this serious infection.    As a patient or caregiver, it is important to understand your or your loved one's antibiotic treatment. It is especially important for caregivers to speak up when patients can't speak for themselves. Here are some important questions to ask your healthcare team.    What infection is this antibiotic treating and how do you know I have that infection?    What side effects might occur from this antibiotic?    How long will I need to take this antibiotic?    Is it safe to take this antibiotic with other medications or supplements (e.g., vitamins) that I am taking?     Are there any special directions I need to know about taking this  antibiotic? For example, should I take it with food?    How will I be monitored to know whether my infection is responding to the antibiotic?    What tests may help to make sure the right antibiotic is prescribed for me?      Information provided by:  www.cdc.gov/getsmart  U.S. Department of Health and Human Services  Centers for disease Control and Prevention  National Center for Emerging and Zoonotic Infectious Diseases  Division of Healthcare Quality Promotion         Protect others around you: Learn how to safely use, store and throw away your medicines at www.disposemymeds.org.             Medication List: This is a list of all your medications and when to take them. Check marks below indicate your daily home schedule. Keep this list as a reference.      Medications           Morning Afternoon Evening Bedtime As Needed    buPROPion 300 MG 24 hr tablet   Commonly known as:  WELLBUTRIN XL   Take 1 tablet (300 mg) by mouth every morning   Last time this was given:  300 mg on 11/24/2017  8:53 AM            9am                       diltiazem 120 MG 24 hr capsule   Commonly known as:  CARDIZEM CD/CARTIA XT   Take 1 capsule (120 mg) by mouth daily   Last time this was given:  120 mg on 11/24/2017  8:53 AM            9am                       fluticasone 50 MCG/ACT spray   Commonly known as:  FLONASE   Spray 2 sprays into both nostrils daily            9am                       furosemide 20 MG tablet   Commonly known as:  LASIX   Take 1 tablet (20 mg) by mouth daily            9am  Resume at home                          GABAPENTIN PO   Take 600 mg by mouth 3 times daily   Last time this was given:  600 mg on 11/24/2017  8:53 AM            8am       2pm       8pm               levofloxacin 500 MG tablet   Commonly known as:  LEVAQUIN   Take 1 tablet (500 mg) by mouth daily for 6 days                    6pm               lidocaine 5 % Patch   Commonly known as:  LIDODERM   Apply up to 3 patches to painful area at once  for up to 12 h within a 24 h period.  Remove after 12 hours.   Last time this was given:  1 patch on 11/21/2017  2:20 PM            On at 8am/ off at 8pm                       METOPROLOL SUCCINATE ER PO   Take 50 mg by mouth every evening   Last time this was given:  50 mg on 11/23/2017  9:17 PM                    8pm               minocycline 50 MG capsule   Commonly known as:  MINOCIN/DYNACIN   TAKE 1 CAPSULE(50 MG) BY MOUTH TWICE DAILY            start at  8am           8pm               nortriptyline 25 MG capsule   Commonly known as:  PAMELOR   TAKE 2 CAPSULES(50 MG) BY MOUTH AT BEDTIME   Last time this was given:  50 mg on 11/23/2017  9:17 PM                        10pm           pantoprazole 40 MG EC tablet   Commonly known as:  PROTONIX   TAKE 1 TABLET(40 MG) BY MOUTH DAILY   Last time this was given:  40 mg on 11/24/2017  7:17 AM            8am                       pravastatin 10 MG tablet   Commonly known as:  PRAVACHOL   Take 1 tablet (10 mg) by mouth At Bedtime Needs labs before next refill   Last time this was given:  10 mg on 11/23/2017  9:16 PM                        10pm           pregabalin 150 MG capsule   Commonly known as:  LYRICA   Take 1 capsule (150 mg) by mouth 2 times daily   Last time this was given:  150 mg on 11/24/2017  8:53 AM            8am           8pm               venlafaxine 150 MG 24 hr capsule   Commonly known as:  EFFEXOR-XR   TAKE 1 CAPSULE(150 MG) BY MOUTH DAILY            8am  Resume at home                       WARFARIN SODIUM PO   Take by mouth daily 5 mg Mondays, 2.5 mg all other days or as directed by INR clinic   Last time this was given:  2.5 mg on 11/23/2017  5:33 PM

## 2017-11-20 NOTE — ED NOTES
Pt's spouse noticed an increase in weakness and pt's inability to assist with trasfers and hygiene.  Pt also noticed to have blue tongue.  Pt and spouse denies any blue food or candies.

## 2017-11-20 NOTE — ED NOTES
Bed: ED23  Expected date: 11/20/17  Expected time: 5:07 PM  Means of arrival: Ambulance  Comments:  [facility]  78yo F

## 2017-11-20 NOTE — ED PROVIDER NOTES
History     Chief Complaint:  Fever      HPI   Berenice Chaudhari is a 77 year old female with history of recurrent cellulitis, atrial flutter, chronic cough who presents to the emergency department today for evaluation of fever. Per chart review, the patient was admitted in June for cellulitis of bilateral legs and admitted in August for atrial flutter. Per the patient's family, reports that the patient has had a chronic cough for a year that has remained undiagnosed after nondiagnostic X-rays and endoscopy. They report she has an occasional rattle sound. Two days ago she began feeling sick and yesterday she developed a fever of 101. Today, she was noticeably weaker and unable to sit up in order to transfer herself to her wheelchair. Also, she had a fever of 102.6, therefore her  called EMS and she was transported to the emergency department for evaluation. En route, she received one neb. On presentation, the family states her cough and rattles have worsened. They state she has not been coughing much up. She denies any chest pain or palpitations.    Notably, her  states she has a turquoise blue tongue of unknown origin. He states there is no blue candy, popsicles, or ink pens at home that are that color. She denies any mouth pain or funny taste in her mouth but complains of some sore throat.    Allergies:  Ceftriaxone  Nafcillin  Penicillins  Vancomycin  Codeine  Clindamycin  Zithromax [Azithromycin]    Medications:    pravastatin (PRAVACHOL) 10 MG tablet  venlafaxine (EFFEXOR-XR) 150 MG 24 hr capsule  pantoprazole (PROTONIX) 40 MG EC tablet  zolpidem (AMBIEN) 5 MG tablet  warfarin (COUMADIN) 2.5 MG tablet  diltiazem (CARDIZEM CD/CARTIA XT) 120 MG 24 hr capsule  furosemide (LASIX) 20 MG tablet  buPROPion (WELLBUTRIN XL) 300 MG 24 hr tablet  minocycline (MINOCIN/DYNACIN) 50 MG capsule  metoprolol (TOPROL-XL) 50 MG 24 hr tablet  gabapentin (NEURONTIN) 600 MG tablet  fluticasone (FLONASE) 50 MCG/ACT  spray  lidocaine (LIDODERM) 5 % Patch  CEPHALEXIN PO  pregabalin (LYRICA) 150 MG capsule  nortriptyline (PAMELOR) 25 MG capsule  Multiple Vitamins-Minerals (CENTRUM SILVER) per tablet    Past Medical History:    Allergic rhinitis  Anxiety  Arrhythmia  Atrial flutter with rapid ventricular response  Cellulitis and abscess of leg, except foot  Chronic pain  Chronic renal disease  Contact dermatitis and other eczema  Erythroderma desquamativum  Hypertension  Generalized osteoarthrosis  GERD  Herpes zoster  Hyperlipidemia  Methicillin susceptible staphylococcus aureus septicemia  Obesity  Pneumonia  Urticaria  Venous stasis    Past Surgical History:    Biopsy  Extensive oral surgery  Remote T&A  Laparoscopic cholecystectomy  Left hip I&D x2  Colonoscopy  Cataract IOL  ENT surgery  EGD  Phacoemulsification clear cornea with standard intraocular lens implant    Family History:    MI    Social History:  The patient was accompanied to the ED by her sister and .  Smoking Status: Former  Smokeless Tobacco: Never  Alcohol Use: No  Marital Status:       Review of Systems   Constitutional: Positive for fatigue and fever.   HENT: Positive for sore throat.         Negative for mouth pain   Respiratory: Positive for cough, shortness of breath and wheezing.    Cardiovascular: Negative for chest pain and palpitations.   All other systems reviewed and are negative.    Physical Exam     Patient Vitals for the past 24 hrs:   BP Temp Temp src Pulse Heart Rate Resp SpO2 Height Weight   11/20/17 2100 133/69 - - - 80 22 96 % - -   11/20/17 2045 140/65 - - - 81 22 97 % - -   11/20/17 2030 (!) 116/96 - - - 84 15 96 % - -   11/20/17 2015 142/64 - - - 80 27 97 % - -   11/20/17 2000 134/68 - - - 80 (!) 31 93 % - -   11/20/17 1945 119/80 - - - 82 16 93 % - -   11/20/17 1930 139/71 - - - 85 24 93 % - -   11/20/17 1822 - 100.9  F (38.3  C) Oral - - - - - -   11/20/17 1738 - - - - - - 95 % - -   11/20/17 1735 129/71 102.1  F (38.9  C)  "Oral 86 - 26 - 1.676 m (5' 6\") (!) 144 kg (317 lb 7.4 oz)       Physical Exam  Constitutional:  Appears well-developed and well-nourished. Alert. Answers questions, but isn't fully, brightly awake and conversant.  HENT:   Head: Atraumatic.   Nose: Nose normal.  Mouth/Throat: Oral mucosa moist. Bright (bright blue (man made color) exudate on tongue and roof of mouth. 3-4 petechiae on soft palate, no definite pharyngeal exudate. no trismus. Tonsils symmetric. No tonsillar enlargement, erythema, or exudate. Airway patent.  Eyes: Conjunctivae normal. EOM normal. Pupils equal, round, and reactive to light. No scleral icterus.   Neck: Normal range of motion. Neck supple. No tracheal deviation present.   Cardiovascular: Normal rate, regular rhythm. No gallop. No friction rub. No murmur heard. Symmetric radial and DP artery pulses   Pulmonary/Chest: frequent wet sounding cough. Effort normal. No stridor. No respiratory distress. Unable to sit up for posterior exam, but anteriorly and laterally - No wheezes, No rales, No focal rhonchi . She does have coarse referred upper airway sounds. No tenderness.   Abdominal: Soft. Bowel sounds normal. No distension. No mass. epigastric tenderness. No rebound. No guarding.   Musculoskeletal: Normal range of motion. Bilateral LE edema- family says is chronic. She has erythema and warmth of left calf and shin and foot. No palpable fluctuance or gas in soft tissue. No bony tenderness. No deformity   Lymph: No cervical adenopathy.   Neurological: Alert and oriented to person, place, and time. Generalized weakness, but no focal strength deficit. CN II-VII intact. No sensory deficit. GCS eye subscore is 4. GCS verbal subscore is 5. GCS motor subscore is 6. Normal coordination   Skin: Skin is warm and dry. No rash noted. No pallor. Normal capillary refill.  Psychiatric:  Normal mood. Normal affect.     Emergency Department Course     ECG:  Indication: fever  Completed at 1730.  Read at 1806. "   Normal sinus rhythm  Right bundle branch block  Abnormal ECG  On comparison with prior ECG 8/7/17, right bundle branch block is old, prior atrial flutter is gone, no new ST or T wave changes.   Rate 87 bpm. DE interval 172. QRS duration 158. QT/QTc 392/471. P-R-T axes 59 127 23.    Imaging:  Radiology findings were communicated with the patient and family who voiced understanding of the findings.  Chest XR, 1 view PORT  IMPRESSION: Mild patchy airspace opacities in the perihilar and lower  lung regions bilaterally. Findings could represent pneumonia. There  appears to be small bilateral pleural effusions. Pulmonary edema is  not excluded. Cardiac silhouette borderline-enlarged. No pneumothorax  Identified.  Report per radiology      Laboratory:  Laboratory findings were communicated with the patient and family who voiced understanding of the findings.  UA with Microscopic: Mucous urine present  CMP: Glucose 148 (H), Creatinine 1.24 (H), GFR Estimate 42 (L), Calcium 7.9 (L), Albumin 2.6 (L), o/w WNL.  Lipase: 72 (L)  BNP: 7034 (H)  Troponin (Collected 1900): 0.134 (HH)  Lactic Acid whole blood: 1.0  CBC: WBC 19.8 (H) o/w WNL. (HGB 12.8, )   INR: 1.57 (H)    Blood cultures: pending    Interventions:  1821 NS Bolus 1,000mL IV  1911 Levaquin 750 mg IV  2013 aspirin 324 mg PO  2046 Lasix 40 mg IV    Emergency Department Course:  Nursing notes and vitals reviewed.  The patient was sent for a Chest XR, 1 view portable while in the emergency department, results above.   IV was inserted and blood was drawn for laboratory testing, results above.  Parks catheter was placed while in the emergency department; urine was collected and sent to lab for testing.  1756: I performed an exam of the patient as documented above.   2035: I spoke with Dr. Bernal of the hospitalist service regarding patient's presentation, findings, and plan of care.   2118: Patient rechecked and updated.   Findings and plan explained to the  patient and family who consents to admission. Discussed the patient with Dr. Bernal, who will admit the patient to a [facility] bed for further monitoring, evaluation, and treatment.  I personally reviewed the laboratory and imaging results with the patient and family and answered all related questions prior to admission.    Impression & Plan          CMS Diagnoses:         Medical Decision Making:  Berenice Chaudhari is a 77 year old female with a complex past medical history was brought to the ear today for evaluation of fever, temp 102, associated with generalized nonfocal weakness. Evaluation for cause of fever related undertaken. She does have evidence for an obvious redness in her left leg. Given her multiple allergies, we started her on Levaquin, to which she has responded well in the past. The patient does have an infection and does meet criteria for sepsis with 2 SIRS criteria. However blood pressure, pulse, lactic acid are normal. No signs of severe sepsis or septic shock yet.    The patient also has a cough. Chest x-ray shows bilateral infiltrates, that could be pneumonia, but based on cardiac workup more likely represent congestive heart failure. We added Lasix for that. No evidence for any new renal failure.     In terms of CHF, her troponin is mildly elevated, likely owing to demand ischemia. She's not endorsing any chest pain or shortness of breath or other anginal type symptoms. EKG shows a right bundle branch block, but no change from prior. I don't think there is an indication for emergent cardiac cath. The patient is currently on Coumadin, for stroke prophylaxis with a flutter. INR is abnormal but sub therapeutic. Hold off on any additional heparin for now. Aspirin was administered. Patient will be admitted to the telemetry floor for serial enzymes.       Diagnosis:    ICD-10-CM   1. Cellulitis of left lower extremity L03.116   2. Generalized muscle weakness M62.81   3. Congestive heart failure,  unspecified congestive heart failure chronicity, unspecified congestive heart failure type (H) I50.9   4. Troponin I above reference range R74.8       Disposition:  Admitted to Med tele    Scribe Disclosure:  I, Radhaalexx Leggett, am serving as a scribe at 5:52 PM on 11/20/2017 to document services personally performed by Jefry Paredes MD based on my observations and the provider's statements to me.    11/20/2017   [facility] EMERGENCY DEPARTMENT       Jefry Paredes MD  11/20/17 8557

## 2017-11-21 ENCOUNTER — APPOINTMENT (OUTPATIENT)
Dept: OCCUPATIONAL THERAPY | Facility: CLINIC | Age: 77
End: 2017-11-21
Attending: PHYSICIAN ASSISTANT
Payer: COMMERCIAL

## 2017-11-21 DIAGNOSIS — I50.33 ACUTE ON CHRONIC DIASTOLIC HEART FAILURE (H): Primary | ICD-10-CM

## 2017-11-21 LAB
ANION GAP SERPL CALCULATED.3IONS-SCNC: 8 MMOL/L (ref 3–14)
BASOPHILS # BLD AUTO: 0 10E9/L (ref 0–0.2)
BASOPHILS NFR BLD AUTO: 0.2 %
BUN SERPL-MCNC: 17 MG/DL (ref 7–30)
CALCIUM SERPL-MCNC: 8.4 MG/DL (ref 8.5–10.1)
CHLORIDE SERPL-SCNC: 106 MMOL/L (ref 94–109)
CO2 SERPL-SCNC: 25 MMOL/L (ref 20–32)
CREAT SERPL-MCNC: 1.38 MG/DL (ref 0.52–1.04)
DIFFERENTIAL METHOD BLD: ABNORMAL
EOSINOPHIL # BLD AUTO: 0.2 10E9/L (ref 0–0.7)
EOSINOPHIL NFR BLD AUTO: 1.7 %
ERYTHROCYTE [DISTWIDTH] IN BLOOD BY AUTOMATED COUNT: 15 % (ref 10–15)
FLUAV+FLUBV AG SPEC QL: NEGATIVE
FLUAV+FLUBV AG SPEC QL: NEGATIVE
GFR SERPL CREATININE-BSD FRML MDRD: 37 ML/MIN/1.7M2
GLUCOSE SERPL-MCNC: 86 MG/DL (ref 70–99)
HCT VFR BLD AUTO: 35.2 % (ref 35–47)
HGB BLD-MCNC: 11.4 G/DL (ref 11.7–15.7)
IMM GRANULOCYTES # BLD: 0.1 10E9/L (ref 0–0.4)
IMM GRANULOCYTES NFR BLD: 0.5 %
INR PPP: 1.57 (ref 0.86–1.14)
INTERPRETATION ECG - MUSE: NORMAL
LACTATE BLD-SCNC: 0.9 MMOL/L (ref 0.7–2)
LYMPHOCYTES # BLD AUTO: 0.8 10E9/L (ref 0.8–5.3)
LYMPHOCYTES NFR BLD AUTO: 6.4 %
MCH RBC QN AUTO: 29.5 PG (ref 26.5–33)
MCHC RBC AUTO-ENTMCNC: 32.4 G/DL (ref 31.5–36.5)
MCV RBC AUTO: 91 FL (ref 78–100)
MONOCYTES # BLD AUTO: 0.9 10E9/L (ref 0–1.3)
MONOCYTES NFR BLD AUTO: 7 %
NEUTROPHILS # BLD AUTO: 10.3 10E9/L (ref 1.6–8.3)
NEUTROPHILS NFR BLD AUTO: 84.2 %
NRBC # BLD AUTO: 0 10*3/UL
NRBC BLD AUTO-RTO: 0 /100
PLATELET # BLD AUTO: 116 10E9/L (ref 150–450)
POTASSIUM SERPL-SCNC: 3.7 MMOL/L (ref 3.4–5.3)
RBC # BLD AUTO: 3.87 10E12/L (ref 3.8–5.2)
SODIUM SERPL-SCNC: 139 MMOL/L (ref 133–144)
SPECIMEN SOURCE: NORMAL
WBC # BLD AUTO: 12.3 10E9/L (ref 4–11)

## 2017-11-21 PROCEDURE — 87804 INFLUENZA ASSAY W/OPTIC: CPT | Performed by: INTERNAL MEDICINE

## 2017-11-21 PROCEDURE — 25000132 ZZH RX MED GY IP 250 OP 250 PS 637: Performed by: PHYSICIAN ASSISTANT

## 2017-11-21 PROCEDURE — 25000128 H RX IP 250 OP 636: Performed by: PHYSICIAN ASSISTANT

## 2017-11-21 PROCEDURE — 84484 ASSAY OF TROPONIN QUANT: CPT | Performed by: PHYSICIAN ASSISTANT

## 2017-11-21 PROCEDURE — 87205 SMEAR GRAM STAIN: CPT | Performed by: INTERNAL MEDICINE

## 2017-11-21 PROCEDURE — 99212 OFFICE O/P EST SF 10 MIN: CPT

## 2017-11-21 PROCEDURE — 25000132 ZZH RX MED GY IP 250 OP 250 PS 637: Performed by: INTERNAL MEDICINE

## 2017-11-21 PROCEDURE — 12000007 ZZH R&B INTERMEDIATE

## 2017-11-21 PROCEDURE — 97535 SELF CARE MNGMENT TRAINING: CPT | Mod: GO | Performed by: REHABILITATION PRACTITIONER

## 2017-11-21 PROCEDURE — 83605 ASSAY OF LACTIC ACID: CPT | Performed by: INTERNAL MEDICINE

## 2017-11-21 PROCEDURE — 40000133 ZZH STATISTIC OT WARD VISIT: Performed by: REHABILITATION PRACTITIONER

## 2017-11-21 PROCEDURE — 36415 COLL VENOUS BLD VENIPUNCTURE: CPT | Performed by: INTERNAL MEDICINE

## 2017-11-21 PROCEDURE — 87070 CULTURE OTHR SPECIMN AEROBIC: CPT | Performed by: INTERNAL MEDICINE

## 2017-11-21 PROCEDURE — 85025 COMPLETE CBC W/AUTO DIFF WBC: CPT | Performed by: PHYSICIAN ASSISTANT

## 2017-11-21 PROCEDURE — 36415 COLL VENOUS BLD VENIPUNCTURE: CPT | Performed by: PHYSICIAN ASSISTANT

## 2017-11-21 PROCEDURE — 97165 OT EVAL LOW COMPLEX 30 MIN: CPT | Mod: GO | Performed by: REHABILITATION PRACTITIONER

## 2017-11-21 PROCEDURE — 99233 SBSQ HOSP IP/OBS HIGH 50: CPT | Performed by: INTERNAL MEDICINE

## 2017-11-21 PROCEDURE — 80048 BASIC METABOLIC PNL TOTAL CA: CPT | Performed by: PHYSICIAN ASSISTANT

## 2017-11-21 PROCEDURE — 85610 PROTHROMBIN TIME: CPT | Performed by: PHYSICIAN ASSISTANT

## 2017-11-21 RX ORDER — WARFARIN SODIUM 5 MG/1
5 TABLET ORAL
Status: COMPLETED | OUTPATIENT
Start: 2017-11-21 | End: 2017-11-21

## 2017-11-21 RX ADMIN — FUROSEMIDE 40 MG: 10 INJECTION, SOLUTION INTRAVENOUS at 09:35

## 2017-11-21 RX ADMIN — BUPROPION HYDROCHLORIDE 300 MG: 150 TABLET, FILM COATED, EXTENDED RELEASE ORAL at 08:10

## 2017-11-21 RX ADMIN — MICONAZOLE NITRATE: 2 POWDER TOPICAL at 21:47

## 2017-11-21 RX ADMIN — GABAPENTIN 600 MG: 600 TABLET, FILM COATED ORAL at 21:18

## 2017-11-21 RX ADMIN — LEVOFLOXACIN 500 MG: 5 INJECTION, SOLUTION INTRAVENOUS at 17:57

## 2017-11-21 RX ADMIN — GABAPENTIN 600 MG: 600 TABLET, FILM COATED ORAL at 08:10

## 2017-11-21 RX ADMIN — GABAPENTIN 600 MG: 600 TABLET, FILM COATED ORAL at 16:14

## 2017-11-21 RX ADMIN — WARFARIN SODIUM 5 MG: 5 TABLET ORAL at 17:57

## 2017-11-21 RX ADMIN — PANTOPRAZOLE SODIUM 40 MG: 40 TABLET, DELAYED RELEASE ORAL at 06:45

## 2017-11-21 RX ADMIN — MICONAZOLE NITRATE: 2 POWDER TOPICAL at 03:59

## 2017-11-21 RX ADMIN — PREGABALIN 150 MG: 75 CAPSULE ORAL at 08:10

## 2017-11-21 RX ADMIN — ACETAMINOPHEN 650 MG: 325 TABLET, FILM COATED ORAL at 22:17

## 2017-11-21 RX ADMIN — NORTRIPTYLINE HYDROCHLORIDE 50 MG: 50 CAPSULE ORAL at 21:18

## 2017-11-21 RX ADMIN — PRAVASTATIN SODIUM 10 MG: 10 TABLET ORAL at 21:18

## 2017-11-21 RX ADMIN — DILTIAZEM HYDROCHLORIDE 120 MG: 120 CAPSULE, COATED, EXTENDED RELEASE ORAL at 08:10

## 2017-11-21 RX ADMIN — PREGABALIN 150 MG: 75 CAPSULE ORAL at 21:18

## 2017-11-21 RX ADMIN — METOPROLOL SUCCINATE 50 MG: 50 TABLET, EXTENDED RELEASE ORAL at 21:19

## 2017-11-21 RX ADMIN — LIDOCAINE 1 PATCH: 50 PATCH CUTANEOUS at 14:20

## 2017-11-21 RX ADMIN — VENLAFAXINE HYDROCHLORIDE 150 MG: 150 TABLET, EXTENDED RELEASE ORAL at 08:10

## 2017-11-21 ASSESSMENT — ACTIVITIES OF DAILY LIVING (ADL)
ADLS_ACUITY_SCORE: 27
ADLS_ACUITY_SCORE: 25

## 2017-11-21 ASSESSMENT — PAIN DESCRIPTION - DESCRIPTORS: DESCRIPTORS: SORE

## 2017-11-21 NOTE — PHARMACY-ADMISSION MEDICATION HISTORY
Admission medication history interview status for this patient is complete. See [facility] admission navigator for allergy information, prior to admission medications and immunization status.     Medication history interview source(s):Patient and Family  Medication history resources (including written lists, pill bottles, clinic record): printed med list  Primary pharmacy: [facility], [address]    Changes made to PTA medication list:  Added:   Deleted: Multivite, Zolpidem  Changed: Warfarin    Actions taken by pharmacist (provider contacted, etc):None     Additional medication history information:None    Medication reconciliation/reorder completed by provider prior to medication history? No    For patients on insulin therapy: No    Prior to Admission medications    Medication Sig Last Dose Taking? Auth Provider   pravastatin (PRAVACHOL) 10 MG tablet Take 1 tablet (10 mg) by mouth At Bedtime Needs labs before next refill 11/19/2017 at Unknown time Yes Nelly Pearl MD   GABAPENTIN PO Take 600 mg by mouth 3 times daily 11/19/2017 at Unknown time Yes Unknown, Entered By History   METOPROLOL SUCCINATE ER PO Take 50 mg by mouth every evening 11/19/2017 at Unknown time Yes Unknown, Entered By History   WARFARIN SODIUM PO Take by mouth daily 5 mg Mondays, 2.5 mg all other days or as directed by INR clinic 11/19/2017 at 2.5 mg Yes Unknown, Entered By History   venlafaxine (EFFEXOR-XR) 150 MG 24 hr capsule TAKE 1 CAPSULE(150 MG) BY MOUTH DAILY 11/19/2017 at pm Yes Nelly Pearl MD   pantoprazole (PROTONIX) 40 MG EC tablet TAKE 1 TABLET(40 MG) BY MOUTH DAILY 11/19/2017 at Unknown time Yes Nelly Pearl MD   diltiazem (CARDIZEM CD/CARTIA XT) 120 MG 24 hr capsule Take 1 capsule (120 mg) by mouth daily 11/19/2017 at Unknown time Yes Adela Mars APRN CNP   furosemide (LASIX) 20 MG tablet Take 1 tablet (20 mg) by mouth daily 11/19/2017 at Unknown time Yes Adela Mars APRN CNP    buPROPion (WELLBUTRIN XL) 300 MG 24 hr tablet Take 1 tablet (300 mg) by mouth every morning 11/19/2017 at Unknown time Yes Nelly Pearl MD   minocycline (MINOCIN/DYNACIN) 50 MG capsule TAKE 1 CAPSULE(50 MG) BY MOUTH TWICE DAILY 11/19/2017 at Unknown time Yes Nelly Pearl MD   fluticasone (FLONASE) 50 MCG/ACT spray Spray 2 sprays into both nostrils daily 11/19/2017 at Unknown time Yes Nelly Pearl MD   pregabalin (LYRICA) 150 MG capsule Take 1 capsule (150 mg) by mouth 2 times daily 11/19/2017 at Unknown time Yes Nelly Pearl MD   nortriptyline (PAMELOR) 25 MG capsule TAKE 2 CAPSULES(50 MG) BY MOUTH AT BEDTIME 11/19/2017 at Unknown time Yes Nelly Pearl MD   lidocaine (LIDODERM) 5 % Patch Apply up to 3 patches to painful area at once for up to 12 h within a 24 h period.  Remove after 12 hours.   Nelly Pearl MD   CEPHALEXIN PO Take 500 mg by mouth 3 times daily as needed (when cellulitis is coming on)    Reported, Patient

## 2017-11-21 NOTE — PROGRESS NOTES
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 Please contact Infection Prevention with any questions/concerns at *[phone].    Nicole Olvera, ICP

## 2017-11-21 NOTE — PLAN OF CARE
Problem: Patient Care Overview  Goal: Plan of Care/Patient Progress Review  Outcome: No Change  Patient alert. Cellulitis redness/warm in LLE. Levaquin cont. Sputum specimen to lab. D/C f/c per Dr Warner, 1400 out from [facility]. Wound/skin fold evaluated by wound nurse with care instructions written.  POC reviewed with pt//sister.

## 2017-11-21 NOTE — PHARMACY-ANTICOAGULATION SERVICE
Clinical Pharmacy - Warfarin Dosing Consult     Pharmacy has been consulted to manage this patient s warfarin therapy.  Indication: Atrial Fibrillation  Therapy Goal: INR 2-3  Provider/Team: Dolores TAMEZ  Warfarin Prior to Admission: Yes  Warfarin PTA Regimen:  5 mg on M and 2.5 mg ROW  Significant drug interactions: levaquin  Recent documented change in oral intake/nutrition: Unknown  Dose Comments: INR 1.57, warfarin 5 mg today, starting levaquin with anticipated increase in INR    INR   Date Value Ref Range Status   11/20/2017 1.57 (H) 0.86 - 1.14 Final   11/16/2017 1.8  Final       Recommend warfarin 5 mg today.  Pharmacy will monitor Berenice Chaudhari daily and order warfarin doses to achieve specified goal.      Please contact pharmacy as soon as possible if the warfarin needs to be held for a procedure or if the warfarin goals change.

## 2017-11-21 NOTE — PROGRESS NOTES
[facility] Nurse Inpatient Wound Assessment     Assessment of wound(s) on pt's:   Coccyx / Deep Gluteal Cleft        Data:   Patient History:      per MD note(s): 77 year old female with a PMH significant for paroxysmal a fib, CKD, HTN, CHF, GERD, hx of MRSA, interstitial lung dz, HLP, chronic cough, morbid obesity and recurrent cellulitis of the legs, who presents with fever and generalized weakness.      Current Diet / Nutrition:           Active Diet Order      Combination Diet Regular Diet Adult            Heber Assessment and sub scores:   Heber Score  Avg: 15  Min: 14  Max: 16     Labs:     Recent Labs   Lab Test  11/21/17   0651  11/20/17   1900   03/18/15   1600   08/11/14   1455   ALBUMIN   --   2.6*   < >   --    < >   --    HGB  11.4*   --    < >  13.3   < >  15.2   RBC  3.87   --    < >  4.41   < >  4.80   WBC  12.3*   --    < >  5.5   < >  6.1   PLT  116*   --    < >  166   < >  162   INR  1.57*   --    < >   --    --    --    A1C   --    --    --    --    --   5.8   CRP   --    --    --   8.4*   --   14.1*    < > = values in this interval not displayed.          Wound Assessment (location #1):   Gluteal Cleft/ Coccyx  Wound History:  History of fevers, skin very moist, obesity and deep skin folds    Specific Dimensions (length x width x depth, in cm):   9 x 0.5 x 0.2cm 100% deep red/ pink moist dermis    Periwound Skin: intact deep cleft,      Color: normal and consistent with surrounding tissue    Temperature  warm    Drainage:  Scant bloody      Odor: none    Pain:  absent , no complaint          Intervention:     Patient's chart evaluated.      Wound(s) was assessed    Wound Care: was done:  Per POC to coccyx and skin folds    Orders  Reviewed and Written    Supplies  Reviewed, Gathered and Placed at Bedside    Discussed plan of care with Patient and Nurse          Assessment:       Morbidly obese pt with fever, skin is moist from sweating, deep skin folds warm, moist, fungal  rash and red raw skin to posterior BLE/ knees; groin/ abd fold; breasts/ axilla folds.    Combination of Moisture associated skin damage vs Pressure injury to deep gluteal fold/ Coccyx area could be a possible Stage 2 ulcer present on admission. Pt is very moist and sweaty and feel that moisture plays a large part in the wound development.         Plan:     Nursing to notify the Provider(s) and re-consult the WOC Nurse if wound(s) deteriorate(s) or if the wound care plan needs reevaluation.    Plan of care for wound located on Skin Folds: BID/ PRN    1. Gently swipe skin clean using Flores cleanser on a moist wipe, no need to rinse    2. Lightly dust skin with Desenex and swipe off excess across skin    3. Apply SMALL amount Critic aid paste to any open skin        Plan for care to gluteal skin fold/ coccyx: Every 3 days    1. Cleanse wound with MicroKlenz spray, gently pat dry    2. Apply Mepilex Sacral dressing to FLAT skin, date    3. IF DRESSING DOES NOT HOLD FOR >24hours: treat the same as Skin Folds: Flores/ Desenex/ Critic aid paste BID    4. Pressure Injury Prevention: turn side to side Q2hrs; keep HOB < 30 degrees unless eating, float heels off pillows.    WO Nurse will return: weekly     Face to face time: 25 min

## 2017-11-21 NOTE — PROGRESS NOTES
[facility]  Hospitalist Progress Note  Name: Berenice Chaudhari    MRN: [id number]  Provider:  Harriet Warner MD  11/21/17    Initial presenting complaint/issue to hospital (Diagnosis): sepsis due to cellulitis of LLE.         Assessment and Plan:      Summary of Stay: Berenice Chaudhari is a 77 year old female admitted on 11/20/2017 with sepsis due to cellulitis of LLE. PMH significant for paroxysmal a fib, CKD, HTN, CHF, GERD, hx of MRSA, interstitial lung dz, HLP, chronic cough, morbid obesity and recurrent cellulitis of the legs, who presents with fever and generalized weakness.       Problem List:     1. Sepsis due to cellulitis of LLE.  - IV levaquin.  - Cx NGTD.    2. Acute on chronic diastolic HF.  - TTE 8/2017 showed nml EF with mild-moderate LVH, apical kinesis.  - On metoprolol, diltiazem and IV lasix.  - Elevated troponin likely demand ischemia.    3. CKD stage 3.  - Avoid nephrotoxins.    4. PAF.  - On metoprolol and diltiazem.  - On coumadin for CVA ppx.      DVT Prophylaxis:  -  Coumadin.  Code Status: DNR / DNI  Discharge Dispo: TBD.  Estimated Disch Date / # of Days until Discharge: > 2 days.        Interval History:        No fevers/chills. + cough with green sputum. No chest pain/SOB.                  Physical Exam:      Last Vital Signs:  Temp: 99.7  F (37.6  C) Temp src: Oral BP: 157/48 Pulse: 86 Heart Rate: 83 Resp: 20 SpO2: 98 % O2 Device: None (Room air)      Intake/Output Summary (Last 24 hours) at 11/21/17 1049  Last data filed at 11/21/17 0404   Gross per 24 hour   Intake                0 ml   Output             1975 ml   Net            -1975 ml     I/O last 3 completed shifts:  In: -   Out: 1975 [Urine:1975]  Vitals:    11/20/17 1735 11/21/17 0403   Weight: (!) 144 kg (317 lb 7.4 oz) (!) 155.6 kg (343 lb)       Gen - AAO x 3 in NAD.  Lungs - CTA B.  Heart - RR,S1+S2 nml, no m/g/r.  Abd - soft, NT, ND, + BS.  Ext - LLE erythema and warmth from the knee to ankle. Minimal ttp.          Medications:      All current medications were reviewed.         Data:      All new lab and imaging data was reviewed.   Labs:    Recent Labs  Lab 11/20/17  1900 11/20/17  1750   CULT No growth after 8 hours No growth after 8 hours       Recent Labs  Lab 11/21/17  0651 11/20/17  1750   WBC 12.3* 19.8*   HGB 11.4* 12.8   HCT 35.2 40.4   MCV 91 92   * 164       Recent Labs  Lab 11/21/17 0651 11/20/17  1900    136   POTASSIUM 3.7 4.0   CHLORIDE 106 106   CO2 25 23   ANIONGAP 8 7   GLC 86 148*   BUN 17 15   CR 1.38* 1.24*   GFRESTIMATED 37* 42*   GFRESTBLACK 45* 51*   SHILOH 8.4* 7.9*   PROTTOTAL  --  7.2   ALBUMIN  --  2.6*   BILITOTAL  --  0.9   ALKPHOS  --  99   AST  --  23   ALT  --  24       Recent Labs  Lab 11/21/17 0651 11/20/17 1750 11/16/17   INR 1.57* 1.57* 1.8      Recent Imaging:   Recent Results (from the past 24 hour(s))   Chest  XR, 1 view PORTABLE    Narrative    PORTABLE CHEST ONE VIEW   11/20/2017 6:04 PM     HISTORY: Fever.    COMPARISON: 8/7/2017.      Impression    IMPRESSION: Mild patchy airspace opacities in the perihilar and lower  lung regions bilaterally. Findings could represent pneumonia. There  appears to be small bilateral pleural effusions. Pulmonary edema is  not excluded. Cardiac silhouette borderline-enlarged. No pneumothorax  identified.    JENNA SMITH MD

## 2017-11-21 NOTE — PLAN OF CARE
Problem: Patient Care Overview  Goal: Plan of Care/Patient Progress Review  Pt is WC bound.   Pt is a turn & reposition Q2 hours.  A/Ox4.  VSS.  Tele SR with 1st degree AVB & BBB.  Denies pain.  Pt has a chronic cough & upper airway rhonci & LS coarse.   Left LE red & warm.  Pt is receiving IV Levaquin.  Redness under skin folds-miconazole powder applied.  O/A to coccyx.  Mepilex dressing in place & woc nurse consult ordered.   Influenza A/B neg.  Pt is also being tx with IV Lasix for fluid overload.  Parks cath in place with clear maycol urine.  Pt to follow core clinic at [place].

## 2017-11-21 NOTE — H&P
History and Physical     Berenice Chaudhari MRN# [id number]   YOB: 1940 Age: 77 year old      Date of Admission:  11/20/2017    Primary care provider: Nelly Pearl          Assessment and Plan:   Berenice Chaudhari is a 77 year old female with a PMH significant for paroxysmal a fib, CKD, HTN, CHF, GERD, hx of MRSA, interstitial lung dz, HLP, chronic cough, morbid obesity and recurrent cellulitis of the legs, who presents with fever and generalized weakness.     1. Fever - hx of recurrent cellulitis often associated with fever. Left LE erythema and warmth. Concern for concurrent respiratory infection, pneumonia vs influenza. Max T here 102.1, WBC 19.8. CXR shows mild patchy airspace opacities in the perihilar and lower lung regions bilaterally, concerning for pneumonia vs pulmonary edema, small bilateral pleural effusions. Audible apparent upper airway rhonchi, lungs otherwise clear to ausculation. Chronic cough, unclear etiology. Has multiple abx allergies. Cellulitis has been treated with Levaquin in the past, will continue as it will cover the lungs as well. Continue supportive cares. Incentive spirometry. Add on rapid influenza.   2. Left lower extremity cellulitis - likely source of fever, hx of recurrent cellulitis often associated with fever. Noted erythema and warmth to left LE earlier today. Mild discomfort with palpation of lateral aspect of left lower leg. Continue abx and supportive cares.   3. Fluid overload - BNP elevated at 7000, CXR concerning for infiltrate vs edema, troponin elevated. Prior echo from 8/8/17 showed EF 50-55%, mild-mod LVH, apical kinesis, mild aortic sclerosis. Pt denies increased LE edema or significant SOB. Increased rhonchi in upper airways. Received 40 mg IV lasix in ED, continue daily. Monitor on tele, strict I&Os, and daily weights.   4. Elevated troponin - 0.134. Denies chest pain, no ischemic changes on EKG. Likely due to fluid overload. Will monitor on tele and  trend trops.   5. Paroxysmal a fib - recently dx in past 6 months. EKG SR today. Rate controlled with metoprolol and diltiazem, anticoagulated with coumadin. Pharmacy to dose coumadin, resume home meds  6. HTN - BPs stable, resume home meds  7. CKD - Cr 1.24, appears to be near her baseline. Monitor with diuresis.   8. GERD - resume PPI  9. Morbid obesity  10. Turquoise tongue - unknown etiology    Prophylaxis - resume coumadin  Code status - DNR / DNI. Discussed with patient  Dispo - admit to IP. Anticipate >2 nights for management of acute issues                Chief Complaint:   Fever, generalized weakness         History of Present Illness:   Berenice Chaudhari is a 77 year old female with a PMH significant for paroxysmal a fib, CKD, HTN, CHF, GERD, hx of MRSA, interstitial lung dz, HLP, chronic cough, morbid obesity and recurrent cellulitis of the legs, who presents with fever and generalized weakness. Pt reports onset of feeling unwell yesterday afternoon and developed a fever of 101. Fever continued into today with a max of 102.6. The patient's  noted increased weakness as the patient was unable to transfer herself out of her wheelchair like she usually can. The  states she frequently gets fevers from cellulitis and today noted erythema and warmth to the patient's left lower leg. He call EMS to bring her to the ED. She denies chest pain, SOB but does note increased rattle in her upper airway. She denies abd pain, nausea, vomiting, diarrhea or dysuria. She denies increased swelling in the right lower extremity. Of note, she was admitted in June for bilateral lower extremity cellulitis and again in August for new onset a fib with RVR. She also notes a chronic cough for the past 1-1.5 yrs. She states it has been worked up but they have been unable to find a cause for the cough. She denies increase in her cough or sputum production.   In the ED, T 102.1, HR 86, /71, R 26 and SpO2 95% on room air.  CMP - Cr 1.24, glucose 148, otherwise unremarkable. Lactic acid 1.0, lipase 72, BNP 7034, troponin 0.134, INR 1.57. CBC - WBC 19.8 otherwise unremarkable. UA negative. CXR shows mild patchy airspace opacities in the perihilar and lower lung regions bilaterally, concerning for pneumonia vs pulmonary edema, small bilateral pleural effusions. EKG SR with RBBB. Blood cultures pending. She received 1L NS bolus, 324 mg PO ASA, 40 mg IV lasix and 750 mg IV Levaquin. She will be admitted to IP for further management.     Hx obtained by speaking with ED physician, chart review and pt interview.               Past Medical History:     Past Medical History:   Diagnosis Date     Allergic rhinitis, cause unspecified      Anxiety 6/13/2013     Arrhythmia     atrial flutter     Atrial flutter with rapid ventricular response (H) 8/8/2017     Cellulitis and abscess of leg, except foot recurrent , both legs    begins with fever, nausea, diarrhea, vomiting & & the cellulitis may be visible a day or 2 later     Chronic pain     On chronic fentanyl patch.     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  less likely = raction to vancomycin      Essential hypertension with goal blood pressure less than 140/90 1/12/2005     Problem list name updated by automated process. Provider to review     Generalized osteoarthrosis, unspecified site     films 9/04: L hhip bad; both knees mod severe medial OA     GERD (gastroesophageal reflux disease) 11/15/2013     Herpes zoster without mention of complication 9/03    L shoulder; still has neuralgia     Hyperlipidemia LDL goal <100 9/16/2011     Methicillin susceptible Staphylococcus aureus septicemia (H) after L hip injection 205    ARDS, renal failure, staph sepsis after a hip joint injection      Obesity 1/12/2005     Problem list name updated by automated process. Provider to review     Other chronic pain      Other diseases of lung, not elsewhere classified 07/93    Interstitial lung process     Pneumonia, organism unspecified(486)      Urticaria, unspecified 07/93    Diffuse severe (hospitalized).  (+) skin biopsy by Dr. Figueroa               Past Surgical History:     Past Surgical History:   Procedure Laterality Date     BIOPSY       C NONSPECIFIC PROCEDURE      Extensive oral (dental) surgery     C NONSPECIFIC PROCEDURE      Remote T&A     C NONSPECIFIC PROCEDURE  1/03    LAP CHOLY     C NONSPECIFIC PROCEDURE      L hip I + D x 2  4/05     C NONSPECIFIC PROCEDURE  2/05    colonoscopy     C NONSPECIFIC PROCEDURE      L hip I + D several other times      CATARACT IOL, RT/LT Right 2012     CHOLECYSTECTOMY       ENT SURGERY       ESOPHAGOSCOPY, GASTROSCOPY, DUODENOSCOPY (EGD), COMBINED N/A 11/9/2017    Procedure: COMBINED ESOPHAGOSCOPY, GASTROSCOPY, DUODENOSCOPY (EGD);  ESOPHAGOSCOPY, GASTROSCOPY, DUODENOSCOPY (EGD) (fv)  ;  Surgeon: Buck Ribeiro MD;  Location:  OR     PHACOEMULSIFICATION CLEAR CORNEA WITH STANDARD INTRAOCULAR LENS IMPLANT Left 3/10/2016    Procedure: PHACOEMULSIFICATION CLEAR CORNEA WITH STANDARD INTRAOCULAR LENS IMPLANT;  Surgeon: Dwight Metcalf MD;  Location: [facility]               Social History:     Social History     Social History     Marital status:      Spouse name: N/A     Number of children: N/A     Years of education: N/A     Occupational History     Not on file.     Social History Main Topics     Smoking status: Former Smoker     Packs/day: 2.00     Years: 22.00     Types: Cigarettes     Start date: 1967     Quit date: 1990     Smokeless tobacco: Never Used      Comment: started smoking 22's- quit in 1990     Alcohol use No      Comment: RARELY     Drug use: No     Sexual activity: No     Other Topics Concern     Not on file     Social History Narrative                Family History:     Family History   Problem Relation Age of Onset     Myocardial Infarction Father 63              Allergies:      Allergies   Allergen Reactions     Ceftriaxone Anaphylaxis and Rash     Rocephin given. Developed rash to back and abd, awaiting to see if it improves with d/c of rocephin.        Nafcillin Rash     diffuse severe rash in hospital 2/05     Penicillins Anaphylaxis     Vancomycin Anaphylaxis and Rash     Codeine Fatigue     Sleeps continuously     Clindamycin Rash     received information from patient     Zithromax [Azithromycin] Rash               Medications:     Prior to Admission medications    Medication Sig Last Dose Taking? Auth Provider   pravastatin (PRAVACHOL) 10 MG tablet Take 1 tablet (10 mg) by mouth At Bedtime Needs labs before next refill 11/19/2017 at Unknown time Yes Nelly Pearl MD   GABAPENTIN PO Take 600 mg by mouth 3 times daily 11/19/2017 at Unknown time Yes Unknown, Entered By History   METOPROLOL SUCCINATE ER PO Take 50 mg by mouth every evening 11/19/2017 at Unknown time Yes Unknown, Entered By History   WARFARIN SODIUM PO Take by mouth daily 5 mg Mondays, 2.5 mg all other days or as directed by INR clinic 11/19/2017 at 2.5 mg Yes Unknown, Entered By History   venlafaxine (EFFEXOR-XR) 150 MG 24 hr capsule TAKE 1 CAPSULE(150 MG) BY MOUTH DAILY 11/19/2017 at pm Yes Nelly Pearl MD   pantoprazole (PROTONIX) 40 MG EC tablet TAKE 1 TABLET(40 MG) BY MOUTH DAILY 11/19/2017 at Unknown time Yes Nelly Pearl MD   diltiazem (CARDIZEM CD/CARTIA XT) 120 MG 24 hr capsule Take 1 capsule (120 mg) by mouth daily 11/19/2017 at Unknown time Yes Adela Mars APRN CNP   furosemide (LASIX) 20 MG tablet Take 1 tablet (20 mg) by mouth daily 11/19/2017 at Unknown time Yes Adela Mars APRN CNP   buPROPion (WELLBUTRIN XL) 300 MG 24 hr tablet Take 1 tablet (300 mg) by mouth every morning 11/19/2017 at Unknown time Yes  "Nelly Pearl MD   minocycline (MINOCIN/DYNACIN) 50 MG capsule TAKE 1 CAPSULE(50 MG) BY MOUTH TWICE DAILY 11/19/2017 at Unknown time Yes Nelly Pearl MD   fluticasone (FLONASE) 50 MCG/ACT spray Spray 2 sprays into both nostrils daily 11/19/2017 at Unknown time Yes Nelly Pearl MD   pregabalin (LYRICA) 150 MG capsule Take 1 capsule (150 mg) by mouth 2 times daily 11/19/2017 at Unknown time Yes Nelly Paerl MD   nortriptyline (PAMELOR) 25 MG capsule TAKE 2 CAPSULES(50 MG) BY MOUTH AT BEDTIME 11/19/2017 at Unknown time Yes Nelly Pearl MD   lidocaine (LIDODERM) 5 % Patch Apply up to 3 patches to painful area at once for up to 12 h within a 24 h period.  Remove after 12 hours.   Nelly Pearl MD   CEPHALEXIN PO Take 500 mg by mouth 3 times daily as needed (when cellulitis is coming on)    Reported, Patient              Review of Systems:   A Comprehensive greater than 10 system review of systems was carried out.  Pertinent positives and negatives are noted above.  Otherwise negative for contributory information.     Review Of Systems  Skin: negative  Eyes: negative  Ears/Nose/Throat: negative  Respiratory: No shortness of breath, dyspnea on exertion, cough, or hemoptysis  Cardiovascular: negative  Gastrointestinal: negative  Genitourinary: negative  Musculoskeletal: negative  Neurologic: negative  Psychiatric: negative  Hematologic/Lymphatic/Immunologic: negative  Endocrine: negative             Physical Exam:   Blood pressure 118/61, pulse 86, temperature 98.4  F (36.9  C), temperature source Oral, resp. rate 20, height 1.676 m (5' 6\"), weight (!) 144 kg (317 lb 7.4 oz), SpO2 97 %, not currently breastfeeding.  Exam:  GENERAL:  Comfortable, obese.  PSYCH: pleasant, oriented, No acute distress.  HEENT:  Atraumatic, normocephalic. Normal conjunctiva, normal hearing, and oropharynx-turquoise tongue.  NECK:  Supple, no neck vein distention  HEART:  Normal S1, S2 with no murmur, " no pericardial rub, gallops or S3 or S4.  LUNGS:  Audible rhonchi in upper airway, lungs otherwise clear to auscultation, normal Respiratory effort. No wheezing or rales.  GI:  Soft, normal bowel sounds. Non-tender, non distended.   EXTREMITIES:  No pedal edema on the right. Erythema noted to the left LE, primary on the posterior aspect, runs from ankle to distal thigh, +2 pulses bilateral and equal.  SKIN:  Dry to touch, No rash, wound or ulcerations.  NEUROLOGIC:  CN 2-12 intact, BL 5/5 symmetric upper and lower extremity strength, sensation is intact with no focal deficits.               Data:       Recent Labs  Lab 11/20/17 1900   NTBNPI 7034*       Recent Labs  Lab 11/20/17 1750   WBC 19.8*   HGB 12.8   HCT 40.4   MCV 92          Recent Labs  Lab 11/20/17 1900      POTASSIUM 4.0   CHLORIDE 106   CO2 23   ANIONGAP 7   *   BUN 15   CR 1.24*   GFRESTIMATED 42*   GFRESTBLACK 51*   SHILOH 7.9*   PROTTOTAL 7.2   ALBUMIN 2.6*   BILITOTAL 0.9   ALKPHOS 99   AST 23   ALT 24       Recent Labs  Lab 11/20/17  1750 11/16/17   INR 1.57* 1.8     Invalid input(s): LACTIC ACID    Recent Labs  Lab 11/20/17 1900   LIPASE 72*       Recent Labs  Lab 11/20/17 1936   COLOR Yellow   APPEARANCE Clear   URINEGLC Negative   URINEBILI Negative   URINEKETONE Negative   SG 1.014   UBLD Negative   URINEPH 7.0   PROTEIN Negative   NITRITE Negative   LEUKEST Negative   RBCU 1   WBCU <1       Recent Results (from the past 48 hour(s))   Chest  XR, 1 view PORTABLE    Narrative    PORTABLE CHEST ONE VIEW   11/20/2017 6:04 PM     HISTORY: Fever.    COMPARISON: 8/7/2017.      Impression    IMPRESSION: Mild patchy airspace opacities in the perihilar and lower  lung regions bilaterally. Findings could represent pneumonia. There  appears to be small bilateral pleural effusions. Pulmonary edema is  not excluded. Cardiac silhouette borderline-enlarged. No pneumothorax  identified.    MD Nereida ROBBINS  BRYNN    This patient was seen and discussed with Dr. Lino who agrees with the current plans as outlined above.

## 2017-11-21 NOTE — ED NOTES
"[facility]  ED Nurse Handoff Report    Berenice Chaudhari is a 77 year old female   ED Chief complaint: Fever  . ED Diagnosis:   Final diagnoses:   Cellulitis of left lower extremity   Generalized muscle weakness     Allergies:   Allergies   Allergen Reactions     Ceftriaxone Anaphylaxis and Rash     Rocephin given. Developed rash to back and abd, awaiting to see if it improves with d/c of rocephin.        Nafcillin Rash     diffuse severe rash in hospital 2/05     Penicillins Anaphylaxis     Vancomycin Anaphylaxis and Rash     Codeine Fatigue     Sleeps continuously     Clindamycin Rash     received information from patient     Zithromax [Azithromycin] Rash       Code Status: prior  Activity level - Baseline/Home:  Stand with Assist. Activity Level - Current:   Total Care. Lift room needed: Yes. Bariatric: Yes   Needed: No   Isolation: No. Infection: Not Applicable (MRSA listed in toe on prior)  MRSA.     Vital Signs:   Vitals:    11/20/17 1735 11/20/17 1738 11/20/17 1822 11/20/17 1930   BP: 129/71   139/71   Pulse: 86      Resp: 26   24   Temp: 102.1  F (38.9  C)  100.9  F (38.3  C)    TempSrc: Oral  Oral    SpO2:  95%  93%   Weight: (!) 144 kg (317 lb 7.4 oz)      Height: 1.676 m (5' 6\")          Cardiac Rhythm:  ,      Pain level:    Patient confused: Yes. Patient Falls Risk: Yes.   Elimination Status: catheter placed  Patient Report - Initial Complaint: fever, weakness. Focused Assessment: Pt currently answering questions appropriately and assisting in cares as able.  Spouse states that pt has hallucinations with fevers.  Pt has been unable to participate in  transfers and home care, per spouse.  Pt denies pain at this time.  Pt has \"rattling\" cough and sounds coarse throughout all lung fields.    Tests Performed: labs/x-ray. Abnormal Results:   Labs Ordered and Resulted from Time of ED Arrival Up to the Time of Departure from the ED   CBC WITH PLATELETS DIFFERENTIAL - Abnormal; Notable for " the following:        Result Value    WBC 19.8 (*)     RDW 15.1 (*)     Absolute Neutrophil 18.1 (*)     Absolute Lymphocytes 0.6 (*)     All other components within normal limits   INR - Abnormal; Notable for the following:     INR 1.57 (*)     All other components within normal limits   COMPREHENSIVE METABOLIC PANEL - Abnormal; Notable for the following:     Glucose 148 (*)     Creatinine 1.24 (*)     GFR Estimate 42 (*)     GFR Estimate If Black 51 (*)     Calcium 7.9 (*)     Albumin 2.6 (*)     All other components within normal limits   LIPASE - Abnormal; Notable for the following:     Lipase 72 (*)     All other components within normal limits   NT PROBNP INPATIENT - Abnormal; Notable for the following:     N-Terminal Pro BNP Inpatient 7034 (*)     All other components within normal limits   TROPONIN I - Abnormal; Notable for the following:     Troponin I ES 0.134 (*)     All other components within normal limits   LACTIC ACID WHOLE BLOOD   ROUTINE UA WITH MICROSCOPIC   LACTIC ACID WHOLE BLOOD   PERIPHERAL IV CATHETER   CARDIAC CONTINUOUS MONITORING   CONTINUE INDWELLING URINARY CATHETER (LOZADA)   BLOOD CULTURE   BLOOD CULTURE   .   Treatments provided: meds/fluid  Family Comments:  very involved in cares  OBS brochure/video discussed/provided to patient:  No  ED Medications:   Medications   0.9% sodium chloride BOLUS (1,000 mLs Intravenous New Bag 11/20/17 1821)     Followed by   0.9% sodium chloride infusion (not administered)   levofloxacin (LEVAQUIN) infusion 750 mg (750 mg Intravenous New Bag 11/20/17 1911)     Drips infusing:  Yes  For the majority of the shift, the patient's behavior Green. Interventions performed were frequent rounding.     Severe Sepsis OR Septic Shock Diagnosis Present: No      ED Nurse Name/Phone Number: Cheryle Avitia,   7:45 PM    RECEIVING UNIT ED HANDOFF REVIEW    Above ED Nurse Handoff Report was reviewed:yes  Reviewed by: Nelly Vega on November 20, 2017 at 9:01 PM

## 2017-11-21 NOTE — CONSULTS
[facility] referral received from Nereida ALFARO. Berenice is not currently established in the [facility]. She's been seen once in our office by Adela Mars CNP. Will arrange for [facility] follow up.    Please order nutrition consult for 2gm Na diet education if appropriate. Hospital nurse, please give pt [facility]/HF education.       [facility] appointment made for:  Friday 12/1/17 with non fasting lab at 1145am and a visit with Heather Harley CNP at 1250pm.        We look forward to seeing Berenice in the clinic.   Please call with questions.     Nessa Thomas RN, BSN  [facility] Care Coordinator  [phone]      [facility]: Cardiomyopathy, Optimization, Rehabilitation, Education   The [facility] is a heart failure specialty clinic within the [facility] where you will work with your cardiologist, nurse practitioners, physician assistants and registered nurses who specialize in heart failure care. They are dedicated to helping patients with heart failure to carefully adjust medications, receive education, and learn who and when to call if symptoms develop. They specialize in helping you better understand your condition, slow the progression of your disease, improve the length and quality of your life, help you detect future heart problems before they become life threatening, and avoid hospitalizations.

## 2017-11-22 LAB
ANION GAP SERPL CALCULATED.3IONS-SCNC: 9 MMOL/L (ref 3–14)
BASOPHILS # BLD AUTO: 0.1 10E9/L (ref 0–0.2)
BASOPHILS NFR BLD AUTO: 0.7 %
BUN SERPL-MCNC: 26 MG/DL (ref 7–30)
CALCIUM SERPL-MCNC: 9 MG/DL (ref 8.5–10.1)
CHLORIDE SERPL-SCNC: 102 MMOL/L (ref 94–109)
CO2 SERPL-SCNC: 26 MMOL/L (ref 20–32)
CREAT SERPL-MCNC: 1.85 MG/DL (ref 0.52–1.04)
DIFFERENTIAL METHOD BLD: ABNORMAL
EOSINOPHIL # BLD AUTO: 0.6 10E9/L (ref 0–0.7)
EOSINOPHIL NFR BLD AUTO: 5.9 %
ERYTHROCYTE [DISTWIDTH] IN BLOOD BY AUTOMATED COUNT: 15.1 % (ref 10–15)
GFR SERPL CREATININE-BSD FRML MDRD: 27 ML/MIN/1.7M2
GLUCOSE SERPL-MCNC: 120 MG/DL (ref 70–99)
GRAM STN SPEC: NORMAL
HCT VFR BLD AUTO: 41.2 % (ref 35–47)
HGB BLD-MCNC: 13 G/DL (ref 11.7–15.7)
IMM GRANULOCYTES # BLD: 0 10E9/L (ref 0–0.4)
IMM GRANULOCYTES NFR BLD: 0.4 %
INR PPP: 1.84 (ref 0.86–1.14)
LYMPHOCYTES # BLD AUTO: 1.9 10E9/L (ref 0.8–5.3)
LYMPHOCYTES NFR BLD AUTO: 18.2 %
Lab: NORMAL
MCH RBC QN AUTO: 29.3 PG (ref 26.5–33)
MCHC RBC AUTO-ENTMCNC: 31.6 G/DL (ref 31.5–36.5)
MCV RBC AUTO: 93 FL (ref 78–100)
MONOCYTES # BLD AUTO: 1.1 10E9/L (ref 0–1.3)
MONOCYTES NFR BLD AUTO: 10.9 %
NEUTROPHILS # BLD AUTO: 6.6 10E9/L (ref 1.6–8.3)
NEUTROPHILS NFR BLD AUTO: 63.9 %
NRBC # BLD AUTO: 0 10*3/UL
NRBC BLD AUTO-RTO: 0 /100
PLATELET # BLD AUTO: 157 10E9/L (ref 150–450)
POTASSIUM SERPL-SCNC: 3.5 MMOL/L (ref 3.4–5.3)
RBC # BLD AUTO: 4.44 10E12/L (ref 3.8–5.2)
SODIUM SERPL-SCNC: 137 MMOL/L (ref 133–144)
SPECIMEN SOURCE: NORMAL
TROPONIN I SERPL-MCNC: <0.015 UG/L (ref 0–0.04)
TROPONIN I SERPL-MCNC: <0.015 UG/L (ref 0–0.04)
WBC # BLD AUTO: 10.3 10E9/L (ref 4–11)

## 2017-11-22 PROCEDURE — 80048 BASIC METABOLIC PNL TOTAL CA: CPT | Performed by: PHYSICIAN ASSISTANT

## 2017-11-22 PROCEDURE — 25000132 ZZH RX MED GY IP 250 OP 250 PS 637: Performed by: INTERNAL MEDICINE

## 2017-11-22 PROCEDURE — 84484 ASSAY OF TROPONIN QUANT: CPT | Performed by: PHYSICIAN ASSISTANT

## 2017-11-22 PROCEDURE — 85025 COMPLETE CBC W/AUTO DIFF WBC: CPT | Performed by: PHYSICIAN ASSISTANT

## 2017-11-22 PROCEDURE — 85610 PROTHROMBIN TIME: CPT | Performed by: PHYSICIAN ASSISTANT

## 2017-11-22 PROCEDURE — 25000128 H RX IP 250 OP 636: Performed by: PHYSICIAN ASSISTANT

## 2017-11-22 PROCEDURE — 25000132 ZZH RX MED GY IP 250 OP 250 PS 637

## 2017-11-22 PROCEDURE — 36415 COLL VENOUS BLD VENIPUNCTURE: CPT | Performed by: PHYSICIAN ASSISTANT

## 2017-11-22 PROCEDURE — 99233 SBSQ HOSP IP/OBS HIGH 50: CPT | Performed by: INTERNAL MEDICINE

## 2017-11-22 PROCEDURE — 12000007 ZZH R&B INTERMEDIATE

## 2017-11-22 PROCEDURE — 25000132 ZZH RX MED GY IP 250 OP 250 PS 637: Performed by: PHYSICIAN ASSISTANT

## 2017-11-22 RX ORDER — MULTIPLE VITAMINS W/ MINERALS TAB 9MG-400MCG
1 TAB ORAL DAILY
Status: DISCONTINUED | OUTPATIENT
Start: 2017-11-22 | End: 2017-11-24 | Stop reason: HOSPADM

## 2017-11-22 RX ORDER — WARFARIN SODIUM 3 MG/1
3 TABLET ORAL
Status: COMPLETED | OUTPATIENT
Start: 2017-11-22 | End: 2017-11-22

## 2017-11-22 RX ADMIN — BUPROPION HYDROCHLORIDE 300 MG: 150 TABLET, FILM COATED, EXTENDED RELEASE ORAL at 09:13

## 2017-11-22 RX ADMIN — WARFARIN SODIUM 3 MG: 3 TABLET ORAL at 17:08

## 2017-11-22 RX ADMIN — GABAPENTIN 600 MG: 600 TABLET, FILM COATED ORAL at 09:13

## 2017-11-22 RX ADMIN — MULTIPLE VITAMINS W/ MINERALS TAB 1 TABLET: TAB at 17:07

## 2017-11-22 RX ADMIN — METOPROLOL SUCCINATE 50 MG: 50 TABLET, EXTENDED RELEASE ORAL at 20:16

## 2017-11-22 RX ADMIN — DILTIAZEM HYDROCHLORIDE 120 MG: 120 CAPSULE, COATED, EXTENDED RELEASE ORAL at 09:13

## 2017-11-22 RX ADMIN — GABAPENTIN 600 MG: 600 TABLET, FILM COATED ORAL at 20:16

## 2017-11-22 RX ADMIN — GABAPENTIN 600 MG: 600 TABLET, FILM COATED ORAL at 17:08

## 2017-11-22 RX ADMIN — PANTOPRAZOLE SODIUM 40 MG: 40 TABLET, DELAYED RELEASE ORAL at 06:50

## 2017-11-22 RX ADMIN — PRAVASTATIN SODIUM 10 MG: 10 TABLET ORAL at 20:17

## 2017-11-22 RX ADMIN — MICONAZOLE NITRATE: 2 POWDER TOPICAL at 20:19

## 2017-11-22 RX ADMIN — VENLAFAXINE HYDROCHLORIDE 150 MG: 150 TABLET, EXTENDED RELEASE ORAL at 09:13

## 2017-11-22 RX ADMIN — NORTRIPTYLINE HYDROCHLORIDE 50 MG: 50 CAPSULE ORAL at 20:16

## 2017-11-22 RX ADMIN — PREGABALIN 150 MG: 75 CAPSULE ORAL at 09:13

## 2017-11-22 RX ADMIN — MICONAZOLE NITRATE: 2 POWDER TOPICAL at 09:15

## 2017-11-22 RX ADMIN — PREGABALIN 150 MG: 75 CAPSULE ORAL at 20:17

## 2017-11-22 RX ADMIN — LEVOFLOXACIN 500 MG: 5 INJECTION, SOLUTION INTRAVENOUS at 17:07

## 2017-11-22 ASSESSMENT — ACTIVITIES OF DAILY LIVING (ADL)
ADLS_ACUITY_SCORE: 30
ADLS_ACUITY_SCORE: 27
ADLS_ACUITY_SCORE: 29
ADLS_ACUITY_SCORE: 30
ADLS_ACUITY_SCORE: 31
ADLS_ACUITY_SCORE: 29

## 2017-11-22 NOTE — PROGRESS NOTES
[facility]  Hospitalist Progress Note  Name: Berenice Chuadhari    MRN: [id number]  Provider:  Harriet Warner MD  11/22/17    Initial presenting complaint/issue to hospital (Diagnosis): sepsis due to cellulitis of LLE.         Assessment and Plan:      Summary of Stay: Berenice Chaudhari is a 77 year old female admitted on 11/20/2017 with sepsis due to cellulitis of LLE. PMH significant for paroxysmal a fib, CKD, HTN, CHF, GERD, hx of MRSA, interstitial lung dz, HLP, chronic cough, morbid obesity and recurrent cellulitis of the legs, who presents with fever and generalized weakness.         Problem List:     1. Sepsis due to cellulitis of LLE.  - IV levaquin.  - Cx NGTD.     2. Acute on chronic diastolic HF.  - TTE 8/2017 showed nml EF with mild-moderate LVH, apical kinesis.  - On metoprolol, diltiazem and IV lasix.  - Elevated troponin likely demand ischemia.     3. CKD stage 3.  - Avoid nephrotoxins.  - Recheck Cr 11/23.     4. PAF.  - On metoprolol and diltiazem.  - On coumadin for CVA ppx.        DVT Prophylaxis:  -  Coumadin.  Code Status: DNR / DNI  Discharge Dispo: TBD.  Estimated Disch Date / # of Days until Discharge: 1-2 days.        Interval History:        No fevers/chills. Minimal productive cough. No chest pain/SOB.                  Physical Exam:      Last Vital Signs:  Temp: 96.9  F (36.1  C) Temp src: Oral BP: 138/59   Heart Rate: 84 Resp: 20 SpO2: 96 % O2 Device: None (Room air)      Intake/Output Summary (Last 24 hours) at 11/22/17 0915  Last data filed at 11/22/17 0729   Gross per 24 hour   Intake               60 ml   Output             2600 ml   Net            -2540 ml     I/O last 3 completed shifts:  In: 60 [P.O.:60]  Out: 1800 [Urine:1800]  Vitals:    11/20/17 1735 11/21/17 0403 11/22/17 0629   Weight: (!) 144 kg (317 lb 7.4 oz) (!) 155.6 kg (343 lb) (!) 153.5 kg (338 lb 4.8 oz)       Gen - AAO x 3 in NAD.  Lungs - CTA B.  Heart - RR,S1+S2 nml, no m/g/r.  Abd - soft, NT, ND, + BS.  Ext  - LLE erythema and warmth from the knee to ankle. Minimal ttp.         Medications:      All current medications were reviewed.         Data:      All new lab and imaging data was reviewed.   Labs:    Recent Labs  Lab 11/20/17  1900 11/20/17  1750   CULT No growth after 2 days No growth after 2 days       Recent Labs  Lab 11/22/17 0700 11/21/17 0651 11/20/17  1750   WBC 10.3 12.3* 19.8*   HGB 13.0 11.4* 12.8   HCT 41.2 35.2 40.4   MCV 93 91 92    116* 164       Recent Labs  Lab 11/22/17 0700 11/21/17 0651 11/20/17  1900    139 136   POTASSIUM 3.5 3.7 4.0   CHLORIDE 102 106 106   CO2 26 25 23   ANIONGAP 9 8 7   * 86 148*   BUN 26 17 15   CR 1.85* 1.38* 1.24*   GFRESTIMATED 27* 37* 42*   GFRESTBLACK 32* 45* 51*   SHILOH 9.0 8.4* 7.9*   PROTTOTAL  --   --  7.2   ALBUMIN  --   --  2.6*   BILITOTAL  --   --  0.9   ALKPHOS  --   --  99   AST  --   --  23   ALT  --   --  24       Recent Labs  Lab 11/22/17 0700 11/21/17 0651 11/20/17  1750   INR 1.84* 1.57* 1.57*      Recent Imaging:   No results found for this or any previous visit (from the past 24 hour(s)).

## 2017-11-22 NOTE — PLAN OF CARE
Problem: Patient Care Overview  Goal: Plan of Care/Patient Progress Review  OT:  eval complete and treatment initiated.  Pt is a 77 year old female admitted on 11/20/2017 with sepsis due to cellulitis of LLE. PMH significant for paroxysmal a fib, CKD, HTN, CHF, GERD, hx of MRSA, interstitial lung dz, HLP, chronic cough, morbid obesity and recurrent cellulitis of the legs, who presents with fever and generalized weakness.  She reported to be independent in pivot transfers to w/c, stair lift, shower chair and toilet with SBA at baseline.   is able to assist with ADLs and does all cooking, homemaking and driving at baseline.    Discharge Planner OT   Patient plan for discharge: home with assist  Current status: Pt alert, oriented to self/place and able to follow commands.  Pt dependent for bed mobility using ceiling lift to roll, reposition and scoot.  Pt dependent for transfers bed<>chair.  She participated in grooming/hygiene with set-up and min A while sitting up in chair.  Barriers to return to prior living situation: weakness, pain, increased assist needed for all ADLs and transfers  Recommendations for discharge:  TCU vs home with Home Care OT/PT  Rationale for recommendations: Pt would benefit from continued OT to maximize safety and independence in ADLs and functional transfers with improved strength and endurance.       Entered by: Ebony Vargas 11/22/2017 7:02 AM

## 2017-11-22 NOTE — PLAN OF CARE
Problem: Cardiac: Heart Failure (Adult)  Goal: Signs and Symptoms of Listed Potential Problems Will be Absent, Minimized or Managed (Cardiac: Heart Failure)  Signs and symptoms of listed potential problems will be absent, minimized or managed by discharge/transition of care (reference Cardiac: Heart Failure (Adult) CPG).   Outcome: No Change  A&O, denies pain. PATEL w/ exp wheezes. VSS afebrile, on room air, sats mid 90's. Tele SR w/BBB. Lung sounds coarse/diminished. Cont on Levaquin for LLE cellulitis. Parks d/c'd during the day shift. Pt is having difficulty voiding. Bladder scanned for 580, voided 50 cc and was incontinent x 1 this shift.    0730: Pt straight cath for 800 dark maycol urine.

## 2017-11-22 NOTE — PLAN OF CARE
Problem: Patient Care Overview  Goal: Plan of Care/Patient Progress Review  OT- Attempted rehab session, however patient was in the middle of nursing cares, will attempt again as OT schedule allows.

## 2017-11-22 NOTE — CONSULTS
"CLINICAL NUTRITION SERVICES  -  ASSESSMENT NOTE      Recommendations Ordered by Registered Dietitian (RD):   Oral nutrition supplements  MVI+M per pressure injury protocol   Future/Additional Recommendations:    Diet education (low Na, high protein)   Malnutrition:     Unable to determine     REASON FOR ASSESSMENT  Berenice Chaudhari is a 77 year old female seen by the dietitian for Provider Order - Nutrition Education - 2 gram Na + positive nutrition risk screen - stageable pressure injuries or large/nonhealing wound or burn    NUTRITION HISTORY  - Information obtained from chart review. With RN, staff during three attempted visits  Low sodium education provided to patient in 2014 by ISRAEL MOON    CURRENT NUTRITION ORDERS  - Diet: 2 gram Na  - Supplement: none  Current Intake/Tolerance:  - Per flow sheet review, no intake for meals documented.    PHYSICAL FINDINGS  Observed  Unable to assess  Obtained from Chart/Interdisciplinary Team  Skin 11/21 - moisture related vs PI with possible stage 2 coccyx area  Fluid: +1-2 BLE edema    ANTHROPOMETRICS  Height: 5' 6\"  Weight: 155.6 kg  Body mass index is 55.36 kg/(m^2).  Weight Status:  Obesity Grade III BMI >40  IBW: 59.4 kg, 262% of IBW   Weight History: fluctuations, as noted below  Wt Readings from Last 10 Encounters:   11/22/17 (!) 153.5 kg (338 lb 4.8 oz)   10/25/17 (!) 146.1 kg (322 lb)   10/02/17 (!) 150.6 kg (332 lb)   08/08/17 (!) 151 kg (332 lb 14.4 oz)   06/30/17 (!) 264 kg (582 lb)   06/21/17 (!) 156.5 kg (345 lb)   03/15/17 (!) 147.9 kg (326 lb)   03/10/16 (!) 147.9 kg (326 lb)   03/31/15 (!) 154.2 kg (340 lb)   03/20/15 (!) 149.7 kg (330 lb)       ASSESSED NUTRITION NEEDS  (PER APPROVED PRACTICE GUIDELINES, Dosing weight: 83 kg AdjBW)  Estimated Energy Needs: 0574-3871 kcals (25-30 Kcal/Kg)  Justification: obese  Estimated Protein Needs: 100-125+ grams protein (1.2-1.5 g pro/Kg)  Justification: wound healing with consideration for CKD and renal function  Estimated " Fluid Needs: >1 mL/Kcal  Justification: maintenance    LABS  Labs reviewed    MEDICATIONS  Medications reviewed    MALNUTRITION:  % Weight Loss:None noted  % Intake:No decreased intake noted  Subcutaneous Fat Loss: Unable to determine  Muscle Loss: Unable to determine  Fluid Retention: Trace (CKD and CHF hx)    Malnutrition Diagnosis:  Unable to determine due to incomplete physical exam and diet hx    NUTRITION DIAGNOSIS:  Increased nutrient needs (protein) related to wound healing as evidenced by possible stage 2 pressure injury    INTERVENTIONS  Recommendations / Nutrition Prescription  Continue diet as ordered + oral nutrition supplements to increase protein intake    Per pressure injury protocol - daily MVI+M      Implementation  Nutrition education: Needs low sodium and high protein diet education (plans to follow up at [facility] also)  Medical food supplement: Glucerna 2 pm  Multivitamin/Minerals: Thera-Vit-M  Collaboration and Referral of care: Discussed patient during interdisciplinary care rounds this morning and with bedside RN    Goals  Patient to consume >/= 75% of meals TID and >/=1 high protein supplements per day    MONITORING AND EVALUATION:  Nutrition skill - provide low sodium and diet education as schedule and patient availability allows  Progress towards goals will be monitored and evaluated per protocol and Practice Guidelines      Nancie Laird RDN, LD, CNSC  3rd floor/ICU: [phone]  All other floors: [phone]  Weekend/holiday: [phone]  Office: [phone]

## 2017-11-22 NOTE — PROGRESS NOTES
11/21/17 0900   Quick Adds   Type of Visit Initial Occupational Therapy Evaluation   Living Environment   Lives With spouse   Living Arrangements house   Home Accessibility no concerns;ramps present at home  (stair lifts present)   Number of Stairs to Enter Home 0   Number of Stairs Within Home 0   Transportation Available van, wheelchair accessible   Living Environment Comment Pt reported to be independent in pivot transfers to w/c, chairs and stair lifts at baseline.   Self-Care   Dominant Hand right   Usual Activity Tolerance poor   Current Activity Tolerance poor   Regular Exercise yes   Activity/Exercise Type strength training   Exercise Amount/Frequency daily   Equipment Currently Used at Home wheelchair, manual;wheelchair, power;shower chair;grab bar  (bed rails, stair lift)   Activity/Exercise/Self-Care Comment Pt reported to have assist of  for ADLs.   Functional Level Prior   Ambulation 4-->completely dependent   Transferring 3-->assistive equipment and person   Toileting 1-->assistive equipment   Bathing 3-->assistive equipment and person   Dressing 2-->assistive person   Eating 0-->independent   Communication 0-->understands/communicates without difficulty   Swallowing 2-->difficulty swallowing foods   Cognition 1 - attention or memory deficits   Fall history within last six months yes   Number of times patient has fallen within last six months 5   Which of the above functional risks had a recent onset or change? fall history       Present no   General Information   Onset of Illness/Injury or Date of Surgery - Date 11/20/17   Referring Physician Nereida Musa PA-C   Patient/Family Goals Statement Return to home.   Additional Occupational Profile Info/Pertinent History of Current Problem Pt is a 77 year old female admitted on 11/20/2017 with sepsis due to cellulitis of LLE. PMH significant for paroxysmal a fib, CKD, HTN, CHF, GERD, hx of MRSA, interstitial lung dz, HLP,  chronic cough, morbid obesity and recurrent cellulitis of the legs, who presents with fever and generalized weakness.      Precautions/Limitations fall precautions   General Info Comments activity:  up with assist   Cognitive Status Examination   Orientation person;place   Level of Consciousness alert   Able to Follow Commands WNL/WFL   Personal Safety (Cognitive) decreased awareness, need for assist;decreased awareness, need for safety   Memory impaired   Attention No deficits were identified   Organization/Problem Solving Problem solving impaired   Executive Function Working memory impaired, decreased storage of information for performing tasks;Planning ability impaired;Self awareness/monitoring impaired   Visual Perception   Visual Perception Wears glasses   Pain Assessment   Patient Currently in Pain Yes, see Vital Sign flowsheet   Integumentary/Edema   Integumentary/Edema other (describe)   Integumentary/Edema Comments LE cellulits   Range of Motion (ROM)   ROM Quick Adds No deficits were identified   ROM Comment B UE AROM WFL   Strength   Manual Muscle Testing Quick Adds Other   Strength Comments B UE strength grossly 4/5   Hand Strength   Hand Strength Comments WFL   Muscle Tone Assessment   Muscle Tone Quick Adds No deficits were identified   Mobility   Bed Mobility Comments dependent   Transfer Skill: Bed to Chair/Chair to Bed   Level of [place]: Bed to Chair dependent (less than 25% patients effort)   Transfer Skill: Sit to Stand   Level of [place]: Sit/Stand unable to perform   Transfer Skill: Toilet Transfer   Level of [place]: Toilet dependent (less than 25% patients effort)   Grooming   Level of [place]: Grooming minimum assist (75% patients effort)   Physical Assist/Nonphysical Assist: Grooming set-up required  (sitting up in bed)   Eating/Self Feeding   Level of [place]: Eating stand-by assist   Physical Assist/Nonphysical Assist: Eating set-up required   Activities of Daily  "Living Analysis   Impairments Contributing to Impaired Activities of Daily Living balance impaired;cognition impaired;pain;strength decreased   General Therapy Interventions   Planned Therapy Interventions ADL retraining;transfer training;strengthening   Clinical Impression   Criteria for Skilled Therapeutic Interventions Met yes, treatment indicated   OT Diagnosis decreased ADL performance   Influenced by the following impairments strength, endurance, balance, pain, cognition   Assessment of Occupational Performance 3-5 Performance Deficits   Identified Performance Deficits Pt with decreased ability to manage dressing, bathing, toileting, functional transfers   Clinical Decision Making (Complexity) Low complexity   Therapy Frequency 5 times/wk   Predicted Duration of Therapy Intervention (days/wks) 1 week   Anticipated Discharge Disposition Home with Home Therapy;Transitional Care Facility   Risks and Benefits of Treatment have been explained. Yes   Patient, Family & other staff in agreement with plan of care Yes   [facility] TM \"6 Clicks\"   2016, Trustees of [facility], under license to [employer].  All rights reserved.   6 Clicks Short Forms Daily Activity Inpatient Short Form   [facility]  \"6 Clicks\" Daily Activity Inpatient Short Form   1. Putting on and taking off regular lower body clothing? 1 - Total   2. Bathing (including washing, rinsing, drying)? 2 - A Lot   3. Toileting, which includes using toilet, bedpan or urinal? 1 - Total   4. Putting on and taking off regular upper body clothing? 2 - A Lot   5. Taking care of personal grooming such as brushing teeth? 3 - A Little   6. Eating meals? 3 - A Little   Daily Activity Raw Score (Score out of 24.Lower scores equate to lower levels of function) 12   Total Evaluation Time   Total Evaluation Time (Minutes) 8     "

## 2017-11-22 NOTE — PLAN OF CARE
Problem: Patient Care Overview  Goal: Plan of Care/Patient Progress Review  A&O. Up with heavy assist x2 and pivot to commode. Lung sounds: coarse, room air. Tele: SR with 1AVB and BBB. Diet: regular. PIV: lost IV access. Pain: 6/10 knee pain, controlled with tylenol. Left lower leg is red, tender to touch. Wound care completed. Small skin tear on back of right thigh, cleansed with mepilex in place, some drainage. Parks removed, pt voided small amt,  mL.

## 2017-11-22 NOTE — CONSULTS
Care Transition Initial Assessment - RN    Reason For Consult: care coordination/care conference, discharge planning   Met with: Patient.    DATA   Active Problems:    Congestive heart failure, unspecified congestive heart failure chronicity, unspecified congestive heart failure type (H)    Cellulitis of left lower leg       Cognitive Status: alert and oriented.  [facility] Name: [facility]  Primary Care MD Name: Nelly Pearl  Contact information and PCP information verified: Yes    Lives With: spouse  Living Arrangements: house  Quality Of Family Relationships: supportive, helpful, involved  Description of Support System: Supportive, Involved   Who is your support system?:    Support Assessment: Adequate family and caregiver support     Insurance concerns: No Insurance issues identified    ASSESSMENT  Patient currently receives the following services:  none   Pt lives at home with her . They have had [facility] a couple of months ago but currently she does not receive services. She does not use home oxygen, inhalers or nebs. She states she is wheelchair bound at home. She is baseline able to pivot transfer with her . If she is not able, they have a terry lift at home. She states she has multiple grab bars at home. Her  does the cooking. She has a med dispenser for 1 month of meds at a time 4x day.        Identified issues/concerns regarding health management: pt still in need of IV antibiotics for cellulitis  Met with pt to discuss CHF diagnosis. She states she is unable to weigh herself at home due to her inability to stand for long periods. She follows a low Na, diet and follows the diet recommended when taking coumadin.    PLAN  Patient/family is agreeable to the plan?  Yes  Patient anticipates discharging to home with . She has had home care in the past through [facility] and is willing to have Home Care again. She would like [facility] again. She has not been to [facility] in  the past and prefers to dc back home since she has the equipment needed at home and her  is very supportive.        Patient anticipates needs for home equipment: No  Discharge Planner   Discharge Plans in progress: home pending PT/OT eval recommendations  Barriers to discharge plan: still in need of IV abx  Plan/Disposition: Home , pt prefers to dc home with spouse and HC if needed  Appointments: pt states her  will schedule follow up. He works from home during the day and will schedule it around his work schedule. Her  transports her to appts.    She has appt with [facility] Heather Harley on 12/1 at 1250 with labs at 1145.    Care  (CTS) will continue to follow as needed. Handoff will be sent to cts for PCP on dc. Will cont to follow for dc plan of care recommendations. SW updated.    Urvashi Douglas RN CTS  Care Transitions  [phone]

## 2017-11-23 LAB
ANION GAP SERPL CALCULATED.3IONS-SCNC: 8 MMOL/L (ref 3–14)
BUN SERPL-MCNC: 32 MG/DL (ref 7–30)
CALCIUM SERPL-MCNC: 8.6 MG/DL (ref 8.5–10.1)
CHLORIDE SERPL-SCNC: 104 MMOL/L (ref 94–109)
CO2 SERPL-SCNC: 27 MMOL/L (ref 20–32)
CREAT SERPL-MCNC: 1.84 MG/DL (ref 0.52–1.04)
GFR SERPL CREATININE-BSD FRML MDRD: 27 ML/MIN/1.7M2
GLUCOSE SERPL-MCNC: 101 MG/DL (ref 70–99)
INR PPP: 2.27 (ref 0.86–1.14)
POTASSIUM SERPL-SCNC: 3.3 MMOL/L (ref 3.4–5.3)
POTASSIUM SERPL-SCNC: 4.2 MMOL/L (ref 3.4–5.3)
SODIUM SERPL-SCNC: 139 MMOL/L (ref 133–144)

## 2017-11-23 PROCEDURE — 80048 BASIC METABOLIC PNL TOTAL CA: CPT | Performed by: PHYSICIAN ASSISTANT

## 2017-11-23 PROCEDURE — 25000132 ZZH RX MED GY IP 250 OP 250 PS 637: Performed by: PHYSICIAN ASSISTANT

## 2017-11-23 PROCEDURE — 25000132 ZZH RX MED GY IP 250 OP 250 PS 637: Performed by: INTERNAL MEDICINE

## 2017-11-23 PROCEDURE — 12000007 ZZH R&B INTERMEDIATE

## 2017-11-23 PROCEDURE — 84132 ASSAY OF SERUM POTASSIUM: CPT | Performed by: INTERNAL MEDICINE

## 2017-11-23 PROCEDURE — 36415 COLL VENOUS BLD VENIPUNCTURE: CPT | Performed by: INTERNAL MEDICINE

## 2017-11-23 PROCEDURE — 99233 SBSQ HOSP IP/OBS HIGH 50: CPT | Performed by: INTERNAL MEDICINE

## 2017-11-23 PROCEDURE — 36415 COLL VENOUS BLD VENIPUNCTURE: CPT | Performed by: PHYSICIAN ASSISTANT

## 2017-11-23 PROCEDURE — 25000128 H RX IP 250 OP 636: Performed by: PHYSICIAN ASSISTANT

## 2017-11-23 PROCEDURE — 85610 PROTHROMBIN TIME: CPT | Performed by: PHYSICIAN ASSISTANT

## 2017-11-23 RX ORDER — POTASSIUM CHLORIDE 7.45 MG/ML
10 INJECTION INTRAVENOUS
Status: DISCONTINUED | OUTPATIENT
Start: 2017-11-23 | End: 2017-11-24 | Stop reason: HOSPADM

## 2017-11-23 RX ORDER — POTASSIUM CHLORIDE 1500 MG/1
20-40 TABLET, EXTENDED RELEASE ORAL
Status: DISCONTINUED | OUTPATIENT
Start: 2017-11-23 | End: 2017-11-24 | Stop reason: HOSPADM

## 2017-11-23 RX ORDER — POTASSIUM CL/LIDO/0.9 % NACL 10MEQ/0.1L
10 INTRAVENOUS SOLUTION, PIGGYBACK (ML) INTRAVENOUS
Status: DISCONTINUED | OUTPATIENT
Start: 2017-11-23 | End: 2017-11-24 | Stop reason: HOSPADM

## 2017-11-23 RX ORDER — POTASSIUM CHLORIDE 29.8 MG/ML
20 INJECTION INTRAVENOUS
Status: DISCONTINUED | OUTPATIENT
Start: 2017-11-23 | End: 2017-11-24 | Stop reason: HOSPADM

## 2017-11-23 RX ORDER — POTASSIUM CHLORIDE 1.5 G/1.58G
20-40 POWDER, FOR SOLUTION ORAL
Status: DISCONTINUED | OUTPATIENT
Start: 2017-11-23 | End: 2017-11-24 | Stop reason: HOSPADM

## 2017-11-23 RX ORDER — WARFARIN SODIUM 2.5 MG/1
2.5 TABLET ORAL
Status: COMPLETED | OUTPATIENT
Start: 2017-11-23 | End: 2017-11-23

## 2017-11-23 RX ADMIN — MICONAZOLE NITRATE: 2 POWDER TOPICAL at 08:24

## 2017-11-23 RX ADMIN — PREGABALIN 150 MG: 75 CAPSULE ORAL at 21:16

## 2017-11-23 RX ADMIN — POTASSIUM CHLORIDE 20 MEQ: 1500 TABLET, EXTENDED RELEASE ORAL at 15:40

## 2017-11-23 RX ADMIN — PANTOPRAZOLE SODIUM 40 MG: 40 TABLET, DELAYED RELEASE ORAL at 07:03

## 2017-11-23 RX ADMIN — LEVOFLOXACIN 500 MG: 5 INJECTION, SOLUTION INTRAVENOUS at 17:33

## 2017-11-23 RX ADMIN — VENLAFAXINE HYDROCHLORIDE 150 MG: 150 TABLET, EXTENDED RELEASE ORAL at 08:24

## 2017-11-23 RX ADMIN — GABAPENTIN 600 MG: 600 TABLET, FILM COATED ORAL at 21:16

## 2017-11-23 RX ADMIN — METOPROLOL SUCCINATE 50 MG: 50 TABLET, EXTENDED RELEASE ORAL at 21:17

## 2017-11-23 RX ADMIN — POTASSIUM CHLORIDE 40 MEQ: 1500 TABLET, EXTENDED RELEASE ORAL at 12:28

## 2017-11-23 RX ADMIN — DILTIAZEM HYDROCHLORIDE 120 MG: 120 CAPSULE, COATED, EXTENDED RELEASE ORAL at 08:24

## 2017-11-23 RX ADMIN — PRAVASTATIN SODIUM 10 MG: 10 TABLET ORAL at 21:16

## 2017-11-23 RX ADMIN — GABAPENTIN 600 MG: 600 TABLET, FILM COATED ORAL at 15:39

## 2017-11-23 RX ADMIN — WARFARIN SODIUM 2.5 MG: 2.5 TABLET ORAL at 17:33

## 2017-11-23 RX ADMIN — MULTIPLE VITAMINS W/ MINERALS TAB 1 TABLET: TAB at 08:23

## 2017-11-23 RX ADMIN — PREGABALIN 150 MG: 75 CAPSULE ORAL at 08:23

## 2017-11-23 RX ADMIN — GABAPENTIN 600 MG: 600 TABLET, FILM COATED ORAL at 08:23

## 2017-11-23 RX ADMIN — NORTRIPTYLINE HYDROCHLORIDE 50 MG: 50 CAPSULE ORAL at 21:17

## 2017-11-23 RX ADMIN — BUPROPION HYDROCHLORIDE 300 MG: 150 TABLET, FILM COATED, EXTENDED RELEASE ORAL at 08:23

## 2017-11-23 ASSESSMENT — ACTIVITIES OF DAILY LIVING (ADL)
ADLS_ACUITY_SCORE: 29

## 2017-11-23 NOTE — PLAN OF CARE
Problem: Patient Care Overview  Goal: Plan of Care/Patient Progress Review  A&O. Up with Jenn morrell, assist x2. Lung sounds: coarse, IS encouraged. Tele: SR with BBB. Diet: 2g Na. Voided x1. PIV: SL. Pain: 5/10 knee pain, decreased with repositioning. Cont POC.

## 2017-11-23 NOTE — PROGRESS NOTES
[facility]  Hospitalist Progress Note  Name: Berenice Chaudhari    MRN: [id number]  Provider:  Harriet Warner MD  11/23/17    Initial presenting complaint/issue to hospital (Diagnosis): sepsis due to cellulitis of LLE.         Assessment and Plan:      Summary of Stay: Berenice Chaudhari is a 77 year old female admitted on 11/20/2017 with sepsis due to cellulitis of LLE. PMH significant for paroxysmal a fib, CKD, HTN, CHF, GERD, hx of MRSA, interstitial lung dz, HLP, chronic cough, morbid obesity and recurrent cellulitis of the legs, who presents with fever and generalized weakness.          Problem List:      1. Sepsis due to cellulitis of LLE.  - IV levaquin.  - Cx NGTD.      2. Acute on chronic diastolic HF.  - TTE 8/2017 showed nml EF with mild-moderate LVH, apical kinesis.  - On metoprolol, diltiazem and IV lasix.  - Elevated troponin likely demand ischemia.      3. CKD stage 3.  - Avoid nephrotoxins.  - Recheck Cr 11/24.      4. PAF.  - On metoprolol and diltiazem.  - On coumadin for CVA ppx.          DVT Prophylaxis:  -  Coumadin.  Code Status: DNR / DNI  Discharge Dispo: Home.  Estimated Disch Date / # of Days until Discharge: 1 day.              Interval History:        No fevers/chills. Minimal productive cough. No chest pain/SOB.                  Physical Exam:      Last Vital Signs:  Temp: 98  F (36.7  C) Temp src: Oral BP: 134/62   Heart Rate: 86 Resp: 22 SpO2: 94 % O2 Device: None (Room air)      Intake/Output Summary (Last 24 hours) at 11/23/17 1019  Last data filed at 11/23/17 0822   Gross per 24 hour   Intake              250 ml   Output              225 ml   Net               25 ml     I/O last 3 completed shifts:  In: 250 [P.O.:250]  Out: 800 [Urine:800]  Vitals:    11/20/17 1735 11/21/17 0403 11/22/17 0629 11/23/17 0704   Weight: (!) 144 kg (317 lb 7.4 oz) (!) 155.6 kg (343 lb) (!) 153.5 kg (338 lb 4.8 oz) (!) 154.4 kg (340 lb 8 oz)       Gen - AAO x 3 in NAD.  Lungs - CTA B.  Heart -  RR,S1+S2 nml, no m/g/r.  Abd - soft, NT, ND, + BS.  Ext - LLE erythema and warmth from the knee to ankle which is improving.         Medications:      All current medications were reviewed.         Data:      All new lab and imaging data was reviewed.   Labs:    Recent Labs  Lab 11/20/17  1900 11/20/17  1750   CULT No growth after 3 days No growth after 3 days       Recent Labs  Lab 11/22/17  0700 11/21/17  0651 11/20/17  1750   WBC 10.3 12.3* 19.8*   HGB 13.0 11.4* 12.8   HCT 41.2 35.2 40.4   MCV 93 91 92    116* 164       Recent Labs  Lab 11/23/17  0630 11/22/17 0700 11/21/17  0651 11/20/17  1900    137 139 136   POTASSIUM 3.3* 3.5 3.7 4.0   CHLORIDE 104 102 106 106   CO2 27 26 25 23   ANIONGAP 8 9 8 7   * 120* 86 148*   BUN 32* 26 17 15   CR 1.84* 1.85* 1.38* 1.24*   GFRESTIMATED 27* 27* 37* 42*   GFRESTBLACK 32* 32* 45* 51*   SHILOH 8.6 9.0 8.4* 7.9*   PROTTOTAL  --   --   --  7.2   ALBUMIN  --   --   --  2.6*   BILITOTAL  --   --   --  0.9   ALKPHOS  --   --   --  99   AST  --   --   --  23   ALT  --   --   --  24      Recent Imaging:   No results found for this or any previous visit (from the past 24 hour(s)).

## 2017-11-23 NOTE — PLAN OF CARE
Problem: Patient Care Overview  Goal: Plan of Care/Patient Progress Review  Outcome: No Change  AOX4, VSS.Denies pain. On regular diet, 2 gm Na. Tele- SR  With BBB. Up with assist of 2 with ray steady. On IV Levaquin.

## 2017-11-23 NOTE — PLAN OF CARE
Problem: Infection, Risk/Actual (Adult)  Goal: Identify Related Risk Factors and Signs and Symptoms  Related risk factors and signs and symptoms are identified upon initiation of Human Response Clinical Practice Guideline (CPG).   Outcome: Improving  VSS tele SR BBB, Hx Afib, on Po Diltiazem, metoprolol and Coumadin. INR 2.27. On IV Levaquin for LLE Cellulitis. Cr 1.84. K+3.3, protocol added. Pt voided 225cc. WOCN following. Up with Jenn morrell a2. Plan for Dc home tomorrow.

## 2017-11-24 VITALS
DIASTOLIC BLOOD PRESSURE: 59 MMHG | TEMPERATURE: 97.9 F | HEART RATE: 77 BPM | RESPIRATION RATE: 16 BRPM | BODY MASS INDEX: 47.09 KG/M2 | HEIGHT: 66 IN | SYSTOLIC BLOOD PRESSURE: 115 MMHG | OXYGEN SATURATION: 98 % | WEIGHT: 293 LBS

## 2017-11-24 LAB
ANION GAP SERPL CALCULATED.3IONS-SCNC: 6 MMOL/L (ref 3–14)
BACTERIA SPEC CULT: NORMAL
BUN SERPL-MCNC: 35 MG/DL (ref 7–30)
CALCIUM SERPL-MCNC: 8.9 MG/DL (ref 8.5–10.1)
CHLORIDE SERPL-SCNC: 105 MMOL/L (ref 94–109)
CO2 SERPL-SCNC: 26 MMOL/L (ref 20–32)
CREAT SERPL-MCNC: 1.65 MG/DL (ref 0.52–1.04)
GFR SERPL CREATININE-BSD FRML MDRD: 30 ML/MIN/1.7M2
GLUCOSE SERPL-MCNC: 124 MG/DL (ref 70–99)
INR PPP: 2.17 (ref 0.86–1.14)
POTASSIUM SERPL-SCNC: 3.9 MMOL/L (ref 3.4–5.3)
SODIUM SERPL-SCNC: 137 MMOL/L (ref 133–144)
SPECIMEN SOURCE: NORMAL

## 2017-11-24 PROCEDURE — 25000132 ZZH RX MED GY IP 250 OP 250 PS 637: Performed by: PHYSICIAN ASSISTANT

## 2017-11-24 PROCEDURE — 80048 BASIC METABOLIC PNL TOTAL CA: CPT | Performed by: PHYSICIAN ASSISTANT

## 2017-11-24 PROCEDURE — 25000132 ZZH RX MED GY IP 250 OP 250 PS 637: Performed by: INTERNAL MEDICINE

## 2017-11-24 PROCEDURE — 85610 PROTHROMBIN TIME: CPT | Performed by: PHYSICIAN ASSISTANT

## 2017-11-24 PROCEDURE — 99239 HOSP IP/OBS DSCHRG MGMT >30: CPT | Performed by: HOSPITALIST

## 2017-11-24 PROCEDURE — 36415 COLL VENOUS BLD VENIPUNCTURE: CPT | Performed by: PHYSICIAN ASSISTANT

## 2017-11-24 RX ORDER — LEVOFLOXACIN 500 MG/1
500 TABLET, FILM COATED ORAL DAILY
Qty: 6 TABLET | Refills: 0 | Status: SHIPPED | OUTPATIENT
Start: 2017-11-24 | End: 2017-11-30

## 2017-11-24 RX ADMIN — PANTOPRAZOLE SODIUM 40 MG: 40 TABLET, DELAYED RELEASE ORAL at 07:17

## 2017-11-24 RX ADMIN — MICONAZOLE NITRATE: 2 POWDER TOPICAL at 08:57

## 2017-11-24 RX ADMIN — GABAPENTIN 600 MG: 600 TABLET, FILM COATED ORAL at 08:53

## 2017-11-24 RX ADMIN — DILTIAZEM HYDROCHLORIDE 120 MG: 120 CAPSULE, COATED, EXTENDED RELEASE ORAL at 08:53

## 2017-11-24 RX ADMIN — VENLAFAXINE HYDROCHLORIDE 150 MG: 150 TABLET, EXTENDED RELEASE ORAL at 08:53

## 2017-11-24 RX ADMIN — PREGABALIN 150 MG: 75 CAPSULE ORAL at 08:53

## 2017-11-24 RX ADMIN — MULTIPLE VITAMINS W/ MINERALS TAB 1 TABLET: TAB at 08:53

## 2017-11-24 RX ADMIN — BUPROPION HYDROCHLORIDE 300 MG: 150 TABLET, FILM COATED, EXTENDED RELEASE ORAL at 08:53

## 2017-11-24 ASSESSMENT — ACTIVITIES OF DAILY LIVING (ADL)
ADLS_ACUITY_SCORE: 29

## 2017-11-24 NOTE — PLAN OF CARE
Problem: Patient Care Overview  Goal: Plan of Care/Patient Progress Review  Pt. A&O. VSS, afebrile. Transfers with lift/ray steady. Refused repositioning. Tele- SR w/ BBB. [facility] nurses following for coccyx wound. LS- coarse. Body folds reddened and raw. Plan for possible d/c tomorrow.

## 2017-11-24 NOTE — DISCHARGE INSTRUCTIONS
Discharge Instructions for Cellulitis  You have been diagnosed with cellulitis. This is an infection in the deepest layer of the skin. In some cases, the infection also affects the muscle. Cellulitis is caused by bacteria. The bacteria can enter the body through broken skin. This can happen with a cut, scratch, animal bite, or an insect bite that has been scratched. You may have been treated in the hospital with antibiotics and fluids. You will likely be given a prescription for antibiotics to take at home. This sheet will help you take care of yourself at home.  Home care  When you are home:    Take the prescribed antibiotic medicine you are given as directed until it is gone. Take it even if you feel better. It treats the infection and stops it from returning. Not taking all the medicine can make future infections hard to treat.    Keep the infected area clean.    When possible, raise the infected area above the level of your heart. This helps keep swelling down.    Talk with your healthcare provider if you are in pain. Ask what kind of over-the-counter medicine you can take for pain.    Apply clean bandages as advised.    Take your temperature once a day for a week.    Wash your hands often to prevent spreading the infection.  In the future, wash your hands before and after you touch cuts, scratches, or bandages. This will help prevent infection.   When to call your healthcare provider  Call your healthcare provider immediately if you have any of the following:    Difficulty or pain when moving the joints above or below the infected area    Discharge or pus draining from the area    Fever of 100.4 F (38 C) or higher, or as directed by your healthcare provider    Pain that gets worse in or around the infected     Redness that gets worse in or around the infected area, particularly if the area of redness expands to a wider area    Shaking chills    Swelling of the infected area    Vomiting   Date Last Reviewed:  8/1/2016 2000-2017 [employer]. [address] [place] [address], [place] [address]. All rights reserved. This information is not intended as a substitute for professional medical care. Always follow your healthcare professional's instructions.        Left- or Right- Side Congestive Heart Failure (CHF)    The heart is a large muscle. It is a pump that circulates blood throughout the body. Blood carries oxygen to all of the organs, including the brain, muscles, and skin. After your body takes the oxygen out of the blood, the blood returns to the heart. The right side of the heart collects the blood from the body and pumps it to the lungs. In the lungs, it gets fresh oxygen and gives up carbon dioxide. The oxygen-rich blood from the lungs then returns to the left side of the heart, where it is pumped back out to the rest of your body, starting the process all over.  Congestive heart failure (CHF) occurs when the heart muscle is weakened. This affects the pumping action of the heart. Heart failure can affect the right side of the heart or the left side. But heart failure may affect not only the right side of the heart or only the left side. Although it may have started on one side, it can and often eventually does affect both sides.  Right-side heart failure  When the right side of the heart is weakened, it can t handle the blood it is getting from the rest of the body. This blood returns to the heart through veins. When too much pressure builds up in the veins, fluid leaks out into the tissues. Gravity then causes that fluid to move to those parts of the body that are the lowest. So one of the first symptoms of right-side CHF can include swelling in the feet and ankles. If the condition gets worse, the swelling can even go up past the knees. Sometimes it gets so severe, the liver can get congested as well.  Left-side heart failure  When the left side of the heart is weakened, it can t handle the blood it gets  from the lungs. Pressure then builds up in the veins of the lungs, causing fluid to leak into the lung tissues. This may cause CHF and pulmonary edema. This causes you to feel short of breath, weak, or dizzy. These symptoms are often worse with exertion, such as when climbing stairs or walking up hills. Lying with your head flat is uncomfortable and can make your breathing worse. This may make sleeping difficult. You may need to use extra pillows to elevate your upper body to sleep well. The same is true when just resting during the daytime.  There are many causes of heart failure including:    Coronary artery disease    Past heart attack (also known as acute myocardial infarction, or AMI)    High blood pressure    Damaged heart valve    Diabetes    Obesity    Cigarette smoking    Alcohol abuse  Heart failure is a chronic condition. There is no cure. The purpose of medical treatment is to improve the pumping action of the heart. The main way to do this is to remove excess water from the body. A number of medicines can help reach this goal, improve symptoms, and prevent the heart from becoming weaker. Sometimes, heart failure can become so severe that a device is placed in the heart to help with pumping. Another major goal is to better treat the causes of heart failure, such as diabetes and high blood pressure, by making changes in your lifestyle and maximizing medical control when needed.  Home care  Follow these guidelines when caring for yourself at home:    Check your weight every day. This is very important because a sudden increase in weight gain could mean worsening heart failure. Keep these things in mind:    Use the same scale every day.    Weigh yourself at the same time every day.    Make sure the scale is on a hard floor surface, not on a rug or carpet.    Keep a record of your weight every day so your healthcare provider can see it. If you are not given a log sheet for this, keep a separate journal for  this purpose.     Cut back on the amount of salt (sodium) you eat. Follow your healthcare provider's recommendation on how much salt or sodium you should have each day.    Avoid high-salt foods. These include olives, pickles, smoked meats, salted potato chips, and most prepared foods.    Don't add salt to your food at the table. Use only small amounts of salt when cooking.    Read the labels carefully on food packages to learn how much salt or sodium is in each serving in the package. Remember, a can or package of food may contain more than 1 serving. So if you eat all the food in the package, you may be getting more salt than you think.    Follow your healthcare provider's recommendations about how much fluid you should have. Be aware that some foods, such as soup, pudding, and juicy fruits like oranges or melons, contain liquid. You'll need to count the liquid in those foods as part of your daily fluid intake. Your provider can help you with this.    Stop smoking.    Cut back on how much alcohol you drink.    Lose weight if you are overweight. The excess weight adds a lot of stress on the workload of the heart.    Stay active. Talk with your provider about an exercise program that is safe for your heart.    Keep your feet elevated to reduce swelling. Ask your provider about support hose as a preventive treatment for daytime leg swelling.  Besides taking your medicine as instructed, an important part of treatment is lifestyle changes. These include diet, physical activity, stopping smoking, and weight control.  Improve your diet by including more fresh foods, cutting back on how much sugar and saturated fat you eat, and eating fewer processed foods and less salt.  Follow-up care  Follow up with your healthcare provider, or as advised.  Make sure to keep any appointments that were made for you. These can help better control your congestive heart failure. You will need to follow up with your provider on a routine  basis to make sure your heart failure is well managed.  If an X-ray, ECG, or other tests were done, you will be told of any new findings that may affect your care.  Call 911  Call 911 if you:    Become severely short of breath    Feel lightheaded, or feel like you might pass out or faint    Have chest pain or discomfort that is different than usual, the medicines your doctor told you to use for this don't help, or the pain lasts longer than 10 to 15 minutes    You suddenly develop a rapid heart rate  When to seek medical advice  The following may be signs that your heart failure is getting worse. Call your healthcare provider right away if any of these happen:    Sudden weight gain. This means 3 or more pounds in one day, or 5 or more pounds in 1 week.    Trouble breathing not related to being active    New or increased swelling of your legs or ankles    Swelling or pain in your abdomen    Breathing trouble at night. This means waking up short of breath or needing more pillows to breathe.    Frequent coughing that doesn t go away    Feeling much more tired than usual  Date Last Reviewed: 1/4/2016 2000-2017 [employer]. [address] [place] [id number]. All rights reserved. This information is not intended as a substitute for professional medical care. Always follow your healthcare professional's instructions.      You will be discharged home with [facility]. If you have not heard from them within 48 hours of dc please call them at [phone].

## 2017-11-24 NOTE — PROGRESS NOTES
"  Spiritual Assessment Progress Note  [facility] - 3rd floor medical      PRIMARY FOCUS:      Goals of care    ILLNESS CIRCUMSTANCES:    Reviewed documentation. Reflective conversation shared with Berenice which integrated elements of illness and family narratives.         Context of Serious Illness/Symptom(s) - LOS visit.  She was here for cellulitis and other medical issues.      Resources for Support - , Ricardo and all of her animals.      DISTRESS:      Emotional/ ExistentialRelational Distress - Berenice did not express any kind of distress during out visit.     Spiritual/Tenriism Distress - none noted    Social/Cultural/EconomicDistress - none noted       SPIRITUAL/Rastafari (Coping):      Uatsdin/Alie - raised Yazidism but has migrated to other denominations or in her words, \"her backyard\".    Spiritual Practice(s) - seeing God in all of nature.    Emotional/Existential/ Relationall/Connections - very connected to her animals.  She and her  have had several rescue animals.  Music is also important to Berenice.  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      GOALS OF CARE:    Goals of Care - getting home to her  and animals.    Meaning/Sense-Making - She has had profound experiences of God and relishes her time in her backyard where she sees God everywhere.        PLAN: Pt is discharging home today so no need to follow.      Idalia Denson    Pager [phone]  "

## 2017-11-24 NOTE — PROGRESS NOTES
SWS:  SW notified that pt will be discharging home with home care orders. CTS RN met with pt earlier this week and they prefer to [facility] at [place].  Home Care orders placed. [facility] notified.

## 2017-11-24 NOTE — PROGRESS NOTES
[facility]  Met with pt to discuss plans for HC.  Pt to be discharged home today and has agreed to have [facility] follow with services of SN, PT and OT. Patient care support center processing referral.  Pt verbalized understanding that initial visit is scheduled for 11/25 or 11/26/17.  Pt has 24 hour phone number for [facility] for any questions or concerns.

## 2017-11-24 NOTE — PLAN OF CARE
Cellulitis improved with no c/o pain.  Reviewed dischg instructions with understanding and acceptance of plan of care acknowledged.  Filled script given to pt   Awaits ride

## 2017-11-24 NOTE — PLAN OF CARE
Problem: Patient Care Overview  Goal: Plan of Care/Patient Progress Review  A&O. Up with Jenn Arteaga. Lung sounds: coarse, PATEL. IS encouraged. Tele: SR with BBB. Diet: 2g Na. PIV: SL. Pain: denies. Possible d/c to home tomorrow.

## 2017-11-24 NOTE — DISCHARGE SUMMARY
[facility]    Discharge Summary  Hospitalist    Date of Admission:  11/20/2017  Date of Discharge:  11/24/2017  Provider:  Yousif Ruiz MD  Date of Service (when I last saw the patient): 11/24/17    Discharge Diagnoses   1. Sepsis due to cellulitis of LLE.  2. Acute on chronic diastolic HF.  3. CKD stage III.  4. PAF.  5. Severe decondition.  6. Wheelchair bound.  7. Morbid obesity.      Other medical issues:  Past Medical History:   Diagnosis Date     Allergic rhinitis, cause unspecified      Anxiety 6/13/2013     Arrhythmia     atrial flutter     Atrial flutter with rapid ventricular response (H) 8/8/2017     Cellulitis and abscess of leg, except foot recurrent , both legs    begins with fever, nausea, diarrhea, vomiting & & the cellulitis may be visible a day or 2 later     Chronic pain     On chronic fentanyl patch.     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  less likely = raction to vancomycin      Essential hypertension with goal blood pressure less than 140/90 1/12/2005     Problem list name updated by automated process. Provider to review     Generalized osteoarthrosis, unspecified site     films 9/04: L hhip bad; both knees mod severe medial OA     GERD (gastroesophageal reflux disease) 11/15/2013     Herpes zoster without mention of complication 9/03    L shoulder; still has neuralgia     Hyperlipidemia LDL goal <100 9/16/2011     Methicillin susceptible Staphylococcus aureus septicemia (H) after L hip injection 205    ARDS, renal failure, staph sepsis after a hip joint injection     Obesity 1/12/2005     Problem list name updated by automated process. Provider to review     Other chronic pain      Other diseases of lung, not elsewhere classified 07/93    Interstitial lung process      Pneumonia, organism unspecified(486)      Urticaria, unspecified 07/93    Diffuse severe (hospitalized).  (+) skin biopsy by Dr. Figueroa       History of Present Illness   Berenice Chaudhari is an 77 year old female who presented with fever.  Please see the admission history and physical for full details.    Hospital Course   Summary of Stay: Berenice Chaudhari is a 77 year old female admitted on 11/20/2017 with sepsis due to cellulitis of LLE. PMH significant for paroxysmal a fib, CKD, HTN, CHF, GERD, hx of MRSA, interstitial lung dz, HLP, chronic cough, morbid obesity and recurrent cellulitis of the legs, who presents with fever, evidence of sepsis due to LLE cellulitis  and generalized weakness.         Great clinical improvement and response to treatment as outlined below. In condition to complete it at home with HHC (RN, PZT and OT).  Should follow up with PCP.      Problem List:       1. Sepsis due to cellulitis of LLE.  - IV levaquin here, po on discharge  - Cx NGTD.      2. Acute on chronic diastolic HF.  - TTE 8/2017 showed nml EF with mild-moderate LVH, apical kinesis.  - On metoprolol, diltiazem and IV lasix.  - Elevated troponin likely demand ischemia.      3. CKD stage 3.  - Avoid nephrotoxins.  - Recheck Cr 11/24.      4. PAF.  - On metoprolol and diltiazem.  - On coumadin for CVA ppx.      Significant Results and Procedures   See below     Pending Results     Unresulted Labs Ordered in the Past 30 Days of this Admission     Date and Time Order Name Status Description    11/20/2017 1809 Blood culture Preliminary     11/20/2017 1804 Blood culture Preliminary           Code Status   DNR / DNI       Primary Care Physician   Nelly Pearl    GEN:  Obese. Alert, oriented x 3, appears comfortable, NAD.  HEENT:  Normocephalic/atraumatic, no scleral icterus, no nasal discharge, mouth moist.  CV:  Regular rate and rhythm, no murmur or JVD.  S1 + S2 noted, no S3 or S4.  LUNGS:  Clear to auscultation bilaterally  without rales/rhonchi/wheezing/retractions.  Symmetric chest rise on inhalation noted.     Discharge Disposition   Discharged to home with [facility]    Consultations This Hospital Stay   PHARMACY TO DOSE WARFARIN  CORE CLINIC EVALUATION IP CONSULT  CARDIAC REHAB IP CONSULT  CARE COORDINATOR IP CONSULT  NUTRITION SERVICES ADULT IP CONSULT  WOUND OSTOMY CONTINENCE NURSE  IP CONSULT  CARE COORDINATOR IP CONSULT    Time Spent on this Encounter   I, Yousif Ruiz, personally saw the patient today and spent greater than 30 minutes discharging this patient.     Discharge Orders     Home care nursing referral     Home Care PT Referral for Hospital Discharge     Home Care OT Referral for Hospital Discharge     Reason for your hospital stay   LLE cellulitis     Follow-up and recommended labs and tests    LLE cellulitis     Activity   Your activity upon discharge: activity as tolerated     MD face to face encounter   Documentation of Face to Face and Certification for Home Health Services    I certify that patient: Berenice Chaudhari is under my care and that I, or a nurse practitioner or physician's assistant working with me, had a face-to-face encounter that meets the physician face-to-face encounter requirements with this patient on: 11/24/2017.    This encounter with the patient was in whole, or in part, for the following medical condition, which is the primary reason for home health care: LLE cellulitis.    I certify that, based on my findings, the following services are medically necessary home health services: Nursing, Occupational Therapy and Physical Therapy.    My clinical findings support the need for the above services because: Nurse is needed: home safety. PT and OT due to decondition.     Further, I certify that my clinical findings support that this patient is homebound (i.e. absences from home require considerable and taxing effort and are for medical reasons or [facility] services or infrequently or of short duration  when for other reasons) because: Requires assistance of another person or specialized equipment to access medical services because patient:  Wheelchair bound.    Based on the above findings. I certify that this patient is confined to the home and needs intermittent skilled nursing care, physical therapy and/or speech therapy.  The patient is under my care, and I have initiated the establishment of the plan of care.  This patient will be followed by a physician who will periodically review the plan of care.  Physician/Provider to provide follow up care: Nelly Pearl    Attending hospital physician (the Medicare certified [place] provider): Yousif Ruiz  Physician Signature: See electronic signature associated with these discharge orders.  Date: 11/24/2017     DNR/DNI     Diet   Follow this diet upon discharge:  Diet Regular Diet Adult; 2 gm NA Diet       Discharge Medications   Current Discharge Medication List      START taking these medications    Details   levofloxacin (LEVAQUIN) 500 MG tablet Take 1 tablet (500 mg) by mouth daily for 6 days  Qty: 6 tablet, Refills: 0    Associated Diagnoses: Cellulitis of left lower extremity         CONTINUE these medications which have NOT CHANGED    Details   pravastatin (PRAVACHOL) 10 MG tablet Take 1 tablet (10 mg) by mouth At Bedtime Needs labs before next refill  Qty: 30 tablet, Refills: 0    Associated Diagnoses: Hyperlipidemia LDL goal <100      GABAPENTIN PO Take 600 mg by mouth 3 times daily      METOPROLOL SUCCINATE ER PO Take 50 mg by mouth every evening      WARFARIN SODIUM PO Take by mouth daily 5 mg Mondays, 2.5 mg all other days or as directed by INR clinic      venlafaxine (EFFEXOR-XR) 150 MG 24 hr capsule TAKE 1 CAPSULE(150 MG) BY MOUTH DAILY  Qty: 90 capsule, Refills: 0    Associated Diagnoses: Major depressive disorder, recurrent episode, moderate (H)      pantoprazole (PROTONIX) 40 MG EC tablet TAKE 1 TABLET(40 MG) BY MOUTH DAILY  Qty: 90 tablet,  Refills: 3    Associated Diagnoses: Gastroesophageal reflux disease without esophagitis      diltiazem (CARDIZEM CD/CARTIA XT) 120 MG 24 hr capsule Take 1 capsule (120 mg) by mouth daily  Qty: 90 capsule, Refills: 3    Associated Diagnoses: Atrial flutter with rapid ventricular response (H)      furosemide (LASIX) 20 MG tablet Take 1 tablet (20 mg) by mouth daily  Qty: 90 tablet, Refills: 3    Associated Diagnoses: Atrial flutter with rapid ventricular response (H)      buPROPion (WELLBUTRIN XL) 300 MG 24 hr tablet Take 1 tablet (300 mg) by mouth every morning  Qty: 90 tablet, Refills: 1    Associated Diagnoses: Major depressive disorder, recurrent, in full remission (H)      minocycline (MINOCIN/DYNACIN) 50 MG capsule TAKE 1 CAPSULE(50 MG) BY MOUTH TWICE DAILY  Qty: 60 capsule, Refills: 11    Associated Diagnoses: Methicillin susceptible Staphylococcus aureus septicemia (H)      fluticasone (FLONASE) 50 MCG/ACT spray Spray 2 sprays into both nostrils daily  Qty: 3 Bottle, Refills: 11    Associated Diagnoses: Post-nasal drip      pregabalin (LYRICA) 150 MG capsule Take 1 capsule (150 mg) by mouth 2 times daily  Qty: 60 capsule, Refills: 5    Associated Diagnoses: Postherpetic neuralgia      nortriptyline (PAMELOR) 25 MG capsule TAKE 2 CAPSULES(50 MG) BY MOUTH AT BEDTIME  Qty: 60 capsule, Refills: 11    Associated Diagnoses: Post herpetic neuralgia      lidocaine (LIDODERM) 5 % Patch Apply up to 3 patches to painful area at once for up to 12 h within a 24 h period.  Remove after 12 hours.  Qty: 60 patch, Refills: 2    Associated Diagnoses: Postherpetic neuralgia         STOP taking these medications       CEPHALEXIN PO Comments:   Reason for Stopping:             Allergies   Allergies   Allergen Reactions     Ceftriaxone Anaphylaxis and Rash     Rocephin given. Developed rash to back and abd, awaiting to see if it improves with d/c of rocephin.        Nafcillin Rash     diffuse severe rash in hospital 2/05      Penicillins Anaphylaxis     Vancomycin Anaphylaxis and Rash     Codeine Fatigue     Sleeps continuously     Clindamycin Rash     received information from patient     Zithromax [Azithromycin] Rash     Data   Most Recent 3 CBC's:  Recent Labs   Lab Test  11/22/17   0700  11/21/17   0651  11/20/17   1750   WBC  10.3  12.3*  19.8*   HGB  13.0  11.4*  12.8   MCV  93  91  92   PLT  157  116*  164      Most Recent 3 BMP's:  Recent Labs   Lab Test  11/24/17   0603  11/23/17   1940  11/23/17   0630  11/22/17   0700   NA  137   --   139  137   POTASSIUM  3.9  4.2  3.3*  3.5   CHLORIDE  105   --   104  102   CO2  26   --   27  26   BUN  35*   --   32*  26   CR  1.65*   --   1.84*  1.85*   ANIONGAP  6   --   8  9   SHILOH  8.9   --   8.6  9.0   GLC  124*   --   101*  120*     Most Recent 2 LFT's:  Recent Labs   Lab Test  11/20/17   1900  06/30/17   1155   AST  23  31   ALT  24  33   ALKPHOS  99  97   BILITOTAL  0.9  0.5     Most Recent INR's and Anticoagulation Dosing History:  Anticoagulation Dose History     Recent Dosing and Labs Latest Ref Rng & Units 11/9/2017 11/16/2017 11/20/2017 11/21/2017 11/22/2017 11/23/2017 11/24/2017    Warfarin 2.5 mg - - - - - - 2.5 mg -    Warfarin 3 mg - - - - - 3 mg - -    Warfarin 5 mg - - - 5 mg 5 mg - - -    INR 0.86 - 1.14 1.07 1.8 1.57(H) 1.57(H) 1.84(H) 2.27(H) 2.17(H)        Most Recent 3 Troponin's:  Recent Labs   Lab Test  11/22/17   0153  11/21/17   2343  11/20/17   1900   TROPI  <0.015  <0.015  0.134*     Most Recent Cholesterol Panel:  Recent Labs   Lab Test  10/27/16   1133   CHOL  133   LDL  60   HDL  53   TRIG  102     Most Recent 6 Bacteria Isolates From Any Culture (See EPIC Reports for Culture Details):  Recent Labs   Lab Test  11/21/17   1441  11/20/17   1900  11/20/17   1750  08/07/17   1647  08/07/17   1642  06/21/17   0001   CULT  Moderate growth  Normal yvette    No growth after 4 days  No growth after 4 days  No growth  No growth  No growth     Most Recent TSH, T4 and A1c  Labs:  Recent Labs   Lab Test  08/08/17   0725  06/30/17   1155   08/11/14   1455   TSH  1.36  4.27*   < >   --    T4   --   1.37   < >   --    A1C   --    --    --   5.8    < > = values in this interval not displayed.     Results for orders placed or performed during the hospital encounter of 11/20/17   Chest  XR, 1 view PORTABLE    Narrative    PORTABLE CHEST ONE VIEW   11/20/2017 6:04 PM     HISTORY: Fever.    COMPARISON: 8/7/2017.      Impression    IMPRESSION: Mild patchy airspace opacities in the perihilar and lower  lung regions bilaterally. Findings could represent pneumonia. There  appears to be small bilateral pleural effusions. Pulmonary edema is  not excluded. Cardiac silhouette borderline-enlarged. No pneumothorax  identified.    JENNA SMITH MD           Disclaimer: This note consists of symbols derived from keyboarding, dictation and/or voice recognition software. As a result, there may be errors in the script that have gone undetected. Please consider this when interpreting information found in this chart.

## 2017-11-24 NOTE — CONSULTS
"NUTRITION BRIEF NOTE    See RD note 11/22 for full assessment details    New findings in last 24 hours:    Diet order: 2 gram Na. States \"my sister brought me in a sald last night and it was way too high in sodium but I ate it anyways, it was so good,\" dislikes food here - \"have you ever had chicken noodle soup without salt, it's terrible\"    Diet history:    Breakfast: homemade breakfast sandwich - 1 slice of cheese, 1 slice of bread from [employer], 1 hard poached egg. Meat free anthony slice - 2 at a time, oatmeal on occasion. 1 cup of coffee per day    Snacks: apples, [place] snack pack with almonds, cheese and almonds    Drinks: diluted cranberry juice (>50% water), water, no longer drinks diet soda    Lunch: none    Dinner:  prepares meals if he has time otherwise  will  a meal to go such an [employer] or [place] entree. Patient notes she is impressed by the fast food variety options surrounding the [facility] in [place]    Denies adding salt at the table    Reads the nutrition recommendations in the cook book - from Nick.     Buys low sodium options online (where she grocery shops) and prefers fresh meat and fresh vegetables    States she follows low Na, although restaurant choices (shrimp, entrees) are very high sodium - poor insight into rational for low Na diet adherence.     Interventions:      Collaboration and Referral of care: Discussed patient during interdisciplinary care rounds this morning. Will benefit from CORE clinic follow up    Nutrition Education (Content):  Provided handouts:  Tips for Low Na Diet  Tips to increase protein (if intake drops)  Protein sources  Patient denied need for info on label reading, eating out while on low Na diet  Discussed rational for limiting Na for CHF and stressed importance of following 2 gm Na guidelines  Reviewed low and high sodium foods and ways to decrease daily sodium consumption - including when eating out  Reviewed label reading, salt " "content, serving size and servings per container although patient not very receptive to new information  Protein push at each meal while skin healing  Nutrition Education (Application):  Reviewed lower sodium substitutes for commonly consumed high Na foods - making own \"snack packs\" with low Na options, reviewing individual ingredients in breakfast sandwich  makes for lower Na alternatives  Encouraged patient to review menus (online and in person) for franchise and fast food restaurants to make informed choices when eating out/take out food  Anticipated poor to fair compliance, patient notes, \"not to brag, I follow low sodium and don't have any changes to make\" despite education about high Na content of restaurant foods  Provided RD contact information for future questions.      Please page/consult as needed.       Nancie Laird RDN LD, [facility]  3rd floor/ICU: [phone]  All other floors: [phone]  Weekend/holiday: [phone]  Office: [phone]  "

## 2017-11-25 ENCOUNTER — DOCUMENTATION ONLY (OUTPATIENT)
Dept: CARE COORDINATION | Facility: CLINIC | Age: 77
End: 2017-11-25

## 2017-11-25 NOTE — PLAN OF CARE
Problem: Patient Care Overview  Goal: Plan of Care/Patient Progress Review  Occupational Therapy Discharge Summary    Reason for therapy discharge:    Discharged to home.    Progress towards therapy goal(s). See goals on Care Plan in [facility] electronic health record for goal details.  Goals not met.  Barriers to achieving goals:   discharge from facility.    Therapy recommendation(s):    No further therapy is recommended.

## 2017-11-25 NOTE — PROGRESS NOTES
Transition Communication Hand-off for Care Transitions to Next Level of Care Provider    Name: Berenice Chaudhari  MRN #: [id number]  Primary Care Provider: Nelly Pearl  Primary Care MD Name: Nelly Pearl  Primary Clinic: [address] [place] [address] [address] [place] [address]  Primary Care Clinic Name: [facility]  Reason for Hospitalization:  Generalized muscle weakness [M62.81]  Cellulitis of left lower extremity [L03.116]  Troponin I above reference range [R74.8]  Congestive heart failure, unspecified congestive heart failure chronicity, unspecified congestive heart failure type (H) [I50.9]  Admit Date/Time: 11/20/2017  5:24 PM  Discharge Date: 11/24/17  Payor Source: Payor: BCBS / Plan: BCBS OUT OF STATE / Product Type: Indemnity /     Readmission Assessment Measure (HAJA) Risk Score/category: Elevated           Reason for Communication Hand-off Referral: Admission diagnoses: CHF  Admission diagnoses: PN    Discharge Plan:       Concern for non-adherence with plan of care:   No  Discharge Needs Assessment:  Needs       Most Recent Value    Equipment Currently Used at Home wheelchair, manual, wheelchair, power, shower chair, grab bar [bed rails, stair lift]    Transportation Available van, wheelchair accessible    # of Referrals Placed by [facility]    Home Care [facility] [phone], Fax: [phone]          Already enrolled in Tele-monitoring program and name of program:  NA  Follow-up specialty is recommended: No    Follow-up plan:  Future Appointments  Date Time Provider Department Center   11/27/2017 1:20 PM Nelly Pearl MD [facility]   12/1/2017 11:45 AM [facility]   12/1/2017 12:50 PM Heather Harley Sm, APRN CNP [facility]       Any outstanding tests or procedures:        Referrals     Future Labs/Procedures    Home care nursing referral     Comments:    RN skilled nursing visit. RN to assess home safety.  HC to check INR 11/27/17.  Your provider has ordered home  care nursing services. If you have not been contacted within 2 days of your discharge please call the inpatient department phone number at [phone] .    Home Care OT Referral for Hospital Discharge     Comments:    OT to eval and treat    Your provider has ordered home care - occupational therapy. If you have not been contacted within 2 days of your discharge please call the department phone number listed on the top of this document.    Home Care PT Referral for Hospital Discharge     Comments:    PT to eval and treat    Your provider has ordered home care - physical therapy. If you have not been contacted within 2 days of your discharge please call the department phone number listed on the top of this document.            Key Recommendations:    Pt was admitted for LLE cellulitis and CHF. She was being treated with IV lasix and IV levo. She follows a low Na diet at home. She is unable to weigh herself at home as she is unable to stand for long time periods. She lives at home with her  who primarily cares for her.  She was discharged to home with  and  Home RN, PT, OT. She will follow up with PCP and CORE clinic on dc.    Arline Anderson RN CTS    AVS/Discharge Summary is the source of truth; this is a helpful guide for improved communication of patient story

## 2017-11-26 LAB
BACTERIA SPEC CULT: NO GROWTH
BACTERIA SPEC CULT: NO GROWTH
Lab: NORMAL
Lab: NORMAL
SPECIMEN SOURCE: NORMAL
SPECIMEN SOURCE: NORMAL

## 2017-11-26 NOTE — PROGRESS NOTES
[facility] now requests orders and shares plan of care/discharge summaries for some patients through [employer].  Please REPLY TO THIS MESSAGE in order to give authorization for orders when needed.  This is considered a verbal order, you will still receive a faxed copy of orders for signature.  Thank you for your assistance in improving collaboration for our patients.    ORDER SN 1w1, 2w3, 1w3, 3 as needed for disease education, lab draws, medication education, pt/caregiver education , disease management, and home safety  OT to evalaute and treat ADL/IADL safety, energy conservation, adaptive equipment, and transfers.    MD SUMMARY/PLAN OF CARE  SUMMARY TO MD  SITUATION   Cellulitis resolved during hospitalization, has a high risk for recurrent cellulitis, She reports concerns over heart failure status as a new diagnosis with this hospitalization. She reports durign hospitalization she experience increased creatnine levels and chanegs to her Lasix dosing. She was discharged on prehospitalization Lasix dosing. She denies pain,  is very lethargic. She becomes short of breath with conversation and minimal to moderate activity. She has Audible rhonchi noted during the conversation. She has a congested cough with thick yellow secretions. She came home from hospitalization from cellulitis left leg on 11/24. She has a history of MRSA and is on protocol prophylactic antibiotics for MSSA. New diagnosis of CHF with this hospitalization  VS... Temp 96.6  HR 80 RR  18 SpO2 98 percent /72  WOUND DESCRIPTION AND MEASUREMENTS  Wound 1 Skin tear 2cm x 1 cm x 0.1 cm, no drainage noted    PHYSICAL PSYCHOSOCIAL IMPAIRMENT OR LIMITATIONS She has an irritated throat. Transfers self from chair to wheelchair.   NUTRITION CONCERNS...Appetite mostly fruit. Instructed on low salt diet heart healthy diet.   HOME ENVIRONMENT AFFECTING CARE...Lives in a a single family home with  and 2 small dogs, and cat.  CAREGIVER  SUPPORT...  is primary caregiver.  is looking into assisted living housing for wife and himself to meet future needs.    BACKGROUND She was recently discharged from [facility] and rehospitalized since that dischareg with new diagnosis CHF and recurrent cellulitis. SHe had a recent endoscopy for noisy respirations with normal results, no treatment recommended. She did have  PT in a prior episode to treat tremors and strengthening. She presents with no increase or decrease in tremors from prior homecare episode. She is weak from hospitalization and shortness of breath. 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 She is a high fall risk.      ANALYSIS She is home with new diagnosis and in need of education on disease management, diet education, and assessment of home safety and ADL managment.  RECOMMENDATION Sn for disease education and management, medication education/reconciliation/evalaution, lab draws, and home safety. OT for transfers, energy conversation, adaptive equipment needs, and ADL/IADL home safety. She had PT in prior hpomecare episode and feels she has met her goals and needs for physical therapy , declines further PT to evaluation. She declines HHA for assistance with bathing and personal cares, statin gthat her  can and is willing to assist her with bathing needs. SW consult declined for assitance to identify community resources, reporting getting the informtion and assistance they need from clinic .

## 2017-11-27 ENCOUNTER — CARE COORDINATION (OUTPATIENT)
Dept: CARE COORDINATION | Facility: CLINIC | Age: 77
End: 2017-11-27

## 2017-11-27 ENCOUNTER — ANTICOAGULATION THERAPY VISIT (OUTPATIENT)
Dept: NURSING | Facility: CLINIC | Age: 77
End: 2017-11-27
Payer: COMMERCIAL

## 2017-11-27 ENCOUNTER — TELEPHONE (OUTPATIENT)
Dept: PEDIATRICS | Facility: CLINIC | Age: 77
End: 2017-11-27

## 2017-11-27 ENCOUNTER — OFFICE VISIT (OUTPATIENT)
Dept: PEDIATRICS | Facility: CLINIC | Age: 77
End: 2017-11-27
Payer: COMMERCIAL

## 2017-11-27 ENCOUNTER — CARE COORDINATION (OUTPATIENT)
Dept: CARDIOLOGY | Facility: CLINIC | Age: 77
End: 2017-11-27

## 2017-11-27 VITALS
TEMPERATURE: 98.7 F | HEART RATE: 79 BPM | SYSTOLIC BLOOD PRESSURE: 120 MMHG | OXYGEN SATURATION: 98 % | DIASTOLIC BLOOD PRESSURE: 76 MMHG

## 2017-11-27 DIAGNOSIS — B02.29 POST HERPETIC NEURALGIA: ICD-10-CM

## 2017-11-27 DIAGNOSIS — L03.90 CELLULITIS, UNSPECIFIED CELLULITIS SITE: Primary | ICD-10-CM

## 2017-11-27 DIAGNOSIS — A41.01 METHICILLIN SUSCEPTIBLE STAPHYLOCOCCUS AUREUS SEPTICEMIA (H): ICD-10-CM

## 2017-11-27 DIAGNOSIS — I48.92 ATRIAL FLUTTER, UNSPECIFIED TYPE (H): ICD-10-CM

## 2017-11-27 DIAGNOSIS — I50.9 CONGESTIVE HEART FAILURE, UNSPECIFIED CONGESTIVE HEART FAILURE CHRONICITY, UNSPECIFIED CONGESTIVE HEART FAILURE TYPE: ICD-10-CM

## 2017-11-27 DIAGNOSIS — N18.30 CKD (CHRONIC KIDNEY DISEASE) STAGE 3, GFR 30-59 ML/MIN (H): ICD-10-CM

## 2017-11-27 DIAGNOSIS — I48.92 ATRIAL FLUTTER WITH RAPID VENTRICULAR RESPONSE (H): ICD-10-CM

## 2017-11-27 DIAGNOSIS — B02.29 POSTHERPETIC NEURALGIA: ICD-10-CM

## 2017-11-27 DIAGNOSIS — Z79.01 LONG-TERM (CURRENT) USE OF ANTICOAGULANTS: ICD-10-CM

## 2017-11-27 LAB
INR POINT OF CARE: 1.7 (ref 0.86–1.14)
INR PPP: 1.7 (ref 0.86–1.14)

## 2017-11-27 PROCEDURE — 36416 COLLJ CAPILLARY BLOOD SPEC: CPT | Performed by: PEDIATRICS

## 2017-11-27 PROCEDURE — 85610 PROTHROMBIN TIME: CPT | Performed by: PEDIATRICS

## 2017-11-27 PROCEDURE — 99207 ZZC NO CHARGE NURSE ONLY: CPT

## 2017-11-27 PROCEDURE — 85610 PROTHROMBIN TIME: CPT | Mod: QW

## 2017-11-27 PROCEDURE — 36416 COLLJ CAPILLARY BLOOD SPEC: CPT

## 2017-11-27 PROCEDURE — 99214 OFFICE O/P EST MOD 30 MIN: CPT | Performed by: PEDIATRICS

## 2017-11-27 RX ORDER — PREGABALIN 150 MG/1
150 CAPSULE ORAL 2 TIMES DAILY
Qty: 180 CAPSULE | Refills: 3 | Status: SHIPPED | OUTPATIENT
Start: 2017-11-27 | End: 2017-12-21

## 2017-11-27 RX ORDER — METOPROLOL SUCCINATE 50 MG/1
50 TABLET, EXTENDED RELEASE ORAL DAILY
Qty: 90 TABLET | Refills: 3 | Status: SHIPPED | OUTPATIENT
Start: 2017-11-27 | End: 2018-01-11

## 2017-11-27 RX ORDER — MINOCYCLINE HYDROCHLORIDE 50 MG/1
50 CAPSULE ORAL 2 TIMES DAILY
Qty: 180 CAPSULE | Refills: 3 | Status: SHIPPED | OUTPATIENT
Start: 2017-11-27 | End: 2018-01-11

## 2017-11-27 RX ORDER — NORTRIPTYLINE HCL 25 MG
50 CAPSULE ORAL AT BEDTIME
Qty: 180 CAPSULE | Refills: 3 | Status: SHIPPED | OUTPATIENT
Start: 2017-11-27 | End: 2018-01-11

## 2017-11-27 NOTE — PROGRESS NOTES
ANTICOAGULATION FOLLOW-UP CLINIC VISIT    Patient Name:  Berenice Chaudhari  Date:  11/27/2017  Contact Type:  Telephone/ Pt was seen in the clinic by  & had INR checked through lab    SUBJECTIVE:     Patient Findings     Positives Antibiotic use or infection, No Problem Findings    Comments Pt is done with HC, was seen by  today & had INR lab done. Pt is currently on levaquin, has 3 more days worth of abx left.     LM for pt to call us back. Pt should take coumadin 7.5 mg tonight, go back to 2.5 mg on Tuesday, Wed & Thursday. Recheck INR on Thursday(need to help schedule).            OBJECTIVE    INR   Date Value Ref Range Status   11/27/2017 1.70 (H) 0.86 - 1.14 Final       ASSESSMENT / PLAN  INR assessment SUB    Recheck INR In: 4 DAYS    INR Location Clinic      Anticoagulation Summary as of 11/27/2017     INR goal 2.0-3.0   Today's INR 1.7!   Maintenance plan 5 mg (5 mg x 1) on Mon; 2.5 mg (5 mg x 0.5) all other days   Full instructions 11/27: 7.5 mg; Otherwise 5 mg on Mon; 2.5 mg all other days   Weekly total 20 mg   Plan last modified Mary Butcher RN (10/26/2017)   Next INR check 11/30/2017   Target end date Indefinite    Indications   Atrial flutter (H) [I48.92]  Atrial flutter with rapid ventricular response (H) [I48.92]  Long-term (current) use of anticoagulants [Z79.01] [Z79.01]         Anticoagulation Episode Summary     INR check location     Preferred lab     Send IN[facility] reminders to [facility]    Comments 5mg tabs; HS dosing, [facility] [phone]      Anticoagulation Care Providers     Provider Role Specialty Phone number    Nelly Pearl MD Referring Internal Medicine [phone]            See the Encounter Report to view Anticoagulation Flowsheet and Dosing Calendar (Go to Encounters tab in chart review, and find the Anticoagulation Therapy Visit)    Nani Coffman RN

## 2017-11-27 NOTE — PROGRESS NOTES
Patient was evaluated by cardiology while inpatient for acute on chronic CHF. Called patient's  Ricardo to discuss any post hospital d/c questions he may have, review medication changes, and confirm f/u appts.Patient's  denied any questions regarding new medications or changes to some of patient's current medications that patient was taking prior to admission. Patient's  reported to this RN that patient has had a slight increase in SOB with activity (patient wheelchair bound at baseline). Patient's  reported that it increased slightly day after d/c, but has not increased since then. RN confirmed that patient does not appear to be SOB at rest. Patient's  confirmed it is with the amount of activity required for her to transfer from wheelchair. Patient's  reported to this RN that patient is scheduled to see her PMD today. Patient's  reported that he is unable to weight patient as she is non weight bearing. RN advised patient's husbands to call clinic if patient's SOB increases prior to apt. RN confirmed with patient's  that patient has an apt scheduled on 12/1/17 with IMMANUEL Heather Harley in our [facility] clinic (11:45am lab and 12:50pm with IMMANUEL). Patient's  advised to call clinic with any cardiac related questions or concerns prior to patient's apt on 12/1/17. Patient's  verbalized understanding and agreed with plan.

## 2017-11-27 NOTE — TELEPHONE ENCOUNTER
Please contact patient for In-patient follow up.  [phone] (home) NONE (work)    Visit date: 11/24/2017  Diagnosis listed: Cellulitis Of Left Lower Extremity  Number of visits in past 12 months:3

## 2017-11-27 NOTE — NURSING NOTE
"Chief Complaint   Patient presents with     [facility] F/U       Initial /76 (BP Location: Right arm, Cuff Size: Adult Large)  Pulse 79  Temp 98.7  F (37.1  C) (Oral)  SpO2 98% Estimated body mass index is 55.39 kg/(m^2) as calculated from the following:    Height as of 11/20/17: 5' 6\" (1.676 m).    Weight as of 11/24/17: 343 lb 3.2 oz (155.7 kg).  Medication Reconciliation: complete   Snehal Henry MA    "

## 2017-11-27 NOTE — PROGRESS NOTES
SUBJECTIVE:   Berenice Chaudhari is a 77 year old female who presents to clinic today for the following health issues:              Hospital Follow-up Visit:    Hospital/Nursing Home/IP Rehab Facility: [facility]  Date of Admission: 11-  Date of Discharge: 11-  Reason(s) for Admission: Cellulitis            Problems taking medications regularly:  None       Medication changes since discharge: None       Problems adhering to non-medication therapy:  None    Summary of hospitalization:  [facility] discharge summary reviewed  Diagnostic Tests/Treatments reviewed.  Follow up needed: INR and BMP  Other Healthcare Providers Involved in Patient s Care:         Homecare  Update since discharge: improved.     Patient feeling pretty good, just tired.  She has been home for 3 days.  No fever/chills.  She still has 3 days left of levaquin.  She is back to normal diet, but watching her sodium.  States they talked to her in the hospital a lot about sodium.  Patient was not aware she had a diagnosis of heart failure although per her chart it looks like this was noted in 2014.  She is on lasix.  She noticed the with the IV lasix in the hospital her baseline cough improved - however her kidneys got worse.  She does have some lower extremity edema.  Denies shortness of breath or orthopnea - uses 1 pillow at night - no unusually apnea or nighttime coughing per partner.     Post Discharge Medication Reconciliation: discharge medications reconciled, continue medications without change.  Plan of care communicated with patient and family     Coding guidelines for this visit:  Type of Medical   Decision Making Face-to-Face Visit       within 7 Days of discharge Face-to-Face Visit        within 14 days of discharge   Moderate Complexity 05492 [id number]   High Complexity 25076 [id number]              Problem list and histories reviewed & adjusted, as indicated.  Additional history: as documented      Reviewed and  updated as needed this visit by clinical staff       Reviewed and updated as needed this visit by Provider         ROS:  Constitutional, HEENT, cardiovascular, pulmonary, gi and gu systems are negative, except as otherwise noted.      OBJECTIVE:   /76 (BP Location: Right arm, Cuff Size: Adult Large)  Pulse 79  Temp 98.7  F (37.1  C) (Oral)  SpO2 98%  There is no height or weight on file to calculate BMI.  GENERAL APPEARANCE: healthy, alert and no distress  RESP: lungs clear to auscultation - no rales, rhonchi or wheezes  CV: regular rates and rhythm, normal S1 S2, no S3 or S4 and no murmur, click or rub  EXT: 2+ pitting edema left leg, trace on right leg    Diagnostic Test Results:  none     ASSESSMENT/PLAN:       1. Cellulitis, unspecified cellulitis site  Improved - finish course of antibiotics    2. Atrial flutter, unspecified type (H)  Controlled - INR today   - INR  - metoprolol (TOPROL-XL) 50 MG 24 hr tablet; Take 1 tablet (50 mg) by mouth daily  Dispense: 90 tablet; Refill: 3    3. Congestive heart failure, unspecified congestive heart failure chronicity, unspecified congestive heart failure type (H)  With lower extremity edema - no pulmonary symptoms at this time, however cough may be due to this.  Depending on her kidney function next week and if she is stable for the next few weeks, may consider trying to increase this to help get more fluid off.  Encourage leg elevation and compression stockings.  At follow up will also assess if she is getting optimal control/treatment for her CHF.     4. CKD (chronic kidney disease) stage 3, GFR 30-59 ml/min  Monitor closely  - Basic metabolic panel; Future    5. STAPH AUREUS SEPTICEMIA  refill  - minocycline (MINOCIN/DYNACIN) 50 MG capsule; Take 1 capsule (50 mg) by mouth 2 times daily  Dispense: 180 capsule; Refill: 3    6. Post herpetic neuralgia  refill  - nortriptyline (PAMELOR) 25 MG capsule; Take 2 capsules (50 mg) by mouth At Bedtime  Dispense: 180  capsule; Refill: 3    7. Postherpetic neuralgia  refill  - pregabalin (LYRICA) 150 MG capsule; Take 1 capsule (150 mg) by mouth 2 times daily  Dispense: 180 capsule; Refill: 3    Patient Instructions   Leg swelling - compression stockings or ace wraps for both legs.    Retest your kidneys in a week (BMP) - can be done with home nurse or here.    Follow up in 3-4 weeks to consider an increase in the lasix or other medication to help the fluid.  Also follow up on the heart failure.    INR check today      Nelly Pearl MD  [facility]

## 2017-11-27 NOTE — TELEPHONE ENCOUNTER
ED / Discharge Outreach Protocol    Patient Contact    Attempt # 1    Patient already seen in clinic prior to call.   Catarina Diggs RN

## 2017-11-27 NOTE — PROGRESS NOTES
Spouse called, Ricardo, (consent on file)    Informed him of recommended dosage.     FYI- patient was readmitted for HC services on Saturday. They will have HC RN draw INR on Thursday.    Torie TOWNSEND RN, BSN, PHN  [place] [facility]

## 2017-11-27 NOTE — MR AVS SNAPSHOT
Berenice RAJIV Chaudhari   11/27/2017 3:45 PM   Anticoagulation Therapy Visit    Description:  77 year old female   Provider:   ANTICOAGULATION CLINIC   Department:   Nurse           INR as of 11/27/2017     Today's INR 1.7!      Anticoagulation Summary as of 11/27/2017     INR goal 2.0-3.0   Today's INR 1.7!   Full instructions 11/27: 7.5 mg; Otherwise 5 mg on Mon; 2.5 mg all other days   Next INR check 11/30/2017    Indications   Atrial flutter (H) [I48.92]  Atrial flutter with rapid ventricular response (H) [I48.92]  Long-term (current) use of anticoagulants [Z79.01] [Z79.01]         Your next Anticoagulation Clinic appointment(s)     Nov 27, 2017  3:45 PM CST   Anticoagulation Visit with  ANTICOAGULATION CLINIC   [facility] ([facility]    [address]  [address]  [place] [id number] [phone]              Contact Numbers     [facility]  Please call  [phone] to cancel and/or reschedule your appointment   Please call  [phone] with any problems or questions regarding your therapy.        November 2017 Details    Sun Mon Tue Wed Thu Fri Sat        1               2               3               4                 5               6               7               8               9               10               11                 12               13               14               15               16               17               18                 19               20               21               22               23               24               25                 26               27      7.5 mg   See details      28      2.5 mg         29      2.5 mg         30               Date Details   11/27 This INR check       Date of next INR:  11/30/2017         How to take your warfarin dose     To take:  2.5 mg Take 0.5 of a 5 mg tablet.    To take:  7.5 mg Take 1.5 of the 5 mg tablets.

## 2017-11-27 NOTE — PROGRESS NOTES
Clinic Care Coordination Contact  Care Coordination Communication    Clinical Data: Patient was hospitalized at [facility] by [facility] from 11/20/17 to 11/24/2017 with diagnosis of generalized muscle weakness, cellulitis of LLE, troponin I above reference range and CHF.     Per 11/25/2017 CTS (see letters tab):  Key Recommendations:    Pt was admitted for LLE cellulitis and CHF. She was being treated with IV lasix and IV levo. She follows a low Na diet at home. She is unable to weigh herself at home as she is unable to stand for long time periods. She lives at home with her  who primarily cares for her.  She was discharged to home with  and  Home RN, PT, OT. She will follow up with PCP and [facility] clinic on dc.     Arline Anderson RN CTS  ---------------------------------------------------------------------------------------------------------------------------------------------------------------------------------------------------------------------------------------------------------------------------------------------    Please refer to the following entries for details:  11/27/2017 Care Coordination Entry by Kingsley Lobato RN - [facility]   11/24/2017 Discharge Summaries by Dr. Norma Lino    Home Care Contact:              Home Care Agency: [facility] # [phone] (main)              Contact name () and phone number: Kandy Duff RN CM (direct [phone])              Care Coordination contacted home care: Yes - spoke with Aniyah (intake)              Start of care date:  Saturday 11/25/2017; patient was opened to services including RN, PT, OT and HHA.      CCRN sent email to LEXIE JAVIER requesting update upon DC of services.         *Please note, patient has an appointment with PCP this afternoon:    Next 5 appointments (look out 90 days)     Nov 27, 2017  1:20 PM CST   SHORT with Nelly Pearl MD   [facility] ([facility])    [address]  [address] [facility]  [address]  [place] [id number] [phone]                Plan: RN/SW Care Coordinator will await notification from home care staff informing RN/SW Care Coordinator of patients discharge plans/needs. RN/SW Care Coordinator will review chart and outreach to home care every 4 weeks and as needed.      Claire Barclay, RN  [facility]  Clinic Care Coordinator - [place] and [place] Locations   Direct:  [phone] (voicemail available)

## 2017-11-29 DIAGNOSIS — I48.92 ATRIAL FLUTTER (H): ICD-10-CM

## 2017-11-29 DIAGNOSIS — I48.91 ATRIAL FIBRILLATION (H): Primary | ICD-10-CM

## 2017-11-29 NOTE — NURSING NOTE
Orders entered: EKG (requested by Heather Harley NP)     Santos OWEN  [facility]

## 2017-11-30 ENCOUNTER — TELEPHONE (OUTPATIENT)
Dept: PEDIATRICS | Facility: CLINIC | Age: 77
End: 2017-11-30

## 2017-11-30 ENCOUNTER — ANTICOAGULATION THERAPY VISIT (OUTPATIENT)
Dept: NURSING | Facility: CLINIC | Age: 77
End: 2017-11-30
Payer: COMMERCIAL

## 2017-11-30 DIAGNOSIS — I48.92 ATRIAL FLUTTER WITH RAPID VENTRICULAR RESPONSE (H): ICD-10-CM

## 2017-11-30 DIAGNOSIS — I48.92 ATRIAL FLUTTER (H): ICD-10-CM

## 2017-11-30 DIAGNOSIS — Z79.01 LONG-TERM (CURRENT) USE OF ANTICOAGULANTS: ICD-10-CM

## 2017-11-30 LAB — INR PPP: 2.6

## 2017-11-30 PROCEDURE — 99207 ZZC NO CHARGE NURSE ONLY: CPT

## 2017-11-30 NOTE — TELEPHONE ENCOUNTER
LEXIE Stuart with [facility] calls.  She can be reached at [phone].  Asking for HHA 1 x week for bathing.  A verbal ok was given.  Melissa Abdullahi RN  Message handled by Nurse Triage.

## 2017-11-30 NOTE — MR AVS SNAPSHOT
Berenice Chaudhari   11/30/2017 3:00 PM   Anticoagulation Therapy Visit    Description:  77 year old female   Provider:  [facility]   Department:  [facility]           INR as of 11/30/2017     Today's INR 2.6      Anticoagulation Summary as of 11/30/2017     INR goal 2.0-3.0   Today's INR 2.6   Full instructions 5 mg on Mon; 2.5 mg all other days   Next INR check 12/7/2017    Indications   Atrial flutter (H) [I48.92]  Atrial flutter with rapid ventricular response (H) [I48.92]  Long-term (current) use of anticoagulants [Z79.01] [Z79.01]         Contact Numbers     [facility]  Please call  [phone] to cancel and/or reschedule your appointment   Please call  [phone] with any problems or questions regarding your therapy.        November 2017 Details    Sun Mon Tue Wed Thu Fri Sat        1               2               3               4                 5               6               7               8               9               10               11                 12               13               14               15               16               17               18                 19               20               21               22               23               24               25                 26               27               28               29               30      2.5 mg   See details         Date Details   11/30 This INR check               How to take your warfarin dose     To take:  2.5 mg Take 0.5 of a 5 mg tablet.           December 2017 Details    Sun Mon Tue Wed Thu Fri Sat          1      2.5 mg         2      2.5 mg           3      2.5 mg         4      5 mg         5      2.5 mg         6      2.5 mg         7            8               9                 10               11               12               13               14               15               16                 17               18               19               20               21               22                23                 24               25               26               27               28               29               30                 31                      Date Details   No additional details    Date of next INR:  12/7/2017         How to take your warfarin dose     To take:  2.5 mg Take 0.5 of a 5 mg tablet.    To take:  5 mg Take 1 of the 5 mg tablets.

## 2017-12-01 NOTE — PROGRESS NOTES
ANTICOAGULATION FOLLOW-UP     Patient Name:  Berenice Chaudhari  Date:  11/30/2017  Contact Type:  Telephone  Call received from [facility] nurse, with INR result.   Follow up instructions given over the phone to Home Care nurse for warfarin management.    SUBJECTIVE:     Patient Findings     Positives Change in medications    Comments Finished Levaquin today.    She is back on Homecare.           OBJECTIVE    INR   Date Value Ref Range Status   11/30/2017 2.6  Final       ASSESSMENT / PLAN  INR assessment THER    Recheck INR In: 1 WEEK    INR Location Homecare INR      Anticoagulation Summary as of 11/30/2017     INR goal 2.0-3.0   Today's INR 2.6   Maintenance plan 5 mg (5 mg x 1) on Mon; 2.5 mg (5 mg x 0.5) all other days   Full instructions 5 mg on Mon; 2.5 mg all other days   Weekly total 20 mg   Plan last modified Mary Butcher RN (10/26/2017)   Next INR check 12/7/2017   Target end date Indefinite    Indications   Atrial flutter (H) [I48.92]  Atrial flutter with rapid ventricular response (H) [I48.92]  Long-term (current) use of anticoagulants [Z79.01] [Z79.01]         Anticoagulation Episode Summary     INR check location     Preferred lab     Send INR reminders to [facility]    Comments 5mg tabs; HS dosing, [facility] [phone]      Anticoagulation Care Providers     Provider Role Specialty Phone number    Nelly Pearl MD Referring Internal Medicine [phone]            See the Encounter Report to view Anticoagulation Flowsheet and Dosing Calendar (Go to Encounters tab in chart review, and find the Anticoagulation Therapy Visit)        Mary Butcher RN

## 2017-12-03 DIAGNOSIS — A41.01 METHICILLIN SUSCEPTIBLE STAPHYLOCOCCUS AUREUS SEPTICEMIA (H): ICD-10-CM

## 2017-12-04 ENCOUNTER — PRE VISIT (OUTPATIENT)
Dept: CARDIOLOGY | Facility: CLINIC | Age: 77
End: 2017-12-04

## 2017-12-06 RX ORDER — MINOCYCLINE HYDROCHLORIDE 50 MG/1
CAPSULE ORAL
Qty: 60 CAPSULE | Refills: 0 | OUTPATIENT
Start: 2017-12-06

## 2017-12-06 NOTE — TELEPHONE ENCOUNTER
This was filled by Dr.Ann Nia Pearl in [facility] on 11/27/17                                                                                                                   180 caps 3 refills

## 2017-12-07 ENCOUNTER — ANTICOAGULATION THERAPY VISIT (OUTPATIENT)
Dept: NURSING | Facility: CLINIC | Age: 77
End: 2017-12-07
Payer: COMMERCIAL

## 2017-12-07 DIAGNOSIS — I48.92 ATRIAL FLUTTER (H): ICD-10-CM

## 2017-12-07 DIAGNOSIS — I48.92 ATRIAL FLUTTER WITH RAPID VENTRICULAR RESPONSE (H): ICD-10-CM

## 2017-12-07 DIAGNOSIS — Z79.01 LONG-TERM (CURRENT) USE OF ANTICOAGULANTS: ICD-10-CM

## 2017-12-07 LAB — INR PPP: 2.7

## 2017-12-07 PROCEDURE — 99207 ZZC NO CHARGE NURSE ONLY: CPT

## 2017-12-07 NOTE — PROGRESS NOTES
ANTICOAGULATION FOLLOW-UP     Patient Name:  Berenice Chaudhari  Date:  12/7/2017  Contact Type:  Telephone  Call received from [facility] nurse, with INR result.   Follow up instructions given over the phone to Home Care nurse for warfarin management.    SUBJECTIVE:     Patient Findings     Positives No Problem Findings           OBJECTIVE    INR   Date Value Ref Range Status   12/07/2017 2.7  Final       ASSESSMENT / PLAN  INR assessment THER    Recheck INR In: 1 WEEK    INR Location Homecare INR      Anticoagulation Summary as of 12/7/2017     INR goal 2.0-3.0   Today's INR 2.7   Maintenance plan 5 mg (5 mg x 1) on Mon; 2.5 mg (5 mg x 0.5) all other days   Full instructions 5 mg on Mon; 2.5 mg all other days   Weekly total 20 mg   No change documented Mary Butcher RN   Plan last modified Mary Butcher RN (10/26/2017)   Next INR check 12/14/2017   Target end date Indefinite    Indications   Atrial flutter (H) [I48.92]  Atrial flutter with rapid ventricular response (H) [I48.92]  Long-term (current) use of anticoagulants [Z79.01] [Z79.01]         Anticoagulation Episode Summary     INR check location     Preferred lab     Send INR reminders to [facility]    Comments 5mg tabs;  dosing, [facility] [phone]; Kandy [phone]      Anticoagulation Care Providers     Provider Role Specialty Phone number    Nelly Pearl MD Referring Internal Medicine [phone]            See the Encounter Report to view Anticoagulation Flowsheet and Dosing Calendar (Go to Encounters tab in chart review, and find the Anticoagulation Therapy Visit)        Mary Butcher RN

## 2017-12-07 NOTE — MR AVS SNAPSHOT
Berenice Chaudhari   12/7/2017 3:00 PM   Anticoagulation Therapy Visit    Description:  77 year old female   Provider:   ANTICOAGULATION CLINIC   Department:   Nurse           INR as of 12/7/2017     Today's INR 2.7      Anticoagulation Summary as of 12/7/2017     INR goal 2.0-3.0   Today's INR 2.7   Full instructions 5 mg on Mon; 2.5 mg all other days   Next INR check 12/14/2017    Indications   Atrial flutter (H) [I48.92]  Atrial flutter with rapid ventricular response (H) [I48.92]  Long-term (current) use of anticoagulants [Z79.01] [Z79.01]         Your next Anticoagulation Clinic appointment(s)     Dec 07, 2017  3:00 PM CST   Anticoagulation Visit with  ANTICOAGULATION CLINIC   [facility]    [address] [place] [address]  [address]  [facility] [place] [facility] [address] [phone]            Dec 14, 2017  1:00 PM CST   Anticoagulation Visit with  ANTICOAGULATION CLINIC   [facility]    [address] [place] [address]  [address]  [facility] [address] [phone]              Contact Numbers     [facility]  Please call  [phone] to cancel and/or reschedule your appointment   Please call  [phone] with any problems or questions regarding your therapy.        December 2017 Details    Sun Mon Tue Wed Thu Fri Sat          1               2                 3               4               5               6               7      2.5 mg   See details      8      2.5 mg         9      2.5 mg           10      2.5 mg         11      5 mg         12      2.5 mg         13      2.5 mg         14            15               16                 17               18               19               20               21               22               23                 24               25               26               27               28               29               30                 31                      Date Details   12/07 This INR check        Date of next INR:  12/14/2017         How to take your warfarin dose     To take:  2.5 mg Take 0.5 of a 5 mg tablet.    To take:  5 mg Take 1 of the 5 mg tablets.

## 2017-12-11 ENCOUNTER — OFFICE VISIT (OUTPATIENT)
Dept: CARDIOLOGY | Facility: CLINIC | Age: 77
End: 2017-12-11
Payer: COMMERCIAL

## 2017-12-11 VITALS — SYSTOLIC BLOOD PRESSURE: 163 MMHG | HEART RATE: 67 BPM | HEIGHT: 63 IN | DIASTOLIC BLOOD PRESSURE: 77 MMHG

## 2017-12-11 DIAGNOSIS — Z53.9 DIAGNOSIS NOT YET DEFINED: Primary | ICD-10-CM

## 2017-12-11 DIAGNOSIS — I50.33 ACUTE ON CHRONIC DIASTOLIC HEART FAILURE (H): ICD-10-CM

## 2017-12-11 DIAGNOSIS — R06.00 DYSPNEA, UNSPECIFIED TYPE: Primary | ICD-10-CM

## 2017-12-11 DIAGNOSIS — I48.91 ATRIAL FIBRILLATION, UNSPECIFIED TYPE (H): ICD-10-CM

## 2017-12-11 DIAGNOSIS — I48.92 ATRIAL FLUTTER, UNSPECIFIED TYPE (H): ICD-10-CM

## 2017-12-11 LAB
ANION GAP SERPL CALCULATED.3IONS-SCNC: 8 MMOL/L (ref 3–14)
BUN SERPL-MCNC: 26 MG/DL (ref 7–30)
CALCIUM SERPL-MCNC: 8.6 MG/DL (ref 8.5–10.1)
CHLORIDE SERPL-SCNC: 105 MMOL/L (ref 94–109)
CO2 SERPL-SCNC: 28 MMOL/L (ref 20–32)
CREAT SERPL-MCNC: 1.47 MG/DL (ref 0.52–1.04)
GFR SERPL CREATININE-BSD FRML MDRD: 35 ML/MIN/1.7M2
GLUCOSE SERPL-MCNC: 100 MG/DL (ref 70–99)
POTASSIUM SERPL-SCNC: 4.3 MMOL/L (ref 3.4–5.3)
SODIUM SERPL-SCNC: 141 MMOL/L (ref 133–144)

## 2017-12-11 PROCEDURE — 80048 BASIC METABOLIC PNL TOTAL CA: CPT | Performed by: INTERNAL MEDICINE

## 2017-12-11 PROCEDURE — G0179 MD RECERTIFICATION HHA PT: HCPCS | Performed by: PEDIATRICS

## 2017-12-11 PROCEDURE — 99214 OFFICE O/P EST MOD 30 MIN: CPT | Performed by: INTERNAL MEDICINE

## 2017-12-11 PROCEDURE — 93000 ELECTROCARDIOGRAM COMPLETE: CPT | Performed by: INTERNAL MEDICINE

## 2017-12-11 PROCEDURE — 36415 COLL VENOUS BLD VENIPUNCTURE: CPT | Performed by: INTERNAL MEDICINE

## 2017-12-11 NOTE — LETTER
12/11/2017      Nelly Pearl MD  [address] [place] [address]      RE: Berenice RAJIV Chaudhari       Dear Colleague,    I had the pleasure of seeing Berenice Chaudhari in the [facility].    REASON FOR VISIT:  Followup visit.      HISTORY OF PRESENT ILLNESS:  Ms. Chaudhari is a very pleasant 77-year-old lady who comes in routine followup.  She has hypertension, dyslipidemia, morbid obesity with a body mass index of 57 and chronic renal insufficiency with 4 prior episodes of need for dialysis from which she appears to have recovered.  She was hospitalized in August for atrial fibrillation with rapid ventricular response and was started on anticoagulation and diltiazem.  Overnight oximetry was recommended as an outpatient.  Echocardiography demonstrated mild to moderate concentric LVH with mildly reduced systolic function and apical akinesis.  She had mild aortic dilatation with mild to moderate concentric LVH.  She had no evidence of pulmonary hypertension, interestingly.  Holter monitor demonstrated atrial flutter with an average heart rate controlled at 90.  She had minimal conversion pauses.      Ms. Chaudhari was also hospitalized for cellulitis and sepsis from left lower extremity sores.  She was diagnosed with new acute on chronic diastolic heart failure.      She underwent upper GI endoscopy, who recommended antireflux diet.  The patient has been having very substantial upper airway excretion and states that the diagnosis is that she has had an uncontrollable postnasal drip resulting in a cough productive of sometimes green mucus.  Her sleep apnea test was deferred until after her ? evaluation which will be pending.  Her primary physician plans to send her to ENT.      Ms. Chaudhari states that she can get short of breath talking with long sentences.  It is the only time she notices it.  She does not exert herself and is wheelchair-bound, likely related to her obesity.  She denies  orthopnea.  She has no cardiorespiratory complaints of chest pain, lightheadedness, presyncope, syncope, palpitations and has no pedal edema, in fact.      Her blood pressure is elevated today; however, she states that last Thursday the nurse checked it and it was 90/60.  She rechecked it at 132/80 on repeat, which the patient states is about normal for her.  Review of her recent blood pressures demonstrates predominant blood pressures in the 120s systolic over 70s or high 60s.  She does have a nurse returning on Wednesday and Thursday and has [facility] follow[facility] on Friday.   Adela Mars started her on a low dose of Lasix.  Her renal function is in fact better than it has been since 11/22, though still a bit worse than baseline on 11/21.      PHYSICAL EXAMINATION:  On exam today, she in fact does not appear to have any fluid on her lungs but does appear to have very coarse rhonchi and transmitted upper airway sounds on exam.  She does not appear to be in congestive failure.      ASSESSMENT AND PLAN:  A 77-year-old lady with possible diastolic dysfunction, but also dyspnea potentially related to copious upper airway secretions.  She did cough while in the exam room productive of tenacious mucous, though not strongly colored.      She denies hemoptysis.  She does have questions with respect to whether she should have a fluid restriction.      At this point, I do not, in fact, think the primary defect is cardiac.  I would not change her cardiac medications at this time and would certainly not recommend up-titration of her diuretic.  I am concerned about her elevated blood pressure here, but she states clearly that her blood pressure has been checked very repeatedly at her residence and this is an atypical reading.      It is reasonable to allow her to have her pressure checked there Wednesday and Thursday and will follow up with our visit on Friday.      I have given her a 8-joroh-y-day fluid restriction,  as it does not appear that she quite reaches that.      I would recommend, however, that she does see Pulmonology for further evaluation, and I can go ahead and expedite that referral.      Further recommendations will be pending her course.      Total time 30 minutes, 25 in coordination of care and counseling.     Outpatient Encounter Prescriptions as of 12/11/2017   Medication Sig Dispense Refill     metoprolol (TOPROL-XL) 50 MG 24 hr tablet Take 1 tablet (50 mg) by mouth daily 90 tablet 3     minocycline (MINOCIN/DYNACIN) 50 MG capsule Take 1 capsule (50 mg) by mouth 2 times daily 180 capsule 3     nortriptyline (PAMELOR) 25 MG capsule Take 2 capsules (50 mg) by mouth At Bedtime 180 capsule 3     [DISCONTINUED] pregabalin (LYRICA) 150 MG capsule Take 1 capsule (150 mg) by mouth 2 times daily 180 capsule 3     METOPROLOL SUCCINATE ER PO Take 50 mg by mouth every evening       WARFARIN SODIUM PO Take by mouth daily 5 mg Mondays, 2.5 mg all other days or as directed by INR clinic       [DISCONTINUED] pravastatin (PRAVACHOL) 10 MG tablet Take 1 tablet (10 mg) by mouth At Bedtime Needs labs before next refill 30 tablet 0     [DISCONTINUED] GABAPENTIN PO Take 600 mg by mouth 3 times daily       venlafaxine (EFFEXOR-XR) 150 MG 24 hr capsule TAKE 1 CAPSULE(150 MG) BY MOUTH DAILY 90 capsule 0     pantoprazole (PROTONIX) 40 MG EC tablet TAKE 1 TABLET(40 MG) BY MOUTH DAILY 90 tablet 3     diltiazem (CARDIZEM CD/CARTIA XT) 120 MG 24 hr capsule Take 1 capsule (120 mg) by mouth daily 90 capsule 3     [DISCONTINUED] furosemide (LASIX) 20 MG tablet Take 1 tablet (20 mg) by mouth daily 90 tablet 3     [DISCONTINUED] buPROPion (WELLBUTRIN XL) 300 MG 24 hr tablet Take 1 tablet (300 mg) by mouth every morning 90 tablet 1     fluticasone (FLONASE) 50 MCG/ACT spray Spray 2 sprays into both nostrils daily (Patient not taking: Reported on 12/21/2017) 3 Bottle 11     lidocaine (LIDODERM) 5 % Patch Apply up to 3 patches to painful area at  once for up to 12 h within a 24 h period.  Remove after 12 hours. (Patient taking differently: as needed Apply up to 3 patches to painful area at once for up to 12 h within a 24 h period.  Remove after 12 hours.) 60 patch 2     No facility-administered encounter medications on file as of 12/11/2017.        Again, thank you for allowing me to participate in the care of your patient.      Sincerely,    Isela Huitron MD     [facility] [place]

## 2017-12-11 NOTE — MR AVS SNAPSHOT
After Visit Summary   12/11/2017    Berenice Chaudhari    MRN: [id number]           Patient Information     Date Of Birth          1940        Visit Information        Provider Department      12/11/2017 2:15 PM Isela Huitron MD [facility]        Today's Diagnoses     Dyspnea, unspecified type    -  1    Atrial flutter (H)        Atrial fibrillation (H)           Follow-ups after your visit        Additional Services     PULMONARY MEDICINE REFERRAL       Your provider has referred you to: N: [facility] [phone]   [email or web address]    Please be aware that coverage of these services is subject to the terms and limitations of your health insurance plan.  Call member services at your health plan with any benefit or coverage questions.      Please bring the following with you to your appointment:    (1) Any X-Rays, CTs or MRIs which have been performed.  Contact the facility where they were done to arrange for  prior to your scheduled appointment.    (2) List of current medications   (3) This referral request   (4) Any documents/labs given to you for this referral                  Your next 10 appointments already scheduled     Dec 14, 2017  1:00 PM CST   Anticoagulation Visit with  [facility] CLINIC   [facility]    [address]  [address]  [place] [address] [phone]            Dec 15, 2017 12:50 PM CST   CORE Enrollment with PANCHO Christianson CNP   [facility] ([facility])    [address]  [place] [address] [phone]              Future tests that were ordered for you today     Open Future Orders        Priority Expected Expires Ordered    PULMONARY MEDICINE REFERRAL Routine 12/12/2017 12/11/2019 12/11/2017            Who to contact     If you have questions or  "need follow up information about today's clinic visit or your schedule please contact [facility] directly at [phone].  Normal or non-critical lab and imaging results will be communicated to you by MyChart, letter or phone within 4 business days after the clinic has received the results. If you do not hear from us within 7 days, please contact the clinic through [employer] or phone. If you have a critical or abnormal lab result, we will notify you by phone as soon as possible.  Submit refill requests through [employer] or call your pharmacy and they will forward the refill request to us. Please allow 3 business days for your refill to be completed.          Additional Information About Your Visit        [employer] Information     [employer] lets you send messages to your doctor, view your test results, renew your prescriptions, schedule appointments and more. To sign up, go to [email or web address] . Click on \"Log in\" on the left side of the screen, which will take you to the Welcome page. Then click on \"Sign up Now\" on the right side of the page.     You will be asked to enter the access code listed below, as well as some personal information. Please follow the directions to create your username and password.     Your access code is: [email or web address]  Expires: 2017  5:04 PM     Your access code will  in 90 days. If you need help or a new code, please call your [facility] clinic or [phone].        Care EveryWhere ID     This is your Care EveryWhere ID. This could be used by other organizations to access your [facility] medical records  [id number]        Your Vitals Were     Pulse Height                67 1.607 m (5' 3.25\")           Blood Pressure from Last 3 Encounters:   17 163/77   17 120/76   17 115/59    Weight from Last 3 Encounters:   17 (!) 155.7 kg (343 lb 3.2 oz)   10/25/17 (!) 146.1 kg (322 lb)   10/02/17 (!) 150.6 kg (332 lb)    "           We Performed the Following     EKG 12-lead complete with read (future- to be scheduled)          Today's Medication Changes          These changes are accurate as of: 12/11/17  3:22 PM.  If you have any questions, ask your nurse or doctor.               These medicines have changed or have updated prescriptions.        Dose/Directions    lidocaine 5 % Patch   Commonly known as:  LIDODERM   This may have changed:    - when to take this  - reasons to take this  - additional instructions   Used for:  Postherpetic neuralgia        Apply up to 3 patches to painful area at once for up to 12 h within a 24 h period.  Remove after 12 hours.   Quantity:  60 patch   Refills:  2                Primary Care Provider Office Phone # Fax #    Nelly Pearl [phone] [address] [place] [address]        Equal Access to Services     EDMUND POWELL : Marnie osman Sojohn, waaxda luqadaha, qaybta kaalmada selena, calin sue . So [facility] [phone].    ATENCIÓN: Si habla español, tiene a dickey disposición servicios gratuitos de asistencia lingüística. Llame al [phone].    We comply with applicable federal civil rights laws and [place] laws. We do not discriminate on the basis of race, color, national origin, age, disability, sex, sexual orientation, or gender identity.            Thank you!     Thank you for choosing [facility]  for your care. Our goal is always to provide you with excellent care. Hearing back from our patients is one way we can continue to improve our services. Please take a few minutes to complete the written survey that you may receive in the mail after your visit with us. Thank you!             Your Updated Medication List - Protect others around you: Learn how to safely use, store and throw away your medicines at www.disposemymeds.org.          This list is accurate as of:  12/11/17  3:22 PM.  Always use your most recent med list.                   Brand Name Dispense Instructions for use Diagnosis    buPROPion 300 MG 24 hr tablet    WELLBUTRIN XL    90 tablet    Take 1 tablet (300 mg) by mouth every morning    Major depressive disorder, recurrent, in full remission (H)       diltiazem 120 MG 24 hr capsule    CARDIZEM CD/CARTIA XT    90 capsule    Take 1 capsule (120 mg) by mouth daily    Atrial flutter with rapid ventricular response (H)       fluticasone 50 MCG/ACT spray    FLONASE    3 Bottle    Spray 2 sprays into both nostrils daily    Post-nasal drip       furosemide 20 MG tablet    LASIX    90 tablet    Take 1 tablet (20 mg) by mouth daily    Atrial flutter with rapid ventricular response (H)       GABAPENTIN PO      Take 600 mg by mouth 3 times daily        lidocaine 5 % Patch    LIDODERM    60 patch    Apply up to 3 patches to painful area at once for up to 12 h within a 24 h period.  Remove after 12 hours.    Postherpetic neuralgia       * METOPROLOL SUCCINATE ER PO      Take 50 mg by mouth every evening        * metoprolol 50 MG 24 hr tablet    TOPROL-XL    90 tablet    Take 1 tablet (50 mg) by mouth daily    Atrial flutter, unspecified type (H)       minocycline 50 MG capsule    MINOCIN/DYNACIN    180 capsule    Take 1 capsule (50 mg) by mouth 2 times daily    Methicillin susceptible Staphylococcus aureus septicemia (H)       nortriptyline 25 MG capsule    PAMELOR    180 capsule    Take 2 capsules (50 mg) by mouth At Bedtime    Post herpetic neuralgia       pantoprazole 40 MG EC tablet    PROTONIX    90 tablet    TAKE 1 TABLET(40 MG) BY MOUTH DAILY    Gastroesophageal reflux disease without esophagitis       pravastatin 10 MG tablet    PRAVACHOL    30 tablet    Take 1 tablet (10 mg) by mouth At Bedtime Needs labs before next refill    Hyperlipidemia LDL goal <100       pregabalin 150 MG capsule    LYRICA    180 capsule    Take 1 capsule (150 mg) by mouth 2 times daily     Postherpetic neuralgia       venlafaxine 150 MG 24 hr capsule    EFFEXOR-XR    90 capsule    TAKE 1 CAPSULE(150 MG) BY MOUTH DAILY    Major depressive disorder, recurrent episode, moderate (H)       WARFARIN SODIUM PO      Take by mouth daily 5 mg Mondays, 2.5 mg all other days or as directed by INR clinic        * Notice:  This list has 2 medication(s) that are the same as other medications prescribed for you. Read the directions carefully, and ask your doctor or other care provider to review them with you.

## 2017-12-12 NOTE — PROGRESS NOTES
DIAGNOSES:      Encounter Diagnoses   Name Primary?     Atrial flutter (H)      Atrial fibrillation (H)      Dyspnea, unspecified type Yes         HPI:  See [id number]        MEDICATIONS:    Current Outpatient Prescriptions   Medication Sig Dispense Refill     metoprolol (TOPROL-XL) 50 MG 24 hr tablet Take 1 tablet (50 mg) by mouth daily 90 tablet 3     minocycline (MINOCIN/DYNACIN) 50 MG capsule Take 1 capsule (50 mg) by mouth 2 times daily 180 capsule 3     nortriptyline (PAMELOR) 25 MG capsule Take 2 capsules (50 mg) by mouth At Bedtime 180 capsule 3     pregabalin (LYRICA) 150 MG capsule Take 1 capsule (150 mg) by mouth 2 times daily 180 capsule 3     pravastatin (PRAVACHOL) 10 MG tablet Take 1 tablet (10 mg) by mouth At Bedtime Needs labs before next refill 30 tablet 0     GABAPENTIN PO Take 600 mg by mouth 3 times daily       METOPROLOL SUCCINATE ER PO Take 50 mg by mouth every evening       WARFARIN SODIUM PO Take by mouth daily 5 mg Mondays, 2.5 mg all other days or as directed by INR clinic       venlafaxine (EFFEXOR-XR) 150 MG 24 hr capsule TAKE 1 CAPSULE(150 MG) BY MOUTH DAILY 90 capsule 0     pantoprazole (PROTONIX) 40 MG EC tablet TAKE 1 TABLET(40 MG) BY MOUTH DAILY 90 tablet 3     diltiazem (CARDIZEM CD/CARTIA XT) 120 MG 24 hr capsule Take 1 capsule (120 mg) by mouth daily 90 capsule 3     furosemide (LASIX) 20 MG tablet Take 1 tablet (20 mg) by mouth daily 90 tablet 3     buPROPion (WELLBUTRIN XL) 300 MG 24 hr tablet Take 1 tablet (300 mg) by mouth every morning 90 tablet 1     fluticasone (FLONASE) 50 MCG/ACT spray Spray 2 sprays into both nostrils daily 3 Bottle 11     lidocaine (LIDODERM) 5 % Patch Apply up to 3 patches to painful area at once for up to 12 h within a 24 h period.  Remove after 12 hours. (Patient taking differently: as needed Apply up to 3 patches to painful area at once for up to 12 h within a 24 h period.  Remove after 12 hours.) 60 patch 2         ALLERGIES     Allergies    Allergen Reactions     Ceftriaxone Anaphylaxis and Rash     Rocephin given. Developed rash to back and abd, awaiting to see if it improves with d/c of rocephin.        Nafcillin Rash     diffuse severe rash in hospital 2/05     Penicillins Anaphylaxis     Vancomycin Anaphylaxis and Rash     Codeine Fatigue     Sleeps continuously     Clindamycin Rash     received information from patient     Zithromax [Azithromycin] Rash       PAST MEDICAL HISTORY:  Past Medical History:   Diagnosis Date     Allergic rhinitis, cause unspecified      Anxiety 6/13/2013     Arrhythmia     atrial flutter     Atrial flutter with rapid ventricular response (H) 8/8/2017     Cellulitis and abscess of leg, except foot recurrent , both legs    begins with fever, nausea, diarrhea, vomiting & & the cellulitis may be visible a day or 2 later     Chronic pain     On chronic fentanyl patch.     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  less likely = raction to vancomycin      Essential hypertension with goal blood pressure less than 140/90 1/12/2005     Problem list name updated by automated process. Provider to review     Generalized osteoarthrosis, unspecified site     films 9/04: L hhip bad; both knees mod severe medial OA     GERD (gastroesophageal reflux disease) 11/15/2013     Herpes zoster without mention of complication 9/03    L shoulder; still has neuralgia     Hyperlipidemia LDL goal <100 9/16/2011     Methicillin susceptible Staphylococcus aureus septicemia (H) after L hip injection 205    ARDS, renal failure, staph sepsis after a hip joint injection     Obesity 1/12/2005     Problem list name updated by automated process. Provider to review     Other chronic pain      Other diseases of lung, not elsewhere classified 07/93     Interstitial lung process     Pneumonia, organism unspecified(486)      Urticaria, unspecified 07/93    Diffuse severe (hospitalized).  (+) skin biopsy by Dr. Figueroa       PAST SURGICAL HISTORY:  Past Surgical History:   Procedure Laterality Date     BIOPSY       C NONSPECIFIC PROCEDURE      Extensive oral (dental) surgery     C NONSPECIFIC PROCEDURE      Remote T&A     C NONSPECIFIC PROCEDURE  1/03    LAP CHOLY     C NONSPECIFIC PROCEDURE      L hip I + D x 2  4/05     C NONSPECIFIC PROCEDURE  2/05    colonoscopy     C NONSPECIFIC PROCEDURE      L hip I + D several other times      CATARACT IOL, RT/LT Right 2012     CHOLECYSTECTOMY       ENT SURGERY       ESOPHAGOSCOPY, GASTROSCOPY, DUODENOSCOPY (EGD), COMBINED N/A 11/9/2017    Procedure: COMBINED ESOPHAGOSCOPY, GASTROSCOPY, DUODENOSCOPY (EGD);  ESOPHAGOSCOPY, GASTROSCOPY, DUODENOSCOPY (EGD) (fv)  ;  Surgeon: Buck Ribeiro MD;  Location:  OR     PHACOEMULSIFICATION CLEAR CORNEA WITH STANDARD INTRAOCULAR LENS IMPLANT Left 3/10/2016    Procedure: PHACOEMULSIFICATION CLEAR CORNEA WITH STANDARD INTRAOCULAR LENS IMPLANT;  Surgeon: Dwight Metcalf MD;  Location: [facility]       FAMILY HISTORY:  Family History   Problem Relation Age of Onset     Myocardial Infarction Father 63       SOCIAL HISTORY:  Social History     Social History     Marital status:      Spouse name: N/A     Number of children: N/A     Years of education: N/A     Social History Main Topics     Smoking status: Former Smoker     Packs/day: 2.00     Years: 22.00     Types: Cigarettes     Start date: 1967     Quit date: 1990     Smokeless tobacco: Never Used      Comment: started smoking 20's- quit in 1990     Alcohol use No      Comment: RARELY     Drug use: No     Sexual activity: No     Other Topics Concern     None     Social History Narrative       Review of Systems:  Skin:  Positive for itching   Eyes:  Positive for glasses  ENT:  Positive for nasal congestion;postnasal  "drainage;hoarseness  Respiratory:  Positive for cough;shortness of breath  Cardiovascular:    Positive for;edema;fatigue;lightheadedness  Gastroenterology: Positive for reflux  Genitourinary:  Negative    Musculoskeletal:  Positive for joint pain;joint stiffness  Neurologic:  Negative    Psychiatric:  Positive for depression  Heme/Lymph/Imm:  Positive for allergies  Endocrine:  Negative      Physical Exam:  Vitals: /77 (BP Location: Other (Comment), Patient Position: Sitting, Cuff Size: Adult Regular)  Pulse 67  Ht 1.607 m (5' 3.25\")    Constitutional:  cooperative, alert and oriented, well developed, well nourished, in no acute distress morbidly obese      Skin:  warm and dry to the touch, no apparent skin lesions or masses noted        Head:  normocephalic, no masses or lesions        Eyes:  pupils equal and round;conjunctivae and lids unremarkable;sclera white;EOMS intact        ENT:  no pallor or cyanosis        Neck:  carotid pulses are full and equal bilaterally   JVP cannot be visualized    Chest:      coarse rhonchi bilaterally diffusely. No rales, no dullness    Cardiac:               S1, S2 regular and distant. 2/6 ESM across base.     Abdomen:  abdomen soft;BS normoactive obese      Vascular: not assessed this visit                                        Extremities and Back:  no deformities, clubbing, cyanosis, erythema observed;no edema              Neurological:      in wheelchair      ASSESSMENT AND PLAN:    See dictation    ORDERS AT TODAY'S VISIT:    Orders Placed This Encounter   Procedures     PULMONARY MEDICINE REFERRAL           CC  Heather Harley, APRN CNP  [address]  [place] [address]            "

## 2017-12-12 NOTE — PROGRESS NOTES
REASON FOR VISIT:  Followup visit.      HISTORY OF PRESENT ILLNESS:  Ms. Chaudhari is a very pleasant 77-year-old lady who comes in routine followup.  She has hypertension, dyslipidemia, morbid obesity with a body mass index of 57 and chronic renal insufficiency with 4 prior episodes of need for dialysis from which she appears to have recovered.  She was hospitalized in August for atrial fibrillation with rapid ventricular response and was started on anticoagulation and diltiazem.  Overnight oximetry was recommended as an outpatient.  Echocardiography demonstrated mild to moderate concentric LVH with mildly reduced systolic function and apical akinesis.  She had mild aortic dilatation with mild to moderate concentric LVH.  She had no evidence of pulmonary hypertension, interestingly.  Holter monitor demonstrated atrial flutter with an average heart rate controlled at 90.  She had minimal conversion pauses.      Ms. Chaudhari was also hospitalized for cellulitis and sepsis from left lower extremity sores.  She was diagnosed with new acute on chronic diastolic heart failure.      She underwent upper GI endoscopy, who recommended antireflux diet.  The patient has been having very substantial upper airway excretion and states that the diagnosis is that she has had an uncontrollable postnasal drip resulting in a cough productive of sometimes green mucus.  Her sleep apnea test was deferred until after her ? evaluation which will be pending.  Her primary physician plans to send her to ENT.      Ms. Chaudhari states that she can get short of breath talking with long sentences.  It is the only time she notices it.  She does not exert herself and is wheelchair-bound, likely related to her obesity.  She denies orthopnea.  She has no cardiorespiratory complaints of chest pain, lightheadedness, presyncope, syncope, palpitations and has no pedal edema, in fact.      Her blood pressure is elevated today; however, she states that last Thursday  the nurse checked it and it was 90/60.  She rechecked it at 132/80 on repeat, which the patient states is about normal for her.  Review of her recent blood pressures demonstrates predominant blood pressures in the 120s systolic over 70s or high 60s.  She does have a nurse returning on Wednesday and Thursday and has [facility] follow[facility] on Friday.   Adela Mars started her on a low dose of Lasix.  Her renal function is in fact better than it has been since 11/22, though still a bit worse than baseline on 11/21.      PHYSICAL EXAMINATION:  On exam today, she in fact does not appear to have any fluid on her lungs but does appear to have very coarse rhonchi and transmitted upper airway sounds on exam.  She does not appear to be in congestive failure.      ASSESSMENT AND PLAN:  A 77-year-old lady with possible diastolic dysfunction, but also dyspnea potentially related to copious upper airway secretions.  She did cough while in the exam room productive of tenacious mucous, though not strongly colored.      She denies hemoptysis.  She does have questions with respect to whether she should have a fluid restriction.      At this point, I do not, in fact, think the primary defect is cardiac.  I would not change her cardiac medications at this time and would certainly not recommend up-titration of her diuretic.  I am concerned about her elevated blood pressure here, but she states clearly that her blood pressure has been checked very repeatedly at her residence and this is an atypical reading.      It is reasonable to allow her to have her pressure checked there Wednesday and Thursday and will follow up with our visit on Friday.      I have given her a 8-ipbts-g-day fluid restriction, as it does not appear that she quite reaches that.      I would recommend, however, that she does see Pulmonology for further evaluation, and I can go ahead and expedite that referral.      Further recommendations will be pending her  course.      Total time 30 minutes, 25 in coordination of care and counseling.      Maxime Huitron MD      cc:   Nelly Pearl MD    [address]  [address]         MAXIME HUITRON MD             D: 2017 22:01   T: 2017 23:59   MT: SOFIE      Name:     EDDIE MONTEIRO   MRN:      [id number]        Account:      [id number]   :      1940           Service Date: 2017      Document: [id number]

## 2017-12-13 ENCOUNTER — DOCUMENTATION ONLY (OUTPATIENT)
Dept: CARDIOLOGY | Facility: CLINIC | Age: 77
End: 2017-12-13

## 2017-12-13 ENCOUNTER — TELEPHONE (OUTPATIENT)
Dept: CARDIOLOGY | Facility: CLINIC | Age: 77
End: 2017-12-13

## 2017-12-13 NOTE — PROGRESS NOTES
12/13 Pt's  call back to let me know pt has an appt for Pulmonary Medicine on 1/17 at 3:45 at [facility] w/Dr Streeter.  nkf

## 2017-12-13 NOTE — TELEPHONE ENCOUNTER
Received a call from patient's spouse, Ricardo. Patient recently saw Dr. Huitron on 12-11-17. Patient is scheduled to see [facility] this Friday, to establish care following a recent admit. Ricardo was wondering why patient needs to be seen this Friday. I reviewed that Dr. Huitron recommended that patient keep her appt to assess her blood pressure and adjust medications if needed. (To Note: Friday's appt was rescheduled. Patient was ordinally scheduled to see [facility] prior to her visit with Dr. Huitron). Ricardo stated patient needs more time to recover. Patient has home care and they are monitoring patient. I explained to Ricardo that we do not get any information from home care, the information, vitals, ect, go to patient's PMD. Ricardo stated patient has multiple office visits and has home care, PT, and OT. Ricardo would like to push out patient's appt to the first week of January. I discussed with Ricardo, that it is their decision if they want to cancel the appt. I cancelled Friday's appt per his request. .     I told Ricardo that I would review this with Dr. Huitron's nurse and Dr. Huitron nurse will call him back regarding f/u.     I spoke to Lizbet, she will discuss f/u with Dr. Huitron and contact patient to let him know if patient should f/u with [facility] as ordered or see a general [facility].     TGamikael OWEN  [facility]

## 2017-12-13 NOTE — TELEPHONE ENCOUNTER
Discussed recommendations with Dr. Huitron.  Recommendations to follow-up with [facility] still stand.  Call back placed to patients  (Ricardo).  Discussed recommendations and rationale for f/u with [facility] this week including BP and symptom monitoring. Discussed pts BP at OV which was quite high and of concern; discussed with Ricardo problems that can arise from persistent untreated HTN including risk of stroke.  Ricardo states understanding however states he and Berenice are quite overwhelmed with doctor visits and appointments at this time and prefer to follow-up after the first of they year.  Advised Ricardo to contact our clinic back if questions or concerns should arise including consistent high blood pressure readings.  Ricardo states understanding.  Offers to transfer patient to scheduling to make f/u appointment, Ricardo declines at this time.

## 2017-12-14 ENCOUNTER — ANTICOAGULATION THERAPY VISIT (OUTPATIENT)
Dept: NURSING | Facility: CLINIC | Age: 77
End: 2017-12-14
Payer: COMMERCIAL

## 2017-12-14 DIAGNOSIS — I48.92 ATRIAL FLUTTER (H): ICD-10-CM

## 2017-12-14 DIAGNOSIS — Z79.01 LONG-TERM (CURRENT) USE OF ANTICOAGULANTS: ICD-10-CM

## 2017-12-14 DIAGNOSIS — I48.92 ATRIAL FLUTTER WITH RAPID VENTRICULAR RESPONSE (H): ICD-10-CM

## 2017-12-14 LAB — INR PPP: 2.5

## 2017-12-14 PROCEDURE — 99207 ZZC NO CHARGE NURSE ONLY: CPT

## 2017-12-14 NOTE — PROGRESS NOTES
ANTICOAGULATION FOLLOW-UP     Patient Name:  Berenice Chaudhari  Date:  12/14/2017  Contact Type:  Telephone  Call received from [facility] nurse, with INR result.   Follow up instructions given over the phone to Home Care nurse for warfarin management.    SUBJECTIVE:     Patient Findings     Positives No Problem Findings           OBJECTIVE    INR   Date Value Ref Range Status   12/14/2017 2.5  Final       ASSESSMENT / PLAN  INR assessment THER    Recheck INR In: 1 WEEK    INR Location [facility] INR      Anticoagulation Summary as of 12/14/2017     INR goal 2.0-3.0   Today's INR 2.5   Maintenance plan 5 mg (5 mg x 1) on Fri; 2.5 mg (5 mg x 0.5) all other days   Full instructions 5 mg on Fri; 2.5 mg all other days   Weekly total 20 mg   Plan last modified Mary Butcher RN (12/14/2017)   Next INR check 12/21/2017   Target end date Indefinite    Indications   Atrial flutter (H) [I48.92]  Atrial flutter with rapid ventricular response (H) [I48.92]  Long-term (current) use of anticoagulants [Z79.01] [Z79.01]         Anticoagulation Episode Summary     INR check location     Preferred lab     Send INR reminders to [facility]    Comments 5mg tabs; HS dosing, [facility] [phone]; [place] [phone]      Anticoagulation Care Providers     Provider Role Specialty Phone number    Nelly Pearl MD Referring Internal Medicine [phone]            See the Encounter Report to view Anticoagulation Flowsheet and Dosing Calendar (Go to Encounters tab in chart review, and find the Anticoagulation Therapy Visit)        Mary Butcher RN

## 2017-12-14 NOTE — MR AVS SNAPSHOT
Berenice VANCE Chaudhari   12/14/2017 1:00 PM   Anticoagulation Therapy Visit    Description:  77 year old female   Provider:  [facility]   Department:  [facility]           INR as of 12/14/2017     Today's INR 2.5      Anticoagulation Summary as of 12/14/2017     INR goal 2.0-3.0   Today's INR 2.5   Full instructions 5 mg on Fri; 2.5 mg all other days   Next INR check 12/21/2017    Indications   Atrial flutter (H) [I48.92]  Atrial flutter with rapid ventricular response (H) [I48.92]  Long-term (current) use of anticoagulants [Z79.01] [Z79.01]         Contact Numbers     [facility]  Please call  [phone] to cancel and/or reschedule your appointment   Please call  [phone] with any problems or questions regarding your therapy.        December 2017 Details    Sun Mon Tue Wed Thu Fri Sat          1               2                 3               4               5               6               7               8               9                 10               11               12               13               14      2.5 mg   See details      15      5 mg         16      2.5 mg           17      2.5 mg         18      2.5 mg         19      2.5 mg         20      2.5 mg         21            22               23                 24               25               26               27               28               29               30                 31                      Date Details   12/14 This INR check       Date of next INR:  12/21/2017         How to take your warfarin dose     To take:  2.5 mg Take 0.5 of a 5 mg tablet.    To take:  5 mg Take 1 of the 5 mg tablets.

## 2017-12-21 ENCOUNTER — ANTICOAGULATION THERAPY VISIT (OUTPATIENT)
Dept: NURSING | Facility: CLINIC | Age: 77
End: 2017-12-21

## 2017-12-21 ENCOUNTER — TELEPHONE (OUTPATIENT)
Dept: PEDIATRICS | Facility: CLINIC | Age: 77
End: 2017-12-21

## 2017-12-21 ENCOUNTER — OFFICE VISIT (OUTPATIENT)
Dept: PEDIATRICS | Facility: CLINIC | Age: 77
End: 2017-12-21
Payer: COMMERCIAL

## 2017-12-21 VITALS
WEIGHT: 293 LBS | OXYGEN SATURATION: 98 % | SYSTOLIC BLOOD PRESSURE: 126 MMHG | BODY MASS INDEX: 51.91 KG/M2 | DIASTOLIC BLOOD PRESSURE: 70 MMHG | TEMPERATURE: 98.4 F | HEART RATE: 91 BPM | HEIGHT: 63 IN

## 2017-12-21 DIAGNOSIS — Z79.01 LONG-TERM (CURRENT) USE OF ANTICOAGULANTS: ICD-10-CM

## 2017-12-21 DIAGNOSIS — I48.92 ATRIAL FLUTTER WITH RAPID VENTRICULAR RESPONSE (H): ICD-10-CM

## 2017-12-21 DIAGNOSIS — F33.42 MAJOR DEPRESSIVE DISORDER, RECURRENT, IN FULL REMISSION (H): ICD-10-CM

## 2017-12-21 DIAGNOSIS — I48.92 ATRIAL FLUTTER (H): ICD-10-CM

## 2017-12-21 DIAGNOSIS — R44.3 HALLUCINATIONS: Primary | ICD-10-CM

## 2017-12-21 DIAGNOSIS — B02.29 POSTHERPETIC NEURALGIA: ICD-10-CM

## 2017-12-21 DIAGNOSIS — E78.5 HYPERLIPIDEMIA LDL GOAL <100: ICD-10-CM

## 2017-12-21 LAB — INR PPP: 2.3 (ref 0.86–1.14)

## 2017-12-21 PROCEDURE — 99207 ZZC NO CHARGE NURSE ONLY: CPT

## 2017-12-21 PROCEDURE — 99214 OFFICE O/P EST MOD 30 MIN: CPT | Performed by: PEDIATRICS

## 2017-12-21 PROCEDURE — 85610 PROTHROMBIN TIME: CPT | Performed by: PEDIATRICS

## 2017-12-21 PROCEDURE — 36415 COLL VENOUS BLD VENIPUNCTURE: CPT | Performed by: PEDIATRICS

## 2017-12-21 RX ORDER — GABAPENTIN 300 MG/1
900 CAPSULE ORAL 3 TIMES DAILY
Qty: 810 CAPSULE | Refills: 1 | Status: SHIPPED | OUTPATIENT
Start: 2017-12-21 | End: 2017-12-22

## 2017-12-21 RX ORDER — FUROSEMIDE 20 MG
20 TABLET ORAL DAILY
Qty: 90 TABLET | Refills: 3 | Status: SHIPPED | OUTPATIENT
Start: 2017-12-21 | End: 2018-01-11

## 2017-12-21 RX ORDER — PREGABALIN 150 MG/1
150 CAPSULE ORAL 2 TIMES DAILY
Qty: 60 CAPSULE | Refills: 0 | Status: SHIPPED | OUTPATIENT
Start: 2017-12-21 | End: 2018-01-19

## 2017-12-21 RX ORDER — PRAVASTATIN SODIUM 10 MG
10 TABLET ORAL AT BEDTIME
Qty: 90 TABLET | Refills: 0 | Status: SHIPPED | OUTPATIENT
Start: 2017-12-21 | End: 2018-01-11

## 2017-12-21 RX ORDER — BUPROPION HYDROCHLORIDE 300 MG/1
300 TABLET ORAL EVERY MORNING
Qty: 90 TABLET | Refills: 1 | Status: SHIPPED | OUTPATIENT
Start: 2017-12-21 | End: 2018-01-11

## 2017-12-21 NOTE — PROGRESS NOTES
"  SUBJECTIVE:   Berenice Chaudhari is a 77 year old female who presents to clinic today for the following health issues:          Medication Followup of  Wellbutrin, lyrica, furosemide,      Taking Medication as prescribed: yes    Side Effects:  Low grade fever.     Medication Helping Symptoms:  yes       Patient insurance will no longer cover lyrica next year - wondering what else to use for post herpetic neuralgia.  Currently taking lyrica and gabapentin 600mg TID.    Also getting hallucinations/delusions when she is taking tylenol - takes for low grade temps.   also brings up that she also doesn't take some of her medications for a few days as well - wondering if stopping any of these abruptly could be causing the symptoms instead of the tylenol.    Needs INR check today.    Problem list and histories reviewed & adjusted, as indicated.  Additional history: as documented        Reviewed and updated as needed this visit by clinical staff       Reviewed and updated as needed this visit by Provider         ROS:  Constitutional, HEENT, cardiovascular, pulmonary, gi and gu systems are negative, except as otherwise noted.      OBJECTIVE:   /70 (BP Location: Right arm, Cuff Size: Adult Large)  Pulse 91  Temp 98.4  F (36.9  C) (Oral)  Ht 5' 3.25\" (1.607 m)  Wt (!) 343 lb (155.6 kg)  SpO2 98%  BMI 60.28 kg/m2  Body mass index is 60.28 kg/(m^2).  GENERAL APPEARANCE: healthy, alert and no distress  RESP: lungs clear to auscultation - no rales, rhonchi or wheezes  CV: regular rates and rhythm, normal S1 S2, no S3 or S4 and no murmur, click or rub  EXT: trace non pitting edema L>R    Diagnostic Test Results:  none     ASSESSMENT/PLAN:       1. Major depressive disorder, recurrent, in full remission (H)  Controlled, continue  - buPROPion (WELLBUTRIN XL) 300 MG 24 hr tablet; Take 1 tablet (300 mg) by mouth every morning  Dispense: 90 tablet; Refill: 1    2. Postherpetic neuralgia  Controlled - see PI - will plan " to wean off lyrica since it won't be covered in 2018 and increase gabapentin.  She may not get any additional affect since we are going about 1800mg/day, but ok to prescribe up to 3600mg per day as trial.  - gabapentin (NEURONTIN) 300 MG capsule; Take 3 capsules (900 mg) by mouth 3 times daily  Dispense: 810 capsule; Refill: 1  - pregabalin (LYRICA) 150 MG capsule; Take 1 capsule (150 mg) by mouth 2 times daily  Dispense: 60 capsule; Refill: 0    3. Atrial flutter with rapid ventricular response (H)  refill  - furosemide (LASIX) 20 MG tablet; Take 1 tablet (20 mg) by mouth daily  Dispense: 90 tablet; Refill: 3  - INR    4. Hyperlipidemia LDL goal <100  Refill   - pravastatin (PRAVACHOL) 10 MG tablet; Take 1 tablet (10 mg) by mouth At Bedtime Needs labs before next refill  Dispense: 90 tablet; Refill: 0  - Lipid Profile (Chol, Trig, HDL, LDL calc); Future    5. Hallucinations  Not likely from tylenol.  More likely from not taking Effexor for a few days - encouraged to continue medications even when not feeling well.      Patient Instructions   Wean off Lyrica - cut down to once daily for 2 weeks, then stop.    At same time, increase gabapentin to 900mg three times per day.    If gabapentin not enough for pain after you have weaned off lyrica, please make a follow up appt.    While I'm gone recommend:    Dr. Gil or Dr. Melisa Pearl MD  [facility]3]AN

## 2017-12-21 NOTE — NURSING NOTE
"Chief Complaint   Patient presents with     RECHECK       Initial /70 (BP Location: Right arm, Cuff Size: Adult Large)  Pulse 91  Temp 98.4  F (36.9  C) (Oral)  Ht 5' 3.25\" (1.607 m)  Wt (!) 343 lb (155.6 kg)  SpO2 98%  BMI 60.28 kg/m2 Estimated body mass index is 60.28 kg/(m^2) as calculated from the following:    Height as of this encounter: 5' 3.25\" (1.607 m).    Weight as of this encounter: 343 lb (155.6 kg).  Medication Reconciliation: complete   Snehal Henry MA    "

## 2017-12-21 NOTE — MR AVS SNAPSHOT
Berenice RAJIV Chaudhari   12/21/2017 4:15 PM   Anticoagulation Therapy Visit    Description:  77 year old female   Provider:  [facility]   Department:  [facility]           INR as of 12/21/2017     Today's INR 2.30      Anticoagulation Summary as of 12/21/2017     INR goal 2.0-3.0   Today's INR 2.30   Full instructions 5 mg on Fri; 2.5 mg all other days   Next INR check 12/28/2017    Indications   Atrial flutter (H) [I48.92]  Atrial flutter with rapid ventricular response (H) [I48.92]  Long-term (current) use of anticoagulants [Z79.01] [Z79.01]         Contact Numbers     [facility]  Please call  [phone] to cancel and/or reschedule your appointment   Please call  [phone] with any problems or questions regarding your therapy.        December 2017 Details    Sun Mon Tue Wed Thu Fri Sat          1               2                 3               4               5               6               7               8               9                 10               11               12               13               14               15               16                 17               18               19               20               21      2.5 mg   See details      22      5 mg         23      2.5 mg           24      2.5 mg         25      2.5 mg         26      2.5 mg         27      2.5 mg         28            29               30                 31                      Date Details   12/21 This INR check       Date of next INR:  12/28/2017         How to take your warfarin dose     To take:  2.5 mg Take 0.5 of a 5 mg tablet.    To take:  5 mg Take 1 of the 5 mg tablets.

## 2017-12-21 NOTE — PATIENT INSTRUCTIONS
Wean off Lyrica - cut down to once daily for 2 weeks, then stop.    At same time, increase gabapentin to 900mg three times per day.    If gabapentin not enough for pain after you have weaned off lyrica, please make a follow up appt.    While I'm gone recommend:    Dr. Gil or Dr. Vincent

## 2017-12-21 NOTE — MR AVS SNAPSHOT
After Visit Summary   12/21/2017    Berenice Chaudhari    MRN: [id number]           Patient Information     Date Of Birth          1940        Visit Information        Provider Department      12/21/2017 1:00 PM Nelly Pearl MD [facility]        Today's Diagnoses     Major depressive disorder, recurrent, in full remission (H)        Postherpetic neuralgia        Atrial flutter with rapid ventricular response (H)        Hyperlipidemia LDL goal <100          Care Instructions    Wean off Lyrica - cut down to once daily for 2 weeks, then stop.    At same time, increase gabapentin to 900mg three times per day.    If gabapentin not enough for pain after you have weaned off lyrica, please make a follow up appt.    While I'm gone recommend:    Dr. Gil or Dr. Vincent            Follow-ups after your visit        Future tests that were ordered for you today     Open Future Orders        Priority Expected Expires Ordered    Lipid Profile (Chol, Trig, HDL, LDL calc) Routine 12/21/2017 12/21/2018 12/21/2017            Who to contact     If you have questions or need follow up information about today's clinic visit or your schedule please contact [facility] directly at [phone].  Normal or non-critical lab and imaging results will be communicated to you by MyChart, letter or phone within 4 business days after the clinic has received the results. If you do not hear from us within 7 days, please contact the clinic through MyChart or phone. If you have a critical or abnormal lab result, we will notify you by phone as soon as possible.  Submit refill requests through [employer] or call your pharmacy and they will forward the refill request to us. Please allow 3 business days for your refill to be completed.          Additional Information About Your Visit        [employer] Information     [employer] lets you send messages to your doctor, view your test results, renew your  "prescriptions, schedule appointments and more. To sign up, go to [email or web address] . Click on \"Log in\" on the left side of the screen, which will take you to the Welcome page. Then click on \"Sign up Now\" on the right side of the page.     You will be asked to enter the access code listed below, as well as some personal information. Please follow the directions to create your username and password.     Your access code is: [email or web address]  Expires: 2017  5:04 PM     Your access code will  in 90 days. If you need help or a new code, please call your [facility] clinic or [phone].        Care EveryWhere ID     This is your Care EveryWhere ID. This could be used by other organizations to access your [facility] medical records  [id number]        Your Vitals Were     Pulse Temperature Height Pulse Oximetry BMI (Body Mass Index)       91 98.4  F (36.9  C) (Oral) 5' 3.25\" (1.607 m) 98% 60.28 kg/m2        Blood Pressure from Last 3 Encounters:   17 126/70   17 163/77   17 120/76    Weight from Last 3 Encounters:   17 (!) 343 lb (155.6 kg)   17 (!) 343 lb 3.2 oz (155.7 kg)   10/25/17 (!) 322 lb (146.1 kg)                 Today's Medication Changes          These changes are accurate as of: 17  2:08 PM.  If you have any questions, ask your nurse or doctor.               These medicines have changed or have updated prescriptions.        Dose/Directions    * GABAPENTIN PO   This may have changed:  Another medication with the same name was added. Make sure you understand how and when to take each.        Dose:  600 mg   Take 600 mg by mouth 3 times daily   Refills:  0       * gabapentin 300 MG capsule   Commonly known as:  NEURONTIN   This may have changed:  You were already taking a medication with the same name, and this prescription was added. Make sure you understand how and when to take each.   Used for:  Postherpetic neuralgia   Changed by:  Nelly Pearl MD        " Dose:  900 mg   Take 3 capsules (900 mg) by mouth 3 times daily   Quantity:  810 capsule   Refills:  1       lidocaine 5 % Patch   Commonly known as:  LIDODERM   This may have changed:    - when to take this  - reasons to take this  - additional instructions   Used for:  Postherpetic neuralgia        Apply up to 3 patches to painful area at once for up to 12 h within a 24 h period.  Remove after 12 hours.   Quantity:  60 patch   Refills:  2       * Notice:  This list has 2 medication(s) that are the same as other medications prescribed for you. Read the directions carefully, and ask your doctor or other care provider to review them with you.         Where to get your medicines      These medications were sent to [id number] [place] - [address]  AT [address] [place] [id number] [facility] [place] [address]     Phone:  [phone]     buPROPion 300 MG 24 hr tablet    furosemide 20 MG tablet    gabapentin 300 MG capsule    pravastatin 10 MG tablet         Some of these will need a paper prescription and others can be bought over the counter.  Ask your nurse if you have questions.     Bring a paper prescription for each of these medications     pregabalin 150 MG capsule                Primary Care Provider Office Phone # Fax #    Nelly Pearl [phone]       [address] [place] [address]        Equal Access to Services     EDMUND OPWELL AH: Hadii irvin robertson hadnatividado Soomaali, waaxda luqadaha, qaybta kaalmada adeegyada, calin hoyt. So [facility] [phone].    ATENCIÓN: Si habla español, tiene a dickey disposición servicios gratuitos de asistencia lingüística. Llame al [phone].    We comply with applicable federal civil rights laws and [place] laws. We do not discriminate on the basis of race, color, national origin, age, disability, sex, sexual orientation, or gender identity.            Thank you!     Thank you for  choosing [facility] [place]  for your care. Our goal is always to provide you with excellent care. Hearing back from our patients is one way we can continue to improve our services. Please take a few minutes to complete the written survey that you may receive in the mail after your visit with us. Thank you!             Your Updated Medication List - Protect others around you: Learn how to safely use, store and throw away your medicines at www.disposemymeds.org.          This list is accurate as of: 12/21/17  2:08 PM.  Always use your most recent med list.                   Brand Name Dispense Instructions for use Diagnosis    buPROPion 300 MG 24 hr tablet    WELLBUTRIN XL    90 tablet    Take 1 tablet (300 mg) by mouth every morning    Major depressive disorder, recurrent, in full remission (H)       diltiazem 120 MG 24 hr capsule    CARDIZEM CD/CARTIA XT    90 capsule    Take 1 capsule (120 mg) by mouth daily    Atrial flutter with rapid ventricular response (H)       fluticasone 50 MCG/ACT spray    FLONASE    3 Bottle    Spray 2 sprays into both nostrils daily    Post-nasal drip       furosemide 20 MG tablet    LASIX    90 tablet    Take 1 tablet (20 mg) by mouth daily    Atrial flutter with rapid ventricular response (H)       * GABAPENTIN PO      Take 600 mg by mouth 3 times daily        * gabapentin 300 MG capsule    NEURONTIN    810 capsule    Take 3 capsules (900 mg) by mouth 3 times daily    Postherpetic neuralgia       lidocaine 5 % Patch    LIDODERM    60 patch    Apply up to 3 patches to painful area at once for up to 12 h within a 24 h period.  Remove after 12 hours.    Postherpetic neuralgia       * METOPROLOL SUCCINATE ER PO      Take 50 mg by mouth every evening        * metoprolol 50 MG 24 hr tablet    TOPROL-XL    90 tablet    Take 1 tablet (50 mg) by mouth daily    Atrial flutter, unspecified type (H)       minocycline 50 MG capsule    MINOCIN/DYNACIN    180 capsule    Take 1 capsule (50 mg) by  mouth 2 times daily    Methicillin susceptible Staphylococcus aureus septicemia (H)       nortriptyline 25 MG capsule    PAMELOR    180 capsule    Take 2 capsules (50 mg) by mouth At Bedtime    Post herpetic neuralgia       pantoprazole 40 MG EC tablet    PROTONIX    90 tablet    TAKE 1 TABLET(40 MG) BY MOUTH DAILY    Gastroesophageal reflux disease without esophagitis       pravastatin 10 MG tablet    PRAVACHOL    90 tablet    Take 1 tablet (10 mg) by mouth At Bedtime Needs labs before next refill    Hyperlipidemia LDL goal <100       pregabalin 150 MG capsule    LYRICA    60 capsule    Take 1 capsule (150 mg) by mouth 2 times daily    Postherpetic neuralgia       venlafaxine 150 MG 24 hr capsule    EFFEXOR-XR    90 capsule    TAKE 1 CAPSULE(150 MG) BY MOUTH DAILY    Major depressive disorder, recurrent episode, moderate (H)       WARFARIN SODIUM PO      Take by mouth daily 5 mg Mondays, 2.5 mg all other days or as directed by INR clinic        * Notice:  This list has 4 medication(s) that are the same as other medications prescribed for you. Read the directions carefully, and ask your doctor or other care provider to review them with you.

## 2017-12-21 NOTE — PROGRESS NOTES
ANTICOAGULATION FOLLOW-UP     Patient Name:  Berenice Chaudhari  Date:  12/21/2017  Contact Type:  Telephone   [facility] done [facility] at [facility] following and office visit with PCP.  Spoke to patient's  Ricardo by phone. He sets up her meds.  Instructions given to Ricardo over the phone. He verbalized understanding.      SUBJECTIVE:     Patient Findings     Positives No Problem Findings           OBJECTIVE    INR   Date Value Ref Range Status   12/21/2017 2.30 (H) 0.86 - 1.14 Final     Comment:     This test is intended for monitoring Coumadin therapy.  Results are not   accurate in patients with prolonged INR due to factor deficiency.         ASSESSMENT / PLAN  INR assessment THER    Recheck INR In: 1 WEEK    [facility]R Location [facility] after doctor appt     Anticoagulation Summary as of 12/21/2017     INR goal 2.0-3.0   Today's INR 2.30   Maintenance plan 5 mg (5 mg x 1) on Fri; 2.5 mg (5 mg x 0.5) all other days   Full instructions 5 mg on Fri; 2.5 mg all other days   Weekly total 20 mg   No change documented Mary Butcher RN   Plan last modified Mary Butcher RN (12/14/2017)   Next INR check 12/28/2017   Target end date Indefinite    Indications   Atrial flutter (H) [I48.92]  Atrial flutter with rapid ventricular response (H) [I48.92]  Long-term (current) use of anticoagulants [Z79.01] [Z79.01]         Anticoagulation Episode Summary     INR check location     Preferred lab     Send IN[facility] reminders to [facility]    Comments 5mg tabs; HS dosing //  (Ricardo) sets up her meds // [facility] [phone]; [place] [phone]      Anticoagulation Care Providers     Provider Role Specialty Phone number    Nelly Pearl MD Referring Internal Medicine [phone]            See the Encounter Report to view Anticoagulation Flowsheet and Dosing Calendar (Go to Encounters tab in chart review, and find the Anticoagulation Therapy Visit)        Mary Butcher RN

## 2017-12-22 ENCOUNTER — TELEPHONE (OUTPATIENT)
Dept: PEDIATRICS | Facility: CLINIC | Age: 77
End: 2017-12-22

## 2017-12-22 RX ORDER — GABAPENTIN 600 MG/1
1200 TABLET ORAL 3 TIMES DAILY
Qty: 90 TABLET | Refills: 1 | COMMUNITY
Start: 2017-12-22 | End: 2018-01-11

## 2017-12-22 NOTE — TELEPHONE ENCOUNTER
1200mg TID is max. Should NOT add any more.  I updated her med list.    Still should wean off the lyrica - if her symptoms are significantly worse after the wean, then she needs to be seen for follow up.    Nelly Pearl MD  Internal Medicine/Pediatrics  [facility]

## 2017-12-22 NOTE — TELEPHONE ENCOUNTER
Patient's  calling. Patient is actually taking 1200 MG tid gabapentin and has been for years. Should they add a 300 mg cap tid or are they already at the max? [phone] ok to vinod.

## 2017-12-26 ENCOUNTER — DOCUMENTATION ONLY (OUTPATIENT)
Dept: CARE COORDINATION | Facility: CLINIC | Age: 77
End: 2017-12-26

## 2017-12-26 NOTE — PROGRESS NOTES
Dear Dr. Nelly Pearl    Medicare Home Health regulations requires [facility] to notify the Physician when the plan for visits has been altered.  We have provided fewer visits than ordered.  We are notifying you of a Missed Visit.  Berenice Chaudhari; MRN [id number]  Missed Visit  Is SN  Dates of missed services 12/22/17   Reason: Patient cancelled   Sincerely [facility]  Tahira Stanley  [phone]

## 2017-12-27 ENCOUNTER — CARE COORDINATION (OUTPATIENT)
Dept: CARE COORDINATION | Facility: CLINIC | Age: 77
End: 2017-12-27

## 2017-12-27 NOTE — PROGRESS NOTES
Clinic Care Coordination Contact  Care Coordination Communication    Home Care Contact:              Home Care Agency: [facility]              Care Coordination contacted home care: Yes - Spoke with Lsiet Cobb.               Patient is current with services at this time.       Plan: RN/SW Care Coordinator will await notification from home care staff informing RN/SW Care Coordinator of patients discharge plans/needs. RN/SW Care Coordinator will review chart and outreach to home care every 4 weeks and as needed.      Claire Barclay RN  [facility]  Clinic Care Coordinator - [place] Locations   Direct:  [phone] (voicemail available)

## 2018-01-01 ENCOUNTER — OFFICE VISIT (OUTPATIENT)
Dept: PEDIATRICS | Facility: CLINIC | Age: 78
End: 2018-01-01
Payer: COMMERCIAL

## 2018-01-01 ENCOUNTER — ANTICOAGULATION THERAPY VISIT (OUTPATIENT)
Dept: NURSING | Facility: CLINIC | Age: 78
End: 2018-01-01

## 2018-01-01 ENCOUNTER — ANTICOAGULATION THERAPY VISIT (OUTPATIENT)
Dept: NURSING | Facility: CLINIC | Age: 78
End: 2018-01-01
Payer: COMMERCIAL

## 2018-01-01 ENCOUNTER — APPOINTMENT (OUTPATIENT)
Dept: CT IMAGING | Facility: CLINIC | Age: 78
End: 2018-01-01
Attending: EMERGENCY MEDICINE
Payer: COMMERCIAL

## 2018-01-01 ENCOUNTER — TELEPHONE (OUTPATIENT)
Dept: NEUROLOGY | Facility: CLINIC | Age: 78
End: 2018-01-01

## 2018-01-01 ENCOUNTER — TELEPHONE (OUTPATIENT)
Dept: PEDIATRICS | Facility: CLINIC | Age: 78
End: 2018-01-01

## 2018-01-01 ENCOUNTER — HOSPITAL ENCOUNTER (OUTPATIENT)
Dept: ULTRASOUND IMAGING | Facility: CLINIC | Age: 78
Discharge: HOME | End: 2018-08-16
Attending: PHYSICIAN ASSISTANT | Admitting: PHYSICIAN ASSISTANT
Payer: COMMERCIAL

## 2018-01-01 ENCOUNTER — OFFICE VISIT (OUTPATIENT)
Dept: NEUROLOGY | Facility: CLINIC | Age: 78
End: 2018-01-01
Payer: COMMERCIAL

## 2018-01-01 ENCOUNTER — OFFICE VISIT (OUTPATIENT)
Dept: ORTHOPEDICS | Facility: CLINIC | Age: 78
End: 2018-01-01
Payer: COMMERCIAL

## 2018-01-01 ENCOUNTER — RADIANT APPOINTMENT (OUTPATIENT)
Dept: GENERAL RADIOLOGY | Facility: CLINIC | Age: 78
End: 2018-01-01
Attending: PHYSICIAN ASSISTANT
Payer: COMMERCIAL

## 2018-01-01 ENCOUNTER — HOSPITAL ENCOUNTER (OUTPATIENT)
Dept: MRI IMAGING | Facility: CLINIC | Age: 78
Discharge: HOME | End: 2018-07-23
Attending: PEDIATRICS | Admitting: PEDIATRICS
Payer: COMMERCIAL

## 2018-01-01 ENCOUNTER — DOCUMENTATION ONLY (OUTPATIENT)
Dept: PEDIATRICS | Facility: CLINIC | Age: 78
End: 2018-01-01

## 2018-01-01 ENCOUNTER — MEDICAL CORRESPONDENCE (OUTPATIENT)
Dept: HEALTH INFORMATION MANAGEMENT | Facility: CLINIC | Age: 78
End: 2018-01-01

## 2018-01-01 ENCOUNTER — PATIENT OUTREACH (OUTPATIENT)
Dept: CARE COORDINATION | Facility: CLINIC | Age: 78
End: 2018-01-01

## 2018-01-01 ENCOUNTER — OFFICE VISIT (OUTPATIENT)
Dept: PODIATRY | Facility: CLINIC | Age: 78
End: 2018-01-01
Payer: COMMERCIAL

## 2018-01-01 ENCOUNTER — APPOINTMENT (OUTPATIENT)
Dept: GENERAL RADIOLOGY | Facility: CLINIC | Age: 78
End: 2018-01-01
Attending: EMERGENCY MEDICINE
Payer: COMMERCIAL

## 2018-01-01 ENCOUNTER — ANTICOAGULATION THERAPY VISIT (OUTPATIENT)
Dept: PEDIATRICS | Facility: CLINIC | Age: 78
End: 2018-01-01

## 2018-01-01 ENCOUNTER — HOSPITAL ENCOUNTER (EMERGENCY)
Facility: CLINIC | Age: 78
Discharge: HOME | End: 2018-07-23
Attending: EMERGENCY MEDICINE | Admitting: EMERGENCY MEDICINE
Payer: COMMERCIAL

## 2018-01-01 ENCOUNTER — PRE VISIT (OUTPATIENT)
Dept: NEUROLOGY | Facility: CLINIC | Age: 78
End: 2018-01-01

## 2018-01-01 VITALS
TEMPERATURE: 98.5 F | HEART RATE: 69 BPM | SYSTOLIC BLOOD PRESSURE: 128 MMHG | OXYGEN SATURATION: 95 % | DIASTOLIC BLOOD PRESSURE: 68 MMHG

## 2018-01-01 VITALS
TEMPERATURE: 97.8 F | HEART RATE: 77 BPM | DIASTOLIC BLOOD PRESSURE: 78 MMHG | OXYGEN SATURATION: 96 % | SYSTOLIC BLOOD PRESSURE: 149 MMHG | RESPIRATION RATE: 20 BRPM

## 2018-01-01 VITALS
OXYGEN SATURATION: 95 % | SYSTOLIC BLOOD PRESSURE: 144 MMHG | HEART RATE: 73 BPM | DIASTOLIC BLOOD PRESSURE: 77 MMHG | TEMPERATURE: 97.8 F

## 2018-01-01 VITALS
HEART RATE: 72 BPM | DIASTOLIC BLOOD PRESSURE: 66 MMHG | OXYGEN SATURATION: 94 % | BODY MASS INDEX: 59.56 KG/M2 | HEIGHT: 64 IN | SYSTOLIC BLOOD PRESSURE: 122 MMHG | TEMPERATURE: 96.6 F

## 2018-01-01 VITALS — HEIGHT: 64 IN | SYSTOLIC BLOOD PRESSURE: 135 MMHG | HEART RATE: 78 BPM | DIASTOLIC BLOOD PRESSURE: 56 MMHG

## 2018-01-01 VITALS — HEIGHT: 64 IN

## 2018-01-01 VITALS
OXYGEN SATURATION: 95 % | HEIGHT: 64 IN | SYSTOLIC BLOOD PRESSURE: 126 MMHG | TEMPERATURE: 97.2 F | HEART RATE: 68 BPM | DIASTOLIC BLOOD PRESSURE: 62 MMHG

## 2018-01-01 VITALS
DIASTOLIC BLOOD PRESSURE: 70 MMHG | SYSTOLIC BLOOD PRESSURE: 140 MMHG | OXYGEN SATURATION: 96 % | TEMPERATURE: 98.6 F | HEART RATE: 72 BPM

## 2018-01-01 VITALS — DIASTOLIC BLOOD PRESSURE: 76 MMHG | SYSTOLIC BLOOD PRESSURE: 138 MMHG

## 2018-01-01 DIAGNOSIS — M79.89 MASS OF SOFT TISSUE: ICD-10-CM

## 2018-01-01 DIAGNOSIS — Z79.01 LONG-TERM (CURRENT) USE OF ANTICOAGULANTS: ICD-10-CM

## 2018-01-01 DIAGNOSIS — N18.30 CKD (CHRONIC KIDNEY DISEASE) STAGE 3, GFR 30-59 ML/MIN (H): ICD-10-CM

## 2018-01-01 DIAGNOSIS — S93.105A: ICD-10-CM

## 2018-01-01 DIAGNOSIS — I48.92 ATRIAL FLUTTER WITH RAPID VENTRICULAR RESPONSE (H): ICD-10-CM

## 2018-01-01 DIAGNOSIS — Z79.01 LONG TERM CURRENT USE OF ANTICOAGULANT THERAPY: Primary | ICD-10-CM

## 2018-01-01 DIAGNOSIS — M79.89 MASS OF SOFT TISSUE: Primary | ICD-10-CM

## 2018-01-01 DIAGNOSIS — M54.50 ACUTE RIGHT-SIDED LOW BACK PAIN WITHOUT SCIATICA: ICD-10-CM

## 2018-01-01 DIAGNOSIS — E87.0 HYPERNATREMIA: ICD-10-CM

## 2018-01-01 DIAGNOSIS — F33.42 MAJOR DEPRESSIVE DISORDER, RECURRENT EPISODE, IN FULL REMISSION (H): Primary | ICD-10-CM

## 2018-01-01 DIAGNOSIS — Z79.01 LONG TERM CURRENT USE OF ANTICOAGULANT THERAPY: ICD-10-CM

## 2018-01-01 DIAGNOSIS — B02.29 POSTHERPETIC NEURALGIA: ICD-10-CM
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
## 2018-01-01 DIAGNOSIS — D64.9 ANEMIA, UNSPECIFIED TYPE: Primary | ICD-10-CM

## 2018-01-01 DIAGNOSIS — F41.9 ANXIETY: Primary | ICD-10-CM

## 2018-01-01 DIAGNOSIS — R70.0 ELEVATED SED RATE: ICD-10-CM

## 2018-01-01 DIAGNOSIS — S92.502A CLOSED FRACTURE OF PHALANX OF LEFT FIFTH TOE, INITIAL ENCOUNTER: ICD-10-CM

## 2018-01-01 DIAGNOSIS — Z79.01 LONG-TERM (CURRENT) USE OF ANTICOAGULANTS: Primary | ICD-10-CM

## 2018-01-01 DIAGNOSIS — D50.8 OTHER IRON DEFICIENCY ANEMIA: ICD-10-CM

## 2018-01-01 DIAGNOSIS — M25.521 RIGHT ELBOW PAIN: ICD-10-CM

## 2018-01-01 DIAGNOSIS — D64.9 ANEMIA, UNSPECIFIED TYPE: ICD-10-CM

## 2018-01-01 DIAGNOSIS — E78.5 HYPERLIPIDEMIA LDL GOAL <100: ICD-10-CM

## 2018-01-01 DIAGNOSIS — I48.92 ATRIAL FLUTTER, UNSPECIFIED TYPE (H): Primary | ICD-10-CM

## 2018-01-01 DIAGNOSIS — F33.42 MAJOR DEPRESSIVE DISORDER, RECURRENT, IN FULL REMISSION (H): ICD-10-CM

## 2018-01-01 DIAGNOSIS — I10 BENIGN ESSENTIAL HYPERTENSION: Primary | ICD-10-CM

## 2018-01-01 DIAGNOSIS — I48.92 ATRIAL FLUTTER, UNSPECIFIED TYPE (H): ICD-10-CM

## 2018-01-01 DIAGNOSIS — R25.1 TREMOR: ICD-10-CM

## 2018-01-01 DIAGNOSIS — R25.1 TREMOR: Primary | ICD-10-CM

## 2018-01-01 DIAGNOSIS — S92.515A CLOSED NONDISPLACED FRACTURE OF PROXIMAL PHALANX OF LESSER TOE OF LEFT FOOT, INITIAL ENCOUNTER: Primary | ICD-10-CM

## 2018-01-01 DIAGNOSIS — I10 ESSENTIAL HYPERTENSION WITH GOAL BLOOD PRESSURE LESS THAN 140/90: Primary | ICD-10-CM

## 2018-01-01 DIAGNOSIS — R06.00 DYSPNEA, UNSPECIFIED TYPE: ICD-10-CM

## 2018-01-01 DIAGNOSIS — B02.29 POST HERPETIC NEURALGIA: Primary | ICD-10-CM

## 2018-01-01 DIAGNOSIS — S92.502B: Primary | ICD-10-CM

## 2018-01-01 DIAGNOSIS — R79.89 ELEVATED SERUM CREATININE: ICD-10-CM

## 2018-01-01 DIAGNOSIS — S80.12XA TRAUMATIC HEMATOMA OF LEFT LOWER LEG, INITIAL ENCOUNTER: ICD-10-CM

## 2018-01-01 DIAGNOSIS — G25.3 MYOCLONUS: Primary | ICD-10-CM

## 2018-01-01 DIAGNOSIS — K21.9 GASTROESOPHAGEAL REFLUX DISEASE WITHOUT ESOPHAGITIS: ICD-10-CM

## 2018-01-01 DIAGNOSIS — D50.8 OTHER IRON DEFICIENCY ANEMIA: Primary | ICD-10-CM

## 2018-01-01 DIAGNOSIS — E03.8 SUBCLINICAL HYPOTHYROIDISM: Primary | ICD-10-CM

## 2018-01-01 DIAGNOSIS — E03.8 SUBCLINICAL HYPOTHYROIDISM: ICD-10-CM

## 2018-01-01 DIAGNOSIS — R06.00 DYSPNEA, UNSPECIFIED TYPE: Primary | ICD-10-CM

## 2018-01-01 DIAGNOSIS — S80.12XA HEMATOMA OF LEFT LOWER EXTREMITY, INITIAL ENCOUNTER: ICD-10-CM

## 2018-01-01 LAB
ALBUMIN SERPL-MCNC: 3.1 G/DL (ref 3.4–5)
ALBUMIN SERPL-MCNC: 3.2 G/DL (ref 3.4–5)
ALBUMIN SERPL-MCNC: 3.5 G/DL (ref 3.4–5)
ALBUMIN SERPL-MCNC: 3.5 G/DL (ref 3.4–5)
ALBUMIN UR-MCNC: NEGATIVE MG/DL
ALP SERPL-CCNC: 109 U/L (ref 40–150)
ALP SERPL-CCNC: 118 U/L (ref 40–150)
ALP SERPL-CCNC: 89 U/L (ref 40–150)
ALP SERPL-CCNC: 97 U/L (ref 40–150)
ALT SERPL W P-5'-P-CCNC: 23 U/L (ref 0–50)
ALT SERPL W P-5'-P-CCNC: 27 U/L (ref 0–50)
ALT SERPL W P-5'-P-CCNC: 35 U/L (ref 0–50)
ALT SERPL W P-5'-P-CCNC: 35 U/L (ref 0–50)
ANION GAP SERPL CALCULATED.3IONS-SCNC: 10 MMOL/L (ref 3–14)
ANION GAP SERPL CALCULATED.3IONS-SCNC: 10 MMOL/L (ref 3–14)
ANION GAP SERPL CALCULATED.3IONS-SCNC: 12 MMOL/L (ref 3–14)
ANION GAP SERPL CALCULATED.3IONS-SCNC: 8 MMOL/L (ref 3–14)
ANION GAP SERPL CALCULATED.3IONS-SCNC: 9 MMOL/L (ref 3–14)
ANISOCYTOSIS BLD QL SMEAR: SLIGHT
APPEARANCE UR: CLEAR
AST SERPL W P-5'-P-CCNC: 34 U/L (ref 0–45)
AST SERPL W P-5'-P-CCNC: 37 U/L (ref 0–45)
AST SERPL W P-5'-P-CCNC: 40 U/L (ref 0–45)
AST SERPL W P-5'-P-CCNC: 42 U/L (ref 0–45)
BACTERIA #/AREA URNS HPF: ABNORMAL /HPF
BACTERIA SPEC CULT: NORMAL
BASOPHILS # BLD AUTO: 0 10E9/L (ref 0–0.2)
BASOPHILS # BLD AUTO: 0.1 10E9/L (ref 0–0.2)
BASOPHILS NFR BLD AUTO: 0.1 %
BASOPHILS NFR BLD AUTO: 0.3 %
BASOPHILS NFR BLD AUTO: 0.3 %
BASOPHILS NFR BLD AUTO: 0.6 %
BASOPHILS NFR BLD AUTO: 1 %
BILIRUB SERPL-MCNC: 0.3 MG/DL (ref 0.2–1.3)
BILIRUB SERPL-MCNC: 0.4 MG/DL (ref 0.2–1.3)
BILIRUB SERPL-MCNC: 0.6 MG/DL (ref 0.2–1.3)
BILIRUB SERPL-MCNC: 0.6 MG/DL (ref 0.2–1.3)
BILIRUB UR QL STRIP: NEGATIVE
BUN SERPL-MCNC: 21 MG/DL (ref 7–30)
BUN SERPL-MCNC: 25 MG/DL (ref 7–30)
BUN SERPL-MCNC: 28 MG/DL (ref 7–30)
BUN SERPL-MCNC: 29 MG/DL (ref 7–30)
BUN SERPL-MCNC: 29 MG/DL (ref 7–30)
BUN SERPL-MCNC: 30 MG/DL (ref 7–30)
BUN SERPL-MCNC: 37 MG/DL (ref 7–30)
CALCIUM SERPL-MCNC: 8.6 MG/DL (ref 8.5–10.1)
CALCIUM SERPL-MCNC: 8.6 MG/DL (ref 8.5–10.1)
CALCIUM SERPL-MCNC: 8.9 MG/DL (ref 8.5–10.1)
CALCIUM SERPL-MCNC: 9.1 MG/DL (ref 8.5–10.1)
CALCIUM SERPL-MCNC: 9.2 MG/DL (ref 8.5–10.1)
CALCIUM SERPL-MCNC: 9.3 MG/DL (ref 8.5–10.1)
CALCIUM SERPL-MCNC: 9.5 MG/DL (ref 8.5–10.1)
CHLORIDE SERPL-SCNC: 102 MMOL/L (ref 94–109)
CHLORIDE SERPL-SCNC: 104 MMOL/L (ref 94–109)
CHLORIDE SERPL-SCNC: 105 MMOL/L (ref 94–109)
CHLORIDE SERPL-SCNC: 106 MMOL/L (ref 94–109)
CHLORIDE SERPL-SCNC: 108 MMOL/L (ref 94–109)
CHOLEST SERPL-MCNC: 129 MG/DL
CO2 SERPL-SCNC: 25 MMOL/L (ref 20–32)
CO2 SERPL-SCNC: 27 MMOL/L (ref 20–32)
CO2 SERPL-SCNC: 28 MMOL/L (ref 20–32)
CO2 SERPL-SCNC: 29 MMOL/L (ref 20–32)
CO2 SERPL-SCNC: 29 MMOL/L (ref 20–32)
CO2 SERPL-SCNC: 31 MMOL/L (ref 20–32)
CO2 SERPL-SCNC: 33 MMOL/L (ref 20–32)
COLOR UR AUTO: YELLOW
COPATH REPORT: NORMAL
CREAT SERPL-MCNC: 1.48 MG/DL (ref 0.52–1.04)
CREAT SERPL-MCNC: 1.6 MG/DL (ref 0.52–1.04)
CREAT SERPL-MCNC: 1.8 MG/DL (ref 0.52–1.04)
CREAT SERPL-MCNC: 1.8 MG/DL (ref 0.52–1.04)
CREAT SERPL-MCNC: 1.89 MG/DL (ref 0.52–1.04)
CREAT SERPL-MCNC: 2.02 MG/DL (ref 0.52–1.04)
CREAT SERPL-MCNC: 2.07 MG/DL (ref 0.52–1.04)
DIFFERENTIAL METHOD BLD: ABNORMAL
EOSINOPHIL # BLD AUTO: 0.3 10E9/L (ref 0–0.7)
EOSINOPHIL # BLD AUTO: 0.4 10E9/L (ref 0–0.7)
EOSINOPHIL # BLD AUTO: 0.4 10E9/L (ref 0–0.7)
EOSINOPHIL # BLD AUTO: 0.5 10E9/L (ref 0–0.7)
EOSINOPHIL # BLD AUTO: 0.5 10E9/L (ref 0–0.7)
EOSINOPHIL NFR BLD AUTO: 3.7 %
EOSINOPHIL NFR BLD AUTO: 5 %
EOSINOPHIL NFR BLD AUTO: 6.5 %
EOSINOPHIL NFR BLD AUTO: 6.6 %
EOSINOPHIL NFR BLD AUTO: 7 %
ERYTHROCYTE [DISTWIDTH] IN BLOOD BY AUTOMATED COUNT: 16 % (ref 10–15)
ERYTHROCYTE [DISTWIDTH] IN BLOOD BY AUTOMATED COUNT: 16.2 % (ref 10–15)
ERYTHROCYTE [DISTWIDTH] IN BLOOD BY AUTOMATED COUNT: 16.3 % (ref 10–15)
ERYTHROCYTE [DISTWIDTH] IN BLOOD BY AUTOMATED COUNT: 16.6 % (ref 10–15)
ERYTHROCYTE [DISTWIDTH] IN BLOOD BY AUTOMATED COUNT: 19.3 % (ref 10–15)
ERYTHROCYTE [SEDIMENTATION RATE] IN BLOOD BY WESTERGREN METHOD: 62 MM/H (ref 0–30)
ERYTHROCYTE [SEDIMENTATION RATE] IN BLOOD BY WESTERGREN METHOD: 67 MM/H (ref 0–30)
FERRITIN SERPL-MCNC: 21 NG/ML (ref 8–252)
FERRITIN SERPL-MCNC: 26 NG/ML (ref 8–252)
GFR SERPL CREATININE-BSD FRML MDRD: 23 ML/MIN/1.7M2
GFR SERPL CREATININE-BSD FRML MDRD: 24 ML/MIN/1.7M2
GFR SERPL CREATININE-BSD FRML MDRD: 26 ML/MIN/1.7M2
GFR SERPL CREATININE-BSD FRML MDRD: 27 ML/MIN/1.7M2
GFR SERPL CREATININE-BSD FRML MDRD: 27 ML/MIN/1.7M2
GFR SERPL CREATININE-BSD FRML MDRD: 31 ML/MIN/1.7M2
GFR SERPL CREATININE-BSD FRML MDRD: 34 ML/MIN/1.7M2
GLUCOSE SERPL-MCNC: 107 MG/DL (ref 70–99)
GLUCOSE SERPL-MCNC: 113 MG/DL (ref 70–99)
GLUCOSE SERPL-MCNC: 113 MG/DL (ref 70–99)
GLUCOSE SERPL-MCNC: 118 MG/DL (ref 70–99)
GLUCOSE SERPL-MCNC: 84 MG/DL (ref 70–99)
GLUCOSE SERPL-MCNC: 91 MG/DL (ref 70–99)
GLUCOSE SERPL-MCNC: 91 MG/DL (ref 70–99)
GLUCOSE UR STRIP-MCNC: NEGATIVE MG/DL
HCT VFR BLD AUTO: 30.2 % (ref 35–47)
HCT VFR BLD AUTO: 30.4 % (ref 35–47)
HCT VFR BLD AUTO: 30.5 % (ref 35–47)
HCT VFR BLD AUTO: 30.6 % (ref 35–47)
HCT VFR BLD AUTO: 37 % (ref 35–47)
HDLC SERPL-MCNC: 47 MG/DL
HEMOCCULT STL QL IA: NEGATIVE
HGB BLD-MCNC: 10.9 G/DL (ref 11.7–15.7)
HGB BLD-MCNC: 8.8 G/DL (ref 11.7–15.7)
HGB BLD-MCNC: 8.9 G/DL (ref 11.7–15.7)
HGB BLD-MCNC: 8.9 G/DL (ref 11.7–15.7)
HGB BLD-MCNC: 9 G/DL (ref 11.7–15.7)
HGB UR QL STRIP: NEGATIVE
INR POINT OF CARE: 1.7 (ref 0.86–1.14)
INR POINT OF CARE: 1.9 (ref 0.86–1.14)
INR POINT OF CARE: 2 (ref 0.86–1.14)
INR POINT OF CARE: 3.2 (ref 0.86–1.14)
INR PPP: 1.2
INR PPP: 1.9 (ref 0.9–1.1)
INR PPP: 1.9 (ref 0.9–1.1)
INR PPP: 2
INR PPP: 2 (ref 0.9–1.1)
INR PPP: 2.04 (ref 0.86–1.14)
INR PPP: 2.1
INR PPP: 2.3
INR PPP: 2.6 (ref 0.86–1.14)
INR PPP: 2.8 (ref 0.9–1.1)
INR PPP: 3.2 (ref 0.9–1.1)
IRON SATN MFR SERPL: 11 % (ref 15–46)
IRON SATN MFR SERPL: 9 % (ref 15–46)
IRON SERPL-MCNC: 41 UG/DL (ref 35–180)
IRON SERPL-MCNC: 46 UG/DL (ref 35–180)
KETONES UR STRIP-MCNC: NEGATIVE MG/DL
LDH SERPL L TO P-CCNC: 254 U/L (ref 81–234)
LDLC SERPL CALC-MCNC: 58 MG/DL
LEUKOCYTE ESTERASE UR QL STRIP: ABNORMAL
LYMPHOCYTES # BLD AUTO: 1 10E9/L (ref 0.8–5.3)
LYMPHOCYTES # BLD AUTO: 1.1 10E9/L (ref 0.8–5.3)
LYMPHOCYTES # BLD AUTO: 1.1 10E9/L (ref 0.8–5.3)
LYMPHOCYTES # BLD AUTO: 1.2 10E9/L (ref 0.8–5.3)
LYMPHOCYTES # BLD AUTO: 1.2 10E9/L (ref 0.8–5.3)
LYMPHOCYTES NFR BLD AUTO: 10.6 %
LYMPHOCYTES NFR BLD AUTO: 15.5 %
LYMPHOCYTES NFR BLD AUTO: 16.7 %
LYMPHOCYTES NFR BLD AUTO: 17 %
LYMPHOCYTES NFR BLD AUTO: 17.6 %
MACROCYTES BLD QL SMEAR: PRESENT
MCH RBC QN AUTO: 26.5 PG (ref 26.5–33)
MCH RBC QN AUTO: 27 PG (ref 26.5–33)
MCH RBC QN AUTO: 27.1 PG (ref 26.5–33)
MCH RBC QN AUTO: 27.2 PG (ref 26.5–33)
MCH RBC QN AUTO: 27.3 PG (ref 26.5–33)
MCHC RBC AUTO-ENTMCNC: 29.1 G/DL (ref 31.5–36.5)
MCHC RBC AUTO-ENTMCNC: 29.2 G/DL (ref 31.5–36.5)
MCHC RBC AUTO-ENTMCNC: 29.3 G/DL (ref 31.5–36.5)
MCHC RBC AUTO-ENTMCNC: 29.4 G/DL (ref 31.5–36.5)
MCHC RBC AUTO-ENTMCNC: 29.5 G/DL (ref 31.5–36.5)
MCV RBC AUTO: 91 FL (ref 78–100)
MCV RBC AUTO: 92 FL (ref 78–100)
MCV RBC AUTO: 93 FL (ref 78–100)
MONOCYTES # BLD AUTO: 0.6 10E9/L (ref 0–1.3)
MONOCYTES # BLD AUTO: 0.7 10E9/L (ref 0–1.3)
MONOCYTES # BLD AUTO: 0.8 10E9/L (ref 0–1.3)
MONOCYTES # BLD AUTO: 0.8 10E9/L (ref 0–1.3)
MONOCYTES # BLD AUTO: 1 10E9/L (ref 0–1.3)
MONOCYTES NFR BLD AUTO: 11 %
MONOCYTES NFR BLD AUTO: 11.5 %
MONOCYTES NFR BLD AUTO: 11.6 %
MONOCYTES NFR BLD AUTO: 11.9 %
MONOCYTES NFR BLD AUTO: 9.5 %
NEUTROPHILS # BLD AUTO: 3.4 10E9/L (ref 1.6–8.3)
NEUTROPHILS # BLD AUTO: 4.5 10E9/L (ref 1.6–8.3)
NEUTROPHILS # BLD AUTO: 4.5 10E9/L (ref 1.6–8.3)
NEUTROPHILS # BLD AUTO: 4.6 10E9/L (ref 1.6–8.3)
NEUTROPHILS # BLD AUTO: 7.6 10E9/L (ref 1.6–8.3)
NEUTROPHILS NFR BLD AUTO: 63.2 %
NEUTROPHILS NFR BLD AUTO: 64.9 %
NEUTROPHILS NFR BLD AUTO: 65.8 %
NEUTROPHILS NFR BLD AUTO: 66 %
NEUTROPHILS NFR BLD AUTO: 76.1 %
NITRATE UR QL: NEGATIVE
NON-SQ EPI CELLS #/AREA URNS LPF: ABNORMAL /LPF
NONHDLC SERPL-MCNC: 82 MG/DL
NT-PROBNP SERPL-MCNC: 1737 PG/ML (ref 0–450)
PH UR STRIP: 5.5 PH (ref 5–7)
PLATELET # BLD AUTO: 156 10E9/L (ref 150–450)
PLATELET # BLD AUTO: 189 10E9/L (ref 150–450)
PLATELET # BLD AUTO: 197 10E9/L (ref 150–450)
PLATELET # BLD AUTO: 198 10E9/L (ref 150–450)
PLATELET # BLD AUTO: 200 10E9/L (ref 150–450)
PLATELET # BLD EST: ABNORMAL 10*3/UL
POTASSIUM SERPL-SCNC: 4 MMOL/L (ref 3.4–5.3)
POTASSIUM SERPL-SCNC: 4.5 MMOL/L (ref 3.4–5.3)
POTASSIUM SERPL-SCNC: 4.7 MMOL/L (ref 3.4–5.3)
POTASSIUM SERPL-SCNC: 4.7 MMOL/L (ref 3.4–5.3)
POTASSIUM SERPL-SCNC: 4.8 MMOL/L (ref 3.4–5.3)
POTASSIUM SERPL-SCNC: 4.8 MMOL/L (ref 3.4–5.3)
POTASSIUM SERPL-SCNC: 5.2 MMOL/L (ref 3.4–5.3)
PROT SERPL-MCNC: 7.9 G/DL (ref 6.8–8.8)
PROT SERPL-MCNC: 7.9 G/DL (ref 6.8–8.8)
PROT SERPL-MCNC: 8 G/DL (ref 6.8–8.8)
PROT SERPL-MCNC: 8.1 G/DL (ref 6.8–8.8)
RBC # BLD AUTO: 3.26 10E12/L (ref 3.8–5.2)
RBC # BLD AUTO: 3.3 10E12/L (ref 3.8–5.2)
RBC # BLD AUTO: 3.31 10E12/L (ref 3.8–5.2)
RBC # BLD AUTO: 3.32 10E12/L (ref 3.8–5.2)
RBC # BLD AUTO: 4.02 10E12/L (ref 3.8–5.2)
RBC #/AREA URNS AUTO: ABNORMAL /HPF
RETICS # AUTO: 96.4 10E9/L (ref 25–95)
RETICS/RBC NFR AUTO: 2.9 % (ref 0.5–2)
SODIUM SERPL-SCNC: 140 MMOL/L (ref 133–144)
SODIUM SERPL-SCNC: 141 MMOL/L (ref 133–144)
SODIUM SERPL-SCNC: 143 MMOL/L (ref 133–144)
SODIUM SERPL-SCNC: 146 MMOL/L (ref 133–144)
SODIUM SERPL-SCNC: 148 MMOL/L (ref 133–144)
SOURCE: ABNORMAL
SP GR UR STRIP: 1.02 (ref 1–1.03)
SPECIMEN SOURCE: NORMAL
T4 FREE SERPL-MCNC: 1.21 NG/DL (ref 0.76–1.46)
TIBC SERPL-MCNC: 412 UG/DL (ref 240–430)
TIBC SERPL-MCNC: 448 UG/DL (ref 240–430)
TRIGL SERPL-MCNC: 121 MG/DL
TSH SERPL DL<=0.005 MIU/L-ACNC: 3.22 MU/L (ref 0.4–4)
TSH SERPL DL<=0.005 MIU/L-ACNC: 3.33 MU/L (ref 0.4–4)
TSH SERPL DL<=0.005 MIU/L-ACNC: 4.28 MU/L (ref 0.4–4)
UROBILINOGEN UR STRIP-ACNC: 0.2 EU/DL (ref 0.2–1)
VARIANT LYMPHS BLD QL SMEAR: PRESENT
VIT B12 SERPL-MCNC: 996 PG/ML (ref 193–986)
WBC # BLD AUTO: 10 10E9/L (ref 4–11)
WBC # BLD AUTO: 5.4 10E9/L (ref 4–11)
WBC # BLD AUTO: 6.8 10E9/L (ref 4–11)
WBC # BLD AUTO: 7 10E9/L (ref 4–11)
WBC # BLD AUTO: 7 10E9/L (ref 4–11)
WBC #/AREA URNS AUTO: ABNORMAL /HPF

## 2018-01-01 PROCEDURE — 85025 COMPLETE CBC W/AUTO DIFF WBC: CPT | Performed by: PEDIATRICS

## 2018-01-01 PROCEDURE — 36415 COLL VENOUS BLD VENIPUNCTURE: CPT | Performed by: PEDIATRICS

## 2018-01-01 PROCEDURE — 71046 X-RAY EXAM CHEST 2 VIEWS: CPT

## 2018-01-01 PROCEDURE — A9585 GADOBUTROL INJECTION: HCPCS | Performed by: PEDIATRICS

## 2018-01-01 PROCEDURE — 81001 URINALYSIS AUTO W/SCOPE: CPT | Performed by: PHYSICIAN ASSISTANT

## 2018-01-01 PROCEDURE — 99207 ZZC NO CHARGE NURSE ONLY: CPT

## 2018-01-01 PROCEDURE — 85045 AUTOMATED RETICULOCYTE COUNT: CPT | Performed by: PEDIATRICS

## 2018-01-01 PROCEDURE — 36416 COLLJ CAPILLARY BLOOD SPEC: CPT

## 2018-01-01 PROCEDURE — 87086 URINE CULTURE/COLONY COUNT: CPT | Performed by: PHYSICIAN ASSISTANT

## 2018-01-01 PROCEDURE — 36415 COLL VENOUS BLD VENIPUNCTURE: CPT | Performed by: PHYSICIAN ASSISTANT

## 2018-01-01 PROCEDURE — 85652 RBC SED RATE AUTOMATED: CPT | Performed by: PEDIATRICS

## 2018-01-01 PROCEDURE — 85610 PROTHROMBIN TIME: CPT | Mod: QW

## 2018-01-01 PROCEDURE — 80053 COMPREHEN METABOLIC PANEL: CPT | Performed by: PEDIATRICS

## 2018-01-01 PROCEDURE — 82728 ASSAY OF FERRITIN: CPT | Performed by: PEDIATRICS

## 2018-01-01 PROCEDURE — 80048 BASIC METABOLIC PNL TOTAL CA: CPT | Performed by: PEDIATRICS

## 2018-01-01 PROCEDURE — 82607 VITAMIN B-12: CPT | Performed by: PEDIATRICS

## 2018-01-01 PROCEDURE — 99214 OFFICE O/P EST MOD 30 MIN: CPT | Mod: GC | Performed by: INTERNAL MEDICINE

## 2018-01-01 PROCEDURE — 76705 ECHO EXAM OF ABDOMEN: CPT

## 2018-01-01 PROCEDURE — 73630 X-RAY EXAM OF FOOT: CPT | Mod: LT

## 2018-01-01 PROCEDURE — 84439 ASSAY OF FREE THYROXINE: CPT | Performed by: PEDIATRICS

## 2018-01-01 PROCEDURE — 85610 PROTHROMBIN TIME: CPT | Performed by: PEDIATRICS

## 2018-01-01 PROCEDURE — 99214 OFFICE O/P EST MOD 30 MIN: CPT | Performed by: PEDIATRICS

## 2018-01-01 PROCEDURE — 80061 LIPID PANEL: CPT | Performed by: PEDIATRICS

## 2018-01-01 PROCEDURE — 99203 OFFICE O/P NEW LOW 30 MIN: CPT | Performed by: FAMILY MEDICINE

## 2018-01-01 PROCEDURE — 25000128 H RX IP 250 OP 636: Performed by: PEDIATRICS

## 2018-01-01 PROCEDURE — 83880 ASSAY OF NATRIURETIC PEPTIDE: CPT | Performed by: PHYSICIAN ASSISTANT

## 2018-01-01 PROCEDURE — 80053 COMPREHEN METABOLIC PANEL: CPT | Performed by: PHYSICIAN ASSISTANT

## 2018-01-01 PROCEDURE — 85025 COMPLETE CBC W/AUTO DIFF WBC: CPT | Performed by: PHYSICIAN ASSISTANT

## 2018-01-01 PROCEDURE — 28660 TREAT TOE DISLOCATION: CPT | Mod: LT

## 2018-01-01 PROCEDURE — 73080 X-RAY EXAM OF ELBOW: CPT | Mod: RT

## 2018-01-01 PROCEDURE — 83550 IRON BINDING TEST: CPT | Performed by: PEDIATRICS

## 2018-01-01 PROCEDURE — 85652 RBC SED RATE AUTOMATED: CPT | Performed by: PHYSICIAN ASSISTANT

## 2018-01-01 PROCEDURE — 99215 OFFICE O/P EST HI 40 MIN: CPT | Performed by: PHYSICIAN ASSISTANT

## 2018-01-01 PROCEDURE — 73700 CT LOWER EXTREMITY W/O DYE: CPT | Mod: LT

## 2018-01-01 PROCEDURE — 99214 OFFICE O/P EST MOD 30 MIN: CPT | Performed by: PHYSICIAN ASSISTANT

## 2018-01-01 PROCEDURE — 99244 OFF/OP CNSLTJ NEW/EST MOD 40: CPT | Performed by: PODIATRIST

## 2018-01-01 PROCEDURE — 70553 MRI BRAIN STEM W/O & W/DYE: CPT

## 2018-01-01 PROCEDURE — 99207 ZZC NO CHARGE NURSE ONLY: CPT | Performed by: PEDIATRICS

## 2018-01-01 PROCEDURE — 84443 ASSAY THYROID STIM HORMONE: CPT | Performed by: PEDIATRICS

## 2018-01-01 PROCEDURE — 83540 ASSAY OF IRON: CPT | Performed by: PEDIATRICS

## 2018-01-01 PROCEDURE — 82274 ASSAY TEST FOR BLOOD FECAL: CPT | Performed by: PEDIATRICS

## 2018-01-01 PROCEDURE — 85060 BLOOD SMEAR INTERPRETATION: CPT | Performed by: PEDIATRICS

## 2018-01-01 PROCEDURE — 99285 EMERGENCY DEPT VISIT HI MDM: CPT | Mod: 25

## 2018-01-01 PROCEDURE — 83615 LACTATE (LD) (LDH) ENZYME: CPT | Performed by: PEDIATRICS

## 2018-01-01 PROCEDURE — 85610 PROTHROMBIN TIME: CPT | Performed by: PHYSICIAN ASSISTANT

## 2018-01-01 RX ORDER — FERROUS SULFATE 325(65) MG
TABLET ORAL
Qty: 90 TABLET | Refills: 2 | Status: SHIPPED | OUTPATIENT
Start: 2018-01-01 | End: 2018-01-01

## 2018-01-01 RX ORDER — GABAPENTIN 600 MG/1
1200 TABLET ORAL 3 TIMES DAILY
Qty: 180 TABLET | Refills: 5 | Status: SHIPPED | OUTPATIENT
Start: 2018-01-01 | End: 2018-01-01

## 2018-01-01 RX ORDER — GINSENG 100 MG
CAPSULE ORAL
Status: DISCONTINUED
Start: 2018-01-01 | End: 2018-01-01 | Stop reason: HOSPADM

## 2018-01-01 RX ORDER — PREGABALIN 150 MG/1
150 CAPSULE ORAL 2 TIMES DAILY
Qty: 180 CAPSULE | Refills: 1 | Status: CANCELLED | OUTPATIENT
Start: 2018-01-01

## 2018-01-01 RX ORDER — OMEPRAZOLE 40 MG/1
40 CAPSULE, DELAYED RELEASE ORAL DAILY
Qty: 90 CAPSULE | Refills: 0 | Status: SHIPPED | OUTPATIENT
Start: 2018-01-01 | End: 2019-01-01

## 2018-01-01 RX ORDER — PRAVASTATIN SODIUM 10 MG
TABLET ORAL
Qty: 90 TABLET | Refills: 0 | Status: CANCELLED | OUTPATIENT
Start: 2018-01-01

## 2018-01-01 RX ORDER — PRAVASTATIN SODIUM 10 MG
TABLET ORAL
Qty: 90 TABLET | Refills: 0 | Status: SHIPPED | OUTPATIENT
Start: 2018-01-01 | End: 2018-01-01

## 2018-01-01 RX ORDER — METOPROLOL SUCCINATE 50 MG/1
TABLET, EXTENDED RELEASE ORAL
Qty: 90 TABLET | Refills: 3 | Status: SHIPPED | OUTPATIENT
Start: 2018-01-01 | End: 2019-01-01

## 2018-01-01 RX ORDER — WARFARIN SODIUM 5 MG/1
TABLET ORAL
Start: 2018-01-01 | End: 2018-01-01

## 2018-01-01 RX ORDER — VENLAFAXINE HYDROCHLORIDE 150 MG/1
150 CAPSULE, EXTENDED RELEASE ORAL EVERY EVENING
Qty: 90 CAPSULE | Refills: 1 | Status: SHIPPED | OUTPATIENT
Start: 2018-01-01 | End: 2018-01-01

## 2018-01-01 RX ORDER — FUROSEMIDE 20 MG
20 TABLET ORAL DAILY
Qty: 90 TABLET | Refills: 0 | Status: SHIPPED | OUTPATIENT
Start: 2018-01-01 | End: 2019-01-01

## 2018-01-01 RX ORDER — DIAZEPAM 10 MG
TABLET ORAL
Qty: 1 TABLET | Refills: 0 | Status: SHIPPED | OUTPATIENT
Start: 2018-01-01 | End: 2018-01-01

## 2018-01-01 RX ORDER — WARFARIN SODIUM 5 MG/1
TABLET ORAL
Qty: 45 TABLET | Refills: 3 | Status: SHIPPED | OUTPATIENT
Start: 2018-01-01 | End: 2018-01-01

## 2018-01-01 RX ORDER — WARFARIN SODIUM 2.5 MG/1
TABLET ORAL
Qty: 60 TABLET | Refills: 3 | Status: SHIPPED | OUTPATIENT
Start: 2018-01-01 | End: 2018-01-01

## 2018-01-01 RX ORDER — WARFARIN SODIUM 2.5 MG/1
TABLET ORAL
Start: 2018-01-01 | End: 2018-01-01

## 2018-01-01 RX ORDER — BUPROPION HYDROCHLORIDE 300 MG/1
TABLET ORAL
Qty: 90 TABLET | Refills: 0 | Status: SHIPPED | OUTPATIENT
Start: 2018-01-01 | End: 2018-01-01

## 2018-01-01 RX ORDER — DILTIAZEM HYDROCHLORIDE 120 MG/1
CAPSULE, EXTENDED RELEASE ORAL
Qty: 90 CAPSULE | Refills: 2 | Status: SHIPPED | OUTPATIENT
Start: 2018-01-01 | End: 2019-01-01

## 2018-01-01 RX ORDER — PREGABALIN 150 MG/1
150 CAPSULE ORAL 2 TIMES DAILY
Qty: 180 CAPSULE | Refills: 1 | Status: SHIPPED | OUTPATIENT
Start: 2018-01-01 | End: 2018-01-01

## 2018-01-01 RX ORDER — GADOBUTROL 604.72 MG/ML
15 INJECTION INTRAVENOUS ONCE
Status: COMPLETED | OUTPATIENT
Start: 2018-01-01 | End: 2018-01-01

## 2018-01-01 RX ORDER — BUPROPION HYDROCHLORIDE 300 MG/1
TABLET ORAL
Qty: 90 TABLET | Refills: 0 | Status: SHIPPED | OUTPATIENT
Start: 2018-01-01 | End: 2019-01-01

## 2018-01-01 RX ORDER — FERROUS SULFATE 325(65) MG
TABLET ORAL
Qty: 180 TABLET | Refills: 2 | Status: SHIPPED | OUTPATIENT
Start: 2018-01-01 | End: 2019-01-01

## 2018-01-01 RX ORDER — GABAPENTIN 600 MG/1
1200 TABLET ORAL 3 TIMES DAILY
Qty: 540 TABLET | Refills: 3 | Status: SHIPPED | OUTPATIENT
Start: 2018-01-01 | End: 2019-01-01

## 2018-01-01 RX ORDER — HYDROCODONE BITARTRATE AND ACETAMINOPHEN 5; 325 MG/1; MG/1
1 TABLET ORAL EVERY 6 HOURS PRN
Qty: 15 TABLET | Refills: 0 | Status: ON HOLD | OUTPATIENT
Start: 2018-01-01 | End: 2019-01-01

## 2018-01-01 RX ORDER — PREGABALIN 150 MG/1
150 CAPSULE ORAL 2 TIMES DAILY
Qty: 180 CAPSULE | Refills: 0 | Status: SHIPPED | OUTPATIENT
Start: 2018-01-01 | End: 2019-01-01

## 2018-01-01 RX ORDER — PRAVASTATIN SODIUM 10 MG
TABLET ORAL
Qty: 90 TABLET | Refills: 0 | Status: SHIPPED | OUTPATIENT
Start: 2018-01-01 | End: 2019-01-01

## 2018-01-01 RX ORDER — OMEPRAZOLE 40 MG/1
CAPSULE, DELAYED RELEASE ORAL
Qty: 90 CAPSULE | Refills: 1 | OUTPATIENT
Start: 2018-01-01

## 2018-01-01 RX ORDER — LEVOTHYROXINE SODIUM 75 UG/1
75 TABLET ORAL DAILY
Qty: 90 TABLET | Refills: 0 | Status: SHIPPED | OUTPATIENT
Start: 2018-01-01 | End: 2018-01-01

## 2018-01-01 RX ORDER — LEVOTHYROXINE SODIUM 75 UG/1
75 TABLET ORAL DAILY
Qty: 90 TABLET | Refills: 2 | Status: SHIPPED | OUTPATIENT
Start: 2018-01-01 | End: 2019-01-01

## 2018-01-01 RX ADMIN — GADOBUTROL 14 ML: 604.72 INJECTION INTRAVENOUS at 17:18

## 2018-01-01 ASSESSMENT — ENCOUNTER SYMPTOMS
BRUISES/BLEEDS EASILY: 0
HEMATURIA: 0
COUGH DISTURBING SLEEP: 1
TREMORS: 1
SWOLLEN GLANDS: 0
COUGH: 1
NUMBNESS: 0
FLANK PAIN: 0
JOINT SWELLING: 1
DYSURIA: 0
SHORTNESS OF BREATH: 0
TINGLING: 0
PARALYSIS: 0
DYSPNEA ON EXERTION: 1
SPEECH CHANGE: 0
DIZZINESS: 1
HEADACHES: 0
FEVER: 0
POSTURAL DYSPNEA: 0
ARTHRALGIAS: 1
SPUTUM PRODUCTION: 1
SEIZURES: 0
SNORES LOUDLY: 0
LOSS OF CONSCIOUSNESS: 0
HEMOPTYSIS: 0
DISTURBANCES IN COORDINATION: 1
MEMORY LOSS: 0
DIFFICULTY URINATING: 0
WHEEZING: 1
WEAKNESS: 1

## 2018-01-01 ASSESSMENT — ACTIVITIES OF DAILY LIVING (ADL): DEPENDENT_IADLS:: CLEANING;COOKING;LAUNDRY;SHOPPING;MEAL PREPARATION;MEDICATION MANAGEMENT;TRANSPORTATION

## 2018-01-01 ASSESSMENT — PAIN SCALES - GENERAL: PAINLEVEL: NO PAIN (0)

## 2018-01-01 ASSESSMENT — PATIENT HEALTH QUESTIONNAIRE - PHQ9: SUM OF ALL RESPONSES TO PHQ QUESTIONS 1-9: 1

## 2018-01-03 ENCOUNTER — TELEPHONE (OUTPATIENT)
Dept: PEDIATRICS | Facility: CLINIC | Age: 78
End: 2018-01-03

## 2018-01-03 NOTE — TELEPHONE ENCOUNTER
Patient calling to report that new insurance needs RX's sent to [facility], they will call to request new refills (they are unable to transfer RX's from previous pharmacy). She wanted this note on file for when we receive the request.  -Melanie Henry

## 2018-01-04 ENCOUNTER — ANTICOAGULATION THERAPY VISIT (OUTPATIENT)
Dept: NURSING | Facility: CLINIC | Age: 78
End: 2018-01-04
Payer: COMMERCIAL

## 2018-01-04 DIAGNOSIS — I48.92 ATRIAL FLUTTER (H): ICD-10-CM

## 2018-01-04 DIAGNOSIS — Z79.01 LONG-TERM (CURRENT) USE OF ANTICOAGULANTS: ICD-10-CM

## 2018-01-04 DIAGNOSIS — I48.92 ATRIAL FLUTTER WITH RAPID VENTRICULAR RESPONSE (H): ICD-10-CM

## 2018-01-04 LAB — INR PPP: 2.2

## 2018-01-04 PROCEDURE — 99207 ZZC NO CHARGE NURSE ONLY: CPT

## 2018-01-04 NOTE — MR AVS SNAPSHOT
Berenice VANCE Chaudhari   1/4/2018 4:30 PM   Anticoagulation Therapy Visit    Description:  77 year old female   Provider:  [facility]   Department:  [facility]           INR as of 1/4/2018     Today's INR 2.2      Anticoagulation Summary as of 1/4/2018     INR goal 2.0-3.0   Today's INR 2.2   Full instructions 5 mg on Fri; 2.5 mg all other days   Next INR check 1/11/2018    Indications   Atrial flutter (H) [I48.92]  Atrial flutter with rapid ventricular response (H) [I48.92]  Long-term (current) use of anticoagulants [Z79.01] [Z79.01]         Contact Numbers     [facility]  Please call  [phone] to cancel and/or reschedule your appointment   Please call  [phone] with any problems or questions regarding your therapy.        January 2018 Details    Sun Mon Tue Wed Thu Fri Sat      1               2               3               4      2.5 mg   See details      5      5 mg         6      2.5 mg           7      2.5 mg         8      2.5 mg         9      2.5 mg         10      2.5 mg         11            12               13                 14               15               16               17               18               19               20                 21               22               23               24               25               26               27                 28               29               30               31                   Date Details   01/04 This INR check       Date of next INR:  1/11/2018         How to take your warfarin dose     To take:  2.5 mg Take 0.5 of a 5 mg tablet.    To take:  5 mg Take 1 of the 5 mg tablets.

## 2018-01-05 DIAGNOSIS — Z53.9 DIAGNOSIS NOT YET DEFINED: Primary | ICD-10-CM

## 2018-01-05 PROCEDURE — G0180 MD CERTIFICATION HHA PATIENT: HCPCS | Performed by: PEDIATRICS

## 2018-01-05 NOTE — PROGRESS NOTES
ANTICOAGULATION FOLLOW-UP     Patient Name:  Berenice Chaudhari  Date:  1/4/2018  Contact Type:  Telephone  Call received from [facility] nurse, with INR result.   Follow up instructions given over the phone to Home Care nurse for warfarin management.    SUBJECTIVE:     Patient Findings     Positives No Problem Findings    Comments Going to [place] 1/20/18 for one week.           OBJECTIVE    INR   Date Value Ref Range Status   01/04/2018 2.2  Final       ASSESSMENT / PLAN  INR assessment THER    Recheck INR In: 1 WEEK    INR Location [facility]      Anticoagulation Summary as of 1/4/2018     INR goal 2.0-3.0   Today's INR 2.2   Maintenance plan 5 mg (5 mg x 1) on Fri; 2.5 mg (5 mg x 0.5) all other days   Full instructions 5 mg on Fri; 2.5 mg all other days   Weekly total 20 mg   No change documented Mary Butcher RN   Plan last modified Mary Butcher RN (12/14/2017)   Next INR check 1/11/2018   Target end date Indefinite    Indications   Atrial flutter (H) [I48.92]  Atrial flutter with rapid ventricular response (H) [I48.92]  Long-term (current) use of anticoagulants [Z79.01] [Z79.01]         Anticoagulation Episode Summary     INR check location     Preferred lab     Send [facility]R reminders to [facility]    Comments 5mg tabs; HS dosing //  (Ricardo) sets up her meds // [facility] [phone]; [facility] [phone]      Anticoagulation Care Providers     Provider Role Specialty Phone number    Nelly Pearl MD Referring Internal Medicine [phone]            See the Encounter Report to view Anticoagulation Flowsheet and Dosing Calendar (Go to Encounters tab in chart review, and find the Anticoagulation Therapy Visit)        Mary Butcher RN

## 2018-01-11 ENCOUNTER — ANTICOAGULATION THERAPY VISIT (OUTPATIENT)
Dept: NURSING | Facility: CLINIC | Age: 78
End: 2018-01-11
Payer: COMMERCIAL

## 2018-01-11 DIAGNOSIS — I48.92 ATRIAL FLUTTER, UNSPECIFIED TYPE (H): ICD-10-CM

## 2018-01-11 DIAGNOSIS — I48.92 ATRIAL FLUTTER WITH RAPID VENTRICULAR RESPONSE (H): ICD-10-CM

## 2018-01-11 DIAGNOSIS — I48.92 ATRIAL FLUTTER (H): ICD-10-CM

## 2018-01-11 DIAGNOSIS — B02.29 POST HERPETIC NEURALGIA: ICD-10-CM

## 2018-01-11 DIAGNOSIS — A41.01 METHICILLIN SUSCEPTIBLE STAPHYLOCOCCUS AUREUS SEPTICEMIA (H): ICD-10-CM

## 2018-01-11 DIAGNOSIS — K21.9 GASTROESOPHAGEAL REFLUX DISEASE WITHOUT ESOPHAGITIS: ICD-10-CM

## 2018-01-11 DIAGNOSIS — B02.29 POSTHERPETIC NEURALGIA: ICD-10-CM

## 2018-01-11 DIAGNOSIS — Z79.01 LONG-TERM (CURRENT) USE OF ANTICOAGULANTS: ICD-10-CM

## 2018-01-11 DIAGNOSIS — E78.5 HYPERLIPIDEMIA LDL GOAL <100: ICD-10-CM

## 2018-01-11 DIAGNOSIS — F33.1 MAJOR DEPRESSIVE DISORDER, RECURRENT EPISODE, MODERATE (H): ICD-10-CM

## 2018-01-11 DIAGNOSIS — F33.42 MAJOR DEPRESSIVE DISORDER, RECURRENT, IN FULL REMISSION (H): ICD-10-CM

## 2018-01-11 LAB — INR PPP: 1.7

## 2018-01-11 PROCEDURE — 99207 ZZC NO CHARGE NURSE ONLY: CPT

## 2018-01-11 RX ORDER — PREGABALIN 150 MG/1
150 CAPSULE ORAL 2 TIMES DAILY
Qty: 180 CAPSULE | Refills: 0 | Status: CANCELLED | OUTPATIENT
Start: 2018-01-11

## 2018-01-11 RX ORDER — DILTIAZEM HYDROCHLORIDE 120 MG/1
120 CAPSULE, COATED, EXTENDED RELEASE ORAL DAILY
Qty: 90 CAPSULE | Refills: 2 | Status: ON HOLD | OUTPATIENT
Start: 2018-01-11 | End: 2018-04-10

## 2018-01-11 RX ORDER — METOPROLOL SUCCINATE 50 MG/1
50 TABLET, EXTENDED RELEASE ORAL DAILY
Qty: 90 TABLET | Refills: 2 | Status: ON HOLD | OUTPATIENT
Start: 2018-01-11 | End: 2018-04-10

## 2018-01-11 RX ORDER — BUPROPION HYDROCHLORIDE 300 MG/1
300 TABLET ORAL EVERY MORNING
Qty: 90 TABLET | Refills: 1 | Status: SHIPPED | OUTPATIENT
Start: 2018-01-11 | End: 2018-01-01

## 2018-01-11 RX ORDER — NORTRIPTYLINE HCL 25 MG
50 CAPSULE ORAL AT BEDTIME
Qty: 180 CAPSULE | Refills: 2 | Status: SHIPPED | OUTPATIENT
Start: 2018-01-11 | End: 2018-01-01

## 2018-01-11 RX ORDER — MINOCYCLINE HYDROCHLORIDE 50 MG/1
50 CAPSULE ORAL 2 TIMES DAILY
Qty: 180 CAPSULE | Refills: 2 | Status: SHIPPED | OUTPATIENT
Start: 2018-01-11 | End: 2018-01-01

## 2018-01-11 RX ORDER — PANTOPRAZOLE SODIUM 40 MG/1
TABLET, DELAYED RELEASE ORAL
Qty: 90 TABLET | Refills: 2 | Status: SHIPPED | OUTPATIENT
Start: 2018-01-11 | End: 2018-01-16

## 2018-01-11 RX ORDER — FUROSEMIDE 20 MG
20 TABLET ORAL DAILY
Qty: 90 TABLET | Refills: 3 | Status: SHIPPED | OUTPATIENT
Start: 2018-01-11 | End: 2018-01-01

## 2018-01-11 RX ORDER — PRAVASTATIN SODIUM 10 MG
10 TABLET ORAL AT BEDTIME
Qty: 90 TABLET | Refills: 0 | Status: SHIPPED | OUTPATIENT
Start: 2018-01-11 | End: 2018-05-11

## 2018-01-11 RX ORDER — GABAPENTIN 600 MG/1
1200 TABLET ORAL 3 TIMES DAILY
Qty: 180 TABLET | Refills: 3 | Status: SHIPPED | OUTPATIENT
Start: 2018-01-11 | End: 2018-01-16

## 2018-01-11 RX ORDER — WARFARIN SODIUM 1 MG/1
TABLET ORAL
Qty: 90 TABLET | Refills: 3 | Status: SHIPPED | OUTPATIENT
Start: 2018-01-11 | End: 2018-01-16

## 2018-01-11 RX ORDER — VENLAFAXINE HYDROCHLORIDE 150 MG/1
CAPSULE, EXTENDED RELEASE ORAL
Qty: 90 CAPSULE | Refills: 0 | Status: ON HOLD | OUTPATIENT
Start: 2018-01-11 | End: 2018-04-10

## 2018-01-11 NOTE — TELEPHONE ENCOUNTER
Called and spoke with pt, she was happy to hear about her refills.   Arline Lui MA 1:07 PM 1/11/2018

## 2018-01-11 NOTE — PROGRESS NOTES
ANTICOAGULATION FOLLOW-UP     Patient Name:  Berenice Chaudhari  Date:  1/11/2018  Contact Type:  Telephone  Call received from [facility] nurse, with INR result.   Follow up instructions given over the phone to Home Care nurse for warfarin management.    SUBJECTIVE:     Patient Findings     Positives Change in diet/appetite    Comments She has been eating a lot of lentil soup with vegetables.           OBJECTIVE    INR   Date Value Ref Range Status   01/11/2018 1.7  Final       ASSESSMENT / PLAN  INR assessment SUB    Recheck INR In: 1 WEEK    INR Location [facility]      Anticoagulation Summary as of 1/11/2018     INR goal 2.0-3.0   Today's INR 1.7!   Maintenance plan 5 mg (5 mg x 1) on Fri; 2.5 mg (5 mg x 0.5) all other days   Full instructions 1/11: 5 mg; Otherwise 5 mg on Fri; 2.5 mg all other days   Weekly total 20 mg   Plan last modified Mary Butcher RN (12/14/2017)   Next INR check 1/18/2018   Target end date Indefinite    Indications   Atrial flutter (H) [I48.92]  Atrial flutter with rapid ventricular response (H) [I48.92]  Long-term (current) use of anticoagulants [Z79.01] [Z79.01]         Anticoagulation Episode Summary     INR check location     Preferred lab     Send [facility]R reminders to [facility]    Comments 5mg tabs; HS dosing //  (Ricardo) sets up her meds // [facility] [phone]; [place] [phone]      Anticoagulation Care Providers     Provider Role Specialty Phone number    Nelly Pearl MD Referring Internal Medicine [phone]            See the Encounter Report to view Anticoagulation Flowsheet and Dosing Calendar (Go to Encounters tab in chart review, and find the Anticoagulation Therapy Visit)        Mary Butcher RN

## 2018-01-11 NOTE — TELEPHONE ENCOUNTER
Call from the [facility] asking for refills of 12 medications.    Patient had refills available at a local pharmacy, so these need to be transferred.   Transferred scripts per protocol.    Routing gabapentin and lyrica to a covering provider to fill as it is not on RN protocol.    Routing warfarin refill to the INR nurse to address, per protocol.    Melissa Abdullahi, RN  Message handled by Nurse Triage.

## 2018-01-16 ENCOUNTER — TELEPHONE (OUTPATIENT)
Dept: PEDIATRICS | Facility: CLINIC | Age: 78
End: 2018-01-16

## 2018-01-16 DIAGNOSIS — I48.92 ATRIAL FLUTTER, UNSPECIFIED TYPE (H): ICD-10-CM

## 2018-01-16 DIAGNOSIS — B02.29 POSTHERPETIC NEURALGIA: ICD-10-CM

## 2018-01-16 DIAGNOSIS — B02.29 POST HERPETIC NEURALGIA: ICD-10-CM

## 2018-01-16 DIAGNOSIS — K21.9 GASTROESOPHAGEAL REFLUX DISEASE WITHOUT ESOPHAGITIS: ICD-10-CM

## 2018-01-16 RX ORDER — PANTOPRAZOLE SODIUM 40 MG/1
TABLET, DELAYED RELEASE ORAL
Qty: 90 TABLET | Refills: 2 | Status: SHIPPED | OUTPATIENT
Start: 2018-01-16 | End: 2018-01-17

## 2018-01-16 RX ORDER — WARFARIN SODIUM 2.5 MG/1
TABLET ORAL
Qty: 95 TABLET | Refills: 3 | Status: ON HOLD | OUTPATIENT
Start: 2018-01-16 | End: 2018-03-12

## 2018-01-16 RX ORDER — GABAPENTIN 600 MG/1
600 TABLET ORAL 3 TIMES DAILY
Qty: 180 TABLET | Refills: 2 | Status: SHIPPED | OUTPATIENT
Start: 2018-01-16 | End: 2018-03-05

## 2018-01-16 NOTE — TELEPHONE ENCOUNTER
I sent in pantoprazole and gabapentin 600 three times daily to [facility] to help us with PA notifications, etc.     Please inform patient.    Also please clarify: is gabapentin 600 three times daily or 1200 three times daily (last was 1200 three times daily).    We will hold on the lyrica prescription until patient has follow up with another provider.    Ernie Mccullough MD  Internal Medicine and Pediatrics

## 2018-01-16 NOTE — TELEPHONE ENCOUNTER
Spoke with patient's  Ricardo (he sets up her meds).  He does not want 1 mg tabs of warfarin.  Right now she has 5 mg and 2.5 mg tabs.  He would be OK with having all 2.5 mg tabs and just taking 2 on Friday.  (her dose is 2.5mg daily with 5mg on Fridays)    New prescription sent to [facility].    Called and spoke to pharmacist at [facility]. Told him to cancel warfarin 1 mg tablets.    Mary Butcher RN

## 2018-01-16 NOTE — TELEPHONE ENCOUNTER
Patient's  calling that patient was weaning off of the Lyrica because he was told that it wouldn't be covered by insurance. He has since found that it may be covered with a PA. Also thought that they would be increasing the gabapentin but discovered that a mistake was made at discharge from the hospital and she is already at the max dose of gabapentin which is why they need to get the higher dose of lyrica. Need an order for the 600 MG tid that she was taking put back in prior to submitting the PA.     Also was told that the Pantoprazole was going to require a PA.    Requesting to have a PA's done since she has been on these for years.     Catarina Diggs RN

## 2018-01-16 NOTE — TELEPHONE ENCOUNTER
Representative from [facility] called and left a message on INR voicemail.  They were questioning if patient was really supposed to get 1 mg tabs of warfarin as her dose is 5mg and 2.5mg.  Call back number: [phone]   Reference # [id number]    Refill was done by Dr. Mccullough and not INR nurse.  Attempted to call patient to clarify and got voicemail.    Mary Butcher RN

## 2018-01-17 RX ORDER — OMEPRAZOLE 40 MG/1
40 CAPSULE, DELAYED RELEASE ORAL DAILY
Qty: 90 CAPSULE | Refills: 1 | Status: SHIPPED | OUTPATIENT
Start: 2018-01-17 | End: 2018-01-18

## 2018-01-17 NOTE — TELEPHONE ENCOUNTER
Called [facility] and medicines were all cancelled.  Gabapentin filled 90 days on 12/20/17 at another pharmacy, too soon to refill.  Pantoprazole not on formulary, do PA or new medicine?  Glo Finley LPN

## 2018-01-17 NOTE — TELEPHONE ENCOUNTER
She can take generic omeprazole 40 mg instead of pantoprazole.  Faxed new prescription.Cancel pantoprazole.

## 2018-01-18 ENCOUNTER — ANTICOAGULATION THERAPY VISIT (OUTPATIENT)
Dept: NURSING | Facility: CLINIC | Age: 78
End: 2018-01-18
Payer: COMMERCIAL

## 2018-01-18 DIAGNOSIS — Z79.01 LONG-TERM (CURRENT) USE OF ANTICOAGULANTS: ICD-10-CM

## 2018-01-18 DIAGNOSIS — I48.92 ATRIAL FLUTTER (H): ICD-10-CM

## 2018-01-18 DIAGNOSIS — I48.92 ATRIAL FLUTTER WITH RAPID VENTRICULAR RESPONSE (H): ICD-10-CM

## 2018-01-18 LAB — INR PPP: 3.8

## 2018-01-18 PROCEDURE — 99207 ZZC NO CHARGE NURSE ONLY: CPT

## 2018-01-18 RX ORDER — OMEPRAZOLE 40 MG/1
40 CAPSULE, DELAYED RELEASE ORAL DAILY
Qty: 90 CAPSULE | Refills: 1 | Status: SHIPPED | OUTPATIENT
Start: 2018-01-18 | End: 2018-03-10

## 2018-01-18 NOTE — PROGRESS NOTES
ANTICOAGULATION FOLLOW-UP     Patient Name:  Berenice Chaudhari  Date:  1/18/2018  Contact Type:  Telephone  Call received from [facility] nurse, with INR result.   Follow up instructions given over the phone to Home Care nurse for warfarin management.    SUBJECTIVE:     Patient Findings     Positives Change in diet/appetite    Comments Her  was gone last week   and other people were making her food.    Going to [place] 1/20 to 1/27/18.    She is being discharged [facility] today.           OBJECTIVE    INR   Date Value Ref Range Status   01/18/2018 3.8  Final       ASSESSMENT / PLAN  INR assessment SUPRA    Recheck INR In: 2 WEEKS    [facility]R Location [facility]      Anticoagulation Summary as of 1/18/2018     INR goal 2.0-3.0   Today's INR 3.8!   Maintenance plan 5 mg (5 mg x 1) on Fri; 2.5 mg (5 mg x 0.5) all other days   Full instructions 1/18: Hold; Otherwise 5 mg on Fri; 2.5 mg all other days   Weekly total 20 mg   Plan last modified Mary Butcher RN (12/14/2017)   Next INR check 2/1/2018   Target end date Indefinite    Indications   Atrial flutter (H) [I48.92]  Atrial flutter with rapid ventricular response (H) [I48.92]  Long-term (current) use of anticoagulants [Z79.01] [Z79.01]         Anticoagulation Episode Summary     INR check location     Preferred lab     Send [facility] reminders [facility]    Comments 5mg tabs; HS dosing //  (Ricardo) sets up her meds // [facility] [phone]; [place] [phone]      Anticoagulation Care Providers     Provider Role Specialty Phone number    Nelly Pearl MD Referring Internal Medicine [phone]            See the Encounter Report to view Anticoagulation Flowsheet and Dosing Calendar (Go to Encounters tab in chart review, and find the Anticoagulation Therapy Visit)        Mary Butcher RN

## 2018-01-18 NOTE — TELEPHONE ENCOUNTER
This writer advised patient of medication change omeprazole instead of pantoprazole,  Patient stated Rx should go to [facility] pharmacy not [facility]    Routed to provider [facility]  Mail order pharmacy    //Lida Blum MA// January 18, 2018 8:44 AM

## 2018-01-18 NOTE — MR AVS SNAPSHOT
Berenice Chaudhari   1/18/2018 8:15 AM   Anticoagulation Therapy Visit    Description:  77 year old female   Provider:  [facility]   Department:  [facility]           INR as of 1/18/2018     Today's INR 3.8!      Anticoagulation Summary as of 1/18/2018     INR goal 2.0-3.0   Today's INR 3.8!   Full instructions 1/18: Hold; Otherwise 5 mg on Fri; 2.5 mg all other days   Next INR check 2/1/2018    Indications   Atrial flutter (H) [I48.92]  Atrial flutter with rapid ventricular response (H) [I48.92]  Long-term (current) use of anticoagulants [Z79.01] [Z79.01]         Contact Numbers     [facility]  Please call  [phone] to cancel and/or reschedule your appointment   Please call  [phone] with any problems or questions regarding your therapy.        January 2018 Details    Sun Mon Tue Wed Thu Fri Sat      1               2               3               4               5               6                 7               8               9               10               11               12               13                 14               15               16               17               18      Hold   See details      19      5 mg         20      2.5 mg           21      2.5 mg         22      2.5 mg         23      2.5 mg         24      2.5 mg         25      2.5 mg         26      5 mg         27      2.5 mg           28      2.5 mg         29      2.5 mg         30      2.5 mg         31      2.5 mg             Date Details   01/18 This INR check               How to take your warfarin dose     To take:  2.5 mg Take 0.5 of a 5 mg tablet.    To take:  5 mg Take 1 of the 5 mg tablets.    Hold Do not take your warfarin dose. See the Details table to the right for additional instructions.                February 2018 Details    Sun Mon Tue Wed Thu Fri Sat         1            2               3                 4               5               6               7               8               9                10                 11               12               13               14               15               16               17                 18               19               20               21               22               23               24                 25               26               27               28                   Date Details   No additional details    Date of next INR:  2/1/2018         How to take your warfarin dose     To take:  2.5 mg Take 0.5 of a 5 mg tablet.

## 2018-01-19 ENCOUNTER — TELEPHONE (OUTPATIENT)
Dept: PEDIATRICS | Facility: CLINIC | Age: 78
End: 2018-01-19

## 2018-01-19 RX ORDER — PREGABALIN 150 MG/1
150 CAPSULE ORAL 2 TIMES DAILY
Qty: 180 CAPSULE | Refills: 1 | Status: SHIPPED | OUTPATIENT
Start: 2018-01-19 | End: 2018-01-01

## 2018-01-19 RX ORDER — PREGABALIN 150 MG/1
150 CAPSULE ORAL 2 TIMES DAILY
Qty: 30 CAPSULE | Refills: 0 | Status: SHIPPED | OUTPATIENT
Start: 2018-01-19 | End: 2018-01-19

## 2018-01-19 RX ORDER — PREGABALIN 150 MG/1
150 CAPSULE ORAL 2 TIMES DAILY
Qty: 180 CAPSULE | Refills: 1 | Status: SHIPPED | OUTPATIENT
Start: 2018-01-19 | End: 2018-01-19

## 2018-01-19 NOTE — TELEPHONE ENCOUNTER
Please fax 15 days supply to our pharmacy, and 90 day to [facility]. In my outbox.    Needs to stop the gabapentin when starts lyrica.

## 2018-01-19 NOTE — TELEPHONE ENCOUNTER
PA Initiation    Medication: pregabalin (LYRICA) 150 MG capsule  Insurance Company: [employer] - Phone [phone] Fax [phone]  Pharmacy Filling the Rx: [facility] - [place], [place] - [address] [place] [address]  Filling Pharmacy Phone: [phone]  Filling Pharmacy Fax: [phone]  Start Date: 1/19/2018        CMM timing out will check later for additional questions

## 2018-01-19 NOTE — TELEPHONE ENCOUNTER
Patient's  calls.  States that the only reason that patient was to wean off Lyrica was that they thought that the medication would not be covered.    They are asking for a 90 days supply sent to the [facility] today and a 14 day to a local pharmacy.  Taking 1200 mg tid ( 2 tablets of 600 mg)  Please advise, they do not want gabapentin.    Will route to Dr. Mccullough to advise.    Melissa Abdullahi RN  Message handled by Nurse Triage.

## 2018-01-19 NOTE — TELEPHONE ENCOUNTER
Needed to do a urgent PA through [facility].  Pt leaving [facility] tomorrow.  PA initiated and approved   Reference number for approval is [id number]. This coverage is good from today through 1-.  She got a 15 day supply from [facility] pharmacy.  I called Pharmacy and let them know about PA.  Dr. Mccullough also called in a 3 month supply to [facility] as well.  Pt's  Ricardo was called and informed.  Out of pocket for 15 day supply was 69.00, with insurance.  Much better than out of pocket costs.  Call us with any other questions or concerns.    Snehal Henry MA

## 2018-01-19 NOTE — TELEPHONE ENCOUNTER
This is fine with me.     I have never met her, and assumed that they wanted to be on the lyrica instead of the gabapentin.      Further advice would require an appointment with someone else while Dr. Pearl is gone.    Ernie Mccullough MD  Internal Medicine and Pediatrics

## 2018-01-19 NOTE — TELEPHONE ENCOUNTER
Prior Authorization Retail Medication Request  Medication/Dose: Lyrica (Pregabalin) 150 mg bid  Diagnosis and ICD code: B02.29 Postherpetic neuralgia  New/Renewal/Insurance Change PA: NEW  Previously Tried and Failed Therapies: Gabapentin    Insurance ID (if provided): [id number]  Insurance Phone (if provided): [phone]    Any additional info from fax request: Pharmacy reported that patient needs to try/fail Gabapentin, Lamotrigine or Carbamezapine. Unsure if patient would need to try all 3. The Lamotrigine & Carbamezapine would be more likely prescribed for mood or seizures, so would be contraindicated in this case.      If you received a fax notification from an outside Pharmacy:  Pharmacy Name: [facility]  Pharmacy #:[phone]  Pharmacy Fax:[phone]

## 2018-01-19 NOTE — TELEPHONE ENCOUNTER
I called pt and spoke with her  to pass along the message to stop gabapentin when starting Lyrica per Dr. Mccullouhg's message and  was very upset and stated they aren't going to be stopping the Gabapentin. Routing to Dr. Mccullough for advice.

## 2018-01-23 ENCOUNTER — CARE COORDINATION (OUTPATIENT)
Dept: CARE COORDINATION | Facility: CLINIC | Age: 78
End: 2018-01-23

## 2018-01-23 NOTE — PROGRESS NOTES
Clinic Care Coordination Contact    Situation: Patient chart reviewed by care coordinator.    Background:   CCRN had planned to outreach to patient, however chart review revealed the following through 01/18/2018 [facility] RN entry -    SUBJECTIVE:    Patient Findings      Positives Change in diet/appetite     Comments Her  was gone last week   and other people were making her food.     Going to [place] 1/20 to 1/27/18.     She is being discharged from Homecare today.       Plan/Recommendations:   CCRN will outreach to patient in the next 1-2 weeks for follow up, post-DC from HC services.     Claire Barclay, LEXIE  Health system  Clinic Care Coordinator - [place] Locations   Direct:  [phone] (voicemail available)

## 2018-01-23 NOTE — TELEPHONE ENCOUNTER
Prior Authorization Not Needed per Insurance    Medication: pregabalin (LYRICA) 150 MG capsule- Prior Auth Not Needed  Insurance Company: [employer] - Phone [phone] Fax [phone]  Expected CoPay:    n/a  Pharmacy Filling the Rx: [facility] - [place], [place] - [address] [place] [address]  Pharmacy Notified: No  Patient Notified: YesComment:  left message

## 2018-01-29 ENCOUNTER — TELEPHONE (OUTPATIENT)
Dept: PEDIATRICS | Facility: CLINIC | Age: 78
End: 2018-01-29

## 2018-01-29 NOTE — TELEPHONE ENCOUNTER
Patient was having INRs done by homecare, but was discharged from homecare on 1/18/18.  She had a trip to [place] 1/20 to 1/27/18.    She needs to schedule an INR appointment at the clinic this week or next week.    Attempted to reach her by phone. Left message on voicemail to call and schedule an appointment.    Mary Butcher RN

## 2018-02-09 ENCOUNTER — CARE COORDINATION (OUTPATIENT)
Dept: CARE COORDINATION | Facility: CLINIC | Age: 78
End: 2018-02-09

## 2018-02-09 NOTE — LETTER
Health Care Home - Access Care Plan    About Me  Patient Name:  Berenice Chaudhari    YOB: 1940  Age:                             78 year old   John MRN:            [id number] Telephone Information:     Home Phone [phone]   Mobile [phone]       Address:    [address] [place] [address] [place] [address] Email address:  [email or web address]      Emergency Contact(s)  Name Relationship Lgl Grd Work Phone Home Phone Mobile Phone   1. CAYETANO,BRYANNA Spouse  [phone]   2. SUZY ALFREDO Sister  none [phone]             Health Maintenance:      My Access Plan  Medical Emergency 911   Questions or concerns during clinic hours Primary Clinic Line, I will call the clinic directly: [facility] [phone]   24 Hour Appointment Line [phone] or  [phone] [place] [phone]) (toll free)   24 Hour Nurse Line [phone] (toll free)   Questions or concerns outside clinic hours 24 Hour Appointment Line, I will call the after-hours on-call line:   [facility] [phone] or [phone]) (toll-free)   Preferred Urgent Care Preferred Urgent Care: [facility], [phone]   Preferred Hospital Preferred Hospital: [facility]  [phone]   [facility] [address]  [place] [facility] - [address]  AT [facility]     Behavioral Health Crisis Line The National Suicide Prevention Lifeline at 1-409.164.1492 or 911     My Care Team Members  Patient Care Team       Relationship Specialty Notifications Start End    Nelly Pearl MD PCP - General Internal Medicine  3/15/17     Phone: [phone] Fax: [phone] [address] [place] [address]        My Medical and Care Information  Problem List   Patient Active Problem List   Diagnosis     Generalized osteoarthrosis, unspecified site     Urticaria     Obesity     Essential  hypertension with goal blood pressure less than 140/90     Allergic rhinitis     Erythroderma desquamativum     Advanced directives, counseling/discussion     Hyperlipidemia LDL goal <100     Post herpetic neuralgia     Health Care Home     Chronic renal disease     Anxiety     Major depressive disorder, recurrent episode, in full remission (HCC)     Congestive heart failure, unspecified congestive heart failure chronicity, unspecified congestive heart failure type (H)     Cough     Severe obesity (BMI >= 40) (H)     Cellulitis of right lower extremity     CKD (chronic kidney disease) stage 3, GFR 30-59 ml/min     Right shoulder pain     Cellulitis     Atrial flutter (H)     Atrial flutter with rapid ventricular response (H)     Long-term (current) use of anticoagulants [Z79.01]     Gastroesophageal reflux disease without esophagitis     Cellulitis of left lower leg     Acute on chronic diastolic heart failure (H)      Current Medications and Allergies:  See printed Medication Report

## 2018-02-09 NOTE — PROGRESS NOTES
"Clinic Care Coordination Contact      Please refer to 01/23/2018 CCRN entry.    Patient was closed to homecare on 01/23/2018, then had a trip to [place] 01/20/2018-01/27/2018.    She has a future appointment for INR through [facility] on Monday 02/12/2018.      Patient Active Problem List   Diagnosis     Generalized osteoarthrosis, unspecified site     Urticaria     Obesity     Essential hypertension with goal blood pressure less than 140/90     Allergic rhinitis     Erythroderma desquamativum     Advanced directives, counseling/discussion     Hyperlipidemia LDL goal <100     Post herpetic neuralgia     Health Care Home     Chronic renal disease     Anxiety     Major depressive disorder, recurrent episode, in full remission (HCC)     Congestive heart failure, unspecified congestive heart failure chronicity, unspecified congestive heart failure type (H)     Cough     Severe obesity (BMI >= 40) (H)     Cellulitis of right lower extremity     CKD (chronic kidney disease) stage 3, GFR 30-59 ml/min     Right shoulder pain     Cellulitis     Atrial flutter (H)     Atrial flutter with rapid ventricular response (H)     Long-term (current) use of anticoagulants [Z79.01]     Gastroesophageal reflux disease without esophagitis     Cellulitis of left lower leg     Acute on chronic diastolic heart failure (H)         Clinical Data: Care Coordinator Outreach    Outreach attempted x 1.  Spoke briefly with patient.   Introduced her to Care Coordination services.   Patient is interested in our services, but she states that right now is \"not a good time\" for initial assessment as she has a visitor coming in apprx. 10min and was hoping to catch a quick nap beforehand.     She states that she works closely with her [employer] CM (apprx. q 3 weeks) but she would like to participate in our Care Coordination program.     She would like to meet with writer Face-to-Face on Monday 02/12/2018 AFTER her INR check through [facility] RN.      Plan: Care " Coordinator will meet with patient as requested.   (Added note to [facility] RN appointment notes requesting to meet with patient afterwards.)    Claire Barclay, LEXIE  [facility] Care Coordinator - [place] Locations   Direct:  [phone] (voicemail available)

## 2018-02-09 NOTE — LETTER
[place] CARE COORDINATION        March 2, 2018    Berenice Chaudhari  [address]  [place] [address]      Dear Berenice,    I am a clinic care coordinator who works with Nelly Pearl MD at [facility]. I wanted to introduce myself and provide you with my contact information for you to be able to call me with any questions or concerns. My apologies that we were not able to meet face-to-face on 02/12/2018.   I wanted to introduce myself and provide you with my contact information so that you can call me with questions or concerns about your health care. Below is a description of clinic care coordination and how I can further assist you.     The clinic care coordinator is a registered nurse and/or  who understand the health care system. The goal of clinic care coordination is to help you manage your health and improve access to the [facility] system in the most efficient manner. The registered nurse can assist you in meeting your health care goals by providing education, coordinating services, and strengthening the communication among your providers. The  can assist you with financial, behavioral, psychosocial, chemical dependency, counseling, and/or psychiatric resources.    Please feel free to contact me at [phone], with any questions or concerns. We at [facility] are focused on providing you with the highest-quality healthcare experience possible and that all starts with you.     Sincerely,     Claire Raes    Enclosed: I have enclosed a copy of a 24 Hour Access Plan. This has helpful phone numbers for you to call when needed. Please keep this in an easy to access place to use as needed.

## 2018-02-12 ENCOUNTER — ANTICOAGULATION THERAPY VISIT (OUTPATIENT)
Dept: NURSING | Facility: CLINIC | Age: 78
End: 2018-02-12
Payer: COMMERCIAL

## 2018-02-12 DIAGNOSIS — Z79.01 LONG-TERM (CURRENT) USE OF ANTICOAGULANTS: ICD-10-CM

## 2018-02-12 DIAGNOSIS — I48.92 ATRIAL FLUTTER WITH RAPID VENTRICULAR RESPONSE (H): ICD-10-CM

## 2018-02-12 DIAGNOSIS — I48.92 ATRIAL FLUTTER (H): ICD-10-CM

## 2018-02-12 LAB — INR POINT OF CARE: 3.3 (ref 0.86–1.14)

## 2018-02-12 PROCEDURE — 36416 COLLJ CAPILLARY BLOOD SPEC: CPT

## 2018-02-12 PROCEDURE — 85610 PROTHROMBIN TIME: CPT | Mod: QW

## 2018-02-12 PROCEDURE — 99207 ZZC NO CHARGE NURSE ONLY: CPT

## 2018-02-12 NOTE — PROGRESS NOTES
ANTICOAGULATION FOLLOW-UP CLINIC VISIT    Patient Name:  Berenice Chaudhari  Date:  2/12/2018  Contact Type:  Face to Face  Patient is accompanied in the office by her .    SUBJECTIVE:     Patient Findings     Positives No Problem Findings    Comments Is interested in getting a Home INR machine.  Paperwork started.           OBJECTIVE    INR Protime   Date Value Ref Range Status   02/12/2018 3.3 (A) 0.86 - 1.14 Final       ASSESSMENT / PLAN  INR assessment SUPRA    Recheck INR In: 2 WEEKS    INR Location Clinic      Anticoagulation Summary as of 2/12/2018     INR goal 2.0-3.0   Today's INR 3.3!   Maintenance plan 2.5 mg (2.5 mg x 1) every day   Full instructions 2/12: 1.25 mg; Otherwise 2.5 mg every day   Weekly total 17.5 mg   Plan last modified Mary Butcher RN (2/12/2018)   Next INR check 2/26/2018   Target end date Indefinite    Indications   Atrial flutter (H) [I48.92]  Atrial flutter with rapid ventricular response (H) [I48.92]  Long-term (current) use of anticoagulants [Z79.01] [Z79.01]         Anticoagulation Episode Summary     INR check location     Preferred lab     Send INR reminders to [facility]    Comments 5mg tabs; HS dosing //  (Ricardo) sets up her meds // [facility] Jocelyne [phone]; [place] [phone]      Anticoagulation Care Providers     Provider Role Specialty Phone number    Nelly Pearl MD Referring Internal Medicine [phone]            See the Encounter Report to view Anticoagulation Flowsheet and Dosing Calendar (Go to Encounters tab in chart review, and find the Anticoagulation Therapy Visit)        Mary Butcher RN

## 2018-02-12 NOTE — MR AVS SNAPSHOT
Berenice Chaudhari   2/12/2018 1:00 PM   Anticoagulation Therapy Visit    Description:  78 year old female   Provider:   ANTICOAGULATION CLINIC   Department:   Nurse           INR as of 2/12/2018     Today's INR 3.3!      Anticoagulation Summary as of 2/12/2018     INR goal 2.0-3.0   Today's INR 3.3!   Full instructions 2/12: 1.25 mg; Otherwise 2.5 mg every day   Next INR check 2/26/2018    Indications   Atrial flutter (H) [I48.92]  Atrial flutter with rapid ventricular response (H) [I48.92]  Long-term (current) use of anticoagulants [Z79.01] [Z79.01]         Your next Anticoagulation Clinic appointment(s)     Feb 26, 2018  1:00 PM CST   Anticoagulation Visit with  ANTICOAGULATION CLINIC   [facility] ([facility])    [address] [place] [address]  [address]  [facility] [address] [phone]              Contact Numbers     [facility]  Please call  [phone] to cancel and/or reschedule your appointment   Please call  [phone] with any problems or questions regarding your therapy.        February 2018 Details    Sun Mon Tue Wed Thu Fri Sat         1               2               3                 4               5               6               7               8               9               10                 11               12      1.25 mg   See details      13      2.5 mg         14      2.5 mg         15      2.5 mg         16      2.5 mg         17      2.5 mg           18      2.5 mg         19      2.5 mg         20      2.5 mg         21      2.5 mg         22      2.5 mg         23      2.5 mg         24      2.5 mg           25      2.5 mg         26            27               28                   Date Details   02/12 This INR check       Date of next INR:  2/26/2018         How to take your warfarin dose     To take:  1.25 mg Take 0.5 of a 2.5 mg tablet.    To take:  2.5 mg Take 1 of the 2.5 mg tablets.

## 2018-02-15 ENCOUNTER — DOCUMENTATION ONLY (OUTPATIENT)
Dept: PEDIATRICS | Facility: CLINIC | Age: 78
End: 2018-02-15

## 2018-02-16 NOTE — PROGRESS NOTES
This patient is requesting a Home INR machine.  Paperwork started.  [facility] is filled out and needs a doctor's signature.  Form is in Dr. Pearl's in-box.    When signed, please return to the INR room.    Mary Butcher RN

## 2018-02-20 NOTE — PROGRESS NOTES
Form was completed and faxed to [facility] at [phone].    Mary Butcher RN

## 2018-02-26 ENCOUNTER — ANTICOAGULATION THERAPY VISIT (OUTPATIENT)
Dept: NURSING | Facility: CLINIC | Age: 78
End: 2018-02-26
Payer: COMMERCIAL

## 2018-02-26 DIAGNOSIS — I48.92 ATRIAL FLUTTER WITH RAPID VENTRICULAR RESPONSE (H): ICD-10-CM

## 2018-02-26 DIAGNOSIS — Z79.01 LONG-TERM (CURRENT) USE OF ANTICOAGULANTS: ICD-10-CM

## 2018-02-26 DIAGNOSIS — I48.92 ATRIAL FLUTTER (H): ICD-10-CM

## 2018-02-26 LAB — INR POINT OF CARE: 1.4 (ref 0.86–1.14)

## 2018-02-26 PROCEDURE — 85610 PROTHROMBIN TIME: CPT | Mod: QW

## 2018-02-26 PROCEDURE — 36416 COLLJ CAPILLARY BLOOD SPEC: CPT

## 2018-02-26 PROCEDURE — 99207 ZZC NO CHARGE NURSE ONLY: CPT

## 2018-02-26 NOTE — MR AVS SNAPSHOT
Berenice RAJIV Chaudhari   2/26/2018 2:30 PM   Anticoagulation Therapy Visit    Description:  78 year old female   Provider:   ANTICOAGULATION CLINIC   Department:   Nurse           INR as of 2/26/2018     Today's INR 1.4!      Anticoagulation Summary as of 2/26/2018     INR goal 2.0-3.0   Today's INR 1.4!   Full instructions 2/26: 5 mg; Otherwise 2.5 mg every day   Next INR check 3/12/2018    Indications   Atrial flutter (H) [I48.92]  Atrial flutter with rapid ventricular response (H) [I48.92]  Long-term (current) use of anticoagulants [Z79.01] [Z79.01]         Your next Anticoagulation Clinic appointment(s)     Mar 12, 2018  1:15 PM CDT   Anticoagulation Visit with  ANTICOAGULATION CLINIC   [facility] ([facility])    [address] [place] [address]  [address]  [facility] [address] [phone]              Contact Numbers     [facility]  Please call  [phone] to cancel and/or reschedule your appointment   Please call  [phone] with any problems or questions regarding your therapy.        February 2018 Details    Sun Mon Tue Wed Thu Fri Sat         1               2               3                 4               5               6               7               8               9               10                 11               12               13               14               15               16               17                 18               19               20               21               22               23               24                 25               26      5 mg   See details      27      2.5 mg         28      2.5 mg             Date Details   02/26 This INR check               How to take your warfarin dose     To take:  2.5 mg Take 1 of the 2.5 mg tablets.    To take:  5 mg Take 1 of the 5 mg tablets.           March 2018 Details    Sun Mon Tue Wed Thu Fri Sat         1      2.5 mg         2      2.5 mg         3      2.5 mg           4      2.5 mg         5       2.5 mg         6      2.5 mg         7      2.5 mg         8      2.5 mg         9      2.5 mg         10      2.5 mg           11      2.5 mg         12            13               14               15               16               17                 18               19               20               21               22               23               24                 25               26               27               28               29               30               31                Date Details   No additional details    Date of next INR:  3/12/2018         How to take your warfarin dose     To take:  2.5 mg Take 1 of the 2.5 mg tablets.

## 2018-02-26 NOTE — PROGRESS NOTES
ANTICOAGULATION FOLLOW-UP CLINIC VISIT    Patient Name:  Berenice Chaudhari  Date:  2/26/2018  Contact Type:  Face to Face  Patient is accompanied in the office by her .    SUBJECTIVE:     Patient Findings     Positives Missed doses    Comments Missed dose on Friday.  Maintenance dose had been decreased last time.    Patient was contacted by N[facility]h.  BC/BS requires patient be on warfarin   for one year before they can get a home monitor.           OBJECTIVE    INR Protime   Date Value Ref Range Status   02/26/2018 1.4 (A) 0.86 - 1.14 Final       ASSESSMENT / PLAN  INR assessment SUB    Recheck INR In: 2 WEEKS    INR Location Clinic      Anticoagulation Summary as of 2/26/2018     INR goal 2.0-3.0   Today's INR 1.4!   Maintenance plan 2.5 mg (2.5 mg x 1) every day   Full instructions 2/26: 5 mg; Otherwise 2.5 mg every day   Weekly total 17.5 mg   Plan last modified Mary Butcher RN (2/12/2018)   Next INR check 3/12/2018   Target end date Indefinite    Indications   Atrial flutter (H) [I48.92]  Atrial flutter with rapid ventricular response (H) [I48.92]  Long-term (current) use of anticoagulants [Z79.01] [Z79.01]         Anticoagulation Episode Summary     INR check location     Preferred lab     Send INR reminders to [facility]    Comments 5mg tabs; HS dosing //  (Ricardo) sets up her meds // motorized wheelchair      Anticoagulation Care Providers     Provider Role Specialty Phone number    Nelly Pearl MD Referring Internal Medicine [phone]            See the Encounter Report to view Anticoagulation Flowsheet and Dosing Calendar (Go to Encounters tab in chart review, and find the Anticoagulation Therapy Visit)        Mary Butcher RN

## 2018-02-27 ENCOUNTER — TELEPHONE (OUTPATIENT)
Dept: PEDIATRICS | Facility: CLINIC | Age: 78
End: 2018-02-27

## 2018-02-27 NOTE — TELEPHONE ENCOUNTER
Patient's  left a message on INR voicemail.  They had contacted [employer] to get a home INR monitor. Paperwork was filled out, signed by Dr. Mccullough, and faxed by [facility].  Due to their [facility] insurance, they were told that she would need to be on warfarin for 1 year before they would pay for her to get a monitor so they were denied. Patient has been on warfarin since 8/8/17. (Alere requirement is 3 months)     had called [facility] to see what they needed to do to get an override, but they wouldn't tell him and said the physician has to call them at [phone].   Patient is in a wheelchair and requires 4 transfers to get into the vehicle and 4 to get out of it to come to the clinic. It exhausts her.    I called the number and got a recording that said the number was to request a peer to peer consult.     List of the requirements on the recording in order for someone from [facility] to call us back:  -spelling of physician's first and last name  -direct back office phone number to the physician; a cell number is preferred. Indicate time zone.  -spelling of member's (patient's) first and last name and date of birth  -case reference number found in the denial letter  -member's ID number ([id number])  -give 3 dates Monday thru Friday and times that the provider can be reached. Office hours are 8:30 to 5pm eastern time.  -provide a 2 hour window for the contact times so a reviewing physician can reach you in a timely manner  -make sure the physician has read the denial letter. If information is missing, it can be sent via fax or mail to the number and address on the denial letter.      Called Ricardo back. (pt's ).  They never received a denial letter.  He is going to call them tomorrow to have it sent to him.  They also have a  at [facility] and I recommended he call her also to see if she can help.    Looked in Dr. Pearl's in-basket and no denial letter was found.    Mary Butcher RN

## 2018-03-02 NOTE — PROGRESS NOTES
Clinic Care Coordination Contact    Referral Source: CTS    [facility] was not able to meet with patient Face-to-Face on 02/12/2018 due to unforeseen cc case volumes on that date.       Plan: Care Coordinator will mail out care coordination introduction letter with care coordinator contact information and explanation of care coordination services. Care Coordinator will do no further outreaches at this time.    Claire Barclay, RN  [facility] Care Coordinator - [facility] Locations   Direct:  [phone] (voicemail available)

## 2018-03-02 NOTE — TELEPHONE ENCOUNTER
I'd be more than happy to do this.  But can someone get them on the phone first and then get me out of a room if necessary?    Ernie Mccullough MD  Internal Medicine and Pediatrics

## 2018-03-02 NOTE — TELEPHONE ENCOUNTER
Called and left a message with all required info expect reference number on denial letter due to not having denial letter (mentioned not having one in the message). Phone number given for provider I gave the Station # ([phone]). Awaiting call back.   Arline Lui MA 2:57 PM 3/2/2018

## 2018-03-05 DIAGNOSIS — B02.29 POST HERPETIC NEURALGIA: ICD-10-CM

## 2018-03-05 NOTE — TELEPHONE ENCOUNTER
Not due for a refill.     gabapentin (NEURONTIN) 600 MG tablet was filled on 1/16/2018, qty 180 with 2 refills.

## 2018-03-05 NOTE — TELEPHONE ENCOUNTER
[facility] called back. Call Steven at [phone]. She needs a reference #. All claims have been approved.

## 2018-03-06 RX ORDER — GABAPENTIN 600 MG/1
600 TABLET ORAL 3 TIMES DAILY
Qty: 270 TABLET | Refills: 3 | Status: ON HOLD | OUTPATIENT
Start: 2018-03-06 | End: 2018-04-10

## 2018-03-06 NOTE — TELEPHONE ENCOUNTER
, Ricardo, called.  He spoke to [facility] about the denial letter and not receiving it.  He was told there was no denial letter because [facility] never got the order for the INR machine.    We faxed the order form to [facility].  He said [facility] had called him and said they were not a preferred provider for [employer] and referred them to someone else.  He does not know who the someone else is.    Gave him [facility]'s phone number [phone].    Mary Butcher RN

## 2018-03-06 NOTE — TELEPHONE ENCOUNTER
[facility] mail order requesting refill. Previous rx sent to [facility] pharmacy. Updated dispense amount for 90 day supply, routing to covering provider for refill.

## 2018-03-06 NOTE — TELEPHONE ENCOUNTER
Spoke to Tameka from [facility] and told him what patient's  reported.  She is going to look into it and see if the insurance will accept a letter of necessity and that will qualify the patient to get the INR monitor.    Mary Butcher RN

## 2018-03-10 ENCOUNTER — APPOINTMENT (OUTPATIENT)
Dept: CT IMAGING | Facility: CLINIC | Age: 78
End: 2018-03-10
Attending: EMERGENCY MEDICINE
Payer: COMMERCIAL

## 2018-03-10 ENCOUNTER — HOSPITAL ENCOUNTER (INPATIENT)
Facility: CLINIC | Age: 78
LOS: 2 days | Discharge: HOME | End: 2018-03-12
Attending: EMERGENCY MEDICINE | Admitting: INTERNAL MEDICINE
Payer: COMMERCIAL

## 2018-03-10 ENCOUNTER — APPOINTMENT (OUTPATIENT)
Dept: GENERAL RADIOLOGY | Facility: CLINIC | Age: 78
End: 2018-03-10
Attending: EMERGENCY MEDICINE
Payer: COMMERCIAL

## 2018-03-10 DIAGNOSIS — R50.9 FEVER AND CHILLS: ICD-10-CM

## 2018-03-10 DIAGNOSIS — I50.23 ACUTE ON CHRONIC SYSTOLIC CONGESTIVE HEART FAILURE (H): ICD-10-CM

## 2018-03-10 DIAGNOSIS — I48.92 ATRIAL FLUTTER, UNSPECIFIED TYPE (H): ICD-10-CM

## 2018-03-10 DIAGNOSIS — R41.0 CONFUSION: ICD-10-CM

## 2018-03-10 LAB
ALBUMIN SERPL-MCNC: 3 G/DL (ref 3.4–5)
ALBUMIN UR-MCNC: 30 MG/DL
ALP SERPL-CCNC: 102 U/L (ref 40–150)
ALT SERPL W P-5'-P-CCNC: 39 U/L (ref 0–50)
ANION GAP SERPL CALCULATED.3IONS-SCNC: 6 MMOL/L (ref 3–14)
APPEARANCE UR: CLEAR
AST SERPL W P-5'-P-CCNC: 35 U/L (ref 0–45)
BASE EXCESS BLDV CALC-SCNC: 3.9 MMOL/L
BASOPHILS # BLD AUTO: 0 10E9/L (ref 0–0.2)
BASOPHILS NFR BLD AUTO: 0.3 %
BILIRUB SERPL-MCNC: 1 MG/DL (ref 0.2–1.3)
BILIRUB UR QL STRIP: NEGATIVE
BUN SERPL-MCNC: 19 MG/DL (ref 7–30)
CALCIUM SERPL-MCNC: 8.8 MG/DL (ref 8.5–10.1)
CHLORIDE SERPL-SCNC: 103 MMOL/L (ref 94–109)
CO2 BLDCOV-SCNC: 27 MMOL/L (ref 21–28)
CO2 SERPL-SCNC: 29 MMOL/L (ref 20–32)
COLOR UR AUTO: YELLOW
CREAT SERPL-MCNC: 1.26 MG/DL (ref 0.52–1.04)
D DIMER PPP FEU-MCNC: 0.4 UG/ML FEU (ref 0–0.5)
DIFFERENTIAL METHOD BLD: ABNORMAL
EOSINOPHIL # BLD AUTO: 0 10E9/L (ref 0–0.7)
EOSINOPHIL NFR BLD AUTO: 0.3 %
ERYTHROCYTE [DISTWIDTH] IN BLOOD BY AUTOMATED COUNT: 14.6 % (ref 10–15)
FLUAV+FLUBV AG SPEC QL: NEGATIVE
FLUAV+FLUBV AG SPEC QL: NEGATIVE
GFR SERPL CREATININE-BSD FRML MDRD: 41 ML/MIN/1.7M2
GLUCOSE SERPL-MCNC: 102 MG/DL (ref 70–99)
GLUCOSE UR STRIP-MCNC: NEGATIVE MG/DL
HCO3 BLDV-SCNC: 30 MMOL/L (ref 21–28)
HCT VFR BLD AUTO: 36 % (ref 35–47)
HGB BLD-MCNC: 11.3 G/DL (ref 11.7–15.7)
HGB UR QL STRIP: ABNORMAL
HYALINE CASTS #/AREA URNS LPF: 1 /LPF (ref 0–2)
IMM GRANULOCYTES # BLD: 0.1 10E9/L (ref 0–0.4)
IMM GRANULOCYTES NFR BLD: 0.4 %
INR PPP: 1.39 (ref 0.86–1.14)
KETONES UR STRIP-MCNC: NEGATIVE MG/DL
LACTATE BLD-SCNC: 0.8 MMOL/L (ref 0.7–2.1)
LACTATE SERPL-SCNC: 1.1 MMOL/L (ref 0.4–2)
LEUKOCYTE ESTERASE UR QL STRIP: ABNORMAL
LYMPHOCYTES # BLD AUTO: 0.8 10E9/L (ref 0.8–5.3)
LYMPHOCYTES NFR BLD AUTO: 6.5 %
MCH RBC QN AUTO: 28.7 PG (ref 26.5–33)
MCHC RBC AUTO-ENTMCNC: 31.4 G/DL (ref 31.5–36.5)
MCV RBC AUTO: 91 FL (ref 78–100)
MONOCYTES # BLD AUTO: 0.5 10E9/L (ref 0–1.3)
MONOCYTES NFR BLD AUTO: 4.6 %
NEUTROPHILS # BLD AUTO: 10.2 10E9/L (ref 1.6–8.3)
NEUTROPHILS NFR BLD AUTO: 87.9 %
NITRATE UR QL: NEGATIVE
NRBC # BLD AUTO: 0 10*3/UL
NRBC BLD AUTO-RTO: 0 /100
NT-PROBNP SERPL-MCNC: 3964 PG/ML (ref 0–1800)
O2/TOTAL GAS SETTING VFR VENT: ABNORMAL %
PCO2 BLDV: 42 MM HG (ref 40–50)
PCO2 BLDV: 47 MM HG (ref 40–50)
PH BLDV: 7.41 PH (ref 7.32–7.43)
PH BLDV: 7.41 PH (ref 7.32–7.43)
PH UR STRIP: 7 PH (ref 5–7)
PLATELET # BLD AUTO: 145 10E9/L (ref 150–450)
PO2 BLDV: 28 MM HG (ref 25–47)
PO2 BLDV: 30 MM HG (ref 25–47)
POTASSIUM SERPL-SCNC: 4 MMOL/L (ref 3.4–5.3)
PROT SERPL-MCNC: 7.9 G/DL (ref 6.8–8.8)
RBC # BLD AUTO: 3.94 10E12/L (ref 3.8–5.2)
RBC #/AREA URNS AUTO: 18 /HPF (ref 0–2)
SAO2 % BLDV FROM PO2: 53 %
SODIUM SERPL-SCNC: 138 MMOL/L (ref 133–144)
SOURCE: ABNORMAL
SP GR UR STRIP: 1.01 (ref 1–1.03)
SPECIMEN SOURCE: NORMAL
SQUAMOUS #/AREA URNS AUTO: 1 /HPF (ref 0–1)
TRANS CELLS #/AREA URNS HPF: 1 /HPF (ref 0–1)
TROPONIN I SERPL-MCNC: 0.02 UG/L (ref 0–0.04)
UROBILINOGEN UR STRIP-MCNC: 0 MG/DL (ref 0–2)
WBC # BLD AUTO: 11.6 10E9/L (ref 4–11)
WBC #/AREA URNS AUTO: 7 /HPF (ref 0–5)

## 2018-03-10 PROCEDURE — 82803 BLOOD GASES ANY COMBINATION: CPT

## 2018-03-10 PROCEDURE — 12000000 ZZH R&B MED SURG/OB

## 2018-03-10 PROCEDURE — 83605 ASSAY OF LACTIC ACID: CPT

## 2018-03-10 PROCEDURE — 96374 THER/PROPH/DIAG INJ IV PUSH: CPT

## 2018-03-10 PROCEDURE — 99223 1ST HOSP IP/OBS HIGH 75: CPT | Mod: AI | Performed by: INTERNAL MEDICINE

## 2018-03-10 PROCEDURE — 40000275 ZZH STATISTIC RCP TIME EA 10 MIN

## 2018-03-10 PROCEDURE — 87040 BLOOD CULTURE FOR BACTERIA: CPT | Performed by: EMERGENCY MEDICINE

## 2018-03-10 PROCEDURE — 85025 COMPLETE CBC W/AUTO DIFF WBC: CPT | Performed by: EMERGENCY MEDICINE

## 2018-03-10 PROCEDURE — 83880 ASSAY OF NATRIURETIC PEPTIDE: CPT | Performed by: EMERGENCY MEDICINE

## 2018-03-10 PROCEDURE — 85610 PROTHROMBIN TIME: CPT | Performed by: EMERGENCY MEDICINE

## 2018-03-10 PROCEDURE — 87633 RESP VIRUS 12-25 TARGETS: CPT | Performed by: INTERNAL MEDICINE

## 2018-03-10 PROCEDURE — 83605 ASSAY OF LACTIC ACID: CPT | Performed by: EMERGENCY MEDICINE

## 2018-03-10 PROCEDURE — 70450 CT HEAD/BRAIN W/O DYE: CPT

## 2018-03-10 PROCEDURE — 36415 COLL VENOUS BLD VENIPUNCTURE: CPT | Performed by: EMERGENCY MEDICINE

## 2018-03-10 PROCEDURE — 25000132 ZZH RX MED GY IP 250 OP 250 PS 637: Performed by: EMERGENCY MEDICINE

## 2018-03-10 PROCEDURE — 84145 PROCALCITONIN (PCT): CPT | Performed by: EMERGENCY MEDICINE

## 2018-03-10 PROCEDURE — 80053 COMPREHEN METABOLIC PANEL: CPT | Performed by: EMERGENCY MEDICINE

## 2018-03-10 PROCEDURE — 84484 ASSAY OF TROPONIN QUANT: CPT | Performed by: EMERGENCY MEDICINE

## 2018-03-10 PROCEDURE — 87804 INFLUENZA ASSAY W/OPTIC: CPT | Performed by: EMERGENCY MEDICINE

## 2018-03-10 PROCEDURE — 71046 X-RAY EXAM CHEST 2 VIEWS: CPT

## 2018-03-10 PROCEDURE — 81001 URINALYSIS AUTO W/SCOPE: CPT | Performed by: EMERGENCY MEDICINE

## 2018-03-10 PROCEDURE — 82803 BLOOD GASES ANY COMBINATION: CPT | Performed by: EMERGENCY MEDICINE

## 2018-03-10 PROCEDURE — 99285 EMERGENCY DEPT VISIT HI MDM: CPT | Mod: 25

## 2018-03-10 PROCEDURE — 93005 ELECTROCARDIOGRAM TRACING: CPT

## 2018-03-10 PROCEDURE — 87086 URINE CULTURE/COLONY COUNT: CPT | Performed by: EMERGENCY MEDICINE

## 2018-03-10 PROCEDURE — 85379 FIBRIN DEGRADATION QUANT: CPT | Performed by: EMERGENCY MEDICINE

## 2018-03-10 PROCEDURE — 25000128 H RX IP 250 OP 636: Performed by: EMERGENCY MEDICINE

## 2018-03-10 RX ORDER — MULTIVIT WITH MINERALS/LUTEIN
1 TABLET ORAL DAILY
Status: ON HOLD | COMMUNITY
End: 2019-01-01

## 2018-03-10 RX ORDER — GABAPENTIN 300 MG/1
1200 CAPSULE ORAL ONCE
Status: DISCONTINUED | OUTPATIENT
Start: 2018-03-10 | End: 2018-03-10

## 2018-03-10 RX ORDER — IPRATROPIUM BROMIDE AND ALBUTEROL SULFATE 2.5; .5 MG/3ML; MG/3ML
3 SOLUTION RESPIRATORY (INHALATION) ONCE
Status: DISCONTINUED | OUTPATIENT
Start: 2018-03-10 | End: 2018-03-11 | Stop reason: CLARIF

## 2018-03-10 RX ORDER — GABAPENTIN 300 MG/1
600 CAPSULE ORAL ONCE
Status: DISCONTINUED | OUTPATIENT
Start: 2018-03-10 | End: 2018-03-10

## 2018-03-10 RX ORDER — GABAPENTIN 300 MG/1
1200 CAPSULE ORAL ONCE
Status: COMPLETED | OUTPATIENT
Start: 2018-03-10 | End: 2018-03-10

## 2018-03-10 RX ORDER — LIDOCAINE 40 MG/G
CREAM TOPICAL
Status: DISCONTINUED | OUTPATIENT
Start: 2018-03-10 | End: 2018-03-11 | Stop reason: CLARIF

## 2018-03-10 RX ORDER — FUROSEMIDE 10 MG/ML
40 INJECTION INTRAMUSCULAR; INTRAVENOUS ONCE
Status: COMPLETED | OUTPATIENT
Start: 2018-03-10 | End: 2018-03-10

## 2018-03-10 RX ADMIN — FUROSEMIDE 40 MG: 10 INJECTION, SOLUTION INTRAMUSCULAR; INTRAVENOUS at 20:10

## 2018-03-10 RX ADMIN — GABAPENTIN 1200 MG: 300 CAPSULE ORAL at 21:12

## 2018-03-10 ASSESSMENT — ENCOUNTER SYMPTOMS
SHORTNESS OF BREATH: 1
CONFUSION: 1
FEVER: 1

## 2018-03-10 NOTE — ED NOTES
Bed: ED07  Expected date: 3/10/18  Expected time: 4:59 PM  Means of arrival: Ambulance  Comments:  [facility]  76yo F

## 2018-03-10 NOTE — ED PROVIDER NOTES
History     Chief Complaint:  Shortness of Breath and Fever      The history is provided by the patient and the EMS personnel. The history is limited by the condition of the patient.      Berenice Chaudhari is a 78 year old female with a complex medical history including atrial flutter and HTN currently anti-coagulated on Coumadin who presents to the ED via EMS for evaluation of shortness of breath and fever. The patient has had increased shortness of breath, fevers, confusion, and falls recently of unknown onset. Yesterday, the patient fell due to tripping, she says and does not believe she hurt herself. Today, the patient's  called EMS today as he noted increased confusion, fever of 102.6F, and oxygen saturations of 89% on room air. He placed her on oxygen. When EMS arrived, they noted normal saturations, but kept her on oxygen.     Here in the ED, the patient reports cough and cold symptoms, but denies chest pain, blood in stool, nosebleeds, blood elsewhere, or other pain. She was able to answer most of my questions correctly, but did not get the date correct. Her  reports that he grew concerned due to fluctuating pulse rate, blood pressure, and blue fingers.       Allergies:  Ceftriaxone  Nafcillin  Penicillins  Vancomycin  Codeine  Clindamycin  Zithromax [Azithromycin]    Medications:    Gabapentin  Lyrica  Prilosec  Coumadin  Wellbutrin  Cardizem  Lasiz  Metoprolol  Pamelor  Pravachol  Effexor  Minocycline  Metoprolol     Past Medical History:    Allergic rhinitis, cause unspecified   Anxiety   Arrhythmia   Atrial flutter with rapid ventricular response (H)   Cellulitis and abscess of leg, except foot   Chronic pain   Chronic renal disease   Contact dermatitis and other eczema, due to unspecified cause   Erythroderma desquamativum   Essential hypertension   Generalized osteoarthrosis, unspecified site   GERD (gastroesophageal reflux disease)   Herpes zoster without mention of  complication   Methicillin susceptible Staphylococcus aureus septicemia (H)   Obesity   Other chronic pain   Other diseases of lung, not elsewhere classified   Pneumonia, organism unspecified(486)   Urticaria, unspecified     Past Surgical History:    Dental  Remote T&A  Cholecystectomy   Left hip I&D x2  Cataract  ENT  Cornea with lens    Family History:    Father: MI    Social History:  Presents alone.  Former smoker: 2.00 ppd for 22 years, starting in 1967 and quitting in 1990.  Negative for smokeless tobacco.   Negative for alcohol use.  Marital Status:   [2]     Review of Systems   Unable to perform ROS: Acuity of condition   Constitutional: Positive for fever.   Respiratory: Positive for shortness of breath.    Psychiatric/Behavioral: Positive for confusion.   10 point review of systems all negative except as above and in the HPI.  Physical Exam   First Vitals:  /86, Pulse 83, Temp 102.3  F (39.1  C) (Oral),  Resp 28, SpO2 99%      Physical Exam  General: The patient is alert, mildly labored breathing.    HENT: Mucous membranes moist.    Cardiovascular: Regular rate and rhythm. Good pulses in all four extremities. Normal capillary refill and skin turgor.     Respiratory: Lungs are clear. No nasal flaring. No retractions. No wheezing, no crackles. Prolonged expirations and inspirations. Breath sounds with rhonchi.     Gastrointestinal: Abdomen soft. No guarding, no rebound. No palpable hernias. Large BMI with no tenderness.     Musculoskeletal: No gross deformity.     Skin: No rashes or petechiae.     Neurologic: The patient is alert and oriented x3. She did miss the date by 2 days.  GCS 15. No testable cranial nerve deficit. Follows commands with clear and appropriate speech. Gives appropriate answers. Good strength in all extremities. No gross neurologic deficit. Gross sensation intact. Pupils are round and reactive. No meningismus.     Lymphatic: No cervical adenopathy. No lower extremity  swelling.    Psychiatric: The patient is non-tearful.    Emergency Department Course   ECG:  Indication: shortness of breath and fever  Time: 1842  Vent. Rate 80 bpm. MA interval 170. QRS duration 162. QT/QTc 390/449. P-R-T axis 46 92 7. Normal sinus rhythm. Right bundle branch block. Read time: 1848.    Imaging:  Radiographic findings were communicated with the patient who voiced understanding of the findings.    CT Head without contrast:   IMPRESSION:   1. Mild atrophy and chronic white matter disease.  2. Nothing acute.  3. No interval change. As per radiology.    XR Chest, 2 views:   IMPRESSION: Nothing clearly acute. As per radiology.     Laboratory:  1726  ISTAT gases lactate madeleine POCT: pH 7.41, pCO2 42, pO2 28, HCO3 27, Lactate 0.8  1746 Lactic acid: 1.1    Venous Blood Gas    Recent Labs  Lab 03/10/18  1721 03/10/18  1718   PHV 7.41 7.41   PCO2V 42 47   PO2V 28 30   HCO3V 27 30*   CLAUDIO  --  3.9   O2PER  --  Room Air     1718 Troponin: 0.016  D dimer: 0.4  INR: 1.39 (H)  BNP: 3964 (H)    CBC: WBC: 11.6 (H), HGB: 11.3 (L), PLT: 145 (L)  CMP: Glucose 102 (H), Creatinine 1.26 (H), GFR 41 (L), albumin 3.0 (L), o/w WNL     UA with micro: blood large, albumin 30, leukocyte esterase trace, WBC 7 (H), RBC 18 (H), o/w negative    Influenza A/B: Negative for A/B    Blood culture x2: pending  Urine culture: pending    Interventions:  1748 Duoneb 3 mL nebulization  2010 Lasix 40 mg IV  2112 Neurontin 1,200 mg oral    Emergency Department Course:  Nursing notes and vitals reviewed. 1710 I performed an exam of the patient as documented above. EKG obtained in the ED, see results above.     IV inserted. Medicine administered as documented above. Blood drawn. This was sent to the lab for further testing, results above.    The patient was sent for a CT Head and XR Chest while in the emergency department, findings above.     2015 I rechecked the patient and discussed the results of her workup thus far.  The  states that  the patient is hallucinating.     2020 I talked with the nurse. She reports no signs of skin breakdown.     2130  I consulted with Dr. Blackwell of the hospitalist services. They are in agreement to accept the patient for admission.    Findings and plan explained to the Patient who consents to admission. Discussed the patient with Dr. Blackwell, who will admit the patient to an inpatient tele bed for further monitoring, evaluation, and treatment.    Impression & Plan    Medical Decision Making:  Berenice Chaudhari is a 78 year old female presented originally without her  and complained that she was not having many symptoms other than some problems breathing. However once her  arrived, he did say that she had some confusion, cyanosis, and hypoxia at home as well as her blood pressure and heart rate had fluctuated. This does sound suspicious for a pulmonary episode, especially with her symptoms. Her chest XR did demonstrate signs of pulmonary congestion. She did have an elevated BNP that was not as high as previous. At this point, her symptoms could be explained by CHF. The fact that she is having weakness and some hallucinations- per the , she is seeing her dead dog-I do not think she will do well at home. I think she may potentially need placement. I did not see any signs to suggest cellulitis. Her lactic acid levels were normal. I am unsure of the exact cause of her fever, likely pulmonary source. She was admitted to the hospital in good condition with cultures pending. The patient was otherwise stable. She no longer requires oxygen here. I do not feel it is likely a PE. She was admitted for treatment of CHF started on Lasix.      Diagnosis:    ICD-10-CM   1. Acute on chronic systolic congestive heart failure (H) I50.23   2. Confusion R41.0   3. Fever and chills R50.9       Disposition:  Admitted to Dr. Rosalva ESPINO, Adrianna Murphy, am serving as a scribe on 3/10/2018 at 5:10 PM to personally document  services performed by Juan Betts MD based on my observations and the provider's statements to me.       Adrianna Murphy  3/10/2018   [facility] EMERGENCY DEPARTMENT       Juan Betts MD  03/10/18 [id number]

## 2018-03-10 NOTE — IP AVS SNAPSHOT
MRN:[id number]                      After Visit Summary   3/10/2018    Berenice Chaudhari    MRN: [id number]           Thank you!     Thank you for choosing [facility] for your care. Our goal is always to provide you with excellent care. Hearing back from our patients is one way we can continue to improve our services. Please take a few minutes to complete the written survey that you may receive in the mail after you visit. If you would like to speak to someone directly about your visit please contact Patient Relations at [phone]. Thank you!          Patient Information     Date Of Birth          1940        Designated Caregiver       Most Recent Value    Caregiver    Will someone help with your care after discharge? yes    Name of designated caregiver Ricardo    Phone number of caregiver [phone]    Caregiver address [address] [place] [address]      About your hospital stay     You were admitted on:  March 10, 2018 You last received care in the:  [facility]    You were discharged on:  March 12, 2018        Reason for your hospital stay       Fever                  Who to Call     For medical emergencies, please call 911.  For non-urgent questions about your medical care, please call your primary care provider or clinic, [phone]          Attending Provider     Provider Specialty    Juan Betts MD Emergency Medicine    Chris Blackwell MD Internal Medicine       Primary Care Provider Office Phone # Fax #    Nelly Pearl [phone]       When to contact your care team       Call your primary doctor if you have any of the following: temperature greater than 101.5  or less than  increased shortness of breath or increased pain.                  After Care Instructions     Activity       Your activity upon discharge: activity as tolerated            Diet       Follow this diet upon discharge: Orders Placed This  Encounter      Combination Diet Low Saturated Fat Na <2400mg Diet, No Caffeine Diet                  Follow-up Appointments     Follow-up and recommended labs and tests        Follow up with primary care provider, Nelly Pearl, within 7 days for hospital follow- up.  No follow up labs or test are needed.  Follow pending test results                  Additional Services     Inr Clinic Referral                 Further instructions from your care team       Follow up with clinic for further INR dosing by Thursday.     Warfarin Instruction     You have started taking a medicine called warfarin. This is a blood-thinning medicine (anticoagulant). It helps prevent and treat blood clots.      Before leaving the hospital, make sure you know how much to take and how long to take it.      You will need regular blood tests to make sure your blood is clotting safely. It is very important to see your doctor for regular blood tests.    Talk to your doctor before taking any new medicine (this includes over-the-counter drugs and herbal products). Many medicines can interact with warfarin. This may cause more bleeding or too much clotting.     Eating a lot of vitamin K--found in green, leafy vegetables--can change the way warfarin works in your body. Do NOT avoid these foods. Instead, try to eat the same amount each day.     Bleeding is the most common side-effect of warfarin. You may notice bleeding gums, a bloody nose, bruises and bleeding longer when you cut yourself. See a doctor at once if:   o You cough up blood  o You find blood in your stool (poop)  o You have a deep cut, or a cut that bleeds longer than 10 minutes   o You have a bad cut, hard fall, accident or hit your head (go to urgent care or the emergency room).    For women who can get pregnant: This medicine can harm an unborn baby. Be very careful not to get pregnant while taking this medicine. If you think you might be pregnant, call your doctor right  "away.    For more information, read \"Guide to Warfarin Therapy,  the booklet you received in the hospital.        Pending Results     Date and Time Order Name Status Description    3/11/2018 1351 Gram stain Preliminary     3/11/2018 1351 Sputum Culture Aerobic Bacterial In process     3/11/2018 0049 Respiratory Virus Panel by PCR In process     3/10/2018 1725 Blood culture Preliminary     3/10/2018 1725 Blood culture Preliminary             Statement of Approval     Ordered          18 1233  I have reviewed and agree with all the recommendations and orders detailed in this document.  EFFECTIVE NOW     Approved and electronically signed by:  Chris Blackwell MD             Admission Information     Date & Time Provider Department Dept. Phone    3/10/2018 Chris Blackwell MD [facility] [phone]      Your Vitals Were     Blood Pressure Pulse Temperature Respirations Height Weight    133/67 83 95.9  F (35.5  C) (Oral) 16 1.626 m (5' 4\") 157.8 kg (347 lb 12.8 oz)    Pulse Oximetry BMI (Body Mass Index)                93% 59.7 kg/m2          [employer] Information     [employer] lets you send messages to your doctor, view your test results, renew your prescriptions, schedule appointments and more. To sign up, go to [email or web address] . Click on \"Log in\" on the left side of the screen, which will take you to the Welcome page. Then click on \"Sign up Now\" on the right side of the page.     You will be asked to enter the access code listed below, as well as some personal information. Please follow the directions to create your username and password.     Your access code is: [email or web address]  Expires: 6/10/2018 12:33 PM     Your access code will  in 90 days. If you need help or a new code, please call your [place] clinic or [phone].        Care EveryWhere ID     This is your Care EveryWhere ID. This could be used by other organizations to access your [place] medical " records  [id number]        Equal Access to Services     [facility] ANDRE : Hadsanchez aad ku hadnatividadranjith Hummel, wacliftonda sascha, qaarcenio remymamoe walters, calin mckayfredericmariano hoyt. So [facility] [phone].    ATENCIÓN: Si habla español, tiene a dickey disposición servicios gratuitos de asistencia lingüística. Llame al [phone].    We comply with applicable federal civil rights laws and [place] laws. We do not discriminate on the basis of race, color, national origin, age, disability, sex, sexual orientation, or gender identity.               Review of your medicines      CONTINUE these medicines which may have CHANGED, or have new prescriptions. If we are uncertain of the size of tablets/capsules you have at home, strength may be listed as something that might have changed.        Dose / Directions    diltiazem 120 MG 24 hr capsule   Commonly known as:  CARDIZEM CD/CARTIA XT   This may have changed:  when to take this   Used for:  Atrial flutter with rapid ventricular response (H)        Dose:  120 mg   Take 1 capsule (120 mg) by mouth daily   Quantity:  90 capsule   Refills:  2       lidocaine 5 % Patch   Commonly known as:  LIDODERM   This may have changed:    - when to take this  - reasons to take this  - additional instructions   Used for:  Postherpetic neuralgia        Apply up to 3 patches to painful area at once for up to 12 h within a 24 h period.  Remove after 12 hours.   Quantity:  60 patch   Refills:  2       warfarin 2.5 MG tablet   Commonly known as:  COUMADIN   This may have changed:    - how much to take  - how to take this  - when to take this  - additional instructions   Used for:  Atrial flutter, unspecified type (H)        Dose:  5 mg   Take 2 tablets (5 mg) by mouth daily For the next 2 days and get INR checked at the INR clinic for further dosing of Coumadin.   Quantity:  95 tablet   Refills:  3         CONTINUE these medicines which have NOT CHANGED        Dose / Directions     buPROPion 300 MG 24 hr tablet   Commonly known as:  WELLBUTRIN XL   Used for:  Major depressive disorder, recurrent, in full remission (H)        Dose:  300 mg   Take 1 tablet (300 mg) by mouth every morning   Quantity:  90 tablet   Refills:  1       CENTRUM SILVER per tablet        Dose:  1 tablet   Take 1 tablet by mouth daily   Refills:  0       CEPHALEXIN PO        Dose:  500 mg   Take 500 mg by mouth 3 times daily as needed cellulitis   Refills:  0       fluticasone 50 MCG/ACT spray   Commonly known as:  FLONASE   Used for:  Post-nasal drip        Dose:  2 spray   Spray 2 sprays into both nostrils daily   Quantity:  3 Bottle   Refills:  11       furosemide 20 MG tablet   Commonly known as:  LASIX   Used for:  Atrial flutter with rapid ventricular response (H)        Dose:  20 mg   Take 1 tablet (20 mg) by mouth daily   Quantity:  90 tablet   Refills:  3       gabapentin 600 MG tablet   Commonly known as:  NEURONTIN   Used for:  Post herpetic neuralgia        Dose:  600 mg   Take 1 tablet (600 mg) by mouth 3 times daily   Quantity:  270 tablet   Refills:  3       metoprolol succinate 50 MG 24 hr tablet   Commonly known as:  TOPROL-XL   Used for:  Atrial flutter, unspecified type (H)        Dose:  50 mg   Take 1 tablet (50 mg) by mouth daily   Quantity:  90 tablet   Refills:  2       minocycline 50 MG capsule   Commonly known as:  MINOCIN/DYNACIN   Used for:  Methicillin susceptible Staphylococcus aureus septicemia (H)        Dose:  50 mg   Take 1 capsule (50 mg) by mouth 2 times daily   Quantity:  180 capsule   Refills:  2       nortriptyline 25 MG capsule   Commonly known as:  PAMELOR   Used for:  Post herpetic neuralgia        Dose:  50 mg   Take 2 capsules (50 mg) by mouth At Bedtime   Quantity:  180 capsule   Refills:  2       pravastatin 10 MG tablet   Commonly known as:  PRAVACHOL   Used for:  Hyperlipidemia LDL goal <100        Dose:  10 mg   Take 1 tablet (10 mg) by mouth At Bedtime Needs labs before  next refill   Quantity:  90 tablet   Refills:  0       pregabalin 150 MG capsule   Commonly known as:  LYRICA   Used for:  Postherpetic neuralgia        Dose:  150 mg   Take 1 capsule (150 mg) by mouth 2 times daily   Quantity:  180 capsule   Refills:  1       PROTONIX PO        Dose:  40 mg   Take 40 mg by mouth every morning (before breakfast)   Refills:  0       venlafaxine 150 MG 24 hr capsule   Commonly known as:  EFFEXOR-XR   Used for:  Major depressive disorder, recurrent episode, moderate (H)        TAKE 1 CAPSULE(150 MG) BY MOUTH DAILY   Quantity:  90 capsule   Refills:  0                Protect others around you: Learn how to safely use, store and throw away your medicines at www.disposemymeds.org.        ANTIBIOTIC INSTRUCTION     You've Been Prescribed an Antibiotic - Now What?  Your healthcare team thinks that you or your loved one might have an infection. Some infections can be treated with antibiotics, which are powerful, life-saving drugs. Like all medications, antibiotics have side effects and should only be used when necessary. There are some important things you should know about your antibiotic treatment.      Your healthcare team may run tests before you start taking an antibiotic.    Your team may take samples (e.g., from your blood, urine or other areas) to run tests to look for bacteria. These test can be important to determine if you need an antibiotic at all and, if you do, which antibiotic will work best.      Within a few days, your healthcare team might change or even stop your antibiotic.    Your team may start you on an antibiotic while they are working to find out what is making you sick.    Your team might change your antibiotic because test results show that a different antibiotic would be better to treat your infection.    In some cases, once your team has more information, they learn that you do not need an antibiotic at all. They may find out that you don't have an infection, or  that the antibiotic you're taking won't work against your infection. For example, an infection caused by a virus can't be treated with antibiotics. Staying on an antibiotic when you don't need it is more likely to be harmful than helpful.      You may experience side effects from your antibiotic.    Like all medications, antibiotics have side effects. Some of these can be serious.    Let you healthcare team know if you have any known allergies when you are admitted to the hospital.    One significant side effect of nearly all antibiotics is the risk of severe and sometimes deadly diarrhea caused by Clostridium difficile (C. Difficile). This occurs when a person takes antibiotics because some good germs are destroyed. Antibiotic use allows C. diificile to take over, putting patients at high risk for this serious infection.    As a patient or caregiver, it is important to understand your or your loved one's antibiotic treatment. It is especially important for caregivers to speak up when patients can't speak for themselves. Here are some important questions to ask your healthcare team.    What infection is this antibiotic treating and how do you know I have that infection?    What side effects might occur from this antibiotic?    How long will I need to take this antibiotic?    Is it safe to take this antibiotic with other medications or supplements (e.g., vitamins) that I am taking?     Are there any special directions I need to know about taking this antibiotic? For example, should I take it with food?    How will I be monitored to know whether my infection is responding to the antibiotic?    What tests may help to make sure the right antibiotic is prescribed for me?      Information provided by:  www.cdc.gov/getsmart  U.S. Department of Health and Human Services  Centers for disease Control and Prevention  National Center for Emerging and Zoonotic Infectious Diseases  Division of Healthcare Quality Promotion              Medication List: This is a list of all your medications and when to take them. Check marks below indicate your daily home schedule. Keep this list as a reference.      Medications           Morning Afternoon Evening Bedtime As Needed    buPROPion 300 MG 24 hr tablet   Commonly known as:  WELLBUTRIN XL   Take 1 tablet (300 mg) by mouth every morning   Last time this was given:  300 mg on 3/12/2018  8:35 AM                                CENTRUM SILVER per tablet   Take 1 tablet by mouth daily                                CEPHALEXIN PO   Take 500 mg by mouth 3 times daily as needed cellulitis                                diltiazem 120 MG 24 hr capsule   Commonly known as:  CARDIZEM CD/CARTIA XT   Take 1 capsule (120 mg) by mouth daily   Last time this was given:  120 mg on 3/11/2018  8:30 PM                                fluticasone 50 MCG/ACT spray   Commonly known as:  FLONASE   Spray 2 sprays into both nostrils daily                                furosemide 20 MG tablet   Commonly known as:  LASIX   Take 1 tablet (20 mg) by mouth daily   Last time this was given:  20 mg on 3/12/2018  8:34 AM                                gabapentin 600 MG tablet   Commonly known as:  NEURONTIN   Take 1 tablet (600 mg) by mouth 3 times daily   Last time this was given:  600 mg on 3/12/2018  8:35 AM                                lidocaine 5 % Patch   Commonly known as:  LIDODERM   Apply up to 3 patches to painful area at once for up to 12 h within a 24 h period.  Remove after 12 hours.                                metoprolol succinate 50 MG 24 hr tablet   Commonly known as:  TOPROL-XL   Take 1 tablet (50 mg) by mouth daily   Last time this was given:  50 mg on 3/11/2018  8:30 PM                                minocycline 50 MG capsule   Commonly known as:  MINOCIN/DYNACIN   Take 1 capsule (50 mg) by mouth 2 times daily   Last time this was given:  50 mg on 3/12/2018  8:34 AM                                 nortriptyline 25 MG capsule   Commonly known as:  PAMELOR   Take 2 capsules (50 mg) by mouth At Bedtime   Last time this was given:  50 mg on 3/11/2018  8:36 PM                                pravastatin 10 MG tablet   Commonly known as:  PRAVACHOL   Take 1 tablet (10 mg) by mouth At Bedtime Needs labs before next refill   Last time this was given:  10 mg on 3/11/2018  8:36 PM                                pregabalin 150 MG capsule   Commonly known as:  LYRICA   Take 1 capsule (150 mg) by mouth 2 times daily   Last time this was given:  150 mg on 3/12/2018  8:35 AM                                PROTONIX PO   Take 40 mg by mouth every morning (before breakfast)   Last time this was given:  40 mg on 3/12/2018  8:35 AM                                venlafaxine 150 MG 24 hr capsule   Commonly known as:  EFFEXOR-XR   TAKE 1 CAPSULE(150 MG) BY MOUTH DAILY                                warfarin 2.5 MG tablet   Commonly known as:  COUMADIN   Take 2 tablets (5 mg) by mouth daily For the next 2 days and get INR checked at the INR clinic for further dosing of Coumadin.   Last time this was given:  5 mg on 3/11/2018  5:31 PM

## 2018-03-10 NOTE — ED NOTES
ABC's intact.  Alert and oriented x4.    Per paramedics, EMS called due to pt fatigue and fever at home.  Pt's  reported SaO2 89% on RA.  Upon EMS arrival, first responders placed oxygen on patient.

## 2018-03-10 NOTE — IP AVS SNAPSHOT
[facility]    [address] [facility] [address]    Phone:  [phone]    Fax:  [phone]                                       After Visit Summary   3/10/2018    Berenice Chaudhari    MRN: [id number]           After Visit Summary Signature Page     I have received my discharge instructions, and my questions have been answered. I have discussed any challenges I see with this plan with the nurse or doctor.    ..........................................................................................................................................  Patient/Patient Representative Signature      ..........................................................................................................................................  Patient Representative Print Name and Relationship to Patient    ..................................................               ................................................  Date                                            Time    ..........................................................................................................................................  Reviewed by Signature/Title    ...................................................              ..............................................  Date                                                            Time

## 2018-03-11 PROBLEM — R06.02 SOB (SHORTNESS OF BREATH): Status: ACTIVE | Noted: 2018-03-11

## 2018-03-11 PROBLEM — R50.9 ACUTE FEBRILE ILLNESS: Status: ACTIVE | Noted: 2018-03-11

## 2018-03-11 LAB
ALBUMIN SERPL-MCNC: 2.7 G/DL (ref 3.4–5)
ALP SERPL-CCNC: 89 U/L (ref 40–150)
ALT SERPL W P-5'-P-CCNC: 31 U/L (ref 0–50)
ANION GAP SERPL CALCULATED.3IONS-SCNC: 8 MMOL/L (ref 3–14)
AST SERPL W P-5'-P-CCNC: 30 U/L (ref 0–45)
BILIRUB SERPL-MCNC: 1 MG/DL (ref 0.2–1.3)
BUN SERPL-MCNC: 19 MG/DL (ref 7–30)
CALCIUM SERPL-MCNC: 8.5 MG/DL (ref 8.5–10.1)
CHLORIDE SERPL-SCNC: 103 MMOL/L (ref 94–109)
CO2 SERPL-SCNC: 28 MMOL/L (ref 20–32)
CREAT SERPL-MCNC: 1.35 MG/DL (ref 0.52–1.04)
ERYTHROCYTE [DISTWIDTH] IN BLOOD BY AUTOMATED COUNT: 14.7 % (ref 10–15)
GFR SERPL CREATININE-BSD FRML MDRD: 38 ML/MIN/1.7M2
GLUCOSE SERPL-MCNC: 92 MG/DL (ref 70–99)
HCT VFR BLD AUTO: 34.4 % (ref 35–47)
HGB BLD-MCNC: 10.6 G/DL (ref 11.7–15.7)
INR PPP: 1.35 (ref 0.86–1.14)
INTERPRETATION ECG - MUSE: NORMAL
MCH RBC QN AUTO: 28.3 PG (ref 26.5–33)
MCHC RBC AUTO-ENTMCNC: 30.8 G/DL (ref 31.5–36.5)
MCV RBC AUTO: 92 FL (ref 78–100)
PLATELET # BLD AUTO: 134 10E9/L (ref 150–450)
POTASSIUM SERPL-SCNC: 3.4 MMOL/L (ref 3.4–5.3)
PROCALCITONIN SERPL-MCNC: 0.14 NG/ML
PROT SERPL-MCNC: 7.3 G/DL (ref 6.8–8.8)
RBC # BLD AUTO: 3.74 10E12/L (ref 3.8–5.2)
SODIUM SERPL-SCNC: 139 MMOL/L (ref 133–144)
WBC # BLD AUTO: 9 10E9/L (ref 4–11)

## 2018-03-11 PROCEDURE — 25000132 ZZH RX MED GY IP 250 OP 250 PS 637

## 2018-03-11 PROCEDURE — 84145 PROCALCITONIN (PCT): CPT | Performed by: INTERNAL MEDICINE

## 2018-03-11 PROCEDURE — 80053 COMPREHEN METABOLIC PANEL: CPT | Performed by: INTERNAL MEDICINE

## 2018-03-11 PROCEDURE — 25000132 ZZH RX MED GY IP 250 OP 250 PS 637: Performed by: INTERNAL MEDICINE

## 2018-03-11 PROCEDURE — 85610 PROTHROMBIN TIME: CPT | Performed by: INTERNAL MEDICINE

## 2018-03-11 PROCEDURE — 99232 SBSQ HOSP IP/OBS MODERATE 35: CPT | Performed by: INTERNAL MEDICINE

## 2018-03-11 PROCEDURE — 36415 COLL VENOUS BLD VENIPUNCTURE: CPT | Performed by: INTERNAL MEDICINE

## 2018-03-11 PROCEDURE — 85027 COMPLETE CBC AUTOMATED: CPT | Performed by: INTERNAL MEDICINE

## 2018-03-11 PROCEDURE — 99207 ZZC CDG-MDM COMPONENT: MEETS LOW - DOWN CODED: CPT | Performed by: INTERNAL MEDICINE

## 2018-03-11 PROCEDURE — 12000000 ZZH R&B MED SURG/OB

## 2018-03-11 RX ORDER — POTASSIUM CHLORIDE 1500 MG/1
20-40 TABLET, EXTENDED RELEASE ORAL
Status: DISCONTINUED | OUTPATIENT
Start: 2018-03-11 | End: 2018-03-11

## 2018-03-11 RX ORDER — MAGNESIUM SULFATE HEPTAHYDRATE 40 MG/ML
4 INJECTION, SOLUTION INTRAVENOUS EVERY 4 HOURS PRN
Status: DISCONTINUED | OUTPATIENT
Start: 2018-03-11 | End: 2018-03-12 | Stop reason: HOSPADM

## 2018-03-11 RX ORDER — PROCHLORPERAZINE 25 MG
12.5 SUPPOSITORY, RECTAL RECTAL EVERY 12 HOURS PRN
Status: DISCONTINUED | OUTPATIENT
Start: 2018-03-11 | End: 2018-03-12 | Stop reason: HOSPADM

## 2018-03-11 RX ORDER — ONDANSETRON 2 MG/ML
4 INJECTION INTRAMUSCULAR; INTRAVENOUS EVERY 6 HOURS PRN
Status: DISCONTINUED | OUTPATIENT
Start: 2018-03-11 | End: 2018-03-11

## 2018-03-11 RX ORDER — NALOXONE HYDROCHLORIDE 0.4 MG/ML
.1-.4 INJECTION, SOLUTION INTRAMUSCULAR; INTRAVENOUS; SUBCUTANEOUS
Status: DISCONTINUED | OUTPATIENT
Start: 2018-03-11 | End: 2018-03-12 | Stop reason: HOSPADM

## 2018-03-11 RX ORDER — METOPROLOL SUCCINATE 50 MG/1
50 TABLET, EXTENDED RELEASE ORAL EVERY EVENING
Status: DISCONTINUED | OUTPATIENT
Start: 2018-03-11 | End: 2018-03-12 | Stop reason: HOSPADM

## 2018-03-11 RX ORDER — METOCLOPRAMIDE HYDROCHLORIDE 5 MG/ML
5 INJECTION INTRAMUSCULAR; INTRAVENOUS EVERY 6 HOURS PRN
Status: DISCONTINUED | OUTPATIENT
Start: 2018-03-11 | End: 2018-03-11

## 2018-03-11 RX ORDER — MINOCYCLINE HYDROCHLORIDE 50 MG/1
50 CAPSULE ORAL 2 TIMES DAILY
Status: DISCONTINUED | OUTPATIENT
Start: 2018-03-11 | End: 2018-03-12 | Stop reason: HOSPADM

## 2018-03-11 RX ORDER — METOCLOPRAMIDE HYDROCHLORIDE 5 MG/ML
5 INJECTION INTRAMUSCULAR; INTRAVENOUS EVERY 6 HOURS PRN
Status: DISCONTINUED | OUTPATIENT
Start: 2018-03-11 | End: 2018-03-12 | Stop reason: HOSPADM

## 2018-03-11 RX ORDER — ONDANSETRON 2 MG/ML
4 INJECTION INTRAMUSCULAR; INTRAVENOUS EVERY 6 HOURS PRN
Status: DISCONTINUED | OUTPATIENT
Start: 2018-03-11 | End: 2018-03-12 | Stop reason: HOSPADM

## 2018-03-11 RX ORDER — POTASSIUM CHLORIDE 1.5 G/1.58G
20-40 POWDER, FOR SOLUTION ORAL
Status: DISCONTINUED | OUTPATIENT
Start: 2018-03-11 | End: 2018-03-12 | Stop reason: HOSPADM

## 2018-03-11 RX ORDER — POTASSIUM CHLORIDE 7.45 MG/ML
10 INJECTION INTRAVENOUS
Status: DISCONTINUED | OUTPATIENT
Start: 2018-03-11 | End: 2018-03-12 | Stop reason: HOSPADM

## 2018-03-11 RX ORDER — WARFARIN SODIUM 5 MG/1
5 TABLET ORAL ONCE
Status: COMPLETED | OUTPATIENT
Start: 2018-03-11 | End: 2018-03-11

## 2018-03-11 RX ORDER — HYDROMORPHONE HYDROCHLORIDE 1 MG/ML
.3-.5 INJECTION, SOLUTION INTRAMUSCULAR; INTRAVENOUS; SUBCUTANEOUS
Status: DISCONTINUED | OUTPATIENT
Start: 2018-03-11 | End: 2018-03-12 | Stop reason: HOSPADM

## 2018-03-11 RX ORDER — GABAPENTIN 600 MG/1
600 TABLET ORAL 3 TIMES DAILY
Status: DISCONTINUED | OUTPATIENT
Start: 2018-03-11 | End: 2018-03-12 | Stop reason: HOSPADM

## 2018-03-11 RX ORDER — POTASSIUM CL/LIDO/0.9 % NACL 10MEQ/0.1L
10 INTRAVENOUS SOLUTION, PIGGYBACK (ML) INTRAVENOUS
Status: DISCONTINUED | OUTPATIENT
Start: 2018-03-11 | End: 2018-03-11

## 2018-03-11 RX ORDER — POTASSIUM CHLORIDE 1.5 G/1.58G
20-40 POWDER, FOR SOLUTION ORAL
Status: DISCONTINUED | OUTPATIENT
Start: 2018-03-11 | End: 2018-03-11

## 2018-03-11 RX ORDER — PROCHLORPERAZINE 25 MG
12.5 SUPPOSITORY, RECTAL RECTAL EVERY 12 HOURS PRN
Status: DISCONTINUED | OUTPATIENT
Start: 2018-03-11 | End: 2018-03-11

## 2018-03-11 RX ORDER — DILTIAZEM HYDROCHLORIDE 120 MG/1
120 CAPSULE, COATED, EXTENDED RELEASE ORAL EVERY EVENING
Status: DISCONTINUED | OUTPATIENT
Start: 2018-03-11 | End: 2018-03-12 | Stop reason: HOSPADM

## 2018-03-11 RX ORDER — WARFARIN SODIUM 5 MG/1
5 TABLET ORAL
Status: COMPLETED | OUTPATIENT
Start: 2018-03-11 | End: 2018-03-11

## 2018-03-11 RX ORDER — POTASSIUM CHLORIDE 1500 MG/1
20-40 TABLET, EXTENDED RELEASE ORAL
Status: DISCONTINUED | OUTPATIENT
Start: 2018-03-11 | End: 2018-03-12 | Stop reason: HOSPADM

## 2018-03-11 RX ORDER — METOCLOPRAMIDE 5 MG/1
5 TABLET ORAL EVERY 6 HOURS PRN
Status: DISCONTINUED | OUTPATIENT
Start: 2018-03-11 | End: 2018-03-12 | Stop reason: HOSPADM

## 2018-03-11 RX ORDER — PREGABALIN 75 MG/1
150 CAPSULE ORAL 2 TIMES DAILY
Status: DISCONTINUED | OUTPATIENT
Start: 2018-03-11 | End: 2018-03-12 | Stop reason: HOSPADM

## 2018-03-11 RX ORDER — BUPROPION HYDROCHLORIDE 150 MG/1
300 TABLET ORAL EVERY MORNING
Status: DISCONTINUED | OUTPATIENT
Start: 2018-03-11 | End: 2018-03-12 | Stop reason: HOSPADM

## 2018-03-11 RX ORDER — PANTOPRAZOLE SODIUM 40 MG/1
40 TABLET, DELAYED RELEASE ORAL
Status: DISCONTINUED | OUTPATIENT
Start: 2018-03-11 | End: 2018-03-12 | Stop reason: HOSPADM

## 2018-03-11 RX ORDER — MAGNESIUM SULFATE HEPTAHYDRATE 40 MG/ML
4 INJECTION, SOLUTION INTRAVENOUS EVERY 4 HOURS PRN
Status: DISCONTINUED | OUTPATIENT
Start: 2018-03-11 | End: 2018-03-11

## 2018-03-11 RX ORDER — POTASSIUM CHLORIDE 7.45 MG/ML
10 INJECTION INTRAVENOUS
Status: DISCONTINUED | OUTPATIENT
Start: 2018-03-11 | End: 2018-03-11

## 2018-03-11 RX ORDER — ONDANSETRON 4 MG/1
4 TABLET, ORALLY DISINTEGRATING ORAL EVERY 6 HOURS PRN
Status: DISCONTINUED | OUTPATIENT
Start: 2018-03-11 | End: 2018-03-12 | Stop reason: HOSPADM

## 2018-03-11 RX ORDER — FLUTICASONE PROPIONATE 50 MCG
2 SPRAY, SUSPENSION (ML) NASAL DAILY PRN
Status: DISCONTINUED | OUTPATIENT
Start: 2018-03-11 | End: 2018-03-12 | Stop reason: HOSPADM

## 2018-03-11 RX ORDER — NALOXONE HYDROCHLORIDE 0.4 MG/ML
.1-.4 INJECTION, SOLUTION INTRAMUSCULAR; INTRAVENOUS; SUBCUTANEOUS
Status: DISCONTINUED | OUTPATIENT
Start: 2018-03-11 | End: 2018-03-11

## 2018-03-11 RX ORDER — PROCHLORPERAZINE MALEATE 5 MG
5 TABLET ORAL EVERY 6 HOURS PRN
Status: DISCONTINUED | OUTPATIENT
Start: 2018-03-11 | End: 2018-03-12 | Stop reason: HOSPADM

## 2018-03-11 RX ORDER — POTASSIUM CHLORIDE 29.8 MG/ML
20 INJECTION INTRAVENOUS
Status: DISCONTINUED | OUTPATIENT
Start: 2018-03-11 | End: 2018-03-11

## 2018-03-11 RX ORDER — POTASSIUM CL/LIDO/0.9 % NACL 10MEQ/0.1L
10 INTRAVENOUS SOLUTION, PIGGYBACK (ML) INTRAVENOUS
Status: DISCONTINUED | OUTPATIENT
Start: 2018-03-11 | End: 2018-03-12 | Stop reason: HOSPADM

## 2018-03-11 RX ORDER — FUROSEMIDE 20 MG
20 TABLET ORAL DAILY
Status: DISCONTINUED | OUTPATIENT
Start: 2018-03-11 | End: 2018-03-12 | Stop reason: HOSPADM

## 2018-03-11 RX ORDER — METOCLOPRAMIDE 5 MG/1
5 TABLET ORAL EVERY 6 HOURS PRN
Status: DISCONTINUED | OUTPATIENT
Start: 2018-03-11 | End: 2018-03-11

## 2018-03-11 RX ORDER — PRAVASTATIN SODIUM 10 MG
10 TABLET ORAL AT BEDTIME
Status: DISCONTINUED | OUTPATIENT
Start: 2018-03-11 | End: 2018-03-12 | Stop reason: HOSPADM

## 2018-03-11 RX ORDER — NORTRIPTYLINE HYDROCHLORIDE 50 MG/1
50 CAPSULE ORAL AT BEDTIME
Status: DISCONTINUED | OUTPATIENT
Start: 2018-03-11 | End: 2018-03-12 | Stop reason: HOSPADM

## 2018-03-11 RX ORDER — ONDANSETRON 4 MG/1
4 TABLET, ORALLY DISINTEGRATING ORAL EVERY 6 HOURS PRN
Status: DISCONTINUED | OUTPATIENT
Start: 2018-03-11 | End: 2018-03-11

## 2018-03-11 RX ORDER — POTASSIUM CHLORIDE 29.8 MG/ML
20 INJECTION INTRAVENOUS
Status: DISCONTINUED | OUTPATIENT
Start: 2018-03-11 | End: 2018-03-12 | Stop reason: HOSPADM

## 2018-03-11 RX ORDER — PROCHLORPERAZINE MALEATE 5 MG
5 TABLET ORAL EVERY 6 HOURS PRN
Status: DISCONTINUED | OUTPATIENT
Start: 2018-03-11 | End: 2018-03-11

## 2018-03-11 RX ADMIN — BUPROPION HYDROCHLORIDE 300 MG: 150 TABLET, FILM COATED, EXTENDED RELEASE ORAL at 09:27

## 2018-03-11 RX ADMIN — WARFARIN SODIUM 5 MG: 5 TABLET ORAL at 17:31

## 2018-03-11 RX ADMIN — MINOCYCLINE HYDROCHLORIDE 50 MG: 50 CAPSULE ORAL at 20:33

## 2018-03-11 RX ADMIN — METOPROLOL SUCCINATE 50 MG: 50 TABLET, EXTENDED RELEASE ORAL at 03:24

## 2018-03-11 RX ADMIN — FUROSEMIDE 20 MG: 20 TABLET ORAL at 09:27

## 2018-03-11 RX ADMIN — PRAVASTATIN SODIUM 10 MG: 10 TABLET ORAL at 03:24

## 2018-03-11 RX ADMIN — MICONAZOLE NITRATE: 2 POWDER TOPICAL at 00:31

## 2018-03-11 RX ADMIN — PREGABALIN 150 MG: 75 CAPSULE ORAL at 20:30

## 2018-03-11 RX ADMIN — PRAVASTATIN SODIUM 10 MG: 10 TABLET ORAL at 20:36

## 2018-03-11 RX ADMIN — MINOCYCLINE HYDROCHLORIDE 50 MG: 50 CAPSULE ORAL at 09:43

## 2018-03-11 RX ADMIN — WARFARIN SODIUM 5 MG: 5 TABLET ORAL at 03:24

## 2018-03-11 RX ADMIN — PREGABALIN 150 MG: 75 CAPSULE ORAL at 10:23

## 2018-03-11 RX ADMIN — GABAPENTIN 600 MG: 600 TABLET, FILM COATED ORAL at 13:45

## 2018-03-11 RX ADMIN — NORTRIPTYLINE HYDROCHLORIDE 50 MG: 50 CAPSULE ORAL at 20:36

## 2018-03-11 RX ADMIN — NORTRIPTYLINE HYDROCHLORIDE 50 MG: 50 CAPSULE ORAL at 03:25

## 2018-03-11 RX ADMIN — DILTIAZEM HYDROCHLORIDE 120 MG: 120 CAPSULE, COATED, EXTENDED RELEASE ORAL at 20:30

## 2018-03-11 RX ADMIN — GABAPENTIN 600 MG: 600 TABLET, FILM COATED ORAL at 20:30

## 2018-03-11 RX ADMIN — MINOCYCLINE HYDROCHLORIDE 50 MG: 50 CAPSULE ORAL at 03:24

## 2018-03-11 RX ADMIN — METOPROLOL SUCCINATE 50 MG: 50 TABLET, EXTENDED RELEASE ORAL at 20:30

## 2018-03-11 RX ADMIN — PANTOPRAZOLE SODIUM 40 MG: 40 TABLET, DELAYED RELEASE ORAL at 09:27

## 2018-03-11 NOTE — PHARMACY-ADMISSION MEDICATION HISTORY
.Admission medication history interview status for this patient is complete. See [facility] admission navigator for allergy information, prior to admission medications and immunization status.     Medication history interview source(s):Patient and Family  Medication history resources (including written lists, pill bottles, clinic record):list  [place] [facility] [place]    Changes made to PTA medication list:  Added: protonix and mvi and prn cephalexin  Deleted: omeprazole  Changed: none    Actions taken by pharmacist (provider contacted, etc):None     Additional medication history information:None    Medication reconciliation/reorder completed by provider prior to medication history? No    Do you take OTC medications (eg tylenol, ibuprofen, fish oil, eye/ear drops, etc)? y  (Y/N)    For patients on insulin therapy: n (Y/N)  Lantus/levemir/NPH/Mix 70/30 dose:   (Y/N) (see Med list for doses)   Sliding scale Novolog Y/N  If Yes, do you have a baseline novolog pre-meal dose:  units with meals  Patients eat three meals a day:   Y/N    How many episodes of hypoglycemia do you have per week: _______  How many missed doses do you have per week: ______  How many times do you check your blood glucose per day: _______   Any Barriers to therapy - Be specific :  cost of medications, comfortable with giving injections (if applicable), comfortable and confident with current diabetes regimen: Y/N ______________      Prior to Admission medications    Medication Sig Last Dose Taking? Auth Provider   Multiple Vitamins-Minerals (CENTRUM SILVER) per tablet Take 1 tablet by mouth daily 3/9/2018 at 0800 Yes Unknown, Entered By History   CEPHALEXIN PO Take 500 mg by mouth 3 times daily as needed cellulitis  Yes Unknown, Entered By History   Pantoprazole Sodium (PROTONIX PO) Take 40 mg by mouth every morning (before breakfast) 3/9/2018 at 0800 Yes Unknown, Entered By History   gabapentin (NEURONTIN) 600 MG tablet Take 1 tablet (600 mg) by  mouth 3 times daily 3/10/2018 at 1800 Yes Jaspal Vincent Mai, MD   pregabalin (LYRICA) 150 MG capsule Take 1 capsule (150 mg) by mouth 2 times daily 3/9/2018 at 1800 Yes Ernie Mccullough MD   warfarin (COUMADIN) 2.5 MG tablet Take 2.5mg (1 tablet) M,Tu,W,Th,Sa,Robin, and 5mg (2 tablets) on Fridays OR as directed by INR clinic (see comment) 3/9/2018 at 1800 Yes Nelly Pearl MD   buPROPion (WELLBUTRIN XL) 300 MG 24 hr tablet Take 1 tablet (300 mg) by mouth every morning 3/9/2018 at 0800 Yes Nelly Pearl MD   diltiazem (CARDIZEM CD/CARTIA XT) 120 MG 24 hr capsule Take 1 capsule (120 mg) by mouth daily  Patient taking differently: Take 120 mg by mouth every evening  3/9/2018 at 1800 Yes Nelly Pearl MD   furosemide (LASIX) 20 MG tablet Take 1 tablet (20 mg) by mouth daily 3/9/2018 at 0800 Yes Nelly Pearl MD   metoprolol (TOPROL-XL) 50 MG 24 hr tablet Take 1 tablet (50 mg) by mouth daily 3/9/2018 at 1800 Yes Nelly Pearl MD   nortriptyline (PAMELOR) 25 MG capsule Take 2 capsules (50 mg) by mouth At Bedtime 3/9/2018 at 2200 Yes Nelly Pearl MD   pravastatin (PRAVACHOL) 10 MG tablet Take 1 tablet (10 mg) by mouth At Bedtime Needs labs before next refill 3/9/2018 at 2200 Yes Nelly Pearl MD   venlafaxine (EFFEXOR-XR) 150 MG 24 hr capsule TAKE 1 CAPSULE(150 MG) BY MOUTH DAILY 3/9/2018 at 1800 Yes Nelly Pearl MD   minocycline (MINOCIN/DYNACIN) 50 MG capsule Take 1 capsule (50 mg) by mouth 2 times daily 3/9/2018 at 1800 Yes Nelly Pearl MD   lidocaine (LIDODERM) 5 % Patch Apply up to 3 patches to painful area at once for up to 12 h within a 24 h period.  Remove after 12 hours.  Patient taking differently: as needed Apply up to 3 patches to painful area at once for up to 12 h within a 24 h period.  Remove after 12 hours.  Yes Nelyl Pearl MD   fluticasone (FLONASE) 50 MCG/ACT spray Spray 2 sprays into both nostrils daily   Nelly Pearl MD

## 2018-03-11 NOTE — PROGRESS NOTES
Hospitalist Progress Note  [facility]      Chris Blackwell MD 03/11/2018      Reason for Stay / current hospital problem list:    Acute febrile illness    Productive cough shortness of breath and deconditioning    Acute encephalopathy           Assessment and Plan:        Summary of Stay:     Berenice Chaudhari is a 78 year old  female with a significant past medical history of morbid obesity, hypertension, recurrent admissions for cellulitis, atrial arrhythmias, who presents with generalized weakness and fever.  She has chronic cough productive of yellowish and greenish sputum for which she is on minocycline daily.  Patient's  also concerned about patient's mental status due to confusion before he brought her to the emergency department.  ED evaluation revealed fever of 102.3, mild leukocytosis and chest x-ray evidence of pulmonary vascular congestion with no convincing evidence of infiltrate.  Patient was given a dose of Lasix and was admitted to our service.     Patient's presentation is consistent with acute febrile illness of unclear etiology but I am not convinced that she has congestive heart failure.      Admission diagnoses:       #1.  Acute febrile illness with chronic cough productive of yellowish and greenish sputum concerning for bronchiectasis or chronic bronchitis on suppressive minocycline.  Patient was mildly hypoxic on arrival but this improved.  BNP was elevated but the patient appears to be euvolemic with no convincing evidence of CHF   --Patient is afebrile, continues to productive cough.  Continue chronic antibiotic, sputum Gram stain culture obtained, follow cultures and fever curve.     #2.  Morbid obesity and history of hip infection who is wheelchair-bound at baseline   --Physical therapy consult    #3.Physical deconditioning      #4.CKD stage III: Stable     #5.  Paroxysmal atrial fibrillation on Coumadin: Subtherapeutic INR, pharmacy assisting with  dosing     #6.  History of diastolic heart failure: Continue home dose of oral Lasix     #7.  Acute encephalopathy: Improved           # Pain Assessment:   Current Pain Score 3/11/2018 3/11/2018 3/11/2018   Patient currently in pain? yes denies denies   Pain score (0-10) 4 - -   Pain location Head - -   Pain descriptors Headache - -   - Berenice is experiencing pain due to headache. Pain management was discussed and the plan was created in a collaborative fashion.  Berenice's response to the current recommendations: engaged  - Please see the plan for pain management as documented above              DVT Prophylaxis: Warfarin        Code Status: DNR / DNI      Discharge Plan:Expected discharge home in the next 1 or 2 days based on patient's progress and culture results        Interval History (Subjective):             Patient was seen and examined by me today and medical record reviewed.Overnight events noted and care discussed with nursing staff and treatment team.  Continues to feel weak but denies any fever or chills.  No chest pain, shortness of breath dyspnea.  Continues to have productive cough                   Physical Exam:      Last Vital Signs:  Temp:  [97.4  F (36.3  C)-102.3  F (39.1  C)] 97.5  F (36.4  C)  Pulse:  [83] 83  Heart Rate:  [77-87] 78  Resp:  [16-28] 16  BP: (117-173)/(56-86) 117/58  SpO2:  [93 %-99 %] 98 %  I/O last 3 completed shifts:  In: 170 [P.O.:170]  Out: -     Wt Readings from Last 5 Encounters:   03/11/18 (!) 158.2 kg (348 lb 12.8 oz)   12/21/17 (!) 155.6 kg (343 lb)   11/24/17 (!) 155.7 kg (343 lb 3.2 oz)   10/25/17 (!) 146.1 kg (322 lb)   10/02/17 (!) 150.6 kg (332 lb)     GEN: awake, alert, cooperative, no apparent distress, oriented x 3     NECK:Supple ,no mass or thyromegaly      HEENT:  Normocephalic/atraumatic, no scleral icterus, no nasal discharge, mouth moist.     CV:  Regular rate and rhythm, no murmur or JVD.  S1 + S2 noted, no S3 or S4.     LUNGS: No increased work of  breathing noted, generalized decreased air entry but no crackles or wheezing appreciated by me.  ABD: Active bowel sounds, soft, non-tender/non-distended.  No rebound/guarding/rigidity.     EXT: Trace edema.  No cyanosis.  No joint synovitis noted.Lower extremity pulses are normal bilaterally and      LGS: No cervical or axillary lymphadenopathy      SKIN: Dry to touch, warm ,no exanthems noted in the visualized areas.     Neurologic:Grossly intact,non focal . No acute focal neurologic deficit      Psychaitric exam:Mood and affect normal                    Medications:      All current medications were reviewed with changes reflected in problem list.    Medications     - MEDICATION INSTRUCTIONS -       - MEDICATION INSTRUCTIONS -       Warfarin Therapy Reminder         pregabalin  150 mg Oral BID     buPROPion  300 mg Oral QAM     nortriptyline  50 mg Oral At Bedtime     pravastatin  10 mg Oral At Bedtime     metoprolol succinate  50 mg Oral QPM     diltiazem  120 mg Oral QPM     furosemide  20 mg Oral Daily     minocycline  50 mg Oral BID     pantoprazole (PROTONIX) EC tablet 40 mg  40 mg Oral QAM AC     warfarin  5 mg Oral ONCE at 18:00     gabapentin  600 mg Oral TID                Data:          Data reviewed today: I reviewed all new labs and imaging results over the last 24 hours. I personally reviewed no images or EKG's today        Recent Labs  Lab 03/11/18  0734 03/10/18  1718   WBC 9.0 11.6*   HGB 10.6* 11.3*   HCT 34.4* 36.0   MCV 92 91   * 145*       Recent Labs  Lab 03/10/18  1754 03/10/18  1715   CULT No growth after 16 hours No growth after 16 hours       Recent Labs  Lab 03/11/18  0734 03/10/18  1718    138   POTASSIUM 3.4 4.0   CHLORIDE 103 103   CO2 28 29   ANIONGAP 8 6   GLC 92 102*   BUN 19 19   CR 1.35* 1.26*   GFRESTIMATED 38* 41*   GFRESTBLACK 46* 50*   SHILOH 8.5 8.8   PROTTOTAL 7.3 7.9   ALBUMIN 2.7* 3.0*   BILITOTAL 1.0 1.0   ALKPHOS 89 102   AST 30 35   ALT 31 39         Recent  Labs  Lab 03/11/18  0734 03/10/18  1718   GLC 92 102*             Recent Labs  Lab 03/11/18  0734 03/10/18  1911   INR 1.35* 1.39*           Recent Labs  Lab 03/10/18  1718   TROPI 0.016       Recent Results (from the past 48 hour(s))   CT Head w/o Contrast    Narrative    CT HEAD WITHOUT CONTRAST  3/10/2018 6:27 PM    HISTORY: Confusion, evaluate for CVA.    TECHNIQUE: Scans were obtained through the head without IV contrast.   Radiation dose for this scan was reduced using automated exposure  control, adjustment of the mA and/or kV according to patient size, or  iterative reconstruction technique.    COMPARISON: Head CT 3/7/2005.    FINDINGS: Mild atrophy. Minimal chronic white matter disease likely  small vessel ischemic change.  No hemorrhage, mass lesion, or focal  area of acute infarction identified. Paranasal sinuses are normal. No  bony abnormality. No interval change.      Impression    IMPRESSION:   1. Mild atrophy and chronic white matter disease.  2. Nothing acute.  3. No interval change.    ARELIS HU MD   XR Chest 2 Views    Narrative    CHEST TWO VIEW   3/10/2018 6:35 PM     HISTORY: Shortness of breath.    COMPARISON: 11/20/2017    FINDINGS: Heart at upper limits of normal in size. Pulmonary vascular  congestion but no convincing infiltrates. No interval change.      Impression    IMPRESSION: Nothing clearly acute.    ARELIS HU MD               Disclaimer: This note consists of symbols derived from keyboarding, dictation and/or voice recognition software. As a result, there may be errors in the script that have gone undetected. Please consider this when interpreting information found in this chart

## 2018-03-11 NOTE — ED NOTES
Ambulated patient to commode with RN.  Patient had difficulty moving from bed to commode.  Patient's oxygen saturation while moving was 99% on RA.

## 2018-03-11 NOTE — PLAN OF CARE
Problem: Patient Care Overview  Goal: Plan of Care/Patient Progress Review  Outcome: Improving  Arrived to unit via cart at 2345 with  . Introduced to room and call lightIntermittent confusion to place, time, situation. Temp of 99.9. Elevated BP, otherwise VSS. LS have coarseness throughout. Small, white to clear thick sputum. Good productive cough. On droplet precautions until Respiratory panel results. Flu negative. CXR negative with some pulmonary congestion- IV lasix given in ED. O2 on at 2LPM, occasionally would desat when sleeping on RA. BS active x4. Miconazole powder applied to skin folds, skin reddened to back of knees but all skin intact to folds. 11 old wounds that are scabbed or calloused over present to R buttock. open wound to L buttock- cleansed and mepilex applied. WOCN consulted. No fat/cardiac/no caffeine diet. 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 Unable to fully bear weight to L hip, w/c bound at baseline. Pivot transfers to commode with A2.  Denies pain. voiding in good amts. Will continue to monitor.

## 2018-03-11 NOTE — H&P
Hospitalist Admission Note(Formerly [facility])    Name: Berenice Chaudhari    MRN: [id number]          YOB: 1940    Age: 78 year old  Date of admission: 3/10/2018  Primary care provider: Nelly Pearl              Assessment:       Brief summary of admission assessment:Berenice Chaudhari is a 78 year old  female with a significant past medical history of morbid obesity, hypertension, recurrent admissions for cellulitis, atrial arrhythmias, who presents with generalized weakness and fever.  She has chronic cough productive of yellowish and greenish sputum for which she is on minocycline daily.  Patient's  also concerned about patient's mental status due to confusion before he brought her to the emergency department.  ED evaluation revealed fever of 102.3, mild leukocytosis and chest x-ray evidence of pulmonary vascular congestion with no convincing evidence of infiltrate.  Patient was given a dose of Lasix and was admitted to our service.    Patient's presentation is consistent with acute febrile illness of unclear etiology but I am not convinced that she has congestive heart failure.  Patient will be admitted for further inpatient management to care of Hospitalist service.      Admission diagnoses:      #1.  Acute febrile illness with chronic cough productive of yellowish and greenish sputum concerning for bronchiectasis or chronic bronchitis on suppressive minocycline.  Patient was mildly hypoxic on arrival but this improved.  BNP was elevated but the patient appears to be euvolemic with no convincing evidence of CHF     #2.  Morbid obesity and history of hip infection who is wheelchair-bound at baseline    #3.Physical deconditioning     #4.CKD stage III    #5.  Paroxysmal atrial fibrillation on Coumadin     #6.  History of diastolic heart failure    #7.  Acute encephalopathy: Improved         Plan/MDM:       > Admission Status: Will admit patient to hospitalist service as inpatient as  patient likely need over two mid night stays in the hospital.     >Care plan:    -- admit to medical bed I/O, Daily weight, serial trop,   -- add procalcitonin level, continue minocycline, Gram stain and culture sputum, oxygen as needed, respiratory viral panel, continue home Lasix      >Supportive care:Nausea and vomiting control measures  Respiratory therapy    >Diet:Diet advanced    >Activity:Advance activity as tolerated    >Education/Counseling :Discussed treatment plan with the patient    >Consults:none    >VTE prophylactic measures:prophylaxis against venous thromboembolism    >Therapies:none       >Additional orders:    --Care plan discussed with the patient/family and agreed to care plan   --Patient will be transferred to care of hospitalist attending for further evaluation and management as appropriate   --Old medical orders reviewed   --imaging result independently reviewed by me     (See orders placed for this visit by me )     - Home medication reviewed and will be continued as appropriate once pharmacy reconciliation is completed         Code Status/Disposition:     >Code Status:DNR / DNI      >Disposition:anticipate discharge to home and Anticipate discharge in 2-3 days        Disclaimer: This note consists of symbols derived from keyboarding, dictation and/or voice recognition software. As a result, there may be errors in the script that have gone undetected. Please consider this when interpreting information found in this chart.             Chief Complaint:     Generalized weakness fever and shortness of breath     History is obtained from the patient          History of Present Illness:      This patient is a 78 year old  female with a significant past medical history of complex medical problems well known to our service who presents with the following condition requiring a hospital admission:      Acute febrile illness associated with encephalopathy and mild hypoxia    Berenice is 78-year-old female  who has a complex medical problems very well-known to our service from her recurrent admissions including sepsis currently presenting with 1 day history of generalized weakness, fever and some shortness of breath.  She is chronically coughing with productive sputum and did not realize any change to her baseline.  Patient's  at bedside was concerned about patient's mental status as she was confused earlier with some hallucinations.  Patient and  were at a concert in [place] yesterday and did not have any symptoms of fever or chills.  Patient developed fever today with a fever of 102.6 and her oxygen saturation was 89% as well.  Patient denies any chest pain or significant shortness of breath orthopnea.  Evaluation in the emergency department did not reveal source of fever.         Past Medical History:     Past Medical History:   Diagnosis Date     Allergic rhinitis, cause unspecified      Anxiety 6/13/2013     Arrhythmia     atrial flutter     Atrial flutter with rapid ventricular response (H) 8/8/2017     Cellulitis and abscess of leg, except foot recurrent , both legs    begins with fever, nausea, diarrhea, vomiting & & the cellulitis may be visible a day or 2 later     Chronic pain     On chronic fentanyl patch.     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  less likely = raction to vancomycin      Essential hypertension with goal blood pressure less than 140/90 1/12/2005     Problem list name updated by automated process. Provider to review     Generalized osteoarthrosis, unspecified site     films 9/04: L hhip bad; both knees mod severe medial OA     GERD (gastroesophageal reflux disease) 11/15/2013     Herpes zoster without mention of complication 9/03    L shoulder; still has neuralgia      Hyperlipidemia LDL goal <100 9/16/2011     Methicillin susceptible Staphylococcus aureus septicemia (H) after L hip injection 205    ARDS, renal failure, staph sepsis after a hip joint injection     Obesity 1/12/2005     Problem list name updated by automated process. Provider to review     Other chronic pain      Other diseases of lung, not elsewhere classified 07/93    Interstitial lung process     Pneumonia, organism unspecified(486)      Urticaria, unspecified 07/93    Diffuse severe (hospitalized).  (+) skin biopsy by Dr. Figueroa            Past Surgical History:     Past Surgical History:   Procedure Laterality Date     BIOPSY       C NONSPECIFIC PROCEDURE      Extensive oral (dental) surgery     C NONSPECIFIC PROCEDURE      Remote T&A     C NONSPECIFIC PROCEDURE  1/03    LAP CHOLY     C NONSPECIFIC PROCEDURE      L hip I + D x 2  4/05     C NONSPECIFIC PROCEDURE  2/05    colonoscopy     C NONSPECIFIC PROCEDURE      L hip I + D several other times      CATARACT IOL, RT/LT Right 2012     CHOLECYSTECTOMY       ENT SURGERY       ESOPHAGOSCOPY, GASTROSCOPY, DUODENOSCOPY (EGD), COMBINED N/A 11/9/2017    Procedure: COMBINED ESOPHAGOSCOPY, GASTROSCOPY, DUODENOSCOPY (EGD);  ESOPHAGOSCOPY, GASTROSCOPY, DUODENOSCOPY (EGD) (fv)  ;  Surgeon: Buck Ribeiro MD;  Location:  OR     PHACOEMULSIFICATION CLEAR CORNEA WITH STANDARD INTRAOCULAR LENS IMPLANT Left 3/10/2016    Procedure: PHACOEMULSIFICATION CLEAR CORNEA WITH STANDARD INTRAOCULAR LENS IMPLANT;  Surgeon: Dwight Metcalf MD;  Location: [facility]             Social History:     Social History   Substance Use Topics     Smoking status: Former Smoker     Packs/day: 2.00     Years: 22.00     Types: Cigarettes     Start date: 1967     Quit date: 1990     Smokeless tobacco: Never Used      Comment: started smoking 20's- quit in 1990     Alcohol use No      Comment: RARELY             Family History:     Family History   Problem Relation Age of Onset     Myocardial  Infarction Father 63            Allergies:     Allergies   Allergen Reactions     Ceftriaxone Anaphylaxis and Rash     Rocephin given. Developed rash to back and abd, awaiting to see if it improves with d/c of rocephin.        Nafcillin Rash     diffuse severe rash in hospital 2/05     Penicillins Anaphylaxis     Vancomycin Anaphylaxis and Rash     Codeine Fatigue     Sleeps continuously     Clindamycin Rash     received information from patient     Zithromax [Azithromycin] Rash             Medications:        Prior to Admission medications    Medication Sig Last Dose Taking? Auth Provider   gabapentin (NEURONTIN) 600 MG tablet Take 1 tablet (600 mg) by mouth 3 times daily   Jaspal Vincent Mai, MD   pregabalin (LYRICA) 150 MG capsule Take 1 capsule (150 mg) by mouth 2 times daily   Ernie Mccullough MD   omeprazole (PRILOSEC) 40 MG capsule Take 1 capsule (40 mg) by mouth daily Take 30-60 minutes before a meal.   Ernie Mccullough MD   warfarin (COUMADIN) 2.5 MG tablet Take 2.5mg (1 tablet) M,Tu,W,Th,Sa,Su, and 5mg (2 tablets) on Fridays OR as directed by INR clinic (see comment)   Nelly Pearl MD   buPROPion (WELLBUTRIN XL) 300 MG 24 hr tablet Take 1 tablet (300 mg) by mouth every morning   Nelly Pearl MD   diltiazem (CARDIZEM CD/CARTIA XT) 120 MG 24 hr capsule Take 1 capsule (120 mg) by mouth daily   Nelly Pearl MD   furosemide (LASIX) 20 MG tablet Take 1 tablet (20 mg) by mouth daily   Nelly Pearl MD   metoprolol (TOPROL-XL) 50 MG 24 hr tablet Take 1 tablet (50 mg) by mouth daily   Nelly Pearl MD   nortriptyline (PAMELOR) 25 MG capsule Take 2 capsules (50 mg) by mouth At Bedtime   Nelly Pearl MD   pravastatin (PRAVACHOL) 10 MG tablet Take 1 tablet (10 mg) by mouth At Bedtime Needs labs before next refill   Nelly Pearl MD   venlafaxine (EFFEXOR-XR) 150 MG 24 hr capsule TAKE 1 CAPSULE(150 MG) BY MOUTH DAILY   Nelly Pearl MD   minocycline  (MINOCIN/DYNACIN) 50 MG capsule Take 1 capsule (50 mg) by mouth 2 times daily   Nelly Pearl MD   METOPROLOL SUCCINATE ER PO Take 50 mg by mouth every evening   Unknown, Entered By History   fluticasone (FLONASE) 50 MCG/ACT spray Spray 2 sprays into both nostrils daily   Nelly Pearl MD   lidocaine (LIDODERM) 5 % Patch Apply up to 3 patches to painful area at once for up to 12 h within a 24 h period.  Remove after 12 hours.  Patient taking differently: as needed Apply up to 3 patches to painful area at once for up to 12 h within a 24 h period.  Remove after 12 hours.   Nelly Pearl MD          Review of Systems:     A Comprehensive greater than 10 system review of systems was carried out.  Pertinent positives and negatives are noted above in HPI.  Otherwise negative for contributory information.           Physical Exam:     Vital signs were reviewed    Temp:  [100.4  F (38  C)-102.3  F (39.1  C)] 100.4  F (38  C)  Pulse:  [83] 83  Heart Rate:  [77-80] 78  Resp:  [28] 28  BP: (145-173)/(76-86) 173/76  SpO2:  [93 %-99 %] 98 %        GEN: awake, alert, cooperative, no apparent distress, oriented x 3    NECK:Supple ,no mass or thyromegaly     HEENT:  Normocephalic/atraumatic, no scleral icterus, no nasal discharge, mouth moist.    CV:  Regular rate and rhythm, no murmur or JVD.  S1 + S2 noted, no S3 or S4.    LUNGS: No increased work of breathing noted, generalized decreased air entry but no crackles or wheezing appreciated by me.  ABD: Active bowel sounds, soft, non-tender/non-distended.  No rebound/guarding/rigidity.    EXT: Trace edema.  No cyanosis.  No joint synovitis noted.Lower extremity pulses are normal bilaterally and     LGS: No cervical or axillary lymphadenopathy     SKIN: Dry to touch, warm ,no exanthems noted in the visualized areas.    Neurologic:Grossly intact,non focal . No acute focal neurologic deficit     Psychaitric exam:Mood and affect normal            Data:       All  laboratory and imaging data in the past 24 hours reviewed     Results for orders placed or performed during the hospital encounter of 03/10/18   XR Chest 2 Views    Narrative    CHEST TWO VIEW   3/10/2018 6:35 PM     HISTORY: Shortness of breath.    COMPARISON: 11/20/2017    FINDINGS: Heart at upper limits of normal in size. Pulmonary vascular  congestion but no convincing infiltrates. No interval change.      Impression    IMPRESSION: Nothing clearly acute.    ARELIS HU MD   CT Head w/o Contrast    Narrative    CT HEAD WITHOUT CONTRAST  3/10/2018 6:27 PM    HISTORY: Confusion, evaluate for CVA.    TECHNIQUE: Scans were obtained through the head without IV contrast.   Radiation dose for this scan was reduced using automated exposure  control, adjustment of the mA and/or kV according to patient size, or  iterative reconstruction technique.    COMPARISON: Head CT 3/7/2005.    FINDINGS: Mild atrophy. Minimal chronic white matter disease likely  small vessel ischemic change.  No hemorrhage, mass lesion, or focal  area of acute infarction identified. Paranasal sinuses are normal. No  bony abnormality. No interval change.      Impression    IMPRESSION:   1. Mild atrophy and chronic white matter disease.  2. Nothing acute.  3. No interval change.    ARELIS HU MD   Comprehensive metabolic panel   Result Value Ref Range    Sodium 138 133 - 144 mmol/L    Potassium 4.0 3.4 - 5.3 mmol/L    Chloride 103 94 - 109 mmol/L    Carbon Dioxide 29 20 - 32 mmol/L    Anion Gap 6 3 - 14 mmol/L    Glucose 102 (H) 70 - 99 mg/dL    Urea Nitrogen 19 7 - 30 mg/dL    Creatinine 1.26 (H) 0.52 - 1.04 mg/dL    GFR Estimate 41 (L) >60 mL/min/1.7m2    GFR Estimate If Black 50 (L) >60 mL/min/1.7m2    Calcium 8.8 8.5 - 10.1 mg/dL    Bilirubin Total 1.0 0.2 - 1.3 mg/dL    Albumin 3.0 (L) 3.4 - 5.0 g/dL    Protein Total 7.9 6.8 - 8.8 g/dL    Alkaline Phosphatase 102 40 - 150 U/L    ALT 39 0 - 50 U/L    AST 35 0 - 45 U/L   CBC with platelets  differential   Result Value Ref Range    WBC 11.6 (H) 4.0 - 11.0 10e9/L    RBC Count 3.94 3.8 - 5.2 10e12/L    Hemoglobin 11.3 (L) 11.7 - 15.7 g/dL    Hematocrit 36.0 35.0 - 47.0 %    MCV 91 78 - 100 fl    MCH 28.7 26.5 - 33.0 pg    MCHC 31.4 (L) 31.5 - 36.5 g/dL    RDW 14.6 10.0 - 15.0 %    Platelet Count 145 (L) 150 - 450 10e9/L    Diff Method Automated Method     % Neutrophils 87.9 %    % Lymphocytes 6.5 %    % Monocytes 4.6 %    % Eosinophils 0.3 %    % Basophils 0.3 %    % Immature Granulocytes 0.4 %    Nucleated RBCs 0 0 /100    Absolute Neutrophil 10.2 (H) 1.6 - 8.3 10e9/L    Absolute Lymphocytes 0.8 0.8 - 5.3 10e9/L    Absolute Monocytes 0.5 0.0 - 1.3 10e9/L    Absolute Eosinophils 0.0 0.0 - 0.7 10e9/L    Absolute Basophils 0.0 0.0 - 0.2 10e9/L    Abs Immature Granulocytes 0.1 0 - 0.4 10e9/L    Absolute Nucleated RBC 0.0    Troponin I   Result Value Ref Range    Troponin I ES 0.016 0.000 - 0.045 ug/L   Nt probnp inpatient (BNP)   Result Value Ref Range    N-Terminal Pro BNP Inpatient 3964 (H) 0 - 1800 pg/mL   Blood gas venous   Result Value Ref Range    Ph Venous 7.41 7.32 - 7.43 pH    PCO2 Venous 47 40 - 50 mm Hg    PO2 Venous 30 25 - 47 mm Hg    Bicarbonate Venous 30 (H) 21 - 28 mmol/L    Base Excess Venous 3.9 mmol/L    FIO2 Room Air    UA with Microscopic   Result Value Ref Range    Color Urine Yellow     Appearance Urine Clear     Glucose Urine Negative NEG^Negative mg/dL    Bilirubin Urine Negative NEG^Negative    Ketones Urine Negative NEG^Negative mg/dL    Specific Gravity Urine 1.014 1.003 - 1.035    Blood Urine Large (A) NEG^Negative    pH Urine 7.0 5.0 - 7.0 pH    Protein Albumin Urine 30 (A) NEG^Negative mg/dL    Urobilinogen mg/dL 0.0 0.0 - 2.0 mg/dL    Nitrite Urine Negative NEG^Negative    Leukocyte Esterase Urine Trace (A) NEG^Negative    Source Catheterized Urine     WBC Urine 7 (H) 0 - 5 /HPF    RBC Urine 18 (H) 0 - 2 /HPF    Squamous Epithelial /HPF Urine 1 0 - 1 /HPF    Transitional Epi 1  0 - 1 /HPF    Hyaline Casts 1 0 - 2 /LPF   Lactic acid   Result Value Ref Range    Lactic Acid 1.1 0.4 - 2.0 mmol/L   D dimer quantitative   Result Value Ref Range    D Dimer 0.4 0.0 - 0.50 ug/ml FEU   INR   Result Value Ref Range    INR 1.39 (H) 0.86 - 1.14   EKG 12-lead, tracing only   Result Value Ref Range    Interpretation ECG Click View Image link to view waveform and result    ISTAT gases lactate madeleine POCT   Result Value Ref Range    Ph Venous 7.41 7.32 - 7.43 pH    PCO2 Venous 42 40 - 50 mm Hg    PO2 Venous 28 25 - 47 mm Hg    Bicarbonate Venous 27 21 - 28 mmol/L    O2 Sat Venous 53 %    Lactic Acid 0.8 0.7 - 2.1 mmol/L   Blood culture   Result Value Ref Range    Specimen Description Blood Left Arm     Special Requests Aerobic and anaerobic bottles received     Culture Micro PENDING    Blood culture   Result Value Ref Range    Specimen Description Blood Left Hand     Culture Micro PENDING    Influenza A/B antigen   Result Value Ref Range    Influenza A/B Agn Specimen Nasal     Influenza A Negative NEG^Negative    Influenza B Negative NEG^Negative     *Note: Due to a large number of results and/or encounters for the requested time period, some results have not been displayed. A complete set of results can be found in Results Review.            Recent Results (from the past 48 hour(s))   CT Head w/o Contrast    Narrative    CT HEAD WITHOUT CONTRAST  3/10/2018 6:27 PM    HISTORY: Confusion, evaluate for CVA.    TECHNIQUE: Scans were obtained through the head without IV contrast.   Radiation dose for this scan was reduced using automated exposure  control, adjustment of the mA and/or kV according to patient size, or  iterative reconstruction technique.    COMPARISON: Head CT 3/7/2005.    FINDINGS: Mild atrophy. Minimal chronic white matter disease likely  small vessel ischemic change.  No hemorrhage, mass lesion, or focal  area of acute infarction identified. Paranasal sinuses are normal. No  bony abnormality. No  interval change.      Impression    IMPRESSION:   1. Mild atrophy and chronic white matter disease.  2. Nothing acute.  3. No interval change.    ARELIS HU MD   XR Chest 2 Views    Narrative    CHEST TWO VIEW   3/10/2018 6:35 PM     HISTORY: Shortness of breath.    COMPARISON: 11/20/2017    FINDINGS: Heart at upper limits of normal in size. Pulmonary vascular  congestion but no convincing infiltrates. No interval change.      Impression    IMPRESSION: Nothing clearly acute.    ARELIS HU MD       EKG results: Normal sinus rhythm   All imaging studies reviewed by me.         Patient`s old medical records reviewed and case discussed with the ED physician.    ED course-Reviewed

## 2018-03-11 NOTE — PHARMACY-ANTICOAGULATION SERVICE
Clinical Pharmacy - Warfarin Dosing Consult     Pharmacy has been consulted to manage this patient s warfarin therapy.  Indication: Atrial Fibrillation  Therapy Goal: INR 2-3  Warfarin Prior to Admission: Yes  Warfarin PTA Regimen: 5mg Fridays, 2.5mg ROW  Recent documented change in oral intake/nutrition: Unknown    INR   Date Value Ref Range Status   03/10/2018 1.39 (H) 0.86 - 1.14 Final     INR Protime   Date Value Ref Range Status   02/26/2018 1.4 (A) 0.86 - 1.14 Final       Recommend warfarin 5 mg today upon admission as patient's inr is subtherapeutic. Pharmacy will monitor Berenice Chaudhari daily and order warfarin doses to achieve specified goal.      Please contact pharmacy as soon as possible if the warfarin needs to be held for a procedure or if the warfarin goals change.

## 2018-03-11 NOTE — PLAN OF CARE
Problem: Patient Care Overview  Goal: Plan of Care/Patient Progress Review  Outcome: Improving  Alert and oriented, sleeps between cares. On droplet and contact isolation. Afebrile this shift. Lung sounds coarse and diminished, has productive cough. Tolerating low sodium/sat fat diet. Pivot transfers with Ax2, gait belt, and walker up to commode. Voiding in adequate amounts, incontinent at times. Dressing to L buttock CDI, has old pressure wounds to R buttock. WOC consulted. Mild edema in BLE. Denies pain. Blood cx pending.

## 2018-03-11 NOTE — ED NOTES
[facility]  ED Nurse Handoff Report    Berenice Chaudhari is a 78 year old female   ED Chief complaint: Shortness of Breath and Fever  . ED Diagnosis:   Final diagnoses:   Acute on chronic systolic congestive heart failure (H)   Confusion   Fever and chills     Allergies:   Allergies   Allergen Reactions     Ceftriaxone Anaphylaxis and Rash     Rocephin given. Developed rash to back and abd, awaiting to see if it improves with d/c of rocephin.        Nafcillin Rash     diffuse severe rash in hospital 2/05     Penicillins Anaphylaxis     Vancomycin Anaphylaxis and Rash     Codeine Fatigue     Sleeps continuously     Clindamycin Rash     received information from patient     Zithromax [Azithromycin] Rash       Code Status: DNR / DNI  Activity level - Baseline/Home:  Total Care. Activity Level - Current:   Total Care. Lift room needed: Yes. Bariatric: yes    Needed: No   Isolation: No. Infection: Not Applicable.     Vital Signs:   Vitals:    03/10/18 1838 03/10/18 1839 03/10/18 1840 03/10/18 1841   BP:       Pulse:       Resp:       Temp:       TempSrc:       SpO2: 98% 98% 97% 98%       Cardiac Rhythm:  ,      Pain level: 0-10 Pain Scale: 0  Patient confused: Yes. Patient Falls Risk: Yes.   Elimination Status: Has voided   Patient Report - Initial Complaint: SOB, trouble breathing snoring respirations . Focused Assessment: Respiratory - Respiratory WDL:  WDL except; cough Lung Fields: All Fields Cough Frequency: frequent Cough Type: no productive sputum; good Throughout All Lung Fields: coarse; grunting (expiratory)  Tests Performed:   Labs Ordered and Resulted from Time of ED Arrival Up to the Time of Departure from the ED   COMPREHENSIVE METABOLIC PANEL - Abnormal; Notable for the following:        Result Value    Glucose 102 (*)     Creatinine 1.26 (*)     GFR Estimate 41 (*)     GFR Estimate If Black 50 (*)     Albumin 3.0 (*)     All other components within normal limits   CBC WITH PLATELETS  DIFFERENTIAL - Abnormal; Notable for the following:     WBC 11.6 (*)     Hemoglobin 11.3 (*)     MCHC 31.4 (*)     Platelet Count 145 (*)     Absolute Neutrophil 10.2 (*)     All other components within normal limits   NT PROBNP INPATIENT - Abnormal; Notable for the following:     N-Terminal Pro BNP Inpatient 3964 (*)     All other components within normal limits   BLOOD GAS VENOUS - Abnormal; Notable for the following:     Bicarbonate Venous 30 (*)     All other components within normal limits   ROUTINE UA WITH MICROSCOPIC - Abnormal; Notable for the following:     Blood Urine Large (*)     Protein Albumin Urine 30 (*)     Leukocyte Esterase Urine Trace (*)     WBC Urine 7 (*)     RBC Urine 18 (*)     All other components within normal limits   INR - Abnormal; Notable for the following:     INR 1.39 (*)     All other components within normal limits   TROPONIN I   LACTIC ACID   D DIMER QUANTITATIVE   PERIPHERAL IV CATHETER   PULSE OXIMETRY NURSING   CARDIAC CONTINUOUS MONITORING   NURSING DRAW AND HOLD   NURSING DRAW AND HOLD   NURSING DRAW AND HOLD   ISTAT CG4 GASES LACTATE KAYLA NURSING POCT   VITAL SIGNS   PULSE OXIMETRY NURSING   CARDIAC CONTINUOUS MONITORING   PERIPHERAL IV CATHETER   ISTAT  GASES LACTATE KAYLA POCT   URINE CULTURE AEROBIC BACTERIAL   BLOOD CULTURE   BLOOD CULTURE   INFLUENZA A/B ANTIGEN   . Abnormal Results: see above Elevated BNP    Treatments provided: gabapentin   Family Comments:  and sister at bedside.   OBS brochure/video discussed/provided to patient:  No  ED Medications:   Medications   lidocaine 1 % 1 mL (not administered)   lidocaine (LMX4) kit (not administered)   sodium chloride (PF) 0.9% PF flush 3 mL (not administered)   sodium chloride (PF) 0.9% PF flush 3 mL (not administered)   0.9% sodium chloride BOLUS (not administered)   lidocaine 1 % 1 mL (not administered)   lidocaine (LMX4) kit (not administered)   sodium chloride (PF) 0.9% PF flush 3 mL (not administered)   sodium  chloride (PF) 0.9% PF flush 3 mL (not administered)   ipratropium - albuterol 0.5 mg/2.5 mg/3 mL (DUONEB) neb solution 3 mL (3 mLs Nebulization Not Given 3/10/18 1748)   gabapentin (NEURONTIN) capsule 1,200 mg (not administered)   furosemide (LASIX) injection 40 mg (40 mg Intravenous Given 3/10/18 2010)     Drips infusing:  No  For the majority of the shift, the patient's behavior Green. Interventions performed were monitor  .     Severe Sepsis OR Septic Shock Diagnosis Present: No      ED Nurse Name/Phone Number: Phillip Mackmeenu,   8:34 PM  RECEIVING UNIT ED HANDOFF REVIEW    Above ED Nurse Handoff Report was reviewed:yes  Reviewed by: Nereida Alfonso on March 10, 2018 at 11:30 PM

## 2018-03-12 VITALS
WEIGHT: 293 LBS | HEIGHT: 64 IN | BODY MASS INDEX: 50.02 KG/M2 | HEART RATE: 83 BPM | OXYGEN SATURATION: 93 % | SYSTOLIC BLOOD PRESSURE: 133 MMHG | DIASTOLIC BLOOD PRESSURE: 67 MMHG | TEMPERATURE: 95.9 F | RESPIRATION RATE: 16 BRPM

## 2018-03-12 LAB
ANION GAP SERPL CALCULATED.3IONS-SCNC: 5 MMOL/L (ref 3–14)
BACTERIA SPEC CULT: ABNORMAL
BACTERIA SPEC CULT: ABNORMAL
BACTERIA SPEC CULT: NO GROWTH
BASOPHILS # BLD AUTO: 0 10E9/L (ref 0–0.2)
BASOPHILS NFR BLD AUTO: 0.7 %
BUN SERPL-MCNC: 24 MG/DL (ref 7–30)
CALCIUM SERPL-MCNC: 8.6 MG/DL (ref 8.5–10.1)
CHLORIDE SERPL-SCNC: 105 MMOL/L (ref 94–109)
CO2 SERPL-SCNC: 31 MMOL/L (ref 20–32)
CREAT SERPL-MCNC: 1.39 MG/DL (ref 0.52–1.04)
DIFFERENTIAL METHOD BLD: ABNORMAL
EOSINOPHIL # BLD AUTO: 0.4 10E9/L (ref 0–0.7)
EOSINOPHIL NFR BLD AUTO: 7.1 %
ERYTHROCYTE [DISTWIDTH] IN BLOOD BY AUTOMATED COUNT: 14.7 % (ref 10–15)
FLUAV H1 2009 PAND RNA SPEC QL NAA+PROBE: NEGATIVE
FLUAV H1 RNA SPEC QL NAA+PROBE: NEGATIVE
FLUAV H3 RNA SPEC QL NAA+PROBE: NEGATIVE
FLUAV RNA SPEC QL NAA+PROBE: NEGATIVE
FLUBV RNA SPEC QL NAA+PROBE: NEGATIVE
GFR SERPL CREATININE-BSD FRML MDRD: 37 ML/MIN/1.7M2
GLUCOSE SERPL-MCNC: 103 MG/DL (ref 70–99)
GRAM STN SPEC: ABNORMAL
HADV DNA SPEC QL NAA+PROBE: NEGATIVE
HADV DNA SPEC QL NAA+PROBE: NEGATIVE
HCT VFR BLD AUTO: 34.8 % (ref 35–47)
HGB BLD-MCNC: 11.1 G/DL (ref 11.7–15.7)
HMPV RNA SPEC QL NAA+PROBE: NEGATIVE
HPIV1 RNA SPEC QL NAA+PROBE: NEGATIVE
HPIV2 RNA SPEC QL NAA+PROBE: NEGATIVE
HPIV3 RNA SPEC QL NAA+PROBE: NEGATIVE
IMM GRANULOCYTES # BLD: 0.1 10E9/L (ref 0–0.4)
IMM GRANULOCYTES NFR BLD: 1.1 %
INR PPP: 1.53 (ref 0.86–1.14)
LYMPHOCYTES # BLD AUTO: 1.4 10E9/L (ref 0.8–5.3)
LYMPHOCYTES NFR BLD AUTO: 25 %
Lab: ABNORMAL
MCH RBC QN AUTO: 28.6 PG (ref 26.5–33)
MCHC RBC AUTO-ENTMCNC: 31.9 G/DL (ref 31.5–36.5)
MCV RBC AUTO: 90 FL (ref 78–100)
MICROBIOLOGIST REVIEW: NORMAL
MONOCYTES # BLD AUTO: 0.9 10E9/L (ref 0–1.3)
MONOCYTES NFR BLD AUTO: 15.8 %
NEUTROPHILS # BLD AUTO: 2.8 10E9/L (ref 1.6–8.3)
NEUTROPHILS NFR BLD AUTO: 50.3 %
NRBC # BLD AUTO: 0 10*3/UL
NRBC BLD AUTO-RTO: 0 /100
PLATELET # BLD AUTO: 132 10E9/L (ref 150–450)
POTASSIUM SERPL-SCNC: 3.4 MMOL/L (ref 3.4–5.3)
RBC # BLD AUTO: 3.88 10E12/L (ref 3.8–5.2)
RHINOVIRUS RNA SPEC QL NAA+PROBE: NEGATIVE
RSV RNA SPEC QL NAA+PROBE: NEGATIVE
RSV RNA SPEC QL NAA+PROBE: NEGATIVE
SODIUM SERPL-SCNC: 141 MMOL/L (ref 133–144)
SPECIMEN SOURCE: ABNORMAL
SPECIMEN SOURCE: ABNORMAL
SPECIMEN SOURCE: NORMAL
SPECIMEN SOURCE: NORMAL
WBC # BLD AUTO: 5.5 10E9/L (ref 4–11)

## 2018-03-12 PROCEDURE — 40000893 ZZH STATISTIC PT IP EVAL DEFER: Performed by: PHYSICAL THERAPIST

## 2018-03-12 PROCEDURE — 40000894 ZZH STATISTIC OT IP EVAL DEFER: Performed by: STUDENT IN AN ORGANIZED HEALTH CARE EDUCATION/TRAINING PROGRAM

## 2018-03-12 PROCEDURE — G0463 HOSPITAL OUTPT CLINIC VISIT: HCPCS

## 2018-03-12 PROCEDURE — 99239 HOSP IP/OBS DSCHRG MGMT >30: CPT | Performed by: INTERNAL MEDICINE

## 2018-03-12 PROCEDURE — 36415 COLL VENOUS BLD VENIPUNCTURE: CPT | Performed by: INTERNAL MEDICINE

## 2018-03-12 PROCEDURE — 85610 PROTHROMBIN TIME: CPT | Performed by: INTERNAL MEDICINE

## 2018-03-12 PROCEDURE — 85025 COMPLETE CBC W/AUTO DIFF WBC: CPT | Performed by: INTERNAL MEDICINE

## 2018-03-12 PROCEDURE — 80048 BASIC METABOLIC PNL TOTAL CA: CPT | Performed by: INTERNAL MEDICINE

## 2018-03-12 PROCEDURE — 87205 SMEAR GRAM STAIN: CPT | Performed by: INTERNAL MEDICINE

## 2018-03-12 PROCEDURE — 25000132 ZZH RX MED GY IP 250 OP 250 PS 637: Performed by: INTERNAL MEDICINE

## 2018-03-12 RX ORDER — WARFARIN SODIUM 2.5 MG/1
5 TABLET ORAL DAILY
Qty: 95 TABLET | Refills: 3 | COMMUNITY
Start: 2018-03-12 | End: 2018-03-13

## 2018-03-12 RX ORDER — WARFARIN SODIUM 5 MG/1
5 TABLET ORAL
Status: DISCONTINUED | OUTPATIENT
Start: 2018-03-12 | End: 2018-03-12 | Stop reason: HOSPADM

## 2018-03-12 RX ADMIN — MINOCYCLINE HYDROCHLORIDE 50 MG: 50 CAPSULE ORAL at 08:34

## 2018-03-12 RX ADMIN — FUROSEMIDE 20 MG: 20 TABLET ORAL at 08:34

## 2018-03-12 RX ADMIN — BUPROPION HYDROCHLORIDE 300 MG: 150 TABLET, FILM COATED, EXTENDED RELEASE ORAL at 08:35

## 2018-03-12 RX ADMIN — GABAPENTIN 600 MG: 600 TABLET, FILM COATED ORAL at 08:35

## 2018-03-12 RX ADMIN — PANTOPRAZOLE SODIUM 40 MG: 40 TABLET, DELAYED RELEASE ORAL at 08:35

## 2018-03-12 RX ADMIN — PREGABALIN 150 MG: 75 CAPSULE ORAL at 08:35

## 2018-03-12 NOTE — PLAN OF CARE
Problem: Patient Care Overview  Goal: Plan of Care/Patient Progress Review  OT: Consult received, chart reviewed, pt admitted with acute febrile illness of unclear etiology, prior to evaluation pt discharged to home with . At baseline pt is in a wheelchair, per chart review from previous hospitalizations pt has A with transfers and manages ADLs from her chair. Will complete order as pt discharging as OT going to room.

## 2018-03-12 NOTE — TELEPHONE ENCOUNTER
Tameka LM again today.  She says pt's insurance requires pt to be on warfarin for 1y prior to home monitoring.  She has the date they can pursue this again.  Tameka has spoken with .  Tameka's # [phone]  Emperatriz Garcia RN

## 2018-03-12 NOTE — PROGRESS NOTES
Reviewed discharge instructions and medications with patient and . Questions answered. Patient discharged to home with , discharge instructions, and belongings at this time. Pt instructed to follow up about INR level and Coumadin dose by Thursday.

## 2018-03-12 NOTE — PHARMACY-ANTICOAGULATION SERVICE
Clinical Pharmacy- Warfarin Discharge Note  This patient is currently on warfarin for the treatment of Atrial fibrillation.  INR Goal= 2-3  Expected length of therapy undetermined.    Anticoagulation Dose History     Recent Dosing and Labs Latest Ref Rng & Units 2/12/2018 2/26/2018 3/10/2018 3/11/2018 3/11/2018 3/11/2018 3/12/2018    Warfarin 5 mg - - - - 5 mg - 5 mg -    INR 0.86 - 1.14 - - 1.39(H) - 1.35(H) - 1.53(H)    INR 0.86 - 1.14 3.3(A) 1.4(A) - - - - -          Vitamin K doses administered during the last 7 days:    FFP administered during the last 7 days:     Patient is discharged on warfarin 5 mg for the next 2 days with an INR to be rechecked Wednesday.

## 2018-03-12 NOTE — PROGRESS NOTES
[facility] Nurse Inpatient Adult Pressure Injury (PI) Wound Assessment     Assessment of PI(s) on pt's:   Buttocks    Data:   Patient History:      per MD note(s):  78 year old  female with a significant past medical history of morbid obesity, hypertension, recurrent admissions for cellulitis, atrial arrhythmias, who presents with generalized weakness and fever.  She has chronic cough productive of yellowish and greenish sputum for which she is on minocycline daily.  Patient's  also concerned about patient's mental status due to confusion before he brought her to the emergency department.  ED evaluation revealed fever of 102.3, mild leukocytosis and chest x-ray evidence of pulmonary vascular congestion with no convincing evidence of infiltrate.     Current mattress:  AtmosAir  Current pressure relieving devices:  Mepilex dressing and Pillows    Moisture Management:  Incontinence Protocol and Diaper    Current Diet / Nutrition:           Active Diet Order      Combination Diet Low Saturated Fat Na <2400mg Diet, No Caffeine Diet    Heber Assessment and sub scores:   Heber Score  Av  Min: 16  Max: 16   Labs:   Recent Labs   Lab Test  18   0640  18   0734   03/18/15   1600   14   1455   ALBUMIN   --   2.7*   < >   --    < >   --    HGB  11.1*  10.6*   < >  13.3   < >  15.2   INR  1.53*  1.35*   < >   --    --    --    WBC  5.5  9.0   < >  5.5   < >  6.1   A1C   --    --    --    --    --   5.8   CRP   --    --    --   8.4*   --   14.1*    < > = values in this interval not displayed.                                                                                                                          Pressure Injury Assessment  (location #1):   Left IT/ Coccyx    Wound History:   Pt has been seen multiple past admissions with wounds to gluteal cleft. Pt incontinent, obese deep fold.     Specific Dimensions (length x width x depth, in cm) :       Left IT: 2x2  0.1cm pink dermis round wound,     Coccyx: slit 3 x 0.2 x 0.1cm pink moist dermis    Periwound Skin: intact with several small healed dark pink scars noted across buttocks,      Drainage:  none        Odor: none    Pain: no complaint         Intervention:     Patient's chart evaluated.      Heber Interventions:  Current Heber Interventions and Care Plan reviewed and updated, appropriate at this time.    Wound was assessed. Wound Care: was done: Removal of existing dressing    Visual inspection, Re- Application of clean dressing,    Orders  Written    Supplies  at bedside and floor stock    Discussed plan of care with Patient and Nurse           Assessment:     Pressure Injury vs moisture associated skin damage: Stage 2 to Left IT and Coccyx with moisture component; pt is able to turn on her own. Reports sitting in her w/c at home without a pillow for long periods of time. Pt was instructed to rock back and forth every 15-30 min while sitting in the w/c    Status: wound  initial assessment, Asymptomatic and would benefit from Critic aid paste         Plan:     Nursing to notify the Provider(s) and re-consult the WO Nurse if wound(s) deteriorate(s)or if the wound care plan needs reevaluation.    Plan for wound care to wound located on gluteal cleft/ Buttocks: BID/ PRN    1. Gently cleanse skin with Flores and apply light layer of Critic aid paste to open areas    WOC Nurse will return: weekly

## 2018-03-12 NOTE — PLAN OF CARE
Problem: Patient Care Overview  Goal: Plan of Care/Patient Progress Review  Patient alert and oriented.  Up with assist of 2 with gait belt and walker.  Lung sounds clear.  O2 95% RA.  Bowel sounds hypoactive.  Denies pain.  Left buttocks foam dressing applied over wound.  Right buttocks scabbing noted, open to air.  Droplet and contact precautions maintained.  Will continue to monitor.

## 2018-03-12 NOTE — PLAN OF CARE
Problem: Patient Care Overview  Goal: Plan of Care/Patient Progress Review    PT:  Discharge Planner PT   Patient plan for discharge: None stated  Current status: Pt significant past medical history of morbid obesity, hypertension, recurrent admissions for cellulitis, atrial arrhythmias, who presents with generalized weakness and fever. Per chart patient reported to be wheelchair bound, mobilizing with nursing A x 2 completing pivot transfers. PT spoke with  to clarify ambulatory status,  reporting patient is unable to walk, uses either manual WC or motorized WC for mobility. PT evaluation not indicated, patient would benefit from Occupational therapy assessment, MD paged with request.   Barriers to return to prior living situation: defer to OT  Recommendations for discharge: defer to OT  Rationale for recommendations: Pt not appropriate for PT intervention, OT evaluation recommended       Entered by: Lelo Bull 03/12/2018 10:22 AM

## 2018-03-12 NOTE — PLAN OF CARE
Problem: Patient Care Overview  Goal: Plan of Care/Patient Progress Review  Patient alert and oriented.  Up with assist of 2 with gait belt and walker.  Lung sounds clear.  O2 93% RA.  Bowel sounds hypoactive.  Denies pain.  Left buttocks foam dressing applied over wound.  Right buttocks scabbing noted, open to air.  Droplet and contact precautions maintained.  Will continue to monitor.

## 2018-03-13 ENCOUNTER — ANTICOAGULATION THERAPY VISIT (OUTPATIENT)
Dept: NURSING | Facility: CLINIC | Age: 78
End: 2018-03-13
Payer: COMMERCIAL

## 2018-03-13 DIAGNOSIS — Z79.01 LONG-TERM (CURRENT) USE OF ANTICOAGULANTS: ICD-10-CM

## 2018-03-13 DIAGNOSIS — I48.92 ATRIAL FLUTTER, UNSPECIFIED TYPE (H): ICD-10-CM

## 2018-03-13 DIAGNOSIS — I48.92 ATRIAL FLUTTER (H): ICD-10-CM

## 2018-03-13 DIAGNOSIS — I48.92 ATRIAL FLUTTER WITH RAPID VENTRICULAR RESPONSE (H): ICD-10-CM

## 2018-03-13 PROCEDURE — 99207 ZZC NO CHARGE NURSE ONLY: CPT

## 2018-03-13 RX ORDER — WARFARIN SODIUM 2.5 MG/1
TABLET ORAL DAILY
Qty: 95 TABLET | Refills: 3 | Status: ON HOLD | COMMUNITY
Start: 2018-03-13 | End: 2018-04-12

## 2018-03-13 NOTE — MR AVS SNAPSHOT
Berenice VANCE Fara   3/13/2018 4:30 PM   Anticoagulation Therapy Visit    Description:  78 year old female   Provider:   ANTICOAGULATION CLINIC   Department:   Nurse           INR as of 3/13/2018     Today's INR 1.53! (3/12/2018)      Anticoagulation Summary as of 3/13/2018     INR goal 2.0-3.0   Today's INR 1.53! (3/12/2018)   Full instructions 3/13: 5 mg; Otherwise 2.5 mg every day   Next INR check 3/19/2018    Indications   Atrial flutter (H) [I48.92]  Atrial flutter with rapid ventricular response (H) [I48.92]  Long-term (current) use of anticoagulants [Z79.01] [Z79.01]         Your next Anticoagulation Clinic appointment(s)     Mar 19, 2018  1:00 PM CDT   Anticoagulation Visit with  ANTICOAGULATION CLINIC   [facility])    [address] [place] [address]  [address]  [facility] [address] [phone]              Contact Numbers     [facility]  Please call  [phone] to cancel and/or reschedule your appointment   Please call  [phone] with any problems or questions regarding your therapy.        March 2018 Details    Sun Mon Tue Wed Thu Fri Sat         1               2               3                 4               5               6               7               8               9               10                 11               12               13      5 mg   See details      14      2.5 mg         15      2.5 mg         16      2.5 mg         17      2.5 mg           18      2.5 mg         19            20               21               22               23               24                 25               26               27               28               29               30               31                Date Details   03/13 This INR check       Date of next INR:  3/19/2018         How to take your warfarin dose     To take:  2.5 mg Take 1 of the 2.5 mg tablets.    To take:  5 mg Take 1 of the 5 mg tablets.

## 2018-03-13 NOTE — PROGRESS NOTES
ANTICOAGULATION FOLLOW-UP CLINIC VISIT    Patient Name:  Berenice Chaudhari  Date:  3/13/2018  Contact Type:  Telephone/     SUBJECTIVE:     Patient Findings     Comments Pt discharged from hospital 3-12-18 and instructed to take 5mg warfarin 3-12-18 and 3-13-18 and then get INR check at clinic.   calling today.  He does not want to bring pt for INR until 3-19-18 when he will be seeing MD too.  He says pt was on 2.5mg warfarin qd until 3-10-18 when she was admitted to hosp.  He says pt was confused and they did not find a reason.  Pt did not get any warfarin 3-10-18 and 5mg 3-11-18.           OBJECTIVE    INR   Date Value Ref Range Status   03/12/2018 1.53 (H) 0.86 - 1.14 Final       ASSESSMENT / PLAN  INR assessment SUB    Recheck INR In: 6 DAYS    INR Location Outside lab      Anticoagulation Summary as of 3/13/2018     INR goal 2.0-3.0   Today's INR    Maintenance plan 2.5 mg (2.5 mg x 1) every day   Full instructions 3/13: 5 mg; Otherwise 2.5 mg every day   Weekly total 17.5 mg   Plan last modified Mary Butcher RN (2/12/2018)   Next INR check 3/19/2018   Target end date Indefinite    Indications   Atrial flutter (H) [I48.92]  Atrial flutter with rapid ventricular response (H) [I48.92]  Long-term (current) use of anticoagulants [Z79.01] [Z79.01]         Anticoagulation Episode Summary     INR check location     Preferred lab     Send INR reminders to [facility]    Comments 5mg tabs; HS dosing //  (Ricardo) sets up her meds // motorized wheelchair      Anticoagulation Care Providers     Provider Role Specialty Phone number    Nelly Pearl MD Referring Internal Medicine [phone]            See the Encounter Report to view Anticoagulation Flowsheet and Dosing Calendar (Go to Encounters tab in chart review, and find the Anticoagulation Therapy Visit)    Dosage adjustment made based on physician directed care plan.    Emperatriz Garcia RN

## 2018-03-14 ENCOUNTER — TELEPHONE (OUTPATIENT)
Dept: PEDIATRICS | Facility: CLINIC | Age: 78
End: 2018-03-14

## 2018-03-14 NOTE — TELEPHONE ENCOUNTER
ED / Discharge Outreach Protocol    Patient Contact    Attempt # 1    Was call answered?  No.  Left message on voicemail with information to call me back.  Melissa Abdullahi RN  Message handled by Nurse Triage.

## 2018-03-14 NOTE — TELEPHONE ENCOUNTER
Please contact patient for In-patient follow up.  [phone] (home) NONE (work)    Visit date: 03/12/2018  Diagnosis listed:Acute On Chronic Systolic Congestive Heart Failure (H)  Number of visits in past 12 months:4

## 2018-03-14 NOTE — TELEPHONE ENCOUNTER
"Hospital/TCU/ED for chronic condition Discharge Protocol    \"Hi, my name is Joelle Woodall, a registered nurse, and I am calling from [facility].  I am calling to follow up and see how things are going for you after your recent emergency visit/hospital/TCU stay.\"    Tell me how you are doing now that you are home?\" States that she is doing well   Breathing is back to baseline.     States \"everything is back to normal\".  Notes fatigue, but states that she always feels like this when she has been in the hospital      Discharge Instructions    \"Let's review your discharge instructions.  What is/are the follow-up recommendations?  Pt. Response: INR clinic and PCP follow-up.  Has already had INR checked.    \"Has an appointment with your primary care provider been scheduled?\"   Yes. (confirm)    \"When you see the provider, I would recommend that you bring your medications with you.\"    Medications    \"Tell me what changed about your medicines when you discharged?\"    Changes to chronic meds?    0-1    \"What questions do you have about your medications?\"    None     New diagnoses of heart failure, COPD, diabetes, or MI?    No        On warfarin: \"Were you given any recommendations for follow-up with the anticoagulation clinic?\" Yes - Anticoagulation clinic appointment is already scheduled at appropriate interval    Medication reconciliation completed? Yes  Was MTM referral placed (*Make sure to put transitions as reason for referral)?   No    Call Summary    \"What questions or concerns do you have about your recent visit and your follow-up care?\"     none    \"If you have questions or things don't continue to improve, we encourage you contact us through the main clinic number (give number).  Even if the clinic is not open, triage nurses are available 24/7 to help you.     We would like you to know that our clinic has extended hours (provide information).  We also have urgent care (provide details on closest location and " "hours/contact info)\"      \"Thank you for your time and take care!\"    SANDRA Woodall RN           "

## 2018-03-16 LAB
BACTERIA SPEC CULT: NO GROWTH
BACTERIA SPEC CULT: NO GROWTH
Lab: NORMAL
SPECIMEN SOURCE: NORMAL
SPECIMEN SOURCE: NORMAL

## 2018-03-18 NOTE — DISCHARGE SUMMARY
Physician Discharge Summary     Name: Berenice Chaudhari    MRN: [id number]     YOB: 1940    Age: 78 year old                                                 Primary care provider: Nelly Pearl      Admit date:  3/10/2018      Discharge date and time: 3/12/2018  2:28 PM       Discharge Physician:  Chris Blackwell        Discharge Diagnosis and brief summary        #Acute febrile illness likely secondary to acute exacerbation of chronic bronchitis versus bronchiectasis   #Acute encephalopathy improved with treatment and monitoring          Secondary Diagnosis /chronic medical conditions        Past Medical History:   Diagnosis Date     Allergic rhinitis, cause unspecified      Anxiety 6/13/2013     Arrhythmia     atrial flutter     Atrial flutter with rapid ventricular response (H) 8/8/2017     Cellulitis and abscess of leg, except foot recurrent , both legs    begins with fever, nausea, diarrhea, vomiting & & the cellulitis may be visible a day or 2 later     Chronic pain     On chronic fentanyl patch.     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  less likely = raction to vancomycin      Essential hypertension with goal blood pressure less than 140/90 1/12/2005     Problem list name updated by automated process. Provider to review     Generalized osteoarthrosis, unspecified site     films 9/04: L hhip bad; both knees mod severe medial OA     GERD (gastroesophageal reflux disease) 11/15/2013     Herpes zoster without mention of complication 9/03    L shoulder; still has neuralgia     Hyperlipidemia LDL goal <100 9/16/2011     Methicillin susceptible Staphylococcus aureus septicemia (H) after L hip injection 205    ARDS, renal failure, staph sepsis after a hip joint injection     Obesity  "1/12/2005     Problem list name updated by automated process. Provider to review     Other chronic pain      Other diseases of lung, not elsewhere classified 07/93    Interstitial lung process     Pneumonia, organism unspecified(486)      Urticaria, unspecified 07/93    Diffuse severe (hospitalized).  (+) skin biopsy by Dr. Figueroa       Past Surgical History:    Past Surgical History:   Procedure Laterality Date     BIOPSY       C NONSPECIFIC PROCEDURE      Extensive oral (dental) surgery     C NONSPECIFIC PROCEDURE      Remote T&A     C NONSPECIFIC PROCEDURE  1/03    LAP CHOLY     C NONSPECIFIC PROCEDURE      L hip I + D x 2  4/05     C NONSPECIFIC PROCEDURE  2/05    colonoscopy     C NONSPECIFIC PROCEDURE      L hip I + D several other times      CATARACT IOL, RT/LT Right 2012     CHOLECYSTECTOMY       ENT SURGERY       ESOPHAGOSCOPY, GASTROSCOPY, DUODENOSCOPY (EGD), COMBINED N/A 11/9/2017    Procedure: COMBINED ESOPHAGOSCOPY, GASTROSCOPY, DUODENOSCOPY (EGD);  ESOPHAGOSCOPY, GASTROSCOPY, DUODENOSCOPY (EGD) (fv)  ;  Surgeon: Buck Ribeiro MD;  Location:  OR     PHACOEMULSIFICATION CLEAR CORNEA WITH STANDARD INTRAOCULAR LENS IMPLANT Left 3/10/2016    Procedure: PHACOEMULSIFICATION CLEAR CORNEA WITH STANDARD INTRAOCULAR LENS IMPLANT;  Surgeon: Dwight Metcalf MD;  Location: [facility]                   Brief Summary of Hospital stay :       Please refer to  Admission H&P note for full details of patient presentation.      Admission Condition: fair     Discharged Condition: stable    /67  Pulse 83  Temp 95.9  F (35.5  C) (Oral)  Resp 16  Ht 1.626 m (5' 4\")  Wt (!) 157.8 kg (347 lb 12.8 oz)  SpO2 93%  BMI 59.7 kg/m2       Presenting problem/signs and symptoms:    Fever    Brief Hospital Summary:    Berenice Chaudhari is a 78 year old  female with a significant past medical history of morbid obesity, hypertension, recurrent admissions for cellulitis, atrial arrhythmias, who presents with " generalized weakness and fever.  She has chronic cough productive of yellowish and greenish sputum for which she is on minocycline daily.  Patient's  also concerned about patient's mental status due to confusion before he brought her to the emergency department.  ED evaluation revealed fever of 102.3, mild leukocytosis and chest x-ray evidence of pulmonary vascular congestion with no convincing evidence of infiltrate.  Patient was given a dose of Lasix and was admitted to our service.     Patient's presentation is consistent with acute febrile illness of unclear etiology but I am not convinced that she has congestive heart failure.  Patient was admitted for further inpatient management to care of Hospitalist service.       Admission diagnoses:       #1.  Acute febrile illness with chronic cough productive of yellowish and greenish sputum concerning for bronchiectasis or chronic bronchitis on suppressive minocycline.  Patient was mildly hypoxic on arrival but this improved.  BNP was elevated but the patient appears to be euvolemic with no convincing evidence of CHF      #2.  Morbid obesity and history of hip infection who is wheelchair-bound at baseline     #3.Physical deconditioning      #4.CKD stage III     #5.  Paroxysmal atrial fibrillation on Coumadin      #6.  History of diastolic heart failure     #7.  Acute encephalopathy: Improved        # Discharge Pain Plan:   - Patient currently has NO PAIN and is not being prescribed pain medications on discharge.           Consultations during hospital stay         PHARMACY TO DOSE WARFARIN  WOUND OSTOMY CONTINENCE NURSE  IP CONSULT  PHYSICAL THERAPY ADULT IP CONSULT  OCCUPATIONAL THERAPY ADULT IP CONSULT      Major procedure performed/  Significant Diagnostic Studies              Results for orders placed or performed during the hospital encounter of 03/10/18   XR Chest 2 Views    Narrative    CHEST TWO VIEW   3/10/2018 6:35 PM     HISTORY: Shortness of  breath.    COMPARISON: 11/20/2017    FINDINGS: Heart at upper limits of normal in size. Pulmonary vascular  congestion but no convincing infiltrates. No interval change.      Impression    IMPRESSION: Nothing clearly acute.    ARELIS HU MD   CT Head w/o Contrast    Narrative    CT HEAD WITHOUT CONTRAST  3/10/2018 6:27 PM    HISTORY: Confusion, evaluate for CVA.    TECHNIQUE: Scans were obtained through the head without IV contrast.   Radiation dose for this scan was reduced using automated exposure  control, adjustment of the mA and/or kV according to patient size, or  iterative reconstruction technique.    COMPARISON: Head CT 3/7/2005.    FINDINGS: Mild atrophy. Minimal chronic white matter disease likely  small vessel ischemic change.  No hemorrhage, mass lesion, or focal  area of acute infarction identified. Paranasal sinuses are normal. No  bony abnormality. No interval change.      Impression    IMPRESSION:   1. Mild atrophy and chronic white matter disease.  2. Nothing acute.  3. No interval change.    ARELIS HU MD     *Note: Due to a large number of results and/or encounters for the requested time period, some results have not been displayed. A complete set of results can be found in Results Review.         Recent Labs  Lab 03/12/18  0640   WBC 5.5   HGB 11.1*   HCT 34.8*   MCV 90   *       Recent Labs  Lab 03/12/18  1100   CULT Canceled, Test credited>10 Squamous epithelial cells/low power field indicates oral contamination. Please recollect.*  Notification of test cancellation was given Nohelia Moe RN on 3.12.18 at 1423. bw       Recent Labs  Lab 03/12/18  0640      POTASSIUM 3.4   CHLORIDE 105   CO2 31   ANIONGAP 5   *   BUN 24   CR 1.39*   GFRESTIMATED 37*   GFRESTBLACK 45*   SHILOH 8.6         Recent Labs  Lab 03/12/18  0640   *             Recent Labs  Lab 03/12/18  0640   INR 1.53*               Pending Results           Unresulted Labs Ordered in the  Past 30 Days of this Admission     No orders found from 1/9/2018 to 3/11/2018.              Disposition         home      Allergies       Allergies   Allergen Reactions     Ceftriaxone Anaphylaxis and Rash     Rocephin given. Developed rash to back and abd, awaiting to see if it improves with d/c of rocephin.        Nafcillin Rash     diffuse severe rash in hospital 2/05     Penicillins Anaphylaxis     Vancomycin Anaphylaxis and Rash     Codeine Fatigue     Sleeps continuously     Clindamycin Rash     received information from patient     Zithromax [Azithromycin] Rash            Patient Instructions and Discharge Medications              Review of your medicines      CONTINUE these medicines which may have CHANGED, or have new prescriptions. If we are uncertain of the size of tablets/capsules you have at home, strength may be listed as something that might have changed.       Dose / Directions    diltiazem 120 MG 24 hr capsule   Commonly known as:  CARDIZEM CD/CARTIA XT   This may have changed:  when to take this   Used for:  Atrial flutter with rapid ventricular response (H)        Dose:  120 mg   Take 1 capsule (120 mg) by mouth daily   Quantity:  90 capsule   Refills:  2       lidocaine 5 % Patch   Commonly known as:  LIDODERM   This may have changed:    - when to take this  - reasons to take this  - additional instructions   Used for:  Postherpetic neuralgia        Apply up to 3 patches to painful area at once for up to 12 h within a 24 h period.  Remove after 12 hours.   Quantity:  60 patch   Refills:  2         CONTINUE these medicines which have NOT CHANGED       Dose / Directions    buPROPion 300 MG 24 hr tablet   Commonly known as:  WELLBUTRIN XL   Used for:  Major depressive disorder, recurrent, in full remission (H)        Dose:  300 mg   Take 1 tablet (300 mg) by mouth every morning   Quantity:  90 tablet   Refills:  1       CENTRUM SILVER per tablet        Dose:  1 tablet   Take 1 tablet by mouth daily    Refills:  0       CEPHALEXIN PO        Dose:  500 mg   Take 500 mg by mouth 3 times daily as needed cellulitis   Refills:  0       fluticasone 50 MCG/ACT spray   Commonly known as:  FLONASE   Used for:  Post-nasal drip        Dose:  2 spray   Spray 2 sprays into both nostrils daily   Quantity:  3 Bottle   Refills:  11       furosemide 20 MG tablet   Commonly known as:  LASIX   Used for:  Atrial flutter with rapid ventricular response (H)        Dose:  20 mg   Take 1 tablet (20 mg) by mouth daily   Quantity:  90 tablet   Refills:  3       gabapentin 600 MG tablet   Commonly known as:  NEURONTIN   Used for:  Post herpetic neuralgia        Dose:  600 mg   Take 1 tablet (600 mg) by mouth 3 times daily   Quantity:  270 tablet   Refills:  3       metoprolol succinate 50 MG 24 hr tablet   Commonly known as:  TOPROL-XL   Used for:  Atrial flutter, unspecified type (H)        Dose:  50 mg   Take 1 tablet (50 mg) by mouth daily   Quantity:  90 tablet   Refills:  2       minocycline 50 MG capsule   Commonly known as:  MINOCIN/DYNACIN   Used for:  Methicillin susceptible Staphylococcus aureus septicemia (H)        Dose:  50 mg   Take 1 capsule (50 mg) by mouth 2 times daily   Quantity:  180 capsule   Refills:  2       nortriptyline 25 MG capsule   Commonly known as:  PAMELOR   Used for:  Post herpetic neuralgia        Dose:  50 mg   Take 2 capsules (50 mg) by mouth At Bedtime   Quantity:  180 capsule   Refills:  2       pravastatin 10 MG tablet   Commonly known as:  PRAVACHOL   Used for:  Hyperlipidemia LDL goal <100        Dose:  10 mg   Take 1 tablet (10 mg) by mouth At Bedtime Needs labs before next refill   Quantity:  90 tablet   Refills:  0       pregabalin 150 MG capsule   Commonly known as:  LYRICA   Used for:  Postherpetic neuralgia        Dose:  150 mg   Take 1 capsule (150 mg) by mouth 2 times daily   Quantity:  180 capsule   Refills:  1       PROTONIX PO        Dose:  40 mg   Take 40 mg by mouth every morning  (before breakfast)   Refills:  0       venlafaxine 150 MG 24 hr capsule   Commonly known as:  EFFEXOR-XR   Used for:  Major depressive disorder, recurrent episode, moderate (H)        TAKE 1 CAPSULE(150 MG) BY MOUTH DAILY   Quantity:  90 capsule   Refills:  0         STOP taking          warfarin 2.5 MG tablet   Commonly known as:  COUMADIN                  Discharge diet:  Active Diet Order      Diet  cardiac diet        Discharge activity:Activity as tolerated      Discharge follow-up:    Follow up with primary care provider in 7 days or earlier if symptoms return or gets worse.    Follow up with consultant as instructed     Other instructions:    We discussed with Patient/family about detail discharge instructions as well as discharge medications above including potential risks,side effects and benefits.Patient/family understood benefits and potential serious side effects of taking these medications and need to follow up with PCP if the patient develops complications.  Patient is also advised to see a doctor immediately for severe symptoms.          I saw and evaluated the patient on day of discharge and  discharge instructions reviewed  and  all the patient's questions and concerns addressed.Over 30 minutes spent on discharge and coordination of discharge process for this patient.          Disclaimer: This note consists of symbols derived from keyboarding, dictation and/or voice recognition software. As a result, there may be errors in the script that have gone undetected. Please consider this when interpreting information found in this chart

## 2018-03-19 ENCOUNTER — OFFICE VISIT (OUTPATIENT)
Dept: PEDIATRICS | Facility: CLINIC | Age: 78
End: 2018-03-19
Payer: COMMERCIAL

## 2018-03-19 ENCOUNTER — ANTICOAGULATION THERAPY VISIT (OUTPATIENT)
Dept: NURSING | Facility: CLINIC | Age: 78
End: 2018-03-19
Payer: COMMERCIAL

## 2018-03-19 VITALS
SYSTOLIC BLOOD PRESSURE: 128 MMHG | WEIGHT: 293 LBS | BODY MASS INDEX: 59.7 KG/M2 | HEART RATE: 69 BPM | TEMPERATURE: 98 F | DIASTOLIC BLOOD PRESSURE: 60 MMHG | OXYGEN SATURATION: 98 %

## 2018-03-19 DIAGNOSIS — I48.92 ATRIAL FLUTTER (H): ICD-10-CM

## 2018-03-19 DIAGNOSIS — Z76.89 HEALTH CARE HOME: ICD-10-CM

## 2018-03-19 DIAGNOSIS — Z09 HOSPITAL DISCHARGE FOLLOW-UP: ICD-10-CM

## 2018-03-19 DIAGNOSIS — I50.9 CONGESTIVE HEART FAILURE, UNSPECIFIED CONGESTIVE HEART FAILURE CHRONICITY, UNSPECIFIED CONGESTIVE HEART FAILURE TYPE: ICD-10-CM

## 2018-03-19 DIAGNOSIS — I10 ESSENTIAL HYPERTENSION WITH GOAL BLOOD PRESSURE LESS THAN 140/90: ICD-10-CM

## 2018-03-19 DIAGNOSIS — G93.40 ENCEPHALOPATHY: ICD-10-CM

## 2018-03-19 DIAGNOSIS — I48.92 ATRIAL FLUTTER WITH RAPID VENTRICULAR RESPONSE (H): ICD-10-CM

## 2018-03-19 DIAGNOSIS — R05.9 COUGH: ICD-10-CM

## 2018-03-19 DIAGNOSIS — Z79.01 LONG-TERM (CURRENT) USE OF ANTICOAGULANTS: ICD-10-CM

## 2018-03-19 DIAGNOSIS — A41.9 SEPSIS, DUE TO UNSPECIFIED ORGANISM: Primary | ICD-10-CM

## 2018-03-19 DIAGNOSIS — N18.30 STAGE 3 CHRONIC KIDNEY DISEASE (H): ICD-10-CM

## 2018-03-19 LAB — INR POINT OF CARE: 1.7 (ref 0.86–1.14)

## 2018-03-19 PROCEDURE — 85610 PROTHROMBIN TIME: CPT | Mod: QW

## 2018-03-19 PROCEDURE — 99207 ZZC NO CHARGE NURSE ONLY: CPT

## 2018-03-19 PROCEDURE — 99214 OFFICE O/P EST MOD 30 MIN: CPT | Performed by: INTERNAL MEDICINE

## 2018-03-19 PROCEDURE — 36416 COLLJ CAPILLARY BLOOD SPEC: CPT

## 2018-03-19 NOTE — MR AVS SNAPSHOT
Berenice RAJIV Chaudhari   3/19/2018 1:00 PM   Anticoagulation Therapy Visit    Description:  78 year old female   Provider:   [facility]   Department:   Nurse           INR as of 3/19/2018     Today's INR 1.7!      Anticoagulation Summary as of 3/19/2018     INR goal 2.0-3.0   Today's INR 1.7!   Full instructions 3/19: 5 mg; 3/20: 5 mg; Otherwise 2.5 mg every day   Next INR check 3/23/2018    Indications   Atrial flutter (H) [I48.92]  Atrial flutter with rapid ventricular response (H) [I48.92]  Long-term (current) use of anticoagulants [Z79.01] [Z79.01]         Your next Anticoagulation Clinic appointment(s)     Mar 23, 2018 11:00 AM CDT   Anticoagulation Visit with  ANTICOAGULATION CLINIC   [facility] ([facility])    [address]  [address]  [place] [id number] [phone]              Contact Numbers     [facility]  Please call  [phone] to cancel and/or reschedule your appointment   Please call  [phone] with any problems or questions regarding your therapy.        March 2018 Details    Sun Mon Tue Wed Thu Fri Sat         1               2               3                 4               5               6               7               8               9               10                 11               12               13               14               15               16               17                 18               19      5 mg   See details      20      5 mg         21      2.5 mg         22      2.5 mg         23            24                 25               26               27               28               29               30               31                Date Details   03/19 This INR check       Date of next INR:  3/23/2018         How to take your warfarin dose     To take:  2.5 mg Take 1 of the 2.5 mg tablets.    To take:  5 mg Take 1 of the 5 mg tablets.

## 2018-03-19 NOTE — PROGRESS NOTES
SUBJECTIVE:   Berenice Chaudhari is a 78 year old female who presents to clinic today for the following health issues:        Hospital Follow-up Visit:    Hospital/Nursing Home/IP Rehab Facility: [facility]  Date of Admission: 3/10/2018  Date of Discharge: 3/12/2018  Reason(s) for Admission: Acute febrile illness secondary to acute exacerbation of chronic bronchitis versus bronchiectasis, acute encephalopathy             Problems taking medications regularly:  None       Medication changes since discharge: Diltiazem 120mg (1 capsule orally daily), lidocaine 5% patch (apply up to 3 patches to painful area up to 12 hours.)       Problems adhering to non-medication therapy:  None    Summary of hospitalization:  [facility] discharge summary reviewed  Diagnostic Tests/Treatments reviewed.  Follow up needed: pulmonology?  Other Healthcare Providers Involved in Patient s Care:         Homecare  Update since discharge: improved.     Post Discharge Medication Reconciliation: discharge medications reconciled, continue medications without change.  Plan of care communicated with patient     Coding guidelines for this visit:  Type of Medical   Decision Making Face-to-Face Visit       within 7 Days of discharge Face-to-Face Visit        within 14 days of discharge   Moderate Complexity [id number]   High Complexity [id number]            Problem list and histories reviewed & adjusted, as indicated.  Additional history: as documented    Patient Active Problem List   Diagnosis     Generalized osteoarthrosis, unspecified site     Urticaria     Obesity     Essential hypertension with goal blood pressure less than 140/90     Allergic rhinitis     Erythroderma desquamativum     Advanced directives, counseling/discussion     Hyperlipidemia LDL goal <100     Post herpetic neuralgia     Health Care Home     Chronic renal disease     Anxiety     Major depressive disorder, recurrent episode, in full remission (HCC)      Congestive heart failure, unspecified congestive heart failure chronicity, unspecified congestive heart failure type (H)     Cough     Severe obesity (BMI >= 40) (H)     Cellulitis of right lower extremity     CKD (chronic kidney disease) stage 3, GFR 30-59 ml/min     Right shoulder pain     Cellulitis     Atrial flutter (H)     Atrial flutter with rapid ventricular response (H)     Long-term (current) use of anticoagulants [Z79.01]     Gastroesophageal reflux disease without esophagitis     Cellulitis of left lower leg     Acute on chronic diastolic heart failure (H)     SOB (shortness of breath)     Acute febrile illness     Past Surgical History:   Procedure Laterality Date     BIOPSY       C NONSPECIFIC PROCEDURE      Extensive oral (dental) surgery     C NONSPECIFIC PROCEDURE      Remote T&A     C NONSPECIFIC PROCEDURE  1/03    LAP CHOLY     C NONSPECIFIC PROCEDURE      L hip I + D x 2  4/05     C NONSPECIFIC PROCEDURE  2/05    colonoscopy     C NONSPECIFIC PROCEDURE      L hip I + D several other times      CATARACT IOL, RT/LT Right 2012     CHOLECYSTECTOMY       ENT SURGERY       ESOPHAGOSCOPY, GASTROSCOPY, DUODENOSCOPY (EGD), COMBINED N/A 11/9/2017    Procedure: COMBINED ESOPHAGOSCOPY, GASTROSCOPY, DUODENOSCOPY (EGD);  ESOPHAGOSCOPY, GASTROSCOPY, DUODENOSCOPY (EGD) (fv)  ;  Surgeon: Buck Ribeiro MD;  Location:  OR     PHACOEMULSIFICATION CLEAR CORNEA WITH STANDARD INTRAOCULAR LENS IMPLANT Left 3/10/2016    Procedure: PHACOEMULSIFICATION CLEAR CORNEA WITH STANDARD INTRAOCULAR LENS IMPLANT;  Surgeon: Dwight Metcalf MD;  Location: [facility]       Social History   Substance Use Topics     Smoking status: Former Smoker     Packs/day: 2.00     Years: 22.00     Types: Cigarettes     Start date: 1967     Quit date: 1990     Smokeless tobacco: Never Used      Comment: started smoking 22's- quit in 1990     Alcohol use No      Comment: RARELY     Family History   Problem Relation Age of Onset      Myocardial Infarction Father 63         Current Outpatient Prescriptions   Medication Sig Dispense Refill     warfarin (COUMADIN) 2.5 MG tablet Take 2 tablets (5 mg) by mouth daily For the next 2 days and get INR checked at the INR clinic for further dosing of Coumadin.  And 2.5mg qd ThFSS 95 tablet 3     Multiple Vitamins-Minerals (CENTRUM SILVER) per tablet Take 1 tablet by mouth daily       CEPHALEXIN PO Take 500 mg by mouth 3 times daily as needed cellulitis       Pantoprazole Sodium (PROTONIX PO) Take 40 mg by mouth every morning (before breakfast)       gabapentin (NEURONTIN) 600 MG tablet Take 1 tablet (600 mg) by mouth 3 times daily 270 tablet 3     pregabalin (LYRICA) 150 MG capsule Take 1 capsule (150 mg) by mouth 2 times daily 180 capsule 1     buPROPion (WELLBUTRIN XL) 300 MG 24 hr tablet Take 1 tablet (300 mg) by mouth every morning 90 tablet 1     diltiazem (CARDIZEM CD/CARTIA XT) 120 MG 24 hr capsule Take 1 capsule (120 mg) by mouth daily (Patient taking differently: Take 120 mg by mouth every evening ) 90 capsule 2     furosemide (LASIX) 20 MG tablet Take 1 tablet (20 mg) by mouth daily 90 tablet 3     metoprolol (TOPROL-XL) 50 MG 24 hr tablet Take 1 tablet (50 mg) by mouth daily 90 tablet 2     nortriptyline (PAMELOR) 25 MG capsule Take 2 capsules (50 mg) by mouth At Bedtime 180 capsule 2     pravastatin (PRAVACHOL) 10 MG tablet Take 1 tablet (10 mg) by mouth At Bedtime Needs labs before next refill 90 tablet 0     venlafaxine (EFFEXOR-XR) 150 MG 24 hr capsule TAKE 1 CAPSULE(150 MG) BY MOUTH DAILY 90 capsule 0     minocycline (MINOCIN/DYNACIN) 50 MG capsule Take 1 capsule (50 mg) by mouth 2 times daily 180 capsule 2     fluticasone (FLONASE) 50 MCG/ACT spray Spray 2 sprays into both nostrils daily 3 Bottle 11     lidocaine (LIDODERM) 5 % Patch Apply up to 3 patches to painful area at once for up to 12 h within a 24 h period.  Remove after 12 hours. (Patient taking differently: as needed Apply up to  3 patches to painful area at once for up to 12 h within a 24 h period.  Remove after 12 hours.) 60 patch 2     Allergies   Allergen Reactions     Ceftriaxone Anaphylaxis and Rash     Rocephin given. Developed rash to back and abd, awaiting to see if it improves with d/c of rocephin.        Nafcillin Rash     diffuse severe rash in hospital 2/05     Penicillins Anaphylaxis     Vancomycin Anaphylaxis and Rash     Codeine Fatigue     Sleeps continuously     Clindamycin Rash     received information from patient     Zithromax [Azithromycin] Rash     Recent Labs   Lab Test  03/12/18   0640  03/11/18   0734  03/10/18   1718   11/20/17   1900   08/08/17   0725   06/30/17   1155   10/27/16   1133   09/04/14   1724   08/11/14   1455   04/02/13   1059   09/08/11   1231   A1C   --    --    --    --    --    --    --    --    --    --    --    --    --    --   5.8   --    --    --   5.6   LDL   --    --    --    --    --    --    --    --    --    --   60   --   66   --    --    --   81   < >  149*   HDL   --    --    --    --    --    --    --    --    --    --   53   --   39*   --    --    --   45*   < >  42*   TRIG   --    --    --    --    --    --    --    --    --    --   102   --   154*   --    --    --   158*   < >  179*   ALT   --   31  39   --   24   --    --    --   33   < >  45   < >  51*   < >   --    --   48   < >  25   CR  1.39*  1.35*  1.26*   < >  1.24*   < >  1.75*   < >  1.29*   < >  1.19*   < >  1.10*   < >  1.46*   < >  1.38*   < >  1.15*   GFRESTIMATED  37*  38*  41*   < >  42*   < >  28*   < >  40*   < >  44*   < >  49*   < >  35*   < >  38*   < >  47*   GFRESTBLACK  45*  46*  50*   < >  51*   < >  34*   < >  49*   < >  54*   < >  59*   < >  43*   < >  46*   < >  57*   POTASSIUM  3.4  3.4  4.0   < >  4.0   < >  3.5   < >  4.0   < >  4.0   < >  4.6   < >  3.7   < >  4.1   < >  3.6   TSH   --    --    --    --    --    --   1.36   --   4.27*   --   5.14*   < >   --    --    --    --    --    < >  3.04    <  > = values in this interval not displayed.      BP Readings from Last 3 Encounters:   03/19/18 128/60   03/12/18 133/67   12/21/17 126/70    Wt Readings from Last 3 Encounters:   03/19/18 (!) 347 lb 12.8 oz (157.8 kg)   03/12/18 (!) 347 lb 12.8 oz (157.8 kg)   12/21/17 (!) 343 lb (155.6 kg)                    Reviewed and updated as needed this visit by clinical staff       Reviewed and updated as needed this visit by Provider         ROS:  All other systems on a 10-point review are negative, unless otherwise noted in HPI    OBJECTIVE:     /60 (BP Location: Right arm, Patient Position: Chair, Cuff Size: Adult Regular)  Pulse 69  Temp 98  F (36.7  C) (Oral)  Wt (!) 347 lb 12.8 oz (157.8 kg)  SpO2 98%  BMI 59.7 kg/m2  Body mass index is 59.7 kg/(m^2).  GENERAL: alert, no distress, obese and wheelchair bound  EYES: Eyes grossly normal to inspection and EOMI  NECK: no adenopathy, no asymmetry, masses, or scars and thyroid normal to palpation  RESP: diffusely course breath sounds that clear with cough, otherwise good airway movement bilaterally  CV: regular rate and rhythm, normal S1 S2, no S3 or S4, no murmur, click or rub, no peripheral edema and peripheral pulses strong  ABDOMEN: soft, nontender, no hepatosplenomegaly, no masses and bowel sounds normal  MS: mild +1 edema bilaterally up to ankle, worse on LLE than right  NEURO: Mentation intact and speech normal      ASSESSMENT/PLAN:         1. Sepsis, due to unspecified organism (H)  Acute febrile illness due to respiratory infection (exacerbation of bronchitis vs bronchiectasis-unconfirmed diagnosis) complicated by acute encephalopathy. Resolved and pt has been discharged.  - HOME CARE NURSING REFERRAL    2. Encephalopathy  Secondary to acute illness - now resolved.  - HOME CARE NURSING REFERRAL    3. Hospital discharge follow-up    4. Essential hypertension with goal blood pressure less than 140/90  Stable, continue current regimen    5. Health Care  Home  Order placed to resume care for home RN (INR draws), home PT.    6. Stage 3 chronic kidney disease  Stable, continue current regimen    7. Congestive heart failure, unspecified congestive heart failure chronicity, unspecified congestive heart failure type (H)  Stable, continue current regimen    8. Cough  Chronic, etiology unconfirmed.  Will resend referral to pulmonology.  - PULMONARY MEDICINE REFERRAL    F/u with Dr. Pearl in 4-6 months or sooner as needed.    Amy Vincent MD  [facility]

## 2018-03-19 NOTE — PATIENT INSTRUCTIONS
1) Referral for home health care services to resume sent today  2) Referral to pulmonology sent today    Follow-up as need with Dr. Pearl.

## 2018-03-19 NOTE — PROGRESS NOTES
ANTICOAGULATION FOLLOW-UP CLINIC VISIT    Patient Name:  Berenice Chaudhari  Date:  3/19/2018  Contact Type:  Face to Face    SUBJECTIVE:     Patient Findings     Positives Hospital admission    Comments Denies bleeding/bruising. Was in ED on 3/10 for fever, d/c from hospital on 03/12. Missed 2.5 mg dose on Thursday(3/15). Feeling well now, appetite is good, denies increased greens/salads in diet.              OBJECTIVE    INR Protime   Date Value Ref Range Status   03/19/2018 1.7 (A) 0.86 - 1.14 Final       ASSESSMENT / PLAN  INR assessment SUB    Recheck INR In: 5 DAYS    INR Location Clinic      Anticoagulation Summary as of 3/19/2018     INR goal 2.0-3.0   Today's INR 1.7!   Maintenance plan 2.5 mg (2.5 mg x 1) every day   Full instructions 3/19: 5 mg; 3/20: 5 mg; Otherwise 2.5 mg every day   Weekly total 17.5 mg   Plan last modified Mary Butcher RN (2/12/2018)   Next INR check 3/23/2018   Target end date Indefinite    Indications   Atrial flutter (H) [I48.92]  Atrial flutter with rapid ventricular response (H) [I48.92]  Long-term (current) use of anticoagulants [Z79.01] [Z79.01]         Anticoagulation Episode Summary     INR check location     Preferred lab     Send INR reminders to [facility]    Comments 5mg tabs; HS dosing //  (Ricardo) sets up her meds // motorized wheelchair      Anticoagulation Care Providers     Provider Role Specialty Phone number    Nelly Pearl MD Referring Internal Medicine [phone]            See the Encounter Report to view Anticoagulation Flowsheet and Dosing Calendar (Go to Encounters tab in chart review, and find the Anticoagulation Therapy Visit)    Nani Coffman RN

## 2018-03-19 NOTE — MR AVS SNAPSHOT
After Visit Summary   3/19/2018    Berenice Chaudhari    MRN: [id number]           Patient Information     Date Of Birth          1940        Visit Information        Provider Department      3/19/2018 12:30 PM Jaspal Vincent Mai, MD [facility]        Today's Diagnoses     Sepsis, due to unspecified organism (H)    -  1    Encephalopathy        Hospital discharge follow-up        Essential hypertension with goal blood pressure less than 140/90        Health Care Home        Stage 3 chronic kidney disease        Congestive heart failure, unspecified congestive heart failure chronicity, unspecified congestive heart failure type (H)        Cough          Care Instructions    1) Referral for home health care services to resume sent today  2) Referral to pulmonology sent today    Follow-up as need with Dr. Pearl.          Follow-ups after your visit        Additional Services     HOME CARE NURSING REFERRAL       **Order classes of: [facility], [facility] and [facility] will route to the Home Care and Hospice Referral Pool.  Home Care or Hospice will then contact the patient to schedule their appointment.**    If you do not hear from Home Care and Hospice, or you would like to call to schedule, please call the referring place of service that your provider has listed below.  ______________________________________________________________________    Your provider has referred you to: [facility]: [facility] [phone]   [email or web address]    Extended Emergency Contact Information  Primary Emergency Contact: Ricardo Chaudhari  Address: [address] [place] [address] [place]           [place] [address] [facility]  Home Phone: [phone]  Work Phone: [phone]  Mobile Phone: [phone]  Relation: Spouse  Secondary Emergency Contact: Heather Brewster   [facility]  Home Phone: [phone]  Work Phone: none  Mobile Phone: [phone]  Relation:  Sister    Patient Anticipated Discharge Date: discharged   RN, PT, HHA to begin 24 - 48 hours after discharge.  PLEASE EVALUATE AND TREAT (Evaluation timeline is 24 - 48 hrs. Please call if there is need for a variance to this timeline).    REASON FOR REFERRAL: Resume previous home health RN (for home INR draws) and PT    ADDITIONAL SERVICES NEEDED: none    OTHER PERTINENT INFORMATION: Patient was last seen by provider on 3/19/2018 for follow-up after hospital discharge.    Current Outpatient Prescriptions:  warfarin (COUMADIN) 2.5 MG tablet, Take 2 tablets (5 mg) by mouth daily For the next 2 days and get INR checked at the INR clinic for further dosing of Coumadin.  And 2.5mg qd ThFSS, Disp: 95 tablet, Rfl: 3  Multiple Vitamins-Minerals (CENTRUM SILVER) per tablet, Take 1 tablet by mouth daily, Disp: , Rfl:   CEPHALEXIN PO, Take 500 mg by mouth 3 times daily as needed cellulitis, Disp: , Rfl:   Pantoprazole Sodium (PROTONIX PO), Take 40 mg by mouth every morning (before breakfast), Disp: , Rfl:   gabapentin (NEURONTIN) 600 MG tablet, Take 1 tablet (600 mg) by mouth 3 times daily, Disp: 270 tablet, Rfl: 3  pregabalin (LYRICA) 150 MG capsule, Take 1 capsule (150 mg) by mouth 2 times daily, Disp: 180 capsule, Rfl: 1  buPROPion (WELLBUTRIN XL) 300 MG 24 hr tablet, Take 1 tablet (300 mg) by mouth every morning, Disp: 90 tablet, Rfl: 1  diltiazem (CARDIZEM CD/CARTIA XT) 120 MG 24 hr capsule, Take 1 capsule (120 mg) by mouth daily (Patient taking differently: Take 120 mg by mouth every evening ), Disp: 90 capsule, Rfl: 2  furosemide (LASIX) 20 MG tablet, Take 1 tablet (20 mg) by mouth daily, Disp: 90 tablet, Rfl: 3  metoprolol (TOPROL-XL) 50 MG 24 hr tablet, Take 1 tablet (50 mg) by mouth daily, Disp: 90 tablet, Rfl: 2  nortriptyline (PAMELOR) 25 MG capsule, Take 2 capsules (50 mg) by mouth At Bedtime, Disp: 180 capsule, Rfl: 2  pravastatin (PRAVACHOL) 10 MG tablet, Take 1 tablet (10 mg) by mouth At Bedtime Needs labs  before next refill, Disp: 90 tablet, Rfl: 0  venlafaxine (EFFEXOR-XR) 150 MG 24 hr capsule, TAKE 1 CAPSULE(150 MG) BY MOUTH DAILY, Disp: 90 capsule, Rfl: 0  minocycline (MINOCIN/DYNACIN) 50 MG capsule, Take 1 capsule (50 mg) by mouth 2 times daily, Disp: 180 capsule, Rfl: 2  fluticasone (FLONASE) 50 MCG/ACT spray, Spray 2 sprays into both nostrils daily, Disp: 3 Bottle, Rfl: 11  lidocaine (LIDODERM) 5 % Patch, Apply up to 3 patches to painful area at once for up to 12 h within a 24 h period.  Remove after 12 hours. (Patient taking differently: as needed Apply up to 3 patches to painful area at once for up to 12 h within a 24 h period.  Remove after 12 hours.), Disp: 60 patch, Rfl: 2      Patient Active Problem List:     Generalized osteoarthrosis, unspecified site     Urticaria     Obesity     Essential hypertension with goal blood pressure less than 140/90     Allergic rhinitis     Erythroderma desquamativum     Advanced directives, counseling/discussion     Hyperlipidemia LDL goal <100     Post herpetic neuralgia     Health Care Home     Chronic renal disease     Anxiety     Major depressive disorder, recurrent episode, in full remission (HCC)     Congestive heart failure, unspecified congestive heart failure chronicity, unspecified congestive heart failure type (H)     Cough     Severe obesity (BMI >= 40) (H)     Cellulitis of right lower extremity     CKD (chronic kidney disease) stage 3, GFR 30-59 ml/min     Right shoulder pain     Cellulitis     Atrial flutter (H)     Atrial flutter with rapid ventricular response (H)     Long-term (current) use of anticoagulants [Z79.01]     Gastroesophageal reflux disease without esophagitis     Cellulitis of left lower leg     Acute on chronic diastolic heart failure (H)     SOB (shortness of breath)     Acute febrile illness      Documentation of Face to Face and Certification for Home Health Services    I certify that patient, Berenice Chaudhari is under my care and that I,  or a Nurse Practitioner or Physician's Assistant working with me, had a face-to-face encounter that meets the physician face-to-face encounter requirements with this patient on: 3/19/2018.    This encounter with the patient was in whole, or in part, for the following medical condition, which is the primary reason for Home Health Care: congestive heart failure, chronic cough, acute sepsis (recovered), atrial flutter, CKD stage 3.    I certify that, based on my findings, the following services are medically necessary Home Health Services: Nursing and Physical Therapy    My clinical findings support the need for the above services because: Nurse is needed: To provide assessment and oversight required in the home to assure adherence to the medical plan due to: atrial fibrillation and anticoagulation. and Physical Therapy Services are needed to assess and treat the following functional impairments: wheel chair bound, immobility, deconditioning.    Further, I certify that my clinical findings support that this patient is homebound (i.e. absences from home require considerable and taxing effort and are for medical reasons or Tao[employer] services or infrequently or of short duration when for other reasons) because: Requires assistance of another person or specialized equipment to access medical services because patient:  Wheel chair bound.    Based on the above findings, I certify that this patient is confined to the home and needs intermittent skilled nursing care, physical therapy and/or speech therapy.  The patient is under my care, and I have initiated the establishment of the plan of care.  This patient will be followed by a physician who will periodically review the plan of care.    Physician/Provider to provide follow up care: Nelly Pearl certified Physician at time of discharge: Amy Vincent MD      Please be aware that coverage of these services is subject to the terms and  "limitations of your health insurance plan.  Call member services at your health plan with any benefit or coverage questions.            PULMONARY MEDICINE REFERRAL       Your provider has referred you to: [facility]: [facility] [place] [phone]   [email or web address]    Please be aware that coverage of these services is subject to the terms and limitations of your health insurance plan.  Call member services at your health plan with any benefit or coverage questions.      Please bring the following with you to your appointment:    (1) Any X-Rays, CTs or MRIs which have been performed.  Contact the facility where they were done to arrange for  prior to your scheduled appointment.    (2) List of current medications   (3) This referral request   (4) Any documents/labs given to you for this referral                  Who to contact     If you have questions or need follow up information about today's clinic visit or your schedule please contact [facility] directly at [phone].  Normal or non-critical lab and imaging results will be communicated to you by MyChart, letter or phone within 4 business days after the clinic has received the results. If you do not hear from us within 7 days, please contact the clinic through "[employer]hart or phone. If you have a critical or abnormal lab result, we will notify you by phone as soon as possible.  Submit refill requests through [employer] or call your pharmacy and they will forward the refill request to us. Please allow 3 business days for your refill to be completed.          Additional Information About Your Visit        [employer] Information     [employer] lets you send messages to your doctor, view your test results, renew your prescriptions, schedule appointments and more. To sign up, go to [email or web address] . Click on \"Log in\" on the left side of the screen, which will take you to the Welcome " "page. Then click on \"Sign up Now\" on the right side of the page.     You will be asked to enter the access code listed below, as well as some personal information. Please follow the directions to create your username and password.     Your access code is: [email or web address]  Expires: 6/10/2018 12:33 PM     Your access code will  in 90 days. If you need help or a new code, please call your [place] clinic or [phone].        Care EveryWhere ID     This is your Care EveryWhere ID. This could be used by other organizations to access your [place] medical records  [id number]        Your Vitals Were     Pulse Temperature Pulse Oximetry BMI (Body Mass Index)          69 98  F (36.7  C) (Oral) 98% 59.7 kg/m2         Blood Pressure from Last 3 Encounters:   18 128/60   18 133/67   17 126/70    Weight from Last 3 Encounters:   18 (!) 347 lb 12.8 oz (157.8 kg)   18 (!) 347 lb 12.8 oz (157.8 kg)   17 (!) 343 lb (155.6 kg)              We Performed the Following     HOME CARE NURSING REFERRAL     PULMONARY MEDICINE REFERRAL          Today's Medication Changes          These changes are accurate as of 3/19/18  1:17 PM.  If you have any questions, ask your nurse or doctor.               These medicines have changed or have updated prescriptions.        Dose/Directions    diltiazem 120 MG 24 hr capsule   Commonly known as:  CARDIZEM CD/CARTIA XT   This may have changed:  when to take this   Used for:  Atrial flutter with rapid ventricular response (H)        Dose:  120 mg   Take 1 capsule (120 mg) by mouth daily   Quantity:  90 capsule   Refills:  2       lidocaine 5 % Patch   Commonly known as:  LIDODERM   This may have changed:    - when to take this  - reasons to take this  - additional instructions   Used for:  Postherpetic neuralgia        Apply up to 3 patches to painful area at once for up to 12 h within a 24 h period.  Remove after 12 hours.   Quantity:  60 patch   Refills:  2       "          Primary Care Provider Office Phone # Fax #    Nelly Pearl [phone] [address] [place] [address]        Equal Access to Services     LAILAPAULINE ANDRE : Hadii irvin robertson dawson Hummel, waaxda luqadaha, qaybta kaalmada selena, calin severino lavalentinalatoya hoyt. So [facility] [phone].    ATENCIÓN: Si habla español, tiene a dickey disposición servicios gratuitos de asistencia lingüística. Llame al [phone].    We comply with applicable federal civil rights laws and [place] laws. We do not discriminate on the basis of race, color, national origin, age, disability, sex, sexual orientation, or gender identity.            Thank you!     Thank you for choosing [facility]  for your care. Our goal is always to provide you with excellent care. Hearing back from our patients is one way we can continue to improve our services. Please take a few minutes to complete the written survey that you may receive in the mail after your visit with us. Thank you!             Your Updated Medication List - Protect others around you: Learn how to safely use, store and throw away your medicines at www.disposemymeds.org.          This list is accurate as of 3/19/18  1:17 PM.  Always use your most recent med list.                   Brand Name Dispense Instructions for use Diagnosis    buPROPion 300 MG 24 hr tablet    WELLBUTRIN XL    90 tablet    Take 1 tablet (300 mg) by mouth every morning    Major depressive disorder, recurrent, in full remission (H)       CENTRUM SILVER per tablet      Take 1 tablet by mouth daily        CEPHALEXIN PO      Take 500 mg by mouth 3 times daily as needed cellulitis        diltiazem 120 MG 24 hr capsule    CARDIZEM CD/CARTIA XT    90 capsule    Take 1 capsule (120 mg) by mouth daily    Atrial flutter with rapid ventricular response (H)       fluticasone 50 MCG/ACT spray    FLONASE    3 Bottle    Spray 2 sprays into both nostrils daily     Post-nasal drip       furosemide 20 MG tablet    LASIX    90 tablet    Take 1 tablet (20 mg) by mouth daily    Atrial flutter with rapid ventricular response (H)       gabapentin 600 MG tablet    NEURONTIN    270 tablet    Take 1 tablet (600 mg) by mouth 3 times daily    Post herpetic neuralgia       lidocaine 5 % Patch    LIDODERM    60 patch    Apply up to 3 patches to painful area at once for up to 12 h within a 24 h period.  Remove after 12 hours.    Postherpetic neuralgia       metoprolol succinate 50 MG 24 hr tablet    TOPROL-XL    90 tablet    Take 1 tablet (50 mg) by mouth daily    Atrial flutter, unspecified type (H)       minocycline 50 MG capsule    MINOCIN/DYNACIN    180 capsule    Take 1 capsule (50 mg) by mouth 2 times daily    Methicillin susceptible Staphylococcus aureus septicemia (H)       nortriptyline 25 MG capsule    PAMELOR    180 capsule    Take 2 capsules (50 mg) by mouth At Bedtime    Post herpetic neuralgia       pravastatin 10 MG tablet    PRAVACHOL    90 tablet    Take 1 tablet (10 mg) by mouth At Bedtime Needs labs before next refill    Hyperlipidemia LDL goal <100       pregabalin 150 MG capsule    LYRICA    180 capsule    Take 1 capsule (150 mg) by mouth 2 times daily    Postherpetic neuralgia       PROTONIX PO      Take 40 mg by mouth every morning (before breakfast)        venlafaxine 150 MG 24 hr capsule    EFFEXOR-XR    90 capsule    TAKE 1 CAPSULE(150 MG) BY MOUTH DAILY    Major depressive disorder, recurrent episode, moderate (H)       warfarin 2.5 MG tablet    COUMADIN    95 tablet    Take 2 tablets (5 mg) by mouth daily For the next 2 days and get INR checked at the INR clinic for further dosing of Coumadin. And 2.5mg qd ThFSS    Atrial flutter, unspecified type (H)

## 2018-03-21 ENCOUNTER — DOCUMENTATION ONLY (OUTPATIENT)
Dept: CARE COORDINATION | Facility: CLINIC | Age: 78
End: 2018-03-21

## 2018-03-21 NOTE — PROGRESS NOTES
[facility] now requests orders and shares plan of care/discharge summaries for some patients through [employer].  Please REPLY TO THIS MESSAGE in order to give authorization for orders when needed.  This is considered a verbal order, you will still receive a faxed copy of orders for signature.  Thank you for your assistance in improving collaboration for our patients.    ORDER  Skilled Nursing 2x/week for 2 weeks, 1x/week for 3 weeks to perform assessment with focus on dyspnea levels, respiratory assessment, instruction on testing and treatments, medication management and reconciliation, pain management, mental health status and need for referral or change in treatment plan, skin integrity, falls risk. Instruct on disease process, signs/symptoms of complications to report to agency/physician/911, emergency/safety and falls prevention plan, medication effects/SE, new/high risk/changed medications, diet, and activity. Implement interventions to monitor and mitigate pain, prevent pressure ulcers and confirm proper foot care.   3 prn visits for medication changes, falls assessment and changes in condition, supervisory visits per agency protocol, recertification assessments. Visits may be in person or via video conference based upon patient needs.    MD SUMMARY/PLAN OF CARE  SUMMARY TO MD  SITUATION   Patient is a 78 year old female who is being referred to home care from her primary MD office for assessment of medication reconcilliation\management\education, INR lab draws,  disease process management\education, and home safety eval. VS...128/68 BP left forearm, 96.4 temp, 67 pulse, 97 percent RA, 18 resp, Coughing up large amounts of yellow green sputum. SOB with minimal exertion, such as talking. Course crackles through out lobes.   WOUND DESCRIPTION AND MEASUREMENTS  Small open areas on buttocks, pt is using cavilon spray daily and keeping areas clean and dry. No changes to pre hospital medications  except an short increase in coumadin, which pt spouse called the Anticouagulation clinic for direction on. Next INR is due 3/23/18. Appetite is fair, she does not follow any diet recommendations, states she eats what she wants and enjoys it. Pt lives in a single family home with a lot of DME to assist with all her needs. Pt spouse is main caregiver and willing and able to assist with all needs.   BACKGROUND Patient was hospitalized 3\10 to 3\12 at [facility] for fever, spesis, acute exacerbation of chronic Bronchitis which caused febrile encephalopathy. Encephalopathy has now resolved. She has Hx of dCHF, anxiety\depression, Atrial fib\flutter on anticoag, CKD3, HTN.  ANALYSIS Pt is homebound due to dyspnea with minimal exertion and needs skilled nursing to assist with care management at home.    RECOMMENDATION Skilled Nursing to assess and educate on resp status, chronic care management and assist with INR management. Pt declined PT and OT at this time.     Thank you,   Jennifer Alatorre RN  Case Cierra  [facility]  [email or web address]   Office: [phone]  Cell: [phone]

## 2018-03-23 ENCOUNTER — ANTICOAGULATION THERAPY VISIT (OUTPATIENT)
Dept: NURSING | Facility: CLINIC | Age: 78
End: 2018-03-23
Payer: COMMERCIAL

## 2018-03-23 DIAGNOSIS — I48.92 ATRIAL FLUTTER (H): ICD-10-CM

## 2018-03-23 DIAGNOSIS — I48.92 ATRIAL FLUTTER WITH RAPID VENTRICULAR RESPONSE (H): ICD-10-CM

## 2018-03-23 DIAGNOSIS — Z79.01 LONG-TERM (CURRENT) USE OF ANTICOAGULANTS: ICD-10-CM

## 2018-03-23 LAB — INR PPP: 1.7

## 2018-03-23 PROCEDURE — 99207 ZZC NO CHARGE NURSE ONLY: CPT

## 2018-03-23 NOTE — MR AVS SNAPSHOT
Berenice RAJIV Chaudhari   3/23/2018 8:30 AM   Anticoagulation Therapy Visit    Description:  78 year old female   Provider:   ANTICOAGULATION CLINIC   Department:   Nurse           INR as of 3/23/2018     Today's INR 1.7!      Anticoagulation Summary as of 3/23/2018     INR goal 2.0-3.0   Today's INR 1.7!   Full instructions 3/23: 5 mg; 3/26: 5 mg; Otherwise 2.5 mg every day   Next INR check 3/27/2018    Indications   Atrial flutter (H) [I48.92]  Atrial flutter with rapid ventricular response (H) [I48.92]  Long-term (current) use of anticoagulants [Z79.01] [Z79.01]         Your next Anticoagulation Clinic appointment(s)     Mar 27, 2018  4:15 PM CDT   Anticoagulation Visit with  ANTICOAGULATION CLINIC   [facility]    [address]  [address]  [facility] [id number] [phone]              Contact Numbers     [facility]  Please call  [phone] to cancel and/or reschedule your appointment   Please call  [phone] with any problems or questions regarding your therapy.        March 2018 Details    Sun Mon Tue Wed Thu Fri Sat         1               2               3                 4               5               6               7               8               9               10                 11               12               13               14               15               16               17                 18               19               20               21               22               23      5 mg   See details      24      2.5 mg           25      2.5 mg         26      5 mg         27            28               29               30               31                Date Details   03/23 This INR check       Date of next INR:  3/27/2018         How to take your warfarin dose     To take:  2.5 mg Take 1 of the 2.5 mg tablets.    To take:  5 mg Take 1 of the 5 mg tablets.

## 2018-03-23 NOTE — PROGRESS NOTES
ANTICOAGULATION FOLLOW-UP     Patient Name:  Berenice Chaudhari  Date:  3/23/2018  Contact Type:  Telephone  Call received from [facility] nurse, with INR result.   Follow up instructions given over the phone to Home Care nurse for warfarin management.    SUBJECTIVE:     Patient Findings     Positives Change in diet/appetite    Comments Has been eating a lot of green vegetables,   including Edamame. 1 cup = about 40 mcg Vit K           OBJECTIVE    INR   Date Value Ref Range Status   03/23/2018 1.7  Final       ASSESSMENT / PLAN  INR assessment SUB    Recheck INR In: 4 DAYS    INR Location Homecare INR      Anticoagulation Summary as of 3/23/2018     INR goal 2.0-3.0   Today's INR 1.7!   Maintenance plan 2.5 mg (2.5 mg x 1) every day   Full instructions 3/23: 5 mg; 3/26: 5 mg; Otherwise 2.5 mg every day   Weekly total 17.5 mg   Plan last modified Mary Butcher RN (2/12/2018)   Next INR check 3/27/2018   Target end date Indefinite    Indications   Atrial flutter (H) [I48.92]  Atrial flutter with rapid ventricular response (H) [I48.92]  Long-term (current) use of anticoagulants [Z79.01] [Z79.01]         Anticoagulation Episode Summary     INR check location     Preferred lab     Send INR reminders to [facility]    Comments 5mg tabs; HS dosing //  (Ricardo) sets up her meds // motorized wheelchair      Anticoagulation Care Providers     Provider Role Specialty Phone number    Nelly Pearl MD Referring Internal Medicine [phone]            See the Encounter Report to view Anticoagulation Flowsheet and Dosing Calendar (Go to Encounters tab in chart review, and find the Anticoagulation Therapy Visit)        Mary Butcher, LEXIE

## 2018-03-27 ENCOUNTER — ANTICOAGULATION THERAPY VISIT (OUTPATIENT)
Dept: NURSING | Facility: CLINIC | Age: 78
End: 2018-03-27
Payer: COMMERCIAL

## 2018-03-27 DIAGNOSIS — I48.92 ATRIAL FLUTTER WITH RAPID VENTRICULAR RESPONSE (H): ICD-10-CM

## 2018-03-27 DIAGNOSIS — Z79.01 LONG-TERM (CURRENT) USE OF ANTICOAGULANTS: ICD-10-CM

## 2018-03-27 DIAGNOSIS — I48.92 ATRIAL FLUTTER (H): ICD-10-CM

## 2018-03-27 LAB — INR PPP: 2.8

## 2018-03-27 PROCEDURE — 99207 ZZC NO CHARGE NURSE ONLY: CPT

## 2018-03-27 NOTE — PROGRESS NOTES
ANTICOAGULATION FOLLOW-UP     Patient Name:  Berenice Chaudhari  Date:  3/27/2018  Contact Type:  Telephone  Call received from [facility] nurse, with INR result.   Follow up instructions given over the phone to Home Care nurse for warfarin management.    SUBJECTIVE:     Patient Findings     Positives No Problem Findings           OBJECTIVE    INR   Date Value Ref Range Status   03/27/2018 2.8  Final       ASSESSMENT / PLAN  INR assessment THER    Recheck INR In: 1 WEEK    INR Location Homecare INR      Anticoagulation Summary as of 3/27/2018     INR goal 2.0-3.0   Today's INR 2.8   Maintenance plan 5 mg (5 mg x 1) on Mon, Fri; 2.5 mg (2.5 mg x 1) all other days   Full instructions 5 mg on Mon, Fri; 2.5 mg all other days   Weekly total 22.5 mg   Plan last modified Mary Butcher RN (3/27/2018)   Next INR check 4/3/2018   Target end date Indefinite    Indications   Atrial flutter (H) [I48.92]  Atrial flutter with rapid ventricular response (H) [I48.92]  Long-term (current) use of anticoagulants [Z79.01] [Z79.01]         Anticoagulation Episode Summary     INR check location     Preferred lab     Send INR reminders to [facility]    Comments 5mg tabs; HS dosing //  (Ricardo) sets up her meds // motorized wheelchair      Anticoagulation Care Providers     Provider Role Specialty Phone number    Nelly Pearl MD Referring Internal Medicine [phone]            See the Encounter Report to view Anticoagulation Flowsheet and Dosing Calendar (Go to Encounters tab in chart review, and find the Anticoagulation Therapy Visit)        Mary Butcher RN

## 2018-03-27 NOTE — MR AVS SNAPSHOT
Berenice Chaudhari   3/27/2018 4:15 PM   Anticoagulation Therapy Visit    Description:  78 year old female   Provider:   ANTICOAGULATION CLINIC   Department:   Nurse           INR as of 3/27/2018     Today's INR 2.8      Anticoagulation Summary as of 3/27/2018     INR goal 2.0-3.0   Today's INR 2.8   Full instructions 5 mg on Mon, Fri; 2.5 mg all other days   Next INR check 4/3/2018    Indications   Atrial flutter (H) [I48.92]  Atrial flutter with rapid ventricular response (H) [I48.92]  Long-term (current) use of anticoagulants [Z79.01] [Z79.01]         Your next Anticoagulation Clinic appointment(s)     Mar 27, 2018  4:15 PM CDT   Anticoagulation Visit with  ANTICOAGULATION CLINIC   [facility] ([facility])    [address] [place] [address]  [address]  [place] [address] [phone]            Apr 03, 2018 11:30 AM CDT   Anticoagulation Visit with  ANTICOAGULATION CLINIC   [facility] ([facility])    [address] [place] [address]  [address]  [place] [address] [phone]              Contact Numbers     [facility]  Please call  [phone] to cancel and/or reschedule your appointment   Please call  [phone] with any problems or questions regarding your therapy.        March 2018 Details    Sun Mon Tue Wed Thu Fri Sat         1               2               3                 4               5               6               7               8               9               10                 11               12               13               14               15               16               17                 18               19               20               21               22               23               24                 25               26               27      2.5 mg   See details      28      2.5 mg         29      2.5 mg         30      5 mg         31      2.5 mg          Date Details   03/27 This INR check               How to take  your warfarin dose     To take:  2.5 mg Take 1 of the 2.5 mg tablets.    To take:  5 mg Take 1 of the 5 mg tablets.           April 2018 Details    Sun Mon Tue Wed Thu Fri Sat     1      2.5 mg         2      5 mg         3            4               5               6               7                 8               9               10               11               12               13               14                 15               16               17               18               19               20               21                 22               23               24               25               26               27               28                 29               30                     Date Details   No additional details    Date of next INR:  4/3/2018         How to take your warfarin dose     To take:  2.5 mg Take 1 of the 2.5 mg tablets.    To take:  5 mg Take 1 of the 5 mg tablets.

## 2018-04-03 ENCOUNTER — TELEPHONE (OUTPATIENT)
Dept: PEDIATRICS | Facility: CLINIC | Age: 78
End: 2018-04-03

## 2018-04-03 ENCOUNTER — ANTICOAGULATION THERAPY VISIT (OUTPATIENT)
Dept: NURSING | Facility: CLINIC | Age: 78
End: 2018-04-03
Payer: COMMERCIAL

## 2018-04-03 ENCOUNTER — DOCUMENTATION ONLY (OUTPATIENT)
Dept: CARE COORDINATION | Facility: CLINIC | Age: 78
End: 2018-04-03

## 2018-04-03 DIAGNOSIS — I48.92 ATRIAL FLUTTER WITH RAPID VENTRICULAR RESPONSE (H): ICD-10-CM

## 2018-04-03 DIAGNOSIS — Z79.01 LONG-TERM (CURRENT) USE OF ANTICOAGULANTS: ICD-10-CM

## 2018-04-03 DIAGNOSIS — I48.92 ATRIAL FLUTTER (H): ICD-10-CM

## 2018-04-03 LAB — INR PPP: 3.7

## 2018-04-03 PROCEDURE — 99207 ZZC NO CHARGE NURSE ONLY: CPT

## 2018-04-03 NOTE — PROGRESS NOTES
ANTICOAGULATION FOLLOW-UP     Patient Name:  Berenice Chaudhari  Date:  4/3/2018  Contact Type:  Telephone  Call received from [facility] nurse, with INR result.   Follow up instructions given over the phone to Home Care nurse for warfarin management.    SUBJECTIVE:     Patient Findings     Positives Antibiotic use or infection    Comments URI symptoms started yesterday morning.  Temp today 99.3.    She is being discharged from Homecare today.           OBJECTIVE    INR   Date Value Ref Range Status   04/03/2018 3.7  Final       ASSESSMENT / PLAN  INR assessment SUPRA    Recheck INR In: 1 WEEK    INR Location Homecare INR      Anticoagulation Summary as of 4/3/2018     INR goal 2.0-3.0   Today's INR 3.7!   Maintenance plan 5 mg (5 mg x 1) on Mon, Fri; 2.5 mg (2.5 mg x 1) all other days   Full instructions 4/3: Hold; Otherwise 5 mg on Mon, Fri; 2.5 mg all other days   Weekly total 22.5 mg   Plan last modified Mary Butcher RN (3/27/2018)   Next INR check 4/10/2018   Target end date Indefinite    Indications   Atrial flutter (H) [I48.92]  Atrial flutter with rapid ventricular response (H) [I48.92]  Long-term (current) use of anticoagulants [Z79.01] [Z79.01]         Anticoagulation Episode Summary     INR check location     Preferred lab     Send INR reminders to [facility]    Comments 5mg tabs; HS dosing //  (Ricardo) sets up her meds // motorized wheelchair      Anticoagulation Care Providers     Provider Role Specialty Phone number    Nelly Pearl MD Referring Internal Medicine [phone]            See the Encounter Report to view Anticoagulation Flowsheet and Dosing Calendar (Go to Encounters tab in chart review, and find the Anticoagulation Therapy Visit)        Mary Butcher RN

## 2018-04-03 NOTE — PROGRESS NOTES
Notice of Discharge from [facility]     Summary of outcomes in meeting goals  S.  Pt has been seen for SNV only re resp status and INR management in the home.  She lives with spouse in upstairs of 2 story home.  She has home equipped with w/c, remodelled bathroom, and stair lift.  She has had improvement in resp status until today, when she feels that she is getting a cold again and feels run down after an outing on Easter Sunday with family.  She has temp of 99.3, return of prod cough of green phlemn, and raspy mid lobes bilateral.  Her INR today was 3.7 and she will have INR checked in clinic next week.  Spouse helps her with med management to ensure correct dosing. She feels she has no more confusion anymore.   She has refused adamantly to have SN check skin integrity on her buttocks multiple times.  She refused on admission, within 5 days of admission, on following week by SN CM she has know in the past, refused even if SN would come when pt is still in bed, and educated why skin integruty check is necessary part of comprehensive assessment required by Medicare and other insurances.  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  She says she is just sick of the medical professionals doing things to her.  There were stage 2 pressure ulcers documented in her most recent hospital admission.  She says she uses Cavalon spray to heal them. Therefore management and compliance personnel was involved and then given 10 day notice of termination of services.  She accepted those terms without arguement.  She understands today is last SNV.  B.  CHF, anxiety\depression, Atrial fib\flutter on anticoag, CKD3, HTN.fever, bronchitis, encephalopathy  A.  Pt was discharged early from [facility] for non compliance with skin assessment.  R.  DC [facility] without goals met.  MD notified re reason for d/c.  Discharge disposition to care with spouse.  Final  education provided to go to INR clinic next week 4/10/18 at 4:15pm

## 2018-04-03 NOTE — MR AVS SNAPSHOT
Berenice VANCE Chaudhari   4/3/2018 11:30 AM   Anticoagulation Therapy Visit    Description:  78 year old female   Provider:   ANTICOAGULATION CLINIC   Department:  [facility]           INR as of 4/3/2018     Today's INR 3.7!      Anticoagulation Summary as of 4/3/2018     INR goal 2.0-3.0   Today's INR 3.7!   Full instructions 4/3: Hold; Otherwise 5 mg on Mon, Fri; 2.5 mg all other days   Next INR check 4/10/2018    Indications   Atrial flutter (H) [I48.92]  Atrial flutter with rapid ventricular response (H) [I48.92]  Long-term (current) use of anticoagulants [Z79.01] [Z79.01]         Your next Anticoagulation Clinic appointment(s)     Apr 10, 2018  4:15 PM CDT   Anticoagulation Visit with  ANTICOAGULATION CLINIC   [facility] [place] ([facility])    [address]  [address]  [place] [facility] [id number] [phone]              Contact Numbers     [facility]  Please call  [phone] to cancel and/or reschedule your appointment   Please call  [phone] with any problems or questions regarding your therapy.        April 2018 Details    Sun Mon Tue Wed Thu Fri Sat     1               2               3      Hold   See details      4      2.5 mg         5      2.5 mg         6      5 mg         7      2.5 mg           8      2.5 mg         9      5 mg         10            11               12               13               14                 15               16               17               18               19               20               21                 22               23               24               25               26               27               28                 29               30                     Date Details   04/03 This INR check       Date of next INR:  4/10/2018         How to take your warfarin dose     To take:  2.5 mg Take 1 of the 2.5 mg tablets.    To take:  5 mg Take 1 of the 5 mg tablets.    Hold Do not take your warfarin dose. See the Details table to the  right for additional instructions.

## 2018-04-03 NOTE — TELEPHONE ENCOUNTER
Call from Kandy with [facility].  She can be reached at [phone].  Calling to let PCP know that patient will be terminated from home care due to her not complying with nursing recommendations and protocols-patient had stage 2 pressure ulcers and per Medicare requirements, they have to check her buttocks and monitor skin breakdown.  Patient refused-services have been terminated as of today.    Melissa Abdullahi RN  Message handled by Nurse Triage.

## 2018-04-09 ENCOUNTER — HOSPITAL ENCOUNTER (INPATIENT)
Facility: CLINIC | Age: 78
LOS: 2 days | Discharge: HOME | End: 2018-04-12
Attending: EMERGENCY MEDICINE | Admitting: INTERNAL MEDICINE
Payer: COMMERCIAL

## 2018-04-09 ENCOUNTER — APPOINTMENT (OUTPATIENT)
Dept: GENERAL RADIOLOGY | Facility: CLINIC | Age: 78
End: 2018-04-09
Attending: EMERGENCY MEDICINE
Payer: COMMERCIAL

## 2018-04-09 DIAGNOSIS — J18.9 HCAP (HEALTHCARE-ASSOCIATED PNEUMONIA): ICD-10-CM

## 2018-04-09 DIAGNOSIS — J18.9 PNEUMONIA: Primary | ICD-10-CM

## 2018-04-09 DIAGNOSIS — I48.92 ATRIAL FLUTTER, UNSPECIFIED TYPE (H): ICD-10-CM

## 2018-04-09 LAB
ALBUMIN SERPL-MCNC: 3 G/DL (ref 3.4–5)
ALBUMIN UR-MCNC: NEGATIVE MG/DL
ALP SERPL-CCNC: 109 U/L (ref 40–150)
ALT SERPL W P-5'-P-CCNC: 38 U/L (ref 0–50)
ANION GAP SERPL CALCULATED.3IONS-SCNC: 6 MMOL/L (ref 3–14)
APPEARANCE UR: CLEAR
AST SERPL W P-5'-P-CCNC: 33 U/L (ref 0–45)
BASOPHILS # BLD AUTO: 0.1 10E9/L (ref 0–0.2)
BASOPHILS NFR BLD AUTO: 0.2 %
BILIRUB SERPL-MCNC: 0.7 MG/DL (ref 0.2–1.3)
BILIRUB UR QL STRIP: NEGATIVE
BUN SERPL-MCNC: 21 MG/DL (ref 7–30)
CALCIUM SERPL-MCNC: 8.8 MG/DL (ref 8.5–10.1)
CHLORIDE SERPL-SCNC: 103 MMOL/L (ref 94–109)
CO2 SERPL-SCNC: 28 MMOL/L (ref 20–32)
COLOR UR AUTO: YELLOW
CREAT SERPL-MCNC: 1.29 MG/DL (ref 0.52–1.04)
DIFFERENTIAL METHOD BLD: ABNORMAL
EOSINOPHIL # BLD AUTO: 0 10E9/L (ref 0–0.7)
EOSINOPHIL NFR BLD AUTO: 0.2 %
ERYTHROCYTE [DISTWIDTH] IN BLOOD BY AUTOMATED COUNT: 14.5 % (ref 10–15)
FLUAV+FLUBV AG SPEC QL: NEGATIVE
FLUAV+FLUBV AG SPEC QL: NEGATIVE
GFR SERPL CREATININE-BSD FRML MDRD: 40 ML/MIN/1.7M2
GLUCOSE SERPL-MCNC: 137 MG/DL (ref 70–99)
GLUCOSE UR STRIP-MCNC: NEGATIVE MG/DL
HCT VFR BLD AUTO: 36.5 % (ref 35–47)
HGB BLD-MCNC: 11.4 G/DL (ref 11.7–15.7)
HGB UR QL STRIP: NEGATIVE
IMM GRANULOCYTES # BLD: 0.6 10E9/L (ref 0–0.4)
IMM GRANULOCYTES NFR BLD: 2.5 %
INR PPP: 2.26 (ref 0.86–1.14)
KETONES UR STRIP-MCNC: NEGATIVE MG/DL
LACTATE BLD-SCNC: 1.6 MMOL/L (ref 0.7–2)
LEUKOCYTE ESTERASE UR QL STRIP: NEGATIVE
LYMPHOCYTES # BLD AUTO: 0.7 10E9/L (ref 0.8–5.3)
LYMPHOCYTES NFR BLD AUTO: 2.9 %
MCH RBC QN AUTO: 28.3 PG (ref 26.5–33)
MCHC RBC AUTO-ENTMCNC: 31.2 G/DL (ref 31.5–36.5)
MCV RBC AUTO: 91 FL (ref 78–100)
MONOCYTES # BLD AUTO: 1 10E9/L (ref 0–1.3)
MONOCYTES NFR BLD AUTO: 4.4 %
NEUTROPHILS # BLD AUTO: 20.9 10E9/L (ref 1.6–8.3)
NEUTROPHILS NFR BLD AUTO: 89.8 %
NITRATE UR QL: NEGATIVE
NRBC # BLD AUTO: 0 10*3/UL
NRBC BLD AUTO-RTO: 0 /100
PH UR STRIP: 5 PH (ref 5–7)
PLATELET # BLD AUTO: 174 10E9/L (ref 150–450)
POTASSIUM SERPL-SCNC: 4.3 MMOL/L (ref 3.4–5.3)
PROT SERPL-MCNC: 7.9 G/DL (ref 6.8–8.8)
RBC # BLD AUTO: 4.03 10E12/L (ref 3.8–5.2)
SODIUM SERPL-SCNC: 137 MMOL/L (ref 133–144)
SOURCE: NORMAL
SP GR UR STRIP: 1.01 (ref 1–1.03)
SPECIMEN SOURCE: NORMAL
UROBILINOGEN UR STRIP-MCNC: 0 MG/DL (ref 0–2)
WBC # BLD AUTO: 23.2 10E9/L (ref 4–11)

## 2018-04-09 PROCEDURE — 93005 ELECTROCARDIOGRAM TRACING: CPT

## 2018-04-09 PROCEDURE — 96365 THER/PROPH/DIAG IV INF INIT: CPT

## 2018-04-09 PROCEDURE — 71046 X-RAY EXAM CHEST 2 VIEWS: CPT

## 2018-04-09 PROCEDURE — 36415 COLL VENOUS BLD VENIPUNCTURE: CPT | Performed by: EMERGENCY MEDICINE

## 2018-04-09 PROCEDURE — 85025 COMPLETE CBC W/AUTO DIFF WBC: CPT | Performed by: EMERGENCY MEDICINE

## 2018-04-09 PROCEDURE — 85610 PROTHROMBIN TIME: CPT | Performed by: EMERGENCY MEDICINE

## 2018-04-09 PROCEDURE — 25000128 H RX IP 250 OP 636: Performed by: EMERGENCY MEDICINE

## 2018-04-09 PROCEDURE — 87804 INFLUENZA ASSAY W/OPTIC: CPT | Performed by: EMERGENCY MEDICINE

## 2018-04-09 PROCEDURE — 81003 URINALYSIS AUTO W/O SCOPE: CPT | Performed by: EMERGENCY MEDICINE

## 2018-04-09 PROCEDURE — 83605 ASSAY OF LACTIC ACID: CPT | Performed by: EMERGENCY MEDICINE

## 2018-04-09 PROCEDURE — 96361 HYDRATE IV INFUSION ADD-ON: CPT

## 2018-04-09 PROCEDURE — 94640 AIRWAY INHALATION TREATMENT: CPT

## 2018-04-09 PROCEDURE — 25000132 ZZH RX MED GY IP 250 OP 250 PS 637: Performed by: EMERGENCY MEDICINE

## 2018-04-09 PROCEDURE — 87040 BLOOD CULTURE FOR BACTERIA: CPT | Performed by: EMERGENCY MEDICINE

## 2018-04-09 PROCEDURE — 87086 URINE CULTURE/COLONY COUNT: CPT | Performed by: EMERGENCY MEDICINE

## 2018-04-09 PROCEDURE — 80053 COMPREHEN METABOLIC PANEL: CPT | Performed by: EMERGENCY MEDICINE

## 2018-04-09 PROCEDURE — 96375 TX/PRO/DX INJ NEW DRUG ADDON: CPT

## 2018-04-09 PROCEDURE — 99285 EMERGENCY DEPT VISIT HI MDM: CPT | Mod: 25

## 2018-04-09 RX ORDER — IPRATROPIUM BROMIDE AND ALBUTEROL SULFATE 2.5; .5 MG/3ML; MG/3ML
SOLUTION RESPIRATORY (INHALATION)
Status: COMPLETED
Start: 2018-04-09 | End: 2018-04-10

## 2018-04-09 RX ORDER — MEROPENEM 1 G/1
1 INJECTION, POWDER, FOR SOLUTION INTRAVENOUS ONCE
Status: COMPLETED | OUTPATIENT
Start: 2018-04-09 | End: 2018-04-10

## 2018-04-09 RX ORDER — LEVOFLOXACIN 5 MG/ML
750 INJECTION, SOLUTION INTRAVENOUS ONCE
Status: COMPLETED | OUTPATIENT
Start: 2018-04-10 | End: 2018-04-10

## 2018-04-09 RX ORDER — METHYLPREDNISOLONE SODIUM SUCCINATE 125 MG/2ML
125 INJECTION, POWDER, LYOPHILIZED, FOR SOLUTION INTRAMUSCULAR; INTRAVENOUS ONCE
Status: COMPLETED | OUTPATIENT
Start: 2018-04-09 | End: 2018-04-10

## 2018-04-09 RX ORDER — ACETAMINOPHEN 325 MG/1
650 TABLET ORAL ONCE
Status: COMPLETED | OUTPATIENT
Start: 2018-04-09 | End: 2018-04-09

## 2018-04-09 RX ORDER — IPRATROPIUM BROMIDE AND ALBUTEROL SULFATE 2.5; .5 MG/3ML; MG/3ML
6 SOLUTION RESPIRATORY (INHALATION) ONCE
Status: COMPLETED | OUTPATIENT
Start: 2018-04-09 | End: 2018-04-10

## 2018-04-09 RX ADMIN — SODIUM CHLORIDE 1000 ML: 9 INJECTION, SOLUTION INTRAVENOUS at 22:45

## 2018-04-09 RX ADMIN — ACETAMINOPHEN 650 MG: 325 TABLET, FILM COATED ORAL at 23:54

## 2018-04-09 ASSESSMENT — ENCOUNTER SYMPTOMS
HEADACHES: 0
WEAKNESS: 1
HEMATURIA: 0
COUGH: 1
SHORTNESS OF BREATH: 0
CONFUSION: 1
ABDOMINAL PAIN: 0
DIARRHEA: 0
DIFFICULTY URINATING: 0
DYSURIA: 0
FEVER: 1
WOUND: 0

## 2018-04-09 NOTE — IP AVS SNAPSHOT
MRN:[id number]                      After Visit Summary   4/9/2018    Berenice Chaudhari    MRN: [id number]           Thank you!     Thank you for choosing [facility] for your care. Our goal is always to provide you with excellent care. Hearing back from our patients is one way we can continue to improve our services. Please take a few minutes to complete the written survey that you may receive in the mail after you visit. If you would like to speak to someone directly about your visit please contact Patient Relations at [phone]. Thank you!          Patient Information     Date Of Birth          1940        Designated Caregiver       Most Recent Value    Caregiver    Will someone help with your care after discharge? yes    Name of designated caregiver Ricardo    Phone number of caregiver [phone]    Caregiver address [address], [place] [address]      About your hospital stay     You were admitted on:  April 10, 2018 You last received care in the:  [place] [address] [facility]    You were discharged on:  April 12, 2018        Reason for your hospital stay       Admitted for presumed HCAP.                  Who to Call     For medical emergencies, please call 911.  For non-urgent questions about your medical care, please call your primary care provider or clinic, [phone]          Attending Provider     Provider Specialty    Margo Dennis MD Emergency Medicine    [facility]       Primary Care Provider Office Phone # Fax #    Nelly Pearl [phone]      After Care Instructions     Activity       Your activity upon discharge: activity as tolerated            Diet       Follow this diet upon discharge: Orders Placed This Encounter      Combination Diet Low Saturated Fat Na <2400mg Diet, No Caffeine Diet                  Follow-up Appointments     Follow-up and recommended labs and tests         Follow up with primary care provider, Nelly Pearl, within 7 days to evaluate medication change, to evaluate treatment change and for hospital follow- up.  The following labs/tests are recommended: repeat INR this Saturday 4/14.                  Your next 10 appointments already scheduled     Apr 20, 2018 11:00 AM CDT   Office Visit with Nelly Pearl MD   [facility]    [address] [place] [address]  [address]  [facility] [address] [phone]           Bring a current list of meds and any records pertaining to this visit. For Physicals, please bring immunization records and any forms needing to be filled out. Please arrive 10 minutes early to complete paperwork.            Apr 20, 2018 11:30 AM CDT   Anticoagulation Visit with  ANTICOAGULATION CLINIC   [facility] ([facility])    [address] [place] [address]  [address]  [place] [address] [phone]              Additional Services     Med Therapy Manage Referral       Your provider has referred you to: **[facility] Scheduling (numerous locations) [phone]   [email or web address]    Reason for Referral: 10+ prescription medications    The [facility] department will contact you to schedule an appointment.  You may also schedule the appointment by calling [phone].  For [facility] [place] patients, please call [phone] to confirm/schedule your appointment on the next business day.    This service is designed to help you get the most from your medications.  A specially trained Pharmacist will work closely with you and your providers to solve any questions, concerns, issues or problems related to your medications.    Please bring all of your prescription and non-prescription medications (such as vitamins, over-the-counter medications, and herbals) or a detailed  "medication list to your appointment.    If you have a glucose meter or other home monitoring information, please also bring this to your appointment (i.e. blood glucose log, blood pressure log, pain log, etc.).                  Warfarin Instruction     You have started taking a medicine called warfarin. This is a blood-thinning medicine (anticoagulant). It helps prevent and treat blood clots.      Before leaving the hospital, make sure you know how much to take and how long to take it.      You will need regular blood tests to make sure your blood is clotting safely. It is very important to see your doctor for regular blood tests.    Talk to your doctor before taking any new medicine (this includes over-the-counter drugs and herbal products). Many medicines can interact with warfarin. This may cause more bleeding or too much clotting.     Eating a lot of vitamin K--found in green, leafy vegetables--can change the way warfarin works in your body. Do NOT avoid these foods. Instead, try to eat the same amount each day.     Bleeding is the most common side-effect of warfarin. You may notice bleeding gums, a bloody nose, bruises and bleeding longer when you cut yourself. See a doctor at once if:   o You cough up blood  o You find blood in your stool (poop)  o You have a deep cut, or a cut that bleeds longer than 10 minutes   o You have a bad cut, hard fall, accident or hit your head (go to urgent care or the emergency room).    For women who can get pregnant: This medicine can harm an unborn baby. Be very careful not to get pregnant while taking this medicine. If you think you might be pregnant, call your doctor right away.    For more information, read \"Guide to Warfarin Therapy,  the booklet you received in the hospital.        Pending Results     Date and Time Order Name Status Description    4/9/2018 2222 Blood culture Preliminary     4/9/2018 2222 Blood culture Preliminary             Statement of Approval     " "Ordered          18 1418  I have reviewed and agree with all the recommendations and orders detailed in this document.  EFFECTIVE NOW     Approved and electronically signed by:  Randy Alas MD             Admission Information     Date & Time Provider Department Dept. Phone    2018 Zaynab Stephenson MD [place] [address] [facility] [phone]      Your Vitals Were     Blood Pressure Pulse Temperature Respirations Weight Pulse Oximetry    161/79 (BP Location: Left arm) 95 98.1  F (36.7  C) (Oral) 20 157.7 kg (347 lb 9.6 oz) 96%    BMI (Body Mass Index)                   59.67 kg/m2           MyChart Information     [facility]Wise lets you send messages to your doctor, view your test results, renew your prescriptions, schedule appointments and more. To sign up, go to [email or web address] . Click on \"Log in\" on the left side of the screen, which will take you to the Welcome page. Then click on \"Sign up Now\" on the right side of the page.     You will be asked to enter the access code listed below, as well as some personal information. Please follow the directions to create your username and password.     Your access code is: [email or web address]  Expires: 6/10/2018 12:33 PM     Your access code will  in 90 days. If you need help or a new code, please call your [facility] clinic or [phone].        Care EveryWhere ID     This is your Care EveryWhere ID. This could be used by other organizations to access your [facility] medical records  [id number]        Equal Access to Services     [facility]: Hadii irvin osman Sojohn, waaxda luqadaha, qaybta kaalmacalin mckeon . So [facility] [phone].    ATENCIÓN: Si habla español, tiene a dickey disposición servicios gratuitos de asistencia lingüística. Llame al [phone].    We comply with applicable federal civil rights laws and [place] laws. We do not discriminate on the basis of race, color, national " origin, age, disability, sex, sexual orientation, or gender identity.               Review of your medicines      START taking        Dose / Directions    levofloxacin 500 MG tablet   Commonly known as:  LEVAQUIN        Dose:  500 mg   Take 1 tablet (500 mg) by mouth daily   Quantity:  7 tablet   Refills:  0         CONTINUE these medicines which may have CHANGED, or have new prescriptions. If we are uncertain of the size of tablets/capsules you have at home, strength may be listed as something that might have changed.        Dose / Directions    warfarin 2 MG tablet   Commonly known as:  COUMADIN   This may have changed:    - medication strength  - how much to take  - additional instructions   Used for:  Atrial flutter, unspecified type (H)        Dose:  2 mg   Start taking on:  4/13/2018   Take 1 tablet (2 mg) by mouth daily   Quantity:  7 tablet   Refills:  0         CONTINUE these medicines which have NOT CHANGED        Dose / Directions    buPROPion 300 MG 24 hr tablet   Commonly known as:  WELLBUTRIN XL   Used for:  Major depressive disorder, recurrent, in full remission (H)        Dose:  300 mg   Take 1 tablet (300 mg) by mouth every morning   Quantity:  90 tablet   Refills:  1       CENTRUM SILVER per tablet        Dose:  1 tablet   Take 1 tablet by mouth daily   Refills:  0       CEPHALEXIN PO        Dose:  500 mg   Take 500 mg by mouth 3 times daily as needed cellulitis   Refills:  0       diltiazem 120 MG 24 hr ER beaded capsule   Commonly known as:  TIAZAC        Dose:  120 mg   Take 120 mg by mouth every evening   Refills:  0       furosemide 20 MG tablet   Commonly known as:  LASIX   Used for:  Atrial flutter with rapid ventricular response (H)        Dose:  20 mg   Take 1 tablet (20 mg) by mouth daily   Quantity:  90 tablet   Refills:  3       gabapentin 600 MG tablet   Commonly known as:  NEURONTIN        Dose:  1200 mg   Take 1,200 mg by mouth 3 times daily   Refills:  0       lidocaine 5 % Patch    Commonly known as:  LIDODERM   Indication:  Nerve Pain After Herpes Zoster or Shingles        Dose:  3 patch   Place 3 patches onto the skin daily as needed for moderate pain (Apply up to 3 patches to painful area at once for up to 12 h within a 24 h period.  Remove after 12 hours.)   Refills:  0       METOPROLOL SUCCINATE ER PO        Dose:  50 mg   Take 50 mg by mouth every evening   Refills:  0       minocycline 50 MG capsule   Commonly known as:  MINOCIN/DYNACIN   Used for:  Methicillin susceptible Staphylococcus aureus septicemia (H)        Dose:  50 mg   Take 1 capsule (50 mg) by mouth 2 times daily   Quantity:  180 capsule   Refills:  2       nortriptyline 25 MG capsule   Commonly known as:  PAMELOR   Used for:  Post herpetic neuralgia        Dose:  50 mg   Take 2 capsules (50 mg) by mouth At Bedtime   Quantity:  180 capsule   Refills:  2       pravastatin 10 MG tablet   Commonly known as:  PRAVACHOL   Used for:  Hyperlipidemia LDL goal <100        Dose:  10 mg   Take 1 tablet (10 mg) by mouth At Bedtime Needs labs before next refill   Quantity:  90 tablet   Refills:  0       pregabalin 150 MG capsule   Commonly known as:  LYRICA   Used for:  Postherpetic neuralgia        Dose:  150 mg   Take 1 capsule (150 mg) by mouth 2 times daily   Quantity:  180 capsule   Refills:  1       PROTONIX PO        Dose:  40 mg   Take 40 mg by mouth every morning (before breakfast)   Refills:  0       venlafaxine 150 MG 24 hr capsule   Commonly known as:  EFFEXOR-XR        Dose:  150 mg   Take 150 mg by mouth every evening   Refills:  0            Where to get your medicines      These medications were sent to [facility] #[id number] - [place], [place] - [address] AT [place] [address], [place] [address]     Phone:  [phone]     levofloxacin 500 MG tablet    warfarin 2 MG tablet                Protect others around you: Learn how to safely use, store and throw away your medicines at  www.disposemymeds.org.        ANTIBIOTIC INSTRUCTION     You've Been Prescribed an Antibiotic - Now What?  Your healthcare team thinks that you or your loved one might have an infection. Some infections can be treated with antibiotics, which are powerful, life-saving drugs. Like all medications, antibiotics have side effects and should only be used when necessary. There are some important things you should know about your antibiotic treatment.      Your healthcare team may run tests before you start taking an antibiotic.    Your team may take samples (e.g., from your blood, urine or other areas) to run tests to look for bacteria. These test can be important to determine if you need an antibiotic at all and, if you do, which antibiotic will work best.      Within a few days, your healthcare team might change or even stop your antibiotic.    Your team may start you on an antibiotic while they are working to find out what is making you sick.    Your team might change your antibiotic because test results show that a different antibiotic would be better to treat your infection.    In some cases, once your team has more information, they learn that you do not need an antibiotic at all. They may find out that you don't have an infection, or that the antibiotic you're taking won't work against your infection. For example, an infection caused by a virus can't be treated with antibiotics. Staying on an antibiotic when you don't need it is more likely to be harmful than helpful.      You may experience side effects from your antibiotic.    Like all medications, antibiotics have side effects. Some of these can be serious.    Let you healthcare team know if you have any known allergies when you are admitted to the hospital.    One significant side effect of nearly all antibiotics is the risk of severe and sometimes deadly diarrhea caused by Clostridium difficile (C. Difficile). This occurs when a person takes antibiotics because  some good germs are destroyed. Antibiotic use allows C. diificile to take over, putting patients at high risk for this serious infection.    As a patient or caregiver, it is important to understand your or your loved one's antibiotic treatment. It is especially important for caregivers to speak up when patients can't speak for themselves. Here are some important questions to ask your healthcare team.    What infection is this antibiotic treating and how do you know I have that infection?    What side effects might occur from this antibiotic?    How long will I need to take this antibiotic?    Is it safe to take this antibiotic with other medications or supplements (e.g., vitamins) that I am taking?     Are there any special directions I need to know about taking this antibiotic? For example, should I take it with food?    How will I be monitored to know whether my infection is responding to the antibiotic?    What tests may help to make sure the right antibiotic is prescribed for me?      Information provided by:  www.cdc.gov/getsmart  U.S. Department of Health and Human Services  Centers for disease Control and Prevention  National Center for Emerging and Zoonotic Infectious Diseases  Division of Healthcare Quality Promotion             Medication List: This is a list of all your medications and when to take them. Check marks below indicate your daily home schedule. Keep this list as a reference.      Medications           Morning Afternoon Evening Bedtime As Needed    buPROPion 300 MG 24 hr tablet   Commonly known as:  WELLBUTRIN XL   Take 1 tablet (300 mg) by mouth every morning   Last time this was given:  300 mg on 4/12/2018  8:28 AM   Next Dose Due:  Tomorrow morning 4/13/18                                   CENTRUM SILVER per tablet   Take 1 tablet by mouth daily                                   CEPHALEXIN PO   Take 500 mg by mouth 3 times daily as needed cellulitis                                    diltiazem 120 MG 24 hr ER beaded capsule   Commonly known as:  TIAZAC   Take 120 mg by mouth every evening   Next Dose Due:  Take this evening 4/12/18                                   furosemide 20 MG tablet   Commonly known as:  LASIX   Take 1 tablet (20 mg) by mouth daily   Last time this was given:  20 mg on 4/12/2018  8:28 AM   Next Dose Due:  Take Tomorrow 4/12/18                                   gabapentin 600 MG tablet   Commonly known as:  NEURONTIN   Take 1,200 mg by mouth 3 times daily   Last time this was given:  1,200 mg on 4/12/2018  8:27 AM   Next Dose Due:  Take this evening 4/12/18                                         levofloxacin 500 MG tablet   Commonly known as:  LEVAQUIN   Take 1 tablet (500 mg) by mouth daily   Next Dose Due:  Take tomorrow morning 4/13/18                                   lidocaine 5 % Patch   Commonly known as:  LIDODERM   Place 3 patches onto the skin daily as needed for moderate pain (Apply up to 3 patches to painful area at once for up to 12 h within a 24 h period.  Remove after 12 hours.)                                   METOPROLOL SUCCINATE ER PO   Take 50 mg by mouth every evening   Last time this was given:  50 mg on 4/11/2018  8:43 PM                                minocycline 50 MG capsule   Commonly known as:  MINOCIN/DYNACIN   Take 1 capsule (50 mg) by mouth 2 times daily   Next Dose Due:  Take this evening 4/12/18                                      nortriptyline 25 MG capsule   Commonly known as:  PAMELOR   Take 2 capsules (50 mg) by mouth At Bedtime   Last time this was given:  50 mg on 4/11/2018  9:40 PM   Next Dose Due:  Take tonight at bedtime 4/12/18                                   pravastatin 10 MG tablet   Commonly known as:  PRAVACHOL   Take 1 tablet (10 mg) by mouth At Bedtime Needs labs before next refill   Last time this was given:  10 mg on 4/11/2018  9:40 PM   Next Dose Due:  Take tonight at bedtime 4/12/18                                    pregabalin 150 MG capsule   Commonly known as:  LYRICA   Take 1 capsule (150 mg) by mouth 2 times daily   Last time this was given:  150 mg on 4/12/2018  8:27 AM   Next Dose Due:  Take this evening 4/12/18                                      PROTONIX PO   Take 40 mg by mouth every morning (before breakfast)   Last time this was given:  40 mg on 4/12/2018  6:58 AM   Next Dose Due:  Take tomorrow morning 4/13/18                                   venlafaxine 150 MG 24 hr capsule   Commonly known as:  EFFEXOR-XR   Take 150 mg by mouth every evening   Next Dose Due:  Take this evening 4/12/18                                   warfarin 2 MG tablet   Commonly known as:  COUMADIN   Take 1 tablet (2 mg) by mouth daily   Start taking on:  4/13/2018   Last time this was given:  2.5 mg on 4/10/2018  5:53 PM   Next Dose Due:  Take tomorrow evening 4/13/18                                             More Information        Methicillin-Resistant Staphylococcus aureus (MRSA)  What is MRSA?  Staphylococcus aureus bacteria or  staph  is a very common germ. It is found on the skin or in the nose of many people. Sometimes the bacteria causes no problem, or it causes a mild infection. But it can also cause severe infections of the skin, lungs, blood, or other organs or tissues. Some staph infections can be easily treated with antibiotics. But one type of staph, Methicillin-Resistant staphylococcus areas (MRSA) can t. It s called Methicillin-Resistant because the antibiotic methicillin, which used to be effective treatment, no longer works. MRSA is common in hospitals and nursing homes or long-term care facilities. It is also spreading among healthy children and adults outside the health care system. There are two different ways MRSA can appear in the body:    Colonization: When a person carries the MRSA bacteria (often in nose or on skin), but is healthy. This person can spread MRSA to others.    Infection: When a person gets sick  because of the bacteria, it's called being infected with MRSA. This person can also spread MRSA to others. If not treated properly, MRSA infections can be very serious.    What are the symptoms of MRSA infection?  MRSA skin infections start as small red bumps on the skin that look like pimples or spider bites. The small bumps usually get larger and become swollen, painful, warm to the touch and filled with pus. MRSA can also start in other ways, and it can spread deeper into the body. Common places and symptoms include:    Urinary tract: pain and burning when urinating, the need to urinate more often, fever    Blood: high fever, chills, nausea and vomiting, shortness of breath, confusion    Bone, muscle, or other tissue infections    Lungs: pneumonia in one or both lungs    Surgical wound infections    Heart: Infection of the lining of the heart called endocarditis  Who s at risk?  Anyone can get a staph infection. But certain factors make infection more likely, including:    A current or recent stay in the hospital    Living in a nursing home or long-term care facility or other crowded areas, like  barracks or group home    Taking antibiotics    Having certain health conditions, such as diabetes or HIV    Getting kidney dialysis    Sharing sports equipment, razors or other sharp objects  How does MRSA spread?  MRSA usually spreads through skin-to-skin contact, whether through contact with an infected person or on the hands of health care workers who work with infected patients. MRSA can also spread through contact with contaminated objects, such as cart handles and bedrails or shared towels or athletic equipment.  How is MRSA treated?  MRSA infections are usually treated with antibiotics. These may be in pill form or through a vein (intravenous or IV). If you have a skin abscess, we may drain it.   Patients who test positive for MRSA colonization may go through a process called decolonization. We apply a topical  antibiotic inside your nose to kill the bacteria. We may also wash your skin with a special soap.  Controlling and Preventing MRSA: In the hospital  Hospitals and nursing homes control and prevent MRSA by doing the following:    Handwashing. This is the single most important way to prevent the spread of germs.    Protective clothing. Health care workers and visitors may wear gloves and a gown when entering the room of a patient with MRSA. They remove these items before leaving.    Avoid antibiotic overuse. Too much use can cause germs to resist some antibiotics.    Monitoring. Hospitals monitor the spread of MRSA and educate all staff on the best ways to prevent it.  What you can do as a patient    Ask all hospital staff to wash their hands before touching you. Don t be afraid to speak up!    Wash your own hands often with soap and warm water, or use an alcohol-based hand gel. This is especially important:    After using the bathroom    After touching a bandage    Before eating    Encourage family and friends to wash hands as well    If you need to have a test done, such as an X-ray, follow instructions from staff. You may need to change into a clean hospital gown and wash your hands just before leaving your room.  Controlling and Preventing MRSA: at Home  Patients:    Wash your hands often with soap and warm water, or use an alcohol-based hand gel. This is especially important:    After using the bathroom    After touching a bandage    Before eating    Follow instructions we give you for caring for surgical wounds or any tubes that you have, such as a catheter or dialysis port.    Keep cuts and scrapes clean and covered until they heal.    Do not share towels, razors, clothing or athletic equipment.    Take all antibiotics your doctor prescribed. Don t take half doses or stop the antibiotics, even if you feel better.  Caregivers:    Wash your hands well with soap and warm water before and after any contact with the  patient. You can use an alcohol-based hand gel if your hands aren t visibly dirty.    Wear disposable gloves when changing a bandage, touching an infected wound or handling dirty laundry. Throw away the gloves after each use. Then wash your hands well.    Change the patient s bedding once a week, or more often if it s soiled with feces or body fluids. Wash and dry it alone in a washer and dryer using the warmest temperatures recommended on the labels. Use liquid bleach during the wash cycle if the label permits.    Clean surfaces like tabletops and sinks really well. Keep bathrooms, toilets and bedside commodes clean. A mixture of 1/4 cup of bleach to 1 quart of water works great for this.  Understanding drug resistance  Hard-to-kill (resistant) germs, such as MRSA, develop when antibiotics are taken longer than needed. They can also develop when antibiotics are taken when they aren't needed, or are not taken exactly as directed. This is because any germs that survive treatment with an antibiotic can multiply and thus create more resistant germs. The more often antibiotics are used, the more chances resistant germs have to develop. This is why your care team may not prescribe antibiotics unless he or she is certain that they are needed.  Date Last Reviewed: 10/1/2016    3531-0023 [employer]. [address] [place] [address], [place] [address]. All rights reserved. This information is not intended as a substitute for professional medical care. Always follow your healthcare professional's instructions.  This information has been modified by your health care provider with permission from the publisher.                When You Have Pneumonia  You have been diagnosed with pneumonia. This is a serious lung infection. Most cases of pneumonia are caused by bacteria. Pneumonia most often occurs in older adults, young children, and people with chronic health problems.  Home care    Take your medicine exactly as directed.  Don t skip doses. Continue taking your antibiotics as until they are all gone, even if you start to feel better. This will prevent the pneumonia from coming back.    Drink at least 8 glasses of water daily, unless directed otherwise. This helps to loosen and thin secretions so that you can cough them up.    Use a cool-mist humidifier in your bedroom. Be sure to clean the humidifier daily.    Don t use medicines to suppress your cough unless your cough is dry, painful, or interferes with your sleep. Coughing up mucus is normal. You may use an expectorant if your healthcare provider says it s okay.    You can use warm compresses or a heating pad on the lowest setting to relieve chest discomfort. Use several times a day for 15-20 minutes at a time. To prevent injury to your skin, set the temperature to warm, not hot. Don t put the compress or pad directly on your skin. Make certain it has a cover or wrap it in a towel. This is to prevent skin burns.    Get plenty of rest until your fever, shortness of breath, and chest pain go away.    Plan to get a flu shot every year. The flu is a common cause of pneumonia. Getting a flu shot every year can help prevent both the flu and pneumonia.  Getting the pneumococcal vaccine  Talk with your healthcare provider about getting the pneumococcalvaccine. Pneumococcal pneumonia is caused by bacteria that spread from person to person. It can cause minor problems, such as ear infections. But it can also turn into life-threatening illnesses of the lungs (pneumonia), the covering of the brain and spinal cord (meningitis), and the blood (bacteremia).  Children under 2 years of age, adults over age 65, people with certain health conditions, and smokers are at the highest risk of pneumococcal disease. This vaccine can help prevent pneumococcal disease in both adults and children. Some people should not have the vaccine. Make sure to ask your healthcare provider if you should have the  "vaccine.   Follow-up care  Make a follow-up appointment as directed by our staff.  When to call your healthcare provider  Call your healthcare provider if you have any of the following:    Fever above 100.4 F (38 C), or as directed by your healthcare provider    Mucus from the lungs (sputum) that s yellow, green, bloody, or smells bad    A large amount of sputum    Vomiting    Symptoms that get worse  When to call 911  Call 911 right away if you have any of the following:    Chest pain    Trouble breathing    Blue lips or fingernails   Date Last Reviewed: 11/1/2016 2000-2017 [employer]. [address] [place] [address]. All rights reserved. This information is not intended as a substitute for professional medical care. Always follow your healthcare professional's instructions.              Preventing Pneumonia  Your follow-up appointment is on (date)________________ at (time)________   What is pneumonia?  Pneumonia is a serious lung infection. It occurs when bacteria grows in your lungs. The bacteria may come from people coughing on you or you touching a surface they coughed on. The risk is higher for people who are older or have chronic health problems.  Your discharge instructions include:    \"Preventing Pneumonia\" (this brochure)    Pneumonia action plan (one-page)    Post-hospital visit with your doctor (one-page)    Inhaler resource (one-page)    Your nurse will review your after visit summary (AVS) with you. He or she will answer any questions you have about new medicines or other health concerns.  Supporting you after your hospital stay  Before you leave the hospital, we will discuss what help you may need when you return home. We are here to support you and your family. We can find the right resources and help you plan for a successful recovery.  Special treatments at home  Some treatments that you received in the hospital may continue at home. They may include:    Inhaled " medicines to open your airways    Oxygen for easier breathing    Methods to loosen and drain mucus from your lungs.    postural drainage (lying with your chest lower than your stomach)    using a hand-held (PEP) device.    Productive coughing    Take a slow, deep breath in through your nose. Lean forward slightly. Cough 2 short, sharp coughs into a tissue. As you cough, gently push your arm against your belly.    Relax for a moment, then repeat.  Taking your medicine  Keep taking your antibiotic medicine until it's gone, even if you feel better.  What barriers do you have in taking your medicines? _______________________________  Staying active  Walking, even when you're short of breath, helps you stay independent and strong. Walking can prevent pneumonia and blood clots.   Preventing pneumonia  Here are some things you can do to reduce your chances of getting pneumonia. (Check off the 3 things you think you can do better.)    Avoid people with flu or cold symptoms.    Finish antibiotics.    Get shots for pneumonia and influenza (flu).    Call your doctor at the first sign of a fever or worsening cough.    Wash hands often.    Stop smoking.     Do deep breathing and coughing exercises.  What barriers do you have to doing these things? ________________________________________  ________________________________________  ________________________________________  ________________________________________  Day 1 goal: Fill your prescriptions.   Day 2 goal: Start doing the 3 things to prevent pneumonia.   Day 3 to 7 goal: Go to your scheduled appointment. Complete your course of antibiotics.  Do you have any barriers that may stop you from going to your follow-up visit?  _____________________________________  _____________________________________  Remember!  It is important for you to keep your follow-up appointment with your doctor. Even if you feel better, your doctor can provide care specific to your health and address  the reasons you were in the hospital. Please bring your discharge materials.  When to call the doctor  You should begin to feel better within 48-72 hours. Call your doctor if you notice:    Fever rises or returns    Shortness of breath worsens    Mucus color changes or increases in volume    Coughing becomes more severe    Lips and fingertips turn blue    Side effects from your medicine.  If symptoms don't improve, call the clinic:   Clinic______________ Phone #______________  For informational purposes only. Not to replace the advice of your health care provider.   Copyright   2014 [employer]. All rights reserved. [employer] 188824 - 09/14.

## 2018-04-09 NOTE — LETTER
Transition Communication Hand-off for Care Transitions to Next Level of Care Provider    Hand-off for Care Transitions to Next Level of Care Provider  Name: Berenice Chaudhari  : 1940  MRN #: [id number]  Reason for Hospitalization:  Pneumonia [J18.9]  Admit Date/Time: 2018 10:11 PM  Discharge Date: 2018    Reason for Communication Hand-off Referral: Admission diagnoses: PN    Discharge Plan:  Discharged to: Home with support                   Patient agreeable to post-hospital support suggestions:  Yes  Discharge Plan:  Home with support           Patient is on new medications:   Yes           MTM follow up recommended: Yes           Tel-[employer] program:  Ineligible  Follow-up specialty is recommended: Yes. Follow up with the Anticoagulation Clinic as scheduled.   Follow-up plan:  Future Appointments  Date Time Provider Department Center   2018 11:00 AM Nelly Pearl MD [facility]   2018 11:30 AM  [facility]     Any outstanding tests or procedures:  No.       Referrals     Future Labs/Procedures    Med Therapy Manage Referral     Comments:    Your provider has referred you to: **[facility] Scheduling (numerous locations) [phone]   [email or web address]    Reason for Referral: 10+ prescription medications    The [facility] department will contact you to schedule an appointment.  You may also schedule the appointment by calling [phone].  For [facility] patients, please call [phone] to confirm/schedule your appointment on the next business day.    This service is designed to help you get the most from your medications.  A specially trained Pharmacist will work closely with you and your providers to solve any questions, concerns, issues or problems related to your medications.    Please bring all of your prescription and non-prescription  medications (such as vitamins, over-the-counter medications, and herbals) or a detailed medication list to your appointment.    If you have a glucose meter or other home monitoring information, please also bring this to your appointment (i.e. blood glucose log, blood pressure log, pain log, etc.).          Key Recommendations:  Patient was admitted for healthcare acquired pneumonia. She is agreeable to recommendations from the PNA action plan. Her  is her caretaker and transports her to appointments due to being wheelchair bound at baseline. She was able to discharge home without supplemental oxygen. Assess for continued improvement of her PNA. Also, assess for compliance with completing all of her prescribed antibiotic.     Communicated handoff via [employer] [facility] to Dr. Nelly Pearl's CC at [facility].      Any Harley RN, BSN, CTS  [facility]  [phone]    AVS/Discharge Summary is the source of truth; this is a helpful guide for improved communication of patient story

## 2018-04-09 NOTE — IP AVS SNAPSHOT
[facility] [address] [facility]    [address]    [facility] [address]    Phone:  [phone]    Fax:  [phone]                                       After Visit Summary   4/9/2018    Berenice Chaudhari    MRN: [id number]           After Visit Summary Signature Page     I have received my discharge instructions, and my questions have been answered. I have discussed any challenges I see with this plan with the nurse or doctor.    ..........................................................................................................................................  Patient/Patient Representative Signature      ..........................................................................................................................................  Patient Representative Print Name and Relationship to Patient    ..................................................               ................................................  Date                                            Time    ..........................................................................................................................................  Reviewed by Signature/Title    ...................................................              ..............................................  Date                                                            Time

## 2018-04-09 NOTE — LETTER
Transition Communication Hand-off for Care Transitions to Next Level of Care Provider    Name: Berenice Chaudhari  : 1940  MRN #: [id number]  Primary Care Provider: Nelly Pearl  Primary Care MD Name: Dr. Nelly Pearl  Primary Clinic: [address] [place] [address] [address] [place] [address]  Primary Care Clinic Name: [facility]  Reason for Hospitalization:  Pneumonia [J18.9]  Admit Date/Time: 2018 10:11 PM  Discharge Date: ***  Payor Source: Payor: BCBS / Plan: BCBS OUT OF STATE / Product Type: Indemnity /     Key Recommendations:      Jenise Harley    AVS/Discharge Summary is the source of truth; this is a helpful guide for improved communication of patient story

## 2018-04-10 PROBLEM — J18.9 HCAP (HEALTHCARE-ASSOCIATED PNEUMONIA): Status: ACTIVE | Noted: 2018-04-10

## 2018-04-10 LAB
INTERPRETATION ECG - MUSE: NORMAL
LACTATE BLD-SCNC: 1.4 MMOL/L (ref 0.7–2)

## 2018-04-10 PROCEDURE — 25000132 ZZH RX MED GY IP 250 OP 250 PS 637: Performed by: INTERNAL MEDICINE

## 2018-04-10 PROCEDURE — 40000275 ZZH STATISTIC RCP TIME EA 10 MIN

## 2018-04-10 PROCEDURE — 94640 AIRWAY INHALATION TREATMENT: CPT

## 2018-04-10 PROCEDURE — 25000128 H RX IP 250 OP 636: Performed by: EMERGENCY MEDICINE

## 2018-04-10 PROCEDURE — 25000125 ZZHC RX 250

## 2018-04-10 PROCEDURE — 25000128 H RX IP 250 OP 636: Performed by: INTERNAL MEDICINE

## 2018-04-10 PROCEDURE — 12000000 ZZH R&B MED SURG/OB

## 2018-04-10 PROCEDURE — 36415 COLL VENOUS BLD VENIPUNCTURE: CPT | Performed by: INTERNAL MEDICINE

## 2018-04-10 PROCEDURE — 99223 1ST HOSP IP/OBS HIGH 75: CPT | Mod: AI | Performed by: INTERNAL MEDICINE

## 2018-04-10 PROCEDURE — 94640 AIRWAY INHALATION TREATMENT: CPT | Mod: 76

## 2018-04-10 PROCEDURE — 25000125 ZZHC RX 250: Performed by: INTERNAL MEDICINE

## 2018-04-10 PROCEDURE — 83605 ASSAY OF LACTIC ACID: CPT | Performed by: INTERNAL MEDICINE

## 2018-04-10 PROCEDURE — 40000274 ZZH STATISTIC RCP CONSULT EA 30 MIN

## 2018-04-10 RX ORDER — LIDOCAINE 50 MG/G
3 PATCH TOPICAL DAILY PRN
Status: ON HOLD | COMMUNITY
End: 2019-01-01

## 2018-04-10 RX ORDER — SODIUM CHLORIDE 9 MG/ML
INJECTION, SOLUTION INTRAVENOUS CONTINUOUS
Status: DISCONTINUED | OUTPATIENT
Start: 2018-04-10 | End: 2018-04-10

## 2018-04-10 RX ORDER — DILTIAZEM HYDROCHLORIDE 120 MG/1
120 CAPSULE, EXTENDED RELEASE ORAL EVERY EVENING
COMMUNITY
End: 2018-01-01 | Stop reason: ALTCHOICE

## 2018-04-10 RX ORDER — ACETAMINOPHEN 325 MG/1
650 TABLET ORAL EVERY 4 HOURS PRN
Status: DISCONTINUED | OUTPATIENT
Start: 2018-04-10 | End: 2018-04-12 | Stop reason: HOSPADM

## 2018-04-10 RX ORDER — METOPROLOL SUCCINATE 50 MG/1
50 TABLET, EXTENDED RELEASE ORAL EVERY EVENING
Status: DISCONTINUED | OUTPATIENT
Start: 2018-04-10 | End: 2018-04-12 | Stop reason: HOSPADM

## 2018-04-10 RX ORDER — PREDNISONE 20 MG/1
20 TABLET ORAL DAILY
Status: DISCONTINUED | OUTPATIENT
Start: 2018-04-11 | End: 2018-04-12 | Stop reason: HOSPADM

## 2018-04-10 RX ORDER — PANTOPRAZOLE SODIUM 40 MG/1
40 TABLET, DELAYED RELEASE ORAL
Status: DISCONTINUED | OUTPATIENT
Start: 2018-04-10 | End: 2018-04-12 | Stop reason: HOSPADM

## 2018-04-10 RX ORDER — ONDANSETRON 4 MG/1
4 TABLET, ORALLY DISINTEGRATING ORAL EVERY 6 HOURS PRN
Status: DISCONTINUED | OUTPATIENT
Start: 2018-04-10 | End: 2018-04-12 | Stop reason: HOSPADM

## 2018-04-10 RX ORDER — WARFARIN SODIUM 2.5 MG/1
2.5 TABLET ORAL
Status: COMPLETED | OUTPATIENT
Start: 2018-04-10 | End: 2018-04-10

## 2018-04-10 RX ORDER — LEVOFLOXACIN 5 MG/ML
750 INJECTION, SOLUTION INTRAVENOUS EVERY 24 HOURS
Status: DISCONTINUED | OUTPATIENT
Start: 2018-04-10 | End: 2018-04-12 | Stop reason: HOSPADM

## 2018-04-10 RX ORDER — ONDANSETRON 2 MG/ML
4 INJECTION INTRAMUSCULAR; INTRAVENOUS EVERY 6 HOURS PRN
Status: DISCONTINUED | OUTPATIENT
Start: 2018-04-10 | End: 2018-04-12 | Stop reason: HOSPADM

## 2018-04-10 RX ORDER — MEROPENEM 1 G/1
1 INJECTION, POWDER, FOR SOLUTION INTRAVENOUS EVERY 12 HOURS
Status: DISCONTINUED | OUTPATIENT
Start: 2018-04-10 | End: 2018-04-10

## 2018-04-10 RX ORDER — LIDOCAINE 4 G/G
3 PATCH TOPICAL DAILY PRN
Status: DISCONTINUED | OUTPATIENT
Start: 2018-04-10 | End: 2018-04-12 | Stop reason: HOSPADM

## 2018-04-10 RX ORDER — NALOXONE HYDROCHLORIDE 0.4 MG/ML
.1-.4 INJECTION, SOLUTION INTRAMUSCULAR; INTRAVENOUS; SUBCUTANEOUS
Status: DISCONTINUED | OUTPATIENT
Start: 2018-04-10 | End: 2018-04-12 | Stop reason: HOSPADM

## 2018-04-10 RX ORDER — GABAPENTIN 600 MG/1
1200 TABLET ORAL 3 TIMES DAILY
Status: DISCONTINUED | OUTPATIENT
Start: 2018-04-10 | End: 2018-04-12 | Stop reason: HOSPADM

## 2018-04-10 RX ORDER — BUPROPION HYDROCHLORIDE 150 MG/1
300 TABLET ORAL EVERY MORNING
Status: DISCONTINUED | OUTPATIENT
Start: 2018-04-10 | End: 2018-04-12 | Stop reason: HOSPADM

## 2018-04-10 RX ORDER — NORTRIPTYLINE HCL 25 MG
50 CAPSULE ORAL AT BEDTIME
Status: DISCONTINUED | OUTPATIENT
Start: 2018-04-10 | End: 2018-04-12 | Stop reason: HOSPADM

## 2018-04-10 RX ORDER — VENLAFAXINE HYDROCHLORIDE 150 MG/1
150 CAPSULE, EXTENDED RELEASE ORAL EVERY EVENING
COMMUNITY
End: 2018-01-01

## 2018-04-10 RX ORDER — PRAVASTATIN SODIUM 10 MG
10 TABLET ORAL AT BEDTIME
Status: DISCONTINUED | OUTPATIENT
Start: 2018-04-10 | End: 2018-04-12 | Stop reason: HOSPADM

## 2018-04-10 RX ORDER — GABAPENTIN 600 MG/1
1200 TABLET ORAL 3 TIMES DAILY
COMMUNITY
End: 2018-01-01

## 2018-04-10 RX ORDER — DILTIAZEM HYDROCHLORIDE 120 MG/1
120 CAPSULE, COATED, EXTENDED RELEASE ORAL EVERY EVENING
Status: DISCONTINUED | OUTPATIENT
Start: 2018-04-10 | End: 2018-04-12 | Stop reason: HOSPADM

## 2018-04-10 RX ORDER — VENLAFAXINE HYDROCHLORIDE 37.5 MG/1
150 TABLET, EXTENDED RELEASE ORAL EVERY EVENING
Status: DISCONTINUED | OUTPATIENT
Start: 2018-04-10 | End: 2018-04-12 | Stop reason: HOSPADM

## 2018-04-10 RX ORDER — IPRATROPIUM BROMIDE AND ALBUTEROL SULFATE 2.5; .5 MG/3ML; MG/3ML
3 SOLUTION RESPIRATORY (INHALATION) EVERY 4 HOURS PRN
Status: DISCONTINUED | OUTPATIENT
Start: 2018-04-10 | End: 2018-04-12 | Stop reason: HOSPADM

## 2018-04-10 RX ORDER — METHYLPREDNISOLONE SODIUM SUCCINATE 40 MG/ML
40 INJECTION, POWDER, LYOPHILIZED, FOR SOLUTION INTRAMUSCULAR; INTRAVENOUS EVERY 8 HOURS
Status: DISCONTINUED | OUTPATIENT
Start: 2018-04-10 | End: 2018-04-10

## 2018-04-10 RX ORDER — MEROPENEM 1 G/1
1 INJECTION, POWDER, FOR SOLUTION INTRAVENOUS EVERY 8 HOURS
Status: DISCONTINUED | OUTPATIENT
Start: 2018-04-10 | End: 2018-04-12 | Stop reason: HOSPADM

## 2018-04-10 RX ORDER — IPRATROPIUM BROMIDE AND ALBUTEROL SULFATE 2.5; .5 MG/3ML; MG/3ML
3 SOLUTION RESPIRATORY (INHALATION)
Status: DISCONTINUED | OUTPATIENT
Start: 2018-04-10 | End: 2018-04-10

## 2018-04-10 RX ORDER — FUROSEMIDE 20 MG
20 TABLET ORAL DAILY
Status: DISCONTINUED | OUTPATIENT
Start: 2018-04-10 | End: 2018-04-12 | Stop reason: HOSPADM

## 2018-04-10 RX ADMIN — FUROSEMIDE 20 MG: 20 TABLET ORAL at 10:26

## 2018-04-10 RX ADMIN — MEROPENEM 1 G: 1 INJECTION, POWDER, FOR SOLUTION INTRAVENOUS at 11:16

## 2018-04-10 RX ADMIN — IPRATROPIUM BROMIDE AND ALBUTEROL SULFATE 3 ML: .5; 3 SOLUTION RESPIRATORY (INHALATION) at 07:33

## 2018-04-10 RX ADMIN — DILTIAZEM HYDROCHLORIDE 120 MG: 120 CAPSULE, COATED, EXTENDED RELEASE ORAL at 20:46

## 2018-04-10 RX ADMIN — IPRATROPIUM BROMIDE AND ALBUTEROL SULFATE 6 ML: .5; 3 SOLUTION RESPIRATORY (INHALATION) at 00:03

## 2018-04-10 RX ADMIN — PREGABALIN 150 MG: 100 CAPSULE ORAL at 10:26

## 2018-04-10 RX ADMIN — VENLAFAXINE HYDROCHLORIDE 150 MG: 37.5 TABLET, EXTENDED RELEASE ORAL at 20:46

## 2018-04-10 RX ADMIN — NORTRIPTYLINE HYDROCHLORIDE 50 MG: 25 CAPSULE ORAL at 21:26

## 2018-04-10 RX ADMIN — PANTOPRAZOLE SODIUM 40 MG: 40 TABLET, DELAYED RELEASE ORAL at 10:26

## 2018-04-10 RX ADMIN — IPRATROPIUM BROMIDE AND ALBUTEROL SULFATE 6 ML: 2.5; .5 SOLUTION RESPIRATORY (INHALATION) at 00:03

## 2018-04-10 RX ADMIN — METHYLPREDNISOLONE SODIUM SUCCINATE 125 MG: 125 INJECTION, POWDER, FOR SOLUTION INTRAMUSCULAR; INTRAVENOUS at 00:03

## 2018-04-10 RX ADMIN — PREGABALIN 150 MG: 100 CAPSULE ORAL at 20:46

## 2018-04-10 RX ADMIN — GABAPENTIN 1200 MG: 600 TABLET, FILM COATED ORAL at 10:26

## 2018-04-10 RX ADMIN — LEVOFLOXACIN 750 MG: 5 INJECTION, SOLUTION INTRAVENOUS at 20:45

## 2018-04-10 RX ADMIN — METHYLPREDNISOLONE SODIUM SUCCINATE 40 MG: 40 INJECTION, POWDER, FOR SOLUTION INTRAMUSCULAR; INTRAVENOUS at 09:30

## 2018-04-10 RX ADMIN — BUPROPION HYDROCHLORIDE 300 MG: 150 TABLET, FILM COATED, EXTENDED RELEASE ORAL at 10:25

## 2018-04-10 RX ADMIN — MEROPENEM 1 G: 1 INJECTION, POWDER, FOR SOLUTION INTRAVENOUS at 00:20

## 2018-04-10 RX ADMIN — MICONAZOLE NITRATE: 2 POWDER TOPICAL at 14:35

## 2018-04-10 RX ADMIN — GABAPENTIN 1200 MG: 600 TABLET, FILM COATED ORAL at 21:26

## 2018-04-10 RX ADMIN — WARFARIN SODIUM 2.5 MG: 2.5 TABLET ORAL at 17:53

## 2018-04-10 RX ADMIN — LEVOFLOXACIN 750 MG: 5 INJECTION, SOLUTION INTRAVENOUS at 01:19

## 2018-04-10 RX ADMIN — SODIUM CHLORIDE: 9 INJECTION, SOLUTION INTRAVENOUS at 02:54

## 2018-04-10 RX ADMIN — MEROPENEM 1 G: 1 INJECTION, POWDER, FOR SOLUTION INTRAVENOUS at 20:45

## 2018-04-10 RX ADMIN — GABAPENTIN 1200 MG: 600 TABLET, FILM COATED ORAL at 16:47

## 2018-04-10 RX ADMIN — METOPROLOL SUCCINATE 50 MG: 50 TABLET, EXTENDED RELEASE ORAL at 20:46

## 2018-04-10 RX ADMIN — PRAVASTATIN SODIUM 10 MG: 10 TABLET ORAL at 21:27

## 2018-04-10 ASSESSMENT — ACTIVITIES OF DAILY LIVING (ADL)
ADLS_ACUITY_SCORE: 23
ADLS_ACUITY_SCORE: 24
ADLS_ACUITY_SCORE: 24
ADLS_ACUITY_SCORE: 23
ADLS_ACUITY_SCORE: 24

## 2018-04-10 NOTE — H&P
[facility]    History and Physical  Hospitalist       Date of Admission:  4/9/2018  Date of Service (when I saw the patient): 04/10/18    Assessment & Plan   Berenice Chaudhari is a 78 year old female patient with extensive past medical history including diastolic congestive heart failure, atrial flutter on Coumadin, hypertension, hyperlipidemia, GERD, kidney disease, obesity, recent admission to this facility for febrile illness, bronchitis, was brought to emergency room with a complaint of fever and shortness of breath.  Laboratory workup showed elevated WBC at 23.2.  Lactic acid is normal.  Chest x-ray showed infiltrates in the left middle and lower lobe suspicious for pneumonia.  Patient does have multiple antibiotic allergies.  She was started on IV Levaquin and meropenem for healthcare associated pneumonia.  She was admitted for further management.    1.  Healthcare associated pneumonia: Patient was recently admitted in March for acute febrile illness.  Currently she does have fever, leukocytosis, radiologic evidence of pneumonia.  --Started on IV Levaquin and meropenem which will continue.  She does have allergies to vancomycin and clindamycin.  She is on maintenance minocycline at home for remote history of staph infection.  Will continue minocycline for now.  Will consult infectious disease for further evaluation and recommendations regarding antibiotics.  Cultures collected.  --We will put her on gentle hydration with normal saline at 60 mL/h.  Will closely monitor for fluid overload.  --Will continue neb treatment    2.  Possible undiagnosed COPD: Patient does have past history of smoking.  She is wheezing.  Will put her on neb treatment and IV Solu-Medrol.  Continue oxygen supplement.    2.  History of diastolic congestive heart failure: No clear evidence of CHF exacerbation at this moment.  Will closely monitor while on IV fluid    3.  Atrial flutter, on anticoagulation with Coumadin: INR  therapeutic.  Will consult pharmacy for Coumadin dosing    4.  Hypertension: Blood pressure on the high side.  Will resume her PTA meds once reviewed by pharmacy    5.  Hyperlipidemia: Resume PTA medication once pharmacy reviews.    6.  Chronic kidney disease: Creatinine at baseline.  We will continue to monitor    We will admit patient to medical floor as inpatient    DVT Prophylaxis: Coumadin  Code Status: DNR / DNI.  CODE STATUS was discussed with his family and patient herself and she does not want to be intubated or resuscitated in case of cardiopulmonary arrest.    Disposition: Expected discharge: Anticipate more than 2 nights of hospital course.    Zaynab Stephenson MD    Primary Care Physician   Nelly Pearl    Chief Complaint   Fever and cough  History is obtained from the patient and family    History of Present Illness   Berenice Chaudhari is a 78 year old female patient with extensive past medical history including diastolic congestive heart failure, atrial flutter on Coumadin, hypertension, hyperlipidemia, GERD, kidney disease, obesity, recent admission to this facility for febrile illness, bronchitis, was brought to emergency room with a complaint of fever and shortness of breath.  History was mainly obtained from her .  Her  stated that she was admitted to this facility from 3/10-3/12.  She was discharged home.  Patient started to have fever and cough yesterday.  She also had associated shortness of breath and wheezing.  She has no leg swelling.  Her symptoms continued to get worse and she was brought by EMS to emergency room for evaluation.  Patient denies abdominal pain, nausea, vomiting, diarrhea, urinary symptoms.  On arrival to emergency room, her vital signs showed pressure 100.6, pulse 95, blood pressure 177/78, oxygen saturation 93% on room air.  Laboratory workup revealed sodium 137, creatinine 1.29, lactic acid 1.6, WBC 23.2, hemoglobin 11.4.  Chest x-ray was done and showed  infiltrate in the left mid and lower lung consistent with pneumonia.  She was put on oxygen supplement, neb treatment, IV Levaquin and meropenem, IV Solu-Medrol.  She was admitted for further management.    Past Medical History    I have reviewed this patient's medical history and updated it with pertinent information if needed.   Past Medical History:   Diagnosis Date     Allergic rhinitis, cause unspecified      Anxiety 6/13/2013     Arrhythmia     atrial flutter     Atrial flutter with rapid ventricular response (H) 8/8/2017     Cellulitis and abscess of leg, except foot recurrent , both legs    begins with fever, nausea, diarrhea, vomiting & & the cellulitis may be visible a day or 2 later     Chronic pain     On chronic fentanyl patch.     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  less likely = raction to vancomycin      Essential hypertension with goal blood pressure less than 140/90 1/12/2005     Problem list name updated by automated process. Provider to review     Generalized osteoarthrosis, unspecified site     films 9/04: L hhip bad; both knees mod severe medial OA     GERD (gastroesophageal reflux disease) 11/15/2013     Herpes zoster without mention of complication 9/03    L shoulder; still has neuralgia     Hyperlipidemia LDL goal <100 9/16/2011     Methicillin susceptible Staphylococcus aureus septicemia (H) after L hip injection 205    ARDS, renal failure, staph sepsis after a hip joint injection     Obesity 1/12/2005     Problem list name updated by automated process. Provider to review     Other chronic pain      Other diseases of lung, not elsewhere classified 07/93    Interstitial lung process     Pneumonia, organism unspecified(486)      Urticaria, unspecified 07/93    Diffuse severe  (hospitalized).  (+) skin biopsy by Dr. Figueroa       Past Surgical History   I have reviewed this patient's surgical history and updated it with pertinent information if needed.  Past Surgical History:   Procedure Laterality Date     BIOPSY       C NONSPECIFIC PROCEDURE      Extensive oral (dental) surgery     C NONSPECIFIC PROCEDURE      Remote T&A     C NONSPECIFIC PROCEDURE  1/03    LAP CHOLY     C NONSPECIFIC PROCEDURE      L hip I + D x 2  4/05     C NONSPECIFIC PROCEDURE  2/05    colonoscopy     C NONSPECIFIC PROCEDURE      L hip I + D several other times      CATARACT IOL, RT/LT Right 2012     CHOLECYSTECTOMY       ENT SURGERY       ESOPHAGOSCOPY, GASTROSCOPY, DUODENOSCOPY (EGD), COMBINED N/A 11/9/2017    Procedure: COMBINED ESOPHAGOSCOPY, GASTROSCOPY, DUODENOSCOPY (EGD);  ESOPHAGOSCOPY, GASTROSCOPY, DUODENOSCOPY (EGD) (fv)  ;  Surgeon: Buck Ribeiro MD;  Location: [facility] OR     PHACOEMULSIFICATION CLEAR CORNEA WITH STANDARD INTRAOCULAR LENS IMPLANT Left 3/10/2016    Procedure: PHACOEMULSIFICATION CLEAR CORNEA WITH STANDARD INTRAOCULAR LENS IMPLANT;  Surgeon: Dwight Metcalf MD;  [facility]       Prior to Admission Medications   Prior to Admission Medications   Prescriptions Last Dose Informant Patient Reported? Taking?   CEPHALEXIN PO   Yes No   Sig: Take 500 mg by mouth 3 times daily as needed cellulitis   Multiple Vitamins-Minerals (CENTRUM SILVER) per tablet   Yes No   Sig: Take 1 tablet by mouth daily   Pantoprazole Sodium (PROTONIX PO)   Yes No   Sig: Take 40 mg by mouth every morning (before breakfast)   buPROPion (WELLBUTRIN XL) 300 MG 24 hr tablet   No No   Sig: Take 1 tablet (300 mg) by mouth every morning   diltiazem (CARDIZEM CD/CARTIA XT) 120 MG 24 hr capsule   No No   Sig: Take 1 capsule (120 mg) by mouth daily   Patient taking differently: Take 120 mg by mouth every evening    fluticasone (FLONASE) 50 MCG/ACT spray   No No   Sig: Spray 2 sprays into both nostrils daily    furosemide (LASIX) 20 MG tablet   No No   Sig: Take 1 tablet (20 mg) by mouth daily   gabapentin (NEURONTIN) 600 MG tablet   No No   Sig: Take 1 tablet (600 mg) by mouth 3 times daily   lidocaine (LIDODERM) 5 % Patch   No No   Sig: Apply up to 3 patches to painful area at once for up to 12 h within a 24 h period.  Remove after 12 hours.   Patient taking differently: as needed Apply up to 3 patches to painful area at once for up to 12 h within a 24 h period.  Remove after 12 hours.   metoprolol (TOPROL-XL) 50 MG 24 hr tablet   No No   Sig: Take 1 tablet (50 mg) by mouth daily   minocycline (MINOCIN/DYNACIN) 50 MG capsule   No No   Sig: Take 1 capsule (50 mg) by mouth 2 times daily   nortriptyline (PAMELOR) 25 MG capsule   No No   Sig: Take 2 capsules (50 mg) by mouth At Bedtime   pravastatin (PRAVACHOL) 10 MG tablet   No No   Sig: Take 1 tablet (10 mg) by mouth At Bedtime Needs labs before next refill   pregabalin (LYRICA) 150 MG capsule   No No   Sig: Take 1 capsule (150 mg) by mouth 2 times daily   venlafaxine (EFFEXOR-XR) 150 MG 24 hr capsule   No No   Sig: TAKE 1 CAPSULE(150 MG) BY MOUTH DAILY   warfarin (COUMADIN) 2.5 MG tablet   Yes No   Sig: Take 2 tablets (5 mg) by mouth daily For the next 2 days and get INR checked at the INR clinic for further dosing of Coumadin.  And 2.5mg qd ThFSS      Facility-Administered Medications: None     Allergies   Allergies   Allergen Reactions     Ceftriaxone Anaphylaxis and Rash     Rocephin given. Developed rash to back and abd, awaiting to see if it improves with d/c of rocephin.        Nafcillin Rash     diffuse severe rash in hospital 2/05     Penicillins Anaphylaxis     Vancomycin Anaphylaxis and Rash     Codeine Fatigue     Sleeps continuously     Clindamycin Rash     received information from patient     Zithromax [Azithromycin] Rash       Social History   I have reviewed this patient's social history and updated it with pertinent information if needed. Berenice Chaudhari   reports that she quit smoking about 28 years ago. Her smoking use included Cigarettes. She started smoking about 51 years ago. She has a 44.00 pack-year smoking history. She has never used smokeless tobacco. She reports that she does not drink alcohol or use illicit drugs.    Family History   I have reviewed this patient's family history and updated it with pertinent information if needed.   Family History   Problem Relation Age of Onset     Myocardial Infarction Father 63       Review of Systems   The 10 point Review of Systems is negative other than noted in the HPI or here.  Fever, cough    Physical Exam   Temp: 102.5  F (39.2  C) Temp src: Oral BP: 142/51 Pulse: 95 Heart Rate: 92 Resp: 23 SpO2: 90 % O2 Device: None (Room air)    Vital Signs with Ranges  Temp:  [100.6  F (38.1  C)-102.5  F (39.2  C)] 102.5  F (39.2  C)  Pulse:  [95] 95  Heart Rate:  [90-93] 92  Resp:  [17-28] 23  BP: (122-188)/() 142/51  SpO2:  [90 %-99 %] 90 %  347 lbs 14.17 oz    GEN:  Alert, oriented x 3, in mild to moderate respiratory distress, on oxygen  HEENT:  Normocephalic/atraumatic, no scleral icterus, no nasal discharge, mouth moist.  CV:  Regular rate and rhythm, no murmur or JVD.  S1 + S2 noted, no S3 or S4.  LUNGS: Has bilateral wheezes.  No crackles or rales.  Symmetric chest rise on inhalation noted.  ABD:  Active bowel sounds, soft, non-tender/non-distended.  No rebound/guarding/rigidity.  EXT:  No edema or cyanosis. Hands/feet warm to touch with good signs of peripheral perfusion.  No joint synovitis noted.  SKIN:  Dry to touch, no exanthems noted in the visualized areas.  NEURO:  Symmetric muscle strength, sensation to touch grossly intact.  No new focal deficits appreciated.    Data   Data reviewed today:  I personally reviewed chest x-ray shows infiltrates on the left side her lung  suspicious for pneumonia    Recent Labs  Lab 04/09/18  2240 04/03/18  1456   WBC 23.2*  --    HGB 11.4*  --    MCV 91  --      --     INR 2.26* 3.7     --    POTASSIUM 4.3  --    CHLORIDE 103  --    CO2 28  --    BUN 21  --    CR 1.29*  --    ANIONGAP 6  --    SHILOH 8.8  --    *  --    ALBUMIN 3.0*  --    PROTTOTAL 7.9  --    BILITOTAL 0.7  --    ALKPHOS 109  --    ALT 38  --    AST 33  --        Recent Results (from the past 24 hour(s))   XR Chest 2 Views    Narrative    XR CHEST 2 VW  4/9/2018 11:19 PM     HISTORY: Fever, cough.    COMPARISON: 3/10/2018.    FINDINGS: The heart is enlarged without pulmonary edema. There is  infiltrate in the left mid and lower lung. Allowing for low volumes,  the right lung appears clear. No pneumothorax or pleural effusion.      Impression    IMPRESSION: Left-sided pneumonia.

## 2018-04-10 NOTE — PLAN OF CARE
Problem: Patient Care Overview  Goal: Plan of Care/Patient Progress Review  Outcome: Improving  Pt a/o x4 up with ray morrell. Pt LS diminshed, on RA sating 97%. Per Dr. Alas d/c solumedrol pt is not wheezing. BM Monday evening. Pt cont on IV levaquine and Merrem. Pt afebrile this am. BP elevated at 151/53 prior to am meds. Pt has no c/o of pain.     18:50. Pt up to commode with assist of ray morrell. Pt had large brown/formed stool and urinated.

## 2018-04-10 NOTE — PROGRESS NOTES
Admitted earlier.  H&P reviewed.  Seen and examined.  Nursing service at bedside during exam.  Berenice is found sitting on the hospital chair, no ongoing complaints, minimal coughing spells and was able to sleep last night.  No reported events of nausea, vomiting, chest pain, remained afebrile with stable hemodynamics.  She is not needing oxygen supplementation during this morning exam.  Wheezing is not apparent during auscultation.  No significant leg swelling.  Continue home regimen for diuretics, breathing treatments, stop IV fluids, convert IV Medrol to oral prednisone in the setting of resolved wheezing  Remained on IV antibiotics and pending infectious disease formal evaluation and consultation was requested during admission process.  Resume warfarin as per pharmacy dosing with prior history of atrial flutter on chronic oral anticoagulation at home.    Bishop

## 2018-04-10 NOTE — ED PROVIDER NOTES
"  History     Chief Complaint:  Fever    HPI   Berenice Chaudhari is a 78 year old female who presents to the emergency department today via EMS from home for evaluation of a fever. The patient reports around 1700 tonight, five hours prior to arrival, she had a fever of 104 with associated cough, congestion, and felt weak and \"out of it.\". With persistent symptoms, EMS was called and she presents to the ED for further evaluation. On their arrival, the patient was too weak to get out of bed herself. EMS report patient was febrile and given two tylenol en route. Initial O2 sat 96%, and patient placed on 2L nasal cannula for comfort. Last blood pressure 127/82. Upon presentation, patient states she feels \"pretty good.\" The patient denies shortness of breath, abdominal pain, urinary symptoms, vomiting, diarrhea, headache, chest pain, leg pain or swelling, and recent falls or injuries. Of note, per care everywhere, the patient was hospitalized a month ago from 3/10-3/12 for acute febrile illness secondary to acute exacerbation of chronic bronchitis versus bronchiectasis and acute encephalopathy.     Allergies:  Ceftriaxone  Nafcillin  Penicillins  Vancomycin  Codeine  Clindamycin  Zithromax [Azithromycin]     Medications:    warfarin (COUMADIN) 2.5 MG tablet  CEPHALEXIN PO  Pantoprazole Sodium (PROTONIX PO)  gabapentin (NEURONTIN) 600 MG tablet  pregabalin (LYRICA) 150 MG capsule  buPROPion (WELLBUTRIN XL) 300 MG 24 hr tablet  diltiazem (CARDIZEM CD/CARTIA XT) 120 MG 24 hr capsule  furosemide (LASIX) 20 MG tablet  metoprolol (TOPROL-XL) 50 MG 24 hr tablet  nortriptyline (PAMELOR) 25 MG capsule  pravastatin (PRAVACHOL) 10 MG tablet  venlafaxine (EFFEXOR-XR) 150 MG 24 hr capsule  minocycline (MINOCIN/DYNACIN) 50 MG capsule  fluticasone (FLONASE) 50 MCG/ACT spray  lidocaine (LIDODERM) 5 % Patch    Past Medical History:    Anxiety  Arrhythmia  Atrial flutter with rapid ventricular response  Chronic pain  Chronic renal " "disease  Contact dermatitis and other eczema  Erythroderma desquamativum  Hypertension  Generalized osteoarthrosis  GERD  Hyperlipidemia  Methicillin suspectible staphylococcus aureus septicemia  Obesity  Other disease of lung    Past Surgical History:    Biopsy  Extensive dental surgery  Tonsillectomy & Adenoidectomy   Lap cholecystectomy  Left hip I&D, several  Colonoscopy  Cataract iol, rt/lt  ENT surgery  EGD combined  Phacoemulsification clear cornea with standard intraocular lens implant    Family History:    MI    Social History:  The patient was alone.  Smoking Status: Former - From 1967 - 1990  Smokeless Tobacco: Never  Alcohol Use: No  Marital Status:        Review of Systems   Constitutional: Positive for fever.   HENT: Positive for congestion.    Respiratory: Positive for cough. Negative for shortness of breath.    Cardiovascular: Negative for leg swelling.   Gastrointestinal: Negative for abdominal pain and diarrhea.   Genitourinary: Negative for decreased urine volume, difficulty urinating, dysuria, hematuria and urgency.   Skin: Negative for wound.   Neurological: Positive for weakness. Negative for headaches.   Psychiatric/Behavioral: Positive for confusion (\"out of it\").   All other systems reviewed and are negative.    Physical Exam     Patient Vitals for the past 24 hrs:   BP Temp Temp src Pulse Heart Rate Resp SpO2 Weight   04/09/18 2342 - 102.5  F (39.2  C) Oral - - - - -   04/09/18 2338 - - - - 90 28 99 % -   04/09/18 2336 176/69 - - - - - - -   04/09/18 2324 - - - - - - 98 % -   04/09/18 2322 188/86 - - - - - - -   04/09/18 2305 122/71 - - - 90 24 94 % -   04/09/18 2245 177/78 - - - 93 27 93 % -   04/09/18 2221 (!) 136/103 100.6  F (38.1  C) Oral 95 - 22 95 % (!) 157.8 kg (347 lb 14.2 oz)     Physical Exam  Gen: alert  HEENT: PERRL, oropharynx clear  Neck: normal ROM  CV: RRR, no murmurs, 2+ distal pulses in all 4 extremities  Chest: no tenderness over the chest wall, Intertrigo but no " cellulitis  Pulm: Coarse breath sounds bilaterally, no wheezes  Abd: Soft, nontender  Back: no evidence of injury  MSK: no lower extremity edema, no calf tenderness  Skin: no rash  Neuro: alert, appropriate conversation and interaction    Emergency Department Course     ECG:  Indication: Fever  Completed at 2225.  Read at 2228.   Normal sinus rhythm  Right bundle branch block  Left posterior fascicular block  Bifascicular block  Possible inferior infarct, age undetermined  Abnormal ECG  Rate 96 bpm. WA interval 170. QRS duration 160. QT/QTc 378/477. P-R-T axes 81 130 13.    Imaging:  Radiology findings were communicated with the patient who voiced understanding of the findings.  XR Chest 2 Views   IMPRESSION: Left-sided pneumonia.  Report per radiology     Laboratory:  Laboratory findings were communicated with the patient who voiced understanding of the findings.  INR: 2.26 (H)   CMP: Creatinine 1.29 (H), Glucose 137 (H), GFR Estimate 40 (L), Albumin 3.0 (L) o/w WNL   Influenza A/B Antigen: Negative   Lactic Acid: 1.6  CBC: WBC 23.2 (H), HGB 11.4 (L) o/w WNL. ()   UA: AWNL    Urine culture: Pending  Blood cultures: Pending    Interventions:  2245 NS Bolus 250mL IV  2354 Tylenol 650mg PO  0003 Solu-Medrol 125mg IV  0003 DuoNeb 6mL Nebulization   0020 Meropenum 1g IV  0119 Levaquin 750mg IV    Emergency Department Course:  Nursing notes and vitals reviewed.  IV was inserted and blood was drawn for laboratory testing, results above.  The patient was sent for a XR Chest 2 Views while in the emergency department, results above.   The patient provided a urine sample here in the emergency department. This was sent for laboratory testing, findings above.  2220: I performed an exam of the patient as documented above.   2348: Patient rechecked and updated.   2354: I spoke with Dr. Stephenson of the hospitalist service regarding patient's presentation, findings, and plan of care.  Findings and plan explained to the Patient  who consents to admission. Discussed the patient with Dr. Stephenson, who will admit the patient to a med/surg bed for further monitoring, evaluation, and treatment.  I personally reviewed the laboratory and imaging results with the Patient and answered all related questions prior to admission.    Impression & Plan      Medical Decision Making:  The patient presents for fever and cough. She has a complex past medical history. Recent admission reviewed. The patient has significant leukocytosis noted. Lactic acid negative. Blood pressures actually slightly hypertensive. The remainder of her labs are stable. Patient's chest x-ray showed left lower lobe infiltrate and, given clinical picture I am concerned for health care associated pneumonia. Antibiotics ordered. Patient has a complex allergy history. Meropenum and Levaquin given based on allergy history. I did not feel that Levaquin was sufficiency coverage and therefore meropenum was added despite history of anaphylaxis reaction to penicillins. Urine is negative. Absolutely no abdominal pain or tenderness to suggest intraabdominal process. Patient developed coarse breath sounds and wheezing about two hours after presentation. DuoNeb and solu-medrol given. Will continue to monitor respiratory status. At this time, no evidence for respiratory failure.  Patient is anticoagulated with therapeutic INR and radiologic evidence of PNA therefore I did not feel that PE was likely. Patient discussed with Dr. Stephenson of the hospitalist service and admitted to a medical bed.     Diagnosis:    ICD-10-CM    1. Pneumonia J18.9      Disposition:  Admitted to med/surg    Scribe Disclosure:  ICharissa, am serving as a scribe at 10:28 PM on 4/9/2018 to document services personally performed by Margo Dennis MD based on my observations and the provider's statements to me.      4/9/2018   [facility] EMERGENCY DEPARTMENT         Margo Dennis,  MD  04/10/18 0353

## 2018-04-10 NOTE — CONSULTS
Consult Date:  04/10/2018      INFECTIOUS DISEASE CONSULTATION      REQUESTING PHYSICIAN:  Bladimir Odom MD      IMPRESSION:   1.  A 78-year-old female, extremely well known to me from multiple prior infection issues, now admitted with apparent acute sepsis, major fever, leukocytosis, cultures so far negative, some left lower lobe infiltrate, but no clinical pneumonia, cause of this syndrome is unclear.  Historically when this has happened, usually has ended up being leg cellulitis, but none evident at present.   2.  History of numerous episodes of sepsis, leg cellulitis and other infections historically, no major episodes in the last year, although was just hospitalized several weeks ago with apparent infection, possible respiratory.   3.  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   4.  Prior history of methicillin-resistant Staphylococcus aureus colonization, but no major associated infections in some time.   5.  LISTED ALLERGIES TO PENICILLIN, CEPHALOSPORINS, CLINDAMYCIN, ZITHROMAX, VANCOMYCIN.  She has tolerated carbapenems, daptomycin and in general has been able to tolerate the vancomycin despite the listed allergy.   6.  History of congestive heart failure   7.  History of mild chronic renal insufficiency.   8.  Chronic postherpetic neuralgia.   9.  Morbid obesity.   10.  Frequent abnormal urinalysis and positive urine cultures, but generally not true recurrent urinary tract infections.      RECOMMENDATIONS:   1.  Continue current broad coverage, but no particular commitment to long-term antibiotics until we are able to identify current source.   2.  Follow the legs, lungs, cultures and fever closely.      HISTORY OF PRESENT ILLNESS:  This 78-year-old female is seen in  consultation due to a new acute sepsis.  The patient is very well known to us.  Her long-term history has included prior infected total hip arthroplasty with an assumption of chronic long-term infection.  She is on chronic oral minocycline which she has tolerated well.  She was previously followed by Dr. Judge at [facility], but he has retired so she really has not been seeing anyone recently and has simply continued on the minocycline.  During her prior hospitalizations we have had some discussion about possibly stopping the antibiotic at some point, but given it has not been failing, simply continued to do it.      Prior infections have generally come down to being either the lungs or stasis-related leg cellulitis.  Several episodes have been obvious leg cellulitis eventually even if they did not look that way initially.  She recently was just hospitalized, had some fever and sepsis signs, but no positive cultures and no clear infection treated with a course of Levaquin.  She is now readmitted with major acute fever.  Not really having significant respiratory symptoms, although maybe a slight bit of cough in the last day or so has developed.  Her legs have not been particularly a problem.  She has noticed a slight bit of discomfort more in her left hip than there has been recently.      PAST MEDICAL HISTORY:  Dominated by the above multiple infections, prior history of MRSA colonization without major infections recently, history of congestive heart failure and mild chronic renal insufficiency, history of chronic postherpetic neuralgia, history of morbid obesity.      ALLERGIES:  MULTIPLE LISTED ALLERGIES INCLUDE CEPHALOSPORINS, PENICILLINS, CLINDAMYCIN, ZITHROMAX AS PROBABLE TRUE ALLERGIES.  She has tolerated vancomycin, has generally tolerated carbapenems and daptomycin and tetracyclines.      SOCIAL AND FAMILY HISTORY:  Extensive healthcare contact with known MRSA, but no recent obvious infection contacts.         MEDICATIONS:  As listed.      REVIEW OF SYSTEMS:  Largely as above.  No urinary tract symptoms, no abdominal symptoms.  Not really having much cough or respiratory symptoms prior to admission, but some degree of cough has developed in the hospital and she has had some slight wheezing even, not been major hypoxic.      PHYSICAL EXAMINATION:   GENERAL:  The patient appears her stated age.  Her cognition is intact.  She often will have mental status changes with her acute infections, but not at present.   CURRENT VITAL SIGNS:  Normal, but previous significant temperature elevation at admission 103, pulse is 80, respiratory rate 16 and unlabored.   HEENT:  No thrush or intraoral lesions.  Pupils reactive.   NECK:  Supple and nontender without lymphadenopathy.   HEART:  No major tachycardia.   LUNGS:  A few crackles and some actual wheezing in the right lung.  Left lung crackles.   ABDOMEN:  Quite obese, but no palpable abnormalities.   EXTREMITIES:  Edema is actually fairly well controlled currently and no major cellulitis, warmth in the legs or signs of infection.      LABORATORY DATA:  Blood cultures are pending.  Urinalysis not significantly abnormal.  Serum chemistries relatively unremarkable.  No new positive cultures.        Chest x-ray with slight left lower lobe infiltrate.      Thank you very much for consultation.  We will follow the patient with you.         TOOTIE SUNSHINE MD             D: 04/10/2018   T: 04/10/2018   MT: CECY      Name:     EDDIE MONTEIRO   MRN:      [id number]        Account:       [id number]   :      1940           Consult Date:  04/10/2018      Document: [id number]       cc: Nelly Pearl MD

## 2018-04-10 NOTE — PHARMACY-ADMISSION MEDICATION HISTORY
Admission medication history interview status for this patient is complete. See [facility] admission navigator for allergy information, prior to admission medications and immunization status.     Medication history interview source(s):Patient and Family  Medication history resources (including written lists, pill bottles, clinic record):None  Primary pharmacy: Mail order, otherwise [facility]    Changes made to PTA medication list:  Added: none  Deleted: flonase  Changed: gabapentin, lidocaine patch to prn, warfarin    Actions taken by pharmacist (provider contacted, etc):None     Additional medication history information:None    Medication reconciliation/reorder completed by provider prior to medication history? No    Do you take OTC medications (eg tylenol, ibuprofen, fish oil, eye/ear drops, etc)? Y(Y/N)    For patients on insulin therapy: N (Y/N)  Lantus/levemir/NPH/Mix 70/30 dose:   (Y/N) (see Med list for doses)   Sliding scale Novolog Y/N  If Yes, do you have a baseline novolog pre-meal dose:  units with meals  Patients eat three meals a day:   Y/N    How many episodes of hypoglycemia do you have per week: _______  How many missed doses do you have per week: ______  How many times do you check your blood glucose per day: _______   Any Barriers to therapy - Be specific :  cost of medications, comfortable with giving injections (if applicable), comfortable and confident with current diabetes regimen: Y/N ______________      Prior to Admission medications    Medication Sig Last Dose Taking? Auth Provider   diltiazem (TIAZAC) 120 MG 24 hr ER beaded capsule Take 120 mg by mouth every evening 4/9/2018 at Unknown time Yes Unknown, Entered By History   gabapentin (NEURONTIN) 600 MG tablet Take 1,200 mg by mouth 3 times daily 4/9/2018 at Unknown time Yes Unknown, Entered By History   lidocaine (LIDODERM) 5 % Patch Place 3 patches onto the skin daily as needed for moderate pain (Apply up to 3 patches to painful area at once for up  to 12 h within a 24 h period.  Remove after 12 hours.)  Yes Unknown, Entered By History   METOPROLOL SUCCINATE ER PO Take 50 mg by mouth every evening 4/9/2018 at Unknown time Yes Unknown, Entered By History   venlafaxine (EFFEXOR-XR) 150 MG 24 hr capsule Take 150 mg by mouth every evening 4/9/2018 at Unknown time Yes Unknown, Entered By History   warfarin (COUMADIN) 2.5 MG tablet Take by mouth daily Take 5mg on Monday and Friday, and 2.5mg daily on all other days. 4/9/2018 at 5mg Yes Nelly Pearl MD   Multiple Vitamins-Minerals (CENTRUM SILVER) per tablet Take 1 tablet by mouth daily 4/9/2018 at Unknown time Yes Unknown, Entered By History   Pantoprazole Sodium (PROTONIX PO) Take 40 mg by mouth every morning (before breakfast) 4/9/2018 at Unknown time Yes Unknown, Entered By History   pregabalin (LYRICA) 150 MG capsule Take 1 capsule (150 mg) by mouth 2 times daily 4/9/2018 at Unknown time Yes Ernie Mccullough MD   buPROPion (WELLBUTRIN XL) 300 MG 24 hr tablet Take 1 tablet (300 mg) by mouth every morning 4/9/2018 at Unknown time Yes Nelly Pearl MD   furosemide (LASIX) 20 MG tablet Take 1 tablet (20 mg) by mouth daily 4/9/2018 at Unknown time Yes Nelly Pearl MD   nortriptyline (PAMELOR) 25 MG capsule Take 2 capsules (50 mg) by mouth At Bedtime 4/8/2018 at Unknown time Yes Nelly Pearl MD   pravastatin (PRAVACHOL) 10 MG tablet Take 1 tablet (10 mg) by mouth At Bedtime Needs labs before next refill 4/8/2018 at Unknown time Yes Nelly Pearl MD   minocycline (MINOCIN/DYNACIN) 50 MG capsule Take 1 capsule (50 mg) by mouth 2 times daily 4/9/2018 at Unknown time Yes Nelly Pearl MD   CEPHALEXIN PO Take 500 mg by mouth 3 times daily as needed cellulitis   Unknown, Entered By History

## 2018-04-10 NOTE — PLAN OF CARE
Problem: Patient Care Overview  Goal: Plan of Care/Patient Progress Review  Outcome: No Change  Arrived to unit at 0130. Lethargic, arouses to voice, disoriented to situation, forgetful. Tmax 100.5, other VSS on RA. LS coarse, dyspneic, cough present. Incontinent at times. IVF infusing at 60 mL/hr. ID following. Assist of 2 with mechanical lift, turned and repositioned every 2 hours. Urinary incontinence x1. Continue with POC.

## 2018-04-10 NOTE — ED NOTES
"..  [facility]  ED Nurse Handoff Report    Berenice Chaudhari is a 78 year old female   ED Chief complaint: Generalized Weakness and Fever  . ED Diagnosis:   Final diagnoses:   Pneumonia     Allergies:   Allergies   Allergen Reactions     Ceftriaxone Anaphylaxis and Rash     Rocephin given. Developed rash to back and abd, awaiting to see if it improves with d/c of rocephin.        Nafcillin Rash     diffuse severe rash in hospital 2/05     Penicillins Anaphylaxis     Vancomycin Anaphylaxis and Rash     Codeine Fatigue     Sleeps continuously     Clindamycin Rash     received information from patient     Zithromax [Azithromycin] Rash       Code Status: DNR / DNI - per admitting doctor  Activity level - Baseline/Home:  Total Care. Activity Level - Current:   Total Care. Lift room needed: Yes. Bariatric: Yes   Needed: No   Isolation: Yes. Infection: Not Applicable  MRSA.     Vital Signs:   Vitals:    04/09/18 2345 04/10/18 0000 04/10/18 0015 04/10/18 0029   BP: 160/61 160/61 157/59    Pulse:       Resp: 17 23  22   Temp:       TempSrc:       SpO2: 99% 95% 93% 92%   Weight:           Cardiac Rhythm:  ,      Pain level:    Patient confused: Yes. Patient Falls Risk: Yes.   Elimination Status: straight cath'd for UA   Patient Report - Initial Complaint: Fevers & Weakness. Focused Assessment:  79 yo female brought in by EMS for evaluation of fever and weakness. Temp at home 104.0, spouse gave \"two tylenol\" with no change and an altered LOC therefore he called EMS. PT wheelchair bound at baseline.. ABCs intact.  Please note - patient does have several anaphylactic reactions to antibiotics. Patient has never had Merrem, I sat at bedside for the first 10 minutes to ensure that pt did not have any change in her condition. ABCs intact after administration started. Per spouse reaction normally occurs within minutes of starting abx and it begins as a rash.   Tests Performed: Chest x-ray, ekg. Abnormal Results: " ..  Labs Ordered and Resulted from Time of ED Arrival Up to the Time of Departure from the ED   CBC WITH PLATELETS DIFFERENTIAL - Abnormal; Notable for the following:        Result Value    WBC 23.2 (*)     Hemoglobin 11.4 (*)     MCHC 31.2 (*)     Absolute Neutrophil 20.9 (*)     Absolute Lymphocytes 0.7 (*)     Abs Immature Granulocytes 0.6 (*)     All other components within normal limits   COMPREHENSIVE METABOLIC PANEL - Abnormal; Notable for the following:     Glucose 137 (*)     Creatinine 1.29 (*)     GFR Estimate 40 (*)     GFR Estimate If Black 49 (*)     Albumin 3.0 (*)     All other components within normal limits   INR - Abnormal; Notable for the following:     INR 2.26 (*)     All other components within normal limits   URINE MACROSCOPIC WITH REFLEX TO MICRO   LACTIC ACID WHOLE BLOOD   BLOOD CULTURE   BLOOD CULTURE   INFLUENZA A/B ANTIGEN   URINE CULTURE AEROBIC BACTERIAL     ..  XR Chest 2 Views   Preliminary Result   IMPRESSION: Left-sided pneumonia.        .   Treatments provided: IV abx, steroids, duonebs  Family Comments: Ricardo - spouse, sister at bedside.   OBS brochure/video discussed/provided to patient:  N/A  ED Medications:   Medications   meropenem (MERREM) 1 g vial to attach to  mL bag (1 g Intravenous New Bag 4/10/18 0020)   levofloxacin (LEVAQUIN) infusion 750 mg (not administered)   0.9% sodium chloride BOLUS (0 mLs Intravenous Stopped 4/9/18 2347)   acetaminophen (TYLENOL) tablet 650 mg (650 mg Oral Given 4/9/18 2354)   ipratropium - albuterol 0.5 mg/2.5 mg/3 mL (DUONEB) neb solution 6 mL (6 mLs Nebulization Given 4/10/18 0003)   methylPREDNISolone sodium succinate (solu-MEDROL) injection 125 mg (125 mg Intravenous Given 4/10/18 0003)     Drips infusing:  Yes  For the majority of the shift, the patient's behavior Green. Interventions performed were NA.     Severe Sepsis OR Septic Shock Diagnosis Present: No      ED Nurse Name/Phone Number: Myra JENNIE Manuel,   12:31 AM    RECEIVING  UNIT ED HANDOFF REVIEW    Above ED Nurse Handoff Report was reviewed: Yes  Reviewed by: Gemini Vicente on April 10, 2018 at 12:55 AM

## 2018-04-10 NOTE — ED NOTES
Bed: ED03  Expected date: 4/9/18  Expected time: 9:56 PM  Means of arrival: Ambulance  Comments:  HE: 79 y/o F

## 2018-04-10 NOTE — CONSULTS
ID consult dictated IMP 1 79 yo female well known to me, very complex, new acute fever/WBC, ? Pneumonia, but several potential alt dx    REcd same fir now await cxs

## 2018-04-10 NOTE — PHARMACY-ANTICOAGULATION SERVICE
Clinical Pharmacy - Warfarin Dosing Consult     Pharmacy has been consulted to manage this patient s warfarin therapy.  Indication: Atrial Fibrillation  Therapy Goal: INR 2-3  Warfarin Prior to Admission: Yes  Warfarin PTA Regimen: 5 M/F, 2.5 ROW  Significant drug interactions: levaquin, prednisone    INR   Date Value Ref Range Status   04/09/2018 2.26 (H) 0.86 - 1.14 Final   04/03/2018 3.7  Final       Recommend warfarin 2.5 mg today.  Pharmacy will monitor Berenice Chaudhari daily and order warfarin doses to achieve specified goal.      Please contact pharmacy as soon as possible if the warfarin needs to be held for a procedure or if the warfarin goals change.

## 2018-04-10 NOTE — PROGRESS NOTES
Infection Prevention:    Patient requires Contact precautions because of MRSA: toe, 2006. Please contact Infection Prevention with any questions/concerns at *[phone].    Nicole Olvera, ICP

## 2018-04-10 NOTE — ED NOTES
"77 yo female brought in by EMS for evaluation of fever and weakness. Temp at home 104.0, spouse gave \"two tylenol\" with no change and an altered LOC therefore he called EMS. PT wheelchair bound at baseline.. ABCs intact.   "

## 2018-04-11 LAB
ANION GAP SERPL CALCULATED.3IONS-SCNC: 5 MMOL/L (ref 3–14)
BACTERIA SPEC CULT: NO GROWTH
BASOPHILS # BLD AUTO: 0 10E9/L (ref 0–0.2)
BASOPHILS NFR BLD AUTO: 0.1 %
BUN SERPL-MCNC: 28 MG/DL (ref 7–30)
CALCIUM SERPL-MCNC: 9 MG/DL (ref 8.5–10.1)
CHLORIDE SERPL-SCNC: 107 MMOL/L (ref 94–109)
CO2 SERPL-SCNC: 28 MMOL/L (ref 20–32)
CREAT SERPL-MCNC: 1.34 MG/DL (ref 0.52–1.04)
DIFFERENTIAL METHOD BLD: ABNORMAL
EOSINOPHIL # BLD AUTO: 0 10E9/L (ref 0–0.7)
EOSINOPHIL NFR BLD AUTO: 0.2 %
ERYTHROCYTE [DISTWIDTH] IN BLOOD BY AUTOMATED COUNT: 14.7 % (ref 10–15)
GFR SERPL CREATININE-BSD FRML MDRD: 38 ML/MIN/1.7M2
GLUCOSE SERPL-MCNC: 102 MG/DL (ref 70–99)
HCT VFR BLD AUTO: 35.1 % (ref 35–47)
HGB BLD-MCNC: 10.8 G/DL (ref 11.7–15.7)
IMM GRANULOCYTES # BLD: 0.1 10E9/L (ref 0–0.4)
IMM GRANULOCYTES NFR BLD: 0.7 %
INR PPP: 3.32 (ref 0.86–1.14)
LYMPHOCYTES # BLD AUTO: 1.3 10E9/L (ref 0.8–5.3)
LYMPHOCYTES NFR BLD AUTO: 8.8 %
Lab: NORMAL
MCH RBC QN AUTO: 28.5 PG (ref 26.5–33)
MCHC RBC AUTO-ENTMCNC: 30.8 G/DL (ref 31.5–36.5)
MCV RBC AUTO: 93 FL (ref 78–100)
MONOCYTES # BLD AUTO: 1.1 10E9/L (ref 0–1.3)
MONOCYTES NFR BLD AUTO: 7.2 %
NEUTROPHILS # BLD AUTO: 12.3 10E9/L (ref 1.6–8.3)
NEUTROPHILS NFR BLD AUTO: 83 %
NRBC # BLD AUTO: 0 10*3/UL
NRBC BLD AUTO-RTO: 0 /100
PLATELET # BLD AUTO: 175 10E9/L (ref 150–450)
POTASSIUM SERPL-SCNC: 4.1 MMOL/L (ref 3.4–5.3)
RBC # BLD AUTO: 3.79 10E12/L (ref 3.8–5.2)
SODIUM SERPL-SCNC: 140 MMOL/L (ref 133–144)
SPECIMEN SOURCE: NORMAL
WBC # BLD AUTO: 14.9 10E9/L (ref 4–11)

## 2018-04-11 PROCEDURE — 25000128 H RX IP 250 OP 636: Performed by: INTERNAL MEDICINE

## 2018-04-11 PROCEDURE — 85610 PROTHROMBIN TIME: CPT | Performed by: INTERNAL MEDICINE

## 2018-04-11 PROCEDURE — 80048 BASIC METABOLIC PNL TOTAL CA: CPT | Performed by: INTERNAL MEDICINE

## 2018-04-11 PROCEDURE — 25000125 ZZHC RX 250: Performed by: INTERNAL MEDICINE

## 2018-04-11 PROCEDURE — 12000000 ZZH R&B MED SURG/OB

## 2018-04-11 PROCEDURE — 85025 COMPLETE CBC W/AUTO DIFF WBC: CPT | Performed by: INTERNAL MEDICINE

## 2018-04-11 PROCEDURE — 36415 COLL VENOUS BLD VENIPUNCTURE: CPT | Performed by: INTERNAL MEDICINE

## 2018-04-11 PROCEDURE — 25000132 ZZH RX MED GY IP 250 OP 250 PS 637: Performed by: INTERNAL MEDICINE

## 2018-04-11 PROCEDURE — 99207 ZZC CDG-MDM COMPONENT: MEETS LOW - DOWN CODED: CPT | Performed by: INTERNAL MEDICINE

## 2018-04-11 PROCEDURE — 99232 SBSQ HOSP IP/OBS MODERATE 35: CPT | Performed by: INTERNAL MEDICINE

## 2018-04-11 RX ADMIN — METOPROLOL SUCCINATE 50 MG: 50 TABLET, EXTENDED RELEASE ORAL at 20:43

## 2018-04-11 RX ADMIN — MICONAZOLE NITRATE: 2 POWDER TOPICAL at 08:15

## 2018-04-11 RX ADMIN — FUROSEMIDE 20 MG: 20 TABLET ORAL at 07:58

## 2018-04-11 RX ADMIN — NORTRIPTYLINE HYDROCHLORIDE 50 MG: 25 CAPSULE ORAL at 21:40

## 2018-04-11 RX ADMIN — LEVOFLOXACIN 750 MG: 5 INJECTION, SOLUTION INTRAVENOUS at 21:40

## 2018-04-11 RX ADMIN — MEROPENEM 1 G: 1 INJECTION, POWDER, FOR SOLUTION INTRAVENOUS at 04:18

## 2018-04-11 RX ADMIN — VENLAFAXINE HYDROCHLORIDE 150 MG: 37.5 TABLET, EXTENDED RELEASE ORAL at 20:43

## 2018-04-11 RX ADMIN — PRAVASTATIN SODIUM 10 MG: 10 TABLET ORAL at 21:40

## 2018-04-11 RX ADMIN — PREGABALIN 150 MG: 100 CAPSULE ORAL at 21:40

## 2018-04-11 RX ADMIN — GABAPENTIN 1200 MG: 600 TABLET, FILM COATED ORAL at 07:58

## 2018-04-11 RX ADMIN — GABAPENTIN 1200 MG: 600 TABLET, FILM COATED ORAL at 21:40

## 2018-04-11 RX ADMIN — PREDNISONE 20 MG: 20 TABLET ORAL at 07:58

## 2018-04-11 RX ADMIN — GABAPENTIN 1200 MG: 600 TABLET, FILM COATED ORAL at 16:28

## 2018-04-11 RX ADMIN — MEROPENEM 1 G: 1 INJECTION, POWDER, FOR SOLUTION INTRAVENOUS at 20:43

## 2018-04-11 RX ADMIN — MEROPENEM 1 G: 1 INJECTION, POWDER, FOR SOLUTION INTRAVENOUS at 11:58

## 2018-04-11 RX ADMIN — PREGABALIN 150 MG: 100 CAPSULE ORAL at 07:58

## 2018-04-11 RX ADMIN — DILTIAZEM HYDROCHLORIDE 120 MG: 120 CAPSULE, COATED, EXTENDED RELEASE ORAL at 20:43

## 2018-04-11 RX ADMIN — PANTOPRAZOLE SODIUM 40 MG: 40 TABLET, DELAYED RELEASE ORAL at 07:58

## 2018-04-11 RX ADMIN — BUPROPION HYDROCHLORIDE 300 MG: 150 TABLET, FILM COATED, EXTENDED RELEASE ORAL at 07:58

## 2018-04-11 ASSESSMENT — ACTIVITIES OF DAILY LIVING (ADL)
ADLS_ACUITY_SCORE: 24
ADLS_ACUITY_SCORE: 24
ADLS_ACUITY_SCORE: 23
ADLS_ACUITY_SCORE: 24

## 2018-04-11 NOTE — PLAN OF CARE
Problem: Patient Care Overview  Goal: Plan of Care/Patient Progress Review  Outcome: Improving  Vitals: VSS, afebrile   Oxygen: RA  LOC: AAOx4  Pain: denies pain  Ambulation: up with ray steady  Nontele  Cardiac: WNL  Lungs:diminished, frequent productive cough, dyspneic on exertion   GI: WNL  : incontinent at times  Skin: reddened groin and breast folds, LE edema present, old wounds to buttocks    Plan: continue IV abx, BC and UC pending, ID following

## 2018-04-11 NOTE — PROGRESS NOTES
[facility]  Infectious Disease Progress Note          Assessment and Plan:   IMPRESSION:   1.  A 78-year-old female, extremely well known to me from multiple prior infection issues, now admitted with apparent acute sepsis, major fever, leukocytosis, cultures so far negative, some left lower lobe infiltrate, but no clinical pneumonia, cause of this syndrome is unclear.  Historically when this has happened, usually has ended up being leg cellulitis, but none evident at present.   2.  History of numerous episodes of sepsis, leg cellulitis and other infections historically, no major episodes in the last year, although was just hospitalized several weeks ago with apparent infection, possible respiratory.   3.  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   4.  Prior history of methicillin-resistant Staphylococcus aureus colonization, but no major associated infections in some time.   5.  LISTED ALLERGIES TO PENICILLIN, CEPHALOSPORINS, CLINDAMYCIN, ZITHROMAX, VANCOMYCIN.  She has tolerated carbapenems, daptomycin and in general has been able to tolerate the vancomycin despite the listed allergy.   6.  History of congestive heart failure   7.  History of mild chronic renal insufficiency.   8.  Chronic postherpetic neuralgia.   9.  Morbid obesity.   10.  Frequent abnormal urinalysis and positive urine cultures, but generally not true recurrent urinary tract infections.       RECOMMENDATIONS:   1.  Continue current frank/lev, but no particular commitment to long-term antibiotics , further SULTANA if continued fever and not  able to identify current source.   2.  Follow the legs, lungs, cultures and fever closely.             Interval History:   no new complaints and doing well;  fever resolved, no + cxs, no focal pain or sxs new, O2 nl              Medications:       levofloxacin  750 mg Intravenous Q24H     buPROPion  300 mg Oral QAM     diltiazem  120 mg Oral QPM     furosemide  20 mg Oral Daily     gabapentin  1,200 mg Oral TID     metoprolol succinate (TOPROL-XL) 24 hr tablet 50 mg  50 mg Oral QPM     nortriptyline  50 mg Oral At Bedtime     pantoprazole (PROTONIX) EC tablet 40 mg  40 mg Oral QAM AC     pravastatin  10 mg Oral At Bedtime     pregabalin  150 mg Oral BID     venlafaxine  150 mg Oral QPM     predniSONE  20 mg Oral Daily     lidocaine   Transdermal Q24h     lidocaine   Transdermal Q8H     meropenem  1 g Intravenous Q8H                  Physical Exam:   Blood pressure 147/61, pulse 95, temperature 96.9  F (36.1  C), temperature source Oral, resp. rate 20, weight (!) 159.3 kg (351 lb 3.2 oz), SpO2 95 %, not currently breastfeeding.  Wt Readings from Last 2 Encounters:   04/11/18 (!) 159.3 kg (351 lb 3.2 oz)   03/19/18 (!) 157.8 kg (347 lb 12.8 oz)     Vital Signs with Ranges  Temp:  [96.1  F (35.6  C)-98.9  F (37.2  C)] 96.9  F (36.1  C)  Pulse:  [95] 95  Heart Rate:  [66-89] 66  Resp:  [20-22] 20  BP: (138-178)/(49-65) 147/61  SpO2:  [93 %-100 %] 95 %    Constitutional: Awake, alert, cooperative, no apparent distress looks at baseline   Lungs: Clear to auscultation bilaterally, no crackles or wheezing   Cardiovascular: Regular rate and rhythm, normal S1 and S2, and no murmur noted   Abdomen: Normal bowel sounds, soft, non-distended, non-tender   Skin: No rashes, no cyanosis, same edema no celluliutis   Other:           Data:   All microbiology laboratory data reviewed.  Recent Labs   Lab Test  04/11/18   0714  04/09/18   2240  03/12/18   0640   WBC  14.9*  23.2*  5.5   HGB  10.8*  11.4*  11.1*   HCT  35.1  36.5  34.8*   MCV  93  91  90   PLT  175  174  132*     Recent Labs   Lab Test  04/11/18   0714  04/09/18   2240  03/12/18   0640   CR  1.34*  1.29*  1.39*     Recent Labs    Lab Test  03/18/15   1600   SED  27     Recent Labs   Lab Test  04/09/18   2303  04/09/18   2256  04/09/18   2240  03/12/18   1100  03/10/18   1940  03/10/18   1754  03/10/18   1715  11/21/17   1441  11/20/17   1900   CULT  No growth after 1 day  No growth  No growth after 1 day  Canceled, Test credited  >10 Squamous epithelial cells/low power field indicates oral contamination. Please   recollect.  *  Notification of test cancellation was given to  Meghna Moe RN on 3.12.18 at 1423. bw    No growth  No growth  No growth  Moderate growth  Normal yvette    No growth

## 2018-04-11 NOTE — PROGRESS NOTES
Patient refusing nebulizers all day, when asked if she wanted 2000 neb tx patient refused stating she never wants one again. Nebulizer order changed from QID to prn.

## 2018-04-11 NOTE — PROGRESS NOTES
[facility]  Hospitalist Progress Note  Randy Alas MD, MD 04/11/2018  (Text Page)  Reason for Stay (Diagnosis): Suspected healthcare associated pneumonia         Assessment and Plan:      Summary of Stay: Berenice Chaudhari is a 78 year old female prior history of congestive heart failure, atrial flutter on chronic Coumadin, hypertension, dyslipidemia, chronic kidney disease and was recently admitted in the hospital for febrile illness and bronchitis and was readmitted  on 4/9/2018 with shortness of breath with fever spikes.    Problem List:   1. Earlier fever and shortness of breath with findings suggestive of left-sided pneumonia suspected healthcare associated with gram negatives given numerous hospitalizations in the past.  2. Prior history of MRSA colonization  3. 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 Suspected COPD with no prior formal diagnosis presented earlier with wheezing  5. History of diastolic congestive heart failure not in exacerbation  6. History of atrial flutter on chronic O AC  7. Hypertension  8. Stable chronic kidney disease    Continue inpatient care, remained on IV antibiotics.  Will await further direction from infectious disease service regarding anti-infectives.  Cultures are being followed and currently has no reported significant growth.  Not requiring any oxygen supplementation, wheezing has resolved.  Earlier was given IV Medrol was subsequently switched to oral prednisone for a couple of days.  Diltiazem, metoprolol has been resumed.  Remains on warfarin.  On daily diuretics.    DVT Prophylaxis: Warfarin  Code Status: DNR / DNI  Discharge Dispo: Anticipating home  Estimated Disch Date / # of Days until Disch: 1-2 days        Interval History (Subjective):      Continuing CARE today.  Seen and examined.  Chart reviewed.  Ms. Chaudhari is currently sleeping but easily arouse and able to  participate during the exam.  She stated that she is able to sleep last night with no ongoing complaints of shortness of breath, chest pain, or coughing spells has been decreasing.  She is tolerating oral diet, no reported episodes of nausea/vomiting or diarrhea.  Currently afebrile with stable hemodynamics.   # Pain Assessment:  Current Pain Score 4/11/2018   Patient currently in pain? denies   Pain score (0-10) 0   Pain location -   Pain descriptors -   Berenice mcghee pain level was assessed and she currently denies pain.                  Physical Exam:      Last Vital Signs:  /61  Pulse 95  Temp 96.9  F (36.1  C) (Oral)  Resp 20  Wt (!) 159.3 kg (351 lb 3.2 oz)  SpO2 95%  BMI 60.28 kg/m2       Wt Readings from Last 1 Encounters:   04/11/18 (!) 159.3 kg (351 lb 3.2 oz)     Vitals:    04/09/18 2221 04/10/18 0132 04/11/18 0619   Weight: (!) 157.8 kg (347 lb 14.2 oz) (!) 159.8 kg (352 lb 6.4 oz) (!) 159.3 kg (351 lb 3.2 oz)       Constitutional: Awake, alert, cooperative, no apparent distress   Respiratory:  Fair air entry, no wheezing, no stridor or rhonchi   Cardiovascular: Regular rate and rhythm, normal S1 and S2, and no murmur noted   Abdomen: Normal bowel sounds, soft, non-distended, non-tender   Skin: No rashes, no cyanosis, dry to touch   Neuro: Alert and oriented x3, no weakness, spontaneous and coherent speech   Extremities:  Nonpitting edema bilateral lower extremities, normal range of motion   Other(s): Euthymic mood, not agitated       All other systems: Negative          Medications:      All current medications were reviewed with changes reflected in problem list.         Data:      All new lab and imaging data was reviewed.   Labs:    Recent Labs  Lab 04/09/18  2303 04/09/18  2256 04/09/18  2240   CULT No growth after 1 day No growth No growth after 1 day       Recent Labs  Lab 04/11/18  0714 04/09/18  2240   WBC 14.9* 23.2*   HGB 10.8* 11.4*   HCT 35.1 36.5   MCV 93 91    174        Recent Labs  Lab 04/11/18  0714 04/09/18  2240    137   POTASSIUM 4.1 4.3   CHLORIDE 107 103   CO2 28 28   ANIONGAP 5 6   * 137*   BUN 28 21   CR 1.34* 1.29*   GFRESTIMATED 38* 40*   GFRESTBLACK 47* 49*   SHILOH 9.0 8.8   PROTTOTAL  --  7.9   ALBUMIN  --  3.0*   BILITOTAL  --  0.7   ALKPHOS  --  109   AST  --  33   ALT  --  38       Recent Labs  Lab 04/11/18  0714 04/09/18  2240   * 137*       Recent Labs  Lab 04/11/18  0714 04/09/18  2240   INR 3.32* 2.26*     No results for input(s): TROPONIN, TROPI, TROPR in the last 168 hours.    Invalid input(s): TROP, TROPONINIES    Recent Labs  Lab 04/09/18  2256   COLOR Yellow   APPEARANCE Clear   URINEGLC Negative   URINEBILI Negative   URINEKETONE Negative   SG 1.012   UBLD Negative   URINEPH 5.0   PROTEIN Negative   NITRITE Negative   LEUKEST Negative      Imaging:   Results for orders placed or performed during the hospital encounter of 04/09/18   XR Chest 2 Views    Narrative    XR CHEST 2 VW  4/9/2018 11:19 PM     HISTORY: Fever, cough.    COMPARISON: 3/10/2018.    FINDINGS: The heart is enlarged without pulmonary edema. There is  infiltrate in the left mid and lower lung. Allowing for low volumes,  the right lung appears clear. No pneumothorax or pleural effusion.      Impression    IMPRESSION: Left-sided pneumonia.    SEJAL RAMOS MD     *Note: Due to a large number of results and/or encounters for the requested time period, some results have not been displayed. A complete set of results can be found in Results Review.

## 2018-04-11 NOTE — PLAN OF CARE
Problem: Patient Care Overview  Goal: Plan of Care/Patient Progress Review  Outcome: Improving  A&Ox4, VSS 96% sats on RA. Denies pain. Up in chair.  Denies shortness of breath at rest.  LS diminishes, fine crackles RLL.  BLE edema, LE's elevated.   bringing dinner.  Continue to monitor, continue with plan of care.

## 2018-04-11 NOTE — CONSULTS
Care Transition Initial Assessment - RN    Reason For Consult: care coordination/care conference, discharge planning, medication concerns   Met with: Patient.  DATA   Active Problems:    HCAP (healthcare-associated pneumonia)     PNA Action Plan discussed with patient at bedside.   Cognitive Status: awake, alert and oriented.  Primary Care Clinic Name: [facility]  Primary Care MD Name: Dr. Nelly Pearl  Contact information and PCP information verified: Yes  Lives With: spouse  Living Arrangements: house     Description of Support System: Supportive, Involved   Who is your support system?:        Insurance concerns: No Insurance issues identified  ASSESSMENT  Patient currently receives the following services:  Housekeeping services every 2 weeks; Every Friday a woman comes to assist with projects ie cleaning closets, folding laundry.         Identified issues/concerns regarding health management: Patient is wheelchair bound at baseline. Has multiple admissions for CHF, infections, cellulitis and wounds (pressure sores). She is agreeable to recommendations from the PNA action plan. Her  is her caretaker and transports her to appointments.     PLAN  Financial costs for the patient were not discussed.  Patient given options and choices for discharge.  Patient/family is agreeable to the plan?  Yes  Patient anticipates discharging back to home.     Other Resources: Other (see comment) (PNA Action Plan)  Patient anticipates needs for home equipment: No  Discharge Planner   Discharge Plans in progress: Yes  Barriers to discharge plan: Ongoing IV antibiotic  Plan/Disposition: Home   Appointments: Dr. Nelly Pearl on Friday, 4/20/18 at 11am.     CM will continue to follow patient until discharge for any additional needs.     Any Harley, RN, BSN, CTS  [facility]  [phone]

## 2018-04-12 VITALS
WEIGHT: 293 LBS | HEART RATE: 95 BPM | OXYGEN SATURATION: 96 % | TEMPERATURE: 98.1 F | RESPIRATION RATE: 20 BRPM | SYSTOLIC BLOOD PRESSURE: 161 MMHG | DIASTOLIC BLOOD PRESSURE: 79 MMHG | BODY MASS INDEX: 59.67 KG/M2

## 2018-04-12 DIAGNOSIS — Z53.9 DIAGNOSIS NOT YET DEFINED: Primary | ICD-10-CM

## 2018-04-12 LAB
ANION GAP SERPL CALCULATED.3IONS-SCNC: 5 MMOL/L (ref 3–14)
BASOPHILS # BLD AUTO: 0 10E9/L (ref 0–0.2)
BASOPHILS NFR BLD AUTO: 0.2 %
BUN SERPL-MCNC: 30 MG/DL (ref 7–30)
CALCIUM SERPL-MCNC: 8.7 MG/DL (ref 8.5–10.1)
CHLORIDE SERPL-SCNC: 107 MMOL/L (ref 94–109)
CO2 SERPL-SCNC: 29 MMOL/L (ref 20–32)
CREAT SERPL-MCNC: 1.26 MG/DL (ref 0.52–1.04)
DIFFERENTIAL METHOD BLD: ABNORMAL
EOSINOPHIL # BLD AUTO: 0.2 10E9/L (ref 0–0.7)
EOSINOPHIL NFR BLD AUTO: 2.5 %
ERYTHROCYTE [DISTWIDTH] IN BLOOD BY AUTOMATED COUNT: 14.6 % (ref 10–15)
GFR SERPL CREATININE-BSD FRML MDRD: 41 ML/MIN/1.7M2
GLUCOSE SERPL-MCNC: 79 MG/DL (ref 70–99)
HCT VFR BLD AUTO: 34 % (ref 35–47)
HGB BLD-MCNC: 10.5 G/DL (ref 11.7–15.7)
IMM GRANULOCYTES # BLD: 0 10E9/L (ref 0–0.4)
IMM GRANULOCYTES NFR BLD: 0.4 %
INR PPP: 3.11 (ref 0.86–1.14)
LYMPHOCYTES # BLD AUTO: 1.6 10E9/L (ref 0.8–5.3)
LYMPHOCYTES NFR BLD AUTO: 17.6 %
MCH RBC QN AUTO: 28.5 PG (ref 26.5–33)
MCHC RBC AUTO-ENTMCNC: 30.9 G/DL (ref 31.5–36.5)
MCV RBC AUTO: 92 FL (ref 78–100)
MONOCYTES # BLD AUTO: 0.6 10E9/L (ref 0–1.3)
MONOCYTES NFR BLD AUTO: 7 %
NEUTROPHILS # BLD AUTO: 6.5 10E9/L (ref 1.6–8.3)
NEUTROPHILS NFR BLD AUTO: 72.3 %
NRBC # BLD AUTO: 0 10*3/UL
NRBC BLD AUTO-RTO: 0 /100
PLATELET # BLD AUTO: 185 10E9/L (ref 150–450)
POTASSIUM SERPL-SCNC: 4 MMOL/L (ref 3.4–5.3)
RBC # BLD AUTO: 3.69 10E12/L (ref 3.8–5.2)
SODIUM SERPL-SCNC: 141 MMOL/L (ref 133–144)
WBC # BLD AUTO: 9 10E9/L (ref 4–11)

## 2018-04-12 PROCEDURE — 25000132 ZZH RX MED GY IP 250 OP 250 PS 637: Performed by: INTERNAL MEDICINE

## 2018-04-12 PROCEDURE — 99239 HOSP IP/OBS DSCHRG MGMT >30: CPT | Performed by: INTERNAL MEDICINE

## 2018-04-12 PROCEDURE — 80048 BASIC METABOLIC PNL TOTAL CA: CPT | Performed by: INTERNAL MEDICINE

## 2018-04-12 PROCEDURE — 85025 COMPLETE CBC W/AUTO DIFF WBC: CPT | Performed by: INTERNAL MEDICINE

## 2018-04-12 PROCEDURE — G0180 MD CERTIFICATION HHA PATIENT: HCPCS | Performed by: INTERNAL MEDICINE

## 2018-04-12 PROCEDURE — 25000125 ZZHC RX 250: Performed by: INTERNAL MEDICINE

## 2018-04-12 PROCEDURE — 36415 COLL VENOUS BLD VENIPUNCTURE: CPT | Performed by: INTERNAL MEDICINE

## 2018-04-12 PROCEDURE — 25000128 H RX IP 250 OP 636: Performed by: INTERNAL MEDICINE

## 2018-04-12 PROCEDURE — 85610 PROTHROMBIN TIME: CPT | Performed by: INTERNAL MEDICINE

## 2018-04-12 RX ORDER — LEVOFLOXACIN 500 MG/1
500 TABLET, FILM COATED ORAL DAILY
Qty: 7 TABLET | Refills: 0 | Status: SHIPPED | OUTPATIENT
Start: 2018-04-12 | End: 2018-01-01

## 2018-04-12 RX ORDER — WARFARIN SODIUM 2 MG/1
2 TABLET ORAL DAILY
Qty: 7 TABLET | Refills: 0 | Status: SHIPPED | OUTPATIENT
Start: 2018-04-13 | End: 2018-05-04

## 2018-04-12 RX ORDER — WARFARIN SODIUM 1 MG/1
1 TABLET ORAL
Status: DISCONTINUED | OUTPATIENT
Start: 2018-04-12 | End: 2018-04-12 | Stop reason: HOSPADM

## 2018-04-12 RX ADMIN — PREDNISONE 20 MG: 20 TABLET ORAL at 08:28

## 2018-04-12 RX ADMIN — GABAPENTIN 1200 MG: 600 TABLET, FILM COATED ORAL at 16:53

## 2018-04-12 RX ADMIN — BUPROPION HYDROCHLORIDE 300 MG: 150 TABLET, FILM COATED, EXTENDED RELEASE ORAL at 08:28

## 2018-04-12 RX ADMIN — MICONAZOLE NITRATE: 2 POWDER TOPICAL at 12:28

## 2018-04-12 RX ADMIN — FUROSEMIDE 20 MG: 20 TABLET ORAL at 08:28

## 2018-04-12 RX ADMIN — GABAPENTIN 1200 MG: 600 TABLET, FILM COATED ORAL at 08:27

## 2018-04-12 RX ADMIN — PREGABALIN 150 MG: 100 CAPSULE ORAL at 08:27

## 2018-04-12 RX ADMIN — MEROPENEM 1 G: 1 INJECTION, POWDER, FOR SOLUTION INTRAVENOUS at 12:33

## 2018-04-12 RX ADMIN — PANTOPRAZOLE SODIUM 40 MG: 40 TABLET, DELAYED RELEASE ORAL at 06:58

## 2018-04-12 RX ADMIN — MEROPENEM 1 G: 1 INJECTION, POWDER, FOR SOLUTION INTRAVENOUS at 04:13

## 2018-04-12 ASSESSMENT — ACTIVITIES OF DAILY LIVING (ADL)
ADLS_ACUITY_SCORE: 23
ADLS_ACUITY_SCORE: 22
ADLS_ACUITY_SCORE: 22

## 2018-04-12 NOTE — PROGRESS NOTES
[facility]  Hospitalist Progress Note  Randy Alas MD, MD 04/12/2018  (Text Page)  Reason for Stay (Diagnosis): Suspected healthcare associated pneumonia         Assessment and Plan:      Summary of Stay: Berenice Chaudhari is a 78 year old female prior history of congestive heart failure, atrial flutter on chronic Coumadin, hypertension, dyslipidemia, chronic kidney disease and was recently admitted in the hospital for febrile illness and bronchitis and was readmitted  on 4/9/2018 with shortness of breath with fever spikes.    Problem List:   1. Earlier fever and shortness of breath with findings suggestive of left-sided pneumonia suspected healthcare associated with gram negatives given numerous hospitalizations in the past.  2. Prior history of MRSA colonization  3. 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 Suspected COPD with no prior formal diagnosis presented earlier with wheezing  5. History of diastolic congestive heart failure not in exacerbation  6. History of atrial flutter on chronic O AC  7. Hypertension  8. Stable chronic kidney disease    Continue inpatient care, remained on IV antibiotics.  Will await further direction from infectious disease service regarding anti-infectives.  Multiple cultures are being followed and currently has no reported significant growth.  Her leukocytosis has significantly decreased and now back to normal from earlier peak of 23K  Not requiring any oxygen supplementation, wheezing has resolved.  Earlier was given IV Medrol was subsequently switched to oral prednisone for a couple of days.  Diltiazem, metoprolol has been resumed.  Remains on warfarin.  On daily diuretics.    DVT Prophylaxis: Warfarin  Code Status: DNR / DNI  Discharge Dispo: Anticipating home  Estimated Disch Date / # of Days until Disch: 1 day        Interval History (Subjective):      Continuing CARE today.   Seen and examined.  Chart reviewed.  Ms. Chaudhari feels fine, slept well, coughing spells has been decreasing.  She denies any nausea, vomiting, abdominal pain or diarrhea.  No reported episodes of hypoxia, shortness of breath, or chest pain.  She is in good spirits during his morning exam and actually looking forward for home discharge if feasible.     # Pain Assessment:  Current Pain Score 4/12/2018   Patient currently in pain? denies   Pain score (0-10) -   Pain location -   Pain descriptors -   Berenice mcghee pain level was assessed and she currently denies pain.                  Physical Exam:      Last Vital Signs:  /83 (BP Location: Left arm)  Pulse 95  Temp 97.3  F (36.3  C) (Oral)  Resp 18  Wt (!) 157.7 kg (347 lb 9.6 oz)  SpO2 96%  BMI 59.67 kg/m2    I/O last 3 completed shifts:  In: 240 [P.O.:240]  Out: -   Wt Readings from Last 1 Encounters:   04/12/18 (!) 157.7 kg (347 lb 9.6 oz)     Vitals:    04/09/18 2221 04/10/18 0132 04/11/18 0619 04/12/18 0559   Weight: (!) 157.8 kg (347 lb 14.2 oz) (!) 159.8 kg (352 lb 6.4 oz) (!) 159.3 kg (351 lb 3.2 oz) (!) 157.7 kg (347 lb 9.6 oz)       Constitutional: Awake, alert, cooperative, no apparent distress   Respiratory:  Fair air entry, no wheezing, no stridor or rhonchi   Cardiovascular: Regular rate and rhythm, normal S1 and S2, and no murmur noted   Abdomen: Normal bowel sounds, soft, non-distended, non-tender   Skin: No rashes, no cyanosis, dry to touch   Neuro: Alert and oriented x3, no weakness, spontaneous and coherent speech   Extremities:  Nonpitting edema bilateral lower extremities, normal range of motion   Other(s): Euthymic mood, not agitated       All other systems: Negative          Medications:      All current medications were reviewed with changes reflected in problem list.         Data:      All new lab and imaging data was reviewed.   Labs:    Recent Labs  Lab 04/09/18  2303 04/09/18  2256 04/09/18  2240   CULT No growth after 2 days No growth  No growth after 2 days       Recent Labs  Lab 04/12/18  0713 04/11/18  0714 04/09/18  2240   WBC 9.0 14.9* 23.2*   HGB 10.5* 10.8* 11.4*   HCT 34.0* 35.1 36.5   MCV 92 93 91    175 174       Recent Labs  Lab 04/12/18  0713 04/11/18  0714 04/09/18  2240    140 137   POTASSIUM 4.0 4.1 4.3   CHLORIDE 107 107 103   CO2 29 28 28   ANIONGAP 5 5 6   GLC 79 102* 137*   BUN 30 28 21   CR 1.26* 1.34* 1.29*   GFRESTIMATED 41* 38* 40*   GFRESTBLACK 50* 47* 49*   SHILOH 8.7 9.0 8.8   PROTTOTAL  --   --  7.9   ALBUMIN  --   --  3.0*   BILITOTAL  --   --  0.7   ALKPHOS  --   --  109   AST  --   --  33   ALT  --   --  38       Recent Labs  Lab 04/12/18  0713 04/11/18  0714 04/09/18  2240   GLC 79 102* 137*       Recent Labs  Lab 04/12/18  0713 04/11/18  0714 04/09/18  2240   INR 3.11* 3.32* 2.26*     No results for input(s): TROPONIN, TROPI, TROPR in the last 168 hours.    Invalid input(s): TROP, TROPONINIES    Recent Labs  Lab 04/09/18  2256   COLOR Yellow   APPEARANCE Clear   URINEGLC Negative   URINEBILI Negative   URINEKETONE Negative   SG 1.012   UBLD Negative   URINEPH 5.0   PROTEIN Negative   NITRITE Negative   LEUKEST Negative      Imaging:   Results for orders placed or performed during the hospital encounter of 04/09/18   XR Chest 2 Views    Narrative    XR CHEST 2 VW  4/9/2018 11:19 PM     HISTORY: Fever, cough.    COMPARISON: 3/10/2018.    FINDINGS: The heart is enlarged without pulmonary edema. There is  infiltrate in the left mid and lower lung. Allowing for low volumes,  the right lung appears clear. No pneumothorax or pleural effusion.      Impression    IMPRESSION: Left-sided pneumonia.    SEJAL RAMOS MD     *Note: Due to a large number of results and/or encounters for the requested time period, some results have not been displayed. A complete set of results can be found in Results Review.

## 2018-04-12 NOTE — PLAN OF CARE
Problem: Patient Care Overview  Goal: Plan of Care/Patient Progress Review  Outcome: Improving  Vitals: VSS, afebrile   Oxygen: RA  LOC: AAOx4  Pain: denies pain  Ambulation: up with ray steady  Nontele  Cardiac: WNL  Lungs:diminished, infrequent productive cough, dyspneic on exertion   GI: WNL  : incontinent at times  Skin: reddened groin and breast folds, LE edema present, old wounds to buttocks covered with mepilex   Plan: continue IV abx, BC and UC pending, ID following

## 2018-04-12 NOTE — PLAN OF CARE
Problem: Patient Care Overview  Goal: Plan of Care/Patient Progress Review  Outcome: Adequate for Discharge Date Met: 04/12/18  VSS, A/O, pt discharged to home via  with all belongings, verbalized understanding of discharge instructions. Pt used personal wheelchair upon discharge to vehicle.

## 2018-04-12 NOTE — PROGRESS NOTES
[facility]  Infectious Disease Progress Note          Assessment and Plan:   IMPRESSION:   1.  A 78-year-old female, extremely well known to me from multiple prior infection issues, now admitted with apparent acute sepsis, major fever, leukocytosis, cultures so far negative, some left lower lobe infiltrate, but no clinical pneumonia, cause of this syndrome is unclear.  Historically when this has happened, usually has ended up being leg cellulitis, but none evident at present.   2.  History of numerous episodes of sepsis, leg cellulitis and other infections historically, no major episodes in the last year, although was just hospitalized several weeks ago with apparent infection, possible respiratory.   3.  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   4.  Prior history of methicillin-resistant Staphylococcus aureus colonization, but no major associated infections in some time.   5.  LISTED ALLERGIES TO PENICILLIN, CEPHALOSPORINS, CLINDAMYCIN, ZITHROMAX, VANCOMYCIN.  She has tolerated carbapenems, daptomycin and in general has been able to tolerate the vancomycin despite the listed allergy.   6.  History of congestive heart failure   7.  History of mild chronic renal insufficiency.   8.  Chronic postherpetic neuralgia.   9.  Morbid obesity.   10.  Frequent abnormal urinalysis and positive urine cultures, but generally not true recurrent urinary tract infections.       RECOMMENDATIONS:   1.  Continue current frank/lev while here, but no particular commitment to long-term antibiotics , further SULTANA if continued fever and not  able to identify current source.   2.  I would be OK 7 days po levaquin and home, assume pneumonia, if worsens return            Interval History:    no new complaints and doing well; fever resolved, no + cxs, no focal pain or sxs new, O2 nl              Medications:       levofloxacin  750 mg Intravenous Q24H     buPROPion  300 mg Oral QAM     diltiazem  120 mg Oral QPM     furosemide  20 mg Oral Daily     gabapentin  1,200 mg Oral TID     metoprolol succinate (TOPROL-XL) 24 hr tablet 50 mg  50 mg Oral QPM     nortriptyline  50 mg Oral At Bedtime     pantoprazole (PROTONIX) EC tablet 40 mg  40 mg Oral QAM AC     pravastatin  10 mg Oral At Bedtime     pregabalin  150 mg Oral BID     venlafaxine  150 mg Oral QPM     predniSONE  20 mg Oral Daily     lidocaine   Transdermal Q24h     lidocaine   Transdermal Q8H     meropenem  1 g Intravenous Q8H                  Physical Exam:   Blood pressure 152/57, pulse 95, temperature 97.6  F (36.4  C), temperature source Oral, resp. rate 18, weight (!) 157.7 kg (347 lb 9.6 oz), SpO2 94 %, not currently breastfeeding.  Wt Readings from Last 2 Encounters:   04/12/18 (!) 157.7 kg (347 lb 9.6 oz)   03/19/18 (!) 157.8 kg (347 lb 12.8 oz)     Vital Signs with Ranges  Temp:  [96.8  F (36  C)-97.6  F (36.4  C)] 97.6  F (36.4  C)  Heart Rate:  [58-87] 58  Resp:  [18-20] 18  BP: (124-164)/(57-83) 152/57  SpO2:  [94 %-98 %] 94 %    Constitutional: Awake, alert, cooperative, no apparent distress looks at baseline   Lungs: Clear to auscultation bilaterally, no crackles or wheezing   Cardiovascular: Regular rate and rhythm, normal S1 and S2, and no murmur noted   Abdomen: Normal bowel sounds, soft, non-distended, non-tender   Skin: No rashes, no cyanosis, same edema no celluliutis   Other:           Data:   All microbiology laboratory data reviewed.  Recent Labs   Lab Test  04/12/18   0713  04/11/18   0714  04/09/18   2240   WBC  9.0  14.9*  23.2*   HGB  10.5*  10.8*  11.4*   HCT  34.0*  35.1  36.5   MCV  92  93  91   PLT  185  175  174     Recent Labs   Lab Test  04/12/18   0713  04/11/18   0714  04/09/18   2240   CR  1.26*  1.34*  1.29*      Recent Labs   Lab Test  03/18/15   1600   SED  27     Recent Labs   Lab Test  04/09/18   2303  04/09/18   2256  04/09/18   2240  03/12/18   1100  03/10/18   1940  03/10/18   1754  03/10/18   1715  11/21/17   1441  11/20/17   1900   CULT  No growth after 2 days  No growth  No growth after 2 days  Canceled, Test credited  >10 Squamous epithelial cells/low power field indicates oral contamination. Please   recollect.  *  Notification of test cancellation was given to  Meghna Moe RN on 3.12.18 at 1423. bw    No growth  No growth  No growth  Moderate growth  Normal yvette    No growth

## 2018-04-13 ENCOUNTER — PATIENT OUTREACH (OUTPATIENT)
Dept: PEDIATRICS | Facility: CLINIC | Age: 78
End: 2018-04-13

## 2018-04-13 ENCOUNTER — TELEPHONE (OUTPATIENT)
Dept: PEDIATRICS | Facility: CLINIC | Age: 78
End: 2018-04-13

## 2018-04-13 ENCOUNTER — TELEPHONE (OUTPATIENT)
Dept: PHARMACY | Facility: OTHER | Age: 78
End: 2018-04-13

## 2018-04-13 NOTE — TELEPHONE ENCOUNTER
ED / Discharge Outreach Protocol    Patient Contact    Attempt # 1    Was call answered?  No.  Left message on voicemail with information to call me back.    Next 5 appointments (look out 90 days)     Apr 20, 2018 11:00 AM CDT   Office Visit with Nelly Pearl MD   [facility]    [address] [place] [id number]   [phone]                Melissa Abdullahi RN  Message handled by Nurse Triage.

## 2018-04-13 NOTE — TELEPHONE ENCOUNTER
ED / Discharge Outreach Protocol    Patient Contact    Attempt # 2    Was call answered?  No.  Left message on voicemail with information to call me back.    Cha, RN  Triage Nurse

## 2018-04-13 NOTE — PROGRESS NOTES
"Clinic Care Coordination Contact  [facility]/Voicemail    Referral Source: IP Report    Clinical Data: Patient was hospitalized at [facility] from 04/09/2018 to 04/12/2018 with diagnosis of LEFT-sided PNA (with suspected gram negative etiology), hx: prior MRSA colonization, hx: diastolic HF (not in exacerbation), A-Flutter on chronic anticoagulants, HTN and stable chronic CKD.     Please refer to the following entries:  04/11/2018-  Consults - Care Transition Initial Assessment - RN - Jenise Harley RN CM   04/11/2018 - CTS (see letters tab)  04/12/2018 - Discharge Summaries - Dr. Linares - Hospitalist    Care Coordinator Outreach    Outreach attempted x 1.   Patient reports that she just woke up and is a little bit \"foggy\".   She would prefer to wake up and then continue our discussion - requests return call from writer today (Friday 04/13/2018) after 1pm.     Plan:  Care Coordinator will call patient back today after 1pm as requested for initial assessment.    Claire Barclay RN  [facility]  Clinic Care Coordinator - [place] Locations   Direct:  [phone] (voicemail available)   "

## 2018-04-13 NOTE — TELEPHONE ENCOUNTER
Please contact patient for Emergency Department follow up.  [phone] (home) NONE (work)    Visit date: 04/12/2018  Diagnosis listed:Pneumonia  Number of visits in past 12 months:1

## 2018-04-13 NOTE — DISCHARGE SUMMARY
[facility]  Discharge Summary  Name: Berenice Chaudhari    MRN: [id number]  YOB: 1940    Age: 78 year old  Date of Discharge:  4/12/2018  5:19 PM  Date of Admission: 4/9/2018  Primary Care Provider: Nelly Pearl  Discharge Physician:  Randy Alas MD  Discharging Service:  Hospitalist      Discharge Diagnosis:  Left-sided pneumonia with suspected gram-negative in etiology  Prior history of MRSA colonization  History of diastolic congestive heart failure not in exacerbation  atrial flutter on chronic anticoagulation  Hypertension  Stable chronic kidney disease     Other Diagnosis:  Past Medical History:   Diagnosis Date     Allergic rhinitis, cause unspecified      Anxiety 6/13/2013     Arrhythmia     atrial flutter     Atrial flutter with rapid ventricular response (H) 8/8/2017     Cellulitis and abscess of leg, except foot recurrent , both legs    begins with fever, nausea, diarrhea, vomiting & & the cellulitis may be visible a day or 2 later     Chronic pain     On chronic fentanyl patch.     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  less likely = raction to vancomycin      Essential hypertension with goal blood pressure less than 140/90 1/12/2005     Problem list name updated by automated process. Provider to review     Generalized osteoarthrosis, unspecified site     films 9/04: L hhip bad; both knees mod severe medial OA     GERD (gastroesophageal reflux disease) 11/15/2013     Herpes zoster without mention of complication 9/03    L shoulder; still has neuralgia     Hyperlipidemia LDL goal <100 9/16/2011     Methicillin susceptible Staphylococcus aureus septicemia (H) after L hip injection 205    ARDS, renal failure, staph sepsis after a hip joint injection     Obesity  1/12/2005     Problem list name updated by automated process. Provider to review     Other chronic pain      Other diseases of lung, not elsewhere classified 07/93    Interstitial lung process     Pneumonia, organism unspecified(486)      Urticaria, unspecified 07/93    Diffuse severe (hospitalized).  (+) skin biopsy by Dr. Figueroa          Discharge Disposition:  Discharged to home     Allergies:  Allergies   Allergen Reactions     Ceftriaxone Anaphylaxis and Rash     Rocephin given. Developed rash to back and abd, awaiting to see if it improves with d/c of rocephin.        Nafcillin Rash     diffuse severe rash in hospital 2/05     Penicillins Anaphylaxis     Vancomycin Anaphylaxis and Rash     Codeine Fatigue     Sleeps continuously     Clindamycin Rash     received information from patient     Zithromax [Azithromycin] Rash        Discharge Medications:   Discharge Medication List as of 4/12/2018  4:40 PM      START taking these medications    Details   levofloxacin (LEVAQUIN) 500 MG tablet Take 1 tablet (500 mg) by mouth daily, Disp-7 tablet, R-0, E-Prescribe         CONTINUE these medications which have CHANGED    Details   warfarin (COUMADIN) 2 MG tablet Take 1 tablet (2 mg) by mouth daily, Disp-7 tablet, R-0, E-PrescribeTake 1 st dose on 4/13         CONTINUE these medications which have NOT CHANGED    Details   diltiazem (TIAZAC) 120 MG 24 hr ER beaded capsule Take 120 mg by mouth every evening, Historical      gabapentin (NEURONTIN) 600 MG tablet Take 1,200 mg by mouth 3 times daily, Historical      lidocaine (LIDODERM) 5 % Patch Place 3 patches onto the skin daily as needed for moderate pain (Apply up to 3 patches to painful area at once for up to 12 h within a 24 h period.  Remove after 12 hours.)Historical      METOPROLOL SUCCINATE ER PO Take 50 mg by mouth every evening, Historical      venlafaxine (EFFEXOR-XR) 150 MG 24 hr capsule Take 150 mg by mouth every evening, Historical      Multiple  Vitamins-Minerals (CENTRUM SILVER) per tablet Take 1 tablet by mouth daily, Historical      Pantoprazole Sodium (PROTONIX PO) Take 40 mg by mouth every morning (before breakfast), Historical      pregabalin (LYRICA) 150 MG capsule Take 1 capsule (150 mg) by mouth 2 times daily, Disp-180 capsule, R-1, Local Print      buPROPion (WELLBUTRIN XL) 300 MG 24 hr tablet Take 1 tablet (300 mg) by mouth every morning, Disp-90 tablet, R-1, E-Prescribe      furosemide (LASIX) 20 MG tablet Take 1 tablet (20 mg) by mouth daily, Disp-90 tablet, R-3, E-Prescribe      nortriptyline (PAMELOR) 25 MG capsule Take 2 capsules (50 mg) by mouth At Bedtime, Disp-180 capsule, R-2, E-Prescribe      pravastatin (PRAVACHOL) 10 MG tablet Take 1 tablet (10 mg) by mouth At Bedtime Needs labs before next refill, Disp-90 tablet, R-0, E-Prescribe      minocycline (MINOCIN/DYNACIN) 50 MG capsule Take 1 capsule (50 mg) by mouth 2 times daily, Disp-180 capsule, R-2, E-Prescribe      CEPHALEXIN PO Take 500 mg by mouth 3 times daily as needed cellulitis, Historical              Condition on Discharge:  Discharge condition: Stable   Discharge vitals: Blood pressure 161/79, pulse 95, temperature 98.1  F (36.7  C), temperature source Oral, resp. rate 20, weight (!) 157.7 kg (347 lb 9.6 oz), SpO2 96 %, not currently breastfeeding.   Code status on discharge: DNR / DNI     History of Present Illness:  See detailed admission note for full details.        Significant Physical Exam Findings Day of Discharge:  HEENT; Atraumatic, normocephalic, pinkish conjuctiva, pupils bilateral reactive   Skin: warm and moist, no rashes  Lungs: Fair air entry, no obvious wheezes, no crackles  Heart: normal rate, normal rhythm, no rubs or gallops.   Abdomen: normal bowel sounds, no tenderness, no peritoneal signs, no guarding  Extremities: no deformities, no edema   Neuro; follow commands, alert and oriented x3, spontaneous speech, coherent, moves all extremities  spontaneously  Psych; no hallucination, euthymic mood, not agitated    Procedures other than Imaging:  None     Imaging:  Results for orders placed or performed during the hospital encounter of 04/09/18   XR Chest 2 Views    Narrative    XR CHEST 2 VW  4/9/2018 11:19 PM     HISTORY: Fever, cough.    COMPARISON: 3/10/2018.    FINDINGS: The heart is enlarged without pulmonary edema. There is  infiltrate in the left mid and lower lung. Allowing for low volumes,  the right lung appears clear. No pneumothorax or pleural effusion.      Impression    IMPRESSION: Left-sided pneumonia.    SEJAL RAMOS MD     *Note: Due to a large number of results and/or encounters for the requested time period, some results have not been displayed. A complete set of results can be found in Results Review.        Consultations:  Consultation during this admission received from infectious disease.     Recent Lab Results:    Recent Labs  Lab 04/12/18  0713 04/11/18  0714 04/09/18  2240   WBC 9.0 14.9* 23.2*   HGB 10.5* 10.8* 11.4*   HCT 34.0* 35.1 36.5   MCV 92 93 91    175 174       Recent Labs  Lab 04/09/18  2303 04/09/18  2256 04/09/18  2240   CULT No growth after 2 days No growth No growth after 2 days       Recent Labs  Lab 04/12/18  0713 04/11/18  0714 04/09/18  2240    140 137   POTASSIUM 4.0 4.1 4.3   CHLORIDE 107 107 103   CO2 29 28 28   ANIONGAP 5 5 6   GLC 79 102* 137*   BUN 30 28 21   CR 1.26* 1.34* 1.29*   GFRESTIMATED 41* 38* 40*   GFRESTBLACK 50* 47* 49*   SHILOH 8.7 9.0 8.8   PROTTOTAL  --   --  7.9   ALBUMIN  --   --  3.0*   BILITOTAL  --   --  0.7   ALKPHOS  --   --  109   AST  --   --  33   ALT  --   --  38       Recent Labs  Lab 04/12/18  0713 04/11/18  0714 04/09/18  2240   GLC 79 102* 137*       Recent Labs  Lab 04/10/18  2104 04/09/18  2240   LACT 1.4 1.6     No results for input(s): TROPONIN, TROPI, TROPR in the last 168 hours.    Invalid input(s): TROP, TROPONINIES    Recent Labs  Lab 04/09/18  8918    COLOR Yellow   APPEARANCE Clear   URINEGLC Negative   URINEBILI Negative   URINEKETONE Negative   SG 1.012   UBLD Negative   URINEPH 5.0   PROTEIN Negative   NITRITE Negative   LEUKEST Negative          Pending Results:    Unresulted Labs Ordered in the Past 30 Days of this Admission     Date and Time Order Name Status Description    4/9/2018 2222 Blood culture Preliminary     4/9/2018 2222 Blood culture Preliminary            Discharge Instructions and Follow-Up:   Discharge diet:   Active Diet Order      Diet   Discharge activity: Activity as tolerated   Discharge follow-up: 1-2 weeks with PCP   Outpatient therapy: None    Other instructions: None      Hospital Course:  Ms. Chaudhari is currently feeling well and fine.  She is afebrile and not requiring oxygen supplementation.  She has stable hemodynamics.  Currently denying any symptoms of chest pain, shortness of breath, coughing spells, nausea/vomiting.  Tolerating oral diet.  No episodes of diarrhea.  Subsequent CBC showed leukocytosis has resolved from earlier because 23K back to normal..  Cultures has no significant growth.  Overall clinical picture has improved and Ms. Ng is actually requesting for home discharge today.  She is optimized from infectious disease service as well with recommendations to continue Levaquin upon discharge and she receive dual antibiotics during her stay here with meropenem and Levaquin in the setting of multiple hospitalizations in the past and complex medical and infectious history.  No obvious skin breakdown or cellulitis, not even significant leg swelling were seen with Ms. Ng during this hospitalization.  Her warfarin and diltiazem/metoprolol were resumed.  Since wheezing has resolved no further steroids were given.     Total time spent in face to face contact with the patient and coordinating discharge was:  > 30 Minutes.

## 2018-04-13 NOTE — PHARMACY-ANTICOAGULATION SERVICE
Clinical Pharmacy- Warfarin Discharge Note  This patient is currently on warfarin for the treatment of Atrial fibrillation.  INR Goal= 2-3  Expected length of therapy undetermined.    Warfarin PTA Regimen: 5 M/F, 2.5 ROW      Anticoagulation Dose History     Recent Dosing and Labs Latest Ref Rng & Units 3/23/2018 3/27/2018 4/3/2018 4/9/2018 4/10/2018 4/11/2018 4/12/2018    Warfarin 2.5 mg - - - - - 2.5 mg - -    INR 0.86 - 1.14 1.7 2.8 3.7 2.26(H) - 3.32(H) 3.11(H)    INR 0.86 - 1.14 - - - - - - -          The patient should have an INR checked on Saturdaty 4/14/18.  Agree with discharge orders of 2 mg daily of warfarin until INR is checked and reassessed at that time

## 2018-04-13 NOTE — TELEPHONE ENCOUNTER
"Hospital/TCU/ED for chronic condition Discharge Protocol    \"Hi, my name is Melissa Abdullahi, a registered nurse, and I am calling from [facility].  I am calling to follow up and see how things are going for you after your recent emergency visit/hospital/TCU stay.\"    Tell me how you are doing now that you are home?\" better, sleeping a lot, taking abx and tolerating well      Discharge Instructions    \"Let's review your discharge instructions.  What is/are the follow-up recommendations?  Pt. Response: appointment with PCP     \"Has an appointment with your primary care provider been scheduled?\"   Yes. (confirm)    \"When you see the provider, I would recommend that you bring your medications with you.\"    Medications    \"Tell me what changed about your medicines when you discharged?\"    Changes to chronic meds?    0-1    \"What questions do you have about your medications?\"    None     New diagnoses of heart failure, COPD, diabetes, or MI?    No          On warfarin: \"Were you given any recommendations for follow-up with the anticoagulation clinic?\" Yes - Anticoagulation clinic appointment is already scheduled at appropriate interval    Medication reconciliation completed? Yes  Was MTM referral placed (*Make sure to put transitions as reason for referral)?   No    Call Summary    \"What questions or concerns do you have about your recent visit and your follow-up care?\"     none    \"If you have questions or things don't continue to improve, we encourage you contact us through the main clinic number (give number).  Even if the clinic is not open, triage nurses are available 24/7 to help you.     We would like you to know that our clinic has extended hours (provide information).  We also have urgent care (provide details on closest location and hours/contact info)\"      \"Thank you for your time and take care!\"         Next 5 appointments (look out 90 days)     Apr 20, 2018 11:00 AM CDT   Office Visit with Nelly Kramer " MD Dae   [facility]    [address]  [address]  [facility] [address] [phone]

## 2018-04-14 NOTE — PROGRESS NOTES
Hand-off for Care Transitions to Next Level of Care Provider  Name: Berenice Chaudhari  : 1940  MRN #: [id number]  Reason for Hospitalization:  Pneumonia [J18.9]  Admit Date/Time: 2018 10:11 PM  Discharge Date: 2018    Reason for Communication Hand-off Referral: Admission diagnoses: PN    Discharge Plan:  Discharged to: Home with support                   Patient agreeable to post-hospital support suggestions:  Yes  Discharge Plan:  Home with support           Patient is on new medications:   Yes           MTM follow up recommended: Yes           Tel-Assurance program:  Ineligible  Follow-up specialty is recommended: Yes. Follow up with the Anticoagulation Clinic as scheduled.   Follow-up plan:  Future Appointments  Date Time Provider Department Center   2018 11:00 AM Nelly Pearl MD [facility]   2018 11:30 AM [facility]     Any outstanding tests or procedures:  No.       Referrals     Future Labs/Procedures    Med Therapy Manage Referral     Comments:    Your provider has referred you to: **[place] [facility] Scheduling (numerous locations) [phone]   [email or web address]    Reason for Referral: 10+ prescription medications    The [place] [facility] department will contact you to schedule an appointment.  You may also schedule the appointment by calling [phone].  For [place] patients, please call [phone] to confirm/schedule your appointment on the next business day.    This service is designed to help you get the most from your medications.  A specially trained Pharmacist will work closely with you and your providers to solve any questions, concerns, issues or problems related to your medications.    Please bring all of your prescription and non-prescription medications (such as vitamins, over-the-counter medications, and herbals) or a detailed  medication list to your appointment.    If you have a glucose meter or other home monitoring information, please also bring this to your appointment (i.e. blood glucose log, blood pressure log, pain log, etc.).          Key Recommendations:  Patient was admitted for healthcare acquired pneumonia. She is agreeable to recommendations from the PNA action plan. Her  is her caretaker and transports her to appointments due to being wheelchair bound at baseline. She was able to discharge home without supplemental oxygen. Assess for continued improvement of her PNA. Also, assess for compliance with completing all of her prescribed antibiotic.     Communicated handoff via [employer] to Dr. Nelly Pearl's CC at [facility].      Any Harley RN, BSN, CTS  [facility]  [phone]

## 2018-04-15 ENCOUNTER — NURSE TRIAGE (OUTPATIENT)
Dept: NURSING | Facility: CLINIC | Age: 78
End: 2018-04-15

## 2018-04-15 ENCOUNTER — TELEPHONE (OUTPATIENT)
Dept: PEDIATRICS | Facility: CLINIC | Age: 78
End: 2018-04-15

## 2018-04-15 DIAGNOSIS — J18.9 HCAP (HEALTHCARE-ASSOCIATED PNEUMONIA): Primary | ICD-10-CM

## 2018-04-15 RX ORDER — DOXYCYCLINE 100 MG/1
100 CAPSULE ORAL 2 TIMES DAILY
Qty: 14 CAPSULE | Refills: 0 | Status: SHIPPED | OUTPATIENT
Start: 2018-04-15 | End: 2018-01-01

## 2018-04-15 NOTE — TELEPHONE ENCOUNTER
Reason for Disposition    Hives or itching     Berenice was discharged from hospital on Thursday for pneumonia.  She started on oral Levaquin 500 mg daily, started to develop rash and itching yesterday, hives today.    Paged on call provider for [facility] to speak to me at [facility].  Dr. Cuevas is on call.  Dr. Cuevas advises: Stop Levaquin, if she didn't take today's dose of Levaquin may start new abx today: Doxycycline: 100 mg BID x 7 days.  Follow up in clinic with Dr. Pearl tomorrow.    Review sxs to be seen in ER and/or call 911.    Called Berenice and  Ricardo back with above instructions.  They appear to understand directives and agree with plan, however they declined to make an appointment for tomorrow. Asked that PCP care team call tomorrow to follow up as they already have appointment on Friday.    Phone message left for care team.    Additional Information    Negative: [1] Life-threatening reaction (anaphylaxis) in the past to the same drug AND [2] < 2 hours since exposure    Negative: Difficulty breathing or wheezing    Negative: [1] Hoarseness or cough AND [2] started soon after 1st dose of drug    Negative: [1] Swollen tongue AND [2] started soon after 1st dose of drug    Negative: [1] Purple or blood-colored rash (spots or dots) AND [2] fever    Negative: Sounds like a life-threatening emergency to the triager    Negative: Rash is only on 1 part of the body (localized)    Negative: Taking new non-prescription (OTC) antihistamine, decongestant, ear drops, eye drops, or other OTC cough/cold medicine    Negative: Taking new prescription antihistamine, allergy medicine, asthma medicine, eye drops, ear drops or nose drops    Negative: Rash started more than 3 days after stopping new prescription medicine    Negative: Swollen tongue    Negative: [1] Widespread hives AND [2] onset < 2 hours of exposure to 1st dose of drug    Negative: Fever    Negative: Patient sounds very sick or weak to the triager     Negative: [1] Purple or blood-colored rash (spots or dots) AND [2] no fever AND [3] sounds well to triager    Negative: [1] Taking new prescription medication AND [2] rash within 4 hours of 1st dose    Negative: Large or small blisters on skin (i.e., fluid filled bubbles or sacs)    Negative: Bloody crusts on lips or sores in mouth    Negative: Face or lip swelling    Protocols used: RASH - WIDESPREAD ON DRUGS-ADULT-

## 2018-04-15 NOTE — TELEPHONE ENCOUNTER
Clinic Action Needed:Yes, please return call    Reason for Call: Berenice was discharged from home on Thursday after being hospitalized for pneumonia.  She was prescribed Levaquin 500 mg daily for 7 days.  After second dose she developed itching, rash and hives.  See triage note.  Her abx was changed by the on call provider Dr. Cuevas to Doxycycline 100 mg bid x 7days.  She also advised that Berenice should follow up with Dr. Pearl on Monday.  Berenice declined to make an appointment for Monday as she is already scheduled to come in on Friday April 20th for hospital follow up call. Please return call to discuss if she should be seen sooner than Friday.  Thank you. (Please note that Doxycycline may cause an increased risk in bleeding with Warfarin).     Routed to:  Triage Station C    Sandi Mantilla RN  [facility]

## 2018-04-16 NOTE — TELEPHONE ENCOUNTER
Doesn't have any fever but no energy and very uncomfortable Lots of hives. Not having any breathing difficulty. She is still red. Still has some swelling.  doesn't seem to feel she needs to come in any sooner unless they can't get the hives to go away. Advised on dosing of benadryl. Advised about increased bleeding with Warfarin.  Catarina Diggs RN

## 2018-04-16 NOTE — PROGRESS NOTES
Clinic Care Coordination Contact  [facility]/Voicemail    Referral Source: IP Report    Clinical Data: Care Coordinator Outreach    Outreach attempted x 2.  Spoke briefly with patient's spouse, Ricardo.  He reports that she is currently sleeping. He states that she had an allergic reaction to oral levofloxacin over the weekend - refer to 04/15/2018 Nurse Triage encounter.   Patient has been suffering from hives but is taking Benadryl prn.  He is well versed, reportedly, on s/s of anaphylaxis as patient has a significant history of medication reactions (specifically IV meds).    He states if she has any s/s of anaphylaxis he will call 911.   He reports that patient has MD + INR appointments this week on Friday 04/20/2018.   He is in agreement for writer to meet with patient after these appointments are complete.     He is interested in PCP/CCRN help with obtaining the following-     Hospital bed    Padded transfer belt    Plan:      FYI to PCP re: DME order request that patient/spouse would like to discuss at upcoming appointment.     Care Coordinator will try to reach patient again in 3-5 business days, after 04/20/2018 appointments as noted.   Patient will require both PNA and [facility].     Next 5 appointments (look out 90 days)     Apr 20, 2018 11:00 AM CDT   Office Visit with N[facility] MD   [facility]    [address] [place] [address]  [address]  [facility] [address] [phone]                  Claire Barclay, RN  [facility]  Clinic Care Coordinator - [place] Locations   Direct:  [phone] (voicemail available)

## 2018-04-20 ENCOUNTER — ANTICOAGULATION THERAPY VISIT (OUTPATIENT)
Dept: NURSING | Facility: CLINIC | Age: 78
End: 2018-04-20
Payer: COMMERCIAL

## 2018-04-20 ENCOUNTER — OFFICE VISIT (OUTPATIENT)
Dept: PEDIATRICS | Facility: CLINIC | Age: 78
End: 2018-04-20
Payer: COMMERCIAL

## 2018-04-20 VITALS
TEMPERATURE: 97.3 F | HEART RATE: 77 BPM | HEIGHT: 64 IN | OXYGEN SATURATION: 100 % | BODY MASS INDEX: 50.02 KG/M2 | RESPIRATION RATE: 16 BRPM | SYSTOLIC BLOOD PRESSURE: 136 MMHG | DIASTOLIC BLOOD PRESSURE: 66 MMHG | WEIGHT: 293 LBS

## 2018-04-20 DIAGNOSIS — T78.40XD ALLERGIC REACTION, SUBSEQUENT ENCOUNTER: ICD-10-CM

## 2018-04-20 DIAGNOSIS — E66.01 SEVERE OBESITY (BMI >= 40) (H): ICD-10-CM

## 2018-04-20 DIAGNOSIS — J18.9 PNEUMONIA OF LEFT LUNG DUE TO INFECTIOUS ORGANISM, UNSPECIFIED PART OF LUNG: Primary | ICD-10-CM

## 2018-04-20 DIAGNOSIS — I48.92 ATRIAL FLUTTER WITH RAPID VENTRICULAR RESPONSE (H): ICD-10-CM

## 2018-04-20 DIAGNOSIS — I50.9 CONGESTIVE HEART FAILURE, UNSPECIFIED CONGESTIVE HEART FAILURE CHRONICITY, UNSPECIFIED CONGESTIVE HEART FAILURE TYPE: ICD-10-CM

## 2018-04-20 DIAGNOSIS — Z79.01 LONG-TERM (CURRENT) USE OF ANTICOAGULANTS: ICD-10-CM

## 2018-04-20 DIAGNOSIS — I48.92 ATRIAL FLUTTER (H): ICD-10-CM

## 2018-04-20 LAB — INR POINT OF CARE: 2.9 (ref 0.86–1.14)

## 2018-04-20 PROCEDURE — 36416 COLLJ CAPILLARY BLOOD SPEC: CPT

## 2018-04-20 PROCEDURE — 99207 ZZC NO CHARGE NURSE ONLY: CPT

## 2018-04-20 PROCEDURE — 85610 PROTHROMBIN TIME: CPT | Mod: QW

## 2018-04-20 PROCEDURE — 99214 OFFICE O/P EST MOD 30 MIN: CPT | Performed by: PEDIATRICS

## 2018-04-20 ASSESSMENT — ACTIVITIES OF DAILY LIVING (ADL): DEPENDENT_IADLS:: CLEANING;COOKING;LAUNDRY;SHOPPING;MEAL PREPARATION;MEDICATION MANAGEMENT;TRANSPORTATION

## 2018-04-20 NOTE — PROGRESS NOTES
SUBJECTIVE:   Berenice Chaudhari is a 78 year old female who presents to clinic today for the following health issues:          Hospital Follow-up Visit:    Hospital/Nursing Home/IP Rehab Facility: [facility]  Date of Admission: 4/9/18  Date of Discharge: 4/12/18  Reason(s) for Admission: Left sided pneumonia with suspected gram negative in etiology, Prior history of MRSA colonization, History of diastolic congestive heart failure not in exacerbation, atrial flutter on chronic anticoagulation, hypertension and stable chronic kidney disease.             Problems taking medications regularly:  None       Medication changes since discharge: Yes-Doxycycline        Problems adhering to non-medication therapy:  None    Summary of hospitalization:  [facility] discharge summary reviewed  Diagnostic Tests/Treatments reviewed.  Follow up needed: INR  Other Healthcare Providers Involved in Patient s Care:         Care Coordination  Update since discharge: fluctuating course.     Patient had reaction to oral levo on 4/16 - this was switched to doxycycline - patient will take last dose tomorrow AM.  She is using benadryl to help with the full body rash/redness from the levo.  It is slowly getting better. Interestingly she did tolerate IV levo in the hospital.      She feels that her breathing is good.  No fevers since discharge.  Appetite is up and down - her  makes sure she is eating and drinking. The are requesting hospital bed and padded transfer belt for home.     Post Discharge Medication Reconciliation: discharge medications reconciled, continue medications without change.  Plan of care communicated with patient     Coding guidelines for this visit:  Type of Medical   Decision Making Face-to-Face Visit       within 7 Days of discharge Face-to-Face Visit        within 14 days of discharge   Moderate Complexity 44531 [id number]   High Complexity 90740 [id number]            Problem list and histories reviewed  "& adjusted, as indicated.  Additional history: as documented    Reviewed and updated as needed this visit by clinical staff  Tobacco  Allergies  Meds  Med Hx  Surg Hx  Fam Hx  Soc Hx      Reviewed and updated as needed this visit by Provider         ROS:  Constitutional, HEENT, cardiovascular, pulmonary, gi and gu systems are negative, except as otherwise noted.    OBJECTIVE:     /66  Pulse 77  Temp 97.3  F (36.3  C) (Oral)  Resp 16  Ht 5' 4\" (1.626 m)  Wt (!) 347 lb 9.6 oz (157.7 kg)  SpO2 100%  BMI 59.67 kg/m2  Body mass index is 59.67 kg/(m^2).  GENERAL APPEARANCE: healthy, alert and no distress  RESP: lungs clear to auscultation - no rales, rhonchi or wheezes  CV: regular rates and rhythm, normal S1 S2, no S3 or S4 and no murmur, click or rub  SKIN: mild redness on skin, diffuse, no warmth  EXT: trace edema    Diagnostic Test Results:  none     ASSESSMENT/PLAN:       1. Pneumonia of left lung due to infectious organism, unspecified part of lung  resolved    2. Severe obesity (BMI >= 40) (H)  - order for DME; Equipment being ordered: padded transfer belt  Dispense: 1 Units; Refill: 0  - order for DME; Equipment being ordered: Hospital Bed - wide, with top and bottom side rails  Dispense: 1 Units; Refill: 0    3. Congestive heart failure, unspecified congestive heart failure chronicity, unspecified congestive heart failure type (H)  - order for DME; Equipment being ordered: padded transfer belt  Dispense: 1 Units; Refill: 0  - order for DME; Equipment being ordered: Hospital Bed - wide, with top and bottom side rails  Dispense: 1 Units; Refill: 0    4. Allergic reaction, subsequent encounter  Continue benadryl prn      Patient Instructions   Will send your hospital bed and transfer belt request to your insurance.    Continue with benadryl as needed for reaction    Follow up in 3 months      Nelly Pearl MD  [facility]    "

## 2018-04-20 NOTE — PATIENT INSTRUCTIONS
Will send your hospital bed and transfer belt request to your insurance.    Continue with benadryl as needed for reaction    Follow up in 3 months

## 2018-04-20 NOTE — PROGRESS NOTES
"Clinic Care Coordination Contact    OUTREACH    Referral Information:  Referral Source: IP Report    Primary Diagnosis: Pneumonia (Patient also has CHF, HTN, A-Flutter on chronic anticoagulants.)    Chief Complaint   Patient presents with     Clinic Care Coordination - Initial     Clinic Care Coordinator-RN     Clinic Care Coordination - Post Hospital        Universal Utilization:   Utilization    Last refreshed: 4/20/2018  1:18 PM:  No Show Count (past year) 3       Last refreshed: 4/20/2018  1:18 PM:  ED visits 0       Last refreshed: 4/20/2018  1:18 PM:  Hospital admissions 5          Current as of: 4/20/2018  1:18 PM             Clinical Concerns:  Current Medical Concerns:    No new concerns today.    Patient states that she has never really felt or managed her CHF before.   \"I dont' even really realize it's there.\"  Typically, she tends to take necessary steps for treatment if she or spouse notices swelling in extremities or fevers.   Her spouse, Ricardo, is her primary caregiver.   He monitors her closely and will either bring her to appointments or call 911 if needed. She doesn't follow a heart-healthy diet.  She states her rationale for this is \"I dieted my entire life.  I told him [Ricardo], I don't want to do that anymore.  If I want to eat something I will, but I usually like healthy options - like soup.\"   Ricardo is on [employer] so she tends to eat the same foods that he does, but cannot have a lot of \"greens\" because of the warfarin.   She tries to keep her greens (vitamin K) intake consistent throughout the week. She is caffeine-free (soda and coffee).   She states that she doesn't put much focus on her heart because she is too focused on her other health ailments.      Current Behavioral Concerns:   Patient denies any issues with mood including depression or anxiety.   She denies SI or HI.    Ricardo concurs with this - \"I dont' think depression is an issue.\"       Health Maintenance Reviewed: Not " "assessed    Medication Management:  Medications reviewed today.   She denies any current concerns with regard to side effects, cost or access to medications.    Ricardo sets up her medications in a pill box.   They have a \"red and green\" logging system for patient to complete once she has self-administered her medications.   Patient utilizes [facility] for all 90-day supply medications.   She uses [facility] for local rxs as her insurance will cover 90% cost.   (She prefers [facility], but they are only covered at a 10% benefit under her current plan.)     Functional Status:  Mobility Status: Independent w/Device  Equipment Currently Used at Home: wheelchair, manual -Manual WC- for bedroom level of home only, grab bars, wheelchair, power, lift device- Zachary Lift,  stair lifts, bed rails x 3 (currently working on getting a hospital bed), lift recliner, pulse oximeter, wrist BP cuff, built-in bath bench  Transportation:  DEPENDENT  Transportation means: Family, Accessible car (Wheelchair accessible van), Medical transport -[employer], sister can help with transportation if Ricardo is unavailable.      Psychosocial:  Current living arrangement: I live in a private home with spouse  (4-level home including the basement.  Patient has stair lifts to navigate the parts of the home she uses most often.   She feels safe in her ability to navigate the home with assistance.)  Type of residence:: Private home - stairs  Financial/Insurance: [employer] [place] (patient's spouse works for an [employer]-based company; her insurance coverage is through him).  She has a [employer] RN CM who outreaches to her monthly.   She receives  benefits.   She has support through Ricardo, spouse/primary caregiver.   Her sister, Heather, lives in [place] and is also supportive of the patient.   She helps make meals for her and Ricardo.   She states that she has great neighbors they can call on as well.      Resources and Interventions:  Current " "Resources:  DME - as listed, Housekeeping/[employer]ore [employer], Transportation Services (Private pay:  Cleaning agency (q other week),  Lawn/garden/snow removal services and \"[employer]\" - paid to assist with projects, stays with patient  and cares for her when Ricardo is out of town for work. )  Advanced Care Plans/Directives on file: Yes    *Ricardo carries a backpack with the following contents wherever he and the patient go together:    Copy of patient's ACD    Medication List    Allergies    Full-day supply of medications including APAP (for prn use)         Outreach Frequency: 6 weeks (Per patient request.)  Future Appointments              In 1 week  ANTICOAGULATION CLINIC [facility]           Plan:   Care Coordinator mailed out care coordination introduction letter on 03/02/2018.  Copy of this letter provided to patient/spouse at today's F2F visit.   My Heart Failure Action Plan - copy provided to patient/spouse today.   Care Coordinator will outreach in apprx. 6 weeks at patient's request.    Claire Barclay, LEXIE  [facility]  Clinic Care Coordinator - [place] Locations   Direct:  [phone] (voicemail available)   "

## 2018-04-20 NOTE — PROGRESS NOTES
ANTICOAGULATION FOLLOW-UP CLINIC VISIT    Patient Name:  Berenice Chaudhari  Date:  4/20/2018  Contact Type:  Face to Face  Patient is accompanied in the office by her .    SUBJECTIVE:     Patient Findings     Positives No Problem Findings    Comments She was given a prescription for seven 2 mg tabs   of warfarin to take for a week after she was discharged from the hospital.           OBJECTIVE    INR Protime   Date Value Ref Range Status   04/20/2018 2.9 (A) 0.86 - 1.14 Final       ASSESSMENT / PLAN  INR assessment THER    Recheck INR In: 2 WEEKS    INR Location Clinic      Anticoagulation Summary as of 4/20/2018     INR goal 2.0-3.0   Today's INR 2.9   Maintenance plan 1.25 mg (2.5 mg x 0.5) on Mon, Fri; 2.5 mg (2.5 mg x 1) all other days   Full instructions 1.25 mg on Mon, Fri; 2.5 mg all other days   Weekly total 15 mg   Plan last modified Mary Butcher RN (4/20/2018)   Next INR check 5/3/2018   Target end date Indefinite    Indications   Atrial flutter (H) [I48.92]  Atrial flutter with rapid ventricular response (H) [I48.92]  Long-term (current) use of anticoagulants [Z79.01] [Z79.01]         Anticoagulation Episode Summary     INR check location     Preferred lab     Send INR reminders to [facility]    Comments 5mg tabs; HS dosing //  (Ricardo) sets up her meds // motorized wheelchair      Anticoagulation Care Providers     Provider Role Specialty Phone number    Nelly Pearl MD Referring Internal Medicine [phone]            See the Encounter Report to view Anticoagulation Flowsheet and Dosing Calendar (Go to Encounters tab in chart review, and find the Anticoagulation Therapy Visit)        Mary Butcher RN

## 2018-04-20 NOTE — MR AVS SNAPSHOT
Berenice VANCE Chaudhari   4/20/2018 11:30 AM   Anticoagulation Therapy Visit    Description:  78 year old female   Provider:  [facility]   Department:   Nurse           INR as of 4/20/2018     Today's INR 2.9      Anticoagulation Summary as of 4/20/2018     INR goal 2.0-3.0   Today's INR 2.9   Full instructions 1.25 mg on Mon, Fri; 2.5 mg all other days   Next INR check 5/3/2018    Indications   Atrial flutter (H) [I48.92]  Atrial flutter with rapid ventricular response (H) [I48.92]  Long-term (current) use of anticoagulants [Z79.01] [Z79.01]         Your next Anticoagulation Clinic appointment(s)     May 03, 2018 11:15 AM CDT   Anticoagulation Visit with  ANTICOAGULATION CLINIC   [facility]    [address] [place] [address]  [address]  [facility] [address] [phone]              Contact Numbers     [facility]  Please call  [phone] to cancel and/or reschedule your appointment   Please call  [phone] with any problems or questions regarding your therapy.        April 2018 Details    Sun Mon Tue Wed Thu Fri Sat     1               2               3               4               5               6               7                 8               9               10               11               12               13               14                 15               16               17               18               19               20      1.25 mg   See details      21      2.5 mg           22      2.5 mg         23      1.25 mg         24      2.5 mg         25      2.5 mg         26      2.5 mg         27      1.25 mg         28      2.5 mg           29      2.5 mg         30      1.25 mg               Date Details   04/20 This INR check               How to take your warfarin dose     To take:  1.25 mg Take 0.5 of a 2.5 mg tablet.    To take:  2.5 mg Take 1 of the 2.5 mg tablets.           May 2018 Details    Sun Mon Tue Wed Thu Fri Sat       1      2.5  mg         2      2.5 mg         3            4               5                 6               7               8               9               10               11               12                 13               14               15               16               17               18               19                 20               21               22               23               24               25               26                 27               28               29               30               31                  Date Details   No additional details    Date of next INR:  5/3/2018         How to take your warfarin dose     To take:  2.5 mg Take 1 of the 2.5 mg tablets.

## 2018-04-20 NOTE — MR AVS SNAPSHOT
"              After Visit Summary   4/20/2018    Berenice Chaudhari    MRN: [id number]           Patient Information     Date Of Birth          1940        Visit Information        Provider Department      4/20/2018 11:00 AM Nelly Pearl MD [facility]        Today's Diagnoses     Severe obesity (BMI >= 40) (H)    -  1    Congestive heart failure, unspecified congestive heart failure chronicity, unspecified congestive heart failure type (H)        Allergic reaction, subsequent encounter          Care Instructions    Will send your hospital bed and transfer belt request to your insurance.    Continue with benadryl as needed for reaction    Follow up in 3 months          Follow-ups after your visit        Follow-up notes from your care team     Return in about 3 months (around 7/20/2018) for med check.      Who to contact     If you have questions or need follow up information about today's clinic visit or your schedule please contact [facility] directly at [phone].  Normal or non-critical lab and imaging results will be communicated to you by MyChart, letter or phone within 4 business days after the clinic has received the results. If you do not hear from us within 7 days, please contact the clinic through [employer] or phone. If you have a critical or abnormal lab result, we will notify you by phone as soon as possible.  Submit refill requests through [employer] or call your pharmacy and they will forward the refill request to us. Please allow 3 business days for your refill to be completed.          Additional Information About Your Visit        [employer] Information     [employer] lets you send messages to your doctor, view your test results, renew your prescriptions, schedule appointments and more. To sign up, go to [email or web address] . Click on \"Log in\" on the left side of the screen, which will take you to the Welcome page. Then click on \"Sign up Now\" on the right side of the " "page.     You will be asked to enter the access code listed below, as well as some personal information. Please follow the directions to create your username and password.     Your access code is: [email or web address]  Expires: 6/10/2018 12:33 PM     Your access code will  in 90 days. If you need help or a new code, please call your [place] clinic or [phone].        Care EveryWhere ID     This is your Care EveryWhere ID. This could be used by other organizations to access your [place] medical records  [id number]        Your Vitals Were     Pulse Temperature Respirations Height Pulse Oximetry BMI (Body Mass Index)    77 97.3  F (36.3  C) (Oral) 16 5' 4\" (1.626 m) 100% 59.67 kg/m2       Blood Pressure from Last 3 Encounters:   18 146/66   18 161/79   18 128/60    Weight from Last 3 Encounters:   18 (!) 347 lb 9.6 oz (157.7 kg)   18 (!) 347 lb 9.6 oz (157.7 kg)   18 (!) 347 lb 12.8 oz (157.8 kg)              We Performed the Following     DEPRESSION ACTION PLAN (DAP)          Today's Medication Changes          These changes are accurate as of 18 11:35 AM.  If you have any questions, ask your nurse or doctor.               Start taking these medicines.        Dose/Directions    order for DME   Used for:  Severe obesity (BMI >= 40) (H), Congestive heart failure, unspecified congestive heart failure chronicity, unspecified congestive heart failure type (H)   Started by:  Nelly Pearl MD        Equipment being ordered: padded transfer belt   Quantity:  1 Units   Refills:  0       order for DME   Used for:  Severe obesity (BMI >= 40) (H), Congestive heart failure, unspecified congestive heart failure chronicity, unspecified congestive heart failure type (H)   Started by:  Nelly Pearl MD        Equipment being ordered: Hospital Bed - wide, with top and bottom side rails   Quantity:  1 Units   Refills:  0            Where to get your medicines      Some of these " will need a paper prescription and others can be bought over the counter.  Ask your nurse if you have questions.     Bring a paper prescription for each of these medications     order for DME    order for DME                Primary Care Provider Office Phone # Fax #    Nelly Pearl [phone] [address] [place] [address]        Equal Access to Services     [facility]: Hadii aad ku hadasho Soomaali, waaxda luqadaha, qaybta kaalmada adeegyada, waxay idiin hayaan adeeg woodyfredericmariano lavalentinan . So [facility] [phone].    ATENCIÓN: Si habla español, tiene a dickey disposición servicios gratuitos de asistencia lingüística. Juan Antonioame al [phone].    We comply with applicable federal civil rights laws and [place] laws. We do not discriminate on the basis of race, color, national origin, age, disability, sex, sexual orientation, or gender identity.            Thank you!     [facility]. Our goal is always to provide you with excellent care. Hearing back from our patients is one way we can continue to improve our services. Please take a few minutes to complete the written survey that you may receive in the mail after your visit with us. Thank you!             Your Updated Medication List - Protect others around you: Learn how to safely use, store and throw away your medicines at www.disposemymeds.org.          This list is accurate as of 4/20/18 11:35 AM.  Always use your most recent med list.                   Brand Name Dispense Instructions for use Diagnosis    buPROPion 300 MG 24 hr tablet    WELLBUTRIN XL    90 tablet    Take 1 tablet (300 mg) by mouth every morning    Major depressive disorder, recurrent, in full remission (H)       CENTRUM SILVER per tablet      Take 1 tablet by mouth daily        CEPHALEXIN PO      Take 500 mg by mouth 3 times daily as needed cellulitis        diltiazem 120 MG 24 hr ER beaded capsule    TIAZAC      Take 120 mg by mouth every evening        doxycycline 100 MG capsule    VIBRAMYCIN    14 capsule    Take 1 capsule (100 mg) by mouth 2 times daily    HCAP (healthcare-associated pneumonia)       furosemide 20 MG tablet    LASIX    90 tablet    Take 1 tablet (20 mg) by mouth daily    Atrial flutter with rapid ventricular response (H)       gabapentin 600 MG tablet    NEURONTIN     Take 1,200 mg by mouth 3 times daily        levofloxacin 500 MG tablet    LEVAQUIN    7 tablet    Take 1 tablet (500 mg) by mouth daily    HCAP (healthcare-associated pneumonia)       lidocaine 5 % Patch    LIDODERM     Place 3 patches onto the skin daily as needed for moderate pain (Apply up to 3 patches to painful area at once for up to 12 h within a 24 h period.  Remove after 12 hours.)        METOPROLOL SUCCINATE ER PO      Take 50 mg by mouth every evening        minocycline 50 MG capsule    MINOCIN/DYNACIN    180 capsule    Take 1 capsule (50 mg) by mouth 2 times daily    Methicillin susceptible Staphylococcus aureus septicemia (H)       nortriptyline 25 MG capsule    PAMELOR    180 capsule    Take 2 capsules (50 mg) by mouth At Bedtime    Post herpetic neuralgia       order for DME     1 Units    Equipment being ordered: padded transfer belt    Severe obesity (BMI >= 40) (H), Congestive heart failure, unspecified congestive heart failure chronicity, unspecified congestive heart failure type (H)       order for DME     1 Units    Equipment being ordered: Hospital Bed - wide, with top and bottom side rails    Severe obesity (BMI >= 40) (H), Congestive heart failure, unspecified congestive heart failure chronicity, unspecified congestive heart failure type (H)       pravastatin 10 MG tablet    PRAVACHOL    90 tablet    Take 1 tablet (10 mg) by mouth At Bedtime Needs labs before next refill    Hyperlipidemia LDL goal <100       pregabalin 150 MG capsule    LYRICA    180 capsule    Take 1 capsule (150 mg) by mouth 2 times daily     Postherpetic neuralgia       PROTONIX PO      Take 40 mg by mouth every morning (before breakfast)        venlafaxine 150 MG 24 hr capsule    EFFEXOR-XR     Take 150 mg by mouth every evening        warfarin 2 MG tablet    COUMADIN    7 tablet    Take 1 tablet (2 mg) by mouth daily    Atrial flutter, unspecified type (H)

## 2018-04-20 NOTE — LETTER
My Depression Action Plan  Name: Berenice Chaudhari   Date of Birth 1940  Date: 4/20/2018    My doctor: Nelly Pearl   My clinic: [address]  [address]  [facility] [address] [phone]          GREEN    ZONE   Good Control    What it looks like:     Things are going generally well. You have normal up s and down s. You may even feel depressed from time to time, but bad moods usually last less than a day.   What you need to do:  1. Continue to care for yourself (see self care plan)  2. Check your depression survival kit and update it as needed  3. Follow your physician s recommendations including any medication.  4. Do not stop taking medication unless you consult with your physician first.           YELLOW         ZONE Getting Worse    What it looks like:     Depression is starting to interfere with your life.     It may be hard to get out of bed; you may be starting to isolate yourself from others.    Symptoms of depression are starting to last most all day and this has happened for several days.     You may have suicidal thoughts but they are not constant.   What you need to do:     1. Call your care team, your response to treatment will improve if you keep your care team informed of your progress. Yellow periods are signs an adjustment may need to be made.     2. Continue your self-care, even if you have to fake it!    3. Talk to someone in your support network    4. Open up your depression survival kit           RED    ZONE Medical Alert - Get Help    What it looks like:     Depression is seriously interfering with your life.     You may experience these or other symptoms: You can t get out of bed most days, can t work or engage in other necessary activities, you have trouble taking care of basic hygiene, or basic responsibilities, thoughts of suicide or death that will not go away, self-injurious behavior.     What you need to do:  1. Call your  care team and request a same-day appointment. If they are not available (weekends or after hours) call your local crisis line, emergency room or 911.            Depression Self Care Plan / Survival Kit    Self-Care for Depression  Here s the deal. Your body and mind are really not as separate as most people think.  What you do and think affects how you feel and how you feel influences what you do and think. This means if you do things that people who feel good do, it will help you feel better.  Sometimes this is all it takes.  There is also a place for medication and therapy depending on how severe your depression is, so be sure to consult with your medical provider and/ or Behavioral Health Consultant if your symptoms are worsening or not improving.     In order to better manage my stress, I will:    Exercise  Get some form of exercise, every day. This will help reduce pain and release endorphins, the  feel good  chemicals in your brain. This is almost as good as taking antidepressants!  This is not the same as joining a gym and then never going! (they count on that by the way ) It can be as simple as just going for a walk or doing some gardening, anything that will get you moving.      Hygiene   Maintain good hygiene (Get out of bed in the morning, Make your bed, Brush your teeth, Take a shower, and Get dressed like you were going to work, even if you are unemployed).  If your clothes don't fit try to get ones that do.    Diet  I will strive to eat foods that are good for me, drink plenty of water, and avoid excessive sugar, caffeine, alcohol, and other mood-altering substances.  Some foods that are helpful in depression are: complex carbohydrates, B vitamins, flaxseed, fish or fish oil, fresh fruits and vegetables.    Psychotherapy  I agree to participate in Individual Therapy (if recommended).    Medication  If prescribed medications, I agree to take them.  Missing doses can result in serious side effects.  I  understand that drinking alcohol, or other illicit drug use, may cause potential side effects.  I will not stop my medication abruptly without first discussing it with my provider.    Staying Connected With Others  I will stay in touch with my friends, family members, and my primary care provider/team.    Use your imagination  Be creative.  We all have a creative side; it doesn t matter if it s oil painting, sand castles, or mud pies! This will also kick up the endorphins.    Witness Beauty  (AKA stop and smell the roses) Take a look outside, even in mid-winter. Notice colors, textures. Watch the squirrels and birds.     Service to others  Be of service to others.  There is always someone else in need.  By helping others we can  get out of ourselves  and remember the really important things.  This also provides opportunities for practicing all the other parts of the program.    Humor  Laugh and be silly!  Adjust your TV habits for less news and crime-drama and more comedy.    Control your stress  Try breathing deep, massage therapy, biofeedback, and meditation. Find time to relax each day.     My support system    Clinic Contact:  Phone number:    Contact 1:  Phone number:    Contact 2:  Phone number:    [facility]/:  Phone number:    Therapist:  Phone number:    Local crisis center:    Phone number:    Other community support:  Phone number:

## 2018-04-21 ASSESSMENT — PATIENT HEALTH QUESTIONNAIRE - PHQ9: SUM OF ALL RESPONSES TO PHQ QUESTIONS 1-9: 5

## 2018-04-30 ENCOUNTER — TELEPHONE (OUTPATIENT)
Dept: PEDIATRICS | Facility: CLINIC | Age: 78
End: 2018-04-30

## 2018-04-30 NOTE — TELEPHONE ENCOUNTER
Patient's spouse calling in regards to the patients medical equipment orders. The original pharmacy that these were sent to don't/can't fill these. Patient's spouse would like the orders sent to a home medical equipment company to be filled. Orders sent to [facility] at [phone]. They will start the authorization with insurance and discuss further with the patient.  -Melanie Henry

## 2018-05-03 NOTE — PROGRESS NOTES
"  SUBJECTIVE:   Berenice Chaudhari is a 78 year old female who presents to clinic today for the following health issues:    Dizziness      Duration: 3 weeks - has happened 4 times during this time period    Description   Feeling faint:  YES  Feeling like the surroundings are moving: no   Loss of consciousness or falls: no     Patient states she can be doing anything (one time making the bed, another time was about to get in bed), get brief (<1 min) sensation of lightheadedness.  Denies vertigo.  No other symptoms - no palpitations, no shortness of breath, no chest pain, no diaphoresis, no HA. Then symptoms quickly resolve and she is back to normal.  She is unsure of any relationship to last time she ate or amount of sleep the previous night.  No medications changes. She does eat irregularly - usually eating toro delicious apples throughout the day, but sometimes will go a day and only have ice cream for dinner. No vision changes.       Intensity:  mild    Accompanying signs and symptoms:   Nausea/vomitting: no   Palpitations: no   Weakness in arms or legs: YES  Vision or speech changes: no   Ringing in ears (Tinnitus): no   Hearing loss related to dizziness: no   Other (fevers/chills/sweating/dyspnea): YES- Trouble swallowing    History (similar episodes/head trauma/previous evaluation/recent bleeding): None    Precipitating or alleviating factors (new meds/chemicals): None  Worse with activity/head movement: no     Therapies tried and outcome: None    Problem list and histories reviewed & adjusted, as indicated.  Additional history: as documented    Reviewed and updated as needed this visit by clinical staff       Reviewed and updated as needed this visit by Provider         ROS:  Constitutional, HEENT, cardiovascular, pulmonary, gi and gu systems are negative, except as otherwise noted.    OBJECTIVE:     /72 (BP Location: Right arm, Cuff Size: Adult Large)  Pulse 66  Temp 98  F (36.7  C)  Ht 5' 4\" (1.626 m) "  Wt (!) 347 lb (157.4 kg)  SpO2 97%  BMI 59.56 kg/m2  Body mass index is 59.56 kg/(m^2).  GENERAL APPEARANCE: healthy, alert and no distress  RESP: lungs clear to auscultation - no rales, rhonchi or wheezes  CV: regular rates and rhythm, normal S1 S2, no S3 or S4 and no murmur, click or rub    Diagnostic Test Results:  none     ASSESSMENT/PLAN:       1. Lightheadedness  Patient history with only a few episodes of symptoms - not frequent enough to repeat Holter study at this time. No clear triggers - patient will start to track more closely.   will use home pulse ox to check heart rate during episodes and check her blood pressure. Some concern if related to her sporadic diet.  Will recheck labs again today.  Is symptoms increase may consider further cardiac workup.  - Basic metabolic panel  - CBC with platelets    2. Stress incontinence- at end of visit patient noted that after she goes to the bathroom she thinks she has emptied her bladder,but then if she coughs more urine will come out. Was previously told in the hospital that she doesn't empty her bladder completely.  No dysuria.  Has never seen urology.  No urge incontinence.  Advised more frequent urination throughout day.  In the future we could consider urology for dynamic testing, but patient does not want any more tests or other doctors for a while.    Patient Instructions   With next episodes - try taking pulse ox and blood pressure at home.    Recheck labs.    Keep track of episodes - cary related to activity, food and sleep.    For urination - try small frequent urination.  In the future we can consider seeing urology.      Nelly Pearl MD  [facility]

## 2018-05-04 ENCOUNTER — ANTICOAGULATION THERAPY VISIT (OUTPATIENT)
Dept: NURSING | Facility: CLINIC | Age: 78
End: 2018-05-04
Payer: COMMERCIAL

## 2018-05-04 ENCOUNTER — OFFICE VISIT (OUTPATIENT)
Dept: PEDIATRICS | Facility: CLINIC | Age: 78
End: 2018-05-04
Payer: COMMERCIAL

## 2018-05-04 VITALS
HEART RATE: 66 BPM | DIASTOLIC BLOOD PRESSURE: 72 MMHG | OXYGEN SATURATION: 97 % | SYSTOLIC BLOOD PRESSURE: 134 MMHG | WEIGHT: 293 LBS | BODY MASS INDEX: 50.02 KG/M2 | HEIGHT: 64 IN | TEMPERATURE: 98 F

## 2018-05-04 DIAGNOSIS — R42 LIGHTHEADEDNESS: Primary | ICD-10-CM

## 2018-05-04 DIAGNOSIS — I48.92 ATRIAL FLUTTER WITH RAPID VENTRICULAR RESPONSE (H): ICD-10-CM

## 2018-05-04 DIAGNOSIS — N39.3 FEMALE STRESS INCONTINENCE: ICD-10-CM

## 2018-05-04 DIAGNOSIS — I48.92 ATRIAL FLUTTER (H): ICD-10-CM

## 2018-05-04 DIAGNOSIS — Z79.01 LONG-TERM (CURRENT) USE OF ANTICOAGULANTS: ICD-10-CM

## 2018-05-04 LAB
ERYTHROCYTE [DISTWIDTH] IN BLOOD BY AUTOMATED COUNT: 15.3 % (ref 10–15)
HCT VFR BLD AUTO: 36.3 % (ref 35–47)
HGB BLD-MCNC: 11.3 G/DL (ref 11.7–15.7)
INR POINT OF CARE: 1.5 (ref 0.86–1.14)
MCH RBC QN AUTO: 28.3 PG (ref 26.5–33)
MCHC RBC AUTO-ENTMCNC: 31.1 G/DL (ref 31.5–36.5)
MCV RBC AUTO: 91 FL (ref 78–100)
PLATELET # BLD AUTO: 214 10E9/L (ref 150–450)
RBC # BLD AUTO: 4 10E12/L (ref 3.8–5.2)
WBC # BLD AUTO: 6.5 10E9/L (ref 4–11)

## 2018-05-04 PROCEDURE — 99207 ZZC NO CHARGE NURSE ONLY: CPT

## 2018-05-04 PROCEDURE — 85610 PROTHROMBIN TIME: CPT | Mod: QW

## 2018-05-04 PROCEDURE — 36416 COLLJ CAPILLARY BLOOD SPEC: CPT

## 2018-05-04 PROCEDURE — 80048 BASIC METABOLIC PNL TOTAL CA: CPT | Performed by: PEDIATRICS

## 2018-05-04 PROCEDURE — 85027 COMPLETE CBC AUTOMATED: CPT | Performed by: PEDIATRICS

## 2018-05-04 PROCEDURE — 99214 OFFICE O/P EST MOD 30 MIN: CPT | Performed by: PEDIATRICS

## 2018-05-04 RX ORDER — WARFARIN SODIUM 2.5 MG/1
TABLET ORAL
Start: 2018-05-04 | End: 2018-01-01

## 2018-05-04 NOTE — PROGRESS NOTES
ANTICOAGULATION FOLLOW-UP CLINIC VISIT    Patient Name:  Berenice Chaudhari  Date:  5/4/2018  Contact Type:  Face to Face  Patient is accompanied in the office by her .    SUBJECTIVE:     Patient Findings     Positives No Problem Findings, Unexplained INR or factor level change    Comments Maintenance dose increased.             OBJECTIVE    INR Protime   Date Value Ref Range Status   05/04/2018 1.5 (A) 0.86 - 1.14 Final       ASSESSMENT / PLAN  INR assessment SUB    Recheck INR In: 2 WEEKS    INR Location Clinic      Anticoagulation Summary as of 5/4/2018     INR goal 2.0-3.0   Today's INR 1.5!   Maintenance plan 5 mg (2.5 mg x 2) on Mon, Fri; 2.5 mg (2.5 mg x 1) all other days   Full instructions 5 mg on Mon, Fri; 2.5 mg all other days   Weekly total 22.5 mg   Plan last modified Mary Butcher, RN (5/4/2018)   Next INR check 5/18/2018   Target end date Indefinite    Indications   Atrial flutter (H) [I48.92]  Atrial flutter with rapid ventricular response (H) [I48.92]  Long-term (current) use of anticoagulants [Z79.01] [Z79.01]         Anticoagulation Episode Summary     INR check location     Preferred lab     Send INR reminders to [facility]    Comments 5mg tabs; HS dosing //  (Ricardo) sets up her meds // motorized wheelchair      Anticoagulation Care Providers     Provider Role Specialty Phone number    Nelly Pearl MD Referring Internal Medicine [phone]            See the Encounter Report to view Anticoagulation Flowsheet and Dosing Calendar (Go to Encounters tab in chart review, and find the Anticoagulation Therapy Visit)        Mary Butcher, LEXIE

## 2018-05-04 NOTE — MR AVS SNAPSHOT
Berenice RAJIV Chaudhari   5/4/2018 11:15 AM   Anticoagulation Therapy Visit    Description:  78 year old female   Provider:  [facility]   Department:   Nurse           INR as of 5/4/2018     Today's INR 1.5!      Anticoagulation Summary as of 5/4/2018     INR goal 2.0-3.0   Today's INR 1.5!   Full instructions 5 mg on Mon, Fri; 2.5 mg all other days   Next INR check 5/18/2018    Indications   Atrial flutter (H) [I48.92]  Atrial flutter with rapid ventricular response (H) [I48.92]  Long-term (current) use of anticoagulants [Z79.01] [Z79.01]         Your next Anticoagulation Clinic appointment(s)     May 18, 2018 11:15 AM CDT   Anticoagulation Visit with  ANTICOAGULATION CLINIC   [facility] ([facility])    [address] [place] [address]  [address]  [facility] [address] [phone]              Contact Numbers     [facility]  Please call  [phone] to cancel and/or reschedule your appointment   Please call  [phone] with any problems or questions regarding your therapy.        May 2018 Details    Sun Mon Tue Wed Thu Fri Sat       1               2               3               4      5 mg   See details      5      2.5 mg           6      2.5 mg         7      5 mg         8      2.5 mg         9      2.5 mg         10      2.5 mg         11      5 mg         12      2.5 mg           13      2.5 mg         14      5 mg         15      2.5 mg         16      2.5 mg         17      2.5 mg         18            19                 20               21               22               23               24               25               26                 27               28               29               30               31                  Date Details   05/04 This INR check       Date of next INR:  5/18/2018         How to take your warfarin dose     To take:  2.5 mg Take 1 of the 2.5 mg tablets.    To take:  5 mg Take 2 of the 2.5 mg tablets.

## 2018-05-04 NOTE — LETTER
[facility]  [address]  [place] [address]                  [phone]   May 7, 2018    Berenice Chaudhari  [address]  [place] [address]      Dear Berenice,    Here is a summary of your recent test results:    Your kidney function is stable and your other electrolytes are normal.     Your hemoglobin is still a little below normal, but it's actually the highest (best) its been in a while.     Your test results are enclosed.      Please contact me if you have any questions.    Best regards,    Nelly Pearl MD  [facility]        Results for orders placed or performed in visit on 05/04/18   Basic metabolic panel   Result Value Ref Range    Sodium 141 133 - 144 mmol/L    Potassium 4.3 3.4 - 5.3 mmol/L    Chloride 107 94 - 109 mmol/L    Carbon Dioxide 25 20 - 32 mmol/L    Anion Gap 9 3 - 14 mmol/L    Glucose 101 (H) 70 - 99 mg/dL    Urea Nitrogen 19 7 - 30 mg/dL    Creatinine 1.35 (H) 0.52 - 1.04 mg/dL    GFR Estimate 38 (L) >60 mL/min/1.7m2    GFR Estimate If Black 46 (L) >60 mL/min/1.7m2    Calcium 9.0 8.5 - 10.1 mg/dL   CBC with platelets   Result Value Ref Range    WBC 6.5 4.0 - 11.0 10e9/L    RBC Count 4.00 3.8 - 5.2 10e12/L    Hemoglobin 11.3 (L) 11.7 - 15.7 g/dL    Hematocrit 36.3 35.0 - 47.0 %    MCV 91 78 - 100 fl    MCH 28.3 26.5 - 33.0 pg    MCHC 31.1 (L) 31.5 - 36.5 g/dL    RDW 15.3 (H) 10.0 - 15.0 %    Platelet Count 214 150 - 450 10e9/L     *Note: Due to a large number of results and/or encounters for the requested time period, some results have not been displayed. A complete set of results can be found in Results Review.

## 2018-05-05 LAB
ANION GAP SERPL CALCULATED.3IONS-SCNC: 9 MMOL/L (ref 3–14)
BUN SERPL-MCNC: 19 MG/DL (ref 7–30)
CALCIUM SERPL-MCNC: 9 MG/DL (ref 8.5–10.1)
CHLORIDE SERPL-SCNC: 107 MMOL/L (ref 94–109)
CO2 SERPL-SCNC: 25 MMOL/L (ref 20–32)
CREAT SERPL-MCNC: 1.35 MG/DL (ref 0.52–1.04)
GFR SERPL CREATININE-BSD FRML MDRD: 38 ML/MIN/1.7M2
GLUCOSE SERPL-MCNC: 101 MG/DL (ref 70–99)
POTASSIUM SERPL-SCNC: 4.3 MMOL/L (ref 3.4–5.3)
SODIUM SERPL-SCNC: 141 MMOL/L (ref 133–144)

## 2018-05-08 ENCOUNTER — PATIENT OUTREACH (OUTPATIENT)
Dept: CARE COORDINATION | Facility: CLINIC | Age: 78
End: 2018-05-08

## 2018-05-08 ASSESSMENT — ACTIVITIES OF DAILY LIVING (ADL): DEPENDENT_IADLS:: CLEANING;COOKING;LAUNDRY;SHOPPING;MEAL PREPARATION;MEDICATION MANAGEMENT;TRANSPORTATION

## 2018-05-08 NOTE — PROGRESS NOTES
Clinic Care Coordination Contact  Care Team Conversations    [facility] OWEN Care Coordinator change effective as of 05/01/2018. Case reviewed with new RN Care Coordinator- Noemi Doll RN.  Writer forwarding to Noemi Doll RN Care Coordinator who will be taking over [place] location moving forward.         PLAN:    No further follow-up from writer.     ENROLLMENT STATUS:  Not a candidate (with writer)    CARE COORDINATOR STATUS:  Care Team updated to reflect [facility] staffing changes.     Noemi Doll RN to outreach to patient moving forward.        Claire Barclay RN  [facility]  Clinic Care Coordinator - [place] and [place] Locations   Direct:  [phone] (voicemail available)

## 2018-05-11 DIAGNOSIS — E78.5 HYPERLIPIDEMIA LDL GOAL <100: ICD-10-CM

## 2018-05-13 NOTE — TELEPHONE ENCOUNTER
"Requested Prescriptions   Pending Prescriptions Disp Refills     pravastatin (PRAVACHOL) 10 MG tablet  Last Written Prescription Date:  01/11/2018  Last Fill Quantity: 90 tablet,  # refills: 0   Last office visit: 5/4/2018 with prescribing provider:  Nelly Pearl MD   Future Office Visit:     90 tablet 0     Sig: Take 1 tablet (10 mg) by mouth At Bedtime Needs labs before next refill    Statins Protocol Failed    5/11/2018  7:27 PM       Failed - LDL on file in past 12 months    Recent Labs   Lab Test  10/27/16   1133   LDL  60            Passed - No abnormal creatine kinase in past 12 months    No lab results found.            Passed - Recent (12 mo) or future (30 days) visit within the authorizing provider's specialty    Patient had office visit in the last 12 months or has a visit in the next 30 days with authorizing provider or within the authorizing provider's specialty.  See \"Patient Info\" tab in inbasket, or \"Choose Columns\" in Meds & Orders section of the refill encounter.           Passed - Patient is age 18 or older       Passed - No active pregnancy on record       Passed - No positive pregnancy test in past 12 months          "

## 2018-05-14 ENCOUNTER — TELEPHONE (OUTPATIENT)
Dept: PEDIATRICS | Facility: CLINIC | Age: 78
End: 2018-05-14

## 2018-05-14 NOTE — TELEPHONE ENCOUNTER
Updated  on below, he agrees with plan. Gave him number to call to follow up with [facility] on status of order. He will look online for a padded transfer belt.     I will postpone this encounter for Friday to follow up with [facility] as well.

## 2018-05-14 NOTE — TELEPHONE ENCOUNTER
calling to check on the status of the Hospital Bed that was ordered on 4/20/18. He also reports that [facility] does not supply the padded transfer belts. He mentioned he got a call from someone suggesting [facility] as an option if his insurance covers them.     Called [facility], they did not receive order for Hospital Bed. They confirmed that they do not carry padded transfer belts & do not supply beds.     Called [facility], they do not carry hospital beds or padded transfer belts, [facility] does.     Called [facility] ([phone]), they do have hospital beds and take patient's insurance. They do not have padded transfer belts but do have regular transfer belts.     Faxed DME for hospital bed, 4/20/18 office visit notes & insurance info to [facility] at [phone].

## 2018-05-15 NOTE — TELEPHONE ENCOUNTER
Routing refill request to provider for review/approval because:  Josselyn given x1 and patient did not follow up, please advise    Need fasting lipids updated.    Torie TWONSEND RN, BSN, PHN  [place] [facility] RN

## 2018-05-16 RX ORDER — PRAVASTATIN SODIUM 10 MG
10 TABLET ORAL AT BEDTIME
Qty: 90 TABLET | Refills: 0 | Status: SHIPPED | OUTPATIENT
Start: 2018-05-16 | End: 2018-01-01

## 2018-05-18 ENCOUNTER — ANTICOAGULATION THERAPY VISIT (OUTPATIENT)
Dept: NURSING | Facility: CLINIC | Age: 78
End: 2018-05-18
Payer: COMMERCIAL

## 2018-05-18 DIAGNOSIS — Z79.01 LONG-TERM (CURRENT) USE OF ANTICOAGULANTS: ICD-10-CM

## 2018-05-18 DIAGNOSIS — I48.92 ATRIAL FLUTTER WITH RAPID VENTRICULAR RESPONSE (H): ICD-10-CM

## 2018-05-18 DIAGNOSIS — I48.92 ATRIAL FLUTTER (H): ICD-10-CM

## 2018-05-18 LAB — INR POINT OF CARE: 2.1 (ref 0.86–1.14)

## 2018-05-18 PROCEDURE — 99207 ZZC NO CHARGE NURSE ONLY: CPT

## 2018-05-18 PROCEDURE — 85610 PROTHROMBIN TIME: CPT | Mod: QW

## 2018-05-18 PROCEDURE — 36416 COLLJ CAPILLARY BLOOD SPEC: CPT

## 2018-05-18 NOTE — PROGRESS NOTES
ANTICOAGULATION FOLLOW-UP CLINIC VISIT    Patient Name:  Berenice Chaudhari  Date:  5/18/2018  Contact Type:  Face to Face    SUBJECTIVE:     Patient Findings     Positives No Problem Findings           OBJECTIVE    INR Protime   Date Value Ref Range Status   05/18/2018 2.1 (A) 0.86 - 1.14 Final       ASSESSMENT / PLAN  INR assessment THER    Recheck INR In: 2 WEEKS    INR Location Clinic      Anticoagulation Summary as of 5/18/2018     INR goal 2.0-3.0   Today's INR 2.1   Maintenance plan 5 mg (2.5 mg x 2) on Mon, Fri; 2.5 mg (2.5 mg x 1) all other days   Full instructions 5 mg on Mon, Fri; 2.5 mg all other days   Weekly total 22.5 mg   No change documented Mary Butcher RN   Plan last modified Mary Butcher RN (5/4/2018)   Next INR check 6/1/2018   Target end date Indefinite    Indications   Atrial flutter (H) [I48.92]  Atrial flutter with rapid ventricular response (H) [I48.92]  Long-term (current) use of anticoagulants [Z79.01] [Z79.01]         Anticoagulation Episode Summary     INR check location     Preferred lab     Send INR reminders to [facility]    Comments 5mg tabs; HS dosing //  (Ricardo) sets up her meds // motorized wheelchair      Anticoagulation Care Providers     Provider Role Specialty Phone number    Nelly Pearl MD Referring Internal Medicine [phone]            See the Encounter Report to view Anticoagulation Flowsheet and Dosing Calendar (Go to Encounters tab in chart review, and find the Anticoagulation Therapy Visit)        Mary Butcher RN

## 2018-05-18 NOTE — MR AVS SNAPSHOT
Berenice VANCE Chaudhari   5/18/2018 11:15 AM   Anticoagulation Therapy Visit    Description:  78 year old female   Provider:   ANTICOAGULATION CLINIC   Department:   Nurse           INR as of 5/18/2018     Today's INR 2.1      Anticoagulation Summary as of 5/18/2018     INR goal 2.0-3.0   Today's INR 2.1   Full instructions 5 mg on Mon, Fri; 2.5 mg all other days   Next INR check 6/1/2018    Indications   Atrial flutter (H) [I48.92]  Atrial flutter with rapid ventricular response (H) [I48.92]  Long-term (current) use of anticoagulants [Z79.01] [Z79.01]         Your next Anticoagulation Clinic appointment(s)     Jun 01, 2018 11:30 AM CDT   Anticoagulation Visit with  ANTICOAGULATION CLINIC   [facility] ([facility])    [address] [place] [address] [address]  [facility] [address] [phone]              Contact Numbers     [facility]  Please call  [phone] to cancel and/or reschedule your appointment   Please call  [phone] with any problems or questions regarding your therapy.        May 2018 Details    Sun Mon Tue Wed Thu Fri Sat       1               2               3               4               5                 6               7               8               9               10               11               12                 13               14               15               16               17               18      5 mg   See details      19      2.5 mg           20      2.5 mg         21      5 mg         22      2.5 mg         23      2.5 mg         24      2.5 mg         25      5 mg         26      2.5 mg           27      2.5 mg         28      5 mg         29      2.5 mg         30      2.5 mg         31      2.5 mg            Date Details   05/18 This INR check               How to take your warfarin dose     To take:  2.5 mg Take 1 of the 2.5 mg tablets.    To take:  5 mg Take 2 of the 2.5 mg tablets.           June 2018 Details    Sun Mon Tue Wed Thu Fri Sat           1            2                 3               4               5               6               7               8               9                 10               11               12               13               14               15               16                 17               18               19               20               21               22               23                 24               25               26               27               28               29               30                Date Details   No additional details    Date of next INR:  6/1/2018         How to take your warfarin dose     To take:  5 mg Take 2 of the 2.5 mg tablets.

## 2018-05-18 NOTE — TELEPHONE ENCOUNTER
Called [facility] to check on status and to see if they needed anymore information from us.     Per [facility] Rep,  called on 5/15/18, stated patient does not want the hospital bed. Order was cancelled.

## 2018-05-30 NOTE — TELEPHONE ENCOUNTER
"Requested Prescriptions   Pending Prescriptions Disp Refills     venlafaxine (EFFEXOR-XR) 150 MG 24 hr capsule  Last Written Prescription Date:  -  Last Fill Quantity: -,  # refills: -   Last office visit: 5/4/2018 with prescribing provider:  5/4/2018   Future Office Visit:    PHQ-9 SCORE 3/15/2017 9/5/2017 4/20/2018   Total Score - - -   Total Score 3 7 5     FRANCISCO-7 SCORE 8/7/2013 7/14/2014   Total Score 0 2    30 capsule      Sig: Take 1 capsule (150 mg) by mouth every evening    Serotonin-Norepinephrine Reuptake Inhibitors  Failed    5/30/2018  8:23 AM       Failed - Normal serum creatinine on file in past 12 months    Recent Labs   Lab Test  05/04/18   1226   CR  1.35*          Passed - Blood pressure under 140/90 in past 12 months    BP Readings from Last 3 Encounters:   05/04/18 134/72   04/20/18 136/66   04/12/18 161/79          Passed - Recent (12 mo) or future (30 days) visit within the authorizing provider's specialty    Patient had office visit in the last 12 months or has a visit in the next 30 days with authorizing provider or within the authorizing provider's specialty.  See \"Patient Info\" tab in inbasket, or \"Choose Columns\" in Meds & Orders section of the refill encounter.           Passed - Patient is age 18 or older       Passed - No active pregnancy on record       Passed - No positive pregnancy test in past 12 months        Patients spouse calling for refill of medication, needs refill ASAP. Please call with questions.  -Melanie Henry    "

## 2018-06-01 NOTE — MR AVS SNAPSHOT
Berenice VANCE Chaudhari   6/1/2018 11:30 AM   Anticoagulation Therapy Visit    Description:  78 year old female   Provider:  [facility]   Department:   Nurse           INR as of 6/1/2018     Today's INR 1.7!      Anticoagulation Summary as of 6/1/2018     INR goal 2.0-3.0   Today's INR 1.7!   Full warfarin instructions 5 mg on Mon, Wed, Fri; 2.5 mg all other days   Next INR check 6/15/2018    Indications   Atrial flutter (H) [I48.92]  Atrial flutter with rapid ventricular response (H) [I48.92]  Long-term (current) use of anticoagulants [Z79.01] [Z79.01]         Your next Anticoagulation Clinic appointment(s)     Jc 15, 2018 11:00 AM CDT   Anticoagulation Visit with  ANTICOAGULATION CLINIC   [facility] ([facility]    [address] [place] [address]  [address]  [facility] [address] [phone]              Contact Numbers     [facility]  Please call  [phone] to cancel and/or reschedule your appointment   Please call  [phone] with any problems or questions regarding your therapy.        June 2018 Details    Sun Mon Tue Wed Thu Fri Sat          1      5 mg   See details      2      2.5 mg           3      2.5 mg         4      5 mg         5      2.5 mg         6      5 mg         7      2.5 mg         8      5 mg         9      2.5 mg           10      2.5 mg         11      5 mg         12      2.5 mg         13      5 mg         14      2.5 mg         15            16                 17               18               19               20               21               22               23                 24               25               26               27               28               29               30                Date Details   06/01 This INR check       Date of next INR:  6/15/2018         How to take your warfarin dose     To take:  2.5 mg Take 1 of the 2.5 mg tablets.    To take:  5 mg Take 2 of the 2.5 mg tablets.

## 2018-06-01 NOTE — PROGRESS NOTES
ANTICOAGULATION FOLLOW-UP CLINIC VISIT    Patient Name:  Berenice Chaudhari  Date:  6/1/2018  Contact Type:  Face to Face  Patient is accompanied in the office by her .    SUBJECTIVE:     Patient Findings     Positives Other complaints, Unexplained INR or factor level change    Comments Postnasal drip with cough.    Patient denies missing any doses, or   excess intake of green vegetables, or   medication changes.             OBJECTIVE    INR Protime   Date Value Ref Range Status   06/01/2018 1.7 (A) 0.86 - 1.14 Final       ASSESSMENT / PLAN  INR assessment SUB    Recheck INR In: 2 WEEKS    INR Location Clinic      Anticoagulation Summary as of 6/1/2018     INR goal 2.0-3.0   Today's INR 1.7!   Warfarin maintenance plan 5 mg (2.5 mg x 2) on Mon, Wed, Fri; 2.5 mg (2.5 mg x 1) all other days   Full warfarin instructions 5 mg on Mon, Wed, Fri; 2.5 mg all other days   Weekly warfarin total 25 mg   Plan last modified Mary Butcher RN (6/1/2018)   Next INR check 6/15/2018   Target end date Indefinite    Indications   Long-term (current) use of anticoagulants [Z79.01] [Z79.01]  Atrial flutter with rapid ventricular response (H) [I48.92]         Anticoagulation Episode Summary     INR check location     Preferred lab     Send INR reminders to [facility]    Comments 5mg & 2.5mg tabs; HS dosing //  (Ricardo) sets up her meds // motorized wheelchair      Anticoagulation Care Providers     Provider Role Specialty Phone number    Nelly Pearl MD Referring Internal Medicine [phone]            See the Encounter Report to view Anticoagulation Flowsheet and Dosing Calendar (Go to Encounters tab in chart review, and find the Anticoagulation Therapy Visit)        Mary Butcher RN

## 2018-06-04 NOTE — PROGRESS NOTES
"Clinic Care Coordination Contact      Referral Source: Care Team (hand off from CC RN Colleague)  Clinical Data: Care Coordinator Outreach  Outreach attempted x 1.     Ricardo answered the phone and indicated patient was currently sleeping. This writer introduced self and role, and Ricardo responded \"I was at the first meeting with the Care Coordinator (referring to my colleague, Claire Barclay RN); if you need something, I can help you because she (Berenice, patient) is sleeping right now\". This writer just affirmed that the purpose of my call was to introduce myself on behalf of the primary care team, no further information shared, as there is no consent to communicate on file.    Plan: Care Coordinator will mail out care coordination introduction letter with care coordinator contact information and explanation of care coordination services. Care Coordinator will do no further outreaches at this time; available to patient/care team if future needs or questions arise.     Noemi Doll RN  Clinic Care Coordinator  [phone]    "

## 2018-06-04 NOTE — LETTER
[facility] CARE COORDINATION  June 4, 2018    Berenice Chaudhari  [address]  [place] [address]      Dear Berenice,    I am a clinic care coordinator who works with Nelly Pearl MD. I wanted to introduce myself and provide you with my contact information so that you can call me with questions or concerns about your health care. Below is a description of clinic care coordination and how I can further assist you.     The clinic care coordinator is a registered nurse and/or  who understand the health care system. The goal of clinic care coordination is to help you manage your health and improve access to the [facility] system in the most efficient manner. The registered nurse can assist you in meeting your health care goals by providing education, coordinating services, and strengthening the communication among your providers. The  can assist you with financial, behavioral, psychosocial, chemical dependency, counseling, and/or psychiatric resources.    Please feel free to contact me at [phone], with any questions or concerns. We at [facility] are focused on providing you with the highest-quality healthcare experience possible and that all starts with you.     Sincerely,     Noemi Doll

## 2018-06-21 NOTE — MR AVS SNAPSHOT
Berenice VANCE Chaudhari   6/21/2018 3:00 PM   Anticoagulation Therapy Visit    Description:  78 year old female   Provider:  [facility]   Department:   Nurse           INR as of 6/21/2018     Today's INR 1.9!      Anticoagulation Summary as of 6/21/2018     INR goal 2.0-3.0   Today's INR 1.9!   Full warfarin instructions 5 mg on Mon, Wed, Fri; 2.5 mg all other days   Next INR check 7/5/2018    Indications   Long-term (current) use of anticoagulants [Z79.01] [Z79.01]  Atrial flutter with rapid ventricular response (H) [I48.92]         Your next Anticoagulation Clinic appointment(s)     Jul 05, 2018 11:30 AM CDT   Anticoagulation Visit with  ANTICOAGULATION CLINIC   [facility] ([facility])    [address]  [address]  [facility] [id number] [phone]              Contact Numbers     [facility]  Please call  [phone] to cancel and/or reschedule your appointment   Please call  [phone] with any problems or questions regarding your therapy.        June 2018 Details    Sun Mon Tue Wed Thu Fri Sat          1               2                 3               4               5               6               7               8               9                 10               11               12               13               14               15               16                 17               18               19               20               21      2.5 mg   See details      22      5 mg         23      2.5 mg           24      2.5 mg         25      5 mg         26      2.5 mg         27      5 mg         28      2.5 mg         29      5 mg         30      2.5 mg          Date Details   06/21 This INR check               How to take your warfarin dose     To take:  2.5 mg Take 1 of the 2.5 mg tablets.    To take:  5 mg Take 2 of the 2.5 mg tablets.           July 2018 Details    Sun Mon Tue Wed Thu Fri Sat     1      2.5 mg         2      5 mg         3       2.5 mg         4      5 mg         5            6               7                 8               9               10               11               12               13               14                 15               16               17               18               19               20               21                 22               23               24               25               26               27               28                 29               30               31                    Date Details   No additional details    Date of next INR:  7/5/2018         How to take your warfarin dose     To take:  2.5 mg Take 1 of the 2.5 mg tablets.    To take:  5 mg Take 2 of the 2.5 mg tablets.

## 2018-06-21 NOTE — PROGRESS NOTES
ANTICOAGULATION FOLLOW-UP CLINIC VISIT    Patient Name:  Berenice Chaudhari  Date:  6/21/2018  Contact Type:  Face to Face  Patient is accompanied in the office by her .    SUBJECTIVE:     Patient Findings     Positives Change in diet/appetite    Comments Ate a lot of broccoli yesterday.           OBJECTIVE    INR Protime   Date Value Ref Range Status   06/21/2018 1.9 (A) 0.86 - 1.14 Final       ASSESSMENT / PLAN  INR assessment THER    Recheck INR In: 2 WEEKS    INR Location Clinic      Anticoagulation Summary as of 6/21/2018     INR goal 2.0-3.0   Today's INR 1.9!   Warfarin maintenance plan 5 mg (2.5 mg x 2) on Mon, Wed, Fri; 2.5 mg (2.5 mg x 1) all other days   Full warfarin instructions 5 mg on Mon, Wed, Fri; 2.5 mg all other days   Weekly warfarin total 25 mg   No change documented Mary Butcher, RN   Plan last modified Mary Butcher RN (6/1/2018)   Next INR check 7/5/2018   Target end date Indefinite    Indications   Long-term (current) use of anticoagulants [Z79.01] [Z79.01]  Atrial flutter with rapid ventricular response (H) [I48.92]         Anticoagulation Episode Summary     INR check location     Preferred lab     Send INR reminders to [facility]    Comments 5mg & 2.5mg tabs; HS dosing //  (Ricardo) sets up her meds // motorized wheelchair      Anticoagulation Care Providers     Provider Role Specialty Phone number    Nelly Pearl MD Referring Internal Medicine [phone]            See the Encounter Report to view Anticoagulation Flowsheet and Dosing Calendar (Go to Encounters tab in chart review, and find the Anticoagulation Therapy Visit)        Mary Butcher, LEXIE

## 2018-06-25 NOTE — TELEPHONE ENCOUNTER
Patient calling that she feels that she is having urinary retention. States she is urinating but very small amounts infrequently. Denies pain. Denies feeling bloated. No blood in the urine. No pain with urination. Patient insisted upon seeing PCP. Appointment scheduled but advised that if she wasn't urinating at all she must go to the ER.   Catarina Diggs RN

## 2018-06-28 NOTE — PROGRESS NOTES
"  SUBJECTIVE:   Berenice Chaudhari is a 78 year old female who presents to clinic today for the following health issues:      URINARY TRACT SYMPTOMS      Duration: ***    Description  { SYMPTOMS FEMALE:677213}    Intensity:  {mild,moderate,severe:931691}    Accompanying signs and symptoms:  Fever/chills: { :211100}  Flank pain { :425220}  Nausea and vomiting: { :603325}  Vaginal symptoms: {VAGINAL SYMPTOMS:863280::\"none\"}  Abdominal/Pelvic Pain: { :775957}    History  History of frequent UTI's: { :396721}  History of kidney stones: { :427276}  Sexually Active: { :345371}  Possibility of pregnancy: { :278173::\"No\"}    Precipitating or alleviating factors: {NONE DEFAULTED:066433::\"None\"}    Therapies tried and outcome: {URINARY TREATMENTS FEMALE:642497::\"none\"}   Outcome: ***      {additional problems for provider to add:639835}    Problem list and histories reviewed & adjusted, as indicated.  Additional history: {NONE - AS DOCUMENTED:384732::\"as documented\"}    {HIST REVIEW/ LINKS 2:920580}    Reviewed and updated as needed this visit by clinical staff       Reviewed and updated as needed this visit by Provider         {PROVIDER CHARTING PREFERENCE:199066}  "

## 2018-06-28 NOTE — MR AVS SNAPSHOT
After Visit Summary   6/28/2018    Berenice Chaudhari    MRN: [id number]           Patient Information     Date Of Birth          1940        Visit Information        Provider Department      6/28/2018 11:30 AM Skylar Theodore MD [facility]        Today's Diagnoses     Tremor    -  1      Care Instructions    Nice to meet you today Berenice!    We will draw labs today (B12, thyroid, comprehensive metabolic panel).    We have ordered a brain MRI.    Recommend seeing neurology for these tremors and jerks.           Follow-ups after your visit        Additional Services     NEUROLOGY ADULT REFERRAL       Your provider has referred you for the following:   Consult at [facility]: Neurology Clinic [facility] [phone]   [email or web address]  Progressive tremor    Please be aware that coverage of these services is subject to the terms and limitations of your health insurance plan.  Call member services at your health plan with any benefit or coverage questions.      Please bring the following with you to your appointment:    (1) Any X-Rays, CTs or MRIs which have been performed.  Contact the facility where they were done to arrange for  prior to your scheduled appointment.    (2) List of current medications  (3) This referral request   (4) Any documents/labs given to you for this referral                  Your next 10 appointments already scheduled     Jul 05, 2018 11:30 AM CDT   Anticoagulation Visit with  ANTICOAGULATION CLINIC   [facility]    [address] [place] [address] [address]  [facility] [address] [phone]              Future tests that were ordered for you today     Open Future Orders        Priority Expected Expires Ordered    MR Brain w/o & w Contrast Routine  6/28/2019 6/28/2018            Who to contact     If you have questions or need follow up information about today's clinic visit or your  schedule please contact [facility] [place] directly at [phone].  Normal or non-critical lab and imaging results will be communicated to you by MyChart, letter or phone within 4 business days after the clinic has received the results. If you do not hear from us within 7 days, please contact the clinic through MyChart or phone. If you have a critical or abnormal lab result, we will notify you by phone as soon as possible.  Submit refill requests through [employer] or call your pharmacy and they will forward the refill request to us. Please allow 3 business days for your refill to be completed.          Additional Information About Your Visit        Care EveryWhere ID     This is your Care EveryWhere ID. This could be used by other organizations to access your [facility] medical records  [id number]        Your Vitals Were     Pulse Temperature Pulse Oximetry             72 98.6  F (37  C) (Oral) 96%          Blood Pressure from Last 3 Encounters:   06/28/18 140/70   05/04/18 134/72   04/20/18 136/66    Weight from Last 3 Encounters:   05/04/18 (!) 347 lb (157.4 kg)   04/20/18 (!) 347 lb 9.6 oz (157.7 kg)   04/12/18 (!) 347 lb 9.6 oz (157.7 kg)              We Performed the Following     Comprehensive metabolic panel     NEUROLOGY ADULT REFERRAL     TSH with free T4 reflex     Vitamin B12        Primary Care Provider Office Phone # Fax #    Nelly Pearl [phone] [address] [place] [address]        Equal Access to Services     [facility]: Hadii aad ku hadasho Soomaali, waaxda luqadaha, qaybta kaalmada selena, waxkarson wei sue . So [facility] [place] [phone].    ATENCIÓN: Si habla gerry, tiene a dickey disposición servicios gratuitos de asistencia lingüística. Llame al [phone].    We comply with applicable federal civil rights laws and [place] laws. We do not discriminate on the basis of race, color, national origin, age, disability, sex,  sexual orientation, or gender identity.            Thank you!     Thank you for choosing [facility] [place]  for your care. Our goal is always to provide you with excellent care. Hearing back from our patients is one way we can continue to improve our services. Please take a few minutes to complete the written survey that you may receive in the mail after your visit with us. Thank you!             Your Updated Medication List - Protect others around you: Learn how to safely use, store and throw away your medicines at www.disposemymeds.org.          This list is accurate as of 6/28/18 12:10 PM.  Always use your most recent med list.                   Brand Name Dispense Instructions for use Diagnosis    buPROPion 300 MG 24 hr tablet    WELLBUTRIN XL    90 tablet    Take 1 tablet (300 mg) by mouth every morning    Major depressive disorder, recurrent, in full remission (H)       CENTRUM SILVER per tablet      Take 1 tablet by mouth daily        CEPHALEXIN PO      Take 500 mg by mouth 3 times daily as needed cellulitis        diltiazem 120 MG 24 hr ER beaded capsule    TIAZAC     Take 120 mg by mouth every evening        doxycycline 100 MG capsule    VIBRAMYCIN    14 capsule    Take 1 capsule (100 mg) by mouth 2 times daily    HCAP (healthcare-associated pneumonia)       furosemide 20 MG tablet    LASIX    90 tablet    Take 1 tablet (20 mg) by mouth daily    Atrial flutter with rapid ventricular response (H)       gabapentin 600 MG tablet    NEURONTIN     Take 1,200 mg by mouth 3 times daily        levofloxacin 500 MG tablet    LEVAQUIN    7 tablet    Take 1 tablet (500 mg) by mouth daily    HCAP (healthcare-associated pneumonia)       lidocaine 5 % Patch    LIDODERM     Place 3 patches onto the skin daily as needed for moderate pain (Apply up to 3 patches to painful area at once for up to 12 h within a 24 h period.  Remove after 12 hours.)        METOPROLOL SUCCINATE ER PO      Take 50 mg by mouth every evening         minocycline 50 MG capsule    MINOCIN/DYNACIN    180 capsule    Take 1 capsule (50 mg) by mouth 2 times daily    Methicillin susceptible Staphylococcus aureus septicemia (H)       nortriptyline 25 MG capsule    PAMELOR    180 capsule    Take 2 capsules (50 mg) by mouth At Bedtime    Post herpetic neuralgia       order for DME     1 Units    Equipment being ordered: padded transfer belt    Severe obesity (BMI >= 40) (H), Congestive heart failure, unspecified congestive heart failure chronicity, unspecified congestive heart failure type (H)       pravastatin 10 MG tablet    PRAVACHOL    90 tablet    Take 1 tablet (10 mg) by mouth At Bedtime Needs labs before next refill    Hyperlipidemia LDL goal <100       pregabalin 150 MG capsule    LYRICA    180 capsule    Take 1 capsule (150 mg) by mouth 2 times daily    Postherpetic neuralgia       PROTONIX PO      Take 40 mg by mouth every morning (before breakfast)        venlafaxine 150 MG 24 hr capsule    EFFEXOR-XR    90 capsule    Take 1 capsule (150 mg) by mouth every evening    Major depressive disorder, recurrent episode, in full remission (H)       * warfarin 5 MG tablet    COUMADIN     Take 1 tablet (5 mg) by mouth on MWF (uses 2.5 mg tabs on TTSS) or as directed by INR Clinic    Long-term (current) use of anticoagulants, Atrial flutter with rapid ventricular response (H)       * warfarin 2.5 MG tablet    COUMADIN     Take 1 tablet (2.5 mg) by mouth on TTSS (uses 5 mg tabs on MWF) or as directed by INR Clinic    Long-term (current) use of anticoagulants, Atrial flutter with rapid ventricular response (H)       * Notice:  This list has 2 medication(s) that are the same as other medications prescribed for you. Read the directions carefully, and ask your doctor or other care provider to review them with you.

## 2018-06-28 NOTE — PATIENT INSTRUCTIONS
Nice to meet you today Berenice!    We will draw labs today (B12, thyroid, comprehensive metabolic panel).    We have ordered a brain MRI.    Recommend seeing neurology for these tremors and jerks.

## 2018-07-02 NOTE — TELEPHONE ENCOUNTER
Patient is to come in tomorrow for labs. Wants to have order for INR put in so that he doesn't have to bring her back in later this week to INR clinic.

## 2018-07-05 NOTE — MR AVS SNAPSHOT
Berenice RAJIV Chaudhari   7/5/2018 11:30 AM   Anticoagulation Therapy Visit    Description:  78 year old female   Provider:   [facility] CLINIC   Department:   Nurse           INR as of 7/5/2018     Today's INR 2.60 (7/3/2018)      Anticoagulation Summary as of 7/5/2018     INR goal 2.0-3.0   Today's INR 2.60 (7/3/2018)   Full warfarin instructions 5 mg on Mon, Wed, Fri; 2.5 mg all other days   Next INR check 7/19/2018    Indications   Long-term (current) use of anticoagulants [Z79.01] [Z79.01]  Atrial flutter with rapid ventricular response (H) [I48.92]         Your next Anticoagulation Clinic appointment(s)     Jul 19, 2018 11:30 AM CDT   Anticoagulation Visit with  ANTICOAGULATION CLINIC   [facility] ([facility])    [address] [place] [address]  [address]  [facility] [address] [phone]              Contact Numbers     [facility]  Please call  [phone] to cancel and/or reschedule your appointment   Please call  [phone] with any problems or questions regarding your therapy.        July 2018 Details    Sun Mon Tue Wed Thu Fri Sat     1               2               3               4               5      2.5 mg   See details      6      5 mg         7      2.5 mg           8      2.5 mg         9      5 mg         10      2.5 mg         11      5 mg         12      2.5 mg         13      5 mg         14      2.5 mg           15      2.5 mg         16      5 mg         17      2.5 mg         18      5 mg         19            20               21                 22               23               24               25               26               27               28                 29               30               31                    Date Details   07/05 This INR check       Date of next INR:  7/19/2018         How to take your warfarin dose     To take:  2.5 mg Take 1 of the 2.5 mg tablets.    To take:  5 mg Take 2 of the 2.5 mg tablets.

## 2018-07-05 NOTE — PROGRESS NOTES
ANTICOAGULATION FOLLOW-UP     Patient Name:  Berenice Chaudhari  Date:  7/5/2018  Contact Type:  Telephone  Called patient and  at home number with INR result and   directions for warfarin management.    SUBJECTIVE:     Patient Findings     Positives No Problem Findings    Comments Patient had INR done at lab on 7/3/18.  INR Clinic did not know this and   was not informed of result.           OBJECTIVE    INR   Date Value Ref Range Status   07/03/2018 2.60 (H) 0.86 - 1.14 Final     Comment:     This test is intended for monitoring Coumadin therapy.  Results are not   accurate in patients with prolonged INR due to factor deficiency.         ASSESSMENT / PLAN  INR assessment THER    Recheck INR In: 2 WEEKS    INR Location [facility]     Anticoagulation Summary as of 7/5/2018     INR goal 2.0-3.0   Today's INR 2.60 (7/3/2018)   Warfarin maintenance plan 5 mg (2.5 mg x 2) on Mon, Wed, Fri; 2.5 mg (2.5 mg x 1) all other days   Full warfarin instructions 5 mg on Mon, Wed, Fri; 2.5 mg all other days   Weekly warfarin total 25 mg   No change documented Mary Butcher RN   Plan last modified Mary Butcher RN (6/1/2018)   Next INR check 7/19/2018   Target end date Indefinite    Indications   Long-term (current) use of anticoagulants [Z79.01] [Z79.01]  Atrial flutter with rapid ventricular response (H) [I48.92]         Anticoagulation Episode Summary     INR check location     Preferred lab     Send INR reminders to [facility]    Comments 5mg & 2.5mg tabs; HS dosing //  (Ricardo) sets up her meds // motorized wheelchair      Anticoagulation Care Providers     Provider Role Specialty Phone number    Nelly Pearl MD Referring Internal Medicine [phone]            See the Encounter Report to view Anticoagulation Flowsheet and Dosing Calendar (Go to Encounters tab in chart review, and find the Anticoagulation Therapy Visit)        Mary Butcher RN

## 2018-07-11 NOTE — TELEPHONE ENCOUNTER
[facility] Call Center    Phone Message    May a detailed message be left on voicemail: yes    Reason for Call: Other: Patient states their referring provider attached a MRI referral with the Neurology referral. Patient's  is wondering if patient needs the MRI before the appointment or after? Please follow up with patient's .      Action Taken: Message routed to:  [facility] ([facility]): Neurology

## 2018-07-16 NOTE — TELEPHONE ENCOUNTER
Called patient's  back.      Advised that the patient should have her MRI done prior to her appointment so the doctor can review the results.  Patient's  (Ricardo) states he will make an appointment, and if they can't get the MRI done prior to the appointment with Dr. Lopez, they will call to reschedule.

## 2018-07-16 NOTE — TELEPHONE ENCOUNTER
Spouse, Ricardo, is calling.  At office appointment on 06/28-We will draw labs today (B12, thyroid, comprehensive metabolic panel).     We have ordered a brain MRI.     Recommend seeing neurology for these tremors and jerks.     Prefers to have this done at [facility].  Neurology wants the MRI completed before they see patient.   They have appointment on 07/25.    2.  Patient will also need sedation for the MRI.  Pharmacy pended.  Please call Ricardo when this has been placed.  SANDRA Woodall RN

## 2018-07-19 NOTE — TELEPHONE ENCOUNTER
Order for valium teed up.     Routing to covering provider to sign/print.    Thanks,    Nelly Pearl MD  Internal Medicine/Pediatrics  [facility]

## 2018-07-19 NOTE — TELEPHONE ENCOUNTER
MRI was canceled at last office visit on 6/28 by Dr. Pearl--Neurology referral                        -Defer to neurology regarding utility of MRI vs alternate tests (EEG? EMG?)    Order placed as Dr. Pearl was in agreement and sent calming medication.    Melissa Abdullahi RN  Message handled by Nurse Triage.

## 2018-07-19 NOTE — TELEPHONE ENCOUNTER
RN re-entered order for MRI.  TC called and schedule MRI for 7/23/18 at 3pm at [facility]    Yudi Mars MA   July 19, 2018,  11:06 AM

## 2018-07-19 NOTE — PROGRESS NOTES
ANTICOAGULATION FOLLOW-UP CLINIC VISIT    Patient Name:  Berenice Chaudhari  Date:  7/19/2018  Contact Type:  Face to Face  INR today is in range, no concerns from pt. Advised to continue maintenance dosing & recheck in 3 weeks. Pt is unavailable to come in 2 weeks(spouse leaving on vacation).     SUBJECTIVE:     Patient Findings     Positives No Problem Findings    Comments Denies bleeding/bruising or missed dose.             OBJECTIVE    INR Protime   Date Value Ref Range Status   07/19/2018 2.0 (A) 0.86 - 1.14 Final       ASSESSMENT / PLAN  INR assessment THER    Recheck INR In: 3 WEEKS    INR Location Clinic      Anticoagulation Summary as of 7/19/2018     INR goal 2.0-3.0   Today's INR 2.0   Warfarin maintenance plan 5 mg (2.5 mg x 2) on Mon, Wed, Fri; 2.5 mg (2.5 mg x 1) all other days   Full warfarin instructions 5 mg on Mon, Wed, Fri; 2.5 mg all other days   Weekly warfarin total 25 mg   No change documented Nani Coffman RN   Plan last modified Mary Butcher RN (6/1/2018)   Next INR check 8/9/2018   Target end date Indefinite    Indications   Long-term (current) use of anticoagulants [Z79.01] [Z79.01]  Atrial flutter with rapid ventricular response (H) [I48.92]         Anticoagulation Episode Summary     INR check location     Preferred lab     Send INR reminders to [facility]    Comments 5mg & 2.5mg tabs; HS dosing //  (Ricardo) sets up her meds // motorized wheelchair      Anticoagulation Care Providers     Provider Role Specialty Phone number    Nelly Pearl MD Referring Internal Medicine [phone]            See the Encounter Report to view Anticoagulation Flowsheet and Dosing Calendar (Go to Encounters tab in chart review, and find the Anticoagulation Therapy Visit)    Nani Coffman RN

## 2018-07-19 NOTE — TELEPHONE ENCOUNTER
Rx faxed to pt's pharmacy.   Called and spoke with Ricardo, advised I will have  call him to get MRI scheduled.     Yudi Mars MA   July 19, 2018,  10:22 AM

## 2018-07-19 NOTE — MR AVS SNAPSHOT
Berenice VANCE Chaudhari   7/19/2018 11:30 AM   Anticoagulation Therapy Visit    Description:  78 year old female   Provider:  [facility]   Department:   Nurse           INR as of 7/19/2018     Today's INR 2.0      Anticoagulation Summary as of 7/19/2018     INR goal 2.0-3.0   Today's INR 2.0   Full warfarin instructions 5 mg on Mon, Wed, Fri; 2.5 mg all other days   Next INR check 8/9/2018    Indications   Long-term (current) use of anticoagulants [Z79.01] [Z79.01]  Atrial flutter with rapid ventricular response (H) [I48.92]         Your next Anticoagulation Clinic appointment(s)     Aug 09, 2018 11:30 AM CDT   Anticoagulation Visit with  ANTICOAGULATION CLINIC   [facility] ([facility])    [address]  [address]  [place] [address] [phone]              Contact Numbers     [facility]  Please call  [phone] to cancel and/or reschedule your appointment   Please call  [phone] with any problems or questions regarding your therapy.        July 2018 Details    Sun Mon Tue Wed Thu Fri Sat     1               2               3               4               5               6               7                 8               9               10               11               12               13               14                 15               16               17               18               19      2.5 mg   See details      20      5 mg         21      2.5 mg           22      2.5 mg         23      5 mg         24      2.5 mg         25      5 mg         26      2.5 mg         27      5 mg         28      2.5 mg           29      2.5 mg         30      5 mg         31      2.5 mg              Date Details   07/19 This INR check               How to take your warfarin dose     To take:  2.5 mg Take 1 of the 2.5 mg tablets.    To take:  5 mg Take 2 of the 2.5 mg tablets.           August 2018 Details    Sun Mon Tue Wed Thu Fri Sat        1      5 mg          2      2.5 mg         3      5 mg         4      2.5 mg           5      2.5 mg         6      5 mg         7      2.5 mg         8      5 mg         9            10               11                 12               13               14               15               16               17               18                 19               20               21               22               23               24               25                 26               27               28               29               30               31                 Date Details   No additional details    Date of next INR:  8/9/2018         How to take your warfarin dose     To take:  2.5 mg Take 1 of the 2.5 mg tablets.    To take:  5 mg Take 2 of the 2.5 mg tablets.

## 2018-07-20 NOTE — TELEPHONE ENCOUNTER
Jame with [employer] calling that the patient has an MRI scheduled for Monday but insurance has not authorized yet as needs to do a Peer to Peer. Needs to know what the urgency is so she can advised the patient if may need to reschedule? [phone]

## 2018-07-23 NOTE — ED AVS SNAPSHOT
[facility] Emergency Department    [address]    [facility] [address]    Phone:  [phone]    Fax:  [phone]                                       Berenice Chaudhari   MRN: [id number]    Department:  [facility] Emergency Department   Date of Visit:  7/23/2018           After Visit Summary Signature Page     I have received my discharge instructions, and my questions have been answered. I have discussed any challenges I see with this plan with the nurse or doctor.    ..........................................................................................................................................  Patient/Patient Representative Signature      ..........................................................................................................................................  Patient Representative Print Name and Relationship to Patient    ..................................................               ................................................  Date                                            Time    ..........................................................................................................................................  Reviewed by Signature/Title    ...................................................              ..............................................  Date                                                            Time

## 2018-07-23 NOTE — TELEPHONE ENCOUNTER
Took care of this this morning.    Talked with her insurance [employer] - they okayed MRI (although this does not guarantee payment) - authorization # she needs when going to [facility] is [id number].    Please let patient know.    Nelly Pearl MD  Internal Medicine/Pediatrics  [facility]

## 2018-07-23 NOTE — ED AVS SNAPSHOT
[facility] Emergency Department    [address] [facility] [address]    Phone:  [phone]    Fax:  [phone]                                       Berenice Chaudhari   MRN: [id number]    Department:  [facility] Emergency Department   Date of Visit:  7/23/2018           Patient Information     Date Of Birth          1940        Your diagnoses for this visit were:     Closed fracture of phalanx of left fifth toe, initial encounter     Closed dislocation of fourth toe of left foot, initial encounter     Closed dislocation of third toe of left foot, initial encounter        You were seen by Arnold Foote DO.      Follow-up Information     Follow up with Nelly Pearl MD. Call in 2 days.    Specialties:  Internal Medicine, Pediatrics    Why:  As needed    Contact information:    [address] [place] [address]  [phone]          Follow up with [facility] On 7/24/2018.    Why:  To discuss further follow up    Contact information:    [address] [place], [address] [address]  [phone]          Follow up with [facility] Emergency Department.    Specialty:  EMERGENCY MEDICINE    Why:  If symptoms worsen    Contact information:    [address] [facility] [address]  [phone]        Discharge Instructions         Toe Dislocation  A toe dislocation occurs when the tissues, or ligaments, that hold the joint together are torn. The bones then move apart, or are dislocated, out of their normal position. This causes pain, swelling, and bruising. Sometimes there is also a small chip fracture. Once the joint is put back into place again, it will take about 6 weeks for the ligaments to heal. During this time, your toe should be protected from re-injury. Sometimes this is done by taping the injured toe to the one next to it. This is called buddy taping.     If your toenail has been  severely injured, it may fall off in 1 to 2 weeks. A new one will usually start to grow back within 1 month.  Home care    Keep your leg raised, or elevated, to reduce pain and swelling. When sleeping, place a pillow under the injured leg. When sitting, support your injured leg so it is level with your waist. This is very important during the first 48 hours.    Apply an ice pack over the injured area for no more than 15 to 20 minutes. Do this every 3 to 6 hours for the first 24 to 48 hours. After that, keep using ice packs as needed to ease pain and swelling. To make an ice pack, put ice cubes in a sealed zip-lock plastic bag. Then wrap the bag in a thin, clean towel or cloth. As the ice melts, be careful that any tape, gauze, or splint doesn t get wet.    If you have a removable splint, you may take it off to bathe, then put it back on. If you have a permanent splint, cover your entire foot with 2 plastic bags. Place 1 bag around the other. Tape each bag with duct tape at the top end. Water can still leak in even when your foot is covered. So it's best to keep the splint away from water. If a splint gets wet, you can dry it with a hair-dryer on a cool setting.     If buddy tape was applied and it becomes wet or dirty, change it. You may replace it with paper, plastic, or cloth tape. Cloth tape and paper tapes must be kept dry. When re-applying buddy tape, use gauze or cotton padding between your toes. This will prevent the skin from getting moist and breaking down, or macerating. It is very important to put padding at the web space. This is the small piece of skin that joins the bases of your toes. Keep the buddy tape in place as instructed by your healthcare provider.    You may use over-the-counter pain medicine to control pain, unless another pain medicine was prescribed. Talk with your provider before using these medicines if you have chronic liver or kidney disease, or ever had a stomach ulcer or GI  (gastrointestinal) bleeding.    If you were given crutches, don t bear weight on your injured foot until you can do so without pain.    If you were given a stiff sandal, called a cast shoe, or a special boot, use this until you can walk barefoot without pain.    You may return to sports or physical activities as advised by your provider. How long this takes will depend on your injury. You may be able to go back to your activities right away. Or you may need to wait up to 8 weeks.  Follow-up care  Follow up with your healthcare provider, or as advised.   If X-rays were taken, you will be told of any new findings that may affect your care.  When to seek medical advice  Call your healthcare provider if any of the following occur:    The pain or swelling increases    Your toes becomes cold, blue, numb, or tingly    Your injured toe has redness, warmth, or fluid drainage  Date Last Reviewed: 11/25/2015 2000-2017 [employer]. [address] [place] [address]. All rights reserved. This information is not intended as a substitute for professional medical care. Always follow your healthcare professional's instructions.          Foot Fracture  You have a broken bone (fracture) in your foot. This will cause pain, swelling, and often bruising. It will usually take about 4 to 8 weeks to heal. A foot fracture may be treated with a special shoe, splint, cast, or boot.  Home care  Follow these guidelines when caring for yourself at home:    You may be given a splint, cast, shoe, or boot to keep the injured area from moving. Unless you were told otherwise, use crutches or a walker. Don t put weight on the injured foot until your health care provider says you can do so. (You can rent crutches and a walker at many pharmacies and surgical or orthopedic supply stores.) Don t put weight on a splint, or it will break.    Keep your leg elevated to reduce pain and swelling. When sleeping, put a pillow under the  injured leg. When sitting, support the injured leg so it is above your waist. This is very important during the first 2 days (48 hours).    Put an ice pack on the injured area. Do this for 20 minutes every 1 to 2 hours the first day for pain relief. You can make an ice pack by wrapping a plastic bag of ice cubes in a thin towel. As the ice melts, be careful that the splint, cast, boot, or shoe doesn t get wet. You can place the ice pack directly over the splint or cast. Unless told otherwise, you can open the boot or shoe to apply the ice pack. Continue using the ice pack 3 to 4 times a day for the next 2 days. Then use the ice pack as needed to ease pain and swelling.    Keep the splint, cast, boot, or shoe dry. When bathing, protect it with a large plastic bag, rubber-banded at the top end. If a fiberglass splint or cast or boot gets wet, you can dry it with a hair dryer. Unless told otherwise, you can take off the boot or shoe to bathe.    You may use acetaminophen or ibuprofen to control pain, unless another pain medicine was prescribed. If you have chronic liver or kidney disease, talk with your healthcare provider before using these medicines. Also talk with your provider if you ve had a stomach ulcer or gastrointestinal bleeding.    Don t put creams or objects under the cast if you have itching.  Follow-up care  Follow up with your healthcare provider, or as advised. This is to make sure the bone is healing the way it should. If you were given a splint, it may be changed to a cast or boot at your follow-up visit.  X-rays may be taken. You will be told of any new findings that may affect your care.  When to seek medical advice  Call your healthcare provider right away if any of these occur:    The cast or splint cracks    The plaster cast or splint becomes wet or soft    The fiberglass cast or splint stays wet for more than 24 hours    Bad odor from the cast or wound fluid stains the cast    Tightness or pain  under the cast or splint gets worse    Toes become swollen, cold, blue, numb, or tingly    You can t move your toes    Skin around cast or splint becomes red    Fever of 100.4 F (38 C) or higher, or as directed by your healthcare provider  Date Last Reviewed: 2/1/2017 2000-2017 [employer]. [address] [place] [address]. All rights reserved. This information is not intended as a substitute for professional medical care. Always follow your healthcare professional's instructions.          Your next 10 appointments already scheduled     Jul 25, 2018  4:00 PM CDT   (Arrive by 3:45 PM)   New Movement Disorder with Jerome Lopez MD   [facility] ([facility])    [address]  3rd [address]  [facility] [address] [phone]            Aug 09, 2018 11:30 AM CDT   Anticoagulation Visit with  ANTICOAGULATION CLINIC   [facility]    [address]  [address]  [facility] [address] [phone]              24 Hour Appointment Hotline       To make an appointment at any [facility], call [phone] ([phone]). If you don't have a family doctor or clinic, we will help you find one. [facility] are conveniently located to serve the needs of you and your family.             Review of your medicines      START taking        Dose / Directions Last dose taken    HYDROcodone-acetaminophen 5-325 MG per tablet   Commonly known as:  NORCO   Dose:  1 tablet   Quantity:  15 tablet        Take 1 tablet by mouth every 6 hours as needed for severe pain   Refills:  0          Our records show that you are taking the medicines listed below. If these are incorrect, please call your family doctor or clinic.        Dose / Directions Last dose taken    buPROPion 300 MG 24 hr tablet   Commonly known as:  WELLBUTRIN XL   Dose:  300 mg   Quantity:  90 tablet        Take 1 tablet (300 mg) by mouth every  morning   Refills:  1        CENTRUM SILVER per tablet   Dose:  1 tablet        Take 1 tablet by mouth daily   Refills:  0        CEPHALEXIN PO   Dose:  500 mg        Take 500 mg by mouth 3 times daily as needed cellulitis   Refills:  0        diazepam 10 MG tablet   Commonly known as:  VALIUM   Quantity:  1 tablet        Take 30-60 minutes before procedure.  Do not operate a vehicle after taking this medication.   Refills:  0        diltiazem 120 MG 24 hr ER beaded capsule   Commonly known as:  TIAZAC   Dose:  120 mg        Take 120 mg by mouth every evening   Refills:  0        doxycycline 100 MG capsule   Commonly known as:  VIBRAMYCIN   Dose:  100 mg   Quantity:  14 capsule        Take 1 capsule (100 mg) by mouth 2 times daily   Refills:  0        furosemide 20 MG tablet   Commonly known as:  LASIX   Dose:  20 mg   Quantity:  90 tablet        Take 1 tablet (20 mg) by mouth daily   Refills:  3        gabapentin 600 MG tablet   Commonly known as:  NEURONTIN   Dose:  1200 mg        Take 1,200 mg by mouth 3 times daily   Refills:  0        levofloxacin 500 MG tablet   Commonly known as:  LEVAQUIN   Dose:  500 mg   Quantity:  7 tablet        Take 1 tablet (500 mg) by mouth daily   Refills:  0        lidocaine 5 % Patch   Commonly known as:  LIDODERM   Dose:  3 patch   Indication:  Nerve Pain After Herpes Zoster or Shingles        Place 3 patches onto the skin daily as needed for moderate pain (Apply up to 3 patches to painful area at once for up to 12 h within a 24 h period.  Remove after 12 hours.)   Refills:  0        METOPROLOL SUCCINATE ER PO   Dose:  50 mg        Take 50 mg by mouth every evening   Refills:  0        minocycline 50 MG capsule   Commonly known as:  MINOCIN/DYNACIN   Dose:  50 mg   Quantity:  180 capsule        Take 1 capsule (50 mg) by mouth 2 times daily   Refills:  2        nortriptyline 25 MG capsule   Commonly known as:  PAMELOR   Dose:  50 mg   Quantity:  180 capsule        Take 2 capsules  (50 mg) by mouth At Bedtime   Refills:  2        order for DME   Quantity:  1 Units        Equipment being ordered: padded transfer belt   Refills:  0        pravastatin 10 MG tablet   Commonly known as:  PRAVACHOL   Dose:  10 mg   Quantity:  90 tablet        Take 1 tablet (10 mg) by mouth At Bedtime Needs labs before next refill   Refills:  0        pregabalin 150 MG capsule   Commonly known as:  LYRICA   Dose:  150 mg   Quantity:  180 capsule        Take 1 capsule (150 mg) by mouth 2 times daily   Refills:  1        PROTONIX PO   Dose:  40 mg        Take 40 mg by mouth every morning (before breakfast)   Refills:  0        venlafaxine 150 MG 24 hr capsule   Commonly known as:  EFFEXOR-XR   Dose:  150 mg   Quantity:  90 capsule        Take 1 capsule (150 mg) by mouth every evening   Refills:  1        * warfarin 5 MG tablet   Commonly known as:  COUMADIN        Take 1 tablet (5 mg) by mouth on MWF (uses 2.5 mg tabs on TTSS) or as directed by INR Clinic   Refills:  0        * warfarin 2.5 MG tablet   Commonly known as:  COUMADIN        Take 1 tablet (2.5 mg) by mouth on TTSS (uses 5 mg tabs on MWF) or as directed by INR Clinic   Refills:  0        * Notice:  This list has 2 medication(s) that are the same as other medications prescribed for you. Read the directions carefully, and ask your doctor or other care provider to review them with you.            Information about OPIOIDS     PRESCRIPTION OPIOIDS: WHAT YOU NEED TO KNOW   We gave you an opioid (narcotic) pain medicine. It is important to manage your pain, but opioids are not always the best choice. You should first try all the other options your care team gave you. Take this medicine for as short a time (and as few doses) as possible.     These medicines have risks:    DO NOT drive when on new or higher doses of pain medicine. These medicines can affect your alertness and reaction times, and you could be arrested for driving under the influence (DUI). If you  need to use opioids long-term, talk to your care team about driving.    DO NOT operate heave machinery    DO NOT do any other dangerous activities while taking these medicines.     DO NOT drink any alcohol while taking these medicines.      If the opioid prescribed includes acetaminophen, DO NOT take with any other medicines that contain acetaminophen. Read all labels carefully. Look for the word  acetaminophen  or  Tylenol.  Ask your pharmacist if you have questions or are unsure.    You can get addicted to pain medicines, especially if you have a history of addiction (chemical, alcohol or substance dependence). Talk to your care team about ways to reduce this risk.    Store your pills in a secure place, locked if possible. We will not replace any lost or stolen medicine. If you don t finish your medicine, please throw away (dispose) as directed by your pharmacist. The [place] Pollution [employer] Agency has more information about safe disposal: https://www.pca.[facility].[place]/living-green/managing-unwanted-medications.     All opioids tend to cause constipation. Drink plenty of water and eat foods that have a lot of fiber, such as fruits, vegetables, prune juice, apple juice and high-fiber cereal. Take a laxative (Miralax, milk of magnesia, Colace, Senna) if you don t move your bowels at least every other day.         Prescriptions were sent or printed at these locations (1 Prescription)                   Other Prescriptions                Printed at Department/Unit printer (1 of 1)         HYDROcodone-acetaminophen (NORCO) 5-325 MG per tablet                Procedures and tests performed during your visit     CT Foot Left w/o Contrast    Foot XR, G/E 3 views, left      Orders Needing Specimen Collection     None      Pending Results     Date and Time Order Name Status Description    7/23/2018 1933 CT Foot Left w/o Contrast Preliminary             Pending Culture Results     No orders found from 7/21/2018 to 7/24/2018.             Pending Results Instructions     If you had any lab results that were not finalized at the time of your Discharge, you can call the ED Lab Result RN at [phone]. You will be contacted by this team for any positive Lab results or changes in treatment. The nurses are available 7 days a week from 10A to 6:30P.  You can leave a message 24 hours per day and they will return your call.        Test Results From Your Hospital Stay        7/23/2018  7:20 PM      Narrative     LEFT FOOT THREE VIEWS  7/23/2018 7:08 PM     HISTORY: Fall with pain on toes 3-5, likely dorsiflexed the toes.     COMPARISON: None.        Impression     IMPRESSION:   1. Slightly displaced and angulated transverse fracture of the  proximal shaft of the proximal phalanx of the fifth toe. Overlying  soft tissue swelling.  2. Subluxation of the proximal interphalangeal joints of the third and  fourth toes. Difficult to rule out dislocations. These are not well  seen on lateral view due to overlapping of the bones. Clinical  correlation is necessary.  3. Plantar and posterior calcaneal spurs.     INOCENCIO LITTLE MD         7/23/2018  8:43 PM      Narrative     CT LEFT FOOT WITHOUT CONTRAST  7/23/2018 8:17 PM     HISTORY: Fall, fracture and possible dislocation.    TECHNIQUE: Axial images with reconstructions. Radiation dose for this  scan was reduced using automated exposure control, adjustment of the  mA and/or kV according to patient size, or iterative reconstruction  technique.      COMPARISON: Radiographs from today.     FINDINGS: Calcaneal spurring.        Impression     IMPRESSION:  1. Transverse fracture of the fifth proximal phalanx with slight  angulation.  2. There is dorsal-lateral subluxation at the fourth PIP joint.  3. There is dorsal-lateral dislocation at the third PIP joint.                Clinical Quality Measure: Blood Pressure Screening     Your blood pressure was checked while you were in the emergency department today.  The last reading we obtained was  BP: 149/78 . Please read the guidelines below about what these numbers mean and what you should do about them.  If your systolic blood pressure (the top number) is less than 120 and your diastolic blood pressure (the bottom number) is less than 80, then your blood pressure is normal. There is nothing more that you need to do about it.  If your systolic blood pressure (the top number) is 120-139 or your diastolic blood pressure (the bottom number) is 80-89, your blood pressure may be higher than it should be. You should have your blood pressure rechecked within a year by a primary care provider.  If your systolic blood pressure (the top number) is 140 or greater or your diastolic blood pressure (the bottom number) is 90 or greater, you may have high blood pressure. High blood pressure is treatable, but if left untreated over time it can put you at risk for heart attack, stroke, or kidney failure. You should have your blood pressure rechecked by a primary care provider within the next 4 weeks.  If your provider in the emergency department today gave you specific instructions to follow-up with your doctor or provider even sooner than that, you should follow that instruction and not wait for up to 4 weeks for your follow-up visit.        Thank you for choosing [facility]       Thank you for choosing [facility] for your care. Our goal is always to provide you with excellent care. Hearing back from our patients is one way we can continue to improve our services. Please take a few minutes to complete the written survey that you may receive in the mail after you visit with us. Thank you!        Care EveryWhere ID     This is your Care EveryWhere ID. This could be used by other organizations to access your [facility] medical records  [id number]        Equal Access to Services     EDMUND POWELL : david Pascal, calin gutiérrez  maycol sue ah. So [facility] [phone].    ATENCIÓN: Si habla español, tiene a dickey disposición servicios gratuitos de asistencia lingüística. Llame al [phone].    We comply with applicable federal civil rights laws and [place] laws. We do not discriminate on the basis of race, color, national origin, age, disability, sex, sexual orientation, or gender identity.            After Visit Summary       This is your record. Keep this with you and show to your community pharmacist(s) and doctor(s) at your next visit.

## 2018-07-23 NOTE — TELEPHONE ENCOUNTER
FUTURE VISIT INFORMATION      FUTURE VISIT INFORMATION:    Date: 07/25/2018    Time:     Location:   REFERRAL INFORMATION:    Referring provider:  MARY BARRON    Referring providers clinic:      Reason for visit/diagnosis          RECORDS STATUS      Internal Records: [facility], Care Everywhere and PAC's.

## 2018-07-23 NOTE — ED PROVIDER NOTES
History     Chief Complaint:  Foot Pain      The history is provided by the patient.      Berenice Chaudhari is a pleasant 78 year old female who is wheelchair bound c/o  History of hip problems and obesity who presents to the emergency department with her  for evaluation of foot pain. The patient reports that she fell at about 1730 while attempting to get out of her wheelchair last evening. The patient felt like the toes on her left foot were bent back words and the  reports that he noticed stringy blood from the toes of the left foot. After cleaning the blood,  noticed a cut underneath the toes of the left foot but no other site of bleeding. The family is concerned because the patient is on blood thinning medication.  Patient denies any acute fevers. INR 2 on 7/20    Allergies:  Ceftriaxone  Nafcillin  Penicillins  Vancomycin  Codeine  Clindamycin  Zithromax [Azithromycin]      Medications:    Bupropion   Cephalexin Po  Diazepam   Diltiazem   Doxycycline   Furosemide   Gabapentin   Levofloxacin   Lidocaine   Metoprolol Succinate Er Po  Minocycline   Multiple Vitamins-Minerals   Nortriptyline   Pantoprazole Sodium   Pravastatin   Pregabalin   Venlafaxine   Coumadin     Past Medical History:    Allergic rhinitis, cause unspecified   Anxiety   Arrhythmia   Atrial flutter with rapid ventricular response (H)   Cellulitis and abscess of leg, except foot   Chronic pain   Chronic renal disease   Contact dermatitis and other eczema, due to unspecified cause   Erythroderma desquamativum   Essential hypertension with goal blood pressure less than 140/90   Generalized osteoarthrosis, unspecified site   GERD (gastroesophageal reflux disease)   Herpes zoster without mention of complication   Hyperlipidemia LDL goal <100   Methicillin susceptible Staphylococcus aureus septicemia (H)   Obesity   Other chronic pain   Other diseases of lung, not elsewhere classified   Pneumonia, organism  unspecified(486)   Urticaria, unspecified      Past Surgical History:    Biopsy  Extensive dental surgery  Tonsillectomy & Adenoidectomy   Lap cholecystectomy  Left hip I&D, several  Colonoscopy  Cataract iol, rt/lt  ENT surgery  EGD combined  Phacoemulsification clear cornea with standard intraocular lens implant     Family History:    MI     Social History:  The patient  reports that she quit smoking about 28 years ago. Her smoking use included Cigarettes. She started smoking about 51 years ago. She has a 44.00 pack-year smoking history. She has never used smokeless tobacco. She reports that she does not drink alcohol or use illicit drugs.       Review of Systems   Constitutional: Negative for fever.   Musculoskeletal: Positive for arthralgias and joint swelling.   All other systems reviewed and are negative.    Physical Exam   First Vitals:  BP: 114/79  Pulse: 66  Temp: 97.8  F (36.6  C)  Resp: 20  SpO2: 98 %      Physical Exam  CConstitutional: Patient appears well-developed and well-nourished. There is mild distress.   Head: No external signs of trauma noted.  Eyes: Pupils are equal, round, and reactive to light.   Neck: No JVD noted  Cardiovascular: Normal rate, normal heart sounds.  Exam reveals no gallop and no friction rub.  No murmur heard. Equal B/L peripheral pulses.  Pulmonary/Chest: Effort normal and breath sounds normal. No respiratory distress. Patient has no wheezes. Patient has no rales.   Abdominal: Soft. There is no tenderness.   Extremities: No pitting edema noted. There is bruising and pain over toes 3-5 on the left foot.  Neurological: Patient is alert and oriented to person, place, and time.   Skin: Skin is warm and dry. There is no diaphoresis noted. Bruising over the dorsal and plantar surface of toes 3-5. There are mild skin avulsions on the plantar surface of the base of toes 3 and 4 along with a larger laceration that is under almost the entire plantar surface of toe 5 with extension  towards the medial web space. (See images below)                Emergency Department Course   Imaging:  CT Foot Left w/o Contrast   Final Result   IMPRESSION:   1. Transverse fracture of the fifth proximal phalanx with slight   angulation.   2. There is dorsal-lateral subluxation at the fourth PIP joint.   3. There is dorsal-lateral dislocation at the third PIP joint.      JAYME DURAN MD      Foot XR, G/E 3 views, left   Final Result   IMPRESSION:    1. Slightly displaced and angulated transverse fracture of the   proximal shaft of the proximal phalanx of the fifth toe. Overlying   soft tissue swelling.   2. Subluxation of the proximal interphalangeal joints of the third and   fourth toes. Difficult to rule out dislocations. These are not well   seen on lateral view due to overlapping of the bones. Clinical   correlation is necessary.   3. Plantar and posterior calcaneal spurs.       INOCENCIO LITTLE MD          Reduction of left 3rd toe Procedure Note:    The patient's left third toe was anesthetized via a digital block with lidocaine 2% without epinephrine. The digit was grasped and the mechanism of injury was re-created and with traction and felt a pop and there was realignment of the joints.     Reduction of left 4rd toe Procedure Note:    The patient's left third toe was anesthetized via a digital block with lidocaine 2% without epinephrine. The digit was grasped and the mechanism of injury was re-created and with traction, but no definite pop was felt.     Left foot splint placement note:    After dressing the patient's toe wounds Kerlix was applied around that.   and the patient was placed in a postoperative shoe.  The patient felt better with these interventions in place.    Interventions:  Bacitracin given    Emergency Department Course:  Past medical records, nursing notes, and vitals reviewed.  1802: I performed an exam of the patient and obtained history, as documented above.      The patient was sent for  a CT and XRay of left foot while in the emergency department, findings above.     I also took images of the foot, findings above.     1832: I rechecked the patient. Updated on findings.     I performed 2 dislocations and placed a splint, see procedural notes above.    2245 Rechecked the patient, findings and plan explained to the patient. Patient discharged home, status improved, with instructions regarding supportive care, medications, and reasons to return as well as the importance of close follow-up was reviewed.     Impression & Plan    Medical Decision Making:  This 78-year-old female patient presents to the ED due to a left foot injury.  Please see the HPI and exam for specifics.  The patient has remained well in the ED.  Interventions are as above.  The patient will follow up with her primary care physician as well as orthopedic surgery in the outpatient setting.  I have encouraged close follow-up due to the nature of the wound especially at the proximal portion of the left fifth toe and the patient and  understand this.  Anticipatory guidance given to patient and  prior to discharge.    Diagnosis:    ICD-10-CM    1. Closed fracture of phalanx of left fifth toe, initial encounter S92.502A    2. Closed dislocation of fourth toe of left foot, initial encounter S93.105A    3. Closed dislocation of third toe of left foot, initial encounter S93.105A        Disposition:  discharged to home    Discharge Medications:  Discharge Medication List as of 7/23/2018 11:11 PM      START taking these medications    Details   HYDROcodone-acetaminophen (NORCO) 5-325 MG per tablet Take 1 tablet by mouth every 6 hours as needed for severe pain, Disp-15 tablet, R-0, Local PrintDo not exceed 3000 mg acetaminophen per day from all sources. Do not drive or operate machinery while taking this medication.           Brendon ESPINO, am serving as a scribe at 6:02 PM on 7/23/2018 to document services personally performed  by Arnold Foote DO based on my observations and the provider's statements to me.    [facility] EMERGENCY DEPARTMENT       Arnold Foote DO  07/24/18 0124

## 2018-07-23 NOTE — TELEPHONE ENCOUNTER
Call to patient-spoke with , Ricardo, [facility] on file.    Advised, in agreement.  Melissa Abdullahi, LEXIE  Message handled by Nurse Triage.

## 2018-07-24 NOTE — DISCHARGE INSTRUCTIONS
Toe Dislocation  A toe dislocation occurs when the tissues, or ligaments, that hold the joint together are torn. The bones then move apart, or are dislocated, out of their normal position. This causes pain, swelling, and bruising. Sometimes there is also a small chip fracture. Once the joint is put back into place again, it will take about 6 weeks for the ligaments to heal. During this time, your toe should be protected from re-injury. Sometimes this is done by taping the injured toe to the one next to it. This is called buddy taping.     If your toenail has been severely injured, it may fall off in 1 to 2 weeks. A new one will usually start to grow back within 1 month.  Home care    Keep your leg raised, or elevated, to reduce pain and swelling. When sleeping, place a pillow under the injured leg. When sitting, support your injured leg so it is level with your waist. This is very important during the first 48 hours.    Apply an ice pack over the injured area for no more than 15 to 20 minutes. Do this every 3 to 6 hours for the first 24 to 48 hours. After that, keep using ice packs as needed to ease pain and swelling. To make an ice pack, put ice cubes in a sealed zip-lock plastic bag. Then wrap the bag in a thin, clean towel or cloth. As the ice melts, be careful that any tape, gauze, or splint doesn t get wet.    If you have a removable splint, you may take it off to bathe, then put it back on. If you have a permanent splint, cover your entire foot with 2 plastic bags. Place 1 bag around the other. Tape each bag with duct tape at the top end. Water can still leak in even when your foot is covered. So it's best to keep the splint away from water. If a splint gets wet, you can dry it with a hair-dryer on a cool setting.     If buddy tape was applied and it becomes wet or dirty, change it. You may replace it with paper, plastic, or cloth tape. Cloth tape and paper tapes must be kept dry. When re-applying buddy  tape, use gauze or cotton padding between your toes. This will prevent the skin from getting moist and breaking down, or macerating. It is very important to put padding at the web space. This is the small piece of skin that joins the bases of your toes. Keep the peyton tape in place as instructed by your healthcare provider.    You may use over-the-counter pain medicine to control pain, unless another pain medicine was prescribed. Talk with your provider before using these medicines if you have chronic liver or kidney disease, or ever had a stomach ulcer or GI (gastrointestinal) bleeding.    If you were given crutches, don t bear weight on your injured foot until you can do so without pain.    If you were given a stiff sandal, called a cast shoe, or a special boot, use this until you can walk barefoot without pain.    You may return to sports or physical activities as advised by your provider. How long this takes will depend on your injury. You may be able to go back to your activities right away. Or you may need to wait up to 8 weeks.  Follow-up care  Follow up with your healthcare provider, or as advised.   If X-rays were taken, you will be told of any new findings that may affect your care.  When to seek medical advice  Call your healthcare provider if any of the following occur:    The pain or swelling increases    Your toes becomes cold, blue, numb, or tingly    Your injured toe has redness, warmth, or fluid drainage  Date Last Reviewed: 11/25/2015 2000-2017 [employer]. [address] [place] [address]. All rights reserved. This information is not intended as a substitute for professional medical care. Always follow your healthcare professional's instructions.          Foot Fracture  You have a broken bone (fracture) in your foot. This will cause pain, swelling, and often bruising. It will usually take about 4 to 8 weeks to heal. A foot fracture may be treated with a special shoe,  splint, cast, or boot.  Home care  Follow these guidelines when caring for yourself at home:    You may be given a splint, cast, shoe, or boot to keep the injured area from moving. Unless you were told otherwise, use crutches or a walker. Don t put weight on the injured foot until your health care provider says you can do so. (You can rent crutches and a walker at many pharmacies and surgical or orthopedic supply stores.) Don t put weight on a splint, or it will break.    Keep your leg elevated to reduce pain and swelling. When sleeping, put a pillow under the injured leg. When sitting, support the injured leg so it is above your waist. This is very important during the first 2 days (48 hours).    Put an ice pack on the injured area. Do this for 20 minutes every 1 to 2 hours the first day for pain relief. You can make an ice pack by wrapping a plastic bag of ice cubes in a thin towel. As the ice melts, be careful that the splint, cast, boot, or shoe doesn t get wet. You can place the ice pack directly over the splint or cast. Unless told otherwise, you can open the boot or shoe to apply the ice pack. Continue using the ice pack 3 to 4 times a day for the next 2 days. Then use the ice pack as needed to ease pain and swelling.    Keep the splint, cast, boot, or shoe dry. When bathing, protect it with a large plastic bag, rubber-banded at the top end. If a fiberglass splint or cast or boot gets wet, you can dry it with a hair dryer. Unless told otherwise, you can take off the boot or shoe to bathe.    You may use acetaminophen or ibuprofen to control pain, unless another pain medicine was prescribed. If you have chronic liver or kidney disease, talk with your healthcare provider before using these medicines. Also talk with your provider if you ve had a stomach ulcer or gastrointestinal bleeding.    Don t put creams or objects under the cast if you have itching.  Follow-up care  Follow up with your healthcare provider,  or as advised. This is to make sure the bone is healing the way it should. If you were given a splint, it may be changed to a cast or boot at your follow-up visit.  X-rays may be taken. You will be told of any new findings that may affect your care.  When to seek medical advice  Call your healthcare provider right away if any of these occur:    The cast or splint cracks    The plaster cast or splint becomes wet or soft    The fiberglass cast or splint stays wet for more than 24 hours    Bad odor from the cast or wound fluid stains the cast    Tightness or pain under the cast or splint gets worse    Toes become swollen, cold, blue, numb, or tingly    You can t move your toes    Skin around cast or splint becomes red    Fever of 100.4 F (38 C) or higher, or as directed by your healthcare provider  Date Last Reviewed: 2/1/2017 2000-2017 [employer]. [address] [place] [address]. All rights reserved. This information is not intended as a substitute for professional medical care. Always follow your healthcare professional's instructions.

## 2018-07-25 NOTE — MR AVS SNAPSHOT
"              After Visit Summary   7/25/2018    Berenice Chaudhari    MRN: [id number]           Patient Information     Date Of Birth          1940        Visit Information        Provider Department      7/25/2018 4:00 PM Jerome Lopez MD [facility] Neurology        Care Instructions    #1 You have asterixis a form of myoclonus, not tremor.  This is most often a sign of metabolic problems.  In your case it is either your kidneys or your lungs causing this.  #2 It is reversible when your metabolic parameters improve  #3 It does not require medication treatment           Follow-ups after your visit        Follow-up notes from your care team     Return if symptoms worsen or fail to improve.      Your next 10 appointments already scheduled     Jul 27, 2018  2:00 PM CDT   New Visit with Wayne Milan DO   [facility] SPORTS MEDICINE ([facility])    [address]  [address]  [place] [address]   [phone]            Aug 09, 2018 11:30 AM CDT   Anticoagulation Visit with  ANTICOAGULATION CLINIC   [facility])    [address]  [address]  [place] [address] [phone]              Who to contact     Please call your clinic at [phone] to:    Ask questions about your health    Make or cancel appointments    Discuss your medicines    Learn about your test results    Speak to your doctor            Additional Information About Your Visit        Care EveryWhere ID     This is your Care EveryWhere ID. This could be used by other organizations to access your [facility] medical records  [id number]        Your Vitals Were     Pulse Height                78 1.626 m (5' 4\")           Blood Pressure from Last 3 Encounters:   07/25/18 135/56   07/23/18 149/78   06/28/18 140/70    Weight from Last 3 Encounters:   05/04/18 (!) 157.4 kg (347 lb)   04/20/18 (!) 157.7 kg (347 lb 9.6 oz)   04/12/18 (!) 157.7 kg (347 lb 9.6 oz)    "           Today, you had the following     No orders found for display       Primary Care Provider Office Phone # Fax #    Nelly Pearl [phone] [address] [place] [address]        Equal Access to Services     EDMUND POWELL : Hadii aad ku hadnatividado Soomaali, waaxda luqadaha, qaybta kaalmada adeegyada, calin lazaron jayjay devinelatoya hoyt. So [facility] [phone].    ATENCIÓN: Si habla español, tiene a dickey disposición servicios gratuitos de asistencia lingüística. Llame al [phone].    We comply with applicable federal civil rights laws and [place] laws. We do not discriminate on the basis of race, color, national origin, age, disability, sex, sexual orientation, or gender identity.            Thank you!     Thank you for choosing [facility] NEUROLOGY  for your care. Our goal is always to provide you with excellent care. Hearing back from our patients is one way we can continue to improve our services. Please take a few minutes to complete the written survey that you may receive in the mail after your visit with us. Thank you!             Your Updated Medication List - Protect others around you: Learn how to safely use, store and throw away your medicines at www.disposemymeds.org.          This list is accurate as of 7/25/18  4:36 PM.  Always use your most recent med list.                   Brand Name Dispense Instructions for use Diagnosis    buPROPion 300 MG 24 hr tablet    WELLBUTRIN XL    90 tablet    Take 1 tablet (300 mg) by mouth every morning    Major depressive disorder, recurrent, in full remission (H)       CENTRUM SILVER per tablet      Take 1 tablet by mouth daily        CEPHALEXIN PO      Take 500 mg by mouth 3 times daily as needed cellulitis        diazepam 10 MG tablet    VALIUM    1 tablet    Take 30-60 minutes before procedure.  Do not operate a vehicle after taking this medication.    Anxiety       diltiazem 120 MG 24 hr ER beaded capsule     TIAZAC     Take 120 mg by mouth every evening        doxycycline 100 MG capsule    VIBRAMYCIN    14 capsule    Take 1 capsule (100 mg) by mouth 2 times daily    HCAP (healthcare-associated pneumonia)       furosemide 20 MG tablet    LASIX    90 tablet    Take 1 tablet (20 mg) by mouth daily    Atrial flutter with rapid ventricular response (H)       gabapentin 600 MG tablet    NEURONTIN     Take 1,200 mg by mouth 3 times daily        HYDROcodone-acetaminophen 5-325 MG per tablet    NORCO    15 tablet    Take 1 tablet by mouth every 6 hours as needed for severe pain        levofloxacin 500 MG tablet    LEVAQUIN    7 tablet    Take 1 tablet (500 mg) by mouth daily    HCAP (healthcare-associated pneumonia)       lidocaine 5 % Patch    LIDODERM     Place 3 patches onto the skin daily as needed for moderate pain (Apply up to 3 patches to painful area at once for up to 12 h within a 24 h period.  Remove after 12 hours.)        METOPROLOL SUCCINATE ER PO      Take 50 mg by mouth every evening        minocycline 50 MG capsule    MINOCIN/DYNACIN    180 capsule    Take 1 capsule (50 mg) by mouth 2 times daily    Methicillin susceptible Staphylococcus aureus septicemia (H)       nortriptyline 25 MG capsule    PAMELOR    180 capsule    Take 2 capsules (50 mg) by mouth At Bedtime    Post herpetic neuralgia       order for DME     1 Units    Equipment being ordered: padded transfer belt    Severe obesity (BMI >= 40) (H), Congestive heart failure, unspecified congestive heart failure chronicity, unspecified congestive heart failure type (H)       pravastatin 10 MG tablet    PRAVACHOL    90 tablet    Take 1 tablet (10 mg) by mouth At Bedtime Needs labs before next refill    Hyperlipidemia LDL goal <100       pregabalin 150 MG capsule    LYRICA    180 capsule    Take 1 capsule (150 mg) by mouth 2 times daily    Postherpetic neuralgia       PROTONIX PO      Take 40 mg by mouth every morning (before breakfast)        venlafaxine 150  MG 24 hr capsule    EFFEXOR-XR    90 capsule    Take 1 capsule (150 mg) by mouth every evening    Major depressive disorder, recurrent episode, in full remission (H)       * warfarin 5 MG tablet    COUMADIN     Take 1 tablet (5 mg) by mouth on MWF (uses 2.5 mg tabs on TTSS) or as directed by INR Clinic    Long-term (current) use of anticoagulants, Atrial flutter with rapid ventricular response (H)       * warfarin 2.5 MG tablet    COUMADIN     Take 1 tablet (2.5 mg) by mouth on TTSS (uses 5 mg tabs on MWF) or as directed by INR Clinic    Long-term (current) use of anticoagulants, Atrial flutter with rapid ventricular response (H)       * Notice:  This list has 2 medication(s) that are the same as other medications prescribed for you. Read the directions carefully, and ask your doctor or other care provider to review them with you.

## 2018-07-25 NOTE — LETTER
2018       RE: Berenice Chaudhari  [address]  [place] [address]     Dear Colleague,    Thank you for referring your patient, Berenice Chaudhari, to the [facility] NEUROLOGY at [facility]. Please see a copy of my visit note below.    Department of Neurology  Movement Disorders Division   Initial Clinic Evaluation     Patient: Berenice Chaudhari   MRN: [id number]   : 1940   Date of Visit: 2018     Referring Physician: Dae    Reason for Referral: Tremor    Impression:  1.  Asterixis (negative myoclonus)  2.  Chronic renal failure  3.  Pulmonary disease, unspecified    The abnormal hand movements that I am seeing today are asterixis.  She has brief irregular loss of muscle tone in her wrist extensors causing irregular jerking.  She also has occasional positive myoclonic jerks.  I do not see a tremor.  Asterixis is almost always of a reversible sign of metabolic imbalance.  This is most often liver failure but the patient's liver functions appear normal.  He can also occur with renal failure or with pulmonary disease most often in the presence of hypercapnia.  Treatment with medications is not indicated or successful.    Recommendations:  1.  The diagnosis was discussed with the patient.  2.  The patient is going undergoing pulmonary evaluation.  If she is found to be hypercapnic improvement in her pulmonary status would almost always relieve the asterixis.    We thank Dr. Pearl for the opportunity to evaluate this patient.      Please call or write with questions or concerns,    Sincerely yours,    Jerome Lopez MD      History of Present Illness  Ms. Chaudhari is a 78 year old female who is right-handed.  She is retired .    She has had several severe illnesses through the years.  For 20 years she has suffered from postherpetic neuralgia involving the left shoulder.  She is currently maintained on gabapentin and Lyrica with incomplete control of  pain.  In 2005 she had a septic left hip.  She has been unable to bear weight on the hip since that time.  She has had other admissions for pneumonia and viral illness.  She has chronic renal failure stage III.  She is also being evaluated for an undiagnosed pulmonary problem.    She is kindly referred to us by Dr. Pearl who was noted tremor.  The patient has had some tremor over the past 2 years which was intermittent.  Over the past year and especially over the past few months has become more severe and is almost daily.  She says that the tremor predominantly involves her hands.  It happens when she puts pressure on her arm.  It happens when she holds things.  There is no resting tremor.  She can still drink from a cup.  Her handwriting is jerky and shaky.  She can still hold a fork.  She will spills soup.  She can use a mouse and keyboard without difficulty.  She does not notice head tremor but her  noticed a slight head tremor with fatigue.  There is no vocal tremor.    Of note she has 8 maternal cousins who had head and hand tremor.  One had surgery without any benefit.  Her mother have however had nose tremor and her sister has no tremor.    The patient has no history of thyroid disease.  In fact her sensitive TSH was 3.2216 2818.  Her last venous blood gas on 3/10/2018 showed a PCO2 of 47 (less than 50).  Her last arterial blood gas showed a PCO2 of 40 on 9/26/2014.  Her creatinine was 2.02 on 6/28/2018 and has dropped to 1.8 on 7/3/2018.  She had an MRI of the brain on 7/23/2018.  This shows chronic white matter disease but no significant abnormalities.    Past Medical History:   Past Medical History:   Diagnosis Date     Allergic rhinitis, cause unspecified      Anxiety 6/13/2013     Arrhythmia     atrial flutter     Atrial flutter with rapid ventricular response (H) 8/8/2017     Cellulitis and abscess of leg, except foot recurrent , both legs    begins with fever, nausea, diarrhea, vomiting & &  the cellulitis may be visible a day or 2 later     Chronic pain     On chronic fentanyl patch.     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  less likely = raction to vancomycin      Essential hypertension with goal blood pressure less than 140/90 1/12/2005     Problem list name updated by automated process. Provider to review     Generalized osteoarthrosis, unspecified site     films 9/04: L hhip bad; both knees mod severe medial OA     GERD (gastroesophageal reflux disease) 11/15/2013     Herpes zoster without mention of complication 9/03    L shoulder; still has neuralgia     Hyperlipidemia LDL goal <100 9/16/2011     Methicillin susceptible Staphylococcus aureus septicemia (H) after L hip injection 205    ARDS, renal failure, staph sepsis after a hip joint injection     Obesity 1/12/2005     Problem list name updated by automated process. Provider to review     Other chronic pain      Other diseases of lung, not elsewhere classified 07/93    Interstitial lung process     Pneumonia, organism unspecified(486)      Urticaria, unspecified 07/93    Diffuse severe (hospitalized).  (+) skin biopsy by Dr. Figueroa       Past Surgical History:   Past Surgical History:   Procedure Laterality Date     BIOPSY       C NONSPECIFIC PROCEDURE      Extensive oral (dental) surgery     C NONSPECIFIC PROCEDURE      Remote T&A     C NONSPECIFIC PROCEDURE  1/03    LAP CHOLY     C NONSPECIFIC PROCEDURE      L hip I + D x 2  4/05     C NONSPECIFIC PROCEDURE  2/05    colonoscopy     C NONSPECIFIC PROCEDURE      L hip I + D several other times      CATARACT IOL, RT/LT Right 2012     CHOLECYSTECTOMY       ENT SURGERY       ESOPHAGOSCOPY, GASTROSCOPY, DUODENOSCOPY (EGD), COMBINED N/A 11/9/2017    Procedure: COMBINED ESOPHAGOSCOPY,  GASTROSCOPY, DUODENOSCOPY (EGD);  ESOPHAGOSCOPY, GASTROSCOPY, DUODENOSCOPY (EGD) (fv)  ;  Surgeon: Buck Ribeiro MD;  Location: [facility] OR     PHACOEMULSIFICATION CLEAR CORNEA WITH STANDARD INTRAOCULAR LENS IMPLANT Left 3/10/2016    Procedure: PHACOEMULSIFICATION CLEAR CORNEA WITH STANDARD INTRAOCULAR LENS IMPLANT;  Surgeon: Dwight Metcalf MD;  Location: [facility]       Medications:  Current Outpatient Prescriptions   Medication Sig Dispense Refill     buPROPion (WELLBUTRIN XL) 300 MG 24 hr tablet Take 1 tablet (300 mg) by mouth every morning 90 tablet 1     CEPHALEXIN PO Take 500 mg by mouth 3 times daily as needed cellulitis       diazepam (VALIUM) 10 MG tablet Take 30-60 minutes before procedure.  Do not operate a vehicle after taking this medication. 1 tablet 0     diltiazem (TIAZAC) 120 MG 24 hr ER beaded capsule Take 120 mg by mouth every evening       doxycycline (VIBRAMYCIN) 100 MG capsule Take 1 capsule (100 mg) by mouth 2 times daily 14 capsule 0     furosemide (LASIX) 20 MG tablet Take 1 tablet (20 mg) by mouth daily 90 tablet 3     gabapentin (NEURONTIN) 600 MG tablet Take 1,200 mg by mouth 3 times daily       HYDROcodone-acetaminophen (NORCO) 5-325 MG per tablet Take 1 tablet by mouth every 6 hours as needed for severe pain 15 tablet 0     levofloxacin (LEVAQUIN) 500 MG tablet Take 1 tablet (500 mg) by mouth daily 7 tablet 0     lidocaine (LIDODERM) 5 % Patch Place 3 patches onto the skin daily as needed for moderate pain (Apply up to 3 patches to painful area at once for up to 12 h within a 24 h period.  Remove after 12 hours.)       METOPROLOL SUCCINATE ER PO Take 50 mg by mouth every evening       minocycline (MINOCIN/DYNACIN) 50 MG capsule Take 1 capsule (50 mg) by mouth 2 times daily 180 capsule 2     Multiple Vitamins-Minerals (CENTRUM SILVER) per tablet Take 1 tablet by mouth daily       nortriptyline (PAMELOR) 25 MG capsule Take 2 capsules (50 mg) by mouth At Bedtime 180 capsule 2     order  for DME Equipment being ordered: padded transfer belt 1 Units 0     Pantoprazole Sodium (PROTONIX PO) Take 40 mg by mouth every morning (before breakfast)       pravastatin (PRAVACHOL) 10 MG tablet Take 1 tablet (10 mg) by mouth At Bedtime Needs labs before next refill 90 tablet 0     pregabalin (LYRICA) 150 MG capsule Take 1 capsule (150 mg) by mouth 2 times daily 180 capsule 1     venlafaxine (EFFEXOR-XR) 150 MG 24 hr capsule Take 1 capsule (150 mg) by mouth every evening 90 capsule 1     warfarin (COUMADIN) 2.5 MG tablet Take 1 tablet (2.5 mg) by mouth on TTSS (uses 5 mg tabs on MWF) or as directed by INR Clinic       warfarin (COUMADIN) 5 MG tablet Take 1 tablet (5 mg) by mouth on MWF (uses 2.5 mg tabs on TTSS) or as directed by INR Clinic            Movement Disorder-related Medications                                                                                                                                                             Allergies: is allergic to ceftriaxone; nafcillin; penicillins; vancomycin; codeine; clindamycin; levaquin [levofloxacin]; and zithromax [azithromycin].    Social History:   Social History     Social History     Marital status:      Spouse name: N/A     Number of children: N/A     Years of education: N/A     Social History Main Topics     Smoking status: Former Smoker     Packs/day: 2.00     Years: 22.00     Types: Cigarettes     Start date: 1967     Quit date: 1990     Smokeless tobacco: Never Used      Comment: started smoking 20's- quit in 1990     Alcohol use No      Comment: RARELY     Drug use: No     Sexual activity: No     Other Topics Concern     None     Social History Narrative       Family History:  Family History   Problem Relation Age of Onset     Myocardial Infarction Father 63       ROS:  General:  Fever : no, Chills: no, Sweats: no, Fatigue: YES, ,Weight loss: no  Cardiovascular  Chest Pains: no, Palpitations: YES, Edema: no, Shortness of breath: YES   Respiratory  Cough: no  Gastrointestinal  Nausea: no, Vomiting: no, Diarrhea: no, Constipation: no  Genitourinary  Dysuria: no, Frequency: no, Urgency: no,  Nocturia: no, Incontinence: no Women:  Abnormal vaginal bleeding: no  Musculoskeletal  Back pain: no, Neck Pain: no  Joint pain, swelling, redness: no, Stiffness: no  Skin  Rash: YES, Itching: no,  Suspicious lesions: no  Psychiatric  Depression: YES, Anxiety/Panic: no  Endocrine  Cold intolerance: no, Heat intolerance: no, Excessive thirst: no  Hematologic/LymphatiAbnormal bruising, bleeding: no ,Enlarged lymph nodes: {.:[id number]  Allergic/Immunologic  Hives (Urticaria): no, HIV exposure: no    Neurologic  Visual loss: no, Double vision: no, Headache: no, Loss of consciousness:  no, Seizure: no, Fainting (syncope): no, Dysarthria: no, Dysphagia:  no, Vertigo:  no, Weakness: YES, Atrophy: no, Twitches: YES, Lhermitte's: no, Numbness or tingling: no, Handwriting change: YES, Tremors: YES, Involuntary movements: no, Imbalance: no, Abnormal gait:  YES, Falls :no, Memory loss: no, RBD: no, Sleep disorder: no, Hallucination: no, Loss of concentration: no,  Behavioral change: no,  Loss of motivation: no      Comprehensive Neurologic Exam    Mental Status Exam   The patient is alert, oriented and exhibits no difficulty with cognition or memory.  A formal short test of mental status was performed.     Neurovascular         Speech and Language   Right Left     Carotid Bruit Absent Absent  Speech is normal.   Dorsalis Pedis Pulse Normal Normal  Description   Posterior Tibial Pulse Normal Normal  Language is normal.     Cranial Nerve Exam                 Right Left   Right Left   II                                        Normal Normal  Hearing (VIII) Normal Normal      III, IV, V!                   Normal Normal  Nystagmus (VIII) Normal Normal   EOM description                              Gag (IX, X) Normal Normal   Facial Sensation (V) Normal Normal  SCM (XI) Normal Normal    Muscles of Mastication (V) Normal Normal  Trapezius (XI) Normal Normal   Facial Strength (VII) Normal Normal  Tongue (XII) Normal Normal     Motor Exam  Strength (*/5 grading)  Muscle                  Right Left  Muscle Right Left   Frontalis                                           Normal Normal  Iliopsoas Normal    0          Orbicularis Oculi                     Normal Normal  Quadrideps Normal    Normal        Orbicularis Oris                         Normal Normal  Anterior Tibial Normal Normal      Deltoid Normal Normal  Gastrocnemius Normal    Normal   Biceps Normal Normal  Extensor Hallucis Longus Normal    Normal   Triceps Normal Normal  Toe Extensors Normal    Normal   EDC Normal Normal  Toe Flexor Normal    Normal   Finger Flexors Normal Normal  Other Normal Normal   First Dorsal Interosseous Normal Normal  Other Normal Normal   Hypothenar Normal Normal  Other Normal Normal   Thenar Normal Normal  Other Normal Normal     Reflexes      Tone   Right Left   Right Left   Biceps Normal Normal  Spasticity (S)  Rigidity (R)     Triceps Normal Normal  Neck     Brachioradialis Normal Normal  Arm     Quadriceps -3 -3  Leg     Ankle -4 -4       Babkinski Flexor Flexor         AMR       Coordination   Right Left   Right Left   Fingers Normal Normal  Finger nose finger Normal Normal   Hand Normal Normal  Drift Normal Normal   Leg Normal Normal  Heel Holly Normal Normal   Foot Normal Normal  Other     Other               Involuntary Movements    Gait and Station  None            Right Left  Stand & Sit Normal   Asterixis +2 +2  Gait -4   (Movement type) Normal Normal  Tandem -4   (Movement type) Normal Normal  Romberg na       Sensory Exam          Right Left    Pin Normal Normal    Vibration Normal Normal    Joint position Normal Normal    Other             Coding statement:     Duration of  Services: face-to-face 40min.   Greater than 50% of this visit was spent in counseling and coordination of care.    Medical  decision making is high due to the following components:    Number of diagnoses:[id number]  Management:Diagnostic tests orders or reviewed.No  Medications were prescribed.No Medications were adjusted.No  Complexity of medical data:Outside records reviewed.Yes Radiology images reviewed by myself.Yes Review/order clinical lab tests. Yes Review/order radiologyYes Discussed tests with performing physician. No Called outside records.No Discussed patient with another provider.Yes Took collateral history.YesRiskOne or more chronic illnesses with severe exacerbation, progression, or side effects of treatment.YesAcute or chronic illness or injury that poses a threat to life or bodily function.No  Abrupt change in neurologic status.No      Again, thank you for allowing me to participate in the care of your patient.      Sincerely,    Jerome Lopez MD

## 2018-07-25 NOTE — PATIENT INSTRUCTIONS
#1 You have asterixis a form of myoclonus, not tremor.  This is most often a sign of metabolic problems.  In your case it is either your kidneys or your lungs causing this.  #2 It is reversible when your metabolic parameters improve  #3 It does not require medication treatment

## 2018-07-25 NOTE — PROGRESS NOTES
Department of Neurology  Movement Disorders Division   Initial Clinic Evaluation     Patient: Berenice Chaudhari   MRN: [id number]   : 1940   Date of Visit: 2018     Referring Physician: Dae    Reason for Referral: Tremor    Impression:  1.  Asterixis (negative myoclonus)  2.  Chronic renal failure  3.  Pulmonary disease, unspecified    The abnormal hand movements that I am seeing today are asterixis.  She has brief irregular loss of muscle tone in her wrist extensors causing irregular jerking.  She also has occasional positive myoclonic jerks.  I do not see a tremor.  Asterixis is almost always of a reversible sign of metabolic imbalance.  This is most often liver failure but the patient's liver functions appear normal.  He can also occur with renal failure or with pulmonary disease most often in the presence of hypercapnia.  Treatment with medications is not indicated or successful.    Recommendations:  1.  The diagnosis was discussed with the patient.  2.  The patient is going undergoing pulmonary evaluation.  If she is found to be hypercapnic improvement in her pulmonary status would almost always relieve the asterixis.    We thank Dr. Pearl for the opportunity to evaluate this patient.      Please call or write with questions or concerns,    Sincerely yours,    Jerome Lopez MD      History of Present Illness  Ms. Chaudhari is a 78 year old female who is right-handed.  She is retired .    She has had several severe illnesses through the years.  For 20 years she has suffered from postherpetic neuralgia involving the left shoulder.  She is currently maintained on gabapentin and Lyrica with incomplete control of pain.  In  she had a septic left hip.  She has been unable to bear weight on the hip since that time.  She has had other admissions for pneumonia and viral illness.  She has chronic renal failure stage III.  She is also being evaluated for an undiagnosed pulmonary  problem.    She is kindly referred to us by Dr. Pearl who was noted tremor.  The patient has had some tremor over the past 2 years which was intermittent.  Over the past year and especially over the past few months has become more severe and is almost daily.  She says that the tremor predominantly involves her hands.  It happens when she puts pressure on her arm.  It happens when she holds things.  There is no resting tremor.  She can still drink from a cup.  Her handwriting is jerky and shaky.  She can still hold a fork.  She will spills soup.  She can use a mouse and keyboard without difficulty.  She does not notice head tremor but her  noticed a slight head tremor with fatigue.  There is no vocal tremor.    Of note she has 8 maternal cousins who had head and hand tremor.  One had surgery without any benefit.  Her mother have however had nose tremor and her sister has no tremor.    The patient has no history of thyroid disease.  In fact her sensitive TSH was 3.2216 2818.  Her last venous blood gas on 3/10/2018 showed a PCO2 of 47 (less than 50).  Her last arterial blood gas showed a PCO2 of 40 on 9/26/2014.  Her creatinine was 2.02 on 6/28/2018 and has dropped to 1.8 on 7/3/2018.  She had an MRI of the brain on 7/23/2018.  This shows chronic white matter disease but no significant abnormalities.    Past Medical History:   Past Medical History:   Diagnosis Date     Allergic rhinitis, cause unspecified      Anxiety 6/13/2013     Arrhythmia     atrial flutter     Atrial flutter with rapid ventricular response (H) 8/8/2017     Cellulitis and abscess of leg, except foot recurrent , both legs    begins with fever, nausea, diarrhea, vomiting & & the cellulitis may be visible a day or 2 later     Chronic pain     On chronic fentanyl patch.     Chronic renal disease 1/5/2013     Contact dermatitis and other eczema, due to unspecified cause 07/93     Erythroderma desquamativum 8/09     see hospital records;; may  have has toxic shock syndrome & upon recovery had total body desquamation ..  less likely = raction to vancomycin      Essential hypertension with goal blood pressure less than 140/90 1/12/2005     Problem list name updated by automated process. Provider to review     Generalized osteoarthrosis, unspecified site     films 9/04: L hhip bad; both knees mod severe medial OA     GERD (gastroesophageal reflux disease) 11/15/2013     Herpes zoster without mention of complication 9/03    L shoulder; still has neuralgia     Hyperlipidemia LDL goal <100 9/16/2011     Methicillin susceptible Staphylococcus aureus septicemia (H) after L hip injection 205    ARDS, renal failure, staph sepsis after a hip joint injection     Obesity 1/12/2005     Problem list name updated by automated process. Provider to review     Other chronic pain      Other diseases of lung, not elsewhere classified 07/93    Interstitial lung process     Pneumonia, organism unspecified(486)      Urticaria, unspecified 07/93    Diffuse severe (hospitalized).  (+) skin biopsy by Dr. Figueroa       Past Surgical History:   Past Surgical History:   Procedure Laterality Date     BIOPSY       C NONSPECIFIC PROCEDURE      Extensive oral (dental) surgery     C NONSPECIFIC PROCEDURE      Remote T&A     C NONSPECIFIC PROCEDURE  1/03    LAP CHOLY     C NONSPECIFIC PROCEDURE      L hip I + D x 2  4/05     C NONSPECIFIC PROCEDURE  2/05    colonoscopy     C NONSPECIFIC PROCEDURE      L hip I + D several other times      CATARACT IOL, RT/LT Right 2012     CHOLECYSTECTOMY       ENT SURGERY       ESOPHAGOSCOPY, GASTROSCOPY, DUODENOSCOPY (EGD), COMBINED N/A 11/9/2017    Procedure: COMBINED ESOPHAGOSCOPY, GASTROSCOPY, DUODENOSCOPY (EGD);  ESOPHAGOSCOPY, GASTROSCOPY, DUODENOSCOPY (EGD) (fv)  ;  Surgeon: Buck Ribeiro MD;  Location: [facility] OR     PHACOEMULSIFICATION CLEAR CORNEA WITH STANDARD INTRAOCULAR LENS IMPLANT Left 3/10/2016    Procedure: PHACOEMULSIFICATION CLEAR CORNEA  WITH STANDARD INTRAOCULAR LENS IMPLANT;  Surgeon: Dwight Metcalf MD;  Location: [facility]       Medications:  Current Outpatient Prescriptions   Medication Sig Dispense Refill     buPROPion (WELLBUTRIN XL) 300 MG 24 hr tablet Take 1 tablet (300 mg) by mouth every morning 90 tablet 1     CEPHALEXIN PO Take 500 mg by mouth 3 times daily as needed cellulitis       diazepam (VALIUM) 10 MG tablet Take 30-60 minutes before procedure.  Do not operate a vehicle after taking this medication. 1 tablet 0     diltiazem (TIAZAC) 120 MG 24 hr ER beaded capsule Take 120 mg by mouth every evening       doxycycline (VIBRAMYCIN) 100 MG capsule Take 1 capsule (100 mg) by mouth 2 times daily 14 capsule 0     furosemide (LASIX) 20 MG tablet Take 1 tablet (20 mg) by mouth daily 90 tablet 3     gabapentin (NEURONTIN) 600 MG tablet Take 1,200 mg by mouth 3 times daily       HYDROcodone-acetaminophen (NORCO) 5-325 MG per tablet Take 1 tablet by mouth every 6 hours as needed for severe pain 15 tablet 0     levofloxacin (LEVAQUIN) 500 MG tablet Take 1 tablet (500 mg) by mouth daily 7 tablet 0     lidocaine (LIDODERM) 5 % Patch Place 3 patches onto the skin daily as needed for moderate pain (Apply up to 3 patches to painful area at once for up to 12 h within a 24 h period.  Remove after 12 hours.)       METOPROLOL SUCCINATE ER PO Take 50 mg by mouth every evening       minocycline (MINOCIN/DYNACIN) 50 MG capsule Take 1 capsule (50 mg) by mouth 2 times daily 180 capsule 2     Multiple Vitamins-Minerals (CENTRUM SILVER) per tablet Take 1 tablet by mouth daily       nortriptyline (PAMELOR) 25 MG capsule Take 2 capsules (50 mg) by mouth At Bedtime 180 capsule 2     order for DME Equipment being ordered: padded transfer belt 1 Units 0     Pantoprazole Sodium (PROTONIX PO) Take 40 mg by mouth every morning (before breakfast)       pravastatin (PRAVACHOL) 10 MG tablet Take 1 tablet (10 mg) by mouth At Bedtime Needs labs before next refill 90  tablet 0     pregabalin (LYRICA) 150 MG capsule Take 1 capsule (150 mg) by mouth 2 times daily 180 capsule 1     venlafaxine (EFFEXOR-XR) 150 MG 24 hr capsule Take 1 capsule (150 mg) by mouth every evening 90 capsule 1     warfarin (COUMADIN) 2.5 MG tablet Take 1 tablet (2.5 mg) by mouth on TTSS (uses 5 mg tabs on MWF) or as directed by INR Clinic       warfarin (COUMADIN) 5 MG tablet Take 1 tablet (5 mg) by mouth on MWF (uses 2.5 mg tabs on TTSS) or as directed by INR Clinic            Movement Disorder-related Medications                                                                                                                                                             Allergies: is allergic to ceftriaxone; nafcillin; penicillins; vancomycin; codeine; clindamycin; levaquin [levofloxacin]; and zithromax [azithromycin].    Social History:   Social History     Social History     Marital status:      Spouse name: N/A     Number of children: N/A     Years of education: N/A     Social History Main Topics     Smoking status: Former Smoker     Packs/day: 2.00     Years: 22.00     Types: Cigarettes     Start date: 1967     Quit date: 1990     Smokeless tobacco: Never Used      Comment: started smoking 20's- quit in 1990     Alcohol use No      Comment: RARELY     Drug use: No     Sexual activity: No     Other Topics Concern     None     Social History Narrative       Family History:  Family History   Problem Relation Age of Onset     Myocardial Infarction Father 63       ROS:  General:  Fever : no, Chills: no, Sweats: no, Fatigue: YES, ,Weight loss: no  Cardiovascular  Chest Pains: no, Palpitations: YES, Edema: no, Shortness of breath: YES  Respiratory  Cough: no  Gastrointestinal  Nausea: no, Vomiting: no, Diarrhea: no, Constipation: no  Genitourinary  Dysuria: no, Frequency: no, Urgency: no,  Nocturia: no, Incontinence: no Women:  Abnormal vaginal bleeding: no  Musculoskeletal  Back pain: no, Neck Pain:  no  Joint pain, swelling, redness: no, Stiffness: no  Skin  Rash: YES, Itching: no,  Suspicious lesions: no  Psychiatric  Depression: YES, Anxiety/Panic: no  Endocrine  Cold intolerance: no, Heat intolerance: no, Excessive thirst: no  Hematologic/LymphatiAbnormal bruising, bleeding: no ,Enlarged lymph nodes: {.:[id number]  Allergic/Immunologic  Hives (Urticaria): no, HIV exposure: no    Neurologic  Visual loss: no, Double vision: no, Headache: no, Loss of consciousness:  no, Seizure: no, Fainting (syncope): no, Dysarthria: no, Dysphagia:  no, Vertigo:  no, Weakness: YES, Atrophy: no, Twitches: YES, Lhermitte's: no, Numbness or tingling: no, Handwriting change: YES, Tremors: YES, Involuntary movements: no, Imbalance: no, Abnormal gait:  YES, Falls :no, Memory loss: no, RBD: no, Sleep disorder: no, Hallucination: no, Loss of concentration: no,  Behavioral change: no,  Loss of motivation: no      Comprehensive Neurologic Exam    Mental Status Exam   The patient is alert, oriented and exhibits no difficulty with cognition or memory.  A formal short test of mental status was performed.     Neurovascular         Speech and Language   Right Left     Carotid Bruit Absent Absent  Speech is normal.   Dorsalis Pedis Pulse Normal Normal  Description   Posterior Tibial Pulse Normal Normal  Language is normal.     Cranial Nerve Exam                 Right Left   Right Left   II                                        Normal Normal  Hearing (VIII) Normal Normal      III, IV, V!                   Normal Normal  Nystagmus (VIII) Normal Normal   EOM description                              Gag (IX, X) Normal Normal   Facial Sensation (V) Normal Normal  SCM (XI) Normal Normal   Muscles of Mastication (V) Normal Normal  Trapezius (XI) Normal Normal   Facial Strength (VII) Normal Normal  Tongue (XII) Normal Normal     Motor Exam  Strength (*/5 grading)  Muscle                  Right Left  Muscle Right Left   Frontalis                                            Normal Normal  Iliopsoas Normal    0          Orbicularis Oculi                     Normal Normal  Quadrideps Normal    Normal        Orbicularis Oris                         Normal Normal  Anterior Tibial Normal Normal      Deltoid Normal Normal  Gastrocnemius Normal    Normal   Biceps Normal Normal  Extensor Hallucis Longus Normal    Normal   Triceps Normal Normal  Toe Extensors Normal    Normal   EDC Normal Normal  Toe Flexor Normal    Normal   Finger Flexors Normal Normal  Other Normal Normal   First Dorsal Interosseous Normal Normal  Other Normal Normal   Hypothenar Normal Normal  Other Normal Normal   Thenar Normal Normal  Other Normal Normal     Reflexes      Tone   Right Left   Right Left   Biceps Normal Normal  Spasticity (S)  Rigidity (R)     Triceps Normal Normal  Neck     Brachioradialis Normal Normal  Arm     Quadriceps -3 -3  Leg     Ankle -4 -4       Babkinski Flexor Flexor         AMR       Coordination   Right Left   Right Left   Fingers Normal Normal  Finger nose finger Normal Normal   Hand Normal Normal  Drift Normal Normal   Leg Normal Normal  Heel Holly Normal Normal   Foot Normal Normal  Other     Other               Involuntary Movements    Gait and Station  None            Right Left  Stand & Sit Normal   Asterixis +2 +2  Gait -4   (Movement type) Normal Normal  Tandem -4   (Movement type) Normal Normal  Romberg na       Sensory Exam          Right Left    Pin Normal Normal    Vibration Normal Normal    Joint position Normal Normal    Other             Coding statement:     Duration of  Services: face-to-face 40min.   Greater than 50% of this visit was spent in counseling and coordination of care.    Medical decision making is high due to the following components:    Number of diagnoses:[id number]  Management:Diagnostic tests orders or reviewed.No  Medications were prescribed.No Medications were adjusted.No  Complexity of medical data:Outside records reviewed.Yes  Radiology images reviewed by myself.Yes Review/order clinical lab tests. Yes Review/order radiologyYes Discussed tests with performing physician. No Called outside records.No Discussed patient with another provider.Yes Took collateral history.YesRiskOne or more chronic illnesses with severe exacerbation, progression, or side effects of treatment.YesAcute or chronic illness or injury that poses a threat to life or bodily function.No  Abrupt change in neurologic status.No

## 2018-07-27 NOTE — PROGRESS NOTES
Patient is requesting a home INR machine.  Paperwork filled out, signed by Dr. Pearl and faxed to [facility] at [phone].    Mary Butcher RN  [facility]

## 2018-07-30 NOTE — PATIENT INSTRUCTIONS
1. Open displaced fracture of phalanx of lesser toe of left foot, unspecified phalanx, initial encounter      Recommend referral to Podiatry and placed  New dressing applied to foot - keep clean and dry until follow-up    Follow up with Podiatry.

## 2018-07-30 NOTE — PROGRESS NOTES
ASSESSMENT & PLAN    1. Open displaced fracture of phalanx of lesser toe of left foot, unspecified phalanx, initial encounter      Recommend referral to Podiatry and placed  New dressing applied to foot - keep clean and dry until follow-up    Follow up with Podiatry.      -----    SUBJECTIVE  Berenice Chaudhari is a/an 78 year old female who is seen as an UC referral for evaluation of left foot pain. The patient is seen with sister.    Onset: 1 week(s) ago. Patient describes injury as falling while getting out of wheel chair, she stated that she felt wobbly and grabbed on the wrong item and fell pushing her toes backwards into extension. Patient's  noticed blood on the bottom of her foot due to split skin.  Location of Pain: right 3-5 toes  Rating of Pain at worst: 9/10  Rating of Pain Currently: 0/101  Worsened by: bandage irriatation  Better with:  Removal of bandage, not bothersome at rest/ non-weightbearing.  Treatments tried: Tylenol, wound care, and post op shoe.  Associated symptoms: swelling and bruising, wounds on bottom of 3 toes, initial numbness of toes   Orthopedic history: UNKNOWN  Relevant surgical history: NO  Patient Social History: retired    Patient's past medical, surgical, social, and family histories were reviewed today and no changes are noted.    REVIEW OF SYSTEMS:  10 point ROS is negative other than symptoms noted above in HPI, Past Medical History or as stated below  Constitutional: NEGATIVE for fever, chills, change in weight  Skin: NEGATIVE for worrisome rashes, moles or lesions  GI/: NEGATIVE for bowel or bladder changes  Neuro: NEGATIVE for weakness, dizziness or paresthesias    OBJECTIVE:  /76 (BP Location: Left arm, Patient Position: Chair, Cuff Size: Adult Large)   General: healthy, alert and in no distress  HEENT: no scleral icterus or conjunctival erythema  Skin: no suspicious lesions or rash. No jaundice.  CV:  no pedal edema  Resp: normal respiratory effort without  conversational dyspnea   Psych: normal mood and affect  Gait: normal steady gait with appropriate coordination and balance  Neuro: Normal light sensory exam of lower extremity  MSK:  LEFT FOOT -bandages removed  Inspection:  Trace swelling swelling and resolving ecchymosis distal forefoot and toes.  Open wounds on the plantar aspect fourth and fifth toe out any induration or purulent drainage.  Palpation:    Tender about the 3 through 5 toes. Otherwise remainder of bony/ligamentous landmarks are non-tender.  Range of Motion:     Grossly intact and non-painful  Strength:    Grossly intact in all planes    Independent visualization of the below image:  CT LEFT FOOT WITHOUT CONTRAST  7/23/2018 8:17 PM      HISTORY: Fall, fracture and possible dislocation.     TECHNIQUE: Axial images with reconstructions. Radiation dose for this  scan was reduced using automated exposure control, adjustment of the  mA and/or kV according to patient size, or iterative reconstruction  technique.       COMPARISON: Radiographs from today.      FINDINGS: Calcaneal spurring.         IMPRESSION:  1. Transverse fracture of the fifth proximal phalanx with slight  angulation.  2. There is dorsal-lateral subluxation at the fourth PIP joint.  3. There is dorsal-lateral dislocation at the third PIP joint.     JAYME DURAN MD    LEFT FOOT THREE VIEWS  7/23/2018 7:08 PM      HISTORY: Fall with pain on toes 3-5, likely dorsiflexed the toes.      COMPARISON: None.         IMPRESSION:   1. Slightly displaced and angulated transverse fracture of the  proximal shaft of the proximal phalanx of the fifth toe. Overlying  soft tissue swelling.  2. Subluxation of the proximal interphalangeal joints of the third and  fourth toes. Difficult to rule out dislocations. These are not well  seen on lateral view due to overlapping of the bones. Clinical  correlation is necessary.  3. Plantar and posterior calcaneal spurs.      INOCENCIO LITTLE MD    Patient's conditions  were thoroughly discussed during today's visit with greater than 50% of the visit spent counseling the patient with total time spent face-to-face with the patient being 20 minutes.    Wayne Milan DO [facility]

## 2018-07-30 NOTE — MR AVS SNAPSHOT
After Visit Summary   7/30/2018    Berenice Chaudhari    MRN: [id number]           Patient Information     Date Of Birth          1940        Visit Information        Provider Department      7/30/2018 10:40 AM Wayne Milan DO [facility]        Today's Diagnoses     Open displaced fracture of phalanx of lesser toe of left foot, unspecified phalanx, initial encounter    -  1      Care Instructions    1. Open displaced fracture of phalanx of lesser toe of left foot, unspecified phalanx, initial encounter      Recommend referral to Podiatry and placed  New dressing applied to foot - keep clean and dry until follow-up    Follow up with Podiatry.            Follow-ups after your visit        Additional Services     ORTHO  REFERRAL       [facility] is referring you to the Orthopedic  Services at [facility] and [facility].       The  Representative will assist you in the coordination of your Orthopedic and Musculoskeletal Care as prescribed by your physician.    The  Representative will call you within 24 hours to help schedule your appointment, or you may contact the  Representative at:    [facility] ~ [phone]  [facility] ~ [phone]  [facility] ~ [phone]    Type of Referral : [facility] / Foot & Ankle Surgery - fracture / dislocation with open wound on plantar aspect of MTP joints (3-5)      Timeframe requested: Routine     Coverage of these services is subject to the terms and limitations of your health insurance plan.  Please call member services at your health plan with any benefit or coverage questions.      If X-rays, CT or MRI's have been performed, please contact the facility where they were done to arrange for , prior to your scheduled appointment.  Please bring this referral request to your appointment and present it to your  specialist.                  Your next 10 appointments already scheduled     Aug 09, 2018 11:30 AM CDT   [facility] Visit with [facility]   [facility] [place] [facility] [place]    [address]  [address]  [place] [address] [phone]              Who to contact     If you have questions or need follow up information about today's clinic visit or your schedule please contact [facility] directly at [phone].  Normal or non-critical lab and imaging results will be communicated to you by MyChart, letter or phone within 4 business days after the clinic has received the results. If you do not hear from us within 7 days, please contact the clinic through MyChart or phone. If you have a critical or abnormal lab result, we will notify you by phone as soon as possible.  Submit refill requests through [employer] or call your pharmacy and they will forward the refill request to us. Please allow 3 business days for your refill to be completed.          Additional Information About Your Visit        Care EveryWhere ID     This is your Care EveryWhere ID. This could be used by other organizations to access your [place] medical records  [id number]         Blood Pressure from Last 3 Encounters:   07/30/18 138/76   07/25/18 135/56   07/23/18 149/78    Weight from Last 3 Encounters:   05/04/18 (!) 347 lb (157.4 kg)   04/20/18 (!) 347 lb 9.6 oz (157.7 kg)   04/12/18 (!) 347 lb 9.6 oz (157.7 kg)              We Performed the Following     ORTHO  REFERRAL        Primary Care Provider Office Phone # Fax #    Nelly Pearl [phone] [address] [place] [address]        Equal Access to Services     [facility] : Hadii irvin Hummel, david caicedo, qaybta calin aragon. So [facility] [phone].    ATENCIÓN: Si habla español, tiene a dickey disposición  servicios gratuitos de asistencia lingüística. Jonathon hernandez [phone].    We comply with applicable federal civil rights laws and [place] laws. We do not discriminate on the basis of race, color, national origin, age, disability, sex, sexual orientation, or gender identity.            Thank you!     Thank you for choosing [facility]  for your care. Our goal is always to provide you with excellent care. Hearing back from our patients is one way we can continue to improve our services. Please take a few minutes to complete the written survey that you may receive in the mail after your visit with us. Thank you!             Your Updated Medication List - Protect others around you: Learn how to safely use, store and throw away your medicines at www.disposemymeds.org.          This list is accurate as of 7/30/18 11:30 AM.  Always use your most recent med list.                   Brand Name Dispense Instructions for use Diagnosis    buPROPion 300 MG 24 hr tablet    WELLBUTRIN XL    90 tablet    Take 1 tablet (300 mg) by mouth every morning    Major depressive disorder, recurrent, in full remission (H)       CENTRUM SILVER per tablet      Take 1 tablet by mouth daily        CEPHALEXIN PO      Take 500 mg by mouth 3 times daily as needed cellulitis        diazepam 10 MG tablet    VALIUM    1 tablet    Take 30-60 minutes before procedure.  Do not operate a vehicle after taking this medication.    Anxiety       diltiazem 120 MG 24 hr ER beaded capsule    TIAZAC     Take 120 mg by mouth every evening        doxycycline 100 MG capsule    VIBRAMYCIN    14 capsule    Take 1 capsule (100 mg) by mouth 2 times daily    HCAP (healthcare-associated pneumonia)       furosemide 20 MG tablet    LASIX    90 tablet    Take 1 tablet (20 mg) by mouth daily    Atrial flutter with rapid ventricular response (H)       gabapentin 600 MG tablet    NEURONTIN     Take 1,200 mg by mouth 3 times daily         HYDROcodone-acetaminophen 5-325 MG per tablet    NORCO    15 tablet    Take 1 tablet by mouth every 6 hours as needed for severe pain        levofloxacin 500 MG tablet    LEVAQUIN    7 tablet    Take 1 tablet (500 mg) by mouth daily    HCAP (healthcare-associated pneumonia)       lidocaine 5 % Patch    LIDODERM     Place 3 patches onto the skin daily as needed for moderate pain (Apply up to 3 patches to painful area at once for up to 12 h within a 24 h period.  Remove after 12 hours.)        METOPROLOL SUCCINATE ER PO      Take 50 mg by mouth every evening        minocycline 50 MG capsule    MINOCIN/DYNACIN    180 capsule    Take 1 capsule (50 mg) by mouth 2 times daily    Methicillin susceptible Staphylococcus aureus septicemia (H)       nortriptyline 25 MG capsule    PAMELOR    180 capsule    Take 2 capsules (50 mg) by mouth At Bedtime    Post herpetic neuralgia       order for DME     1 Units    Equipment being ordered: padded transfer belt    Severe obesity (BMI >= 40) (H), Congestive heart failure, unspecified congestive heart failure chronicity, unspecified congestive heart failure type (H)       pravastatin 10 MG tablet    PRAVACHOL    90 tablet    Take 1 tablet (10 mg) by mouth At Bedtime Needs labs before next refill    Hyperlipidemia LDL goal <100       pregabalin 150 MG capsule    LYRICA    180 capsule    Take 1 capsule (150 mg) by mouth 2 times daily    Postherpetic neuralgia       PROTONIX PO      Take 40 mg by mouth every morning (before breakfast)        venlafaxine 150 MG 24 hr capsule    EFFEXOR-XR    90 capsule    Take 1 capsule (150 mg) by mouth every evening    Major depressive disorder, recurrent episode, in full remission (H)       * warfarin 5 MG tablet    COUMADIN     Take 1 tablet (5 mg) by mouth on MWF (uses 2.5 mg tabs on TTSS) or as directed by INR Clinic    Long-term (current) use of anticoagulants, Atrial flutter with rapid ventricular response (H)       * warfarin 2.5 MG tablet     COUMADIN     Take 1 tablet (2.5 mg) by mouth on TTSS (uses 5 mg tabs on MWF) or as directed by INR Clinic    Long-term (current) use of anticoagulants, Atrial flutter with rapid ventricular response (H)       * Notice:  This list has 2 medication(s) that are the same as other medications prescribed for you. Read the directions carefully, and ask your doctor or other care provider to review them with you.

## 2018-07-30 NOTE — PROGRESS NOTES
Call from Tameka from [facility].  [facility] is not accepting the diagnosis of Long term (current) use of anticoagulants by itself.  Tameka had faxed a list of Allowable Diagnosis' Codes for Home INR Monitoring and that was the only diagnosis listed that this patient has.    Patient has Atrial flutter I48.92 and that is why she was started on warfarin. Atrial flutter is not on the list.  Tameka said to add it to the order and she will see if [facility] will accept it.    Diagnosis added and form faxed back to Nilesh.    [facility]tico[facility] (INR) Clinic

## 2018-07-30 NOTE — LETTER
7/30/2018         RE: Berenice Chaudhari  [address] [place] [address]        Dear Colleague,    Thank you for referring your patient, Berenice Chaudhari, to the [facility]. Please see a copy of my visit note below.    ASSESSMENT & PLAN    1. Open displaced fracture of phalanx of lesser toe of left foot, unspecified phalanx, initial encounter      Recommend referral to Podiatry and placed  New dressing applied to foot - keep clean and dry until follow-up    Follow up with Podiatry.      -----    SUBJECTIVE  Berenice Chaudhari is a/an 78 year old female who is seen as an UC referral for evaluation of left foot pain. The patient is seen with sister.    Onset: 1 week(s) ago. Patient describes injury as falling while getting out of wheel chair, she stated that she felt wobbly and grabbed on the wrong item and fell pushing her toes backwards into extension. Patient's  noticed blood on the bottom of her foot due to split skin.  Location of Pain: right 3-5 toes  Rating of Pain at worst: 9/10  Rating of Pain Currently: 0/101  Worsened by: bandage irriatation  Better with:  Removal of bandage, not bothersome at rest/ non-weightbearing.  Treatments tried: Tylenol, wound care, and post op shoe.  Associated symptoms: swelling and bruising, wounds on bottom of 3 toes, initial numbness of toes   Orthopedic history: UNKNOWN  Relevant surgical history: NO  Patient Social History: retired    Patient's past medical, surgical, social, and family histories were reviewed today and no changes are noted.    REVIEW OF SYSTEMS:  10 point ROS is negative other than symptoms noted above in HPI, Past Medical History or as stated below  Constitutional: NEGATIVE for fever, chills, change in weight  Skin: NEGATIVE for worrisome rashes, moles or lesions  GI/: NEGATIVE for bowel or bladder changes  Neuro: NEGATIVE for weakness, dizziness or paresthesias    OBJECTIVE:  /76 (BP Location: Left arm, Patient  Position: Chair, Cuff Size: Adult Large)   General: healthy, alert and in no distress  HEENT: no scleral icterus or conjunctival erythema  Skin: no suspicious lesions or rash. No jaundice.  CV:  no pedal edema  Resp: normal respiratory effort without conversational dyspnea   Psych: normal mood and affect  Gait: normal steady gait with appropriate coordination and balance  Neuro: Normal light sensory exam of lower extremity  MSK:  LEFT FOOT -bandages removed  Inspection:  Trace swelling swelling and resolving ecchymosis distal forefoot and toes.  Open wounds on the plantar aspect fourth and fifth toe out any induration or purulent drainage.  Palpation:    Tender about the 3 through 5 toes. Otherwise remainder of bony/ligamentous landmarks are non-tender.  Range of Motion:     Grossly intact and non-painful  Strength:    Grossly intact in all planes    Independent visualization of the below image:  CT LEFT FOOT WITHOUT CONTRAST  7/23/2018 8:17 PM      HISTORY: Fall, fracture and possible dislocation.     TECHNIQUE: Axial images with reconstructions. Radiation dose for this  scan was reduced using automated exposure control, adjustment of the  mA and/or kV according to patient size, or iterative reconstruction  technique.       COMPARISON: Radiographs from today.      FINDINGS: Calcaneal spurring.         IMPRESSION:  1. Transverse fracture of the fifth proximal phalanx with slight  angulation.  2. There is dorsal-lateral subluxation at the fourth PIP joint.  3. There is dorsal-lateral dislocation at the third PIP joint.     JAYME DURAN MD    LEFT FOOT THREE VIEWS  7/23/2018 7:08 PM      HISTORY: Fall with pain on toes 3-5, likely dorsiflexed the toes.      COMPARISON: None.         IMPRESSION:   1. Slightly displaced and angulated transverse fracture of the  proximal shaft of the proximal phalanx of the fifth toe. Overlying  soft tissue swelling.  2. Subluxation of the proximal interphalangeal joints of the third  and  fourth toes. Difficult to rule out dislocations. These are not well  seen on lateral view due to overlapping of the bones. Clinical  correlation is necessary.  3. Plantar and posterior calcaneal spurs.      INOCENCIO LITTLE MD    Patient's conditions were thoroughly discussed during today's visit with greater than 50% of the visit spent counseling the patient with total time spent face-to-face with the patient being 20 minutes.    Wayne Milan DO [facility]      Again, thank you for allowing me to participate in the care of your patient.        Sincerely,        Wayne Milan DO

## 2018-08-06 NOTE — PATIENT INSTRUCTIONS
Thank you for choosing [facility] / [facility]le [facility]!    DR. NOBLES'S CLINIC LOCATIONS:   MONDAY - [place] TUESDAY - [place]   [address]  [address]   [place] [address] [place] [address]   [phone]       THURSDAY AM - [place] THURSDAY PM - [place]   [address]   [place] [address] [place], [place] [address] [phone]       FRIDAY AM - [place] SET UP SURGERY: [phone] [address] APPOINTMENTS: [phone]   [place] [address] BILLING QUESTIONS: [phone] FAX NUMBER: [phone]     Follow Up: 4 weeks  PRICE THERAPY  Many aches and pains throughout the foot and ankle can be helped with many simple treatments. This is usually described as PRICE Therapy.      P - Protection - often times, inflammation/pain in the lower extremity is not able to improve simply because the areas involved are never allowed to rest. Every step we take can bother the problematic area. Protecting those areas is an important step in the healing process. This may involve a walking cast boot, a special insert/orthotic device, an ankle brace, or simply avoiding barefoot walking.    R - Rest - in addition to protecting the foot/ankle, resting is an important, but often times difficult, treatment option. Getting off your feet when they bother you, and specifically avoiding activities that cause pain/discomfort, are very beneficial to prevent, and treat, foot/ankle pain.      I - Ice - icing regularly can help to decrease inflammation and swelling in the foot, thus decreasing pain. Using an ice pack or a bag of frozen veggies works very well. Ice for 20 minutes multiple times per day as needed.  Do not place the ice directly on the skin as this can cause tissue damage.    C - Compression - using a compression wrap or an ACE wrap can help to decrease swelling, which can help to decrease pain. Wearing the  wraps is generally not needed at night, but they should be worn on a regular basis when you are going to be on your feet for prolonged periods as gravity tends to pull fluids down to your feet/ankles.    E - Elevation - elevating your lower extremities multiple times daily for 15-20 minutes can help to decrease swelling, which works well in decreasing pain levels.    NSAID/Tylenol - Anti-inflammatories like Aleve or ibuprofen, and/or a pain medication, such as Tylenol, can help to improve pain levels and get the issue resolved sooner rather than later. Anyone with liver issues should be careful with Tylenol, and anyone with high blood pressure or heart, stomach or kidney issues should be careful with anti-inflammatories. Please ask if you have questions about these medications, including dosage.      Body Mass Index (BMI)  Many things can cause foot and ankle problems. Foot structure, activity level, foot mechanics and injuries are common causes of pain. One very important issue that often goes unmentioned, is body weight. Extra weight can cause increased stress on muscles, ligaments, bones and tendons. Sometimes just a few extra pounds is all it takes to put one over her/his threshold. Without reducing that stress, it can be difficult to alleviate pain. Some people are uncomfortable addressing this issue, but we feel it is important for you to think about it. As Foot &  Ankle specialists, our job is addressing the lower extremity problem and possible causes. Regarding extra body weight, we encourage patients to discuss diet and weight management plans with their primary care doctors. It is this team approach that gives you the best opportunity for pain relief and getting you back on your feet.

## 2018-08-06 NOTE — LETTER
"    8/6/2018         RE: Berenice Chaudhari  [address]  [place] [address]        Dear Colleague,    Thank you for referring your patient, Berenice Chaudhari, to the [facility]. Please see a copy of my visit note below.    Foot & Ankle Surgery  August 14, 2018    CC: \"broken toe and dislocated toes\"    I was asked to see Berenice Chaudhari regarding the chief complaint by:  Dr. RENUKA Milan    HPI:  Pt is a 78 year old female who presents with above complaint.  L toe injuries 2 weeks ago.  Transferring from her wheelchair to a recliner and injured her foot.  She saw Dr Foote in the ER, where xrays showed injuries to toes 3,4,5.  A CT scan showed the injuries as well.  She was referred to Dr Milan for evaluation, at which point she was referred to our department.  There were lacerations on the toes and these were deemed to be open fracture/dislocations.  She describes pain with contact, 2/10.  Treatment has consisted of bandaging.  She was also given a surgical shoe but she's wearing a slipper today, which she states feels good.  She states the xrays in her chart were done before the toes were reduced in the ER.  \"It doesn't hurt at all\".  She asks what the lump on her left leg is    ROS:   Pos for CC.  The patient denies current nausea, vomiting, chills, fevers, belly pain, calf pain, chest pain or SOB.  Complete remainder of ROS is otherwise neg.    VITALS:    Vitals:    08/06/18 1322   Height: 5' 4\" (1.626 m)       PMH:    Past Medical History:   Diagnosis Date     Allergic rhinitis, cause unspecified      Anxiety 6/13/2013     Arrhythmia     atrial flutter     Atrial flutter with rapid ventricular response (H) 8/8/2017     Cellulitis and abscess of leg, except foot recurrent , both legs    begins with fever, nausea, diarrhea, vomiting & & the cellulitis may be visible a day or 2 later     Chronic pain     On chronic fentanyl patch.     Chronic renal disease 1/5/2013     Contact dermatitis and other " eczema, due to unspecified cause 07/93     Erythroderma desquamativum 8/09     see hospital records;; may have has toxic shock syndrome & upon recovery had total body desquamation ..  less likely = raction to vancomycin      Essential hypertension with goal blood pressure less than 140/90 1/12/2005     Problem list name updated by automated process. Provider to review     Generalized osteoarthrosis, unspecified site     films 9/04: L hhip bad; both knees mod severe medial OA     GERD (gastroesophageal reflux disease) 11/15/2013     Herpes zoster without mention of complication 9/03    L shoulder; still has neuralgia     Hyperlipidemia LDL goal <100 9/16/2011     Methicillin susceptible Staphylococcus aureus septicemia (H) after L hip injection 205    ARDS, renal failure, staph sepsis after a hip joint injection     Obesity 1/12/2005     Problem list name updated by automated process. Provider to review     Other chronic pain      Other diseases of lung, not elsewhere classified 07/93    Interstitial lung process     Pneumonia, organism unspecified(486)      Urticaria, unspecified 07/93    Diffuse severe (hospitalized).  (+) skin biopsy by Dr. Carolina ABREUX:    Past Surgical History:   Procedure Laterality Date     BIOPSY       C NONSPECIFIC PROCEDURE      Extensive oral (dental) surgery     C NONSPECIFIC PROCEDURE      Remote T&A     C NONSPECIFIC PROCEDURE  1/03    LAP CHOLY     C NONSPECIFIC PROCEDURE      L hip I + D x 2  4/05     C NONSPECIFIC PROCEDURE  2/05    colonoscopy     C NONSPECIFIC PROCEDURE      L hip I + D several other times      CATARACT IOL, RT/LT Right 2012     CHOLECYSTECTOMY       ENT SURGERY       ESOPHAGOSCOPY, GASTROSCOPY, DUODENOSCOPY (EGD), COMBINED N/A 11/9/2017    Procedure: COMBINED ESOPHAGOSCOPY, GASTROSCOPY, DUODENOSCOPY (EGD);  ESOPHAGOSCOPY, GASTROSCOPY, DUODENOSCOPY (EGD) (fv)  ;  Surgeon: Buck Ribeiro MD;  Location: [facility] OR     PHACOEMULSIFICATION CLEAR CORNEA WITH  STANDARD INTRAOCULAR LENS IMPLANT Left 3/10/2016    Procedure: PHACOEMULSIFICATION CLEAR CORNEA WITH STANDARD INTRAOCULAR LENS IMPLANT;  Surgeon: Dwight Metcalf MD;  Location: [facility]        MEDS:    Current Outpatient Prescriptions   Medication     buPROPion (WELLBUTRIN XL) 300 MG 24 hr tablet     CEPHALEXIN PO     diazepam (VALIUM) 10 MG tablet     diltiazem (TIAZAC) 120 MG 24 hr ER beaded capsule     doxycycline (VIBRAMYCIN) 100 MG capsule     furosemide (LASIX) 20 MG tablet     gabapentin (NEURONTIN) 600 MG tablet     HYDROcodone-acetaminophen (NORCO) 5-325 MG per tablet     levofloxacin (LEVAQUIN) 500 MG tablet     lidocaine (LIDODERM) 5 % Patch     METOPROLOL SUCCINATE ER PO     minocycline (MINOCIN/DYNACIN) 50 MG capsule     Multiple Vitamins-Minerals (CENTRUM SILVER) per tablet     nortriptyline (PAMELOR) 25 MG capsule     order for DME     Pantoprazole Sodium (PROTONIX PO)     pravastatin (PRAVACHOL) 10 MG tablet     venlafaxine (EFFEXOR-XR) 150 MG 24 hr capsule     warfarin (COUMADIN) 2.5 MG tablet     warfarin (COUMADIN) 5 MG tablet     pregabalin (LYRICA) 150 MG capsule     No current facility-administered medications for this visit.        ALL:     Allergies   Allergen Reactions     Ceftriaxone Anaphylaxis and Rash     Rocephin given. Developed rash to back and abd, awaiting to see if it improves with d/c of rocephin.        Nafcillin Rash     diffuse severe rash in hospital 2/05     Penicillins Anaphylaxis     Vancomycin Anaphylaxis and Rash     Codeine Fatigue     Sleeps continuously     Clindamycin Rash     received information from patient     Levaquin [Levofloxacin] Hives     Oral medication      Zithromax [Azithromycin] Rash       FMH:    Family History   Problem Relation Age of Onset     Myocardial Infarction Father 63       SocHx:    Social History     Social History     Marital status:      Spouse name: N/A     Number of children: N/A     Years of education: N/A     Occupational  History     Not on file.     Social History Main Topics     Smoking status: Former Smoker     Packs/day: 2.00     Years: 22.00     Types: Cigarettes     Start date: 1967     Quit date: 1990     Smokeless tobacco: Never Used      Comment: started smoking 20's- quit in 1990     Alcohol use No      Comment: RARELY     Drug use: No     Sexual activity: No     Other Topics Concern     Not on file     Social History Narrative           EXAMINATION:  Gen:   No apparent distress  Neuro:   A&Ox3, no deficits  Psych:    Answering questions appropriately for age and situation with normal affect  Head:    NCAT  Eye:    Visual scanning without deficit  Ear:    Response to auditory stimuli wnl  Lung:    Non-labored breathing on RA noted  Abd:    NTND per patient report  Lymph:    Mild L forefoot edema.  Hematoma proximal-medial left lower leg.  Vasc:    Pulses palpable, CFT minimally delayed  Neuro:    Light touch sensation intact to all sensory nerve distributions without paresthesias  Derm:    Plantar lacerations lesser toes L foot are epithelialized with mild peeling skin, but no SOI.  MSK:    Left foot - 3rd/5th toes are stable.  4th toe shows mild lateral deviation but no pain.    Calf:    Neg for redness, swelling or tenderness    Imaging -  IMPRESSION:   1. Slightly displaced and angulated transverse fracture of the  proximal shaft of the proximal phalanx of the fifth toe. Overlying  soft tissue swelling.  2. Subluxation of the proximal interphalangeal joints of the third and  fourth toes. Difficult to rule out dislocations. These are not well  seen on lateral view due to overlapping of the bones. Clinical  correlation is necessary.  3. Plantar and posterior calcaneal spurs.        Assessment:  78 year old female with toe fracture/dislocation digits 3,4,5 left lower extremity; hematoma left lower leg      Plan:  Discussed etiologies, anatomy and options  1.  Open fracture/dislocation toes 3,4,5 left lower extremity    -personally reviewed imaging  -lacerations are epithelialized without SOI; no bandaging needed  -RICE/NSAID prn  -comfortable shoe, surgical shoe likely best  -tensogrip for edema control  -no surgery indicated.  Reasonable alignment, she's having minimal pain, and she's not an ideal surgical candidate     2.  Hematoma proximal-medial L lower leg  -warm compress, compression, NSAID  -consider Ortho vs Gen Surg for evacuation if this does not subside.       Follow up:  4 weeks or sooner with acute issues      Patient's medical history was reviewed today    There is no height or weight on file to calculate BMI.    Weight management plan: Patient was referred to their PCP to discuss a diet and exercise plan.        Jeffrey Vogel DPM FACFAS FACFAOM  Podiatric Foot & Ankle Surgeon  [facility]  [phone]        Again, thank you for allowing me to participate in the care of your patient.        Sincerely,        Jeffrey Vogel DPM, RYANNE

## 2018-08-06 NOTE — MR AVS SNAPSHOT
After Visit Summary   8/6/2018    Berenice Chaudhari    MRN: [id number]           Patient Information     Date Of Birth          1940        Visit Information        Provider Department      8/6/2018 1:15 PM Jeffrey Nobles DPM [facility]        Care Instructions    Thank you for choosing [facility]!    DR. NOBLES'S CLINIC LOCATIONS:   MONDAY - [place] TUESDAY - [place]   [address]  [address]   [place] [address] [place] [address]   [phone]       THURSDAY AM - [place] THURSDAY PM - [place]   [address]   [place] [address] [place] [address]   [phone]       FRIDAY AM - [place] SET UP SURGERY: [phone] [address] APPOINTMENTS: [phone]   [place] [address] BILLING QUESTIONS: [phone] FAX NUMBER: [phone]     Follow Up: 4 weeks  PRICE THERAPY  Many aches and pains throughout the foot and ankle can be helped with many simple treatments. This is usually described as PRICE Therapy.      P - Protection - often times, inflammation/pain in the lower extremity is not able to improve simply because the areas involved are never allowed to rest. Every step we take can bother the problematic area. Protecting those areas is an important step in the healing process. This may involve a walking cast boot, a special insert/orthotic device, an ankle brace, or simply avoiding barefoot walking.    R - Rest - in addition to protecting the foot/ankle, resting is an important, but often times difficult, treatment option. Getting off your feet when they bother you, and specifically avoiding activities that cause pain/discomfort, are very beneficial to prevent, and treat, foot/ankle pain.      I - Ice - icing regularly can help to decrease inflammation and swelling in the foot, thus decreasing pain. Using an ice pack or a bag of  frozen veggies works very well. Ice for 20 minutes multiple times per day as needed.  Do not place the ice directly on the skin as this can cause tissue damage.    C - Compression - using a compression wrap or an ACE wrap can help to decrease swelling, which can help to decrease pain. Wearing the wraps is generally not needed at night, but they should be worn on a regular basis when you are going to be on your feet for prolonged periods as gravity tends to pull fluids down to your feet/ankles.    E - Elevation - elevating your lower extremities multiple times daily for 15-20 minutes can help to decrease swelling, which works well in decreasing pain levels.    NSAID/Tylenol - Anti-inflammatories like Aleve or ibuprofen, and/or a pain medication, such as Tylenol, can help to improve pain levels and get the issue resolved sooner rather than later. Anyone with liver issues should be careful with Tylenol, and anyone with high blood pressure or heart, stomach or kidney issues should be careful with anti-inflammatories. Please ask if you have questions about these medications, including dosage.      Body Mass Index (BMI)  Many things can cause foot and ankle problems. Foot structure, activity level, foot mechanics and injuries are common causes of pain. One very important issue that often goes unmentioned, is body weight. Extra weight can cause increased stress on muscles, ligaments, bones and tendons. Sometimes just a few extra pounds is all it takes to put one over her/his threshold. Without reducing that stress, it can be difficult to alleviate pain. Some people are uncomfortable addressing this issue, but we feel it is important for you to think about it. As Foot &  Ankle specialists, our job is addressing the lower extremity problem and possible causes. Regarding extra body weight, we encourage patients to discuss diet and weight management plans with their primary care doctors. It is this team approach that gives you  "the best opportunity for pain relief and getting you back on your feet.            Follow-ups after your visit        Your next 10 appointments already scheduled     Aug 09, 2018 11:30 AM CDT   Anticoagulation Visit with [facility]   [facility] ([facility] [place])    [address]  [address]  [place] [address] [phone]              Who to contact     If you have questions or need follow up information about today's clinic visit or your schedule please contact [facility] [place] directly at [phone].  Normal or non-critical lab and imaging results will be communicated to you by MyChart, letter or phone within 4 business days after the clinic has received the results. If you do not hear from us within 7 days, please contact the clinic through MyChart or phone. If you have a critical or abnormal lab result, we will notify you by phone as soon as possible.  Submit refill requests through [employer] or call your pharmacy and they will forward the refill request to us. Please allow 3 business days for your refill to be completed.          Additional Information About Your Visit        Care EveryWhere ID     This is your Care EveryWhere ID. This could be used by other organizations to access your [place] medical records  [id number]        Your Vitals Were     Height                   5' 4\" (1.626 m)            Blood Pressure from Last 3 Encounters:   07/30/18 138/76   07/25/18 135/56   07/23/18 149/78    Weight from Last 3 Encounters:   05/04/18 (!) 347 lb (157.4 kg)   04/20/18 (!) 347 lb 9.6 oz (157.7 kg)   04/12/18 (!) 347 lb 9.6 oz (157.7 kg)              Today, you had the following     No orders found for display       Primary Care Provider Office Phone # Fax #    Nelly Pearl [phone] [address] [place] [address]        Equal Access to Services     EDMUND POWELL AH: david Pascal " sascha wilmanarcenio ortizcalin rodriguez. So [facility] [phone].    ATENCIÓN: Si liborio garrett, tiene a dickey disposición servicios gratuitos de asistencia lingüística. Jonathon al [phone].    We comply with applicable federal civil rights laws and [place] laws. We do not discriminate on the basis of race, color, national origin, age, disability, sex, sexual orientation, or gender identity.            Thank you!     Thank you for choosing [facility]  for your care. Our goal is always to provide you with excellent care. Hearing back from our patients is one way we can continue to improve our services. Please take a few minutes to complete the written survey that you may receive in the mail after your visit with us. Thank you!             Your Updated Medication List - Protect others around you: Learn how to safely use, store and throw away your medicines at www.disposemymeds.org.          This list is accurate as of 8/6/18  1:41 PM.  Always use your most recent med list.                   Brand Name Dispense Instructions for use Diagnosis    buPROPion 300 MG 24 hr tablet    WELLBUTRIN XL    90 tablet    Take 1 tablet (300 mg) by mouth every morning    Major depressive disorder, recurrent, in full remission (H)       CENTRUM SILVER per tablet      Take 1 tablet by mouth daily        CEPHALEXIN PO      Take 500 mg by mouth 3 times daily as needed cellulitis        diazepam 10 MG tablet    VALIUM    1 tablet    Take 30-60 minutes before procedure.  Do not operate a vehicle after taking this medication.    Anxiety       diltiazem 120 MG 24 hr ER beaded capsule    TIAZAC     Take 120 mg by mouth every evening        doxycycline 100 MG capsule    VIBRAMYCIN    14 capsule    Take 1 capsule (100 mg) by mouth 2 times daily    HCAP (healthcare-associated pneumonia)       furosemide 20 MG tablet    LASIX    90 tablet    Take 1 tablet (20 mg) by mouth daily    Atrial flutter with  rapid ventricular response (H)       gabapentin 600 MG tablet    NEURONTIN     Take 1,200 mg by mouth 3 times daily        HYDROcodone-acetaminophen 5-325 MG per tablet    NORCO    15 tablet    Take 1 tablet by mouth every 6 hours as needed for severe pain        levofloxacin 500 MG tablet    LEVAQUIN    7 tablet    Take 1 tablet (500 mg) by mouth daily    HCAP (healthcare-associated pneumonia)       lidocaine 5 % Patch    LIDODERM     Place 3 patches onto the skin daily as needed for moderate pain (Apply up to 3 patches to painful area at once for up to 12 h within a 24 h period.  Remove after 12 hours.)        METOPROLOL SUCCINATE ER PO      Take 50 mg by mouth every evening        minocycline 50 MG capsule    MINOCIN/DYNACIN    180 capsule    Take 1 capsule (50 mg) by mouth 2 times daily    Methicillin susceptible Staphylococcus aureus septicemia (H)       nortriptyline 25 MG capsule    PAMELOR    180 capsule    Take 2 capsules (50 mg) by mouth At Bedtime    Post herpetic neuralgia       order for DME     1 Units    Equipment being ordered: padded transfer belt    Severe obesity (BMI >= 40) (H), Congestive heart failure, unspecified congestive heart failure chronicity, unspecified congestive heart failure type (H)       pravastatin 10 MG tablet    PRAVACHOL    90 tablet    Take 1 tablet (10 mg) by mouth At Bedtime Needs labs before next refill    Hyperlipidemia LDL goal <100       pregabalin 150 MG capsule    LYRICA    180 capsule    Take 1 capsule (150 mg) by mouth 2 times daily    Postherpetic neuralgia       PROTONIX PO      Take 40 mg by mouth every morning (before breakfast)        venlafaxine 150 MG 24 hr capsule    EFFEXOR-XR    90 capsule    Take 1 capsule (150 mg) by mouth every evening    Major depressive disorder, recurrent episode, in full remission (H)       * warfarin 5 MG tablet    COUMADIN     Take 1 tablet (5 mg) by mouth on MWF (uses 2.5 mg tabs on TTSS) or as directed by INR Clinic    Long-term  (current) use of anticoagulants, Atrial flutter with rapid ventricular response (H)       * warfarin 2.5 MG tablet    COUMADIN     Take 1 tablet (2.5 mg) by mouth on TTSS (uses 5 mg tabs on MWF) or as directed by INR Clinic    Long-term (current) use of anticoagulants, Atrial flutter with rapid ventricular response (H)       * Notice:  This list has 2 medication(s) that are the same as other medications prescribed for you. Read the directions carefully, and ask your doctor or other care provider to review them with you.

## 2018-08-07 NOTE — PROGRESS NOTES
Call from Tameka from [facility].  [facility] is not accepting the Home Monitoring form because the doctor's name was crossed off and changed.  Form had Dr. Mccullough, but patient's PCP is Dae so it was changed before Dr Pearl signed it and it was faxed.  .  Crossed off area had to be dated and initialed.  [facility] will also not accept form if the date signed isn't clearly 2018. The 8 needs to have rounded circles. Had to make them round and also date and initial.    Form faxed back to [facility] [phone].    Mary Butcher, RN  [facility]

## 2018-08-09 NOTE — TELEPHONE ENCOUNTER
Pt came in to check her INR today. Complains that her LLL(below the knee) still has swelling/hard lump under the skin.     Pt fell on 7/22 when she attempted to get out of her wheelchair(her foot got caught in the wheelchair), seen in ER on 7/23, dx with 5th digit fracture. She also noticed swelling, bruising, redness & pain below her L knee since the fall.     Bruising got better since then, but still has a hard lump under the skin & painful to touch. Since the fall, she has seen her orthopedist() & podiatrist(). Both providers advised to elevate leg, ice pack & tylenol for pain. Pt has been doing that.     She would like to know whether she need to be seen by  to take a look at the lump/swelling? No redness, bruising, warmth to touch, fever or chills.     I was going to huddle with pcp while pt was in the clinic, but she was really tired & wanted to go home. Please advise whether she need to be seen or can continue elevate/rest/ice and monitor?     Pt can be reached at [phone](OK to ). Triage nurse can call her back with MD's advise. Thanks.    Cha, RN  Triage Nurse

## 2018-08-09 NOTE — TELEPHONE ENCOUNTER
Sounds like she she has a hematoma.  I agree with the other providers - elevate, ice, rest. Given the multiple other providers have seen her, I do not think she needs to see me.  If it has increasing tendernee or warmth she should be seen.  Hematomas can take months to fully resolve.    Nelly Pearl MD  Internal Medicine/Pediatrics  [facility]

## 2018-08-09 NOTE — PROGRESS NOTES
ANTICOAGULATION FOLLOW-UP CLINIC VISIT    Patient Name:  Berenice Chaudhari  Date:  8/9/2018  Contact Type:  Face to Face  INR is slightly high, denies any changes in med/diet. But pt is under pain & stressed out(see my notes from today's phone encounter to pcp for details). Has been taking otc tylenol 2 tabs at bedtime for L lower leg/toe pain. Appetite haven't been that great either, decreased greens/veg intake as well.     Advised pt to continue same coumadin dose, increase greens/veg in diet(1-2 extra servings per week) & recheck in 2 weeks. Pt & spouse agrees to the plan.    Notified pt that we have been faxing forms to [facility] to get a home monitor to check her INR.     SUBJECTIVE:     Patient Findings     Positives Bruising, Inflammation    Comments Denies bleeding or missed dose.             OBJECTIVE    INR Protime   Date Value Ref Range Status   08/09/2018 3.2 (A) 0.86 - 1.14 Final       ASSESSMENT / PLAN  INR assessment THER    Recheck INR In: 2 WEEKS    INR Location Clinic      Anticoagulation Summary as of 8/9/2018     INR goal 2.0-3.0   Today's INR 3.2!   Warfarin maintenance plan 5 mg (2.5 mg x 2) on Mon, Wed, Fri; 2.5 mg (2.5 mg x 1) all other days   Full warfarin instructions 5 mg on Mon, Wed, Fri; 2.5 mg all other days   Weekly warfarin total 25 mg   No change documented Nani Coffman RN   Plan last modified Mary Butcher, RN (6/1/2018)   Next INR check 8/23/2018   Target end date Indefinite    Indications   Long-term (current) use of anticoagulants [Z79.01] [Z79.01]  Atrial flutter with rapid ventricular response (H) [I48.92]         Anticoagulation Episode Summary     INR check location     Preferred lab     Send INR reminders to [facility]    Comments 5mg & 2.5mg tabs; HS dosing //  (Ricardo) sets up her meds // motorized wheelchair      Anticoagulation Care Providers     Provider Role Specialty Phone number    Nelly Pearl MD Referring Internal Medicine  [phone]            See the Encounter Report to view Anticoagulation Flowsheet and Dosing Calendar (Go to Encounters tab in chart review, and find the Anticoagulation Therapy Visit)    Nani Coffman RN

## 2018-08-09 NOTE — MR AVS SNAPSHOT
Berenice VANCE Fara   8/9/2018 11:30 AM   Anticoagulation Therapy Visit    Description:  78 year old female   Provider:  [facility]   Department:   Nurse           INR as of 8/9/2018     Today's INR 3.2!      Anticoagulation Summary as of 8/9/2018     INR goal 2.0-3.0   Today's INR 3.2!   Full warfarin instructions 5 mg on Mon, Wed, Fri; 2.5 mg all other days   Next INR check 8/23/2018    Indications   Long-term (current) use of anticoagulants [Z79.01] [Z79.01]  Atrial flutter with rapid ventricular response (H) [I48.92]         Your next Anticoagulation Clinic appointment(s)     Aug 23, 2018  1:45 PM CDT   Anticoagulation Visit with  ANTICOAGULATION CLINIC   [facility] ([facility])    [address]  [address]  [facility] [id number] [phone]              Contact Numbers     [facility]  Please call  [phone] to cancel and/or reschedule your appointment   Please call  [phone] with any problems or questions regarding your therapy.        August 2018 Details    Sun Mon Tue Wed Thu Fri Sat        1               2               3               4                 5               6               7               8               9      2.5 mg   See details      10      5 mg         11      2.5 mg           12      2.5 mg         13      5 mg         14      2.5 mg         15      5 mg         16      2.5 mg         17      5 mg         18      2.5 mg           19      2.5 mg         20      5 mg         21      2.5 mg         22      5 mg         23            24               25                 26               27               28               29               30               31                 Date Details   08/09 This INR check       Date of next INR:  8/23/2018         How to take your warfarin dose     To take:  2.5 mg Take 1 of the 2.5 mg tablets.    To take:  5 mg Take 2 of the 2.5 mg tablets.

## 2018-08-10 NOTE — TELEPHONE ENCOUNTER
pregabalin (LYRICA) 150 MG capsule     Last Written Prescription Date:  01/19/18  Last Fill Quantity: 180,   # refills: 1  Last Office Visit: 06/28/18 Arben and 5/4/18 Dae  Future Office visit:        called to check on status of refill, they called it in LAST week to pharmacy and the pharmacy is saying they called us three times and yet there is nothing in system so pls fill today if possible

## 2018-08-10 NOTE — TELEPHONE ENCOUNTER
[place]  checked. No concerns.    Routing refill request to provider for review/approval because:  Drug not on the FM, P or  Health refill protocol or controlled substance    Please print,sign and fax to pharmacy.    Tahira Chang RN

## 2018-08-14 NOTE — PROGRESS NOTES
"Foot & Ankle Surgery  August 14, 2018    CC: \"broken toe and dislocated toes\"    I was asked to see Berenice Chaudhari regarding the chief complaint by:  Dr. RENUKA Milan    HPI:  Pt is a 78 year old female who presents with above complaint.  L toe injuries 2 weeks ago.  Transferring from her wheelchair to a recliner and injured her foot.  She saw Dr Foote in the ER, where xrays showed injuries to toes 3,4,5.  A CT scan showed the injuries as well.  She was referred to Dr Milan for evaluation, at which point she was referred to our department.  There were lacerations on the toes and these were deemed to be open fracture/dislocations.  She describes pain with contact, 2/10.  Treatment has consisted of bandaging.  She was also given a surgical shoe but she's wearing a slipper today, which she states feels good.  She states the xrays in her chart were done before the toes were reduced in the ER.  \"It doesn't hurt at all\".  She asks what the lump on her left leg is    ROS:   Pos for CC.  The patient denies current nausea, vomiting, chills, fevers, belly pain, calf pain, chest pain or SOB.  Complete remainder of ROS is otherwise neg.    VITALS:    Vitals:    08/06/18 1322   Height: 5' 4\" (1.626 m)       PMH:    Past Medical History:   Diagnosis Date     Allergic rhinitis, cause unspecified      Anxiety 6/13/2013     Arrhythmia     atrial flutter     Atrial flutter with rapid ventricular response (H) 8/8/2017     Cellulitis and abscess of leg, except foot recurrent , both legs    begins with fever, nausea, diarrhea, vomiting & & the cellulitis may be visible a day or 2 later     Chronic pain     On chronic fentanyl patch.     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  less likely = raction to vancomycin      Essential " hypertension with goal blood pressure less than 140/90 1/12/2005     Problem list name updated by automated process. Provider to review     Generalized osteoarthrosis, unspecified site     films 9/04: L hhip bad; both knees mod severe medial OA     GERD (gastroesophageal reflux disease) 11/15/2013     Herpes zoster without mention of complication 9/03    L shoulder; still has neuralgia     Hyperlipidemia LDL goal <100 9/16/2011     Methicillin susceptible Staphylococcus aureus septicemia (H) after L hip injection 205    ARDS, renal failure, staph sepsis after a hip joint injection     Obesity 1/12/2005     Problem list name updated by automated process. Provider to review     Other chronic pain      Other diseases of lung, not elsewhere classified 07/93    Interstitial lung process     Pneumonia, organism unspecified(486)      Urticaria, unspecified 07/93    Diffuse severe (hospitalized).  (+) skin biopsy by Dr. Carolina FINNX:    Past Surgical History:   Procedure Laterality Date     BIOPSY       C NONSPECIFIC PROCEDURE      Extensive oral (dental) surgery     C NONSPECIFIC PROCEDURE      Remote T&A     C NONSPECIFIC PROCEDURE  1/03    LAP CHOLY     C NONSPECIFIC PROCEDURE      L hip I + D x 2  4/05     C NONSPECIFIC PROCEDURE  2/05    colonoscopy     C NONSPECIFIC PROCEDURE      L hip I + D several other times      CATARACT IOL, RT/LT Right 2012     CHOLECYSTECTOMY       ENT SURGERY       ESOPHAGOSCOPY, GASTROSCOPY, DUODENOSCOPY (EGD), COMBINED N/A 11/9/2017    Procedure: COMBINED ESOPHAGOSCOPY, GASTROSCOPY, DUODENOSCOPY (EGD);  ESOPHAGOSCOPY, GASTROSCOPY, DUODENOSCOPY (EGD) (fv)  ;  Surgeon: Buck Ribeiro MD;  Location: [facility]     PHACOEMULSIFICATION CLEAR CORNEA WITH STANDARD INTRAOCULAR LENS IMPLANT Left 3/10/2016    Procedure: PHACOEMULSIFICATION CLEAR CORNEA WITH STANDARD INTRAOCULAR LENS IMPLANT;  Surgeon: Dwight Metcalf MD;  Location: [facility]        MEDS:    Current Outpatient Prescriptions    Medication     buPROPion (WELLBUTRIN XL) 300 MG 24 hr tablet     CEPHALEXIN PO     diazepam (VALIUM) 10 MG tablet     diltiazem (TIAZAC) 120 MG 24 hr ER beaded capsule     doxycycline (VIBRAMYCIN) 100 MG capsule     furosemide (LASIX) 20 MG tablet     gabapentin (NEURONTIN) 600 MG tablet     HYDROcodone-acetaminophen (NORCO) 5-325 MG per tablet     levofloxacin (LEVAQUIN) 500 MG tablet     lidocaine (LIDODERM) 5 % Patch     METOPROLOL SUCCINATE ER PO     minocycline (MINOCIN/DYNACIN) 50 MG capsule     Multiple Vitamins-Minerals (CENTRUM SILVER) per tablet     nortriptyline (PAMELOR) 25 MG capsule     order for DME     Pantoprazole Sodium (PROTONIX PO)     pravastatin (PRAVACHOL) 10 MG tablet     venlafaxine (EFFEXOR-XR) 150 MG 24 hr capsule     warfarin (COUMADIN) 2.5 MG tablet     warfarin (COUMADIN) 5 MG tablet     pregabalin (LYRICA) 150 MG capsule     No current facility-administered medications for this visit.        ALL:     Allergies   Allergen Reactions     Ceftriaxone Anaphylaxis and Rash     Rocephin given. Developed rash to back and abd, awaiting to see if it improves with d/c of rocephin.        Nafcillin Rash     diffuse severe rash in hospital 2/05     Penicillins Anaphylaxis     Vancomycin Anaphylaxis and Rash     Codeine Fatigue     Sleeps continuously     Clindamycin Rash     received information from patient     Levaquin [Levofloxacin] Hives     Oral medication      Zithromax [Azithromycin] Rash       FMH:    Family History   Problem Relation Age of Onset     Myocardial Infarction Father 63       SocHx:    Social History     Social History     Marital status:      Spouse name: N/A     Number of children: N/A     Years of education: N/A     Occupational History     Not on file.     Social History Main Topics     Smoking status: Former Smoker     Packs/day: 2.00     Years: 22.00     Types: Cigarettes     Start date: 1967     Quit date: 1990     Smokeless tobacco: Never Used      Comment:  started smoking 20's- quit in 1990     Alcohol use No      Comment: RARELY     Drug use: No     Sexual activity: No     Other Topics Concern     Not on file     Social History Narrative           EXAMINATION:  Gen:   No apparent distress  Neuro:   A&Ox3, no deficits  Psych:    Answering questions appropriately for age and situation with normal affect  Head:    NCAT  Eye:    Visual scanning without deficit  Ear:    Response to auditory stimuli wnl  Lung:    Non-labored breathing on RA noted  Abd:    NTND per patient report  Lymph:    Mild L forefoot edema.  Hematoma proximal-medial left lower leg.  Vasc:    Pulses palpable, CFT minimally delayed  Neuro:    Light touch sensation intact to all sensory nerve distributions without paresthesias  Derm:    Plantar lacerations lesser toes L foot are epithelialized with mild peeling skin, but no SOI.  MSK:    Left foot - 3rd/5th toes are stable.  4th toe shows mild lateral deviation but no pain.    Calf:    Neg for redness, swelling or tenderness    Imaging -  IMPRESSION:   1. Slightly displaced and angulated transverse fracture of the  proximal shaft of the proximal phalanx of the fifth toe. Overlying  soft tissue swelling.  2. Subluxation of the proximal interphalangeal joints of the third and  fourth toes. Difficult to rule out dislocations. These are not well  seen on lateral view due to overlapping of the bones. Clinical  correlation is necessary.  3. Plantar and posterior calcaneal spurs.        Assessment:  78 year old female with toe fracture/dislocation digits 3,4,5 left lower extremity; hematoma left lower leg      Plan:  Discussed etiologies, anatomy and options  1.  Open fracture/dislocation toes 3,4,5 left lower extremity   -personally reviewed imaging  -lacerations are epithelialized without SOI; no bandaging needed  -RICE/NSAID prn  -comfortable shoe, surgical shoe likely best  -tensogrip for edema control  -no surgery indicated.  Reasonable alignment, she's  having minimal pain, and she's not an ideal surgical candidate     2.  Hematoma proximal-medial L lower leg  -warm compress, compression, NSAID  -consider Ortho vs Gen Surg for evacuation if this does not subside.       Follow up:  4 weeks or sooner with acute issues      Patient's medical history was reviewed today    There is no height or weight on file to calculate BMI.    Weight management plan: Patient was referred to their PCP to discuss a diet and exercise plan.        Jeffrey Vogel DPM FACFAS FACFAOM  Podiatric Foot & Ankle Surgeon  [facility]  [phone]

## 2018-08-15 NOTE — PROGRESS NOTES
"  SUBJECTIVE:   Berenice Chaudhari is a 78 year old female, accompanied by , who presents to clinic today for the following health issues:    Concern - Bump  Onset: overnight    Description:     right side abdomen    Intensity: mild    Progression of Symptoms:  same    Accompanying Signs & Symptoms:  4x1 inch in diameter , itchy  No pain, redness, swelling  No discharge  No trauma    Previous history of similar problem:   none    Precipitating factors:   Worsened by: none    Alleviating factors:  Improved by: none  Therapies Tried and outcome: none    In addition, patient fell a few weeks ago. There is swelling of the left anterior leg.     ROS:  ROS otherwise negative    OBJECTIVE:                                                    /62 (BP Location: Other (Comment), Cuff Size: Adult Regular)  Pulse 68  Temp 97.2  F (36.2  C) (Tympanic)  Ht 5' 4\" (1.626 m)  SpO2 95%  There is no height or weight on file to calculate BMI.   GENERAL: alert, no distress  SKIN: inspection of the right lateral abdomen to flank reveals a horizontally linear mass. Very firm. Nontender. Measures greater than 5 cm.   Inspection of the left anterior leg reveals a firm mass--tender to palpation    Diagnostic test results:  No results found. However, due to the size of the patient record, not all encounters were searched. Please check Results Review for a complete set of results.     ASSESSMENT/PLAN:                                                    1. Mass of soft tissue  Proceed with US for further evaluation.  - US Abdomen Limited; Future    2. Traumatic hematoma of left lower leg, initial encounter  Elevate. Apply heat at site.     Paxton Smith PA-C  [facility]  "

## 2018-08-15 NOTE — MR AVS SNAPSHOT
After Visit Summary   8/15/2018    Berenice Chaudhari    MRN: [id number]           Patient Information     Date Of Birth          1940        Visit Information        Provider Department      8/15/2018 10:10 AM Paxton Smith PA-C [facility]        Today's Diagnoses     Mass of soft tissue    -  1    Traumatic hematoma of left lower leg, initial encounter          Care Instructions    Proceed with US          Follow-ups after your visit        Your next 10 appointments already scheduled     Aug 16, 2018  5:30 PM CDT   US ABDOMEN LIMITED with [id number]   [facility] ([facility])    [address]  [facility] [facility] [address] [phone]           Please bring a list of your medicines (including vitamins, minerals and over-the-counter drugs). Also, tell your doctor about any allergies you may have. Wear comfortable clothes and leave your valuables at home.  Adults: No eating or drinking for 8 hours before the exam. You may take medicine with a small sip of water.  Children: - Infants, breast-fed: may have breast milk up to 2 hours before exam. - Infants, formula: may have bottle until 4 hours before exam. - Children 1-5 years: No food or drink for 4 hours before exam. - Children 6 -12 years: No food or drink for 6 hours before exam. - Children over 12 years: No food or drink for 8 hours before exam. - J Tube Fed: No need to stop feedings.  Please call the Imaging Department at your exam site with any questions.            Aug 23, 2018  1:45 PM CDT   Anticoagulation Visit with  ANTICOAGULATION CLINIC   [facility] ([facility])    [address]  [address]  [facility] [id number] [phone]              Future tests that were ordered for you today     Open Future Orders        Priority Expected Expires Ordered    US Abdomen Limited Routine  8/15/2019 8/15/2018            Who to  "contact     If you have questions or need follow up information about today's clinic visit or your schedule please contact [facility] [place] directly at [phone].  Normal or non-critical lab and imaging results will be communicated to you by MyChart, letter or phone within 4 business days after the clinic has received the results. If you do not hear from us within 7 days, please contact the clinic through MyChart or phone. If you have a critical or abnormal lab result, we will notify you by phone as soon as possible.  Submit refill requests through [employer] or call your pharmacy and they will forward the refill request to us. Please allow 3 business days for your refill to be completed.          Additional Information About Your Visit        Care EveryWhere ID     This is your Care EveryWhere ID. This could be used by other organizations to access your [place] medical records  [id number]        Your Vitals Were     Pulse Temperature Height Pulse Oximetry          68 97.2  F (36.2  C) (Tympanic) 5' 4\" (1.626 m) 95%         Blood Pressure from Last 3 Encounters:   08/15/18 126/62   07/30/18 138/76   07/25/18 135/56    Weight from Last 3 Encounters:   05/04/18 (!) 347 lb (157.4 kg)   04/20/18 (!) 347 lb 9.6 oz (157.7 kg)   04/12/18 (!) 347 lb 9.6 oz (157.7 kg)               Primary Care Provider Office Phone # Fax #    Nelly Pearl [phone] [address] [place] [address]        Equal Access to Services     [facility]: Hadii irvin wheato Sojohn, waaxda luqadaha, qaybta kaalmada calin walters. So [facility] [phone].    ATENCIÓN: Si habla español, tiene a dickey disposición servicios gratuitos de asistencia lingüística. Llame al [phone].    We comply with applicable federal civil rights laws and [place] laws. We do not discriminate on the basis of race, color, national origin, age, disability, sex, sexual orientation, " or gender identity.            Thank you!     Thank you for choosing [facility]  for your care. Our goal is always to provide you with excellent care. Hearing back from our patients is one way we can continue to improve our services. Please take a few minutes to complete the written survey that you may receive in the mail after your visit with us. Thank you!             Your Updated Medication List - Protect others around you: Learn how to safely use, store and throw away your medicines at www.disposemymeds.org.          This list is accurate as of 8/15/18 11:08 AM.  Always use your most recent med list.                   Brand Name Dispense Instructions for use Diagnosis    buPROPion 300 MG 24 hr tablet    WELLBUTRIN XL    90 tablet    Take 1 tablet (300 mg) by mouth every morning    Major depressive disorder, recurrent, in full remission (H)       CENTRUM SILVER per tablet      Take 1 tablet by mouth daily        CEPHALEXIN PO      Take 500 mg by mouth 3 times daily as needed cellulitis        diltiazem 120 MG 24 hr ER beaded capsule    TIAZAC     Take 120 mg by mouth every evening        furosemide 20 MG tablet    LASIX    90 tablet    Take 1 tablet (20 mg) by mouth daily    Atrial flutter with rapid ventricular response (H)       gabapentin 600 MG tablet    NEURONTIN     Take 1,200 mg by mouth 3 times daily        HYDROcodone-acetaminophen 5-325 MG per tablet    NORCO    15 tablet    Take 1 tablet by mouth every 6 hours as needed for severe pain        levofloxacin 500 MG tablet    LEVAQUIN    7 tablet    Take 1 tablet (500 mg) by mouth daily    HCAP (healthcare-associated pneumonia)       lidocaine 5 % Patch    LIDODERM     Place 3 patches onto the skin daily as needed for moderate pain (Apply up to 3 patches to painful area at once for up to 12 h within a 24 h period.  Remove after 12 hours.)        METOPROLOL SUCCINATE ER PO      Take 50 mg by mouth every evening        minocycline 50 MG capsule     MINOCIN/DYNACIN    180 capsule    Take 1 capsule (50 mg) by mouth 2 times daily    Methicillin susceptible Staphylococcus aureus septicemia (H)       nortriptyline 25 MG capsule    PAMELOR    180 capsule    Take 2 capsules (50 mg) by mouth At Bedtime    Post herpetic neuralgia       order for DME     1 Units    Equipment being ordered: padded transfer belt    Severe obesity (BMI >= 40) (H), Congestive heart failure, unspecified congestive heart failure chronicity, unspecified congestive heart failure type (H)       pravastatin 10 MG tablet    PRAVACHOL    90 tablet    Take 1 tablet (10 mg) by mouth At Bedtime Needs labs before next refill    Hyperlipidemia LDL goal <100       pregabalin 150 MG capsule    LYRICA    180 capsule    Take 1 capsule (150 mg) by mouth 2 times daily    Postherpetic neuralgia       PROTONIX PO      Take 40 mg by mouth every morning (before breakfast)        venlafaxine 150 MG 24 hr capsule    EFFEXOR-XR    90 capsule    Take 1 capsule (150 mg) by mouth every evening    Major depressive disorder, recurrent episode, in full remission (H)       * warfarin 5 MG tablet    COUMADIN     Take 1 tablet (5 mg) by mouth on MWF (uses 2.5 mg tabs on TTSS) or as directed by INR Clinic    Long-term (current) use of anticoagulants, Atrial flutter with rapid ventricular response (H)       * warfarin 2.5 MG tablet    COUMADIN     Take 1 tablet (2.5 mg) by mouth on TTSS (uses 5 mg tabs on MWF) or as directed by INR Clinic    Long-term (current) use of anticoagulants, Atrial flutter with rapid ventricular response (H)       * Notice:  This list has 2 medication(s) that are the same as other medications prescribed for you. Read the directions carefully, and ask your doctor or other care provider to review them with you.

## 2018-08-15 NOTE — TELEPHONE ENCOUNTER
"Requested Prescriptions   Pending Prescriptions Disp Refills     pravastatin (PRAVACHOL) 10 MG tablet [Pharmacy Med Name: PRAVASTATIN TABS 10MG]  Last Written Prescription Date:  05/16/2018  Last Fill Quantity: 90,  # refills: 0   Last office visit: 6/28/2018 with prescribing provider:  MECHE   Future Office Visit:   Next 5 appointments (look out 90 days)     Aug 15, 2018 10:10 AM CDT   Office Visit with Paxton Smith PA-C   [facility]    [address] [place] [address]  [address]  [facility] [address]   [phone]                  90 tablet 0     Sig: TAKE 1 TABLET AT BEDTIME (NEED LABS BEFORE NEXT REFILL)    Statins Protocol Failed    8/15/2018  3:22 AM       Failed - LDL on file in past 12 months    Recent Labs   Lab Test  10/27/16   1133   LDL  60            Passed - No abnormal creatine kinase in past 12 months    No lab results found.            Passed - Recent (12 mo) or future (30 days) visit within the authorizing provider's specialty    Patient had office visit in the last 12 months or has a visit in the next 30 days with authorizing provider or within the authorizing provider's specialty.  See \"Patient Info\" tab in inbasket, or \"Choose Columns\" in Meds & Orders section of the refill encounter.           Passed - Patient is age 18 or older       Passed - No active pregnancy on record       Passed - No positive pregnancy test in past 12 months          "

## 2018-08-20 NOTE — TELEPHONE ENCOUNTER
Routing refill request to provider for review/approval because:  Labs not current:  Lipids    Cha, RN  Triage Nurse

## 2018-08-23 NOTE — PROGRESS NOTES
ANTICOAGULATION FOLLOW-UP     Patient Name:  Berenice Chaudhari  Date:  8/23/2018  Contact Type:  Telephone  Received a fax from [facility] regarding this patient.   An INR had been done yesterday. No INR Clinic at  on Wednesday.  She has a home INR machine and [facility] faxes in results for coumadin management.   Ricardo called today to make sure fax was received.  Instructions given for warfarin management.      SUBJECTIVE:     Patient Findings     Positives No Problem Findings    Comments Patient's  called to report   that they had received the home INR machine from [facility]   and things are going well so far.           OBJECTIVE    INR   Date Value Ref Range Status   08/22/2018 2.1  Final       ASSESSMENT / PLAN  INR assessment THER    Recheck INR In: 2 WEEKS    INR Location Home INR      Anticoagulation Summary as of 8/23/2018     INR goal 2.0-3.0   Today's INR 2.1 (8/22/2018)   Warfarin maintenance plan 5 mg (2.5 mg x 2) on Mon, Wed, Fri; 2.5 mg (2.5 mg x 1) all other days   Full warfarin instructions 5 mg on Mon, Wed, Fri; 2.5 mg all other days   Weekly warfarin total 25 mg   No change documented Mary Butcher, RN   Plan last modified Mary Butcher, RN (6/1/2018)   Next INR check 9/6/2018   Target end date Indefinite    Indications   Long-term (current) use of anticoagulants [Z79.01] [Z79.01]  Atrial flutter with rapid ventricular response (H) [I48.92]         Anticoagulation Episode Summary     INR check location     Preferred lab     Send INR reminders to  ANTICOAG CLINIC    Comments 5mg & 2.5mg tabs; HS dosing //  (Ricardo) sets up her meds // Home INR monitor // motorized wheelchair      Anticoagulation Care Providers     Provider Role Specialty Phone number    Nelly Pearl MD Referring Internal Medicine [phone]            See the Encounter Report to view Anticoagulation Flowsheet and Dosing Calendar (Go to Encounters tab in chart review, and find the  Anticoagulation Therapy Visit)        Mary Butcher RN

## 2018-09-06 NOTE — MR AVS SNAPSHOT
Berenice RAJIV Chaudhari   9/6/2018 3:45 PM   Anticoagulation Therapy Visit    Description:  78 year old female   Provider:   ANTICOAGULATION CLINIC   Department:   Nurse           INR as of 9/6/2018     Today's INR 2.3 (9/5/2018)      Anticoagulation Summary as of 9/6/2018     INR goal 2.0-3.0   Today's INR 2.3 (9/5/2018)   Full warfarin instructions 5 mg on Mon, Wed, Fri; 2.5 mg all other days   Next INR check 9/19/2018    Indications   Long-term (current) use of anticoagulants [Z79.01] [Z79.01]  Atrial flutter with rapid ventricular response (H) [I48.92]         Your next Anticoagulation Clinic appointment(s)     Sep 20, 2018  8:15 AM CDT   Anticoagulation Visit with  ANTICOAGULATION CLINIC   [facility] ([facility])    [address] [place] [address]  [address]  [facility] [address] [phone]              Contact Numbers     [facility]  Please call  [phone] to cancel and/or reschedule your appointment   Please call  [phone] with any problems or questions regarding your therapy.        September 2018 Details    Sun Mon Tue Wed Thu Fri Sat           1                 2               3               4               5               6      2.5 mg   See details      7      5 mg         8      2.5 mg           9      2.5 mg         10      5 mg         11      2.5 mg         12      5 mg         13      2.5 mg         14      5 mg         15      2.5 mg           16      2.5 mg         17      5 mg         18      2.5 mg         19            20               21               22                 23               24               25               26               27               28               29                 30                      Date Details   09/06 This INR check       Date of next INR:  9/19/2018         How to take your warfarin dose     To take:  2.5 mg Take 1 of the 2.5 mg tablets.    To take:  5 mg Take 2 of the 2.5 mg tablets.

## 2018-09-07 NOTE — PROGRESS NOTES
ANTICOAGULATION FOLLOW-UP     Patient Name:  Berenice Chaudhari  Date:  9/6/2018  Contact Type:  Telephone  Received a fax from [facility] regarding this patient.   She has a home INR machine and [facility] faxes in results for coumadin management.  Called and spoke to her  at home number. Instructions given.      SUBJECTIVE:     Patient Findings     Positives No Problem Findings           OBJECTIVE    INR   Date Value Ref Range Status   09/05/2018 2.3  Final       ASSESSMENT / PLAN  INR assessment THER    Recheck INR In: 2 WEEKS    INR Location Home INR      Anticoagulation Summary as of 9/6/2018     INR goal 2.0-3.0   Today's INR 2.3 (9/5/2018)   Warfarin maintenance plan 5 mg (2.5 mg x 2) on Mon, Wed, Fri; 2.5 mg (2.5 mg x 1) all other days   Full warfarin instructions 5 mg on Mon, Wed, Fri; 2.5 mg all other days   Weekly warfarin total 25 mg   No change documented Mary Butcher RN   Plan last modified Mary Butcher RN (6/1/2018)   Next INR check 9/19/2018   Target end date Indefinite    Indications   Long-term (current) use of anticoagulants [Z79.01] [Z79.01]  Atrial flutter with rapid ventricular response (H) [I48.92]         Anticoagulation Episode Summary     INR check location     Preferred lab     Send INR reminders to [facility]    Comments 5mg & 2.5mg tabs; HS dosing //  (Ricardo) sets up her meds // Home INR monitor // motorized wheelchair      Anticoagulation Care Providers     Provider Role Specialty Phone number    Nelly Pearl MD Referring Internal Medicine [phone]            See the Encounter Report to view Anticoagulation Flowsheet and Dosing Calendar (Go to Encounters tab in chart review, and find the Anticoagulation Therapy Visit)    Dosage adjustment made based on physician directed care plan.    Mary Butcher RN

## 2018-09-14 NOTE — TELEPHONE ENCOUNTER
"Requested Prescriptions   Pending Prescriptions Disp Refills     buPROPion (WELLBUTRIN XL) 300 MG 24 hr tablet [Pharmacy Med Name: BUPROPION HCL XL TABS 300MG]  Last Written Prescription Date:  01/11/2018  Last Fill Quantity: 90 tablet,  # refills: 1   Last office visit: 8/15/2018 with prescribing provider:      Paxton Smith PA-C         Future Office Visit:     90 tablet 1     Sig: TAKE 1 TABLET EVERY MORNING    SSRIs Protocol Failed    9/14/2018  3:15 PM       Failed - PHQ-9 score less than 5 in past 6 months    Please review last PHQ-9 score.   PHQ-9 SCORE 3/15/2017 9/5/2017 4/20/2018   Total Score - - -   Total Score 3 7 5     FRANCISCO-7 SCORE 8/7/2013 7/14/2014   Total Score 0 2              Passed - Medication is Bupropion    If the medication is Bupropion (Wellbutrin), and the patient is taking for smoking cessation; OK to refill.         Passed - Patient is age 18 or older       Passed - No active pregnancy on record       Passed - No positive pregnancy test in last 12 months       Passed - Recent (6 mo) or future (30 days) visit within the authorizing provider's specialty    Patient had office visit in the last 6 months or has a visit in the next 30 days with authorizing provider or within the authorizing provider's specialty.  See \"Patient Info\" tab in inbasket, or \"Choose Columns\" in Meds & Orders section of the refill encounter.              "

## 2018-09-17 NOTE — TELEPHONE ENCOUNTER
Requested Prescriptions   Pending Prescriptions Disp Refills     gabapentin (NEURONTIN) 600 MG tablet        Last Written Prescription Date:  3/6/2018  Last Fill Quantity: 270,   # refills: 3  Last Office Visit: 8/15/2018  Future Office visit:       Routing refill request to provider for review/approval because:  Drug not on the [facility], [facility] or [facility] refill protocol or controlled substance   90 tablet      Sig: Take 2 tablets (1,200 mg) by mouth 3 times daily    There is no refill protocol information for this order

## 2018-09-18 NOTE — TELEPHONE ENCOUNTER
Routing refill request to provider for review/approval because:  T'd up 3 months with one refill.   Drug not on the [facility] refill protocol   Melissa Abdullahi, RN  Message handled by Nurse Triage.

## 2018-09-18 NOTE — TELEPHONE ENCOUNTER
Prescription refilled x 1 per [facility] Refill Protocol.    Routing to [facility] Nurse Pool, please call to assist patient in updating a PHQ9 due to >4.

## 2018-09-18 NOTE — TELEPHONE ENCOUNTER
Patient calling this prescription was sent to the [facility] pharmacy and should have been sent to [facility]. Please resend Rx to [facility] per patients request.

## 2018-10-01 NOTE — PROGRESS NOTES
"  SUBJECTIVE:   Berenice Chaudhari is a 78 year old female who presents to clinic today for the following health issues:      CHEST PAIN     Onset: ***    Description:   Location:  {.:888955}  Character: {.:863882}  Radiation: {.:129150}  Duration: {.:943027}     Intensity: {.:945758}    Progression of Symptoms:  {.:905670}    Accompanying Signs & Symptoms:  Shortness of breath: { :265138::\"no\"}  Sweating: { :376454::\"no\"}  Nausea/vomiting: { :309731::\"no\"}  Lightheadedness: { :210679::\"no\"}  Palpitations: {.:457007::\"no\"}  Fever/Chills: { :744476::\"no\"}  Cough: { :677968::\"no\"}  Heartburn: { :025217::\"no\"}    History:   Family history of heart disease { :721942::\"no\"}  Tobacco use: { :189662::\"no\"}    Precipitating factors:   Worse with exertion: { :662267::\"no\"}  Worse with deep breaths :  { :081473::\"no\"}  Related to food: { :783585::\"no\"}    Alleviating factors:  ***       Therapies Tried and outcome: ***      {additional problems for provider to add:713983}    Problem list and histories reviewed & adjusted, as indicated.  Additional history: {NONE - AS DOCUMENTED:144612::\"as documented\"}    {HIST REVIEW/ LINKS 2:130336}    Reviewed and updated as needed this visit by clinical staff       Reviewed and updated as needed this visit by Provider         {PROVIDER CHARTING PREFERENCE:905320}  "

## 2018-10-01 NOTE — TELEPHONE ENCOUNTER
PCP: Spoke with both the Pt and son Ricardo regarding their request for an earlier appointment with you.     The Pt reports an overall decrease in urine output, and an increased in lower extremity edema, bilateral that started about a week ago.     The Pt reports the decrease in urination (volume) that started last week, about Friday.   The Pt reports the overall volume of urine has decreased, however frequency has unchanged.   The color of the urine is darker than normal in the morning, dark yellow, however during the day it becomes a pale yellow to clear.   The Pt has been drinking normal amounts of fluids during the day, makes sure to drink about every 2 hours. Averages about 40 oz of fluid per day (she was not sure if this is totally accurate) Denies any burning or discomfort during urination. Denies any discomfort in the bladder region, however is having some discomfort on the Right side, flank region -- pain mainly present while moving around. The Pt reports some edema in bilateral legs -- below the knee, she denies any pitting or weeping. Does not wear support stockings, tries to keep her legs elevated as much as possible. Denies fever. Has been much more tired than usual in the last few weeks.     The Pt and son have noticed she is more SOB over the last month, will often times become winded during conversations. She reports she has baseline SOB and wheezing, however has been worse over the last month.     Please advise. Triage: Please call the Pt and son Ricardo back with PCP's response.      Linda Johnson RN -- [facility]

## 2018-10-01 NOTE — TELEPHONE ENCOUNTER
Scheduled the Pt with [facility] provider this afternoon.     Linda Johnson RN -- [facility]

## 2018-10-01 NOTE — TELEPHONE ENCOUNTER
I recommend patient be seen today - either by walk in or URGENT CARE - should get urine and blood work done.    Unfortunately I cannot double book this afternoon.    Nelly Pearl MD  Internal Medicine/Pediatrics  [facility]

## 2018-10-01 NOTE — MR AVS SNAPSHOT
"              After Visit Summary   10/1/2018    Berenice Chaudhari    MRN: [id number]           Patient Information     Date Of Birth          1940        Visit Information        Provider Department      10/1/2018 2:30 PM Paxton Smith PA-C [facility]        Today's Diagnoses     Dyspnea, unspecified type    -  1    Anemia, unspecified type        Hypernatremia        Elevated sed rate        Long term current use of anticoagulant therapy        Right elbow pain        Acute right-sided low back pain without sciatica        Fall, initial encounter           Follow-ups after your visit        Your next 10 appointments already scheduled     Oct 04, 2018  8:45 AM CDT   Anticoagulation Visit with  ANTICOAGULATION CLINIC   [facility] ([facility])    [address]  [address]  [employer] [address] [phone]              Who to contact     If you have questions or need follow up information about today's clinic visit or your schedule please contact [facility] directly at [phone].  Normal or non-critical lab and imaging results will be communicated to you by [facility], letter or phone within 4 business days after the clinic has received the results. If you do not hear from us within 7 days, please contact the clinic through [facility] or phone. If you have a critical or abnormal lab result, we will notify you by phone as soon as possible.  Submit refill requests through [facility] or call your pharmacy and they will forward the refill request to us. Please allow 3 business days for your refill to be completed.          Additional Information About Your Visit        [facility] Information     [facility] lets you send messages to your doctor, view your test results, renew your prescriptions, schedule appointments and more. To sign up, go to [email or web address] . Click on \"Log in\" on the left side of the screen, which will take you to the " "Welcome page. Then click on \"Sign up Now\" on the right side of the page.     You will be asked to enter the access code listed below, as well as some personal information. Please follow the directions to create your username and password.     Your access code is: [email or web address]  Expires: 2018  5:47 PM     Your access code will  in 90 days. If you need help or a new code, please call your [facility] clinic or [phone].        Care EveryWhere ID     This is your Care EveryWhere ID. This could be used by other organizations to access your [facility] medical records  [id number]        Your Vitals Were     Pulse Temperature Height Pulse Oximetry BMI (Body Mass Index)       72 96.6  F (35.9  C) (Tympanic) 5' 4\" (1.626 m) 94% 59.56 kg/m2        Blood Pressure from Last 3 Encounters:   10/01/18 122/66   08/15/18 126/62   18 138/76    Weight from Last 3 Encounters:   18 (!) 347 lb (157.4 kg)   18 (!) 347 lb 9.6 oz (157.7 kg)   18 (!) 347 lb 9.6 oz (157.7 kg)              We Performed the Following     *UA reflex to Microscopic     BNP-N terminal pro     CBC with platelets differential     Comprehensive metabolic panel     Erythrocyte sedimentation rate auto     INR     Urine Culture Aerobic Bacterial     Urine Microscopic        Primary Care Provider Office Phone # Fax #    Nelly Pearl [phone] [address] [place] [address]        Equal Access to Services     [facility]: Hadii irvin wheato Sojohn, waaxda luqadaha, qaybta kaalmada selena, calin sue . So [facility] [phone].    ATENCIÓN: Si habla español, tiene a dickey disposición servicios gratuitos de asistencia lingüística. Llame al [phone].    We comply with applicable federal civil rights laws and [place] laws. We do not discriminate on the basis of race, color, national origin, age, disability, sex, sexual orientation, or gender identity.       "      Thank you!     Thank you for choosing [facility]  for your care. Our goal is always to provide you with excellent care. Hearing back from our patients is one way we can continue to improve our services. Please take a few minutes to complete the written survey that you may receive in the mail after your visit with us. Thank you!             Your Updated Medication List - Protect others around you: Learn how to safely use, store and throw away your medicines at www.disposemymeds.org.          This list is accurate as of 10/1/18  5:47 PM.  Always use your most recent med list.                   Brand Name Dispense Instructions for use Diagnosis    buPROPion 300 MG 24 hr tablet    WELLBUTRIN XL    90 tablet    TAKE 1 TABLET EVERY MORNING    Major depressive disorder, recurrent, in full remission (H)       CENTRUM SILVER per tablet      Take 1 tablet by mouth daily        CEPHALEXIN PO      Take 500 mg by mouth 3 times daily as needed cellulitis        diltiazem 120 MG 24 hr ER beaded capsule    TIAZAC     Take 120 mg by mouth every evening        furosemide 20 MG tablet    LASIX    90 tablet    Take 1 tablet (20 mg) by mouth daily    Atrial flutter with rapid ventricular response (H)       gabapentin 600 MG tablet    NEURONTIN    540 tablet    Take 2 tablets (1,200 mg) by mouth 3 times daily    Post herpetic neuralgia       HYDROcodone-acetaminophen 5-325 MG per tablet    NORCO    15 tablet    Take 1 tablet by mouth every 6 hours as needed for severe pain        levofloxacin 500 MG tablet    LEVAQUIN    7 tablet    Take 1 tablet (500 mg) by mouth daily    HCAP (healthcare-associated pneumonia)       lidocaine 5 % Patch    LIDODERM     Place 3 patches onto the skin daily as needed for moderate pain (Apply up to 3 patches to painful area at once for up to 12 h within a 24 h period.  Remove after 12 hours.)        METOPROLOL SUCCINATE ER PO      Take 50 mg by mouth every evening        minocycline 50 MG  capsule    MINOCIN/DYNACIN    180 capsule    Take 1 capsule (50 mg) by mouth 2 times daily    Methicillin susceptible Staphylococcus aureus septicemia (H)       nortriptyline 25 MG capsule    PAMELOR    180 capsule    Take 2 capsules (50 mg) by mouth At Bedtime    Post herpetic neuralgia       order for DME     1 Units    Equipment being ordered: padded transfer belt    Severe obesity (BMI >= 40) (H), Congestive heart failure, unspecified congestive heart failure chronicity, unspecified congestive heart failure type (H)       pravastatin 10 MG tablet    PRAVACHOL    90 tablet    TAKE 1 TABLET AT BEDTIME (NEED LABS BEFORE NEXT REFILL)    Hyperlipidemia LDL goal <100       pregabalin 150 MG capsule    LYRICA    180 capsule    Take 1 capsule (150 mg) by mouth 2 times daily    Postherpetic neuralgia       PROTONIX PO      Take 40 mg by mouth every morning (before breakfast)        venlafaxine 150 MG 24 hr capsule    EFFEXOR-XR    90 capsule    Take 1 capsule (150 mg) by mouth every evening    Major depressive disorder, recurrent episode, in full remission (H)       * warfarin 5 MG tablet    COUMADIN     Take 1 tablet (5 mg) by mouth on MWF (uses 2.5 mg tabs on TTSS) or as directed by INR Clinic    Long-term (current) use of anticoagulants, Atrial flutter with rapid ventricular response (H)       * warfarin 2.5 MG tablet    COUMADIN     Take 1 tablet (2.5 mg) by mouth on TTSS (uses 5 mg tabs on MWF) or as directed by INR Clinic    Long-term (current) use of anticoagulants, Atrial flutter with rapid ventricular response (H)       * Notice:  This list has 2 medication(s) that are the same as other medications prescribed for you. Read the directions carefully, and ask your doctor or other care provider to review them with you.

## 2018-10-01 NOTE — PROGRESS NOTES
SUBJECTIVE:   Berenice Chaudhari is a 78 year old female, accompanied by , who presents to clinic today for the following health issues:      FALLS     Duration: x 2-3 days ago    Fell twice two days ago    1: was standing by a recliner, slipped with slippers, and patient fell onto buttocks    2: patient was in bathroom, slipped with slippers, and one foot went under a table. Patient felt immediate pain. May have hit right neck/shoulder on toilet.     Right flank pain.         Specific cause: fall     Description:   Location of pain: low back right  Character of pain: sharp and dull ache  Pain radiation: to right side  New numbness or weakness in legs, not attributed to pain:  no     Intensity: Currently 0/10, At its worst 9/10    History:   Therapies tried without relief: laying on left side    Alleviating factors:   Improved by: tylenol      Precipitating factors:  Worsened by: Lifting and Bending    Accompanying Signs & Symptoms:  Risk of Fracture:  Age >64  Risk of Cauda Equina:  None  Risk of Infection:  None  Risk of Cancer:  None  Risk of Ankylosing Spondylitis:  Onset at age <35, male, AND morning back stiffness. no     No head injury. No vision changes. No difficulty thinking. Fatigue chronic.     Decreased urinary output. No abdominal pain. Urine more concentrated. CKD. History of UTIs. On dialysis, while hospitalized.     History of PND. Cough is worse than normal. Sob present, wheezing present.     History of CHF, atrial flutter--on blood thinner. Lasix once daily.    ROS:  CONSTITUTIONAL: NEGATIVE for fever, chills  ENT/MOUTH: NEGATIVE for ear, mouth and throat problems  RESP: NEGATIVE for significant cough or SOB  CV: NEGATIVE for chest pain, palpitations  GI: NEGATIVE for nausea, abdominal pain, heartburn, or change in bowel habits  MUSCULOSKELETAL: NEGATIVE for significant arthralgias or myalgia  NEURO: NEGATIVE for weakness, dizziness or paresthesias    OBJECTIVE:                                 "                    /66 (BP Location: Other (Comment), Cuff Size: Adult Regular)  Pulse 72  Temp 96.6  F (35.9  C) (Tympanic)  Ht 5' 4\" (1.626 m)  SpO2 94%  BMI 59.56 kg/m2  Body mass index is 59.56 kg/(m^2).   GENERAL: alert, obese, in wheelchair. No respiratory distress  HENT: ear canals- normal; TMs- normal; Nose- normal; Mouth- no ulcers, no lesions  NECK: no tenderness, no adenopathy  RESP: diminished breath sounds throughout; rhonchi present  CV: regular rates and rhythm, normal S1 S2, no S3 or S4 and no murmur, no click or rub  MSK: ecchymosis of the right elbow. No erythema, edema. Pain with ROM. Full ROM and strength.   No ecchymosis over the right posterior hip and back. Tender to palpation. Unable to test ROM    Diagnostic test results:  CXR appears NEGATIVE.   Elbow xrays appears NEGATIVE.   Results for orders placed or performed in visit on 10/01/18 (from the past 24 hour(s))   Comprehensive metabolic panel   Result Value Ref Range    Sodium 148 (H) 133 - 144 mmol/L    Potassium 4.7 3.4 - 5.3 mmol/L    Chloride 105 94 - 109 mmol/L    Carbon Dioxide 31 20 - 32 mmol/L    Anion Gap 12 3 - 14 mmol/L    Glucose 91 70 - 99 mg/dL    Urea Nitrogen 28 7 - 30 mg/dL    Creatinine 1.80 (H) 0.52 - 1.04 mg/dL    GFR Estimate 27 (L) >60 mL/min/1.7m2    GFR Estimate If Black 33 (L) >60 mL/min/1.7m2    Calcium 9.3 8.5 - 10.1 mg/dL    Bilirubin Total 0.6 0.2 - 1.3 mg/dL    Albumin 3.5 3.4 - 5.0 g/dL    Protein Total 7.9 6.8 - 8.8 g/dL    Alkaline Phosphatase 97 40 - 150 U/L    ALT 23 0 - 50 U/L    AST 34 0 - 45 U/L   CBC with platelets differential   Result Value Ref Range    WBC 7.0 4.0 - 11.0 10e9/L    RBC Count 3.31 (L) 3.8 - 5.2 10e12/L    Hemoglobin 9.0 (L) 11.7 - 15.7 g/dL    Hematocrit 30.6 (L) 35.0 - 47.0 %    MCV 92 78 - 100 fl    MCH 27.2 26.5 - 33.0 pg    MCHC 29.4 (L) 31.5 - 36.5 g/dL    RDW 16.2 (H) 10.0 - 15.0 %    Platelet Count 197 150 - 450 10e9/L    Diff Method Automated Method     % " Neutrophils 65.8 %    % Lymphocytes 15.5 %    % Monocytes 11.9 %    % Eosinophils 6.5 %    % Basophils 0.3 %    Absolute Neutrophil 4.6 1.6 - 8.3 10e9/L    Absolute Lymphocytes 1.1 0.8 - 5.3 10e9/L    Absolute Monocytes 0.8 0.0 - 1.3 10e9/L    Absolute Eosinophils 0.5 0.0 - 0.7 10e9/L    Absolute Basophils 0.0 0.0 - 0.2 10e9/L   Erythrocyte sedimentation rate auto   Result Value Ref Range    Sed Rate 62 (H) 0 - 30 mm/h   *UA reflex to Microscopic   Result Value Ref Range    Color Urine Yellow     Appearance Urine Clear     Glucose Urine Negative NEG^Negative mg/dL    Bilirubin Urine Negative NEG^Negative    Ketones Urine Negative NEG^Negative mg/dL    Specific Gravity Urine 1.020 1.003 - 1.035    Blood Urine Negative NEG^Negative    pH Urine 5.5 5.0 - 7.0 pH    Protein Albumin Urine Negative NEG^Negative mg/dL    Urobilinogen Urine 0.2 0.2 - 1.0 EU/dL    Nitrite Urine Negative NEG^Negative    Leukocyte Esterase Urine Small (A) NEG^Negative    Source Midstream Urine    Urine Microscopic   Result Value Ref Range    WBC Urine 10-25 (A) OTO5^0 - 5 /HPF    RBC Urine O - 2 OTO2^O - 2 /HPF    Squamous Epithelial /LPF Urine Few FEW^Few /LPF    Bacteria Urine Few (A) NEG^Negative /HPF     *Note: Due to a large number of results and/or encounters for the requested time period, some results have not been displayed. A complete set of results can be found in Results Review.        ASSESSMENT/PLAN:                                                    (R06.00) Dyspnea, unspecified type  (primary encounter diagnosis)  Comment: discussed differential with patient. BNP pending. Patient also has chronic SOB.   Plan: XR Chest 2 Views, Comprehensive metabolic         panel, CBC with platelets differential,         Erythrocyte sedimentation rate auto, *UA reflex        to Microscopic, BNP-N terminal pro, Urine         Microscopic, Urine Culture Aerobic Bacterial          (D64.9) Anemia, unspecified type  Comment: this needs to be worked up  with PMD.   Plan:     (E87.0) Hypernatremia  Comment: likely dehydration--push fluids.   Plan:     (R70.0) Elevated sed rate  Comment:    Plan:     (Z79.01) Long term current use of anticoagulant therapy  Comment: recheck INR and forward to nurse.  Plan: INR         (M25.521) Right elbow pain  Comment: contusion s/p fall  Plan: CANCELED: XR Elbow Right 2 Views          (M54.5) Acute right-sided low back pain without sciatica  Comment:   Plan:     (W19.XXXA) Fall, initial encounter  Comment:   Plan:     Greater than 60 minutes was spent with patient today with the majority spent on counseling and coordination of care for the conditions listed above.     Paxton Smith PA-C  [facility]

## 2018-10-03 NOTE — TELEPHONE ENCOUNTER
Actually changed my mind - lets see her tomorrow Thursday 10/4 at 220pm    Nelly Pearl MD  Internal Medicine/Pediatrics  [facility]

## 2018-10-03 NOTE — TELEPHONE ENCOUNTER
Call to patient-in agreement, appointment scheduled:    Next 5 appointments (look out 90 days)     Oct 04, 2018  2:20 PM CDT   SHORT with Nelly Pearl MD   [facility] ([facility])    [address] [place] [id number] [phone]                Melissa Abdullahi RN  Message handled by Nurse Triage.

## 2018-10-03 NOTE — TELEPHONE ENCOUNTER
Please call patient to set up appt with me - she was seen by Ceci yesterday and should have close follow up.    I am booked until 10/22 (unfortunately I am gone all next week except on Wednesday) -- but I can see her during my 7:40am hold slot on Wed 10/10/18.  If any of her symptoms worsen before then she should see another provider or go to EMERGENCY DEPARTMENT.    Patient and  are expecting a call about follow up appt time.    Nelly Pearl MD  Internal Medicine/Pediatrics  [facility]

## 2018-10-04 NOTE — MR AVS SNAPSHOT
After Visit Summary   10/4/2018    Berenice Chaudhari    MRN: [id number]           Patient Information     Date Of Birth          1940        Visit Information        Provider Department      10/4/2018 2:20 PM Nelly Pearl MD [facility]        Today's Diagnoses     Anemia, unspecified type    -  1    Hypernatremia          Care Instructions    Concern for anemia (low hemoglobin) and high sodium.    Will retest today.    If any fever or chills, call us.    If any increasing in breathing problems, call us.          Follow-ups after your visit        Your next 10 appointments already scheduled     Oct 04, 2018  2:20 PM CDT   SHORT with Nelly Pearl MD   [facility] ([facility])    [address]  [address]  [facility] [address] [phone]            Oct 18, 2018  8:00 AM CDT   Anticoagulation Visit with  ANTICOAGULATION CLINIC   [facility] ([facility])    [address]  [address]  [facility] [address] [phone]              Who to contact     If you have questions or need follow up information about today's clinic visit or your schedule please contact [facility] directly at [phone].  Normal or non-critical lab and imaging results will be communicated to you by [employer], letter or phone within 4 business days after the clinic has received the results. If you do not hear from us within 7 days, please contact the clinic through [employer] or phone. If you have a critical or abnormal lab result, we will notify you by phone as soon as possible.  Submit refill requests through [employer] or call your pharmacy and they will forward the refill request to us. Please allow 3 business days for your refill to be completed.          Additional Information About Your Visit        [employer] Information     [employer] lets you send messages to your doctor, view your test results, renew your  "prescriptions, schedule appointments and more. To sign up, go to [email or web address] . Click on \"Log in\" on the left side of the screen, which will take you to the Welcome page. Then click on \"Sign up Now\" on the right side of the page.     You will be asked to enter the access code listed below, as well as some personal information. Please follow the directions to create your username and password.     Your access code is: [email or web address]  Expires: 2018  5:47 PM     Your access code will  in 90 days. If you need help or a new code, please call your [facility] clinic or [phone].        Care EveryWhere ID     This is your Care EveryWhere ID. This could be used by other organizations to access your [facility] medical records  [id number]        Your Vitals Were     Pulse Temperature Pulse Oximetry             69 98.5  F (36.9  C) (Oral) 95%          Blood Pressure from Last 3 Encounters:   10/04/18 128/68   10/01/18 122/66   08/15/18 126/62    Weight from Last 3 Encounters:   18 (!) 347 lb (157.4 kg)   18 (!) 347 lb 9.6 oz (157.7 kg)   18 (!) 347 lb 9.6 oz (157.7 kg)              We Performed the Following     Blood Morphology Pathologist Review     CBC with platelets differential     Comprehensive metabolic panel     Erythrocyte sedimentation rate auto     Ferritin     Iron and iron binding capacity     Lactate Dehydrogenase     Reticulocyte Count          Today's Medication Changes          These changes are accurate as of 10/4/18  1:43 PM.  If you have any questions, ask your nurse or doctor.               Stop taking these medicines if you haven't already. Please contact your care team if you have questions.     levofloxacin 500 MG tablet   Commonly known as:  LEVAQUIN   Stopped by:  Nelly Pearl MD                    Primary Care Provider Office Phone # Fax #    Nelly Pearl [phone] [address] [place] [address]        Equal " Access to Services     [facility]: Hadii aad ku hadnatividadranjith Cortezali, waaxda luqadaha, qaybta kaalmacalin mckeon. So [facility] [phone].    ATENCIÓN: Si habla gerry, tiene a dickey disposición servicios gratuitos de asistencia lingüística. Llame al [phone].    We comply with applicable federal civil rights laws and [place] laws. We do not discriminate on the basis of race, color, national origin, age, disability, sex, sexual orientation, or gender identity.            Thank you!     Thank you for choosing [facility] [place]  for your care. Our goal is always to provide you with excellent care. Hearing back from our patients is one way we can continue to improve our services. Please take a few minutes to complete the written survey that you may receive in the mail after your visit with us. Thank you!             Your Updated Medication List - Protect others around you: Learn how to safely use, store and throw away your medicines at www.disposemymeds.org.          This list is accurate as of 10/4/18  1:43 PM.  Always use your most recent med list.                   Brand Name Dispense Instructions for use Diagnosis    buPROPion 300 MG 24 hr tablet    WELLBUTRIN XL    90 tablet    TAKE 1 TABLET EVERY MORNING    Major depressive disorder, recurrent, in full remission (H)       CENTRUM SILVER per tablet      Take 1 tablet by mouth daily        CEPHALEXIN PO      Take 500 mg by mouth 3 times daily as needed cellulitis        diltiazem 120 MG 24 hr ER beaded capsule    TIAZAC     Take 120 mg by mouth every evening        furosemide 20 MG tablet    LASIX    90 tablet    Take 1 tablet (20 mg) by mouth daily    Atrial flutter with rapid ventricular response (H)       gabapentin 600 MG tablet    NEURONTIN    540 tablet    Take 2 tablets (1,200 mg) by mouth 3 times daily    Post herpetic neuralgia       HYDROcodone-acetaminophen 5-325 MG per tablet    NORCO    15 tablet    Take  1 tablet by mouth every 6 hours as needed for severe pain        lidocaine 5 % Patch    LIDODERM     Place 3 patches onto the skin daily as needed for moderate pain (Apply up to 3 patches to painful area at once for up to 12 h within a 24 h period.  Remove after 12 hours.)        METOPROLOL SUCCINATE ER PO      Take 50 mg by mouth every evening        minocycline 50 MG capsule    MINOCIN/DYNACIN    180 capsule    Take 1 capsule (50 mg) by mouth 2 times daily    Methicillin susceptible Staphylococcus aureus septicemia (H)       nortriptyline 25 MG capsule    PAMELOR    180 capsule    Take 2 capsules (50 mg) by mouth At Bedtime    Post herpetic neuralgia       order for DME     1 Units    Equipment being ordered: padded transfer belt    Severe obesity (BMI >= 40) (H), Congestive heart failure, unspecified congestive heart failure chronicity, unspecified congestive heart failure type       pravastatin 10 MG tablet    PRAVACHOL    90 tablet    TAKE 1 TABLET AT BEDTIME (NEED LABS BEFORE NEXT REFILL)    Hyperlipidemia LDL goal <100       pregabalin 150 MG capsule    LYRICA    180 capsule    Take 1 capsule (150 mg) by mouth 2 times daily    Postherpetic neuralgia       PROTONIX PO      Take 40 mg by mouth every morning (before breakfast)        venlafaxine 150 MG 24 hr capsule    EFFEXOR-XR    90 capsule    Take 1 capsule (150 mg) by mouth every evening    Major depressive disorder, recurrent episode, in full remission (H)       * warfarin 5 MG tablet    COUMADIN     Take 1 tablet (5 mg) by mouth on MWF (uses 2.5 mg tabs on TTSS) or as directed by INR Clinic    Long-term (current) use of anticoagulants, Atrial flutter with rapid ventricular response (H)       * warfarin 2.5 MG tablet    COUMADIN     Take 1 tablet (2.5 mg) by mouth on TTSS (uses 5 mg tabs on MWF) or as directed by INR Clinic    Long-term (current) use of anticoagulants, Atrial flutter with rapid ventricular response (H)       * Notice:  This list has 2  medication(s) that are the same as other medications prescribed for you. Read the directions carefully, and ask your doctor or other care provider to review them with you.

## 2018-10-04 NOTE — PROGRESS NOTES
SUBJECTIVE:   Berenice Chaudhari is a 78 year old female who presents to clinic today for the following health issues:      Follow up with recent lab work.   Saw Ceci CRAMER on  10-.      Patient feeling a little better since she was here 3 days ago.  Still has cough and shortness of breath, but overall feeling a little better.     Reviewed normal imaging with patient.    Reviewed labs:    Most concerning are 2 gram drop in hemoglobin with no apparent source - patient denies any bloody stools, UA normal. No new medications.    Also hypernatremia noted - this is new for patient - will retest today.    BNP noted to be 1737, however given her age this is normal.    Problem list and histories reviewed & adjusted, as indicated.  Additional history: as documented    Reviewed and updated as needed this visit by clinical staff       Reviewed and updated as needed this visit by Provider         ROS:  Constitutional, HEENT, cardiovascular, pulmonary, gi and gu systems are negative, except as otherwise noted.    OBJECTIVE:     /68 (BP Location: Right arm, Cuff Size: Adult Large)  Pulse 69  Temp 98.5  F (36.9  C) (Oral)  SpO2 95%  There is no height or weight on file to calculate BMI.  GENERAL APPEARANCE: healthy, alert and no distress  RESP: lungs clear to auscultation - no rales, rhonchi or wheezes  CV: regular rates and rhythm, normal S1 S2, no S3 or S4 and no murmur, click or rub  EXT: no edema    Diagnostic Test Results:  none     ASSESSMENT/PLAN:       1. Anemia, unspecified type  Unclear etiology at this point - given increased shortness of breath, this is concerning.  No apparent blood loss.  No new medications that would cause hemolysis.  Labs as below.  If patient feeling more weak or increased shortness of breath will go to EMERGENCY DEPARTMENT.   - Iron and iron binding capacity  - Ferritin  - Blood Morphology Pathologist Review  - CBC with platelets differential  - Reticulocyte Count  - Lactate  Dehydrogenase  - Erythrocyte sedimentation rate auto  - Fecal colorectal cancer screen (FIT); Future    2. Hypernatremia  Trending down from 148 to 146 - will continue to follow next week, continue hydration.  - Comprehensive metabolic panel    Overall her vitals are stable today and she is feeling a little better for now.     Patient Instructions   Concern for anemia (low hemoglobin) and high sodium.    Will retest today.    If any fever or chills, call us.    If any increasing in breathing problems, call us.      Nelly Pearl MD  [facility]

## 2018-10-04 NOTE — PATIENT INSTRUCTIONS
Concern for anemia (low hemoglobin) and high sodium.    Will retest today.    If any fever or chills, call us.    If any increasing in breathing problems, call us.

## 2018-10-05 NOTE — TELEPHONE ENCOUNTER
"Requested Prescriptions   Pending Prescriptions Disp Refills     warfarin (COUMADIN) 5 MG tablet 30 tablet      Sig: Take 1 tablet (5 mg) by mouth on MWF (uses 2.5 mg tabs on TTSS) or as directed by INR Clinic    Vitamin K Antagonists Failed    10/5/2018  1:26 PM       Failed - INR is within goal in the past 6 weeks    Confirm INR is within goal in the past 6 weeks.     Recent Labs   Lab Test  10/01/18   1532   INR  2.04*                      Passed - Recent (12 mo) or future (30 days) visit within the authorizing provider's specialty    Patient had office visit in the last 12 months or has a visit in the next 30 days with authorizing provider or within the authorizing provider's specialty.  See \"Patient Info\" tab in inbasket, or \"Choose Columns\" in Meds & Orders section of the refill encounter.           Passed - Patient is 18 years of age or older       Passed - Patient is not pregnant       Passed - No positive pregnancy on file in past 12 months        warfarin (COUMADIN) 2.5 MG tablet 30 tablet      Sig: Take 1 tablet (2.5 mg) by mouth on TTSS (uses 5 mg tabs on MWF) or as directed by INR Clinic    Vitamin K Antagonists Failed    10/5/2018  1:26 PM       Failed - INR is within goal in the past 6 weeks    Confirm INR is within goal in the past 6 weeks.     Recent Labs   Lab Test  10/01/18   1532   INR  2.04*                      Passed - Recent (12 mo) or future (30 days) visit within the authorizing provider's specialty    Patient had office visit in the last 12 months or has a visit in the next 30 days with authorizing provider or within the authorizing provider's specialty.  See \"Patient Info\" tab in inbasket, or \"Choose Columns\" in Meds & Orders section of the refill encounter.           Passed - Patient is 18 years of age or older       Passed - Patient is not pregnant       Passed - No positive pregnancy on file in past 12 months        Requested Prescriptions   Pending Prescriptions Disp Refills     " "warfarin (COUMADIN) 5 MG tablet 30 tablet      Sig: Take 1 tablet (5 mg) by mouth on MWF (uses 2.5 mg tabs on TTSS) or as directed by INR Clinic    Vitamin K Antagonists Failed    10/5/2018  1:26 PM       Failed - INR is within goal in the past 6 weeks    Confirm INR is within goal in the past 6 weeks.     Recent Labs   Lab Test  10/01/18   1532   INR  2.04*                      Passed - Recent (12 mo) or future (30 days) visit within the authorizing provider's specialty    Patient had office visit in the last 12 months or has a visit in the next 30 days with authorizing provider or within the authorizing provider's specialty.  See \"Patient Info\" tab in inbasket, or \"Choose Columns\" in Meds & Orders section of the refill encounter.           Passed - Patient is 18 years of age or older       Passed - Patient is not pregnant       Passed - No positive pregnancy on file in past 12 months        warfarin (COUMADIN) 2.5 MG tablet  Last Written Prescription Date:  06/01/2018  Last Fill Quantity: umknown,  # refills: unknown   Last office visit: 10/4/2018 with prescribing provider:  Nelly Pearl MD    Future Office Visit:     30 tablet      Sig: Take 1 tablet (2.5 mg) by mouth on TTSS (uses 5 mg tabs on MWF) or as directed by INR Clinic    Vitamin K Antagonists Failed    10/5/2018  1:26 PM       Failed - INR is within goal in the past 6 weeks    Confirm INR is within goal in the past 6 weeks.     Recent Labs   Lab Test  10/01/18   1532   INR  2.04*                      Passed - Recent (12 mo) or future (30 days) visit within the authorizing provider's specialty    Patient had office visit in the last 12 months or has a visit in the next 30 days with authorizing provider or within the authorizing provider's specialty.  See \"Patient Info\" tab in inbasket, or \"Choose Columns\" in Meds & Orders section of the refill encounter.           Passed - Patient is 18 years of age or older       Passed - Patient is not pregnant "       Passed - No positive pregnancy on file in past 12 months

## 2018-10-05 NOTE — TELEPHONE ENCOUNTER
Prescription approved per [facility] Refill Protocol.    Linda Johnson RN -- [facility]

## 2018-10-05 NOTE — TELEPHONE ENCOUNTER
"Requested Prescriptions   Pending Prescriptions Disp Refills     warfarin (COUMADIN) 5 MG tablet  Last Written Prescription Date:  06/01/2018  Last Fill Quantity: unknown,  # refills: unknown   Last office visit: 10/4/2018 with prescribing provider:  Nelly Pearl MD    Future Office Visit:     30 tablet      Sig: Take 1 tablet (5 mg) by mouth on MWF (uses 2.5 mg tabs on TTSS) or as directed by INR Clinic    Vitamin K Antagonists Failed    10/5/2018  1:22 PM       Failed - INR is within goal in the past 6 weeks    Confirm INR is within goal in the past 6 weeks.     Recent Labs   Lab Test  10/01/18   1532   INR  2.04*                      Passed - Recent (12 mo) or future (30 days) visit within the authorizing provider's specialty    Patient had office visit in the last 12 months or has a visit in the next 30 days with authorizing provider or within the authorizing provider's specialty.  See \"Patient Info\" tab in inbasket, or \"Choose Columns\" in Meds & Orders section of the refill encounter.           Passed - Patient is 18 years of age or older       Passed - Patient is not pregnant       Passed - No positive pregnancy on file in past 12 months          "

## 2018-10-05 NOTE — TELEPHONE ENCOUNTER
"Requested Prescriptions   Pending Prescriptions Disp Refills     omeprazole (PRILOSEC) 40 MG capsule [Pharmacy Med Name: OMEPRAZOLE DR CAPS 40MG]  HISTORICAL - Pantoprazole on med list. 90 capsule 1    Last Written Prescription Date:  1/18/18  Last Fill Quantity: 90,  # refills: 1   Last office visit: 10/4/2018 with prescribing provider:  Dae   Future Office Visit:  None scheduled.   Sig: TAKE 1 CAPSULE DAILY 30 TO 60 MINUTES BEFORE A MEAL    PPI Protocol Passed    10/4/2018 11:28 PM       Passed - Not on Clopidogrel (unless Pantoprazole ordered)       Passed - No diagnosis of osteoporosis on record       Passed - Recent (12 mo) or future (30 days) visit within the authorizing provider's specialty    Patient had office visit in the last 12 months or has a visit in the next 30 days with authorizing provider or within the authorizing provider's specialty.  See \"Patient Info\" tab in inbasket, or \"Choose Columns\" in Meds & Orders section of the refill encounter.           Passed - Patient is age 18 or older       Passed - No active pregnacy on record       Passed - No positive pregnancy test in past 12 months          "

## 2018-10-08 NOTE — TELEPHONE ENCOUNTER
Taken off the list in February, taking Protonix per office visit notes.  Melissa Abdullahi RN  Message handled by Nurse Triage.

## 2018-10-08 NOTE — TELEPHONE ENCOUNTER
Warfarin 2.5 mg and 5 mg tablets    Last Written Prescription Date: for 2.5 mg tabs: 1/16/18  Last Fill Qty: 95, # refills: 3  Last Written Prescription Date: for 5 mg tabs: 8/14/17  Last Fill Qty: 90, # refills: 3    Last Office Visit with [facility] or [facility] prescribing provider: 10/4/18     Lab Results   Component Value Date    INR 2.04 10/01/2018    INR 2.4 09/19/2018     Refilled per nurse protocol standing orders.  Mary Butcher RN

## 2018-10-14 NOTE — TELEPHONE ENCOUNTER
"Requested Prescriptions   Pending Prescriptions Disp Refills     CARTIA  MG 24 hr capsule [Pharmacy Med Name: DILTIAZEM HCL ER(CARTIAXT)CP 120MG]  Last Written Prescription Date:  na  Last Fill Quantity: na,  # refills: na   Last office visit: 10/4/2018 with prescribing provider:  Nelly Pearl MD   Future Office Visit:   Next 5 appointments (look out 90 days)     Oct 18, 2018  7:40 AM CDT   SHORT with Nelly Pearl MD   [facility] ([facility])    [address] [place] [address] [address]  [facility] [address]   [phone]                  90 capsule 2     Sig: TAKE 1 CAPSULE DAILY    Calcium Channel Blockers Protocol  Failed    10/14/2018  6:38 AM       Failed - Normal serum creatinine on file in past 12 months    Recent Labs   Lab Test  10/09/18   1346   CR  2.07*            Passed - Blood pressure under 140/90 in past 12 months    BP Readings from Last 3 Encounters:   10/04/18 128/68   10/01/18 122/66   08/15/18 126/62                Passed - Normal ALT in past 12 months    Recent Labs   Lab Test  10/04/18   1349   ALT  27            Passed - Recent (12 mo) or future (30 days) visit within the authorizing provider's specialty    Patient had office visit in the last 12 months or has a visit in the next 30 days with authorizing provider or within the authorizing provider's specialty.  See \"Patient Info\" tab in inbasket, or \"Choose Columns\" in Meds & Orders section of the refill encounter.           Passed - Patient is age 18 or older       Passed - No active pregnancy on record       Passed - No positive pregnancy test in past 12 months     Routing refill request to provider for review/approval because:  Medication is reported/historical         "

## 2018-10-15 NOTE — PROGRESS NOTES
ANTICOAGULATION FOLLOW-UP CLINIC VISIT    Patient Name:  Berenice Chaudhari  Date:  10/15/2018  Contact Type:  Telephone  Call received from Nilesh with critical INR reading for pt. Her INR was 1.2 on 10/14. Called pt, she denies any clotting/stroke sx's, any changes in med, alcohol intake, stress, inf sx's or inflammation sx's. Didn't miss any dose. The only change is she has been eating healthy(more broccoli in diet).     Advised to increase her coumadin tonight & recheck on Thursday(coming in to see pcp on 10/18). Advised to reach us with any concerning sx's. Pt agrees to the plan.    SUBJECTIVE:     Patient Findings     Positives Change in diet/appetite, Unexplained INR or factor level change    Comments Denies bleeding/bruising or missed dose.             OBJECTIVE    INR   Date Value Ref Range Status   10/14/2018 1.2  Final       ASSESSMENT / PLAN  INR assessment SUB    Recheck INR In: 3 DAYS    INR Location Clinic      Anticoagulation Summary as of 10/15/2018     INR goal 2.0-3.0   Today's INR 1.2! (10/14/2018)   Warfarin maintenance plan 5 mg (2.5 mg x 2) on Mon, Wed, Fri; 2.5 mg (2.5 mg x 1) all other days   Full warfarin instructions 10/15: 10 mg; Otherwise 5 mg on Mon, Wed, Fri; 2.5 mg all other days   Weekly warfarin total 25 mg   Plan last modified Mary Butcher, RN (6/1/2018)   Next INR check 10/18/2018   Target end date Indefinite    Indications   Long term current use of anticoagulant therapy [Z79.01]  Atrial flutter with rapid ventricular response (H) [I48.92]         Anticoagulation Episode Summary     INR check location     Preferred lab     Send INR reminders to [facility]    Comments 5mg & 2.5mg tabs; HS dosing //  (Ricardo) sets up her meds // Home INR monitor // motorized wheelchair      Anticoagulation Care Providers     Provider Role Specialty Phone number    Nelly Pearl MD Referring Internal Medicine [phone]            See the Encounter Report to view  Anticoagulation Flowsheet and Dosing Calendar (Go to Encounters tab in chart review, and find the Anticoagulation Therapy Visit)    Nani Coffman RN

## 2018-10-15 NOTE — MR AVS SNAPSHOT
Berenice RAJIV Chaudhari   10/15/2018 3:00 PM   Anticoagulation Therapy Visit    Description:  78 year old female   Provider:  [facility]   Department:   Nurse           INR as of 10/15/2018     Today's INR 1.2! (10/14/2018)      Anticoagulation Summary as of 10/15/2018     INR goal 2.0-3.0   Today's INR 1.2! (10/14/2018)   Full warfarin instructions 10/15: 10 mg; Otherwise 5 mg on Mon, Wed, Fri; 2.5 mg all other days   Next INR check 10/18/2018    Indications   Long term current use of anticoagulant therapy [Z79.01]  Atrial flutter with rapid ventricular response (H) [I48.92]         Your next Anticoagulation Clinic appointment(s)     Oct 18, 2018  8:00 AM CDT   Anticoagulation Visit with  ANTICOAGULATION CLINIC   [facility] ([facility]    [address]  [address]  [facility] [id number] [phone]              Contact Numbers     [facility]  Please call  [phone] to cancel and/or reschedule your appointment   Please call  [phone] with any problems or questions regarding your therapy.        October 2018 Details    Sun Mon Tue Wed Thu Fri Sat      1               2               3               4               5               6                 7               8               9               10               11               12               13                 14               15      10 mg   See details      16      2.5 mg         17      5 mg         18            19               20                 21               22               23               24               25               26               27                 28               29               30               31                   Date Details   10/15 This INR check       Date of next INR:  10/18/2018         How to take your warfarin dose     To take:  2.5 mg Take 1 of the 2.5 mg tablets.    To take:  5 mg Take 2 of the 2.5 mg tablets.    To take:  10 mg Take 2 of the 5 mg tablets.

## 2018-10-16 NOTE — TELEPHONE ENCOUNTER
Last prescription was 1/11/18.   Per discharge note 4/12/18: Continue these medications which have not changed: diltiazem (Tiazac) 120 mg 24 hr ER beaded capsule. Take 120 mg q evening (historical).    Routing refill request to provider for review/approval because:  Labs out of range:  Creatinine 2.07 on 10/9/18    Next 5 appointments (look out 90 days)     Oct 18, 2018  7:40 AM CDT   SHORT with Nelly Pearl MD   [facility] ([facility])    [address] [place] [id number] [phone]                Hellen Luis RN

## 2018-10-18 PROBLEM — D50.8 OTHER IRON DEFICIENCY ANEMIA: Status: ACTIVE | Noted: 2018-01-01

## 2018-10-18 NOTE — PATIENT INSTRUCTIONS
Labs today.    Start iron - start with one tablet daily and increase to twice a day if no constipation.    Likely recheck labs in a few weeks - will let you know after I see these results.

## 2018-10-18 NOTE — MR AVS SNAPSHOT
"              After Visit Summary   10/18/2018    Berenice Chaudhari    MRN: [id number]           Patient Information     Date Of Birth          1940        Visit Information        Provider Department      10/18/2018 7:40 AM Nelly Pearl MD [facility]        Today's Diagnoses     Other iron deficiency anemia    -  1    CKD (chronic kidney disease) stage 3, GFR 30-59 ml/min (H)          Care Instructions    Labs today.    Start iron - start with one tablet daily and increase to twice a day if no constipation.    Likely recheck labs in a few weeks - will let you know after I see these results.          Follow-ups after your visit        Who to contact     If you have questions or need follow up information about today's clinic visit or your schedule please contact [facility] directly at [phone].  Normal or non-critical lab and imaging results will be communicated to you by MyChart, letter or phone within 4 business days after the clinic has received the results. If you do not hear from us within 7 days, please contact the clinic through Xinguoduhart or phone. If you have a critical or abnormal lab result, we will notify you by phone as soon as possible.  Submit refill requests through Wimdu or call your pharmacy and they will forward the refill request to us. Please allow 3 business days for your refill to be completed.          Additional Information About Your Visit        MyChart Information     Wimdu lets you send messages to your doctor, view your test results, renew your prescriptions, schedule appointments and more. To sign up, go to [email or web address] . Click on \"Log in\" on the left side of the screen, which will take you to the Welcome page. Then click on \"Sign up Now\" on the right side of the page.     You will be asked to enter the access code listed below, as well as some personal information. Please follow the directions to create your username and password.   "   Your access code is: [email or web address]  Expires: 2018  5:47 PM     Your access code will  in 90 days. If you need help or a new code, please call your [place] clinic or [phone].        Care EveryWhere ID     This is your Care EveryWhere ID. This could be used by other organizations to access your [place] medical records  [id number]        Your Vitals Were     Pulse Temperature Pulse Oximetry             73 97.8  F (36.6  C) (Oral) 95%          Blood Pressure from Last 3 Encounters:   10/18/18 144/77   10/04/18 128/68   10/01/18 122/66    Weight from Last 3 Encounters:   18 (!) 347 lb (157.4 kg)   18 (!) 347 lb 9.6 oz (157.7 kg)   18 (!) 347 lb 9.6 oz (157.7 kg)              We Performed the Following     Basic metabolic panel     CBC with platelets differential     TSH with free T4 reflex          Today's Medication Changes          These changes are accurate as of 10/18/18  8:12 AM.  If you have any questions, ask your nurse or doctor.               Start taking these medicines.        Dose/Directions    ferrous sulfate 325 (65 Fe) MG tablet   Commonly known as:  IRON   Used for:  Other iron deficiency anemia   Started by:  Nelly Pearl MD        Start taking one tablet daily and increase to one tablet twice daily after two weeks if not consipated.   Quantity:  90 tablet   Refills:  2            Where to get your medicines      These medications were sent to [id number] - [place] - [address]  [id number] [place] [address]     Phone:  [phone]     ferrous sulfate 325 (65 Fe) MG tablet                Primary Care Provider Office Phone # Fax #    Nelly Pearl [phone] [address] [place] [address]        Equal Access to Services     EDMUND POWELL AH: Hadii aad ku hadasho Sojohn, waaxda luqadaha, qaybta kaalkaylene walters, calin severino  lahayley hoyt. So [facility] [phone].    ATENCIÓN: Si habla gerry, tiene a dickey disposición servicios gratuitos de asistencia lingüística. Jonathon al [phone].    We comply with applicable federal civil rights laws and [place] laws. We do not discriminate on the basis of race, color, national origin, age, disability, sex, sexual orientation, or gender identity.            Thank you!     Thank you for choosing [facility] [place]  for your care. Our goal is always to provide you with excellent care. Hearing back from our patients is one way we can continue to improve our services. Please take a few minutes to complete the written survey that you may receive in the mail after your visit with us. Thank you!             Your Updated Medication List - Protect others around you: Learn how to safely use, store and throw away your medicines at www.disposemymeds.org.          This list is accurate as of 10/18/18  8:12 AM.  Always use your most recent med list.                   Brand Name Dispense Instructions for use Diagnosis    buPROPion 300 MG 24 hr tablet    WELLBUTRIN XL    90 tablet    TAKE 1 TABLET EVERY MORNING    Major depressive disorder, recurrent, in full remission (H)       CARTIA  MG 24 hr capsule   Generic drug:  diltiazem     90 capsule    TAKE 1 CAPSULE DAILY    Benign essential hypertension       CENTRUM SILVER per tablet      Take 1 tablet by mouth daily        CEPHALEXIN PO      Take 500 mg by mouth 3 times daily as needed cellulitis        diltiazem 120 MG 24 hr ER beaded capsule    TIAZAC     Take 120 mg by mouth every evening        ferrous sulfate 325 (65 Fe) MG tablet    IRON    90 tablet    Start taking one tablet daily and increase to one tablet twice daily after two weeks if not consipated.    Other iron deficiency anemia       furosemide 20 MG tablet    LASIX    90 tablet    Take 1 tablet (20 mg) by mouth daily    Atrial flutter with rapid ventricular response (H)       gabapentin 600 MG  tablet    NEURONTIN    540 tablet    Take 2 tablets (1,200 mg) by mouth 3 times daily    Post herpetic neuralgia       HYDROcodone-acetaminophen 5-325 MG per tablet    NORCO    15 tablet    Take 1 tablet by mouth every 6 hours as needed for severe pain        lidocaine 5 % Patch    LIDODERM     Place 3 patches onto the skin daily as needed for moderate pain (Apply up to 3 patches to painful area at once for up to 12 h within a 24 h period.  Remove after 12 hours.)        metoprolol succinate 50 MG 24 hr tablet    TOPROL-XL    90 tablet    TAKE 1 TABLET DAILY    Essential hypertension with goal blood pressure less than 140/90       minocycline 50 MG capsule    MINOCIN/DYNACIN    180 capsule    Take 1 capsule (50 mg) by mouth 2 times daily    Methicillin susceptible Staphylococcus aureus septicemia (H)       nortriptyline 25 MG capsule    PAMELOR    180 capsule    Take 2 capsules (50 mg) by mouth At Bedtime    Post herpetic neuralgia       order for DME     1 Units    Equipment being ordered: padded transfer belt    Severe obesity (BMI >= 40) (H), Congestive heart failure, unspecified congestive heart failure chronicity, unspecified congestive heart failure type       pravastatin 10 MG tablet    PRAVACHOL    90 tablet    TAKE 1 TABLET AT BEDTIME (NEED LABS BEFORE NEXT REFILL)    Hyperlipidemia LDL goal <100       pregabalin 150 MG capsule    LYRICA    180 capsule    Take 1 capsule (150 mg) by mouth 2 times daily    Postherpetic neuralgia       PROTONIX PO      Take 40 mg by mouth every morning (before breakfast)        venlafaxine 150 MG 24 hr capsule    EFFEXOR-XR    90 capsule    Take 1 capsule (150 mg) by mouth every evening    Major depressive disorder, recurrent episode, in full remission (H)       * warfarin 5 MG tablet    COUMADIN    45 tablet    Take 1 tablet (5 mg) by mouth on MWF (uses 2.5 mg tabs on TTSS) or as directed by INR Clinic    Atrial flutter with rapid ventricular response (H), Long term current  use of anticoagulant therapy       * warfarin 2.5 MG tablet    COUMADIN    60 tablet    Take 1 tablet (2.5 mg) by mouth on TTSS (uses 5 mg tabs on MWF) or as directed by INR Clinic    Atrial flutter with rapid ventricular response (H), Long term current use of anticoagulant therapy       * Notice:  This list has 2 medication(s) that are the same as other medications prescribed for you. Read the directions carefully, and ask your doctor or other care provider to review them with you.

## 2018-10-18 NOTE — PROGRESS NOTES
"  SUBJECTIVE:   Berenice Chaudhari is a 78 year old female who presents to clinic today for the following health issues:    Follow up lab work.  She is feeling tired.    Reviewed lab work - concern for BRIAN - ferritin low normal 21. Cr still slightly elevated, she does have known h/o CKD.   Continues to deny any blood loss.  Doesn't feel as tired as previous visits, but still run down.  Does have a new , Bhumi, who patient really likes. Patient was very worried she had diabetes today, reassured.       Problem list and histories reviewed & adjusted, as indicated.  Additional history: as documented      Reviewed and updated as needed this visit by clinical staff       Reviewed and updated as needed this visit by Provider         ROS:  Constitutional, HEENT, cardiovascular, pulmonary, gi and gu systems are negative, except as otherwise noted.    OBJECTIVE:     /77  Pulse 73  Temp 97.8  F (36.6  C) (Oral)  SpO2 95%  There is no height or weight on file to calculate BMI.  GENERAL APPEARANCE: healthy, alert and no distress  RESP: lungs clear to auscultation - no rales, rhonchi or wheezes  CV: regular rates and rhythm, normal S1 S2, no S3 or S4 and no murmur, click or rub    Diagnostic Test Results:  none     ASSESSMENT/PLAN:       1. Other iron deficiency anemia  Ferritin low normal - ferritin can rise in the elderly, so she is likely BRIAN.  Etiology likely mixed with know kidney disease, stress/inflammation noted at previous visit and possible blood loss.  Discussed that we may need to do upper and lower endoscopies in the future - still waiting on her occult blood sample - she states it was done and  \"did something with it\" Will also check thyroid today  - ferrous sulfate (IRON) 325 (65 Fe) MG tablet; Start taking one tablet daily and increase to one tablet twice daily after two weeks if not consipated.  Dispense: 90 tablet; Refill: 2  - TSH with free T4 reflex    2. CKD (chronic kidney disease) " stage 3, GFR 30-59 ml/min (H)  Will likely follow labs monthly short term  - Basic metabolic panel  - CBC with platelets differential    Patient Instructions   Labs today.    Start iron - start with one tablet daily and increase to twice a day if no constipation.    Likely recheck labs in a few weeks - will let you know after I see these results.      Nelly Pearl MD  [facility] [place]

## 2018-10-19 PROBLEM — E03.8 SUBCLINICAL HYPOTHYROIDISM: Status: ACTIVE | Noted: 2018-01-01

## 2018-10-19 NOTE — TELEPHONE ENCOUNTER
Talked with patient about subclinical hypothyroid - she is still very fatigued.    Agreeable to starting medications, will start with 75mcg and recheck in 6 weeks.    Patient will call back to make lab only.    Nelly Pearl MD  Internal Medicine/Pediatrics  [facility]

## 2018-10-22 NOTE — MR AVS SNAPSHOT
Berenice VANCE Chaudhari   10/22/2018 4:00 PM   Anticoagulation Therapy Visit    Description:  78 year old female   Provider:  [facility]   Department:  [facility]           INR as of 10/22/2018     Today's INR 2.0 (10/20/2018)      Anticoagulation Summary as of 10/22/2018     INR goal 2.0-3.0   Today's INR 2.0 (10/20/2018)   Full warfarin instructions 2.5 mg on Tue, Thu; 5 mg all other days   Next INR check 11/5/2018    Indications   Long term current use of anticoagulant therapy [Z79.01]  Atrial flutter with rapid ventricular response (H) [I48.92]         Contact Numbers     [facility]  Please call  [phone] to cancel and/or reschedule your appointment   Please call  [phone] with any problems or questions regarding your therapy.        October 2018 Details    Sun Mon Tue Wed Thu Fri Sat      1               2               3               4               5               6                 7               8               9               10               11               12               13                 14               15               16               17               18               19               20                 21               22      5 mg   See details      23      2.5 mg         24      5 mg         25      2.5 mg         26      5 mg         27      5 mg           28      5 mg         29      5 mg         30      2.5 mg         31      5 mg             Date Details   10/22 This INR check               How to take your warfarin dose     To take:  2.5 mg Take 1 of the 2.5 mg tablets.    To take:  5 mg Take 2 of the 2.5 mg tablets.           November 2018 Details    Sun Mon Tue Wed Thu Fri Sat         1      2.5 mg         2      5 mg         3      5 mg           4      5 mg         5            6               7               8               9               10                 11               12               13               14               15               16               17                  18               19               20               21               22               23               24                 25               26               27               28               29               30                 Date Details   No additional details    Date of next INR:  11/5/2018         How to take your warfarin dose     To take:  2.5 mg Take 1 of the 2.5 mg tablets.    To take:  5 mg Take 2 of the 2.5 mg tablets.

## 2018-10-23 NOTE — PROGRESS NOTES
ANTICOAGULATION FOLLOW-UP     Patient Name:  Berenice Chaudhari  Date:  10/22/2018  Contact Type:  Telephone  Received a fax from [facility] [employer] regarding this patient.   She has a home INR machine and [facility] faxes in results for coumadin management.  Called and spoke to her  at home number. Instructions given.    SUBJECTIVE:     Patient Findings     Positives No Problem Findings    Comments Maintenance dose increased.             OBJECTIVE    INR   Date Value Ref Range Status   10/20/2018 2.0  Final       ASSESSMENT / PLAN  INR assessment THER    Recheck INR In: 2 WEEKS    INR Location Home INR      Anticoagulation Summary as of 10/22/2018     INR goal 2.0-3.0   Today's INR 2.0 (10/20/2018)   Warfarin maintenance plan 2.5 mg (2.5 mg x 1) on Tue, Thu; 5 mg (2.5 mg x 2) all other days   Full warfarin instructions 2.5 mg on Tue, Thu; 5 mg all other days   Weekly warfarin total 30 mg   Plan last modified Mary Butcher RN (10/22/2018)   Next INR check 11/5/2018   Target end date Indefinite    Indications   Long term current use of anticoagulant therapy [Z79.01]  Atrial flutter with rapid ventricular response (H) [I48.92]         Anticoagulation Episode Summary     INR check location     Preferred lab     Send INR reminders to [facility]    Comments 5mg & 2.5mg tabs; HS dosing //  (Ricardo) sets up her meds // Home INR monitor // motorized wheelchair      Anticoagulation Care Providers     Provider Role Specialty Phone number    Nelly Pearl MD Referring Internal Medicine [phone]            See the Encounter Report to view Anticoagulation Flowsheet and Dosing Calendar (Go to Encounters tab in chart review, and find the Anticoagulation Therapy Visit)    Dosage adjustment made based on physician directed care plan.    Mary Butcher, LEXIE

## 2018-11-01 NOTE — PROGRESS NOTES
ANTICOAGULATION FOLLOW-UP     Patient Name:  Berenice Chaudhari  Date:  11/1/2018  Contact Type:  Telephone/ Fax  Received a fax from [facility] [employer] regarding this patient.   She has a home INR machine and [facility] faxes in results for coumadin management.  Called and spoke to her  at home number. Instructions given.    SUBJECTIVE:     Patient Findings     Positives No Problem Findings           OBJECTIVE    INR   Date Value Ref Range Status   10/31/2018 2.0 (A) 0.9 - 1.1 Final     Comment:     [facility]       ASSESSMENT / PLAN  INR assessment THER    Recheck INR In: 2 WEEKS    INR Location Home INR      Anticoagulation Summary as of 11/1/2018     INR goal 2.0-3.0   Today's INR 2.0 (10/31/2018)   Warfarin maintenance plan 2.5 mg (2.5 mg x 1) on Tue; 5 mg (2.5 mg x 2) all other days   Full warfarin instructions 2.5 mg on Tue; 5 mg all other days   Weekly warfarin total 32.5 mg   Plan last modified Mary Butcher RN (11/1/2018)   Next INR check 11/15/2018   Target end date Indefinite    Indications   Long term current use of anticoagulant therapy [Z79.01]  Atrial flutter with rapid ventricular response (H) [I48.92]         Anticoagulation Episode Summary     INR check location     Preferred lab     Send INR reminders to [facility]    Comments 5mg & 2.5mg tabs; HS dosing /  (Ricardo) sets up her meds / Home INR monitor [phone] / motorized wheelchair      Anticoagulation Care Providers     Provider Role Specialty Phone number    Nelly Pearl MD Referring Internal Medicine [phone]            See the Encounter Report to view Anticoagulation Flowsheet and Dosing Calendar (Go to Encounters tab in chart review, and find the Anticoagulation Therapy Visit)        Mary Butcher, LEXIE

## 2018-11-01 NOTE — MR AVS SNAPSHOT
Berenice RAJIV Chaudhari   11/1/2018 1:15 PM   Anticoagulation Therapy Visit    Description:  78 year old female   Provider:   [facility]ULATION CLINIC   Department:   Nurse           INR as of 11/1/2018     Today's INR 2.0 (10/31/2018)      Anticoagulation Summary as of 11/1/2018     INR goal 2.0-3.0   Today's INR 2.0 (10/31/2018)   Full warfarin instructions 2.5 mg on Tue; 5 mg all other days   Next INR check 11/15/2018    Indications   Long term current use of anticoagulant therapy [Z79.01]  Atrial flutter with rapid ventricular response (H) [I48.92]         Your next Anticoagulation Clinic appointment(s)     Nov 15, 2018  1:00 PM CST   Anticoagulation Visit with  ANTICOAGULATION CLINIC   [facility]    [address]  [address]  [employer] [id number] [phone]              Contact Numbers     [facility]  Please call  [phone] to cancel and/or reschedule your appointment   Please call  [phone] with any problems or questions regarding your therapy.        November 2018 Details    Sun Mon Tue Wed Thu Fri Sat         1      5 mg   See details      2      5 mg         3      5 mg           4      5 mg         5      5 mg         6      2.5 mg         7      5 mg         8      5 mg         9      5 mg         10      5 mg           11      5 mg         12      5 mg         13      2.5 mg         14      5 mg         15            16               17                 18               19               20               21               22               23               24                 25               26               27               28               29               30                 Date Details   11/01 This INR check       Date of next INR:  11/15/2018         How to take your warfarin dose     To take:  2.5 mg Take 1 of the 2.5 mg tablets.    To take:  5 mg Take 2 of the 2.5 mg tablets.

## 2018-11-15 NOTE — MR AVS SNAPSHOT
Berenice RAJIV Chaudhari   11/15/2018 1:00 PM   Anticoagulation Therapy Visit    Description:  78 year old female   Provider:  [facility]   Department:  [facility]           INR as of 11/15/2018     Today's INR 3.2! (11/14/2018)      Anticoagulation Summary as of 11/15/2018     INR goal 2.0-3.0   Today's INR 3.2! (11/14/2018)   Full warfarin instructions 5 mg on Mon, Wed, Fri; 2.5 mg all other days   Next INR check 11/29/2018    Indications   Long term current use of anticoagulant therapy [Z79.01]  Atrial flutter with rapid ventricular response (H) [I48.92]         Contact Numbers     [facility]  Please call  [phone] to cancel and/or reschedule your appointment   Please call  [phone] with any problems or questions regarding your therapy.        November 2018 Details    Sun Mon Tue Wed Thu Fri Sat         1               2               3                 4               5               6               7               8               9               10                 11               12               13               14               15      2.5 mg   See details      16      5 mg         17      2.5 mg           18      2.5 mg         19      5 mg         20      2.5 mg         21      5 mg         22      2.5 mg         23      5 mg         24      2.5 mg           25      2.5 mg         26      5 mg         27      2.5 mg         28      5 mg         29            30                 Date Details   11/15 This INR check       Date of next INR:  11/29/2018         How to take your warfarin dose     To take:  2.5 mg Take 1 of the 2.5 mg tablets.    To take:  5 mg Take 2 of the 2.5 mg tablets.

## 2018-11-15 NOTE — PROGRESS NOTES
ANTICOAGULATION FOLLOW-UP     Patient Name:  Berenice Chaudhari  Date:  11/15/2018  Contact Type:  Telephone  Received a fax from [employer] [facility] [employer] regarding this patient.   She has a home INR machine and [employer] faxes in results for coumadin management.  Called and spoke to her  at home number. Instructions given.    SUBJECTIVE:     Patient Findings     Positives Change in diet/appetite    Comments They were notified on Monday that CoaguChek strips weren't accurate.  Received new strips on Tuesday.   was afraid her INR would be very high due to   dosage increases based on inaccurate readings.  He has been having her eat a lot of green vegetables this week.    Maintenance dose decreased back to dose before the defective INR strips.             OBJECTIVE    INR   Date Value Ref Range Status   11/14/2018 3.2 (A) 0.9 - 1.1 Final       ASSESSMENT / PLAN  INR assessment SUPRA    Recheck INR In: 2 WEEKS    INR Location Home INR      Anticoagulation Summary as of 11/15/2018     INR goal 2.0-3.0   Today's INR 3.2! (11/14/2018)   Warfarin maintenance plan 5 mg (2.5 mg x 2) on Mon, Wed, Fri; 2.5 mg (2.5 mg x 1) all other days   Full warfarin instructions 5 mg on Mon, Wed, Fri; 2.5 mg all other days   Weekly warfarin total 25 mg   Plan last modified Mary Butcher, RN (11/15/2018)   Next INR check 11/29/2018   Target end date Indefinite    Indications   Long term current use of anticoagulant therapy [Z79.01]  Atrial flutter with rapid ventricular response (H) [I48.92]         Anticoagulation Episode Summary     INR check location     Preferred lab     Send [facility] reminders to [facility]    Comments 5mg & 2.5mg tabs; HS dosing /  (Ricardo) sets up her meds / Home INR monitor [phone] / motorized wheelchair      Anticoagulation Care Providers     Provider Role Specialty Phone number    Nelly Pearl MD Referring Internal Medicine [phone]            See the Encounter Report to view  Anticoagulation Flowsheet and Dosing Calendar (Go to Encounters tab in chart review, and find the Anticoagulation Therapy Visit)    Dosage adjustment made based on physician directed care plan.    Mary Butcher RN

## 2018-11-29 NOTE — PROGRESS NOTES
ANTICOAGULATION FOLLOW-UP     Patient Name:  Berenice Chaudhari  Date:  11/29/2018  Contact Type:  Telephone  Received a fax from [facility] [facility] [employer] regarding this patient.   She has a home INR machine and [facility] faxes in results for coumadin management.  Called and spoke to her  at home number. Instructions given.    SUBJECTIVE:     Patient Findings     Positives Change in medications    Comments Warfarin dose had been decreased due to high INR.    Maintenance dose increased today.             OBJECTIVE    INR   Date Value Ref Range Status   11/28/2018 1.9 (A) 0.9 - 1.1 Final       ASSESSMENT / PLAN  INR assessment THER    Recheck INR In: 2 WEEKS    INR Location Home INR      Anticoagulation Summary as of 11/29/2018     INR goal 2.0-3.0   Today's INR 1.9! (11/28/2018)   Warfarin maintenance plan 2.5 mg (2.5 mg x 1) on Sun, Tue, Thu; 5 mg (2.5 mg x 2) all other days   Full warfarin instructions 2.5 mg on Sun, Tue, Thu; 5 mg all other days   Weekly warfarin total 27.5 mg   Plan last modified Mary Butcher, RN (11/29/2018)   Next INR check 12/13/2018   Target end date Indefinite    Indications   Long term current use of anticoagulant therapy [Z79.01]  Atrial flutter with rapid ventricular response (H) [I48.92]         Anticoagulation Episode Summary     INR check location     Preferred lab     Send INR reminders to [facility]    Comments 5mg & 2.5mg tabs; HS /  (Ricardo) sets up her meds / Home INR monitor / H# [phone] / motorized wheelchair      Anticoagulation Care Providers     Provider Role Specialty Phone number    Nelly Pearl MD Referring Internal Medicine [phone]            See the Encounter Report to view Anticoagulation Flowsheet and Dosing Calendar (Go to Encounters tab in chart review, and find the Anticoagulation Therapy Visit)    Dosage adjustment made based on physician directed care plan.    Mary Butcher RN

## 2018-11-29 NOTE — TELEPHONE ENCOUNTER
Called back, spoke with . We just need an updated PHQ9 to refill medication.     Patient is sleeping.     Will try back patient in an hour. Or patient will call back to complete PHQ9.

## 2018-11-29 NOTE — MR AVS SNAPSHOT
Berenice VANCE Chaudhari   11/29/2018 8:45 AM   Anticoagulation Therapy Visit    Description:  78 year old female   Provider:  [facility]   Department:  [facility]           INR as of 11/29/2018     Today's INR 1.9! (11/28/2018)      Anticoagulation Summary as of 11/29/2018     INR goal 2.0-3.0   Today's INR 1.9! (11/28/2018)   Full warfarin instructions 2.5 mg on Sun, Tue, Thu; 5 mg all other days   Next INR check 12/13/2018    Indications   Long term current use of anticoagulant therapy [Z79.01]  Atrial flutter with rapid ventricular response (H) [I48.92]         Contact Numbers     [facility]  Please call  [phone] to cancel and/or reschedule your appointment   Please call  [phone] with any problems or questions regarding your therapy.        November 2018 Details    Sun Mon Tue Wed Thu Fri Sat         1               2               3                 4               5               6               7               8               9               10                 11               12               13               14               15               16               17                 18               19               20               21               22               23               24                 25               26               27               28               29      2.5 mg   See details      30      5 mg           Date Details   11/29 This INR check               How to take your warfarin dose     To take:  2.5 mg Take 1 of the 2.5 mg tablets.    To take:  5 mg Take 2 of the 2.5 mg tablets.           December 2018 Details    Sun Mon Tue Wed Thu Fri Sat           1      5 mg           2      2.5 mg         3      5 mg         4      2.5 mg         5      5 mg         6      2.5 mg         7      5 mg         8      5 mg           9      2.5 mg         10      5 mg         11      2.5 mg         12      5 mg         13            14               15                 16               17                18               19               20               21               22                 23               24               25               26               27               28               29                 30               31                     Date Details   No additional details    Date of next INR:  12/13/2018         How to take your warfarin dose     To take:  2.5 mg Take 1 of the 2.5 mg tablets.    To take:  5 mg Take 2 of the 2.5 mg tablets.

## 2018-11-29 NOTE — TELEPHONE ENCOUNTER
Patient called back, we updated PHQ9.     Due to patient's COBRA expiring tomorrow. They want to make sure ALL meds are at [facility].     Reviewed entire medication list and sent rx's per standing order in this encounter and the other refill encounter.

## 2018-11-29 NOTE — TELEPHONE ENCOUNTER
Patient called, to request Medication(s).    Patient does not have medical health insurance coverage, as of 11/30/2018.    Please advise.

## 2018-11-29 NOTE — TELEPHONE ENCOUNTER
"Requested Prescriptions   Pending Prescriptions Disp Refills     buPROPion (WELLBUTRIN XL) 300 MG 24 hr tablet [Pharmacy Med Name: BUPROPION HCL XL TABS 300MG]    Last Written Prescription Date:  9/18/2018  Last Fill Quantity: 90,  # refills: 0   Last office visit: 10/18/2018 with prescribing provider:  Nelly Pearl     Future Office Visit:     90 tablet 0     Sig: TAKE 1 TABLET EVERY MORNING    [facility] Protocol Failed    11/29/2018 12:22 AM       Failed - PHQ-9 score less than 5 in past 6 months    Please review last PHQ-9 score.     PHQ-9 SCORE 3/15/2017 9/5/2017 4/20/2018   PHQ-9 Total Score - - -   PHQ-9 Total Score 3 7 5     FRANCISCO-7 SCORE 8/7/2013 7/14/2014   Total Score 0 2                Passed - Medication is Bupropion    If the medication is Bupropion (Wellbutrin), and the patient is taking for smoking cessation; OK to refill.         Passed - Patient is age 18 or older       Passed - No active pregnancy on record       Passed - No positive pregnancy test in last 12 months       Passed - Recent (6 mo) or future (30 days) visit within the authorizing provider's specialty    Patient had office visit in the last 6 months or has a visit in the next 30 days with authorizing provider or within the authorizing provider's specialty.  See \"Patient Info\" tab in inbasket, or \"Choose Columns\" in Meds & Orders section of the refill encounter.              "

## 2018-12-10 NOTE — TELEPHONE ENCOUNTER
Patient requesting prescription be sent to [facility] (rather than [facility]) for insurance purposes.    Prescription approved per [facility] Refill Protocol.    Hellen Luis RN

## 2018-12-10 NOTE — TELEPHONE ENCOUNTER
Patient needs 90 day supply (180 tabs) sent to selected pharmacy (for insurance purposes) ASAP.   -Melanie Henry

## 2018-12-11 NOTE — TELEPHONE ENCOUNTER
Patient spouse called pt only got 90 tablets not 180 can we send the correct refill info to the pharmacy please per pt spouse

## 2018-12-13 NOTE — PROGRESS NOTES
ANTICOAGULATION FOLLOW-UP CLINIC VISIT    Patient Name:  Berenice Chaudhari  Date:  2018  Contact Type:  Telephone/ with patient for clinical assessment    SUBJECTIVE:     Patient Findings     Positives:   No Problem Findings           OBJECTIVE    INR   Date Value Ref Range Status   2018 1.9 (A) 0.9 - 1.1 Final       ASSESSMENT / PLAN  INR assessment THER    Recheck INR In: 2 WEEKS    INR Location Home INR      Anticoagulation Summary  As of 2018    INR goal:   2.0-3.0   TTR:   47.5 % (1.2 y)   INR used for dosin.9! (2018)   Warfarin maintenance plan:   2.5 mg (2.5 mg x 1) every Sun, Tue, Thu; 5 mg (2.5 mg x 2) all other days   Full warfarin instructions:   2.5 mg every Sun, Tue, Thu; 5 mg all other days   Weekly warfarin total:   27.5 mg   No change documented:   Korina Gracia RN   Plan last modified:   Mary Butcher RN (2018)   Next INR check:   2018   Target end date:   Indefinite    Indications    Long term current use of anticoagulant therapy [Z79.01]  Atrial flutter with rapid ventricular response (H) [I48.92]             Anticoagulation Episode Summary     INR check location:       Preferred lab:       Send INR reminders to:   [facility]    Comments:   5mg & 2.5mg tabs; HS /  (Ricardo) sets up her meds / Home INR monitor / H# [phone] / motorized wheelchair      Anticoagulation Care Providers     Provider Role Specialty Phone number    Nelly Pearl MD Referring Internal Medicine [phone]            See the Encounter Report to view Anticoagulation Flowsheet and Dosing Calendar (Go to Encounters tab in chart review, and find the Anticoagulation Therapy Visit)    Patient INR is therapeutic today.  Will continue weekly maintenance dose of 27.5 mg and follow up in 2 weeks or sooner if there are any concerns or problems.      Korina Dacosta RN

## 2018-12-18 NOTE — TELEPHONE ENCOUNTER
Patient's insurance never received refill in November of pravastatin and needs refill of pregabalin. Please send to pharmacy ASAP.  -Melanie Henry

## 2018-12-18 NOTE — TELEPHONE ENCOUNTER
Pravastatin 10 mg was refilled on 11/21/18 for 3 months supply & that was sent to [facility](receipt confirmed by pharmacy). Also, Lyrica was refilled on 8/10/18 for 180 caps(3 months supply) with 1 extra refill(6 months supply). Not due for refill.     Please explain the above to the pt. Thanks.     Cha RN  Triage Nurse

## 2018-12-19 NOTE — TELEPHONE ENCOUNTER
Spoke to Berenice, they are switching from mail order back to [facility] pharmacy.  Pt's  Ricardo handles all of her medications.  He will call us back and let us know which medications she needs called to [facility] as a new Rx.    Snehal Henry MA

## 2018-12-21 NOTE — TELEPHONE ENCOUNTER
Printed the remaining refills-signed by PCP and faxed to the number below.  Melissa Abdullahi RN  Message handled by Nurse Triage.

## 2018-12-21 NOTE — TELEPHONE ENCOUNTER
[facility] states that [facility] not [facility]ills on file for the Lyrica and Prvachol.  Please fax refills to [facility] fax #[phone]    Spoke with [facility].  They do not have refills on file.    Routing refill request to provider for review/approval because:  Lyrica  Drug not on the [facility] refill protocol       Mara Garcia RN

## 2018-12-27 NOTE — PROGRESS NOTES
ANTICOAGULATION FOLLOW-UP     Patient Name:  Berenice Chaudhari  Date:  2018  Contact Type:  Telephone  Received a fax from [facility] [facility] [employer] regarding this patient.   She has a home INR machine and [facility] faxes in results for coumadin management.  Called and spoke to her at home number. Instructions given.    SUBJECTIVE:     Patient Findings     Positives:   No Problem Findings           OBJECTIVE    INR   Date Value Ref Range Status   2018 2.8 (A) 0.9 - 1.1 Final       ASSESSMENT / PLAN  INR assessment THER    Recheck INR In: 2 WEEKS    INR Location Home INR      Anticoagulation Summary  As of 2018    INR goal:   2.0-3.0   TTR:   48.8 % (1.3 y)   INR used for dosin.8 (2018)   Warfarin maintenance plan:   2.5 mg (2.5 mg x 1) every Sun, Tue, Thu; 5 mg (2.5 mg x 2) all other days   Full warfarin instructions:   2.5 mg every Sun, Tue, Thu; 5 mg all other days   Weekly warfarin total:   27.5 mg   No change documented:   Mary Butcher RN   Plan last modified:   Mary Butcher RN (2018)   Next INR check:   2019   Target end date:   Indefinite    Indications    Long term current use of anticoagulant therapy [Z79.01]  Atrial flutter with rapid ventricular response (H) [I48.92]             Anticoagulation Episode Summary     INR check location:       Preferred lab:       Send INR reminders to:   [facility]    Comments:   5mg & 2.5mg tabs; HS /  (Ricardo) sets up her meds / Home INR monitor / H# [phone] / motorized wheelchair      Anticoagulation Care Providers     Provider Role Specialty Phone number    Nelly Pearl MD Referring Internal Medicine [phone]            See the Encounter Report to view Anticoagulation Flowsheet and Dosing Calendar (Go to Encounters tab in chart review, and find the Anticoagulation Therapy Visit)        Mary Butcher RN

## 2019-01-01 ENCOUNTER — ANTICOAGULATION THERAPY VISIT (OUTPATIENT)
Dept: NURSING | Facility: CLINIC | Age: 79
End: 2019-01-01

## 2019-01-01 ENCOUNTER — ANTICOAGULATION THERAPY VISIT (OUTPATIENT)
Dept: NURSING | Facility: CLINIC | Age: 79
End: 2019-01-01
Payer: COMMERCIAL

## 2019-01-01 ENCOUNTER — ANTICOAGULATION THERAPY VISIT (OUTPATIENT)
Dept: PEDIATRICS | Facility: CLINIC | Age: 79
End: 2019-01-01
Payer: COMMERCIAL

## 2019-01-01 ENCOUNTER — ANTICOAGULATION THERAPY VISIT (OUTPATIENT)
Dept: PEDIATRICS | Facility: CLINIC | Age: 79
End: 2019-01-01

## 2019-01-01 ENCOUNTER — TELEPHONE (OUTPATIENT)
Dept: PEDIATRICS | Facility: CLINIC | Age: 79
End: 2019-01-01

## 2019-01-01 ENCOUNTER — HOSPITAL ENCOUNTER (INPATIENT)
Facility: CLINIC | Age: 79
LOS: 7 days | DRG: 871 | End: 2019-05-23
Attending: EMERGENCY MEDICINE | Admitting: INTERNAL MEDICINE
Payer: MEDICARE

## 2019-01-01 ENCOUNTER — APPOINTMENT (OUTPATIENT)
Dept: GENERAL RADIOLOGY | Facility: CLINIC | Age: 79
DRG: 871 | End: 2019-01-01
Attending: INTERNAL MEDICINE
Payer: MEDICARE

## 2019-01-01 ENCOUNTER — APPOINTMENT (OUTPATIENT)
Dept: GENERAL RADIOLOGY | Facility: CLINIC | Age: 79
DRG: 871 | End: 2019-01-01
Attending: EMERGENCY MEDICINE
Payer: MEDICARE

## 2019-01-01 ENCOUNTER — DOCUMENTATION ONLY (OUTPATIENT)
Dept: CARE COORDINATION | Facility: CLINIC | Age: 79
End: 2019-01-01

## 2019-01-01 VITALS
BODY MASS INDEX: 50.02 KG/M2 | WEIGHT: 293 LBS | SYSTOLIC BLOOD PRESSURE: 73 MMHG | TEMPERATURE: 99 F | RESPIRATION RATE: 20 BRPM | HEIGHT: 64 IN | DIASTOLIC BLOOD PRESSURE: 43 MMHG | HEART RATE: 113 BPM | OXYGEN SATURATION: 97 %

## 2019-01-01 DIAGNOSIS — I48.92 ATRIAL FLUTTER WITH RAPID VENTRICULAR RESPONSE (H): ICD-10-CM

## 2019-01-01 DIAGNOSIS — E03.8 SUBCLINICAL HYPOTHYROIDISM: ICD-10-CM

## 2019-01-01 DIAGNOSIS — Z79.01 LONG TERM CURRENT USE OF ANTICOAGULANT THERAPY: ICD-10-CM

## 2019-01-01 DIAGNOSIS — N18.30 STAGE 3 CHRONIC KIDNEY DISEASE (H): Primary | ICD-10-CM

## 2019-01-01 DIAGNOSIS — Z79.01 LONG TERM CURRENT USE OF ANTICOAGULANT THERAPY: Primary | ICD-10-CM

## 2019-01-01 DIAGNOSIS — I48.92 ATRIAL FLUTTER, UNSPECIFIED TYPE (H): Primary | ICD-10-CM

## 2019-01-01 DIAGNOSIS — K21.9 GASTROESOPHAGEAL REFLUX DISEASE WITHOUT ESOPHAGITIS: ICD-10-CM

## 2019-01-01 DIAGNOSIS — I10 BENIGN ESSENTIAL HYPERTENSION: ICD-10-CM

## 2019-01-01 DIAGNOSIS — B02.29 POSTHERPETIC NEURALGIA: ICD-10-CM

## 2019-01-01 DIAGNOSIS — F33.42 MAJOR DEPRESSIVE DISORDER, RECURRENT EPISODE, IN FULL REMISSION (H): ICD-10-CM

## 2019-01-01 DIAGNOSIS — A41.01 METHICILLIN SUSCEPTIBLE STAPHYLOCOCCUS AUREUS SEPTICEMIA (H): ICD-10-CM

## 2019-01-01 DIAGNOSIS — I10 ESSENTIAL HYPERTENSION WITH GOAL BLOOD PRESSURE LESS THAN 140/90: ICD-10-CM

## 2019-01-01 DIAGNOSIS — F33.42 MAJOR DEPRESSIVE DISORDER, RECURRENT, IN FULL REMISSION (H): ICD-10-CM

## 2019-01-01 DIAGNOSIS — D50.8 OTHER IRON DEFICIENCY ANEMIA: ICD-10-CM

## 2019-01-01 DIAGNOSIS — N18.30 CKD (CHRONIC KIDNEY DISEASE) STAGE 3, GFR 30-59 ML/MIN (H): ICD-10-CM

## 2019-01-01 DIAGNOSIS — E78.5 HYPERLIPIDEMIA LDL GOAL <100: ICD-10-CM

## 2019-01-01 DIAGNOSIS — B02.29 POST HERPETIC NEURALGIA: ICD-10-CM

## 2019-01-01 DIAGNOSIS — J18.9 COMMUNITY ACQUIRED PNEUMONIA OF LEFT LUNG, UNSPECIFIED PART OF LUNG: ICD-10-CM

## 2019-01-01 DIAGNOSIS — L03.90 CELLULITIS, UNSPECIFIED CELLULITIS SITE: ICD-10-CM

## 2019-01-01 LAB
ALBUMIN SERPL-MCNC: 3.2 G/DL (ref 3.4–5)
ALBUMIN UR-MCNC: 20 MG/DL
ALP SERPL-CCNC: 100 U/L (ref 40–150)
ALT SERPL W P-5'-P-CCNC: 49 U/L (ref 0–50)
ANION GAP SERPL CALCULATED.3IONS-SCNC: 5 MMOL/L (ref 3–14)
ANION GAP SERPL CALCULATED.3IONS-SCNC: 5 MMOL/L (ref 3–14)
ANION GAP SERPL CALCULATED.3IONS-SCNC: 6 MMOL/L (ref 3–14)
ANION GAP SERPL CALCULATED.3IONS-SCNC: 7 MMOL/L (ref 3–14)
ANION GAP SERPL CALCULATED.3IONS-SCNC: 8 MMOL/L (ref 3–14)
APPEARANCE UR: CLEAR
AST SERPL W P-5'-P-CCNC: 47 U/L (ref 0–45)
BACTERIA #/AREA URNS HPF: ABNORMAL /HPF
BACTERIA SPEC CULT: ABNORMAL
BACTERIA SPEC CULT: NO GROWTH
BACTERIA SPEC CULT: NORMAL
BASE EXCESS BLDV CALC-SCNC: 5.2 MMOL/L
BASOPHILS # BLD AUTO: 0 10E9/L (ref 0–0.2)
BASOPHILS # BLD AUTO: 0 10E9/L (ref 0–0.2)
BASOPHILS # BLD AUTO: 0.1 10E9/L (ref 0–0.2)
BASOPHILS NFR BLD AUTO: 0.1 %
BASOPHILS NFR BLD AUTO: 0.2 %
BASOPHILS NFR BLD AUTO: 0.3 %
BILIRUB SERPL-MCNC: 0.7 MG/DL (ref 0.2–1.3)
BILIRUB UR QL STRIP: NEGATIVE
BUN SERPL-MCNC: 19 MG/DL (ref 7–30)
BUN SERPL-MCNC: 20 MG/DL (ref 7–30)
BUN SERPL-MCNC: 22 MG/DL (ref 7–30)
BUN SERPL-MCNC: 26 MG/DL (ref 7–30)
BUN SERPL-MCNC: 33 MG/DL (ref 7–30)
CALCIUM SERPL-MCNC: 7.6 MG/DL (ref 8.5–10.1)
CALCIUM SERPL-MCNC: 8.2 MG/DL (ref 8.5–10.1)
CALCIUM SERPL-MCNC: 8.3 MG/DL (ref 8.5–10.1)
CALCIUM SERPL-MCNC: 8.3 MG/DL (ref 8.5–10.1)
CALCIUM SERPL-MCNC: 8.7 MG/DL (ref 8.5–10.1)
CHLORIDE SERPL-SCNC: 103 MMOL/L (ref 94–109)
CHLORIDE SERPL-SCNC: 104 MMOL/L (ref 94–109)
CHLORIDE SERPL-SCNC: 105 MMOL/L (ref 94–109)
CO2 BLDCOV-SCNC: 28 MMOL/L (ref 21–28)
CO2 SERPL-SCNC: 23 MMOL/L (ref 20–32)
CO2 SERPL-SCNC: 25 MMOL/L (ref 20–32)
CO2 SERPL-SCNC: 26 MMOL/L (ref 20–32)
CO2 SERPL-SCNC: 29 MMOL/L (ref 20–32)
CO2 SERPL-SCNC: 30 MMOL/L (ref 20–32)
COLOR UR AUTO: YELLOW
CREAT SERPL-MCNC: 1.28 MG/DL (ref 0.52–1.04)
CREAT SERPL-MCNC: 1.42 MG/DL (ref 0.52–1.04)
CREAT SERPL-MCNC: 1.54 MG/DL (ref 0.52–1.04)
CREAT SERPL-MCNC: 2.9 MG/DL (ref 0.52–1.04)
CREAT SERPL-MCNC: 3.52 MG/DL (ref 0.52–1.04)
CREAT SERPL-MCNC: 3.64 MG/DL (ref 0.52–1.04)
CREAT UR-MCNC: 240 MG/DL
DIFFERENTIAL METHOD BLD: ABNORMAL
EOSINOPHIL # BLD AUTO: 0.1 10E9/L (ref 0–0.7)
EOSINOPHIL # BLD AUTO: 0.8 10E9/L (ref 0–0.7)
EOSINOPHIL # BLD AUTO: 1.3 10E9/L (ref 0–0.7)
EOSINOPHIL NFR BLD AUTO: 0.5 %
EOSINOPHIL NFR BLD AUTO: 3.8 %
EOSINOPHIL NFR BLD AUTO: 4.7 %
ERYTHROCYTE [DISTWIDTH] IN BLOOD BY AUTOMATED COUNT: 14 % (ref 10–15)
ERYTHROCYTE [DISTWIDTH] IN BLOOD BY AUTOMATED COUNT: 14 % (ref 10–15)
ERYTHROCYTE [DISTWIDTH] IN BLOOD BY AUTOMATED COUNT: 14.4 % (ref 10–15)
ERYTHROCYTE [DISTWIDTH] IN BLOOD BY AUTOMATED COUNT: 14.6 % (ref 10–15)
FRACT EXCRET NA UR+SERPL-RTO: 0.4 %
GFR SERPL CREATININE-BSD FRML MDRD: 11 ML/MIN/{1.73_M2}
GFR SERPL CREATININE-BSD FRML MDRD: 12 ML/MIN/{1.73_M2}
GFR SERPL CREATININE-BSD FRML MDRD: 15 ML/MIN/{1.73_M2}
GFR SERPL CREATININE-BSD FRML MDRD: 32 ML/MIN/{1.73_M2}
GFR SERPL CREATININE-BSD FRML MDRD: 35 ML/MIN/{1.73_M2}
GFR SERPL CREATININE-BSD FRML MDRD: 40 ML/MIN/{1.73_M2}
GLUCOSE BLDC GLUCOMTR-MCNC: 107 MG/DL (ref 70–99)
GLUCOSE BLDC GLUCOMTR-MCNC: 119 MG/DL (ref 70–99)
GLUCOSE BLDC GLUCOMTR-MCNC: 167 MG/DL (ref 70–99)
GLUCOSE BLDC GLUCOMTR-MCNC: 84 MG/DL (ref 70–99)
GLUCOSE BLDC GLUCOMTR-MCNC: 89 MG/DL (ref 70–99)
GLUCOSE SERPL-MCNC: 104 MG/DL (ref 70–99)
GLUCOSE SERPL-MCNC: 112 MG/DL (ref 70–99)
GLUCOSE SERPL-MCNC: 113 MG/DL (ref 70–99)
GLUCOSE SERPL-MCNC: 119 MG/DL (ref 70–99)
GLUCOSE SERPL-MCNC: 91 MG/DL (ref 70–99)
GLUCOSE UR STRIP-MCNC: NEGATIVE MG/DL
GRAM STN SPEC: ABNORMAL
HCO3 BLDV-SCNC: 31 MMOL/L (ref 21–28)
HCT VFR BLD AUTO: 36.4 % (ref 35–47)
HCT VFR BLD AUTO: 39.5 % (ref 35–47)
HCT VFR BLD AUTO: 40.7 % (ref 35–47)
HCT VFR BLD AUTO: 45.5 % (ref 35–47)
HGB BLD-MCNC: 11.4 G/DL (ref 11.7–15.7)
HGB BLD-MCNC: 12.6 G/DL (ref 11.7–15.7)
HGB BLD-MCNC: 13.2 G/DL (ref 11.7–15.7)
HGB BLD-MCNC: 14.4 G/DL (ref 11.7–15.7)
HGB UR QL STRIP: ABNORMAL
IMM GRANULOCYTES # BLD: 0.1 10E9/L (ref 0–0.4)
IMM GRANULOCYTES # BLD: 0.2 10E9/L (ref 0–0.4)
IMM GRANULOCYTES # BLD: 0.4 10E9/L (ref 0–0.4)
IMM GRANULOCYTES NFR BLD: 0.5 %
IMM GRANULOCYTES NFR BLD: 1.1 %
IMM GRANULOCYTES NFR BLD: 1.4 %
INR PPP: 1.1 (ref 0.8–1.1)
INR PPP: 1.2 (ref 0.9–1.1)
INR PPP: 1.6 (ref 0.9–1.1)
INR PPP: 2.1 (ref 0.9–1.1)
INR PPP: 2.3 (ref 0.9–1.1)
INR PPP: 2.39 (ref 0.86–1.14)
INR PPP: 2.4 (ref 0.9–1.1)
INR PPP: 2.5 (ref 0.9–1.1)
INR PPP: 2.7 (ref 0.8–1.1)
INR PPP: 2.72 (ref 0.86–1.14)
INR PPP: 3.01 (ref 0.86–1.14)
INR PPP: 3.09 (ref 0.86–1.14)
INR PPP: 3.2 (ref 0.9–1.1)
INR PPP: 3.4 (ref 0.9–1.1)
INR PPP: 4 (ref 0.9–1.1)
INR PPP: 5.23 (ref 0.86–1.14)
INTERPRETATION ECG - MUSE: NORMAL
KETONES UR STRIP-MCNC: NEGATIVE MG/DL
LACTATE BLD-SCNC: 0.5 MMOL/L (ref 0.7–2)
LACTATE BLD-SCNC: 0.9 MMOL/L (ref 0.7–2.1)
LACTATE BLD-SCNC: 1 MMOL/L (ref 0.7–2)
LACTATE BLD-SCNC: 2.2 MMOL/L (ref 0.7–2)
LACTATE BLD-SCNC: 3.2 MMOL/L (ref 0.7–2)
LACTATE BLD-SCNC: 3.2 MMOL/L (ref 0.7–2)
LACTATE BLD-SCNC: 3.8 MMOL/L (ref 0.7–2)
LACTATE BLD-SCNC: 4.8 MMOL/L (ref 0.7–2)
LEUKOCYTE ESTERASE UR QL STRIP: ABNORMAL
LYMPHOCYTES # BLD AUTO: 0.5 10E9/L (ref 0.8–5.3)
LYMPHOCYTES # BLD AUTO: 0.6 10E9/L (ref 0.8–5.3)
LYMPHOCYTES # BLD AUTO: 0.6 10E9/L (ref 0.8–5.3)
LYMPHOCYTES NFR BLD AUTO: 2.3 %
LYMPHOCYTES NFR BLD AUTO: 2.3 %
LYMPHOCYTES NFR BLD AUTO: 3.9 %
Lab: ABNORMAL
Lab: NORMAL
MAGNESIUM SERPL-MCNC: 1.3 MG/DL (ref 1.6–2.3)
MAGNESIUM SERPL-MCNC: 1.9 MG/DL (ref 1.6–2.3)
MCH RBC QN AUTO: 30.2 PG (ref 26.5–33)
MCH RBC QN AUTO: 30.6 PG (ref 26.5–33)
MCH RBC QN AUTO: 30.7 PG (ref 26.5–33)
MCH RBC QN AUTO: 30.9 PG (ref 26.5–33)
MCHC RBC AUTO-ENTMCNC: 31.3 G/DL (ref 31.5–36.5)
MCHC RBC AUTO-ENTMCNC: 31.6 G/DL (ref 31.5–36.5)
MCHC RBC AUTO-ENTMCNC: 31.9 G/DL (ref 31.5–36.5)
MCHC RBC AUTO-ENTMCNC: 32.4 G/DL (ref 31.5–36.5)
MCV RBC AUTO: 95 FL (ref 78–100)
MCV RBC AUTO: 96 FL (ref 78–100)
MCV RBC AUTO: 97 FL (ref 78–100)
MCV RBC AUTO: 97 FL (ref 78–100)
MONOCYTES # BLD AUTO: 0.7 10E9/L (ref 0–1.3)
MONOCYTES # BLD AUTO: 0.8 10E9/L (ref 0–1.3)
MONOCYTES # BLD AUTO: 0.9 10E9/L (ref 0–1.3)
MONOCYTES NFR BLD AUTO: 2.8 %
MONOCYTES NFR BLD AUTO: 4.3 %
MONOCYTES NFR BLD AUTO: 4.4 %
MRSA DNA SPEC QL NAA+PROBE: NEGATIVE
MUCOUS THREADS #/AREA URNS LPF: PRESENT /LPF
NEUTROPHILS # BLD AUTO: 13.8 10E9/L (ref 1.6–8.3)
NEUTROPHILS # BLD AUTO: 18.4 10E9/L (ref 1.6–8.3)
NEUTROPHILS # BLD AUTO: 24.4 10E9/L (ref 1.6–8.3)
NEUTROPHILS NFR BLD AUTO: 88.3 %
NEUTROPHILS NFR BLD AUTO: 88.5 %
NEUTROPHILS NFR BLD AUTO: 90.6 %
NITRATE UR QL: POSITIVE
NRBC # BLD AUTO: 0 10*3/UL
NRBC BLD AUTO-RTO: 0 /100
NT-PROBNP SERPL-MCNC: 2568 PG/ML (ref 0–1800)
O2/TOTAL GAS SETTING VFR VENT: 4 %
PCO2 BLDV: 46 MM HG (ref 40–50)
PCO2 BLDV: 48 MM HG (ref 40–50)
PH BLDV: 7.4 PH (ref 7.32–7.43)
PH BLDV: 7.41 PH (ref 7.32–7.43)
PH UR STRIP: 7 PH (ref 5–7)
PLATELET # BLD AUTO: 107 10E9/L (ref 150–450)
PLATELET # BLD AUTO: 148 10E9/L (ref 150–450)
PLATELET # BLD AUTO: 170 10E9/L (ref 150–450)
PLATELET # BLD AUTO: 171 10E9/L (ref 150–450)
PO2 BLDV: 23 MM HG (ref 25–47)
PO2 BLDV: 24 MM HG (ref 25–47)
POTASSIUM SERPL-SCNC: 3.9 MMOL/L (ref 3.4–5.3)
POTASSIUM SERPL-SCNC: 4.2 MMOL/L (ref 3.4–5.3)
POTASSIUM SERPL-SCNC: 4.4 MMOL/L (ref 3.4–5.3)
POTASSIUM SERPL-SCNC: 4.4 MMOL/L (ref 3.4–5.3)
POTASSIUM SERPL-SCNC: 4.5 MMOL/L (ref 3.4–5.3)
POTASSIUM SERPL-SCNC: 4.6 MMOL/L (ref 3.4–5.3)
PROT SERPL-MCNC: 7.7 G/DL (ref 6.8–8.8)
RBC # BLD AUTO: 3.77 10E12/L (ref 3.8–5.2)
RBC # BLD AUTO: 4.11 10E12/L (ref 3.8–5.2)
RBC # BLD AUTO: 4.27 10E12/L (ref 3.8–5.2)
RBC # BLD AUTO: 4.7 10E12/L (ref 3.8–5.2)
RBC #/AREA URNS AUTO: 8 /HPF (ref 0–2)
SAO2 % BLDV FROM PO2: 38 %
SODIUM SERPL-SCNC: 135 MMOL/L (ref 133–144)
SODIUM SERPL-SCNC: 136 MMOL/L (ref 133–144)
SODIUM SERPL-SCNC: 136 MMOL/L (ref 133–144)
SODIUM SERPL-SCNC: 137 MMOL/L (ref 133–144)
SODIUM SERPL-SCNC: 139 MMOL/L (ref 133–144)
SODIUM SERPL-SCNC: 139 MMOL/L (ref 133–144)
SODIUM UR-SCNC: 40 MMOL/L
SOURCE: ABNORMAL
SP GR UR STRIP: 1.02 (ref 1–1.03)
SPECIMEN SOURCE: ABNORMAL
SPECIMEN SOURCE: NORMAL
SQUAMOUS #/AREA URNS AUTO: 1 /HPF (ref 0–1)
TROPONIN I SERPL-MCNC: <0.015 UG/L (ref 0–0.04)
TSH SERPL DL<=0.005 MIU/L-ACNC: 1.98 MU/L (ref 0.4–4)
UROBILINOGEN UR STRIP-MCNC: NORMAL MG/DL (ref 0–2)
WBC # BLD AUTO: 15.2 10E9/L (ref 4–11)
WBC # BLD AUTO: 20.8 10E9/L (ref 4–11)
WBC # BLD AUTO: 27.6 10E9/L (ref 4–11)
WBC # BLD AUTO: 9.5 10E9/L (ref 4–11)
WBC #/AREA URNS AUTO: 18 /HPF (ref 0–5)

## 2019-01-01 PROCEDURE — 12000000 ZZH R&B MED SURG/OB

## 2019-01-01 PROCEDURE — 25000128 H RX IP 250 OP 636: Performed by: INTERNAL MEDICINE

## 2019-01-01 PROCEDURE — 87640 STAPH A DNA AMP PROBE: CPT | Performed by: INTERNAL MEDICINE

## 2019-01-01 PROCEDURE — 85025 COMPLETE CBC W/AUTO DIFF WBC: CPT | Performed by: INTERNAL MEDICINE

## 2019-01-01 PROCEDURE — 99207 ZZC CDG-CODE CATEGORY CHANGED: CPT | Performed by: INTERNAL MEDICINE

## 2019-01-01 PROCEDURE — 25000132 ZZH RX MED GY IP 250 OP 250 PS 637: Performed by: INTERNAL MEDICINE

## 2019-01-01 PROCEDURE — 93005 ELECTROCARDIOGRAM TRACING: CPT

## 2019-01-01 PROCEDURE — 25000128 H RX IP 250 OP 636: Performed by: SURGERY

## 2019-01-01 PROCEDURE — 85610 PROTHROMBIN TIME: CPT | Performed by: EMERGENCY MEDICINE

## 2019-01-01 PROCEDURE — 82565 ASSAY OF CREATININE: CPT | Performed by: INTERNAL MEDICINE

## 2019-01-01 PROCEDURE — 25000125 ZZHC RX 250: Performed by: NURSE PRACTITIONER

## 2019-01-01 PROCEDURE — 25800030 ZZH RX IP 258 OP 636: Performed by: NURSE PRACTITIONER

## 2019-01-01 PROCEDURE — 99233 SBSQ HOSP IP/OBS HIGH 50: CPT | Performed by: INTERNAL MEDICINE

## 2019-01-01 PROCEDURE — 84484 ASSAY OF TROPONIN QUANT: CPT | Performed by: EMERGENCY MEDICINE

## 2019-01-01 PROCEDURE — 99207 ZZC NO CHARGE NURSE ONLY: CPT

## 2019-01-01 PROCEDURE — 93010 ELECTROCARDIOGRAM REPORT: CPT | Performed by: INTERNAL MEDICINE

## 2019-01-01 PROCEDURE — 80048 BASIC METABOLIC PNL TOTAL CA: CPT | Performed by: INTERNAL MEDICINE

## 2019-01-01 PROCEDURE — 99223 1ST HOSP IP/OBS HIGH 75: CPT | Mod: AI | Performed by: INTERNAL MEDICINE

## 2019-01-01 PROCEDURE — 36415 COLL VENOUS BLD VENIPUNCTURE: CPT | Performed by: INTERNAL MEDICINE

## 2019-01-01 PROCEDURE — A9270 NON-COVERED ITEM OR SERVICE: HCPCS | Performed by: INTERNAL MEDICINE

## 2019-01-01 PROCEDURE — 40000986 XR CHEST PORT 1 VW

## 2019-01-01 PROCEDURE — 96361 HYDRATE IV INFUSION ADD-ON: CPT

## 2019-01-01 PROCEDURE — 25800030 ZZH RX IP 258 OP 636: Performed by: INTERNAL MEDICINE

## 2019-01-01 PROCEDURE — 83605 ASSAY OF LACTIC ACID: CPT | Performed by: INTERNAL MEDICINE

## 2019-01-01 PROCEDURE — 00000146 ZZHCL STATISTIC GLUCOSE BY METER IP

## 2019-01-01 PROCEDURE — 84295 ASSAY OF SERUM SODIUM: CPT | Performed by: INTERNAL MEDICINE

## 2019-01-01 PROCEDURE — 99207 ZZC CDG-MDM COMPONENT: MEETS MODERATE - UP CODED: CPT | Performed by: INTERNAL MEDICINE

## 2019-01-01 PROCEDURE — G0463 HOSPITAL OUTPT CLINIC VISIT: HCPCS

## 2019-01-01 PROCEDURE — P9047 ALBUMIN (HUMAN), 25%, 50ML: HCPCS | Performed by: SURGERY

## 2019-01-01 PROCEDURE — A9270 NON-COVERED ITEM OR SERVICE: HCPCS | Performed by: EMERGENCY MEDICINE

## 2019-01-01 PROCEDURE — 93308 TTE F-UP OR LMTD: CPT

## 2019-01-01 PROCEDURE — 83605 ASSAY OF LACTIC ACID: CPT

## 2019-01-01 PROCEDURE — 25000132 ZZH RX MED GY IP 250 OP 250 PS 637: Performed by: EMERGENCY MEDICINE

## 2019-01-01 PROCEDURE — 87070 CULTURE OTHR SPECIMN AEROBIC: CPT | Performed by: INTERNAL MEDICINE

## 2019-01-01 PROCEDURE — 36415 COLL VENOUS BLD VENIPUNCTURE: CPT | Performed by: EMERGENCY MEDICINE

## 2019-01-01 PROCEDURE — 87077 CULTURE AEROBIC IDENTIFY: CPT | Performed by: EMERGENCY MEDICINE

## 2019-01-01 PROCEDURE — 83605 ASSAY OF LACTIC ACID: CPT | Performed by: EMERGENCY MEDICINE

## 2019-01-01 PROCEDURE — 84443 ASSAY THYROID STIM HORMONE: CPT | Performed by: EMERGENCY MEDICINE

## 2019-01-01 PROCEDURE — 96365 THER/PROPH/DIAG IV INF INIT: CPT

## 2019-01-01 PROCEDURE — A9270 NON-COVERED ITEM OR SERVICE: HCPCS | Performed by: NURSE PRACTITIONER

## 2019-01-01 PROCEDURE — 87086 URINE CULTURE/COLONY COUNT: CPT | Performed by: INTERNAL MEDICINE

## 2019-01-01 PROCEDURE — 36569 INSJ PICC 5 YR+ W/O IMAGING: CPT

## 2019-01-01 PROCEDURE — 40000275 ZZH STATISTIC RCP TIME EA 10 MIN

## 2019-01-01 PROCEDURE — 85610 PROTHROMBIN TIME: CPT | Performed by: INTERNAL MEDICINE

## 2019-01-01 PROCEDURE — 83735 ASSAY OF MAGNESIUM: CPT | Performed by: SURGERY

## 2019-01-01 PROCEDURE — 82570 ASSAY OF URINE CREATININE: CPT | Performed by: INTERNAL MEDICINE

## 2019-01-01 PROCEDURE — 87040 BLOOD CULTURE FOR BACTERIA: CPT | Performed by: EMERGENCY MEDICINE

## 2019-01-01 PROCEDURE — 99285 EMERGENCY DEPT VISIT HI MDM: CPT | Mod: 25

## 2019-01-01 PROCEDURE — 99207 ZZC NO CHARGE NURSE ONLY: CPT | Performed by: PEDIATRICS

## 2019-01-01 PROCEDURE — 87186 SC STD MICRODIL/AGAR DIL: CPT | Performed by: INTERNAL MEDICINE

## 2019-01-01 PROCEDURE — 81001 URINALYSIS AUTO W/SCOPE: CPT | Performed by: EMERGENCY MEDICINE

## 2019-01-01 PROCEDURE — 85025 COMPLETE CBC W/AUTO DIFF WBC: CPT | Performed by: EMERGENCY MEDICINE

## 2019-01-01 PROCEDURE — 99222 1ST HOSP IP/OBS MODERATE 55: CPT | Performed by: NURSE PRACTITIONER

## 2019-01-01 PROCEDURE — 87186 SC STD MICRODIL/AGAR DIL: CPT | Performed by: EMERGENCY MEDICINE

## 2019-01-01 PROCEDURE — 82803 BLOOD GASES ANY COMBINATION: CPT

## 2019-01-01 PROCEDURE — 87800 DETECT AGNT MULT DNA DIREC: CPT | Performed by: EMERGENCY MEDICINE

## 2019-01-01 PROCEDURE — 25000128 H RX IP 250 OP 636: Performed by: EMERGENCY MEDICINE

## 2019-01-01 PROCEDURE — 99232 SBSQ HOSP IP/OBS MODERATE 35: CPT | Performed by: NURSE PRACTITIONER

## 2019-01-01 PROCEDURE — 87088 URINE BACTERIA CULTURE: CPT | Performed by: INTERNAL MEDICINE

## 2019-01-01 PROCEDURE — 84132 ASSAY OF SERUM POTASSIUM: CPT | Performed by: SURGERY

## 2019-01-01 PROCEDURE — 82803 BLOOD GASES ANY COMBINATION: CPT | Performed by: EMERGENCY MEDICINE

## 2019-01-01 PROCEDURE — 99238 HOSP IP/OBS DSCHRG MGMT 30/<: CPT | Performed by: INTERNAL MEDICINE

## 2019-01-01 PROCEDURE — 83880 ASSAY OF NATRIURETIC PEPTIDE: CPT | Performed by: EMERGENCY MEDICINE

## 2019-01-01 PROCEDURE — 84300 ASSAY OF URINE SODIUM: CPT | Performed by: INTERNAL MEDICINE

## 2019-01-01 PROCEDURE — 83735 ASSAY OF MAGNESIUM: CPT | Performed by: INTERNAL MEDICINE

## 2019-01-01 PROCEDURE — 25000128 H RX IP 250 OP 636: Performed by: NURSE PRACTITIONER

## 2019-01-01 PROCEDURE — 25000132 ZZH RX MED GY IP 250 OP 250 PS 637: Performed by: NURSE PRACTITIONER

## 2019-01-01 PROCEDURE — 40000809 ZZH STATISTIC NO DOCUMENTATION TO SUPPORT CHARGE

## 2019-01-01 PROCEDURE — 85027 COMPLETE CBC AUTOMATED: CPT | Performed by: INTERNAL MEDICINE

## 2019-01-01 PROCEDURE — 80053 COMPREHEN METABOLIC PANEL: CPT | Performed by: EMERGENCY MEDICINE

## 2019-01-01 PROCEDURE — 96375 TX/PRO/DX INJ NEW DRUG ADDON: CPT

## 2019-01-01 PROCEDURE — 25000125 ZZHC RX 250: Performed by: INTERNAL MEDICINE

## 2019-01-01 PROCEDURE — 99232 SBSQ HOSP IP/OBS MODERATE 35: CPT | Performed by: INTERNAL MEDICINE

## 2019-01-01 PROCEDURE — 87205 SMEAR GRAM STAIN: CPT | Performed by: INTERNAL MEDICINE

## 2019-01-01 PROCEDURE — 71046 X-RAY EXAM CHEST 2 VIEWS: CPT

## 2019-01-01 PROCEDURE — 25800030 ZZH RX IP 258 OP 636: Performed by: EMERGENCY MEDICINE

## 2019-01-01 PROCEDURE — 87641 MR-STAPH DNA AMP PROBE: CPT | Performed by: INTERNAL MEDICINE

## 2019-01-01 RX ORDER — PREGABALIN 75 MG/1
75 CAPSULE ORAL 2 TIMES DAILY
Status: DISCONTINUED | OUTPATIENT
Start: 2019-01-01 | End: 2019-01-01

## 2019-01-01 RX ORDER — POTASSIUM CHLORIDE 29.8 MG/ML
20 INJECTION INTRAVENOUS
Status: DISCONTINUED | OUTPATIENT
Start: 2019-01-01 | End: 2019-01-01

## 2019-01-01 RX ORDER — ACETAMINOPHEN 325 MG/1
650 TABLET ORAL EVERY 4 HOURS PRN
Status: DISCONTINUED | OUTPATIENT
Start: 2019-01-01 | End: 2019-05-24 | Stop reason: HOSPADM

## 2019-01-01 RX ORDER — OXYCODONE HYDROCHLORIDE 100 MG/5ML
5-7.5 SOLUTION ORAL EVERY 4 HOURS
Status: DISCONTINUED | OUTPATIENT
Start: 2019-01-01 | End: 2019-01-01

## 2019-01-01 RX ORDER — HEPARIN SODIUM,PORCINE 10 UNIT/ML
2-5 VIAL (ML) INTRAVENOUS
Status: DISCONTINUED | OUTPATIENT
Start: 2019-01-01 | End: 2019-01-01

## 2019-01-01 RX ORDER — NICOTINE POLACRILEX 4 MG
15-30 LOZENGE BUCCAL
Status: DISCONTINUED | OUTPATIENT
Start: 2019-01-01 | End: 2019-01-01

## 2019-01-01 RX ORDER — ACETAMINOPHEN 650 MG/1
650 SUPPOSITORY RECTAL EVERY 4 HOURS PRN
Status: DISCONTINUED | OUTPATIENT
Start: 2019-01-01 | End: 2019-05-24 | Stop reason: HOSPADM

## 2019-01-01 RX ORDER — NOREPINEPHRINE BITARTRATE 0.06 MG/ML
0.03-0.4 INJECTION, SOLUTION INTRAVENOUS CONTINUOUS
Status: DISCONTINUED | OUTPATIENT
Start: 2019-01-01 | End: 2019-01-01

## 2019-01-01 RX ORDER — POTASSIUM CHLORIDE 1500 MG/1
20-40 TABLET, EXTENDED RELEASE ORAL
Status: DISCONTINUED | OUTPATIENT
Start: 2019-01-01 | End: 2019-01-01

## 2019-01-01 RX ORDER — FERROUS SULFATE 325(65) MG
TABLET ORAL
Qty: 180 TABLET | Refills: 2 | Status: ON HOLD | OUTPATIENT
Start: 2019-01-01 | End: 2019-01-01

## 2019-01-01 RX ORDER — LINEZOLID 2 MG/ML
600 INJECTION, SOLUTION INTRAVENOUS ONCE
Status: COMPLETED | OUTPATIENT
Start: 2019-01-01 | End: 2019-01-01

## 2019-01-01 RX ORDER — PRAVASTATIN SODIUM 10 MG
TABLET ORAL
Qty: 90 TABLET | Refills: 1 | Status: SHIPPED | OUTPATIENT
Start: 2019-01-01 | End: 2019-01-01

## 2019-01-01 RX ORDER — LIDOCAINE 4 G/G
1 PATCH TOPICAL
Status: DISCONTINUED | OUTPATIENT
Start: 2019-01-01 | End: 2019-05-24 | Stop reason: HOSPADM

## 2019-01-01 RX ORDER — DEXTROSE MONOHYDRATE 25 G/50ML
25-50 INJECTION, SOLUTION INTRAVENOUS
Status: DISCONTINUED | OUTPATIENT
Start: 2019-01-01 | End: 2019-01-01

## 2019-01-01 RX ORDER — HYDROMORPHONE HYDROCHLORIDE 10 MG/ML
3 INJECTION INTRAMUSCULAR; INTRAVENOUS; SUBCUTANEOUS
Qty: 0.4 ML | Refills: 0 | Status: SHIPPED | OUTPATIENT
Start: 2019-01-01

## 2019-01-01 RX ORDER — DIPHENHYDRAMINE HYDROCHLORIDE 50 MG/ML
25 INJECTION INTRAMUSCULAR; INTRAVENOUS EVERY 6 HOURS PRN
Status: DISCONTINUED | OUTPATIENT
Start: 2019-01-01 | End: 2019-05-24 | Stop reason: HOSPADM

## 2019-01-01 RX ORDER — NORTRIPTYLINE HCL 25 MG
CAPSULE ORAL
Qty: 180 CAPSULE | Refills: 0 | Status: ON HOLD | OUTPATIENT
Start: 2019-01-01 | End: 2019-01-01

## 2019-01-01 RX ORDER — LEVOTHYROXINE SODIUM 75 UG/1
75 TABLET ORAL DAILY
Qty: 90 TABLET | Refills: 0 | Status: ON HOLD | OUTPATIENT
Start: 2019-01-01 | End: 2019-01-01

## 2019-01-01 RX ORDER — POTASSIUM CL/LIDO/0.9 % NACL 10MEQ/0.1L
10 INTRAVENOUS SOLUTION, PIGGYBACK (ML) INTRAVENOUS
Status: DISCONTINUED | OUTPATIENT
Start: 2019-01-01 | End: 2019-01-01

## 2019-01-01 RX ORDER — AMOXICILLIN 250 MG
1 CAPSULE ORAL 2 TIMES DAILY PRN
Status: DISCONTINUED | OUTPATIENT
Start: 2019-01-01 | End: 2019-05-24 | Stop reason: HOSPADM

## 2019-01-01 RX ORDER — LORAZEPAM 2 MG/ML
.5-1 INJECTION INTRAMUSCULAR
Status: DISCONTINUED | OUTPATIENT
Start: 2019-01-01 | End: 2019-01-01

## 2019-01-01 RX ORDER — FUROSEMIDE 20 MG
20 TABLET ORAL DAILY
Status: DISCONTINUED | OUTPATIENT
Start: 2019-01-01 | End: 2019-01-01

## 2019-01-01 RX ORDER — HYDROMORPHONE HYDROCHLORIDE 10 MG/ML
1 INJECTION INTRAMUSCULAR; INTRAVENOUS; SUBCUTANEOUS
Status: DISCONTINUED | OUTPATIENT
Start: 2019-01-01 | End: 2019-01-01

## 2019-01-01 RX ORDER — SODIUM CHLORIDE 9 MG/ML
INJECTION, SOLUTION INTRAVENOUS CONTINUOUS
Status: DISCONTINUED | OUTPATIENT
Start: 2019-01-01 | End: 2019-01-01

## 2019-01-01 RX ORDER — METOPROLOL SUCCINATE 50 MG/1
50 TABLET, EXTENDED RELEASE ORAL DAILY
Qty: 90 TABLET | Refills: 0 | Status: ON HOLD | OUTPATIENT
Start: 2019-01-01 | End: 2019-01-01

## 2019-01-01 RX ORDER — MAGNESIUM SULFATE HEPTAHYDRATE 40 MG/ML
4 INJECTION, SOLUTION INTRAVENOUS EVERY 4 HOURS PRN
Status: DISCONTINUED | OUTPATIENT
Start: 2019-01-01 | End: 2019-01-01

## 2019-01-01 RX ORDER — WARFARIN SODIUM 4 MG/1
4 TABLET ORAL
Status: COMPLETED | OUTPATIENT
Start: 2019-01-01 | End: 2019-01-01

## 2019-01-01 RX ORDER — GABAPENTIN 300 MG/1
300 CAPSULE ORAL 3 TIMES DAILY
Status: DISCONTINUED | OUTPATIENT
Start: 2019-01-01 | End: 2019-01-01

## 2019-01-01 RX ORDER — AMOXICILLIN 250 MG
2 CAPSULE ORAL 2 TIMES DAILY PRN
Status: DISCONTINUED | OUTPATIENT
Start: 2019-01-01 | End: 2019-05-24 | Stop reason: HOSPADM

## 2019-01-01 RX ORDER — NALOXONE HYDROCHLORIDE 0.4 MG/ML
.1-.4 INJECTION, SOLUTION INTRAMUSCULAR; INTRAVENOUS; SUBCUTANEOUS
Status: DISCONTINUED | OUTPATIENT
Start: 2019-01-01 | End: 2019-05-24 | Stop reason: HOSPADM

## 2019-01-01 RX ORDER — SALIVA STIMULANT COMB. NO.3
2 SPRAY, NON-AEROSOL (ML) MUCOUS MEMBRANE
Qty: 44.3 ML | Refills: 0 | Status: SHIPPED | OUTPATIENT
Start: 2019-01-01

## 2019-01-01 RX ORDER — ATROPINE SULFATE 10 MG/ML
1-2 SOLUTION/ DROPS OPHTHALMIC
Status: DISCONTINUED | OUTPATIENT
Start: 2019-01-01 | End: 2019-05-24 | Stop reason: HOSPADM

## 2019-01-01 RX ORDER — DIPHENHYDRAMINE HYDROCHLORIDE 50 MG/ML
50 INJECTION INTRAMUSCULAR; INTRAVENOUS EVERY 6 HOURS
Status: DISCONTINUED | OUTPATIENT
Start: 2019-01-01 | End: 2019-05-24 | Stop reason: HOSPADM

## 2019-01-01 RX ORDER — HYDROMORPHONE HYDROCHLORIDE 10 MG/ML
2 INJECTION INTRAMUSCULAR; INTRAVENOUS; SUBCUTANEOUS
Status: DISCONTINUED | OUTPATIENT
Start: 2019-01-01 | End: 2019-05-24 | Stop reason: HOSPADM

## 2019-01-01 RX ORDER — BENZOCAINE/MENTHOL 6 MG-10 MG
LOZENGE MUCOUS MEMBRANE EVERY 6 HOURS PRN
Status: DISCONTINUED | OUTPATIENT
Start: 2019-01-01 | End: 2019-01-01 | Stop reason: RX

## 2019-01-01 RX ORDER — HYDROMORPHONE HYDROCHLORIDE 10 MG/ML
2 INJECTION INTRAMUSCULAR; INTRAVENOUS; SUBCUTANEOUS
Qty: 0.4 ML | Refills: 0 | Status: SHIPPED | OUTPATIENT
Start: 2019-01-01

## 2019-01-01 RX ORDER — HYDROMORPHONE HYDROCHLORIDE 2 MG/1
2 TABLET ORAL
Status: DISCONTINUED | OUTPATIENT
Start: 2019-01-01 | End: 2019-05-24 | Stop reason: HOSPADM

## 2019-01-01 RX ORDER — ONDANSETRON 4 MG/1
4 TABLET, ORALLY DISINTEGRATING ORAL EVERY 6 HOURS PRN
Status: DISCONTINUED | OUTPATIENT
Start: 2019-01-01 | End: 2019-05-24 | Stop reason: HOSPADM

## 2019-01-01 RX ORDER — PREGABALIN 150 MG/1
150 CAPSULE ORAL 2 TIMES DAILY
Qty: 180 CAPSULE | Refills: 0 | Status: ON HOLD | OUTPATIENT
Start: 2019-01-01 | End: 2019-01-01

## 2019-01-01 RX ORDER — GABAPENTIN 600 MG/1
300 TABLET ORAL 3 TIMES DAILY
Status: DISCONTINUED | OUTPATIENT
Start: 2019-01-01 | End: 2019-01-01

## 2019-01-01 RX ORDER — OXYCODONE HYDROCHLORIDE 100 MG/5ML
5-7.5 SOLUTION ORAL
Status: DISCONTINUED | OUTPATIENT
Start: 2019-01-01 | End: 2019-01-01

## 2019-01-01 RX ORDER — WARFARIN SODIUM 2.5 MG/1
TABLET ORAL
Qty: 50 TABLET | Refills: 3 | Status: ON HOLD
Start: 2019-01-01 | End: 2019-01-01

## 2019-01-01 RX ORDER — OMEPRAZOLE 40 MG/1
CAPSULE, DELAYED RELEASE ORAL
Qty: 90 CAPSULE | Refills: 0 | Status: ON HOLD | OUTPATIENT
Start: 2019-01-01 | End: 2019-01-01

## 2019-01-01 RX ORDER — WARFARIN SODIUM 5 MG/1
TABLET ORAL
Qty: 50 TABLET | Refills: 3 | Status: ON HOLD
Start: 2019-01-01 | End: 2019-01-01

## 2019-01-01 RX ORDER — POLYETHYLENE GLYCOL 3350 17 G/17G
17 POWDER, FOR SOLUTION ORAL DAILY PRN
Status: DISCONTINUED | OUTPATIENT
Start: 2019-01-01 | End: 2019-05-24 | Stop reason: HOSPADM

## 2019-01-01 RX ORDER — GABAPENTIN 600 MG/1
1200 TABLET ORAL 3 TIMES DAILY
Qty: 540 TABLET | Refills: 3 | Status: ON HOLD | OUTPATIENT
Start: 2019-01-01 | End: 2019-01-01

## 2019-01-01 RX ORDER — LINEZOLID 2 MG/ML
600 INJECTION, SOLUTION INTRAVENOUS EVERY 12 HOURS
Status: DISCONTINUED | OUTPATIENT
Start: 2019-01-01 | End: 2019-01-01

## 2019-01-01 RX ORDER — METOPROLOL SUCCINATE 25 MG/1
50 TABLET, EXTENDED RELEASE ORAL DAILY
Status: DISCONTINUED | OUTPATIENT
Start: 2019-01-01 | End: 2019-01-01

## 2019-01-01 RX ORDER — HYDROXYZINE HYDROCHLORIDE 25 MG/1
25 TABLET, FILM COATED ORAL 3 TIMES DAILY PRN
Status: DISCONTINUED | OUTPATIENT
Start: 2019-01-01 | End: 2019-05-24 | Stop reason: HOSPADM

## 2019-01-01 RX ORDER — HYDROMORPHONE HYDROCHLORIDE 2 MG/1
2 TABLET ORAL
Qty: 30 TABLET | Refills: 0 | Status: SHIPPED | OUTPATIENT
Start: 2019-01-01

## 2019-01-01 RX ORDER — FUROSEMIDE 20 MG
20 TABLET ORAL DAILY
Qty: 90 TABLET | Refills: 0 | Status: ON HOLD | OUTPATIENT
Start: 2019-01-01 | End: 2019-01-01

## 2019-01-01 RX ORDER — SODIUM CHLORIDE 9 MG/ML
INJECTION, SOLUTION INTRAVENOUS CONTINUOUS
Status: DISCONTINUED | OUTPATIENT
Start: 2019-01-01 | End: 2019-05-24 | Stop reason: HOSPADM

## 2019-01-01 RX ORDER — HYDROMORPHONE HYDROCHLORIDE 10 MG/ML
2 INJECTION INTRAMUSCULAR; INTRAVENOUS; SUBCUTANEOUS
Status: DISCONTINUED | OUTPATIENT
Start: 2019-01-01 | End: 2019-01-01

## 2019-01-01 RX ORDER — LIDOCAINE 40 MG/G
CREAM TOPICAL
Status: DISCONTINUED | OUTPATIENT
Start: 2019-01-01 | End: 2019-01-01

## 2019-01-01 RX ORDER — ONDANSETRON 2 MG/ML
4 INJECTION INTRAMUSCULAR; INTRAVENOUS EVERY 6 HOURS PRN
Status: DISCONTINUED | OUTPATIENT
Start: 2019-01-01 | End: 2019-05-24 | Stop reason: HOSPADM

## 2019-01-01 RX ORDER — LEVOFLOXACIN 5 MG/ML
250 INJECTION, SOLUTION INTRAVENOUS EVERY 24 HOURS
Status: DISCONTINUED | OUTPATIENT
Start: 2019-01-01 | End: 2019-01-01

## 2019-01-01 RX ORDER — LORAZEPAM 0.5 MG/1
.5-1 TABLET ORAL
Status: DISCONTINUED | OUTPATIENT
Start: 2019-01-01 | End: 2019-01-01

## 2019-01-01 RX ORDER — ERTAPENEM 1 G/1
1 INJECTION, POWDER, LYOPHILIZED, FOR SOLUTION INTRAMUSCULAR; INTRAVENOUS EVERY 24 HOURS
Status: DISCONTINUED | OUTPATIENT
Start: 2019-01-01 | End: 2019-01-01

## 2019-01-01 RX ORDER — SALIVA STIMULANT COMB. NO.3
2 SPRAY, NON-AEROSOL (ML) MUCOUS MEMBRANE
Status: DISCONTINUED | OUTPATIENT
Start: 2019-01-01 | End: 2019-05-24 | Stop reason: HOSPADM

## 2019-01-01 RX ORDER — BUPROPION HYDROCHLORIDE 300 MG/1
TABLET ORAL
Qty: 90 TABLET | Refills: 0 | Status: SHIPPED | OUTPATIENT
Start: 2019-01-01 | End: 2019-01-01

## 2019-01-01 RX ORDER — LORAZEPAM 2 MG/ML
.5-1 INJECTION INTRAMUSCULAR
Status: DISCONTINUED | OUTPATIENT
Start: 2019-01-01 | End: 2019-05-24 | Stop reason: HOSPADM

## 2019-01-01 RX ORDER — HYDROMORPHONE HYDROCHLORIDE 10 MG/ML
3 INJECTION INTRAMUSCULAR; INTRAVENOUS; SUBCUTANEOUS
Status: DISCONTINUED | OUTPATIENT
Start: 2019-01-01 | End: 2019-01-01

## 2019-01-01 RX ORDER — GABAPENTIN 600 MG/1
1200 TABLET ORAL 3 TIMES DAILY
Status: DISCONTINUED | OUTPATIENT
Start: 2019-01-01 | End: 2019-01-01

## 2019-01-01 RX ORDER — QUETIAPINE FUMARATE 25 MG/1
25 TABLET, FILM COATED ORAL AT BEDTIME
Status: DISCONTINUED | OUTPATIENT
Start: 2019-01-01 | End: 2019-01-01

## 2019-01-01 RX ORDER — PRAVASTATIN SODIUM 10 MG
TABLET ORAL
Qty: 90 TABLET | Refills: 0 | Status: ON HOLD | OUTPATIENT
Start: 2019-01-01 | End: 2019-01-01

## 2019-01-01 RX ORDER — ACETAMINOPHEN 325 MG/1
975 TABLET ORAL ONCE
Status: COMPLETED | OUTPATIENT
Start: 2019-01-01 | End: 2019-01-01

## 2019-01-01 RX ORDER — DIAPER,BRIEF,INFANT-TODD,DISP
EACH MISCELLANEOUS 4 TIMES DAILY
Qty: 25 G | Refills: 0 | Status: SHIPPED | OUTPATIENT
Start: 2019-01-01

## 2019-01-01 RX ORDER — DIAPER,BRIEF,INFANT-TODD,DISP
EACH MISCELLANEOUS 4 TIMES DAILY
Status: DISCONTINUED | OUTPATIENT
Start: 2019-01-01 | End: 2019-05-24 | Stop reason: HOSPADM

## 2019-01-01 RX ORDER — LORAZEPAM 2 MG/ML
1 CONCENTRATE ORAL EVERY 8 HOURS
Qty: 30 ML | Refills: 0 | Status: SHIPPED | OUTPATIENT
Start: 2019-01-01

## 2019-01-01 RX ORDER — VENLAFAXINE HYDROCHLORIDE 150 MG/1
CAPSULE, EXTENDED RELEASE ORAL
Qty: 90 CAPSULE | Refills: 0 | Status: ON HOLD | OUTPATIENT
Start: 2019-01-01 | End: 2019-01-01

## 2019-01-01 RX ORDER — OMEPRAZOLE 40 MG/1
CAPSULE, DELAYED RELEASE ORAL
Qty: 90 CAPSULE | Refills: 1 | Status: SHIPPED | OUTPATIENT
Start: 2019-01-01 | End: 2019-01-01

## 2019-01-01 RX ORDER — ERTAPENEM 1 G/1
1 INJECTION, POWDER, LYOPHILIZED, FOR SOLUTION INTRAMUSCULAR; INTRAVENOUS ONCE
Status: COMPLETED | OUTPATIENT
Start: 2019-01-01 | End: 2019-01-01

## 2019-01-01 RX ORDER — POTASSIUM CHLORIDE 7.45 MG/ML
10 INJECTION INTRAVENOUS
Status: DISCONTINUED | OUTPATIENT
Start: 2019-01-01 | End: 2019-01-01

## 2019-01-01 RX ORDER — WARFARIN SODIUM 2.5 MG/1
TABLET ORAL
Qty: 50 TABLET | Refills: 3 | Status: SHIPPED | OUTPATIENT
Start: 2019-01-01 | End: 2019-01-01

## 2019-01-01 RX ORDER — BUPROPION HYDROCHLORIDE 300 MG/1
TABLET ORAL
Qty: 90 TABLET | Refills: 0 | Status: ON HOLD | OUTPATIENT
Start: 2019-01-01 | End: 2019-01-01

## 2019-01-01 RX ORDER — POTASSIUM CHLORIDE 1.5 G/1.58G
20-40 POWDER, FOR SOLUTION ORAL
Status: DISCONTINUED | OUTPATIENT
Start: 2019-01-01 | End: 2019-01-01

## 2019-01-01 RX ORDER — DIPHENHYDRAMINE HCL 25 MG
25 CAPSULE ORAL EVERY 6 HOURS PRN
Status: DISCONTINUED | OUTPATIENT
Start: 2019-01-01 | End: 2019-01-01

## 2019-01-01 RX ORDER — OXYCODONE HCL 20 MG/ML
5-7.5 CONCENTRATE, ORAL ORAL
Status: DISCONTINUED | OUTPATIENT
Start: 2019-01-01 | End: 2019-01-01

## 2019-01-01 RX ORDER — LEVOTHYROXINE SODIUM 75 UG/1
75 TABLET ORAL DAILY
Status: DISCONTINUED | OUTPATIENT
Start: 2019-01-01 | End: 2019-01-01

## 2019-01-01 RX ORDER — WARFARIN SODIUM 1 MG/1
1 TABLET ORAL
Status: COMPLETED | OUTPATIENT
Start: 2019-01-01 | End: 2019-01-01

## 2019-01-01 RX ORDER — MEROPENEM 500 MG/1
500 INJECTION, POWDER, FOR SOLUTION INTRAVENOUS EVERY 6 HOURS
Status: DISCONTINUED | OUTPATIENT
Start: 2019-01-01 | End: 2019-01-01

## 2019-01-01 RX ORDER — HYDRALAZINE HYDROCHLORIDE 20 MG/ML
10 INJECTION INTRAMUSCULAR; INTRAVENOUS EVERY 4 HOURS PRN
Status: DISCONTINUED | OUTPATIENT
Start: 2019-01-01 | End: 2019-01-01

## 2019-01-01 RX ORDER — HYDROXYZINE HYDROCHLORIDE 25 MG/1
25 TABLET, FILM COATED ORAL 3 TIMES DAILY PRN
Qty: 30 TABLET | Refills: 0 | Status: SHIPPED | OUTPATIENT
Start: 2019-01-01

## 2019-01-01 RX ORDER — LANOLIN ALCOHOL/MO/W.PET/CERES
6 CREAM (GRAM) TOPICAL
Status: DISCONTINUED | OUTPATIENT
Start: 2019-01-01 | End: 2019-01-01

## 2019-01-01 RX ORDER — ALBUMIN (HUMAN) 12.5 G/50ML
25 SOLUTION INTRAVENOUS ONCE
Status: COMPLETED | OUTPATIENT
Start: 2019-01-01 | End: 2019-01-01

## 2019-01-01 RX ORDER — BENZOCAINE/MENTHOL 6 MG-10 MG
LOZENGE MUCOUS MEMBRANE EVERY 6 HOURS PRN
Status: DISCONTINUED | OUTPATIENT
Start: 2019-01-01 | End: 2019-01-01

## 2019-01-01 RX ORDER — WARFARIN SODIUM 5 MG/1
TABLET ORAL
Qty: 50 TABLET | Refills: 3 | Status: SHIPPED | OUTPATIENT
Start: 2019-01-01 | End: 2019-01-01

## 2019-01-01 RX ORDER — DILTIAZEM HYDROCHLORIDE 120 MG/1
120 CAPSULE, COATED, EXTENDED RELEASE ORAL DAILY
Qty: 90 CAPSULE | Refills: 0 | Status: ON HOLD | OUTPATIENT
Start: 2019-01-01 | End: 2019-01-01

## 2019-01-01 RX ORDER — ATROPINE SULFATE 10 MG/ML
1-2 SOLUTION/ DROPS OPHTHALMIC
Qty: 2 ML | Refills: 0 | Status: SHIPPED | OUTPATIENT
Start: 2019-01-01

## 2019-01-01 RX ORDER — DILTIAZEM HYDROCHLORIDE 120 MG/1
120 CAPSULE, COATED, EXTENDED RELEASE ORAL AT BEDTIME
Status: DISCONTINUED | OUTPATIENT
Start: 2019-01-01 | End: 2019-01-01

## 2019-01-01 RX ORDER — WARFARIN SODIUM 5 MG/1
5 TABLET ORAL
Status: COMPLETED | OUTPATIENT
Start: 2019-01-01 | End: 2019-01-01

## 2019-01-01 RX ORDER — HYDROMORPHONE HYDROCHLORIDE 1 MG/ML
.3-.5 INJECTION, SOLUTION INTRAMUSCULAR; INTRAVENOUS; SUBCUTANEOUS
Status: DISCONTINUED | OUTPATIENT
Start: 2019-01-01 | End: 2019-01-01

## 2019-01-01 RX ORDER — HYDROMORPHONE HYDROCHLORIDE 10 MG/ML
3 INJECTION INTRAMUSCULAR; INTRAVENOUS; SUBCUTANEOUS
Status: DISCONTINUED | OUTPATIENT
Start: 2019-01-01 | End: 2019-05-24 | Stop reason: HOSPADM

## 2019-01-01 RX ORDER — PRAVASTATIN SODIUM 10 MG
10 TABLET ORAL AT BEDTIME
Status: DISCONTINUED | OUTPATIENT
Start: 2019-01-01 | End: 2019-01-01

## 2019-01-01 RX ORDER — MINOCYCLINE HYDROCHLORIDE 50 MG/1
CAPSULE ORAL
Qty: 180 CAPSULE | Refills: 0 | Status: ON HOLD | OUTPATIENT
Start: 2019-01-01 | End: 2019-01-01

## 2019-01-01 RX ADMIN — WARFARIN SODIUM 4 MG: 4 TABLET ORAL at 17:50

## 2019-01-01 RX ADMIN — SODIUM CHLORIDE 1000 ML: 9 INJECTION, SOLUTION INTRAVENOUS at 20:06

## 2019-01-01 RX ADMIN — CAMPHOR AND MENTHOL: .6; .6 LOTION TOPICAL at 21:06

## 2019-01-01 RX ADMIN — DIPHENHYDRAMINE HYDROCHLORIDE 25 MG: 50 INJECTION, SOLUTION INTRAMUSCULAR; INTRAVENOUS at 09:12

## 2019-01-01 RX ADMIN — DILTIAZEM HYDROCHLORIDE 120 MG: 120 CAPSULE, COATED, EXTENDED RELEASE ORAL at 01:09

## 2019-01-01 RX ADMIN — LEVOTHYROXINE SODIUM 75 MCG: 75 TABLET ORAL at 08:59

## 2019-01-01 RX ADMIN — OMEPRAZOLE 40 MG: 20 CAPSULE, DELAYED RELEASE ORAL at 08:43

## 2019-01-01 RX ADMIN — LORAZEPAM 1 MG: 2 CONCENTRATE ORAL at 18:20

## 2019-01-01 RX ADMIN — Medication 0.5 MG: at 11:37

## 2019-01-01 RX ADMIN — MELATONIN TAB 3 MG 6 MG: 3 TAB at 18:03

## 2019-01-01 RX ADMIN — GABAPENTIN 1200 MG: 600 TABLET, FILM COATED ORAL at 15:44

## 2019-01-01 RX ADMIN — DIPHENHYDRAMINE HYDROCHLORIDE 25 MG: 50 INJECTION, SOLUTION INTRAMUSCULAR; INTRAVENOUS at 19:42

## 2019-01-01 RX ADMIN — CAMPHOR AND MENTHOL: .6; .6 LOTION TOPICAL at 14:23

## 2019-01-01 RX ADMIN — Medication: at 21:23

## 2019-01-01 RX ADMIN — GABAPENTIN 300 MG: 300 CAPSULE ORAL at 08:31

## 2019-01-01 RX ADMIN — OXYCODONE HYDROCHLORIDE 5 MG: 100 SOLUTION ORAL at 16:42

## 2019-01-01 RX ADMIN — MICONAZOLE NITRATE: 20 POWDER TOPICAL at 22:04

## 2019-01-01 RX ADMIN — ACETAMINOPHEN 650 MG: 325 TABLET, FILM COATED ORAL at 16:31

## 2019-01-01 RX ADMIN — GABAPENTIN 300 MG: 300 CAPSULE ORAL at 22:20

## 2019-01-01 RX ADMIN — Medication 0.5 MG: at 21:49

## 2019-01-01 RX ADMIN — OMEPRAZOLE 40 MG: 20 CAPSULE, DELAYED RELEASE ORAL at 08:59

## 2019-01-01 RX ADMIN — PREGABALIN 150 MG: 100 CAPSULE ORAL at 22:37

## 2019-01-01 RX ADMIN — Medication 0.5 MG: at 15:15

## 2019-01-01 RX ADMIN — SODIUM CHLORIDE 500 ML: 9 INJECTION, SOLUTION INTRAVENOUS at 21:59

## 2019-01-01 RX ADMIN — MICONAZOLE NITRATE: 20 POWDER TOPICAL at 19:47

## 2019-01-01 RX ADMIN — DILTIAZEM HYDROCHLORIDE 120 MG: 120 CAPSULE, COATED, EXTENDED RELEASE ORAL at 22:37

## 2019-01-01 RX ADMIN — DIPHENHYDRAMINE HYDROCHLORIDE 50 MG: 50 INJECTION, SOLUTION INTRAMUSCULAR; INTRAVENOUS at 10:55

## 2019-01-01 RX ADMIN — GABAPENTIN 300 MG: 300 CAPSULE ORAL at 08:48

## 2019-01-01 RX ADMIN — MEROPENEM 500 MG: 500 INJECTION, POWDER, FOR SOLUTION INTRAVENOUS at 14:08

## 2019-01-01 RX ADMIN — GABAPENTIN 1200 MG: 600 TABLET, FILM COATED ORAL at 21:18

## 2019-01-01 RX ADMIN — CAMPHOR AND MENTHOL: .6; .6 LOTION TOPICAL at 21:07

## 2019-01-01 RX ADMIN — ATROPINE SULFATE 2 DROP: 10 SOLUTION OPHTHALMIC at 09:24

## 2019-01-01 RX ADMIN — OXYCODONE HYDROCHLORIDE 5 MG: 100 SOLUTION ORAL at 23:55

## 2019-01-01 RX ADMIN — SODIUM CHLORIDE: 9 INJECTION, SOLUTION INTRAVENOUS at 08:03

## 2019-01-01 RX ADMIN — OXYCODONE HYDROCHLORIDE 5 MG: 100 SOLUTION ORAL at 09:17

## 2019-01-01 RX ADMIN — PREGABALIN 150 MG: 100 CAPSULE ORAL at 01:09

## 2019-01-01 RX ADMIN — MEROPENEM 500 MG: 500 INJECTION, POWDER, FOR SOLUTION INTRAVENOUS at 00:55

## 2019-01-01 RX ADMIN — DIPHENHYDRAMINE HYDROCHLORIDE 25 MG: 50 INJECTION, SOLUTION INTRAMUSCULAR; INTRAVENOUS at 17:58

## 2019-01-01 RX ADMIN — OMEPRAZOLE 40 MG: 20 CAPSULE, DELAYED RELEASE ORAL at 10:12

## 2019-01-01 RX ADMIN — LORAZEPAM 1 MG: 2 INJECTION INTRAMUSCULAR; INTRAVENOUS at 12:46

## 2019-01-01 RX ADMIN — DIPHENHYDRAMINE HYDROCHLORIDE 50 MG: 50 INJECTION, SOLUTION INTRAMUSCULAR; INTRAVENOUS at 15:58

## 2019-01-01 RX ADMIN — CAMPHOR AND MENTHOL: .6; .6 LOTION TOPICAL at 16:56

## 2019-01-01 RX ADMIN — LIDOCAINE 1 PATCH: 560 PATCH PERCUTANEOUS; TOPICAL; TRANSDERMAL at 15:44

## 2019-01-01 RX ADMIN — Medication: at 14:22

## 2019-01-01 RX ADMIN — DIPHENHYDRAMINE HYDROCHLORIDE 25 MG: 50 INJECTION, SOLUTION INTRAMUSCULAR; INTRAVENOUS at 01:32

## 2019-01-01 RX ADMIN — METOPROLOL SUCCINATE 50 MG: 50 TABLET, EXTENDED RELEASE ORAL at 10:11

## 2019-01-01 RX ADMIN — HYDROXYZINE HYDROCHLORIDE 25 MG: 25 TABLET ORAL at 18:03

## 2019-01-01 RX ADMIN — DIPHENHYDRAMINE HYDROCHLORIDE 50 MG: 50 INJECTION, SOLUTION INTRAMUSCULAR; INTRAVENOUS at 05:25

## 2019-01-01 RX ADMIN — CAMPHOR AND MENTHOL: .6; .6 LOTION TOPICAL at 09:19

## 2019-01-01 RX ADMIN — Medication 0.03 MCG/KG/MIN: at 00:32

## 2019-01-01 RX ADMIN — DIPHENHYDRAMINE HYDROCHLORIDE 50 MG: 50 INJECTION, SOLUTION INTRAMUSCULAR; INTRAVENOUS at 23:57

## 2019-01-01 RX ADMIN — SODIUM CHLORIDE: 9 INJECTION, SOLUTION INTRAVENOUS at 01:32

## 2019-01-01 RX ADMIN — GABAPENTIN 300 MG: 300 CAPSULE ORAL at 15:53

## 2019-01-01 RX ADMIN — OXYCODONE HYDROCHLORIDE 5 MG: 100 SOLUTION ORAL at 03:53

## 2019-01-01 RX ADMIN — MEROPENEM 500 MG: 500 INJECTION, POWDER, FOR SOLUTION INTRAVENOUS at 13:49

## 2019-01-01 RX ADMIN — GABAPENTIN 1200 MG: 600 TABLET, FILM COATED ORAL at 10:12

## 2019-01-01 RX ADMIN — DIPHENHYDRAMINE HYDROCHLORIDE 50 MG: 50 INJECTION, SOLUTION INTRAMUSCULAR; INTRAVENOUS at 11:28

## 2019-01-01 RX ADMIN — RANITIDINE 75 MG: 75 TABLET ORAL at 21:27

## 2019-01-01 RX ADMIN — HYDROMORPHONE HYDROCHLORIDE 3 MG: 10 INJECTION, SOLUTION INTRAMUSCULAR; INTRAVENOUS; SUBCUTANEOUS at 12:15

## 2019-01-01 RX ADMIN — HYDROXYZINE HYDROCHLORIDE 25 MG: 25 TABLET ORAL at 08:31

## 2019-01-01 RX ADMIN — METOPROLOL SUCCINATE 50 MG: 50 TABLET, EXTENDED RELEASE ORAL at 08:59

## 2019-01-01 RX ADMIN — SODIUM CHLORIDE 500 ML: 9 INJECTION, SOLUTION INTRAVENOUS at 08:41

## 2019-01-01 RX ADMIN — DIPHENHYDRAMINE HYDROCHLORIDE 50 MG: 50 INJECTION, SOLUTION INTRAMUSCULAR; INTRAVENOUS at 05:00

## 2019-01-01 RX ADMIN — MICONAZOLE NITRATE: 20 POWDER TOPICAL at 13:15

## 2019-01-01 RX ADMIN — DIPHENHYDRAMINE HYDROCHLORIDE 25 MG: 50 INJECTION, SOLUTION INTRAMUSCULAR; INTRAVENOUS at 12:03

## 2019-01-01 RX ADMIN — CAMPHOR AND MENTHOL: .6; .6 LOTION TOPICAL at 01:53

## 2019-01-01 RX ADMIN — SODIUM CHLORIDE 500 ML: 9 INJECTION, SOLUTION INTRAVENOUS at 05:53

## 2019-01-01 RX ADMIN — MICONAZOLE NITRATE: 20 POWDER TOPICAL at 12:48

## 2019-01-01 RX ADMIN — SODIUM CHLORIDE 1000 ML: 9 INJECTION, SOLUTION INTRAVENOUS at 23:28

## 2019-01-01 RX ADMIN — Medication: at 17:08

## 2019-01-01 RX ADMIN — HYDROMORPHONE HYDROCHLORIDE 3 MG: 10 INJECTION, SOLUTION INTRAMUSCULAR; INTRAVENOUS; SUBCUTANEOUS at 18:20

## 2019-01-01 RX ADMIN — HYDROXYZINE HYDROCHLORIDE 25 MG: 25 TABLET ORAL at 15:54

## 2019-01-01 RX ADMIN — GABAPENTIN 1200 MG: 600 TABLET, FILM COATED ORAL at 01:10

## 2019-01-01 RX ADMIN — Medication: at 23:58

## 2019-01-01 RX ADMIN — PREGABALIN 150 MG: 100 CAPSULE ORAL at 10:12

## 2019-01-01 RX ADMIN — GABAPENTIN 1200 MG: 600 TABLET, FILM COATED ORAL at 15:57

## 2019-01-01 RX ADMIN — ACETAMINOPHEN 650 MG: 325 TABLET, FILM COATED ORAL at 18:55

## 2019-01-01 RX ADMIN — LORAZEPAM 1 MG: 2 CONCENTRATE ORAL at 18:01

## 2019-01-01 RX ADMIN — DIPHENHYDRAMINE HYDROCHLORIDE 50 MG: 50 INJECTION, SOLUTION INTRAMUSCULAR; INTRAVENOUS at 23:04

## 2019-01-01 RX ADMIN — FUROSEMIDE 20 MG: 20 TABLET ORAL at 13:16

## 2019-01-01 RX ADMIN — PREGABALIN 75 MG: 75 CAPSULE ORAL at 21:06

## 2019-01-01 RX ADMIN — DIPHENHYDRAMINE HYDROCHLORIDE 25 MG: 50 INJECTION, SOLUTION INTRAMUSCULAR; INTRAVENOUS at 09:55

## 2019-01-01 RX ADMIN — PRAVASTATIN SODIUM 10 MG: 10 TABLET ORAL at 22:19

## 2019-01-01 RX ADMIN — LINEZOLID 600 MG: 2 INJECTION, SOLUTION INTRAVENOUS at 21:49

## 2019-01-01 RX ADMIN — ATROPINE SULFATE 2 DROP: 10 SOLUTION OPHTHALMIC at 16:15

## 2019-01-01 RX ADMIN — PREGABALIN 75 MG: 75 CAPSULE ORAL at 08:22

## 2019-01-01 RX ADMIN — ATROPINE SULFATE 2 DROP: 10 SOLUTION OPHTHALMIC at 03:53

## 2019-01-01 RX ADMIN — LORAZEPAM 1 MG: 2 CONCENTRATE ORAL at 09:30

## 2019-01-01 RX ADMIN — LINEZOLID 600 MG: 600 INJECTION, SOLUTION INTRAVENOUS at 09:40

## 2019-01-01 RX ADMIN — LEVOTHYROXINE SODIUM 75 MCG: 75 TABLET ORAL at 08:22

## 2019-01-01 RX ADMIN — WARFARIN SODIUM 1 MG: 1 TABLET ORAL at 18:22

## 2019-01-01 RX ADMIN — ACETAMINOPHEN 650 MG: 325 TABLET, FILM COATED ORAL at 23:28

## 2019-01-01 RX ADMIN — MEROPENEM 500 MG: 500 INJECTION, POWDER, FOR SOLUTION INTRAVENOUS at 00:42

## 2019-01-01 RX ADMIN — SODIUM CHLORIDE: 9 INJECTION, SOLUTION INTRAVENOUS at 01:08

## 2019-01-01 RX ADMIN — ACETAMINOPHEN 975 MG: 325 TABLET, FILM COATED ORAL at 19:19

## 2019-01-01 RX ADMIN — LINEZOLID 600 MG: 600 INJECTION, SOLUTION INTRAVENOUS at 10:00

## 2019-01-01 RX ADMIN — PRAVASTATIN SODIUM 10 MG: 10 TABLET ORAL at 22:37

## 2019-01-01 RX ADMIN — MEROPENEM 500 MG: 500 INJECTION, POWDER, FOR SOLUTION INTRAVENOUS at 08:18

## 2019-01-01 RX ADMIN — PREGABALIN 150 MG: 100 CAPSULE ORAL at 08:44

## 2019-01-01 RX ADMIN — DIPHENHYDRAMINE HYDROCHLORIDE 50 MG: 50 INJECTION, SOLUTION INTRAMUSCULAR; INTRAVENOUS at 16:50

## 2019-01-01 RX ADMIN — HYDROMORPHONE HYDROCHLORIDE 3 MG: 10 INJECTION, SOLUTION INTRAMUSCULAR; INTRAVENOUS; SUBCUTANEOUS at 16:15

## 2019-01-01 RX ADMIN — CAMPHOR AND MENTHOL: .6; .6 LOTION TOPICAL at 02:59

## 2019-01-01 RX ADMIN — PREGABALIN 150 MG: 100 CAPSULE ORAL at 21:18

## 2019-01-01 RX ADMIN — ERTAPENEM SODIUM 1 G: 1 INJECTION, POWDER, LYOPHILIZED, FOR SOLUTION INTRAMUSCULAR; INTRAVENOUS at 21:06

## 2019-01-01 RX ADMIN — DIPHENHYDRAMINE HYDROCHLORIDE 50 MG: 50 INJECTION, SOLUTION INTRAMUSCULAR; INTRAVENOUS at 22:13

## 2019-01-01 RX ADMIN — HYDROMORPHONE HYDROCHLORIDE 3 MG: 10 INJECTION, SOLUTION INTRAMUSCULAR; INTRAVENOUS; SUBCUTANEOUS at 11:01

## 2019-01-01 RX ADMIN — OXYCODONE HYDROCHLORIDE 5 MG: 100 SOLUTION ORAL at 21:05

## 2019-01-01 RX ADMIN — MEROPENEM 500 MG: 500 INJECTION, POWDER, FOR SOLUTION INTRAVENOUS at 13:55

## 2019-01-01 RX ADMIN — HYDROMORPHONE HYDROCHLORIDE 3 MG: 10 INJECTION, SOLUTION INTRAMUSCULAR; INTRAVENOUS; SUBCUTANEOUS at 20:01

## 2019-01-01 RX ADMIN — DIPHENHYDRAMINE HYDROCHLORIDE 50 MG: 50 INJECTION, SOLUTION INTRAMUSCULAR; INTRAVENOUS at 04:51

## 2019-01-01 RX ADMIN — MEROPENEM 500 MG: 500 INJECTION, POWDER, FOR SOLUTION INTRAVENOUS at 06:08

## 2019-01-01 RX ADMIN — SODIUM CHLORIDE 500 ML: 9 INJECTION, SOLUTION INTRAVENOUS at 14:49

## 2019-01-01 RX ADMIN — Medication: at 14:35

## 2019-01-01 RX ADMIN — ATROPINE SULFATE 1 DROP: 10 SOLUTION OPHTHALMIC at 18:02

## 2019-01-01 RX ADMIN — QUETIAPINE FUMARATE 25 MG: 25 TABLET ORAL at 21:06

## 2019-01-01 RX ADMIN — LINEZOLID 600 MG: 600 INJECTION, SOLUTION INTRAVENOUS at 10:13

## 2019-01-01 RX ADMIN — SODIUM CHLORIDE, POTASSIUM CHLORIDE, SODIUM LACTATE AND CALCIUM CHLORIDE 500 ML: 600; 310; 30; 20 INJECTION, SOLUTION INTRAVENOUS at 19:50

## 2019-01-01 RX ADMIN — FUROSEMIDE 20 MG: 20 TABLET ORAL at 10:12

## 2019-01-01 RX ADMIN — MEROPENEM 500 MG: 500 INJECTION, POWDER, FOR SOLUTION INTRAVENOUS at 18:27

## 2019-01-01 RX ADMIN — Medication: at 10:03

## 2019-01-01 RX ADMIN — Medication: at 17:57

## 2019-01-01 RX ADMIN — GABAPENTIN 300 MG: 300 CAPSULE ORAL at 08:22

## 2019-01-01 RX ADMIN — MICONAZOLE NITRATE: 20 POWDER TOPICAL at 00:51

## 2019-01-01 RX ADMIN — WARFARIN SODIUM 5 MG: 5 TABLET ORAL at 18:55

## 2019-01-01 RX ADMIN — ATROPINE SULFATE 2 DROP: 10 SOLUTION OPHTHALMIC at 11:59

## 2019-01-01 RX ADMIN — PRAVASTATIN SODIUM 10 MG: 10 TABLET ORAL at 01:09

## 2019-01-01 RX ADMIN — MICONAZOLE NITRATE: 20 POWDER TOPICAL at 22:44

## 2019-01-01 RX ADMIN — DIPHENHYDRAMINE HYDROCHLORIDE 50 MG: 50 INJECTION, SOLUTION INTRAMUSCULAR; INTRAVENOUS at 17:11

## 2019-01-01 RX ADMIN — CAMPHOR AND MENTHOL: .6; .6 LOTION TOPICAL at 09:40

## 2019-01-01 RX ADMIN — GABAPENTIN 300 MG: 300 CAPSULE ORAL at 16:32

## 2019-01-01 RX ADMIN — LINEZOLID 600 MG: 600 INJECTION, SOLUTION INTRAVENOUS at 22:24

## 2019-01-01 RX ADMIN — SODIUM CHLORIDE: 9 INJECTION, SOLUTION INTRAVENOUS at 14:32

## 2019-01-01 RX ADMIN — LORAZEPAM 1 MG: 2 CONCENTRATE ORAL at 10:54

## 2019-01-01 RX ADMIN — LEVOTHYROXINE SODIUM 75 MCG: 75 TABLET ORAL at 10:11

## 2019-01-01 RX ADMIN — ATROPINE SULFATE 2 DROP: 10 SOLUTION OPHTHALMIC at 01:54

## 2019-01-01 RX ADMIN — OMEPRAZOLE 40 MG: 20 CAPSULE, DELAYED RELEASE ORAL at 08:22

## 2019-01-01 RX ADMIN — HYDROXYZINE HYDROCHLORIDE 25 MG: 25 TABLET ORAL at 01:22

## 2019-01-01 RX ADMIN — ACETAMINOPHEN 650 MG: 325 TABLET, FILM COATED ORAL at 22:37

## 2019-01-01 RX ADMIN — DIPHENHYDRAMINE HYDROCHLORIDE 50 MG: 50 INJECTION, SOLUTION INTRAMUSCULAR; INTRAVENOUS at 11:01

## 2019-01-01 RX ADMIN — LINEZOLID 600 MG: 600 INJECTION, SOLUTION INTRAVENOUS at 22:41

## 2019-01-01 RX ADMIN — PREGABALIN 75 MG: 75 CAPSULE ORAL at 22:20

## 2019-01-01 RX ADMIN — LINEZOLID 600 MG: 600 INJECTION, SOLUTION INTRAVENOUS at 09:58

## 2019-01-01 RX ADMIN — LIDOCAINE 1 PATCH: 560 PATCH PERCUTANEOUS; TOPICAL; TRANSDERMAL at 16:32

## 2019-01-01 RX ADMIN — LORAZEPAM 1 MG: 2 CONCENTRATE ORAL at 01:52

## 2019-01-01 RX ADMIN — PREGABALIN 75 MG: 75 CAPSULE ORAL at 08:31

## 2019-01-01 RX ADMIN — GABAPENTIN 1200 MG: 600 TABLET, FILM COATED ORAL at 08:59

## 2019-01-01 RX ADMIN — GABAPENTIN 300 MG: 300 CAPSULE ORAL at 21:06

## 2019-01-01 RX ADMIN — DIPHENHYDRAMINE HYDROCHLORIDE 25 MG: 50 INJECTION, SOLUTION INTRAMUSCULAR; INTRAVENOUS at 19:24

## 2019-01-01 RX ADMIN — PRAVASTATIN SODIUM 10 MG: 10 TABLET ORAL at 21:18

## 2019-01-01 RX ADMIN — ALBUMIN HUMAN 25 G: 0.25 SOLUTION INTRAVENOUS at 16:31

## 2019-01-01 RX ADMIN — GABAPENTIN 1200 MG: 600 TABLET, FILM COATED ORAL at 22:37

## 2019-01-01 RX ADMIN — OXYCODONE HYDROCHLORIDE 5 MG: 100 SOLUTION ORAL at 10:54

## 2019-01-01 RX ADMIN — ATROPINE SULFATE 2 DROP: 10 SOLUTION OPHTHALMIC at 13:33

## 2019-01-01 RX ADMIN — LIDOCAINE 1 PATCH: 560 PATCH PERCUTANEOUS; TOPICAL; TRANSDERMAL at 16:43

## 2019-01-01 RX ADMIN — MEROPENEM 500 MG: 500 INJECTION, POWDER, FOR SOLUTION INTRAVENOUS at 19:01

## 2019-01-01 RX ADMIN — MICONAZOLE NITRATE: 20 POWDER TOPICAL at 12:04

## 2019-01-01 RX ADMIN — ATROPINE SULFATE 2 DROP: 10 SOLUTION OPHTHALMIC at 05:25

## 2019-01-01 RX ADMIN — ACETAMINOPHEN 650 MG: 325 TABLET, FILM COATED ORAL at 17:50

## 2019-01-01 RX ADMIN — LEVOFLOXACIN 250 MG: 5 INJECTION, SOLUTION INTRAVENOUS at 17:06

## 2019-01-01 RX ADMIN — OMEPRAZOLE 40 MG: 20 CAPSULE, DELAYED RELEASE ORAL at 08:30

## 2019-01-01 RX ADMIN — HYDROCORTISONE: 1 CREAM TOPICAL at 21:50

## 2019-01-01 RX ADMIN — OXYCODONE HYDROCHLORIDE 5 MG: 100 SOLUTION ORAL at 06:21

## 2019-01-01 RX ADMIN — Medication 0.5 MG: at 12:47

## 2019-01-01 RX ADMIN — LEVOTHYROXINE SODIUM 75 MCG: 75 TABLET ORAL at 08:44

## 2019-01-01 RX ADMIN — PREGABALIN 150 MG: 100 CAPSULE ORAL at 08:59

## 2019-01-01 RX ADMIN — MEROPENEM 500 MG: 500 INJECTION, POWDER, FOR SOLUTION INTRAVENOUS at 13:24

## 2019-01-01 RX ADMIN — HYDROMORPHONE HYDROCHLORIDE 3 MG: 10 INJECTION, SOLUTION INTRAMUSCULAR; INTRAVENOUS; SUBCUTANEOUS at 13:33

## 2019-01-01 ASSESSMENT — ACTIVITIES OF DAILY LIVING (ADL)
ADLS_ACUITY_SCORE: 24
ADLS_ACUITY_SCORE: 22
ADLS_ACUITY_SCORE: 22
NUMBER_OF_TIMES_PATIENT_HAS_FALLEN_WITHIN_LAST_SIX_MONTHS: 4
RETIRED_EATING: 0-->INDEPENDENT
ADLS_ACUITY_SCORE: 22
RETIRED_COMMUNICATION: 0-->UNDERSTANDS/COMMUNICATES WITHOUT DIFFICULTY
ADLS_ACUITY_SCORE: 21
ADLS_ACUITY_SCORE: 22
ADLS_ACUITY_SCORE: 21
ADLS_ACUITY_SCORE: 24
TRANSFERRING: 3-->ASSISTIVE EQUIPMENT AND PERSON
ADLS_ACUITY_SCORE: 21
ADLS_ACUITY_SCORE: 22
COGNITION: 0 - NO COGNITION ISSUES REPORTED
ADLS_ACUITY_SCORE: 21
ADLS_ACUITY_SCORE: 21
ADLS_ACUITY_SCORE: 18
FALL_HISTORY_WITHIN_LAST_SIX_MONTHS: YES
ADLS_ACUITY_SCORE: 21
ADLS_ACUITY_SCORE: 24
SWALLOWING: 0-->SWALLOWS FOODS/LIQUIDS WITHOUT DIFFICULTY
ADLS_ACUITY_SCORE: 22
ADLS_ACUITY_SCORE: 24
BATHING: 1-->ASSISTIVE EQUIPMENT
ADLS_ACUITY_SCORE: 24
ADLS_ACUITY_SCORE: 21
ADLS_ACUITY_SCORE: 17
ADLS_ACUITY_SCORE: 24
ADLS_ACUITY_SCORE: 20
ADLS_ACUITY_SCORE: 24
ADLS_ACUITY_SCORE: 22
ADLS_ACUITY_SCORE: 22
TOILETING: 0-->INDEPENDENT
ADLS_ACUITY_SCORE: 22
ADLS_ACUITY_SCORE: 22
AMBULATION: 3-->ASSISTIVE EQUIPMENT AND PERSON
ADLS_ACUITY_SCORE: 20
ADLS_ACUITY_SCORE: 24
ADLS_ACUITY_SCORE: 18
ADLS_ACUITY_SCORE: 24
ADLS_ACUITY_SCORE: 21
ADLS_ACUITY_SCORE: 24
ADLS_ACUITY_SCORE: 22
DRESS: 0-->INDEPENDENT
ADLS_ACUITY_SCORE: 24
ADLS_ACUITY_SCORE: 18
ADLS_ACUITY_SCORE: 24
ADLS_ACUITY_SCORE: 22
ADLS_ACUITY_SCORE: 22
ADLS_ACUITY_SCORE: 20

## 2019-01-01 ASSESSMENT — MIFFLIN-ST. JEOR
SCORE: 1982.29
SCORE: 1965.51

## 2019-01-11 NOTE — PROGRESS NOTES
ANTICOAGULATION FOLLOW-UP     Patient Name:  Berenice Chaudhari  Date:  1/10/2019  Contact Type:  Telephone  Received a fax from [employer] [facility] [employer] regarding this patient.   She has a home INR machine and [employer] faxes in results for coumadin management.  Called and spoke to her at home number. Instructions given.    SUBJECTIVE:     Patient Findings     Positives:   No Problem Findings           OBJECTIVE    INR   Date Value Ref Range Status   2019 2.3 (A) 0.9 - 1.1 Final       ASSESSMENT / PLAN  INR assessment THER    Recheck INR In: 2 WEEKS    INR Location Home INR      Anticoagulation Summary  As of 1/10/2019    INR goal:   2.0-3.0   TTR:   50.3 % (1.3 y)   INR used for dosin.3 (2019)   Warfarin maintenance plan:   2.5 mg (2.5 mg x 1) every Sun, Tue, Thu; 5 mg (2.5 mg x 2) all other days   Full warfarin instructions:   2.5 mg every Sun, Tue, Thu; 5 mg all other days   Weekly warfarin total:   27.5 mg   No change documented:   Mary Butcher RN   Plan last modified:   Mary Butcher RN (2018)   Next INR check:   2019   Target end date:   Indefinite    Indications    Long term current use of anticoagulant therapy [Z79.01]  Atrial flutter with rapid ventricular response (H) [I48.92]             Anticoagulation Episode Summary     INR check location:       Preferred lab:       Send INR reminders to:   [facility]    Comments:   5mg & 2.5mg tabs; HS /  (Ricardo) sets up her meds / Home INR monitor / H# [phone] / motorized wheelchair      Anticoagulation Care Providers     Provider Role Specialty Phone number    Nelly Pearl MD Referring Internal Medicine [phone]            See the Encounter Report to view Anticoagulation Flowsheet and Dosing Calendar (Go to Encounters tab in chart review, and find the Anticoagulation Therapy Visit)        Mary Butcher RN

## 2019-01-24 NOTE — PROGRESS NOTES
ANTICOAGULATION FOLLOW-UP     Patient Name:  Berenice Chaudhari  Date:  2019  Contact Type:  Telephone/ Fax  Received a fax from [facility] [facility] regarding this patient.   She has a home INR machine and [facility] faxes in results for coumadin management.  Called and spoke to her  at home number. Instructions given.    SUBJECTIVE:     Patient Findings     Positives:   No Problem Findings           OBJECTIVE    INR   Date Value Ref Range Status   2019 2.4 (A) 0.9 - 1.1 Final       ASSESSMENT / PLAN  INR assessment THER    Recheck INR In: 2 WEEKS    INR Location Home INR      Anticoagulation Summary  As of 2019    INR goal:   2.0-3.0   TTR:   51.7 % (1.4 y)   INR used for dosin.4 (2019)   Warfarin maintenance plan:   2.5 mg (2.5 mg x 1) every Sun, Tue, Thu; 5 mg (2.5 mg x 2) all other days   Full warfarin instructions:   2.5 mg every Sun, Tue, Thu; 5 mg all other days   Weekly warfarin total:   27.5 mg   Plan last modified:   Mary Butcher RN (2018)   Next INR check:   2019   Target end date:   Indefinite    Indications    Long term current use of anticoagulant therapy [Z79.01]  Atrial flutter with rapid ventricular response (H) [I48.92]             Anticoagulation Episode Summary     INR check location:       Preferred lab:       Send INR reminders to:    [facility]    Comments:   5mg & 2.5mg tabs; HS /  (Ricardo) sets up her meds / Home INR monitor / H# [phone] / motorized wheelchair      Anticoagulation Care Providers     Provider Role Specialty Phone number    Nelly Pearl MD Referring Internal Medicine [phone]            See the Encounter Report to view Anticoagulation Flowsheet and Dosing Calendar (Go to Encounters tab in chart review, and find the Anticoagulation Therapy Visit)        Mary Butcher RN

## 2019-02-07 NOTE — PROGRESS NOTES
ANTICOAGULATION FOLLOW-UP     Patient Name:  Berenice Chaudhari  Date:  2019  Contact Type:  Telephone  Received a fax from [employer] [facility] regarding this patient.   She has a home INR machine and [employer] faxes in results for coumadin management.  Called and spoke to her at home number. Instructions given.    Her , Ricardo, called later and said she had called him because she had forgotten the instructions. He sets up her meds and requested that his cell number be called with warfarin instructions. Instructions given to him. He used to work out of his home, but now he works outside the home.    SUBJECTIVE:     Patient Findings     Positives:   Change in diet/appetite, Other complaints    Comments:   She has been ill for the past 10 days.  Fever less than 100 (her  makes her go   to the hospital if it hits 101)  Cough worse than her usual cough. Dry cough.  She has had a poor appetite. Not eating any green vegetables.    Patient denies: extra doses, bleeding/bruises,   medication changes.             OBJECTIVE    INR   Date Value Ref Range Status   2019 4.0 (A) 0.9 - 1.1 Final       ASSESSMENT / PLAN  INR assessment SUPRA    Recheck INR In: 1 WEEK    INR Location Home INR      Anticoagulation Summary  As of 2019    INR goal:   2.0-3.0   TTR:   51.3 % (1.4 y)   INR used for dosin.0! (2019)   Warfarin maintenance plan:   2.5 mg (2.5 mg x 1) every Sun, Tue, Thu; 5 mg (2.5 mg x 2) all other days   Full warfarin instructions:   : Hold; : 2.5 mg; Otherwise 2.5 mg every Sun, Tue, Thu; 5 mg all other days   Weekly warfarin total:   27.5 mg   Plan last modified:   Mary Butcher, RN (2018)   Next INR check:   2019   Target end date:   Indefinite    Indications    Long term current use of anticoagulant therapy [Z79.01]  Atrial flutter with rapid ventricular response (H) [I48.92]             Anticoagulation Episode Summary     INR check location:       Preferred lab:        Send INR reminders to:   [facility]    Comments:   5mg & 2.5mg tabs; HS /  (Ricardo) sets up her meds / Home INR monitor / H# [phone] / motorized wheelchair      Anticoagulation Care Providers     Provider Role Specialty Phone number    Nelly Pearl MD Referring Internal Medicine [phone]            See the Encounter Report to view Anticoagulation Flowsheet and Dosing Calendar (Go to Encounters tab in chart review, and find the Anticoagulation Therapy Visit)    Dosage adjustment made based on physician directed care plan.    Mary Butcher RN

## 2019-02-14 NOTE — PROGRESS NOTES
ANTICOAGULATION FOLLOW-UP     Patient Name:  Berenice Chaudhari  Date:  2019  Contact Type:  Telephone/ Fax  Received a fax from [employer] regarding this patient.   She has a home INR machine and [employer] ([facility] faxes in results   for coumadin management.  Called and spoke to her  at cell number. Instructions given.    SUBJECTIVE:     Patient Findings     Positives:   No Problem Findings    Comments:   Cough continues, but is a little better.  No fever.  Appetite is back and is eating her usual diet.           OBJECTIVE    INR   Date Value Ref Range Status   2019 2.5 (A) 0.9 - 1.1 Final       ASSESSMENT / PLAN  INR assessment THER    Recheck INR In: 2 WEEKS    INR Location Home INR      Anticoagulation Summary  As of 2019    INR goal:   2.0-3.0   TTR:   51.1 % (1.4 y)   INR used for dosin.5 (2019)   Warfarin maintenance plan:   2.5 mg (2.5 mg x 1) every Sun, Tue, Thu; 5 mg (2.5 mg x 2) all other days   Full warfarin instructions:   2.5 mg every Sun, Tue, Thu; 5 mg all other days   Weekly warfarin total:   27.5 mg   No change documented:   Mary Butcher RN   Plan last modified:   Mary Butcher RN (2018)   Next INR check:   2019   Target end date:   Indefinite    Indications    Long term current use of anticoagulant therapy [Z79.01]  Atrial flutter with rapid ventricular response (H) [I48.92]             Anticoagulation Episode Summary     INR check location:       Preferred lab:       Send INR reminders to:   [facility]    Comments:   5mg & 2.5mg tabs; HS /  (Ricardo) sets up her meds Cell# [phone] / Home INR monitor / motorized wheelchair      Anticoagulation Care Providers     Provider Role Specialty Phone number    Nelly Pearl MD Referring Internal Medicine [phone]            See the Encounter Report to view Anticoagulation Flowsheet and Dosing Calendar (Go to Encounters tab in chart review, and find the  Anticoagulation Therapy Visit)        Mary Butcher RN

## 2019-02-27 NOTE — TELEPHONE ENCOUNTER
"Requested Prescriptions   Pending Prescriptions Disp Refills     buPROPion (WELLBUTRIN XL) 300 MG 24 hr tablet [Pharmacy Med Name: BUPROPION HCL XL TABS 300MG]    Last Written Prescription Date:  11/29/2018  Last Fill Quantity: 90,  # refills: 0   Last office visit: 10/18/2018 with prescribing provider:  Nelly Pearl     Future Office Visit:     90 tablet 0     Sig: TAKE 1 TABLET EVERY MORNING    [facility] Protocol Passed - 2/27/2019 12:58 AM       Passed - PHQ-9 score less than 5 in past 6 months    Please review last PHQ-9 score.     PHQ-9 SCORE 9/5/2017 4/20/2018 11/29/2018   PHQ-9 Total Score - - -   PHQ-9 Total Score 7 5 1     FRANCISCO-7 SCORE 8/7/2013 7/14/2014   Total Score 0 2                Passed - Medication is Bupropion    If the medication is Bupropion (Wellbutrin), and the patient is taking for smoking cessation; OK to refill.         Passed - Medication is active on med list       Passed - Patient is age 18 or older       Passed - No active pregnancy on record       Passed - No positive pregnancy test in last 12 months       Passed - Recent (6 mo) or future (30 days) visit within the authorizing provider's specialty    Patient had office visit in the last 6 months or has a visit in the next 30 days with authorizing provider or within the authorizing provider's specialty.  See \"Patient Info\" tab in inbasket, or \"Choose Columns\" in Meds & Orders section of the refill encounter.              "

## 2019-02-28 NOTE — PROGRESS NOTES
ANTICOAGULATION FOLLOW-UP     Patient Name:  Berenice Chaudhari  Date:  2019  Contact Type:  Telephone/ Fax  Received a fax from [employer] [facility] [employer] [facility] regarding this patient.   She has a home INR machine and [employer] faxes in INR results.  Called and spoke to her  at cell number.  Warfarin management instructions given.    SUBJECTIVE:     Patient Findings     Positives:   No Problem Findings           OBJECTIVE    INR   Date Value Ref Range Status   2019 2.1 (A) 0.9 - 1.1 Final       ASSESSMENT / PLAN  INR assessment THER    Recheck INR In: 2 WEEKS    INR Location Home INR      Anticoagulation Summary  As of 2019    INR goal:   2.0-3.0   TTR:   52.4 % (1.5 y)   INR used for dosin.1 (2019)   Warfarin maintenance plan:   2.5 mg (2.5 mg x 1) every Sun, Tue, Thu; 5 mg (2.5 mg x 2) all other days   Full warfarin instructions:   2.5 mg every Sun, Tue, Thu; 5 mg all other days   Weekly warfarin total:   27.5 mg   No change documented:   Mary Butcher RN   Plan last modified:   Mary Butcher RN (2018)   Next INR check:   3/14/2019   Target end date:   Indefinite    Indications    Long term current use of anticoagulant therapy [Z79.01]  Atrial flutter with rapid ventricular response (H) [I48.92]             Anticoagulation Episode Summary     INR check location:       Preferred lab:       Send INR reminders to:   [facility]    Comments:   5mg & 2.5mg tabs; HS /  (Ricardo) sets up her meds Cell# [phone] / Home INR monitor / motorized wheelchair      Anticoagulation Care Providers     Provider Role Specialty Phone number    Nelly Peral MD Referring Internal Medicine [phone]            See the Encounter Report to view Anticoagulation Flowsheet and Dosing Calendar (Go to Encounters tab in chart review, and find the Anticoagulation Therapy Visit)        Mary Butcher RN

## 2019-03-04 NOTE — TELEPHONE ENCOUNTER
"Requested Prescriptions   Pending Prescriptions Disp Refills     omeprazole (PRILOSEC) 40 MG DR capsule [Pharmacy Med Name: OMEPRAZOLE DR CAPS 40MG]    Last Written Prescription Date:  11/29/2018  Last Fill Quantity: 90,  # refills: 0   Last office visit: 10/18/2018 with prescribing provider:  Nelly Pearl     Future Office Visit:     90 capsule 0     Sig: TAKE 1 CAPSULE DAILY 30 TO 60 MINUTES BEFORE A MEAL    PPI Protocol Passed - 3/3/2019 11:23 PM       Passed - Not on Clopidogrel (unless Pantoprazole ordered)       Passed - No diagnosis of osteoporosis on record       Passed - Recent (12 mo) or future (30 days) visit within the authorizing provider's specialty    Patient had office visit in the last 12 months or has a visit in the next 30 days with authorizing provider or within the authorizing provider's specialty.  See \"Patient Info\" tab in inbasket, or \"Choose Columns\" in Meds & Orders section of the refill encounter.             Passed - Medication is active on med list       Passed - Patient is age 18 or older       Passed - No active pregnacy on record       Passed - No positive pregnancy test in past 12 months          "

## 2019-03-05 NOTE — TELEPHONE ENCOUNTER
Prescription approved per [facility], [facility] or [facility] refill protocol.  Korina FLORES RN - Triage  [facility]

## 2019-03-15 NOTE — PROGRESS NOTES
ANTICOAGULATION FOLLOW-UP CLINIC VISIT    Patient Name:  Berenice Chaudhari  Date:  3/15/2019  Contact Type:  Telephone/ with patient and     SUBJECTIVE:     Patient Findings     Positives:   Missed doses    Comments:   Missed 3 days of warfarin prior to testing INR.   Currently does not have insurance coverage for patient INR.   reports having 1 strip left.  Wants to hold out on testing since we know why she was low.    Reports that they are getting her set up with Medicare.  Having some difficulties with forms however, they noted she should have coverage at the beginning of Apirl.  Discussed possibility of having to change from [employer] to another company for home monitoring and can assist once approved.           OBJECTIVE    INR   Date Value Ref Range Status   2019 1.2 (A) 0.9 - 1.1 Final       ASSESSMENT / PLAN  INR assessment SUB    Recheck INR In: 3 DAYS    INR Location Home INR      Anticoagulation Summary  As of 3/15/2019    INR goal:   2.0-3.0   TTR:   51.3 % (1.5 y)   INR used for dosin.2! (3/14/2019)   Warfarin maintenance plan:   2.5 mg (2.5 mg x 1) every Sun, Tue; 5 mg (2.5 mg x 2) all other days   Full warfarin instructions:   2.5 mg every Sun, Tue; 5 mg all other days   Weekly warfarin total:   30 mg   Plan last modified:   Korina Gracia RN (3/15/2019)   Next INR check:   3/27/2019   Target end date:   Indefinite    Indications    Long term current use of anticoagulant therapy [Z79.01]  Atrial flutter with rapid ventricular response (H) [I48.92]             Anticoagulation Episode Summary     INR check location:       Preferred lab:       Send INR reminders to:   [facility]    Comments:   5mg & 2.5mg tabs; HS /  (Ricardo) sets up her meds Cell# [phone] / Home INR monitor / motorized wheelchair      Anticoagulation Care Providers     Provider Role Specialty Phone number    Nelly Pearl MD Referring Internal Medicine [phone]            See  the Encounter Report to view Anticoagulation Flowsheet and Dosing Calendar (Go to Encounters tab in chart review, and find the Anticoagulation Therapy Visit)    Patient INR is sub therapeutic.  Patient  gave 5 mg last night which was a 9% increase in dose.  Will then continue with 2.5 mg on Sun, Tues, Thrus and 5 mg all other days and follow up with a re check per husbands request in 2 weeks.  Requested to test earlier however,  notes they have one strip left and concerns with insurance coverage.       Korina Dacosta RN

## 2019-03-15 NOTE — TELEPHONE ENCOUNTER
Non-detailed message left to return my call.   Korina FLORES RN - Triage  [facility]

## 2019-03-15 NOTE — TELEPHONE ENCOUNTER
Received call as on call MD from an outside INR service that INR was 1.2.  Discussed with patients  who was aware of result as he completed the testing at home.  He planned to increase her coumadin to 5mg tonight.  Will forward this to INR clinic, who will call patient tomorrow to establish plan for further coumadin use and INR testing.    Ruba Cuevas MD

## 2019-03-15 NOTE — TELEPHONE ENCOUNTER
See anticoagulation encounter for today.  Korina FLORES RN - Triage  [facility]

## 2019-03-27 NOTE — TELEPHONE ENCOUNTER
"Requested Prescriptions   Pending Prescriptions Disp Refills     pravastatin (PRAVACHOL) 10 MG tablet [Pharmacy Med Name: PRAVASTATIN TABS 10MG]  Last Written Prescription Date:  12/21/2018  Last Fill Quantity: 90 tablet,  # refills: 0   Last office visit: 10/18/2018 with prescribing provider:  Nelly Pearl MD    Future Office Visit:     90 tablet 0     Sig: TAKE 1 TABLET AT BEDTIME (NEED LABS BEFORE NEXT REFILL)    [facility] Protocol Passed - 3/27/2019 12:50 AM       Passed - LDL on file in past 12 months    Recent Labs   Lab Test 11/28/18  0914   LDL 58            Passed - No abnormal creatine kinase in past 12 months    No lab results found.            Passed - Recent (12 mo) or future (30 days) visit within the authorizing provider's specialty    Patient had office visit in the last 12 months or has a visit in the next 30 days with authorizing provider or within the authorizing provider's specialty.  See \"Patient Info\" tab in inbasket, or \"Choose Columns\" in Meds & Orders section of the refill encounter.             Passed - Medication is active on med list       Passed - Patient is age 18 or older       Passed - No active pregnancy on record       Passed - No positive pregnancy test in past 12 months          "

## 2019-03-28 NOTE — TELEPHONE ENCOUNTER
Prescription approved per [facility], [facility] or [facility] refill protocol.  Korina FLORES RN - Triage  [facility]

## 2019-03-29 NOTE — TELEPHONE ENCOUNTER
"Requested Prescriptions   Pending Prescriptions Disp Refills     gabapentin (NEURONTIN) 600 MG tablet 540 tablet 3     Sig: Take 2 tablets (1,200 mg) by mouth 3 times daily    There is no refill protocol information for this order        furosemide (LASIX) 20 MG tablet 90 tablet 0     Sig: Take 1 tablet (20 mg) by mouth daily    Diuretics (Including Combos) Protocol Failed - 3/29/2019  4:14 PM       Failed - Blood pressure under 140/90 in past 12 months    BP Readings from Last 3 Encounters:   10/18/18 144/77   10/04/18 128/68   10/01/18 122/66                Failed - Normal serum creatinine on file in past 12 months    Recent Labs   Lab Test 11/28/18  0914   CR 1.48*             Passed - Recent (12 mo) or future (30 days) visit within the authorizing provider's specialty    Patient had office visit in the last 12 months or has a visit in the next 30 days with authorizing provider or within the authorizing provider's specialty.  See \"Patient Info\" tab in inbasket, or \"Choose Columns\" in Meds & Orders section of the refill encounter.             Passed - Medication is active on med list       Passed - Patient is age 18 or older       Passed - No active pregancy on record       Passed - Normal serum potassium on file in past 12 months    Recent Labs   Lab Test 11/28/18  0914   POTASSIUM 4.7                   Passed - Normal serum sodium on file in past 12 months    Recent Labs   Lab Test 11/28/18  0914                Passed - No positive pregnancy test in past 12 months        minocycline (MINOCIN/DYNACIN) 50 MG capsule 180 capsule 0     Sig: TAKE 1 CAPSULE TWICE A DAY    Oral Acne/Rosacea Medications Protocol Failed - 3/29/2019  4:14 PM       Failed - Confirmation of diagnosis is required    Please confirm diagnosis is acne or rosacea.     If NOT acne or rosacea; refer request to provider for further evaluation.    If diagnosis IS acne or rosacea, OK to refill BASED ON PREVIOUS REFILL CLINICAL NOTE " "RECOMMENDATION.         Passed - Patient is 12 years of age or older       Passed - Recent (12 mo) or future (30 days) visit within the authorizing provider's specialty    Patient had office visit in the last 12 months or has a visit in the next 30 days with authorizing provider or within the authorizing provider's specialty.  See \"Patient Info\" tab in inbasket, or \"Choose Columns\" in Meds & Orders section of the refill encounter.             Passed - Medication is active on med list       Passed - No active pregnancy on record       Passed - No positive prenancy test is past 12 months        levothyroxine (SYNTHROID/LEVOTHROID) 75 MCG tablet 90 tablet 0     Sig: Take 1 tablet (75 mcg) by mouth daily    Thyroid Protocol Passed - 3/29/2019  4:14 PM       Passed - Patient is 12 years or older       Passed - Recent (12 mo) or future (30 days) visit within the authorizing provider's specialty    Patient had office visit in the last 12 months or has a visit in the next 30 days with authorizing provider or within the authorizing provider's specialty.  See \"Patient Info\" tab in inbasket, or \"Choose Columns\" in Meds & Orders section of the refill encounter.             Passed - Medication is active on med list       Passed - Normal TSH on file in past 12 months    Recent Labs   Lab Test 11/28/18  0914   TSH 3.33             Passed - No active pregnancy on record    If patient is pregnant or has had a positive pregnancy test, please check TSH.         Passed - No positive pregnancy test in past 12 months    If patient is pregnant or has had a positive pregnancy test, please check TSH.          buPROPion (WELLBUTRIN XL) 300 MG 24 hr tablet 90 tablet 0    SSRIs Protocol Failed - 3/29/2019  4:14 PM       Failed - PHQ-9 score less than 5 in past 6 months    Please review last PHQ-9 score.          Passed - Medication is Bupropion    If the medication is Bupropion (Wellbutrin), and the patient is taking for smoking cessation; OK " "to refill.         Passed - Medication is active on med list       Passed - Patient is age 18 or older       Passed - No active pregnancy on record       Passed - No positive pregnancy test in last 12 months       Passed - Recent (6 mo) or future (30 days) visit within the authorizing provider's specialty    Patient had office visit in the last 6 months or has a visit in the next 30 days with authorizing provider or within the authorizing provider's specialty.  See \"Patient Info\" tab in inbasket, or \"Choose Columns\" in Meds & Orders section of the refill encounter.            ferrous sulfate (FEROSUL) 325 (65 Fe) MG tablet 180 tablet 2     Sig: Start taking one tablet daily and increase to one tablet twice daily after two weeks if not consipated.    Iron Supplements Passed - 3/29/2019  4:14 PM       Passed - Patient is 12 years of age or older       Passed - Recent (12 mo) or future (30 days) visit within the authorizing provider's specialty    Patient had office visit in the last 12 months or has a visit in the next 30 days with authorizing provider or within the authorizing provider's specialty.  See \"Patient Info\" tab in inbasket, or \"Choose Columns\" in Meds & Orders section of the refill encounter.             Passed - Hgb OR Hct on record within the past 12 mos.    Patient need only have had a HGB or HCT on file in the past 12 mos. That result does not need to be normal.    Recent Labs   Lab Test 11/28/18  0914 10/18/18  0833 10/09/18  1346   HGB 10.9* 8.8* 8.9*     Recent Labs   Lab Test 11/28/18  0914 10/18/18  0833 10/09/18  1346   HCT 37.0 30.2* 30.5*       Please verify a HGB or HCT has been checked SINCE THE LAST DOSE CHANGE.           Passed - Medication is active on med list        metoprolol succinate ER (TOPROL-XL) 50 MG 24 hr tablet 90 tablet 0     Sig: Take 1 tablet (50 mg) by mouth daily    Beta-Blockers Protocol Failed - 3/29/2019  4:14 PM       Failed - Blood pressure under 140/90 in past 12 " "months    BP Readings from Last 3 Encounters:   10/18/18 144/77   10/04/18 128/68   10/01/18 122/66                Passed - Patient is age 6 or older       Passed - Recent (12 mo) or future (30 days) visit within the authorizing provider's specialty    Patient had office visit in the last 12 months or has a visit in the next 30 days with authorizing provider or within the authorizing provider's specialty.  See \"Patient Info\" tab in inbasket, or \"Choose Columns\" in Meds & Orders section of the refill encounter.             Passed - Medication is active on med list        nortriptyline (PAMELOR) 25 MG capsule 180 capsule 0    Tricyclic Agents ( Annual appt and no PHQ9) Failed - 3/29/2019  4:14 PM       Failed - Blood Pressure under 140/90 in past 12 mos    BP Readings from Last 3 Encounters:   10/18/18 144/77   10/04/18 128/68   10/01/18 122/66                Passed - Recent (12 mo) or future (30 days) visit within authorizing provider's specialty    Patient had office visit in the last 12 months or has a visit in the next 30 days with authorizing provider or within the authorizing provider's specialty.  See \"Patient Info\" tab in inbasket, or \"Choose Columns\" in Meds & Orders section of the refill encounter.             Passed - Medication is active on med list       Passed - Patient is age 18 or older       Passed - Patient is not pregnant       Passed - No positive pregnancy test on record in past 12 mos        omeprazole (PRILOSEC) 40 MG DR capsule 90 capsule 0     Sig: TAKE 1 CAPSULE DAILY 30 TO 60 MINUTES BEFORE A MEAL    PPI Protocol Passed - 3/29/2019  4:14 PM       Passed - Not on Clopidogrel (unless Pantoprazole ordered)       Passed - No diagnosis of osteoporosis on record       Passed - Recent (12 mo) or future (30 days) visit within the authorizing provider's specialty    Patient had office visit in the last 12 months or has a visit in the next 30 days with authorizing provider or within the authorizing " "provider's specialty.  See \"Patient Info\" tab in inbasket, or \"Choose Columns\" in Meds & Orders section of the refill encounter.             Passed - Medication is active on med list       Passed - Patient is age 18 or older       Passed - No active pregnacy on record       Passed - No positive pregnancy test in past 12 months        pravastatin (PRAVACHOL) 10 MG tablet 90 tablet 0    Statins Protocol Passed - 3/29/2019  4:14 PM       Passed - LDL on file in past 12 months    Recent Labs   Lab Test 11/28/18  0914   LDL 58            Passed - No abnormal creatine kinase in past 12 months    No lab results found.            Passed - Recent (12 mo) or future (30 days) visit within the authorizing provider's specialty    Patient had office visit in the last 12 months or has a visit in the next 30 days with authorizing provider or within the authorizing provider's specialty.  See \"Patient Info\" tab in inbasket, or \"Choose Columns\" in Meds & Orders section of the refill encounter.             Passed - Medication is active on med list       Passed - Patient is age 18 or older       Passed - No active pregnancy on record       Passed - No positive pregnancy test in past 12 months        pregabalin (LYRICA) 150 MG capsule 180 capsule 0     Sig: Take 1 capsule (150 mg) by mouth 2 times daily    There is no refill protocol information for this order        venlafaxine (EFFEXOR-XR) 150 MG 24 hr capsule 90 capsule 0     Sig: TAKE 1 CAPSULE EVERY EVENING    Serotonin-Norepinephrine Reuptake Inhibitors  Failed - 3/29/2019  4:14 PM       Failed - Blood pressure under 140/90 in past 12 months    BP Readings from Last 3 Encounters:   10/18/18 144/77   10/04/18 128/68   10/01/18 122/66                Failed - PHQ-9 score of less than 5 in past 6 months    Please review last PHQ-9 score.          Failed - Normal serum creatinine on file in past 12 months    Recent Labs   Lab Test 11/28/18  0914   CR 1.48*            Passed - Medication " "is active on med list       Passed - Patient is age 18 or older       Passed - No active pregnancy on record       Passed - No positive pregnancy test in past 12 months       Passed - Recent (6 mo) or future (30 days) visit within the authorizing provider's specialty    Patient had office visit in the last 6 months or has a visit in the next 30 days with authorizing provider or within the authorizing provider's specialty.  See \"Patient Info\" tab in inbasket, or \"Choose Columns\" in Meds & Orders section of the refill encounter.            warfarin (COUMADIN) 2.5 MG tablet 50 tablet 3     Sig: Take 1 tablet (2.5 mg) by mouth on TTSu (uses 5 mg tabs on MWFSa) or as directed by INR Clinic    Vitamin K Antagonists Failed - 3/29/2019  4:14 PM       Failed - INR is within goal in the past 6 weeks    Confirm INR is within goal in the past 6 weeks.     Recent Labs   Lab Test 03/14/19   INR 1.2*                      Passed - Recent (12 mo) or future (30 days) visit within the authorizing provider's specialty    Patient had office visit in the last 12 months or has a visit in the next 30 days with authorizing provider or within the authorizing provider's specialty.  See \"Patient Info\" tab in inbasket, or \"Choose Columns\" in Meds & Orders section of the refill encounter.             Passed - Medication is active on med list       Passed - Patient is 18 years of age or older       Passed - Patient is not pregnant       Passed - No positive pregnancy on file in past 12 months        warfarin (COUMADIN) 5 MG tablet 50 tablet 3     Sig: Take 1 tablet (5 mg) by mouth on MWFSa (uses 2.5 mg tabs on TTSu) or as directed by INR Clinic    Vitamin K Antagonists Failed - 3/29/2019  4:14 PM       Failed - INR is within goal in the past 6 weeks    Confirm INR is within goal in the past 6 weeks.     Recent Labs   Lab Test 03/14/19   INR 1.2*                      Passed - Recent (12 mo) or future (30 days) visit within the authorizing " "provider's specialty    Patient had office visit in the last 12 months or has a visit in the next 30 days with authorizing provider or within the authorizing provider's specialty.  See \"Patient Info\" tab in inbasket, or \"Choose Columns\" in Meds & Orders section of the refill encounter.             Passed - Medication is active on med list       Passed - Patient is 18 years of age or older       Passed - Patient is not pregnant       Passed - No positive pregnancy on file in past 12 months        diltiazem ER COATED BEADS (CARTIA XT) 120 MG 24 hr capsule 90 capsule 0     Sig: Take 1 capsule (120 mg) by mouth daily    There is no refill protocol information for this order          "

## 2019-03-29 NOTE — TELEPHONE ENCOUNTER
Per  patient is out of medications and [facility] requires 2 weeks to process a pharmacy to pharmacy transfer.

## 2019-03-29 NOTE — TELEPHONE ENCOUNTER
Called the Pt's  Ricardo back to discuss below. Left message. Need to get a better idea of what is going on. LM, waiting callback.     Linda Johnson RN -- [facility]

## 2019-03-29 NOTE — TELEPHONE ENCOUNTER
Spoke with Ricardo, the Pt's . The Pt now has new insurance and needs all of her prescriptions sent over to [facility] as their coverage does not include [facility] anymore.   [facility] told them it will take 2-3 weeks to process a transfer and the Pt will be needing medications in the next week or two.     I did issue most of the medications with the exception of Coumadin which I will send to the INR team, and Lyrica and Gabapentin which are controlled. Will route that to PCP.     Linda Johnson RN -- [facility]

## 2019-04-01 NOTE — TELEPHONE ENCOUNTER
Has the patient previously taken warfarin? yes  If yes, for what indication? Atrial Flutter    Does the patient have any of the following indications for a higher range of 2.5-3.5:    Mitral position mechanical valve? no    Angelica-Shiley, Ball and Cage or Monoleaflet valve (regardless of position) no    Other (if yes, please explain) no        Patient due for annual INR clinic referral:  Last referral placed 8/14/2017    Range:  2-3  Duration: lifetime    Order pended for provider to approve if appropriate  Korina FLORES RN - [facility]

## 2019-04-01 NOTE — PROGRESS NOTES
ANTICOAGULATION FOLLOW-UP CLINIC VISIT    Patient Name:  Berenice Chaudhari  Date:  2019  Contact Type:  Telephone/ with  for clinical assessment    SUBJECTIVE:     Patient Findings     Positives:   Missed doses, Change in medications    Comments:   Patient has been without many of her medications for about 1 week due to changing insurance.   will  today with new coverage and she will resume them.     Had been taking 2.5 mg on Thursday.  Maintenance plan indicates 5 mg.  Will resume to 5 mg on Thrusday as noted in the plan as well due to increased lows.               OBJECTIVE    INR   Date Value Ref Range Status   2019 1.1 0.8 - 1.1 Final       ASSESSMENT / PLAN  INR assessment SUB    Recheck INR In: 1 WEEK    INR Location Home INR      Anticoagulation Summary  As of 2019    INR goal:   2.0-3.0   TTR:   49.9 % (1.5 y)   INR used for dosin.1! (3/30/2019)   Warfarin maintenance plan:   2.5 mg (2.5 mg x 1) every Sun, Tue; 5 mg (2.5 mg x 2) all other days   Full warfarin instructions:   : 7.5 mg; Otherwise 2.5 mg every Sun, Tue; 5 mg all other days   Weekly warfarin total:   30 mg   Plan last modified:   Korina Gracia RN (3/15/2019)   Next INR check:   2019   Target end date:   Indefinite    Indications    Long term current use of anticoagulant therapy [Z79.01]  Atrial flutter with rapid ventricular response (H) [I48.92]             Anticoagulation Episode Summary     INR check location:       Preferred lab:       Send INR reminders to:   [facility]    Comments:   5mg & 2.5mg tabs; HS /  (Ricardo) sets up her meds Cell# [phone] / Home INR monitor / motorized wheelchair      Anticoagulation Care Providers     Provider Role Specialty Phone number    Nelly Pearl MD Referring Internal Medicine [phone]            See the Encounter Report to view Anticoagulation Flowsheet and Dosing Calendar (Go to Encounters tab in chart review, and  find the Anticoagulation Therapy Visit)    Patient INR is sub therapeutic today.  Patient will increase dose today to 7.5 mg and will also resume dosing as noted in maintenance plan and follow up in 1 weel or sooner if there are any concerns or problems. Patient has changed insurances and hopes to have test strips by the end of week.  Discussed needing OV next week if do not come in due to decreased INR and risk for patient.   agreed.    Discussed signs of clotting with patient  and when to seek care for concerns.  Patient verbalized understanding    Rationale to adjustments:  Dosage adjustment made based on physician directed care plan.      Korina Dacosta RN

## 2019-04-02 NOTE — TELEPHONE ENCOUNTER
Spoke to Tameka by phone.  [facility] ([facility] [employer] received the updated insurance information for patient.  They will process it and then order more supplies for the patient.    Mary Butcher RN  [facility]

## 2019-04-02 NOTE — TELEPHONE ENCOUNTER
Patient's , Ricardo, called.  She started on a new Medicare insurance yesterday and they are getting the run-around regarding where she can get her INR test strips and if they will be covered.  Gave him the phone number to [facility] [employer] because that is where he got her [employer] machine and maybe they can help him.  Phone number for Tameka at [employer] [facility] [employer]): [phone].    Mary Butcher RN  [facility]

## 2019-04-11 NOTE — PROGRESS NOTES
ANTICOAGULATION FOLLOW-UP     Patient Name:  Berenice Chaudhari  Date:  2019  Contact Type:  Telephone/ Fax  Received a fax from [employer] (formerly [employer]) regarding this patient.   She has a home INR machine and [facility] faxes in INR results.  Called and spoke to her  at cell number.  He does her INRs and manages medications.  Warfarin instructions given.    SUBJECTIVE:     Patient Findings     Comments:   Unexplained INR  Patient denies: any missed doses,   increased intake of green vegetables,   medication changes, bleeding/bruising,   or signs/symptoms of a clot.    Dose increased for the next week.  Having trend of low INRs           OBJECTIVE    INR   Date Value Ref Range Status   04/10/2019 1.6 (A) 0.9 - 1.1 Final       ASSESSMENT / PLAN  INR assessment SUB    Recheck INR In: 1 WEEK    INR Location Home INR      Anticoagulation Summary  As of 2019    INR goal:   2.0-3.0   TTR:   48.9 % (1.6 y)   INR used for dosin.6! (4/10/2019)   Warfarin maintenance plan:   2.5 mg (2.5 mg x 1) every Sun, Tue; 5 mg (2.5 mg x 2) all other days   Full warfarin instructions:   : 7.5 mg; : 5 mg; : 5 mg; Otherwise 2.5 mg every Sun, Tue; 5 mg all other days   Weekly warfarin total:   30 mg   Plan last modified:   Korina Gracia RN (3/15/2019)   Next INR check:   2019   Target end date:   Indefinite    Indications    Long term current use of anticoagulant therapy [Z79.01]  Atrial flutter with rapid ventricular response (H) [I48.92]             Anticoagulation Episode Summary     INR check location:       Preferred lab:       Send INR reminders to:   [facility]    Comments:   5mg & 2.5mg tabs; HS /  (Ricardo) sets up her meds Cell# [phone] / Home INR monitor / motorized wheelchair      Anticoagulation Care Providers     Provider Role Specialty Phone number    Nelly Pearl MD Referring Internal Medicine [phone]            See  the Encounter Report to view Anticoagulation Flowsheet and Dosing Calendar (Go to Encounters tab in chart review, and find the Anticoagulation Therapy Visit)    Dosage adjustment made based on physician directed care plan.    Mary Butcher RN

## 2019-04-22 NOTE — PROGRESS NOTES
ANTICOAGULATION FOLLOW-UP     Patient Name:  Berenice Chaudhari  Date:  4/22/2019  Contact Type:  Telephone/ Fax  Received a fax from [employer] (formerly [employer] regarding this patient.   She has a home INR machine and [facility] faxes in INR results.  Called and spoke to her  at cell number.  Warfarin management instructions given.    SUBJECTIVE:     Patient Findings     Positives:   Missed doses, Extra doses    Comments:   Per :   She:  Missed dose on Thursday 4/18/19.  Took 7.5mg on Friday instead of 5mg as directed.  Took 5mg on Sunday instead of 2.5mg as directed.    Maintenance dose adjusted.             OBJECTIVE    INR   Date Value Ref Range Status   04/21/2019 3.2 (A) 0.9 - 1.1 Final       ASSESSMENT / PLAN  INR assessment SUPRA    Recheck INR In: 10 DAYS    INR Location Home INR      Anticoagulation Summary  As of 4/22/2019    INR goal:   2.0-3.0   TTR:   49.1 % (1.6 y)   INR used for dosing:   3.2! (4/21/2019)   Warfarin maintenance plan:   2.5 mg (2.5 mg x 1) every Mon; 5 mg (5 mg x 1) all other days   Full warfarin instructions:   2.5 mg every Mon; 5 mg all other days   Weekly warfarin total:   32.5 mg   Plan last modified:   Mary Butcher RN (4/22/2019)   Next INR check:   5/1/2019   Target end date:   Indefinite    Indications    Long term current use of anticoagulant therapy [Z79.01]  Atrial flutter with rapid ventricular response (H) [I48.92]             Anticoagulation Episode Summary     INR check location:       Preferred lab:       Send INR reminders to:   [facility]    Comments:   5mg & 2.5mg tabs; HS /  (Ricardo) sets up her meds Cell# [phone] / Home INR monitor / motorized wheelchair      Anticoagulation Care Providers     Provider Role Specialty Phone number    Nelly Pearl MD Referring Internal Medicine [phone]            See the Encounter Report to view Anticoagulation Flowsheet and Dosing Calendar (Go to Encounters tab  in chart review, and find the Anticoagulation Therapy Visit)    Dosage adjustment made based on physician directed care plan.    Mary Butcher RN

## 2019-05-02 NOTE — PROGRESS NOTES
ANTICOAGULATION FOLLOW-UP     Patient Name:  Berenice Chaudhari  Date:  5/2/2019  Contact Type:  Telephone/ Fax  Received a fax from [employer] (formerly [employer]) regarding this patient.   She has a home INR machine and [employer] faxes in INR results.  Called and spoke to her  at cell number.  Warfarin management instructions given.  He sets up her meds and does her INRs in the evening.    SUBJECTIVE:     Patient Findings     Positives:   Change in health, Change in activity, Change in diet/appetite    Comments:   Per :  Patient was running a fever Thursday 4/25 to Saturday 4/27/19  Temps 100.8 to 101.8.  No symptoms of infection or illness.  Temp did not reach 102 so they did not seek medical care.  Temps have returned to normal.  During time of elevated temp, she mostly stayed in bed.  Slept a lot and ate very little.   pushed fluids when he woke her to take meds.             OBJECTIVE    INR   Date Value Ref Range Status   05/02/2019 3.4 (A) 0.9 - 1.1 Final       ASSESSMENT / PLAN  No question data found.  Anticoagulation Summary  As of 5/2/2019    INR goal:   2.0-3.0   TTR:   48.3 % (1.6 y)   INR used for dosing:   3.4! (5/2/2019)   Warfarin maintenance plan:   2.5 mg (2.5 mg x 1) every Mon; 5 mg (5 mg x 1) all other days   Full warfarin instructions:   5/2: 2.5 mg; Otherwise 2.5 mg every Mon; 5 mg all other days   Weekly warfarin total:   32.5 mg   Plan last modified:   Mary Butcher RN (5/2/2019)   Next INR check:   5/9/2019   Target end date:   Indefinite    Indications    Long term current use of anticoagulant therapy [Z79.01]  Atrial flutter with rapid ventricular response (H) [I48.92]             Anticoagulation Episode Summary     INR check location:       Preferred lab:       Send INR reminders to:   [facility]    Comments:   5mg & 2.5mg tabs; HS /  (Ricardo) sets up her meds Cell# [phone] / Home INR monitor / motorized wheelchair       Anticoagulation Care Providers     Provider Role Specialty Phone number    Nelly Pearl MD Referring Internal Medicine [phone]            See the Encounter Report to view Anticoagulation Flowsheet and Dosing Calendar (Go to Encounters tab in chart review, and find the Anticoagulation Therapy Visit)    INR was supra therapeutic last night.  Patient will decrease dose by 50% today, then resume maintenance dose.   Will follow up in 1 week or sooner if needed.    Dosage adjustment made based on physician directed care plan.      Mary Butcher RN

## 2019-05-09 NOTE — PROGRESS NOTES
ANTICOAGULATION FOLLOW-UP CLINIC VISIT    Patient Name:  Berenice Chaudhari  Date:  2019  Contact Type:  Telephone/ Ricardo  for assessment    SUBJECTIVE:  Patient Findings     Comments:   The patient was assessed for diet, medication, and activity level changes, missed or extra doses, bruising or bleeding, with no problem findings over the phone with .          Clinical Outcomes     Comments:   The patient was assessed for diet, medication, and activity level changes, missed or extra doses, bruising or bleeding, with no problem findings over the phone with .             OBJECTIVE    INR   Date Value Ref Range Status   2019 2.7 (A) 0.8 - 1.1 Final       ASSESSMENT / PLAN  INR assessment THER    Recheck INR In: 1 WEEK    INR Location Home INR      Anticoagulation Summary  As of 2019    INR goal:   2.0-3.0   TTR:   48.2 % (1.7 y)   INR used for dosin.7 (2019)   Warfarin maintenance plan:   2.5 mg (2.5 mg x 1) every Mon; 5 mg (5 mg x 1) all other days   Full warfarin instructions:   2.5 mg every Mon; 5 mg all other days   Weekly warfarin total:   32.5 mg   No change documented:   Korina Gracia RN   Plan last modified:   Mary Butcher, RN (2019)   Next INR check:   2019   Target end date:   Indefinite    Indications    Long term current use of anticoagulant therapy [Z79.01]  Atrial flutter with rapid ventricular response (H) [I48.92]             Anticoagulation Episode Summary     INR check location:       Preferred lab:       Send INR reminders to:   [facility]    Comments:   5mg & 2.5mg tabs; HS /  (Ricardo) sets up her meds Cell# [phone] / Home INR monitor / motorized wheelchair      Anticoagulation Care Providers     Provider Role Specialty Phone number    Nelly Pearl MD Referring Internal Medicine [phone]            See the Encounter Report to view Anticoagulation Flowsheet and Dosing Calendar (Go to Encounters tab in  chart review, and find the Anticoagulation Therapy Visit)    Patient INR is therapeutic.  Patient will continue to take 32.5 mg weekly dosing and follow up in 1 week or sooner for any problems or concerns.        Korina Dacosta RN

## 2019-05-16 PROBLEM — J18.9 SEPSIS DUE TO PNEUMONIA (H): Status: ACTIVE | Noted: 2019-01-01

## 2019-05-16 PROBLEM — A41.9 SEPSIS DUE TO PNEUMONIA (H): Status: ACTIVE | Noted: 2019-01-01

## 2019-05-16 NOTE — ED NOTES
Bed: ED02  Expected date: 5/16/19  Expected time: 6:42 PM  Means of arrival: Ambulance  Comments:  BRIEN

## 2019-05-16 NOTE — ED PROVIDER NOTES
"  History     Chief Complaint:  Shortness of breath and altered mental status    HPI   History is limited due to the patient's altered mental status.    Berenice Chaudhari is a 79 year old female with a history of hypertension, hyperlipidemia, CHF, and atrial flutter on coumadin who presents to the ED via EMS for evaluation of shortness of breath and altered mental status. EMS report that they were called due to shortness of breath. Temperature was 100.5 for EMS en route. Upon laying down on the gurney, EMS heard crackles in her lungs. Due to concern for pulmonary edema, EMS started the patient on a nitroglycerin drip. EMS also report that patient was struggling with hallucinations en route but per , patient is alert and oriented. BP en route was 220/100. Here, patient denies experiencing any symptoms and says she is here because her  and sister were concerned that she was \"looking at colorful things.\" When asked what year it was, patient said 1919. She has no complaints here and denies any chest pain, shortness of breath, cough, leg swelling, abdominal pain, or any other symptoms. Of note, patient has a rash on her right inner thigh area that is itchy but states this rash comes and goes; it is not new for her. Patient's  states she was feeling well yesterday and had a sudden onset of confusion and symptoms today. He also states that this rash as stated prior is new.     Allergies:  Ceftriaxone  Nafcillin  Penicillins  Vancomycin  Codeine  Clindamycin  Levaquin  Zithromax     Medications:    buPROPion (WELLBUTRIN XL) 300 MG 24 hr tablet  CEPHALEXIN PO  diltiazem ER COATED BEADS (CARTIA XT) 120 MG 24 hr capsule  ferrous sulfate (FEROSUL) 325 (65 Fe) MG tablet  furosemide (LASIX) 20 MG tablet  gabapentin (NEURONTIN) 600 MG tablet  HYDROcodone-acetaminophen (NORCO) 5-325 MG per tablet  levothyroxine (SYNTHROID/LEVOTHROID) 75 MCG tablet  lidocaine (LIDODERM) 5 % Patch  metoprolol succinate ER " (TOPROL-XL) 50 MG 24 hr tablet  minocycline (MINOCIN/DYNACIN) 50 MG capsule  Multiple Vitamins-Minerals (CENTRUM SILVER) per tablet  nortriptyline (PAMELOR) 25 MG capsule  omeprazole (PRILOSEC) 40 MG DR capsule  pravastatin (PRAVACHOL) 10 MG tablet  pregabalin (LYRICA) 150 MG capsule  venlafaxine (EFFEXOR-XR) 150 MG 24 hr capsule  warfarin (COUMADIN) 5 MG tablet     Past Medical History:    Allergic rhinitis  Anxiety  Arrhythmia  Atrial flutter with RVR  Cellulitis and abscess of leg  Chronic pain  Chronic renal disease  Contact dermatitis  Erythroderma desquamativum  Generalized osteoarthrosis  GERD  Herpes zoster  Hyperlipidemia  Hypertension   Methicillin susceptible Staphylococcus aureus septicemia  Obesity  Pneumonia  Urticaria  Subclinical hypothyroidism  Iron deficiency anemia  Acute on chronic diastolic CHF    Past Surgical History:    Biopsy  Dental surgery  T&A  Laparoscopic cholecystectomy   I&D, left hip  Cataract, right  ENT surgery  EGD, combined  Phacoemulsification clear cornea with standard intraocular lens implant, left    Family History:    MI    Social History:  Smoking status: Former  Alcohol use: Yes  Marital Status:       Review of Systems   Unable to perform ROS: Mental status change       Physical Exam     Patient Vitals for the past 24 hrs:   BP Temp Temp src Pulse Heart Rate Resp SpO2   05/16/19 2118 -- 101.2  F (38.4  C) Oral -- -- -- --   05/16/19 2115 144/59 -- -- 80 80 16 99 %   05/16/19 2100 144/58 -- -- 78 80 16 96 %   05/16/19 2010 -- -- -- -- 80 12 100 %   05/16/19 1930 179/80 -- -- 76 78 19 95 %   05/16/19 1920 (!) 177/99 -- -- 78 -- -- 95 %   05/16/19 1857 (!) 135/92 101.8  F (38.8  C) Oral 82 -- 26 98 %     Physical Exam  Constitutional: vitals reviewed  HENT: Moist oral mucosa.   Eyes: Grossly normal vision. Pupils are equal and round. Extraocular movements intact.  Sclera clear and without icterus. Conjunctiva without pallor.  Cardiovascular: Normal rate. Regular  rhythm. S1 and S2 without audible murmurs. 2+ radial pulses. Normal capillary refill.  Respiratory: Effort normal.  Decreased bibasilar breath sounds.  No wheezes or crackles.  Gastrointestinal: Soft. No distension. No tenderness to palpation. No rebound or guarding.  Neurologic: Alert and awake. Oriented x2. Not to year or situation. Coordinated movement of extremities. Speech normal.  Skin: Blanching erythema with some induration from the right inner thigh to the upper thigh.  No areas of bullae or fluctuance.  Musculoskeletal: No gross deformity. No joint swelling.  Psychiatric: Appropriate affect. Behavior is normal. Intact recent and remote memory. Linear thought process.      Emergency Department Course   ECG (19:01:19):  Rate 83 bpm. OR interval 188. QRS duration 162. QT/QTc 392/460. P-R-T axes 64 135 10. Normal sinus rhythm with sinus arrhythmia. Right bundle branch block. Abnormal ECG. Interpreted at 1905 by Fabio De Jesus MD.    Imaging:  Radiographic findings were communicated with the patient and family who voiced understanding of the findings.    X-ray Chest, 2 views:  IMPRESSION: There are subtle left greater than right lung opacities  which are more conspicuous compared to the prior exam, suspicious for  edema or infection (similar in appearance compared to 4/10/2018). No  evidence of pleural effusion. Cardiac silhouette is mildly enlarged,  similar compared to the prior exam.    Result per radiology.     Laboratory:  Blood Culture x2: Pending  1916 - ISTAT Lactate: pH 7.40, pCO2 46, pO2 23 (L), Bicarbonate 28, Lactic Acid 0.9  CBC: WBC 15.2 (H),  (L) o/w WNL (HGB 12.6)  CMP: Glucose 112 (H), Creatinine 1.42 (H), GFR 35 (L), Albumin 3.2 (L), AST 47 (H) o/w WNL  Lactic Acid: 1.0  TSH: 1.98  Blood Gas Venous: pH 7.41;  pCO2 48; pO2 24 (L); HCO3 31 (H), Base excess 5.2.  UA: Trace blood, Protein albumin 20, Positive nitrite, Moderate leukocyte esterase, WBC 18 (H), RBC 8 (H), Many bacteria,  Mucous present o/w Negative    Procedures:    Limited Bedside Cardiac Ultrasound, performed and interpreted by me.   Indication: Shortness of Breath.  Parasternal long axis, subcostal and IVC views were acquired.   Image quality was satisfactory.    Findings:    Global left ventricular function appears intact.  Chambers do not appear dilated.  There is no evidence of free fluid within the pericardium.  IVC without variability.  No B lines.    IMPRESSION: Grossly normal left ventricular function and chamber size.  No pericardial effusion.  Interventions:  1919: Tylenol 975 mg tablet PO  1950: Lactated ringers 500 mL IV Bolus  2106: Invanz 1 g vial to attach to 100 mL NS IV  2149: Zyvox 600 mg IV infusion    Emergency Department Course:  The patient arrived in the emergency department via EMS.  Past medical records, nursing notes, and vitals reviewed.  1853: I performed an exam of the patient and obtained history, as documented above.     1911: I rechecked the patient and spoke with .    2049: I rechecked the patient. Explained findings to patient and .    Findings and plan explained to the Patient and spouse who consents to admission.     2107: Discussed the patient with Dr. Jones, who will admit the patient to an inpatient bed for further monitoring, evaluation, and treatment.     Impression & Plan      Medical Decision Making:  Patient presents with confusion, fever, hypoxia.  Prehospital he, she was started on a nitro drip for hypertension.  This was stopped immediately and did not need to be continued as her blood pressures remain normal.  I suspect confusion related to infection.  On exam, she does have evidence of a rash on the right inner thigh region that could possibly be cellulitis.  She also is noted to have some hypoxia which raises concern for pneumonia.  Chest x-ray does show some evidence of infiltrate.  Blood cultures were collected x2.  Lactate is normal and given that her vitals are  stable, I do not see a need for large volume fluid resuscitation.  She was only given 500 cc of crystalloid based on her normal heart rate and an IVC ultrasound showing no collapsing dynamics.  Urine was obtained via straight cath, but the result was not available while the patient was in the emergency department.  She will be admitted to the hospital for IV antibiotics.  Based on previous allergies and to cover for pneumonia and skin and soft tissues infection in the setting of MRSA, ertapenem and linezolid were given.  She will be admitted to the hospital for ongoing monitoring.  Her care was signed out to the hospitalist and she left the emergency department in stable condition.  Critical Care time:  none    Diagnosis:    ICD-10-CM    1. Community acquired pneumonia of left lung, unspecified part of lung J18.9 UA with Microscopic     Nt probnp inpatient     Troponin I     INR   2. Cellulitis, unspecified cellulitis site L03.90        Disposition:  Admitted to Dr. Jones.      Danielle Barkley  5/16/2019   [facility] EMERGENCY DEPARTMENT  I, Danielle Barkley, am serving as a scribe at 6:53 PM on 5/16/2019 to document services personally performed by Fabio De Jesus MD based on my observations and the provider's statements to me.        Fabio De Jesus MD  05/16/19 0608

## 2019-05-16 NOTE — ED TRIAGE NOTES
EMS called for SOB and AMS.  On EMS arrival Patients BP was 220/100 and patient was very SOB.  EMS started a nitroglycerin drip for concern for pulmonary edema.      Also noted that patient had a fever of 100.5.      Patient struggling with hallucinations in ambulance.  Per .  Patient is usually alert and oriented x 4.      Patient has not taken any of her regular meds today.      ABCs intact.

## 2019-05-16 NOTE — LETTER
Key Recommendations:  Patient currently receives the following services:  She has someone that stays with her and helps her with cares when her  is out of town for work.  This is private pay.          Identified issues/concerns regarding health management: Pt is admitted with Sepsis and probable PNA.  We did discuss the PNA action care plan.  She is aware of when to contact her MD.  She prefers to schedule her own f/u appt.  Pt has a WC that she uses.  She is able to transfer herself independently and do her own cares.  The person she private hires is there as a resource if needed.      Recommendations are ***    Cheryle Rico    AVS/Discharge Summary is the source of truth; this is a helpful guide for improved communication of patient story

## 2019-05-17 NOTE — PHARMACY-ADMISSION MEDICATION HISTORY
Admission medication history interview status for this patient is complete. See [facility] admission navigator for allergy information, prior to admission medications and immunization status.     Medication history interview source(s):Pt and   Medication history resources (including written lists, pill bottles, clinic record):Med list    Changes made to PTA medication list:  Added: none  Deleted: Luan montesn  Changed: none    Medication reconciliation/reorder completed by provider prior to medication history? No    For patients on insulin therapy: N (Y/N)  Lantus/levemir/NPH/Mix 70/30 dose:   (Y/N) (see Med list for doses)   Sliding scale Novolog Y/N  If Yes, do you have a baseline novolog pre-meal dose:  units with meals  Patients eat three meals a day:   Y/N    How many episodes of hypoglycemia do you have per week: _______  How many missed doses do you have per week: ______  How many times do you check your blood glucose per day: _______  Do you have a Continuous glucose monitor (CGM)   Y/N (remind pt that not approved for hospital use)   Any Barriers to therapy - Be specific :  cost of medications, comfortable with giving injections (if applicable), comfortable and confident with current diabetes regimen: Y/N ______________      Prior to Admission medications    Medication Sig Last Dose Taking? Auth Provider   buPROPion (WELLBUTRIN XL) 300 MG 24 hr tablet TAKE 1 TABLET EVERY MORNING 5/15/2019 at am Yes Nelly Pearl MD   diltiazem ER COATED BEADS (CARTIA XT) 120 MG 24 hr capsule Take 1 capsule (120 mg) by mouth daily 5/15/2019 at supper Yes Nelly Pearl MD   ferrous sulfate (FEROSUL) 325 (65 Fe) MG tablet Start taking one tablet daily and increase to one tablet twice daily after two weeks if not consipated.  Patient taking differently: Take 325 mg by mouth 2 times daily Start taking one tablet daily and increase to one tablet twice daily after two weeks if not consipated. 5/15/2019 at am Yes  Nelly Pearl MD   furosemide (LASIX) 20 MG tablet Take 1 tablet (20 mg) by mouth daily 5/15/2019 at am Yes Nelly Pearl MD   gabapentin (NEURONTIN) 600 MG tablet Take 2 tablets (1,200 mg) by mouth 3 times daily 5/15/2019 at Unknown time Yes Nelly Pearl MD   levothyroxine (SYNTHROID/LEVOTHROID) 75 MCG tablet Take 1 tablet (75 mcg) by mouth daily 5/15/2019 at am Yes Nelly Pearl MD   metoprolol succinate ER (TOPROL-XL) 50 MG 24 hr tablet Take 1 tablet (50 mg) by mouth daily 5/15/2019 at dinner Yes Nelly Pearl MD   minocycline (MINOCIN/DYNACIN) 50 MG capsule TAKE 1 CAPSULE TWICE A DAY 5/15/2019 at Unknown time Yes Nelly Pearl MD   Multiple Vitamins-Minerals (CENTRUM SILVER) per tablet Take 1 tablet by mouth daily 5/15/2019 at am Yes Unknown, Entered By History   nortriptyline (PAMELOR) 25 MG capsule TAKE 2 CAPSULES AT BEDTIME 5/15/2019 at Unknown time Yes Nelly Pearl MD   omeprazole (PRILOSEC) 40 MG DR capsule TAKE 1 CAPSULE DAILY 30 TO 60 MINUTES BEFORE A MEAL 5/15/2019 at Unknown time Yes Nelly Pearl MD   pravastatin (PRAVACHOL) 10 MG tablet TAKE 1 TABLET AT BEDTIME (NEED LABS BEFORE NEXT REFILL) 5/15/2019 at Unknown time Yes Nelly Pearl MD   pregabalin (LYRICA) 150 MG capsule Take 1 capsule (150 mg) by mouth 2 times daily 5/15/2019 at Unknown time Yes Nelly Pearl MD   venlafaxine (EFFEXOR-XR) 150 MG 24 hr capsule TAKE 1 CAPSULE EVERY EVENING 5/15/2019 at Unknown time Yes Nelly Pearl MD   warfarin (COUMADIN) 2.5 MG tablet Take 1 tablet (2.5 mg) by mouth on Monday (uses 5 mg tabs on all other days) or as directed by INR Clinic  Yes Nelly Pearl MD   warfarin (COUMADIN) 5 MG tablet Take 1 tablet (5 mg) by mouth on TWTFSS (uses 2.5 mg tabs on Mon) or as directed by INR Clinic 5/15/2019 at Unknown time Yes Nelly Pearl MD   CEPHALEXIN PO Take 500 mg by mouth 3 times daily as needed cellulitis   Unknown, Entered  By History   HYDROcodone-acetaminophen (NORCO) 5-325 MG per tablet Take 1 tablet by mouth every 6 hours as needed for severe pain   Foote, Arnold Cano DO   lidocaine (LIDODERM) 5 % Patch Place 3 patches onto the skin daily as needed for moderate pain (Apply up to 3 patches to painful area at once for up to 12 h within a 24 h period.  Remove after 12 hours.) prn  Unknown, Entered By History   order for DME Equipment being ordered: padded transfer belt   Nelly Pearl MD

## 2019-05-17 NOTE — PLAN OF CARE
"Pt up with 2 assist to wheelchair and then taken to bathroom and then up in chair. She pivot transfers and doesn't take many steps. PT? Alert and oriented today but cannot remember how she got to hospital. She did see \"rats\" outside her window on the building at one time. Incontinent due to large habitus and legs large and sticking together. Reddened and some bleeding under left breast. WOC came and applied miconazole and will continue to be vigilant about using it. Rafael used this am but doesn't work super well on her. Lungs diminshed with crackles and occasional cough with deep breaths but nonproductive. Afebrile today. Fair appetite. Pleasant but wants to rest. Up in chair for a few hours this am.  to visit. WIll monitor.  "

## 2019-05-17 NOTE — PLAN OF CARE
Tmax overnight 99.1. No longer requiring supplemental O2. Continues to at times be tachypneic and SOB, especially with repositioning. Pt has congested but nonproductive cough. Skin red beneath breasts, abdominal folds and groin- miconazole powder applied. Open area to bottom- cleansed and barrier cream applied. Red area to right inner thigh- area not swollen, pt denies itchiness or pain to area. Pt a heavy 2 assist to reposition in bed- turning/repositioning q2 hours and checking for incontinence- purewick in. Pt receiving invanz and zyvox for abx. Lift/total care. Regular diet.    Triggered sepsis, lactic 0.5.

## 2019-05-17 NOTE — PROGRESS NOTES
[facility] Nurse Inpatient Skin Assessment     Initial Assessment of:   Skin folds and cleft      Data:   Patient History:      per MD note(s):  79 year old female with PMH including atrial flutter on warfarin, CKD stage III, HTN, GERD, HLD, diastolic CHF, hypothyroidism, anxiety, obesity, chronic pain syndrome, and multiple previous infections with pneumonia, UTI, and cellulitis who has an extensive allergy list to multiple antibiotics who presents via EMS due to confusion and fevers.    Heber Assessment and sub scores:   Heber Risk Assessment    Sensory Perception: 3-->slightly limited    Moisture: 3-->occasionally moist   Activity: 1-->bedfast     Mobility: 2-->very limited   Nutrition: 3-->adequate   Friction and Shear: 2-->potential problem    Heber Score: 14         Mattress:  Standard , Atmos Air mattress    Moisture Management:  Incontinence Protocol    Catheter secured? Not applicable    Current Diet / Nutrition:       Orders Placed This Encounter        Combination Diet Regular Diet Adult        Labs:   Recent Labs   Lab Test 05/17/19  0511 05/16/19  1914  03/18/15  1600  08/11/14  1455   ALBUMIN  --  3.2*   < >  --    < >  --    HGB 11.4* 12.6   < > 13.3   < > 15.2   INR 3.09* 3.01*   < >  --   --   --    WBC 9.5 15.2*   < > 5.5   < > 6.1   A1C  --   --   --   --   --  5.8   CRP  --   --   --  8.4*  --  14.1*    < > = values in this interval not displayed.         Skin Assessment (location):   Gluteal cleft and all skin folds  History:  History of incontinence due to obesity, pt reports she has been using creams on all skin folds with no improvement      Skin folds under Breast/ Pannus and groin noted to have red rash and superficial denuded, erosion of epidermis, erythema,  and Gluteal cleft erosion 3 x 0.2 x 0.1cm moist pink dermis,      Drainage:  Scant bloody drainage due to erosion under skin friction    Odor: mild related to fungal infection    Pain:  minimal , tender           Intervention:     Patient's chart evaluated.      Cares performed: per POC with RN    Orders  Written    Supplies  ordered: air bed and supplies floor stock in room    Discussed plan of care with Patient, Family and Nurse          Assessment:      Morbid obese deep skin folds with incontinence and superficial erosion of skin and fungal rash.   Gluteal cleft superficial erosion due to moisture and not pressure.          Plan:   Nursing to notify the Provider(s) and re-consult the WOC Nurse if skin deteriorate(s).    Skin care plan for skin folds and Buttocks: BID and PRN  1. Gently wipe skin with wipe and Flores, no rinse  2. Lightly dust the skin with Antifungal powder and swipe across to cover the folds deeply and all crevices  3. Apply light layer of Critic aid paste to all erosion and open skin areas.  4. Air Bed    WOC Nurse will return: completed, please reconsult if no improvement

## 2019-05-17 NOTE — ED NOTES
[facility]  ED Nurse Handoff Report    Berenice Chaudhari is a 79 year old female   ED Chief complaint: Shortness of Breath and Altered Mental Status  . ED Diagnosis:   Final diagnoses:   None     Allergies:   Allergies   Allergen Reactions     Ceftriaxone Anaphylaxis and Rash     Rocephin given. Developed rash to back and abd, awaiting to see if it improves with d/c of rocephin.        Nafcillin Rash     diffuse severe rash in hospital 2/05     Penicillins Anaphylaxis     Vancomycin Anaphylaxis and Rash     Codeine Fatigue     Sleeps continuously     Clindamycin Rash     received information from patient     Levaquin [Levofloxacin] Hives     Oral medication      Zithromax [Azithromycin] Rash       Code Status: DNR / DNI last documented   Activity level - Baseline/Home:  Stand with Assist. Activity Level - Current:   Total Care. Lift room needed: Yes. Bariatric: No   Needed: No   Isolation: No. Infection: Not Applicable.     Vital Signs:   Vitals:    05/16/19 2010 05/16/19 2100 05/16/19 2115 05/16/19 2118   BP:  144/58 144/59    Pulse:  78 80    Resp: 12 16 16    Temp:    101.2  F (38.4  C)   TempSrc:    Oral   SpO2: 100% 96% 99%        Cardiac Rhythm:  ,      Pain level:    Patient confused: Yes, lucid at this time, but was confused earlier. Patient Falls Risk: Yes.   Elimination Status: ivett Hall  Patient Report - Initial Complaint: SOB, AMS. Focused Assessment:      20:26 Neurological Cognitive - Cognitive/Neuro/Behavioral WDL: -WDL except; all  Level Of Consciousness: alert  Arousal Level: opens eyes spontaneously  Orientation: oriented x 4  Follows Commands: yes  Speech: clear; spontaneous; logical  Best Language: 0 - No aphasia   Kalpesh Coma Scale - Best Eye Response: 4-->(E4) spontaneous  Best Motor Response: 6-->(M6) obeys commands  Best Verbal Response: 5-->(V5) oriented  Kalpesh Coma Scale Score: 15  MH     20:24 Respiratory Respiratory - Respiratory WDL: -WDL except; all   Rhythm/Pattern, Respiratory: depth regular; tachypneic; no shortness of breath reported  Breath Sounds: JROGE; RUL; LLL; RML; RLL  Cough Frequency: frequent  Cough Type: congested  LLL Breath Sounds: diminished  RML Breath Sounds: diminished  RLL Breath Sounds: diminished         Tests Performed: Labs, Xray, cultures, UA pending. Abnormal Results:   Labs Ordered and Resulted from Time of ED Arrival Up to the Time of Departure from the ED   CBC WITH PLATELETS DIFFERENTIAL - Abnormal; Notable for the following components:       Result Value    WBC 15.2 (*)     Platelet Count 148 (*)     Absolute Neutrophil 13.8 (*)     Absolute Lymphocytes 0.6 (*)     All other components within normal limits   COMPREHENSIVE METABOLIC PANEL - Abnormal; Notable for the following components:    Glucose 112 (*)     Creatinine 1.42 (*)     GFR Estimate 35 (*)     GFR Estimate If Black 41 (*)     Albumin 3.2 (*)     AST 47 (*)     All other components within normal limits   BLOOD GAS VENOUS - Abnormal; Notable for the following components:    PO2 Venous 24 (*)     Bicarbonate Venous 31 (*)     All other components within normal limits   ISTAT  GASES LACTATE KAYLA POCT - Abnormal; Notable for the following components:    PO2 Venous 23 (*)     All other components within normal limits   LACTIC ACID WHOLE BLOOD   TSH WITH FREE T4 REFLEX   ROUTINE UA WITH MICROSCOPIC   NT PROBNP INPATIENT   TROPONIN I   INR   BLOOD CULTURE   BLOOD CULTURE     XR Chest 2 Views   Final Result   IMPRESSION: There are subtle left greater than right lung opacities   which are more conspicuous compared to the prior exam, suspicious for   edema or infection (similar in appearance compared to 4/10/2018). No   evidence of pleural effusion. Cardiac silhouette is mildly enlarged,   similar compared to the prior exam.        INOCENCIO LOMBARDI MD      POC US ECHO LIMITED    (Results Pending)   .   Treatments provided: IV fluids, abx, O2 therapy   Family Comments:  cam at  bedside   OBS brochure/video discussed/provided to patient:  N/A  ED Medications:   Medications   linezolid (ZYVOX) infusion 600 mg (has no administration in time range)   ertapenem (INVanz) 1 g vial to attach to  mL bag (1 g Intravenous New Bag 5/16/19 2106)   acetaminophen (TYLENOL) tablet 975 mg (975 mg Oral Given 5/16/19 1919)   lactated ringers BOLUS 500 mL (0 mLs Intravenous Stopped 5/16/19 2116)     Drips infusing:  No  For the majority of the shift, the patient's behavior Green. Interventions performed were NA.     Severe Sepsis OR Septic Shock Diagnosis Present: No      ED Nurse Name/Phone Number: Javan Elias,   9:24 PM  RECEIVING UNIT ED HANDOFF REVIEW    Above ED Nurse Handoff Report was reviewed: Yes  Reviewed by: Maren Lynne on May 16, 2019 at 10:00 PM

## 2019-05-17 NOTE — PHARMACY-ANTICOAGULATION SERVICE
Clinical Pharmacy - Warfarin Dosing Consult     Pharmacy has been consulted to manage this patient s warfarin therapy.  Indication: Atrial Fibrillation  Therapy Goal: INR 2-3  Warfarin Prior to Admission: Yes  Warfarin PTA Regimen: warfarin 5mg [id number], 2.5mg on M  Recent documented change in oral intake/nutrition: Unknown    INR   Date Value Ref Range Status   05/16/2019 3.01 (H) 0.86 - 1.14 Final   05/09/2019 2.7 (A) 0.8 - 1.1 Final       Recommend warfarin 0 mg today.  Pharmacy will monitor Berenice Chaudhari daily and order warfarin doses to achieve specified goal.      Please contact pharmacy as soon as possible if the warfarin needs to be held for a procedure or if the warfarin goals change.

## 2019-05-17 NOTE — CONSULTS
Consult Date:  05/17/2019      LOCATION:  Room [facility].      REFERRING PHYSICIAN:  Dr. Stephenson.      IMPRESSIONS:   1.  A 79-year-old female, very well known to me from numerous prior admissions, admitted with another episode of apparent acute sepsis, first episode in more than a year.  Has fever, malaise, confusion.  Slight infiltrate seen on imaging and some cough acutely, so pneumonia seems likely.  Historically when these episodes occur leg cellulitis becomes evident while treating, her thigh has some mild erythema present, conceivable early cellulitis.   2.  History of numerous prior episodes of sepsis, generally leg cellulitis, but also pneumonia, UTI and unexplained sepsis historically.   3.  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  I felt for the last several years it probably was not a chronic infection present but Dr. Judge had on the long-term treatment program and that hip has done well, so we have continued.   4.  Prior history of methicillin-resistant Staphylococcus aureus colonization, but no infections with this organism in a long period of time.   5.  Allergies to PENICILLIN, CEPHALOSPORINS, ZITHROMAX, CLINDAMYCIN, VANCOMYCIN.  She has been able to tolerate carbapenems, linezolid, daptomycin historically.  I think when needed vancomycin, likely okay despite the allergy as it has been mostly a red man type syndrome.  Her current list also now includes LEVAQUIN, not clear from history where that occurred.  She actually took LEVAQUIN as recently as 1 year ago.   6.  History of chronic mild renal insufficiency.   7.  Chronic obesity.   8.  Chronic postherpetic neuralgia.   9.  Morbid obesity.   10.  Frequent abnormal urinalyses without clearcut true UTIs.      RECOMMENDATIONS:   1.  Currently would do meropenem, also getting linezolid.  Overall, doubt this is MRSA.  If no  cultures demand it can discontinue this soon.  Watch leg, pulmonary symptoms and cultures and readjust accordingly.   2.  If she gets better rapidly with cultures negative, conceivably oral therapy, generally has been a quinolone, but now listed as LEVAQUIN allergic.   3.  If slow to clinically improve continue IV antibiotics through the weekend and then reassess.      HISTORY OF PRESENT ILLNESS:  This 79-year-old female is very well known to us from multiple prior admissions.  The patient has a history of a chronic left hip infection, has been on minocycline for many years through Dr. Judge.  She has a history of multiple recurrent episodes of sepsis generally hospitalized in the [facility] system.  We have seen her on a number of occasions.  Previously she has had some episodes of bacteremia, most have been leg cellulitis often fairly severe.  We have never had a clearcut organism in the cellulitis episodes.  She has had several episodes where there was possible respiratory infection and at least some with probable UTIs.  She frequently has had abnormal urinalyses and urine cultures which were not clearly the cause of her illness.      The current episode is very similar to many other episodes including the one 1 year ago.  She presents with confusion, fever, malaise and on this occasion some cough.  She has had no urinary tract symptoms, did not think there was any skin or musculoskeletal symptoms present.  Of note, on several prior occasions there would not be obvious cellulitis when she first presents, only to turn up later.  She does have some slight erythema in her calf area and thigh.      PAST MEDICAL HISTORY:  Numerous prior episodes of sepsis.  History of long-term left total hip arthroplasty infection, history of chronic congestive heart failure, history of mild chronic renal insufficiency, history of postherpetic neuralgia.      ALLERGIES:  PENICILLINS and CEPHALOSPORINS with definite prior allergic  reactions, has tolerated carbapenems.  Vancomycin has been primarily red man syndrome.  She has tolerated daptomycin, linezolid in the past.  She is now listed as LEVAQUIN on the current listing but do not think that is accurate, got LEVAQUIN as recently as 1 year ago.      SOCIAL AND FAMILY HISTORY:  No recent travels or exposures.  Has known MRSA but has not been any actual involved infection by culture in some time.      REVIEW OF SYSTEMS:  Largely as listed above, confusion has lessened, not feeling fever symptoms, currently feels improved.      PHYSICAL EXAMINATION:   GENERAL:  The patient appears her stated age, does not look particularly toxic or ill.   VITAL SIGNS:  Currently normal, including being afebrile.  Earlier temperature 101+.   HEENT:  No thrush or intraoral lesions.  Pupils reactive.  No facial skin rashes.   NECK:  Supple and nontender, no lymphadenopathy or thyromegaly.   HEART:  Slight systolic murmur.  Mild tachycardia in the 90s.   LUNGS:  Crackles at both bases, oxygenating well, not requiring oxygen at present.     EXTREMITIES:  No significant rashes.  Slight erythema in the right medial thigh area seems slightly warm to the touch, slight erythema in the distal calf area, but not tender.  Joints all look okay including her hip.      LABORATORY:  Cultures all pending.  Creatinine 1.28, which is about at baseline.  White blood cell count was 15,200, now down to 9500.  Urine unremarkable.  Chest x-ray with slight infiltrate.      Thank you very much for the consultation.  I will follow the patient with you.         TOOTIE SUNSHINE MD             D: 2019   T: 2019   MT: [id number]      Name:     EDDIE MONTEIRO   MRN:      [id number]        Account:       [id number]   :      1940           Consult Date:  2019      Document: [id number]       cc: Nelly Stephenson MD

## 2019-05-17 NOTE — CONSULTS
ID consult dictated IMP 1 80 yo female. Acute fever, some resp sxs, sl R leg red, cxs pending    REC frank/zyvox, choice driven vy allergies    REC watch cxs, fever, sxs and adjust, likely IV thru WE then reconsider, call if issues this WE

## 2019-05-17 NOTE — PROGRESS NOTES
Pt arrives to [facility] at 2245. Vitals, height and weight obtained. Tele box ordered and placed on pt. Per tele tech she is showing up on monitor but they are unable to get a rhythm currently. Pt moving around quite a bit as we get her settled in bed. Noc RN to check back for rhythm. Unable to release signed and held orders because pharmacist working on orders and locked out currently. This is passed on to next shift. Pt has red yeast like rash under abdominal folds, under breasts and back of buttocks.Miconazole powder ordered. Mag ordered because pt on protocol. Report given to next shift.

## 2019-05-17 NOTE — CONSULTS
Care Transition Initial Assessment - RN        Met with: Patient.  DATA   Active Problems:    Sepsis due to pneumonia (H)       Cognitive Status: awake, alert and oriented.  [facility]  Primary Care MD Name: luis alfredo  Contact information and PCP information verified: Yes  Lives With: spouse   Living Arrangements: house                 Insurance concerns: No Insurance issues identified  ASSESSMENT  Patient currently receives the following services:  She has someone that stays with her and helps her with cares when her  is out of town for work.  This is private pay.          Identified issues/concerns regarding health management: Pt is admitted with Sepsis and probable PNA.  We did discuss the PNA action care plan.  She is aware of when to contact her MD.  She prefers to schedule her own f/u appt.  Pt has a WC that she uses.  She is able to transfer herself independently and do her own cares.  The person she private hires is there as a resource if needed.  Hand off will be given to Dr Pearl's RN CTS at time of discharge.      PLAN.  Patient given options and choices for discharge yes .  Patient/family is agreeable to the plan?  Yes:   Patient anticipates discharging to home .        Patient anticipates needs for home equipment: No  Plan/Disposition: Home   Appointments: She will schedule f/u appt.    Care  (CTS) will continue to follow as needed.    Nicole OWEN CTS [phone]

## 2019-05-17 NOTE — PROGRESS NOTES
[facility]    Hospitalist Progress Note  Provider : Zaynab Stephenson MD  Date of Service (when I saw the patient): 05/17/2019    Assessment & Plan    Berenice Chaudhari is a 79-year-old female with history of A.flutter on warfarin, CKD stage III, hypertension, GERD, hyperlipidemia, diastolic CHF, hypothyroidism, anxiety, obesity, chronic pain syndrome, prior history of pneumonia, UTI, cellulitis, multiple antibiotic allergies, was brought to emergency room on 5/16 for evaluation for confusion and fevers.  Patient had fever of 101  F at home.  Patient is on 4 L of oxygen at baseline.  She does have chronic cough but denies chest pain.  In the ER, she was noted to have a fever of 101.8.  Pulse 76, oxygen saturation in mid 80s on room air and 98% on 4 L.  Lab work-up showed  WBC 15.2, creatinine 1.42, BNP 2568, TSH 1.98, lactic acid 1.  Urinalysis showed positive nitrite, moderate leukocyte esterase, 18 WBC/hpf.  Chest x-ray showed bilateral opacities right greater than left.  Blood culture and urine culture obtained.  She was started on meropenem and Zyvox due to her multiple antibiotic allergies.  ID consulted.    1. Sepsis, suspect pneumonia, UTI:  --She presented with confusion and fevers to 101.8. She was hypoxic on presentation.  Chest x-ray showed bilateral opacities right greater than left, suspected to be secondary to pneumonia.  She was started on IV meropenem with Zyvox due to her multiple drug allergies, history of MRSA infection.  ID consulted.  Patient stated that her breathing is better today. We will continue antibiotics.  Will await ID input for further antibiotic recommendations.  -- Does have history of pneumonia, UTI, sialitis, and MRSA hip joint infection.  Likely on minocycline.  --Urinalysis showing 18 WBCs, moderate LE, and positive nitrites although no urinary symptoms. Urine culture growing lactose fermenting gram negative rods. Sensitivity pending  --Extensive allergy list  for antibiotics including anaphylaxis with cephalosporin, vancomycin, and penicillin.  Also rash with clindamycin, levofloxacin, and azithromycin. Tolerating current antibiotics.      2. Acute hypoxemic respiratory failure:   --On presentation she was hypoxic to the low to mid 80s on room air. Suspect secondary to pneumonia. Does have history of diastolic CHF and is on Lasix at baseline.   --Continuous pulse ox and supplemental oxygen     3. Acute septic encephalopathy suspected due to acute infection.  --Mental status much better today.  --Fall precautions     4. Leg rash: Right medial thigh erythema that is warm to touch which is new per spouse. Possibility of cellulitis which she has had in the past.     5. Chronic diastolic CHF, HTN, HLD: History of diastolic CHF.   --PTA on diltiazem  mg at bedtime, Lasix 20 mg daily, pravastatin 10 mg daily, and metoprolol succinate 50 mg daily. NT BNP elevated at 2568, however this is less than it has been in the past.  -- Chest x-ray with bilateral opacities, favor infection at this point as opposed to pulmonary edema.  --Will continue PTA metoprolol, pravastatin, and diltiazem  --Will resume her Lasix today     6. Atrial flutter, RBBB: History of atrial flutter.   --Chronically on warfarin along with diltiazem  mg at bedtime and metoprolol succinate 50 mg daily.   --INR 3.09 today.  Coumadin dosing per pharmacy  --Continue metoprolol, diltiazem     7. CKD stage III: Creatinine baseline of 1.3-1.4.  Creatinine 1.28 today (baseline)  --Will monitor BMP     8. Anxiety: PTA on venlafaxine 150 mg at bedtime, nortriptyline 50 mg at bedtime, and Wellbutrin 300 mg a.m.  -Unfortunately these medications need to be on hold while receiving linezolid due to risk of serotonin syndrome. Hopefully if clinically improving rapidly may be able to stop linezolid and get these medications back on board.     9. History of MRSA infection: History of infected hip joint.    --Chronically on minocycline 50 mg twice daily, can hold while receiving linezolid.  Further antibiotic recommendations per ID     10. Chronic pain syndrome: PTA on gabapentin 1200 mg 3 times daily in addition to pregabalin 150 mg twice daily.   -Need formal med rec, but given her mental status is already significantly improved in the emergency department would be okay resuming if dose is confirmed  -Acetaminophen     11. GERD: Continue omeprazole.     12. Hypothyroidism:  Continue levothyroxine. TSH normal     13. Chronic anemia, mild acute thrombocytopenia:   --Baseline hemoglobin usually anywhere from 9-10 range.   --Platelets down from 148 to 107  today.  Will closely monitor    14. Obesity     DVT Prophylaxis: Warfarin  Code Status: DNR/DNI, discussed with patient in presence of spouse     Code Status: DNR/DNI    Disposition: Expected discharge: Anticipate 2 nights of hospital course    Zaynab Stephenson MD    Interval History   Patient seen and examined.  She stated that her breathing is better today.  Cough also improving.  She denies nausea or vomiting.  No abdominal pain.    -Data reviewed today: I reviewed all new labs and imaging results over the last 24 hours. I personally reviewed     Physical Exam   Temp: 99  F (37.2  C) Temp src: Oral BP: 181/68 Pulse: 80 Heart Rate: 72 Resp: 20 SpO2: 93 % O2 Device: None (Room air) Oxygen Delivery: 4 LPM  Vitals:    05/16/19 2258   Weight: 149.6 kg (329 lb 11.2 oz)     Vital Signs with Ranges  Temp:  [98  F (36.7  C)-101.8  F (38.8  C)] 99  F (37.2  C)  Pulse:  [73-82] 80  Heart Rate:  [72-82] 72  Resp:  [11-26] 20  BP: (135-181)/(47-99) 181/68  SpO2:  [93 %-100 %] 93 %  No intake/output data recorded.    GEN:  Alert, oriented x 3, appears comfortable, NAD.  HEENT:  Normocephalic/atraumatic, no scleral icterus, no nasal discharge, mouth moist.  CV:  Regular rate and rhythm, no murmur or JVD.  S1 + S2 noted, no S3 or S4.  LUNGS:  Clear to auscultation bilaterally  without rales/rhonchi/wheezing/retractions.  Symmetric chest rise on inhalation noted.  ABD:  Active bowel sounds, soft, non-tender/non-distended.  No rebound/guarding/rigidity.  EXT:  No edema or cyanosis.  Hands/feet warm to touch with good signs of peripheral perfusion.  No joint synovitis noted.  SKIN:  Dry to touch, no exanthems noted in the visualized areas.  NEURO:  Symmetric muscle strength, sensation to touch grossly intact.  No new focal deficits appreciated.    Medications     - MEDICATION INSTRUCTIONS -       Warfarin Therapy Reminder         diltiazem ER COATED BEADS  120 mg Oral At Bedtime     ertapenem (INVanz) IV  1 g Intravenous Q24H     gabapentin  1,200 mg Oral TID     levothyroxine  75 mcg Oral Daily     linezolid  600 mg Intravenous Q12H     metoprolol succinate ER  50 mg Oral Daily     omeprazole  40 mg Oral QAM     pravastatin  10 mg Oral At Bedtime     pregabalin  150 mg Oral BID     warfarin  1 mg Oral ONCE at 18:00       Data   Recent Labs   Lab 05/17/19  0511 05/16/19  1914   WBC 9.5 15.2*   HGB 11.4* 12.6   MCV 97 96   * 148*   INR 3.09* 3.01*    139   POTASSIUM 3.9 4.5   CHLORIDE 105 104   CO2 29 30   BUN 20 22   CR 1.28* 1.42*   ANIONGAP 5 5   SHILOH 8.3* 8.7   GLC 91 112*   ALBUMIN  --  3.2*   PROTTOTAL  --  7.7   BILITOTAL  --  0.7   ALKPHOS  --  100   ALT  --  49   AST  --  47*   TROPI  --  <0.015       Recent Results (from the past 24 hour(s))   POC US ECHO LIMITED    Impression    Limited Bedside Cardiac Ultrasound, performed and interpreted by me.   Indication: Shortness of Breath.  Parasternal long axis, subcostal and IVC views were acquired.   Image quality was satisfactory.    Findings:    Global left ventricular function appears intact.  Chambers do not appear dilated.  There is no evidence of free fluid within the pericardium.  IVC without variability.  No B lines.    IMPRESSION: Grossly normal left ventricular function and chamber size.  No pericardial effusion.       XR Chest 2 Views    Narrative    CHEST TWO VIEWS      5/16/2019 7:52 PM     HISTORY: AMS, fever.    COMPARISON: X-rays 10/1/2018, 4/10/2018.      Impression    IMPRESSION: There are subtle left greater than right lung opacities  which are more conspicuous compared to the prior exam, suspicious for  edema or infection (similar in appearance compared to 4/10/2018). No  evidence of pleural effusion. Cardiac silhouette is mildly enlarged,  similar compared to the prior exam.      INOCENCIO LOMBARDI MD

## 2019-05-17 NOTE — PROGRESS NOTES
Infection Prevention:    Patient requires Contact precautions because of MRSA hx. Please contact Infection prevention with any questions/concerns at *[phone].    Nicole Olvera, ICP

## 2019-05-17 NOTE — H&P
[facility]  Hospitalist Admission Note  Name: Berenice Chaudhari    MRN: [id number]  YOB: 1940    Age: 79 year old  Date of admission: 5/16/2019  Primary care provider: Nelly Pearl    Chief Complaint: Confusion, fever    Assessment and Plan:   Sepsis, suspect pneumonia: Presenting with confusion and fevers to 101.8.  Also hypoxic and bilateral opacities on chest x-ray R > L so likely pneumonia.  Does have history of pneumonia, UTI, sialitis, and MRSA hip joint infection.  Likely on minocycline.  Other potential source of infection would be UTI with previous history UTIs.  Urinalysis showing 18 WBCs, moderate LE, and positive nitrites although no urinary symptoms.  Alternatively has right medial thigh leg erythema that is new per spouse so possibility of cellulitis.  Lactic acid not elevated however she is febrile, leukocytosis to 15, and acute confusion consistent with sepsis.  -Extensive allergy list for antibiotics including anaphylaxis with cephalosporin, vancomycin, and penicillin.  Also rash with clindamycin, levofloxacin, and azithromycin.  Monitor for any allergic reaction on antibiotics here.  -Continue ertapenem and linezolid  -ID consult due to multiple antibiotic allergies  -Sputum culture  -Obtain UA/UC  -Follow blood cultures  -I reviewed her bedside cardiac ultrasound from the ED and her IVC is dilated with minimal respiratory variation so would hold off on any fluids on top of the 500 ml LR she received in the ED given her diastolic CHF    Acute hypoxemic respiratory failure: Hypoxic to the low to mid 80s on room air.  Oxygen above 90% on 4 L via nasal cannula.  Low PO2 on VBG.  Suspect secondary to pneumonia.  Does have history of diastolic CHF and is on Lasix at baseline.  Bilateral opacities could be in part pulmonary edema, though favor infection with fever.  Dilated IVC and bedside cardiac ultrasound.  -Continuous pulse ox and supplemental oxygen    Acute septic  encephalopathy: Acutely confused.  Did not know the year initially.  Was having some visual hallucinations.  Per spouse this is how she gets when she has an acute infection, but otherwise does not normally have confusion.  Doing much better now in the ED there may have been a hypoxia component as well.  -Fall precautions    Leg rash: Right medial thigh erythema that is warm to touch which is new per spouse.  Possibility of cellulitis which she has had in the past.    Chronic diastolic CHF, HTN, HLD: History of diastolic CHF.  PTA on diltiazem  mg at bedtime, Lasix 20 mg daily, pravastatin 10 mg daily, and metoprolol succinate 50 mg daily.  NT BNP elevated at 2568, however this is less than it has been in the past.  Chest x-ray with bilateral opacities, favor infection at this point as opposed to pulmonary edema.  -Continue PTA metoprolol, pravastatin, and diltiazem  -Pending BMP tomorrow may build to resume Lasix tomorrow, but hold for acute infection    Atrial flutter, RBBB: History of atrial flutter.  Chronically on warfarin along with diltiazem  mg at bedtime and metoprolol succinate 50 mg daily.  INR 3.0 on admission.  EKG shows normal sinus rhythm with sinus arrhythmia and RBBB.  -Telemetry  -Continue metoprolol, diltiazem  -Continue warfarin, Pharm.D. to dose    CKD stage III: Creatinine baseline of 1.3-1.4 which she is at now.  -BMP tomorrow    Anxiety: PTA on venlafaxine 150 mg at bedtime, nortriptyline 50 mg at bedtime, and Wellbutrin 300 mg a.m.  -Unfortunately these medications need to be on hold while receiving linezolid due to risk of serotonin syndrome.  Hopefully if clinically improving rapidly may be able to stop linezolid and get these medications back on board    History of MRSA infection: History of infected hip joint.  Chronically on minocycline 50 mg twice daily, can hold while receiving linezolid.    Chronic pain syndrome: PTA on gabapentin 1200 mg 3 times daily in addition to  pregabalin 150 mg twice daily.   -Need formal med rec, but given her mental status is already significantly improved in the emergency department would be okay resuming if dose is confirmed  -Acetaminophen    GERD: Continue omeprazole.    Hypothyroidism: Resume levothyroxine.  TSH within normal limits.    Chronic anemia, mild acute thrombocytopenia: Hemoglobin usually anywhere from 9-10 range.  Currently 12.6 and may be slightly hemoconcentrated with acute infection and taking Lasix at home.  Platelets low at 148 likely secondary to acute infection.  Nice bleeding.    Obesity      DVT Prophylaxis: Warfarin  Code Status: DNR/DNI, discussed with patient in presence of spouse  FEN: Regular diet, no IV fluids  Discharge Dispo: TBD  Estimated Disch Date / # of Days until Disch: Admit inpatient for sepsis requiring IV antibiotics and respiratory failure.  Anticipate minimal 2-3 night hospitalization.      History of Present Illness:  Berenice Chaudhari is a 79 year old female with PMH including atrial flutter on warfarin, CKD stage III, HTN, GERD, HLD, diastolic CHF, hypothyroidism, anxiety, obesity, chronic pain syndrome, and multiple previous infections with pneumonia, UTI, and cellulitis who has an extensive allergy list to multiple antibiotics who presents via EMS due to confusion and fevers.  Patient called her spouse and said she had a fever to above 101 degrees this morning.  Someone was visiting with her and she was confused from baseline and having some visual hallucinations.  Per spouse this is how she gets when she is febrile and has acute infection.  Patient says she does not recall this and she is not confused now which spouse agrees with.  She has a chronic cough for many years secondary to postnasal drip with no change recently.  She does not feel short of breath currently on 4 L of oxygen.  Denies any chest pain or pressure sensation.  The person that was visiting is also sick as a potential contact source for  infection.  Spouse notes a new right medial leg rash/redness.  She denies any nausea, vomiting, abdominal pain, diarrhea, dysuria, urinary frequency, headache.    History obtained from patient, medical record, and from Dr. De Jesus in the emergency department.  Apparently the patient had a very high blood pressure of 220/100 for EMS and bilateral crackles so she was started on a nitroglycerin drip in route.  This was stopped on arrival here and her blood pressure is been 130-140 systolic throughout her time in the emergency department.  She is febrile to 101.8.  Pulse is 76.  She was hypoxic to the low to mid 80s now saturating 98% on 4 L via nasal cannula.  She does not look in respiratory distress.  EKG obtained shows normal sinus rhythm with sinus arrhythmia and RBBB.  She has leukocytosis to 15.2.  Hemoglobin is 12.6.  Platelets slightly low at 148.  PO2 24 on VBG and PCO2 48.  BNP elevated at 2568.  Troponin undetectable.  TSH 1.98.  Lactic acid 1.0.  AST slightly high at 47.  Creatinine 1.42.  Urinalysis with positive nitrite, moderate LE, and 18 WBCs.  Chest x-ray shows bilateral opacities, right greater than left.  Blood cultures obtained.  Possible pneumonia versus UTI versus cellulitis really unclear at this point but favor pneumonia with hypoxia.  Extensive allergy list so she was given ertapenem and linezolid especially with her MRSA history.  Admit to medical floor.     Past Medical History reviewed:  Past Medical History:   Diagnosis Date     Allergic rhinitis, cause unspecified      Anxiety 6/13/2013     Arrhythmia     atrial flutter     Atrial flutter with rapid ventricular response (H) 8/8/2017     Cellulitis and abscess of leg, except foot recurrent , both legs    begins with fever, nausea, diarrhea, vomiting & & the cellulitis may be visible a day or 2 later     Chronic pain     On chronic fentanyl patch.     Chronic renal disease 1/5/2013     Contact dermatitis and other eczema, due to unspecified  cause 07/93     Erythroderma desquamativum 8/09     see hospital records;; may have has toxic shock syndrome & upon recovery had total body desquamation ..  less likely = raction to vancomycin      Essential hypertension with goal blood pressure less than 140/90 1/12/2005     Problem list name updated by automated process. Provider to review     Generalized osteoarthrosis, unspecified site     films 9/04: L hhip bad; both knees mod severe medial OA     GERD (gastroesophageal reflux disease) 11/15/2013     Herpes zoster without mention of complication 9/03    L shoulder; still has neuralgia     Hyperlipidemia LDL goal <100 9/16/2011     Methicillin susceptible Staphylococcus aureus septicemia (H) after L hip injection 205    ARDS, renal failure, staph sepsis after a hip joint injection     Obesity 1/12/2005     Problem list name updated by automated process. Provider to review     Other chronic pain      Other diseases of lung, not elsewhere classified 07/93    Interstitial lung process     Pneumonia, organism unspecified(486)      Urticaria, unspecified 07/93    Diffuse severe (hospitalized).  (+) skin biopsy by Dr. Figueroa     Past Surgical History reviewed:  Past Surgical History:   Procedure Laterality Date     BIOPSY       C NONSPECIFIC PROCEDURE      Extensive oral (dental) surgery     C NONSPECIFIC PROCEDURE      Remote T&A     C NONSPECIFIC PROCEDURE  1/03    LAP CHOLY     C NONSPECIFIC PROCEDURE      L hip I + D x 2  4/05     C NONSPECIFIC PROCEDURE  2/05    colonoscopy     C NONSPECIFIC PROCEDURE      L hip I + D several other times      CATARACT IOL, RT/LT Right 2012     CHOLECYSTECTOMY       ENT SURGERY       ESOPHAGOSCOPY, GASTROSCOPY, DUODENOSCOPY (EGD), COMBINED N/A 11/9/2017    Procedure: COMBINED ESOPHAGOSCOPY, GASTROSCOPY, DUODENOSCOPY (EGD);  ESOPHAGOSCOPY, GASTROSCOPY, DUODENOSCOPY (EGD) (fv)  ;  Surgeon: Buck Ribeiro MD;  Location: [facility] OR     PHACOEMULSIFICATION CLEAR CORNEA WITH STANDARD  INTRAOCULAR LENS IMPLANT Left 3/10/2016    Procedure: PHACOEMULSIFICATION CLEAR CORNEA WITH STANDARD INTRAOCULAR LENS IMPLANT;  Surgeon: Dwight Metcalf MD;  Location: [facility]     Social History reviewed:  Social History     Tobacco Use     Smoking status: Former Smoker     Packs/day: 2.00     Years: 22.00     Pack years: 44.00     Types: Cigarettes     Start date:      Last attempt to quit:      Years since quittin.3     Smokeless tobacco: Never Used     Tobacco comment: started smoking 20's- quit in    Substance Use Topics     Alcohol use: No     Comment: RARELY     Social History     Social History Narrative     Not on file     Family History reviewed:  Family History   Problem Relation Age of Onset     Myocardial Infarction Father 63     Allergies:  Allergies   Allergen Reactions     Ceftriaxone Anaphylaxis and Rash     Rocephin given. Developed rash to back and abd, awaiting to see if it improves with d/c of rocephin.        Nafcillin Rash     diffuse severe rash in hospital      Penicillins Anaphylaxis     Vancomycin Anaphylaxis and Rash     Codeine Fatigue     Sleeps continuously     Clindamycin Rash     received information from patient     Levaquin [Levofloxacin] Hives     Oral medication      Zithromax [Azithromycin] Rash     Medications:  Prior to Admission medications    Medication Sig Last Dose Taking? Auth Provider   buPROPion (WELLBUTRIN XL) 300 MG 24 hr tablet TAKE 1 TABLET EVERY MORNING   Nelly Pearl MD   CEPHALEXIN PO Take 500 mg by mouth 3 times daily as needed cellulitis   Unknown, Entered By History   diltiazem ER COATED BEADS (CARTIA XT) 120 MG 24 hr capsule Take 1 capsule (120 mg) by mouth daily   Nelly Pearl MD   ferrous sulfate (FEROSUL) 325 (65 Fe) MG tablet Start taking one tablet daily and increase to one tablet twice daily after two weeks if not consipated.   Nelly Pearl MD   furosemide (LASIX) 20 MG tablet Take 1 tablet (20 mg) by  mouth daily   Nelly Pearl MD   gabapentin (NEURONTIN) 600 MG tablet Take 2 tablets (1,200 mg) by mouth 3 times daily   Nelly Pearl MD   HYDROcodone-acetaminophen (NORCO) 5-325 MG per tablet Take 1 tablet by mouth every 6 hours as needed for severe pain   Foote, Arnold Cano DO   levothyroxine (SYNTHROID/LEVOTHROID) 75 MCG tablet Take 1 tablet (75 mcg) by mouth daily   Nelly Pearl MD   lidocaine (LIDODERM) 5 % Patch Place 3 patches onto the skin daily as needed for moderate pain (Apply up to 3 patches to painful area at once for up to 12 h within a 24 h period.  Remove after 12 hours.)   Unknown, Entered By History   metoprolol succinate ER (TOPROL-XL) 50 MG 24 hr tablet Take 1 tablet (50 mg) by mouth daily   Nelly Pearl MD   minocycline (MINOCIN/DYNACIN) 50 MG capsule TAKE 1 CAPSULE TWICE A DAY   Nelly Pearl MD   Multiple Vitamins-Minerals (CENTRUM SILVER) per tablet Take 1 tablet by mouth daily   Unknown, Entered By History   nortriptyline (PAMELOR) 25 MG capsule TAKE 2 CAPSULES AT BEDTIME   Nelly Pearl MD   omeprazole (PRILOSEC) 40 MG DR capsule TAKE 1 CAPSULE DAILY 30 TO 60 MINUTES BEFORE A MEAL   Nelly Pearl MD   order for DME Equipment being ordered: padded transfer belt   Nelly Pearl MD   pravastatin (PRAVACHOL) 10 MG tablet TAKE 1 TABLET AT BEDTIME (NEED LABS BEFORE NEXT REFILL)   Nelly Pearl MD   pregabalin (LYRICA) 150 MG capsule Take 1 capsule (150 mg) by mouth 2 times daily   Nelly Pearl MD   venlafaxine (EFFEXOR-XR) 150 MG 24 hr capsule TAKE 1 CAPSULE EVERY EVENING   Nelly Pearl MD   warfarin (COUMADIN) 2.5 MG tablet Take 1 tablet (2.5 mg) by mouth on Monday (uses 5 mg tabs on all other days) or as directed by INR Clinic   Nelly Pearl MD   warfarin (COUMADIN) 5 MG tablet Take 1 tablet (5 mg) by mouth on TWTFSS (uses 2.5 mg tabs on Mon) or as directed by INR Clinic   Nelly Pearl MD      Review of Systems:  A Comprehensive greater than 10 system review of systems was carried out.  Pertinent positives and negatives are noted above.  Otherwise negative.     Physical Exam:  Blood pressure 179/80, pulse 76, temperature 101.8  F (38.8  C), temperature source Oral, resp. rate 12, SpO2 100 %, not currently breastfeeding.  Wt Readings from Last 1 Encounters:   05/04/18 (!) 157.4 kg (347 lb)     Exam:  Constitutional: Awake, NAD  Eyes: sclera mildly injected  HEENT: atraumatic, MMM  Respiratory: Deep sounding cough, few bilateral crackles, no wheeze  Cardiovascular: RRR.  No murmur   GI: Obese, non-tender, not distended, bowel sounds present  Skin: Blanchable erythema warm to the touch right medial thigh  Musculoskeletal/extremities: atraumatic, no major deformities. No significant lower extremity edema  Neurologic: Alert, currently oriented to place and year, speech clear  Psychiatric: calm, cooperative     Lab and imaging data personally reviewed:  Labs:  Recent Labs   Lab 05/16/19 1916 05/16/19 1914   PHV 7.40 7.41   PO2V 23* 24*   PCO2V 46 48   HCO3V 28 31*     Recent Labs   Lab 05/16/19 1914   WBC 15.2*   HGB 12.6   HCT 39.5   MCV 96   *     Recent Labs   Lab 05/16/19 1914      POTASSIUM 4.5   CHLORIDE 104   CO2 30   ANIONGAP 5   *   BUN 22   CR 1.42*   GFRESTIMATED 35*   GFRESTBLACK 41*   SHILOH 8.7   PROTTOTAL 7.7   ALBUMIN 3.2*   BILITOTAL 0.7   ALKPHOS 100   AST 47*   ALT 49     Recent Labs   Lab 05/16/19 1916 05/16/19 1914   LACT 0.9 1.0     Recent Labs   Lab 05/16/19 1914   TSH 1.98       EKG shows normal sinus rhythm with sinus arrhythmia and RBBB.    Imaging:  Recent Results (from the past 24 hour(s))   XR Chest 2 Views    Narrative    CHEST TWO VIEWS      5/16/2019 7:52 PM     HISTORY: AMS, fever.    COMPARISON: X-rays 10/1/2018, 4/10/2018.      Impression    IMPRESSION: There are subtle left greater than right lung opacities  which are more conspicuous compared to  the prior exam, suspicious for  edema or infection (similar in appearance compared to 4/10/2018). No  evidence of pleural effusion. Cardiac silhouette is mildly enlarged,  similar compared to the prior exam.      MD Sanjiv COATES MD  Hospitalist  [facility]

## 2019-05-18 NOTE — PLAN OF CARE
"VS /61 (BP Location: Right arm)   Pulse 80   Temp 99.3  F (37.4  C) (Oral)   Resp 18   Ht 1.626 m (5' 4\")   Wt (!) 151.2 kg (333 lb 6.4 oz)   SpO2 93%   BMI 57.23 kg/m    Lung sounds diminished  O2 room air  Tele SR BB with notched P waves  Bowel sounds active, passing flatus, no BM this shift  Tolerating regular diet, no appetite this shift  IVF saline lock positional, antibiotics infused  No urine output this shift despite po lasix given. Bladder scan for 288cc  Dressings abdominal and jose folds cleaned per wound care orders  CMS sleepy this shift, increased confusion this afternoon--identifying circular lights on ceiling as clocks  Activity pt repositioned in bed  Pain denies  Patient/family centered care spouse present at bedside this afternoon, supportive and attentive of patient  Discharge plan continue antibiotics and monitor blood, urine and sputum cultures    "

## 2019-05-18 NOTE — PLAN OF CARE
Pt vitals stable. Denies pain. Lidocaine patch to left shoulder. Pt turning/repositioning every two hours with staff assistance to offload pressure to coccyx area. Pt continues on merrem and zyvox. Regular diet. Heavy assist of 2 to pivot to wheelchair.

## 2019-05-18 NOTE — PROVIDER NOTIFICATION
Paged MD: [phone]- call from [facility], + BC for staph epidermadis and MecA gene. already on merrem and zyvox.

## 2019-05-18 NOTE — PLAN OF CARE
Patient hospitalized for pneumonia on 5/16. IV zyvox and Merrem.   Pertinent assessments: Alert and oriented, forgetful at times. Lung sounds diminished and coarse, congested cough, PATEL. Generalized edema. Tolerating regular diet. VSS, on room air. Up with assist of two, stand pivots.   Major Shift Events: PRN tylenol given for shoulder pain. Wounds and rash care completed. Patient refusing specialty bed, patient has agreed to repositioning every two hours. Positive blood cultures, MD aware, current antibiotics should cover.   Plan (Upcoming Events): ID and WOC following.  Discharge/Transfer Needs: TBD.

## 2019-05-18 NOTE — PROVIDER NOTIFICATION
"MD paged, \"Positive blood culture from 5/16 drawn on her L arm, growing gram + cocci in clusters. Do you want any additional interventions?\"  "

## 2019-05-18 NOTE — PROGRESS NOTES
[facility]    Hospitalist Progress Note  Provider : Zaynab Stephenson MD  Date of Service (when I saw the patient): 05/18/2019    Assessment & Plan    Berenice Chaudhari is a 79-year-old female with history of A.flutter on warfarin, CKD stage III, hypertension, GERD, hyperlipidemia, diastolic CHF, hypothyroidism, anxiety, obesity, chronic pain syndrome, prior history of pneumonia, UTI, cellulitis, multiple antibiotic allergies, was brought to emergency room on 5/16 for evaluation for confusion and fevers.  Patient had fever of 101  F at home.  Patient is on 4 L of oxygen at baseline.  She does have chronic cough but denies chest pain.  In the ER, she was noted to have a fever of 101.8.  Pulse 76, oxygen saturation in mid 80s on room air and 98% on 4 L.  Lab work-up showed  WBC 15.2, creatinine 1.42, BNP 2568, TSH 1.98, lactic acid 1.  Urinalysis showed positive nitrite, moderate leukocyte esterase, 18 WBC/hpf.  Chest x-ray showed bilateral opacities right greater than left.  Blood culture and urine culture obtained.  She was started on meropenem and Zyvox due to her multiple antibiotic allergies.  ID consulted and recommended to continue meropenem and zyvox for now.     1. Sepsis, suspect pneumonia, UTI:symptoms improving  --She presented with confusion and fevers to 101.8. She was hypoxic on presentation.  --Chest x-ray showed bilateral opacities right greater than left, suspected to be secondary to pneumonia.  She was started on IV meropenem with Zyvox due to her multiple drug allergies, history of MRSA infection.  ID consulted.    --Will continue antibiotics. Will await ID input for further antibiotic recommendations.  --Does have history of pneumonia, UTI, sialitis, and MRSA hip joint infection. Likely on minocycline.  --Urinalysis showing 18 WBCs, moderate LE, and positive nitrites although no urinary symptoms. Urine culture growing lactose fermenting gram negative rods. Sensitivity  pending  --Extensive allergy list for antibiotics including anaphylaxis with cephalosporin, vancomycin, and penicillin. Also rash with clindamycin, levofloxacin, and azithromycin. Tolerating current antibiotics.    2. Acute hypoxemic respiratory failure:   --On presentation she was hypoxic to the low to mid 80s on room air. Suspect secondary to pneumonia. Does have history of diastolic CHF and is on Lasix at baseline. Will continue po lasix  --Continuous pulse ox and supplemental oxygen     3. Acute septic encephalopathy suspected due to acute infection.  --Mental status much better today.  --Fall precautions     4. Leg rash: Right medial thigh erythema that is warm to touch which is new per spouse. Possibility of cellulitis which she has had in the past.     5. Chronic diastolic CHF, HTN, HLD: History of diastolic CHF.   --PTA on diltiazem  mg at bedtime, Lasix 20 mg daily, pravastatin 10 mg daily, and metoprolol succinate 50 mg daily. NT BNP elevated at 2568, however this is less than it has been in the past..  --Will continue PTA metoprolol, pravastatin, and diltiazem  --Will continue po lasix  --Breathing stable     6. Atrial flutter, RBBB: History of atrial flutter.   --Chronically on warfarin along with diltiazem  mg at bedtime and metoprolol succinate 50 mg daily.   --Coumadin dosing per pharmacy  --Continue metoprolol, diltiazem     7. CKD stage III: Creatinine baseline of 1.3-1.4.  Creatinine 1.54 today  --Will monitor BMP     8. Anxiety: PTA on venlafaxine 150 mg at bedtime, nortriptyline 50 mg at bedtime, and Wellbutrin 300 mg a.m.  -Unfortunately these medications need to be on hold while receiving linezolid due to risk of serotonin syndrome. Hopefully if clinically improving rapidly may be able to stop linezolid and get these medications back on board.  --Will resume once off Zyvox     9. History of MRSA infection: History of infected hip joint.   --Chronically on minocycline 50 mg twice  daily, can hold while receiving linezolid. Further antibiotic recommendations per ID     10. Chronic pain syndrome: PTA on gabapentin 1200 mg 3 times daily in addition to pregabalin 150 mg twice daily.   -Need formal med rec, but given her mental status is already significantly improved in the emergency department would be okay resuming if dose is confirmed  -Acetaminophen     11. GERD: Continue omeprazole.     12. Hypothyroidism: Continue levothyroxine. TSH normal     13. Chronic anemia, mild acute thrombocytopenia:   --Baseline hemoglobin usually anywhere from 9-10 range.   --Platelets down from 148 to 107  today.  Will closely monitor    14. Obesity     DVT Prophylaxis: Warfarin  Code Status: DNR/DNI, discussed with patient in presence of spouse     Code Status: DNR/DNI    Disposition: Expected discharge: Anticipate 2 nights of hospital course    Zaynab Stephenson MD    Interval History   Patient seen and examined.  She stated that her breathing is better today.  Cough also improving.  She denies nausea or vomiting.  No abdominal pain.    -Data reviewed today: I reviewed all new labs and imaging results over the last 24 hours. I personally reviewed     Physical Exam   Temp: 98.9  F (37.2  C) Temp src: Oral BP: 125/61   Heart Rate: 92 Resp: 18 SpO2: 93 % O2 Device: None (Room air)    Vitals:    05/16/19 2258 05/18/19 0606   Weight: 149.6 kg (329 lb 11.2 oz) (!) 151.2 kg (333 lb 6.4 oz)     Vital Signs with Ranges  Temp:  [97.2  F (36.2  C)-99.3  F (37.4  C)] 98.9  F (37.2  C)  Heart Rate:  [73-92] 92  Resp:  [18-22] 18  BP: (108-153)/(43-61) 125/61  SpO2:  [93 %-97 %] 93 %  I/O last 3 completed shifts:  In: -   Out: 1800 [Urine:1800]    GEN:  Alert, oriented x 3, appears comfortable, NAD.  HEENT:  Normocephalic/atraumatic, no scleral icterus, no nasal discharge, mouth moist.  CV:  Regular rate and rhythm, no murmur or JVD.  S1 + S2 noted, no S3 or S4.  LUNGS:  Clear to auscultation bilaterally without  rales/rhonchi/wheezing/retractions.  Symmetric chest rise on inhalation noted.  ABD:  Active bowel sounds, soft, non-tender/non-distended.  No rebound/guarding/rigidity.  EXT:  No edema or cyanosis.  Hands/feet warm to touch with good signs of peripheral perfusion.  No joint synovitis noted.  SKIN:  Dry to touch, no exanthems noted in the visualized areas.  NEURO:  Symmetric muscle strength, sensation to touch grossly intact.  No new focal deficits appreciated.    Medications     - MEDICATION INSTRUCTIONS -       Warfarin Therapy Reminder         diltiazem ER COATED BEADS  120 mg Oral At Bedtime     furosemide  20 mg Oral Daily     gabapentin  1,200 mg Oral TID     levothyroxine  75 mcg Oral Daily     lidocaine  1 patch Transdermal Q24h    And     lidocaine   Transdermal Q24H    And     lidocaine   Transdermal Q8H     linezolid  600 mg Intravenous Q12H     meropenem  500 mg Intravenous Q6H     metoprolol succinate ER  50 mg Oral Daily     omeprazole  40 mg Oral QAM     pravastatin  10 mg Oral At Bedtime     pregabalin  150 mg Oral BID     warfarin  5 mg Oral ONCE at 18:00       Data   Recent Labs   Lab 05/18/19  0748 05/17/19  0511 05/16/19  1914   WBC  --  9.5 15.2*   HGB  --  11.4* 12.6   MCV  --  97 96   PLT  --  107* 148*   INR 2.39* 3.09* 3.01*    139 139   POTASSIUM 4.2 3.9 4.5   CHLORIDE 105 105 104   CO2 26 29 30   BUN 19 20 22   CR 1.54* 1.28* 1.42*   ANIONGAP 6 5 5   SHILOH 8.3* 8.3* 8.7   * 91 112*   ALBUMIN  --   --  3.2*   PROTTOTAL  --   --  7.7   BILITOTAL  --   --  0.7   ALKPHOS  --   --  100   ALT  --   --  49   AST  --   --  47*   TROPI  --   --  <0.015       No results found for this or any previous visit (from the past 24 hour(s)).

## 2019-05-19 NOTE — PROGRESS NOTES
[facility]    Hospitalist Progress Note  Provider : Zaynab Stephenson MD  Date of Service (when I saw the patient): 05/19/2019    Assessment & Plan    Berenice Chaudhari is a 79-year-old female with history of A.flutter on warfarin, CKD stage III, hypertension, GERD, hyperlipidemia, diastolic CHF, hypothyroidism, anxiety, obesity, chronic pain syndrome, prior history of pneumonia, UTI, cellulitis, multiple antibiotic allergies, was brought to emergency room on 5/16 for evaluation for confusion and fevers.  Patient had fever of 101  F at home. Patient is on 4 L of oxygen at baseline.  She does have chronic cough but denies chest pain.  In the ER, she was noted to have a fever of 101.8.  Pulse 76, oxygen saturation in mid 80s on room air and 98% on 4 L. Lab work-up showed  WBC 15.2, creatinine 1.42, BNP 2568, TSH 1.98, lactic acid 1.  Urinalysis showed positive nitrite, moderate leukocyte esterase, 18 WBC/hpf.  Chest x-ray showed bilateral opacities right greater than left.  Blood culture and urine culture obtained.  She was started on meropenem and Zyvox due to her multiple antibiotic allergies.  ID consulted and recommended to continue meropenem and zyvox.  Blood culture from left arm 5/16 grew staph epidermidis,likely contaminant.Culture from the right arm negative.  Currently she is on meropenem and Zyvox.    Urine culture growing E. coli.    1. Sepsis, suspect pneumonia, UTI: Presented with confusion and fevers of 101.8.  She was also hypoxic on presentation  --Patient still has evidence of encephalopathy.  --Chest x-ray showed bilateral opacities right greater than left, suspected to be secondary to pneumonia.  She was started on IV meropenem with Zyvox due to her multiple drug allergies, history of MRSA infection.  ID consulted and recommended to continue IV meropenem and Zyvox,day# 3.   --Does have history of pneumonia, UTI, sialitis, and MRSA hip joint infection. Chronically on  minocycline.  --Urinalysis showing 18 WBCs, moderate LE, and positive nitrites although no urinary symptoms. Urine culture growing E.coli.  --Extensive allergy list for antibiotics including anaphylaxis with cephalosporin, vancomycin, and penicillin. Also rash with clindamycin, levofloxacin, and azithromycin. Tolerating current antibiotics.  --WBC up from 9.5 on 5/17 to 20.8 today.  Lactic is also up at 3.2.  Will continue IV antibiotics.  She was given a bolus of normal saline 500 ml x2 and started on normal saline at 100 mL/h.  Lasix discontinued.  --I discussed the case with  from ID and he recommended to continue current antibiotics.  --Will continue aggressive fluid resuscitation and IV antibiotics. She may need transfer to ICU for pressors if BP doesn't improve with IV fluid resuscitation.    2. Lactic acidosis secondary to sepsis  --lactic acid 3.2 today  --Will continue aggressive fluid resuscitation and IV antibiotics. Will monitor lactic acid level    3. Acute hypoxemic respiratory failure:   --On presentation she was hypoxic to the low to mid 80s on room air. Suspect secondary to pneumonia. Does have history of diastolic CHF and is on Lasix at baseline. Will hold Lasix due to worsening renal function  --Continuous pulse ox and supplemental oxygen     4. Acute septic encephalopathy suspected due to acute infection.  --Patient does have intermittent confusion.  Currently she is oriented to time place and person and was able to answer my questions.  --Fall precautions     5.  Itching after antibiotic administration.  Will give Benadryl prior to antibiotic administration.  She has multiple antibiotic allergies.  Will closely monitor for any severe reaction.  Discussed with RN.     6. Chronic diastolic CHF, HTN, HLD: History of diastolic CHF.   --PTA on diltiazem  mg at bedtime, Lasix 20 mg daily, pravastatin 10 mg daily, and metoprolol succinate 50 mg daily. NT BNP elevated at 2568, however  this is less than it has been in the past..  --Will continue PTA metoprolol, pravastatin, and diltiazem  --Diuretic on hold due to worsening renal function     7. Atrial flutter, RBBB: History of atrial flutter.   --Chronically on warfarin along with diltiazem  mg at bedtime and metoprolol succinate 50 mg daily.   --Coumadin dosing per pharmacy  --Continue metoprolol, diltiazem     8.  Acute kidney injury on CKD stage III: Creatinine baseline of 1.3-1.4.    --Creatinine jumped from 1.54 to 2.90 today.  Lasix discontinued today. Continue IV fluid.  Will check FENA  --Will monitor BMP, daily weight, input/output     9. Anxiety: PTA on venlafaxine 150 mg at bedtime, nortriptyline 50 mg at bedtime, and Wellbutrin 300 mg a.m.  -Unfortunately these medications need to be on hold while receiving linezolid due to risk of serotonin syndrome. Hopefully if clinically improving rapidly may be able to stop linezolid and get these medications back on board.  --Will resume once off Zyvox     10. History of MRSA infection: History of infected hip joint.   --Chronically on minocycline 50 mg twice daily, can hold while receiving linezolid. Minocycline on hold while on Zyvox.     11. Chronic pain syndrome: PTA on gabapentin 1200 mg 3 times daily in addition to pregabalin 150 mg twice daily.   -Acetaminophen  --Decreased gabapentin to 300 mg tid due to worsening renal function     12. GERD: Continue omeprazole.     13. Hypothyroidism: Continue levothyroxine. TSH normal     14. Chronic anemia, mild acute thrombocytopenia:   --Baseline hemoglobin usually anywhere from 9-10 range.   --Platelets normal today    15. Obesity     DVT Prophylaxis: Warfarin  Code Status: DNR/DNI, discussed with patient in presence of spouse     Code Status: DNR/DNI    Disposition: Expected discharge: Anticipate more than 2 nights of hospital course until her symptoms improve and labs normalized    Zaynab Stephenson MD    Interval History   Patient seen and  examined.  She stated that she did not have a sleep today.  Rapid response called last night because patient was feeling lightheaded and the blood pressure was low.  Now she denies any new symptoms.  Per RN, patient has intermittent confusion.    -Data reviewed today: I reviewed all new labs and imaging results over the last 24 hours. I personally reviewed     Physical Exam   Temp: 99.5  F (37.5  C) Temp src: Axillary BP: 94/47   Heart Rate: 95 Resp: 24 SpO2: 100 % O2 Device: None (Room air) Oxygen Delivery: 2 LPM  Vitals:    05/16/19 2258 05/18/19 0606   Weight: 149.6 kg (329 lb 11.2 oz) (!) 151.2 kg (333 lb 6.4 oz)     Vital Signs with Ranges  Temp:  [98.7  F (37.1  C)-101.5  F (38.6  C)] 99.5  F (37.5  C)  Heart Rate:  [] 95  Resp:  [20-24] 24  BP: ()/(20-85) 94/47  SpO2:  [96 %-100 %] 100 %  I/O last 3 completed shifts:  In: -   Out: 275 [Urine:275]    GEN:  Alert, oriented x 3, appears comfortable, NAD.  HEENT:  Normocephalic/atraumatic, no scleral icterus, no nasal discharge, mouth moist.  CV:  Regular rate and rhythm, no murmur or JVD.  S1 + S2 noted, no S3 or S4.  LUNGS:  Clear to auscultation bilaterally without rales/rhonchi/wheezing/retractions.  Symmetric chest rise on inhalation noted.  ABD:  Active bowel sounds, soft, non-tender/non-distended.  No rebound/guarding/rigidity.  EXT:  No edema or cyanosis.  Hands/feet warm to touch with good signs of peripheral perfusion.  No joint synovitis noted.  SKIN:  Dry to touch, no exanthems noted in the visualized areas.  NEURO:  Symmetric muscle strength, sensation to touch grossly intact.  No new focal deficits appreciated.    Medications     - MEDICATION INSTRUCTIONS -       sodium chloride 100 mL/hr at 05/19/19 0954     Warfarin Therapy Reminder         gabapentin  300 mg Oral TID     levothyroxine  75 mcg Oral Daily     lidocaine  1 patch Transdermal Q24h    And     lidocaine   Transdermal Q24H    And     lidocaine   Transdermal Q8H     linezolid   600 mg Intravenous Q12H     meropenem  500 mg Intravenous Q6H     metoprolol succinate ER  50 mg Oral Daily     omeprazole  40 mg Oral QAM     pravastatin  10 mg Oral At Bedtime     pregabalin  75 mg Oral BID     sodium chloride (PF)  3 mL Intravenous Q8H       Data   Recent Labs   Lab 05/19/19  0728 05/18/19  0748 05/17/19  0511 05/16/19  1914   WBC 20.8*  --  9.5 15.2*   HGB 14.4  --  11.4* 12.6   MCV 97  --  97 96     --  107* 148*   INR 2.72* 2.39* 3.09* 3.01*    137 139 139   POTASSIUM 4.4 4.2 3.9 4.5   CHLORIDE 103 105 105 104   CO2 25 26 29 30   BUN 26 19 20 22   CR 2.90* 1.54* 1.28* 1.42*   ANIONGAP 7 6 5 5   SHILOH 8.2* 8.3* 8.3* 8.7   * 119* 91 112*   ALBUMIN  --   --   --  3.2*   PROTTOTAL  --   --   --  7.7   BILITOTAL  --   --   --  0.7   ALKPHOS  --   --   --  100   ALT  --   --   --  49   AST  --   --   --  47*   TROPI  --   --   --  <0.015       No results found for this or any previous visit (from the past 24 hour(s)).

## 2019-05-19 NOTE — PROVIDER NOTIFICATION
MD paged regarding patients mentation, Dr Blackwell returned page, plan to monitor patient for now and add neuro checks. Nursing will continue to monitor.

## 2019-05-19 NOTE — PROGRESS NOTES
"Patient up to bedside commode with a pivot transfer and 3 staff present. Walker for balance and gait belt used. Patient shuffled to bedside commode, needing cues to stand up tall and not to sit down prior to being near commode. Pt had a BM and then became pale and stated she was \"starting to feel lightheaded.\" Lift used to transfer patient safely back to bed. VS taken, BP not changed much from AM BP, all other VS stable. Patient in bed resting and  present and updated on challenging transfer.  "

## 2019-05-19 NOTE — PLAN OF CARE
Pt remains hospitalized for PNA and UTI  Soft BP's, tachy, on RA, denies any pain  Up with assist x 2, not out of bed this shift  1500 NS Bolus given for soft BP's and Lactic of 3.8  Lost IV access after boluses given   IV antibiotics not given this shift per family request  C/O itchy Atarax given with some relief  Pt did not void this shift, bladder scanned for 0 around 0450, attempted to bladder scan again but pt refused.   Pt was on tele monitoring, leads were pulled off and pt refused to have it put back on

## 2019-05-19 NOTE — PROGRESS NOTES
Hospitalist cross cover note  Rapid response was called regarding patient's mental status and blood pressure.  Patient was seen and examined.  She is alert and oriented x3.  She is a 89-year-old female patient with extreme obesity with BMI of 57.23 kg/m  admitted with sepsis due to suspected pneumonia and urinary tract infection.  Patient has acute hypoxic respiratory failure as well as septic encephalopathy per progress note from earlier today.  Patient is not in any form of distress.  She is lying supine.  She is alert and oriented x3.  Lungs are clear.  Initial blood pressure by nursing was 81/52 pulse of 80 but patient was able to put out 250 cc of urine earlier.  Repeat blood pressure measurement with 112 systolic.  There is no indication of hypovolemia or hypotension.  I suspect measurement of blood pressure is inaccurate.  I instructed the nursing staff to closely monitor patient in find appropriate size for blood pressure measurement.

## 2019-05-19 NOTE — PLAN OF CARE
"VS /71 (BP Location: Right arm)   Pulse 80   Temp 99.5  F (37.5  C) (Axillary)   Resp 22   Ht 1.626 m (5' 4\")   Wt (!) 151.2 kg (333 lb 6.4 oz)   SpO2 100%   BMI 57.23 kg/m    Lung sounds clear  O2 room air  Tele SR BBB  Bowel sounds active, BM this shift  Tolerating regular diet, ate 100% of morning meal   IVF Maintenance fluids infusing  Tests lactic acid 3.2 x2 today; (2) 500cc bolus given today   No urine output this shift (bladder scanned for 94 cc), MD notified and ordered the 2nd NS bolus  Per MD, if patient needs to be straight cathed, place a mtz to adequately monitor I&O  CMS alert this morning, lethargic episode while up to commode for BM, resting in bed since this event  Activity up with heavy max assist of 3 with gait belt and walker to pivot to bedside commode. Pt needed assistance of lift to get her back to bed.  Pain denies  Patient/family centered care  present mid day and updated on plan of care  Discharge plan continue to monitor cultures and antibiotic use    "

## 2019-05-19 NOTE — PROVIDER NOTIFICATION
REASON FOR CONTACT: Repeat lactic this AM 3.2 (3.8 last night), WBC 20.9 (up from 9.5), Cr 2.90 up from 1.54--pt and family refused merrem (last 2 doses) d/t possible rxn. Increased confusion and visual hallucinations.   PROVIDER CONTACTED:  Hospitalist, Dr Stephenson  TIME CONTACTED: 0806  MODE OF CONTACT: web based page  RESPONSE: adjustment to gabapentin dose d/t decreased kidney function, discontinued lasix and fluid bolus ordered.

## 2019-05-20 NOTE — PROGRESS NOTES
[facility]   Procedure Note           Peripherally Inserted Central Line Catheter (PICC):       Berenice Chaudhari  MRN# [id number]   May 20, 2019, 3:55 AM Indication: Fluids, electrolyte and nutrition administration           Pause for the cause: Time out completed  Patient ID's verified using two distinct indicators  All necessary equipment is present  Site marked if extremity to be used has been predetermined   Type of line to be used: PICC   Full barrier precautions used: Yes   Skin preparation: Chloraprep   Date of insertion: May 20, 2019, 3:55 AM   Device type: Triple lumen, valved, 5.0   Catheter brand: Bard   Lot number: see avatar   Insertion location: Right cephalic vein   Method of placement: MST  Ultrasound   Number of attempts: With ultrasound: x   Without ultrasound: x   Difficulty threading: no   Midline IV device: Dressing dry and intact  Dressing changed  Chlorhexidine patch   Arm circumference: Adults 10 cm   Midline extremity circumference: 58 cm   Internal length: 42.5 cm   Midline visible catheter length: 4.5 cm   Total catheter length: 47 cm   Tip termination: SVR RA junction   Method of verification: Chest x-ray   Midline patency post placement: Positive blood return  Flushes without difficulty   Line flush: Line flush documented on the eMARx   Placement verified by: Registered Nurse   Catheter placed by: Bessie Hodges   Discontinuation form initiated: No   Patient tolerance: Tolerated well  Age-appropriate response      Summary:  This procedure was performed without difficulty and she tolerated the procedure well with no immediate complications.       Recorded by Bessie Hodges    Attestation:  This patient has been seen and evaluated by me, Bessie Hodges RN.  I have reviewed today's vital signs, medications, labs and imaging.

## 2019-05-20 NOTE — PROGRESS NOTES
Cross Cover.  RN contacted for low blood pressure fever.  Patient is already treated with meropenem and linezolid.  Blood pressure has been low overall 24 hours.  No significant change since this morning.  We will continue current management.  Ordered 1 L of normal saline for low blood pressure.  Patient has decreased urine output.    Care plan discussed with Dr. Stephenson  Discussed with nurse    Despite 1 L of bolus patient systolic blood pressure is in 80s.  Her lactic acid levels have gone up to 4.8 has poor urine output.  After discussion with family agreeable to use pressors.  Will transfer patient to ICU and start pressors for low blood pressure.    Patient remains DNR/DNI  No change in antibiotics  We will get PICC line  We will place Parks catheter  Transfer to ICU to start pressors if blood pressure remains lower  Report called to tele ICU hub

## 2019-05-20 NOTE — PLAN OF CARE
ICU End of Shift Summary.  For vital signs and complete assessments, please see documentation flowsheets.     Pertinent assessments: pt declining low urine output, low bp titrating levo all day now off due to comfort care.  Pt coughing nonproductive.  Right leg larger than left and very red.  Dr Hernandez aware.    Major Shift Events:  went into afib with rvr.  Going in and out of it. Dr Hernandez and Dr Goode aware Temp 102.6. Tylenol given. Rash continues benadryl given for her itching.  ID changed antibiotics due to rash earlier now pt comfort care.    Plan (Upcoming Event  keep pt comfortable.  Discharge/Transfer Needs:  hospice if pt doesn't pass here    Bedside Shift Report Completed :  Y  Bedside Safety Check Completed:  Y

## 2019-05-20 NOTE — CONSULTS
[facility]    Palliative Care Consultation   Text Page    Date of Admission:  5/16/2019    Assessment & Plan   Berenice Chaudhari is a 79 year old female who was admitted on 5/16/2019. I was asked to see the patient for goals of care.    Recommendations:  1.Decisional Capacity -  Unreliable. Patient has an advance directive dated 12/26/2017.  If she becomes unable to demonstrate decisional capacity, Ricardo Chaudhari (spouse) is her primary Health Care Agent.  Alternates are Berenice Brewster (sister).   2. Pain- No acute complaints of pain.  3. Spiritual Care- Oriented to Spiritual Health and Social Work Services as part of Palliative Care team.  4. Care Planning- Patient with decline in her health which prompted hospitalization.  DNR/DNI order placed prior to hospitalization and POLST on file reflecting that code status.  Patient has multiple co-morbid states and requires goals of care conversation during this hospitalization.    Goal of Care: Restorative.  Patient DNR/DNI, but continuing restorative treatments at this time to cure infection.      Disease Process/es & Symptoms:  Berenice Chaudhari is a 79 year old patient admitted with symptoms of confusion and fever. she has been treated for urinary tract infection/sepsis with empiric antibiotics.    She had worsening hypoxia and has required more supplemental oxygen than her baseline 4L via nasal cannula.  She has baseline CKD with new JAMI and requires nephrology consult at this time to consider next steps of renal therapy.        This is in the setting of chronic CHF with normal EF%, CKD stage III, diabetes, hypertension, hyperlipidemia, chronic pain, left hip infection with joint degeneration, anxiety and recurrent pneumonia.  She has been functionally stable prior to this hospitalization, however requires a health assistant with ADLs/IADLs.  Patient has had multiple falls noted in her chart over the past 6 months, none resulting in significant trauma, but seems to  have difficulty with navigating current living situation.    Findings & plan of care discussed with: Dr. Goode  Follow-up plan from palliative team: will follow for family support and goals of care discussions.  Thank you for involving us in the patient's care.     Adrianna DELEON CNP  Pain Management and Palliative Care  [facility]  Pgr: [phone]    Time Spent on this Encounter   I spent  60 minutes in assessment of the patient, counseling and discussion with the patient and family as documented in sections below. Another 30 minutes in review of chart, documentation, coordination of care and discussion with the health care team.    Reason for Consult   Reason for consult: I was asked by Dr. Goode to evaluate this patient for Goals of care.    Primary Care Physician   Nelly eParl    Chief Complaint   confusion    History is obtained from the electronic health record    History of Present Illness   Berenice Chaudhari is a 79 year old female with history of atrial flutter, on warfarin, Stage III chronic kidney disease, hypertension, GERD, hyperlipidemia, right sided heart failure, hypothyroid, anxiety, chronic pain syndrome, pneumonia, urinary tract infections and left hip joint degeneration who presents with confusion and septic picture.  She was worked up on the floor and initially started empiric antibiotics, continues on broad spectrum antibiotics due to multiple medication allergies.  She has started to show improvements of mental status, with return of orientation to self. She has had worsening kidney function throughout hospitalization, and nephrology has been consulted.  She has also become supra therapeutic on her anticoagulation and requires careful monitoring.  Patient became hemodynamically unstable overnight and required use of Pressors to maintain her blood pressure and transfer to ICU for monitoring.      The following symptoms are noted by patient as concerning to her  quality of life.  LUCAS, patient unable to participate in full symptom assessment.  No chronic or acute symptoms noted at this time that require change to plan of care.    Decision-Making & Goals of Care:  Not discussed on day of consult due to family not at the bedside and patient unable to participate due to mental status.    Patient has decision-making capacity Unreliable  Patient has an advance directive dated 12/26/2017.  If she becomes unable to demonstrate decisional capacity, Ricardo Chaudhari (spouse) is her primary Health Care Agent.  Alternates are Berenice Ney (sister).  Physician orders for life-sustaining treatment (POLST) form is on file  Code Status: Do not resusitate / Do not intubate     Past Medical History   I have reviewed this patient's medical history and updated it with pertinent information if needed.   Past Medical History:   Diagnosis Date     Allergic rhinitis, cause unspecified      Anxiety 6/13/2013     Arrhythmia     atrial flutter     Atrial flutter with rapid ventricular response (H) 8/8/2017     Cellulitis and abscess of leg, except foot recurrent , both legs    begins with fever, nausea, diarrhea, vomiting & & the cellulitis may be visible a day or 2 later     Chronic pain     On chronic fentanyl patch.     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  less likely = raction to vancomycin      Essential hypertension with goal blood pressure less than 140/90 1/12/2005     Problem list name updated by automated process. Provider to review     Generalized osteoarthrosis, unspecified site     films 9/04: L hhip bad; both knees mod severe medial OA     GERD (gastroesophageal reflux disease) 11/15/2013     Herpes zoster without mention of complication 9/03    L shoulder; still has neuralgia      Hyperlipidemia LDL goal <100 9/16/2011     Methicillin susceptible Staphylococcus aureus septicemia (H) after L hip injection 205    ARDS, renal failure, staph sepsis after a hip joint injection     Obesity 1/12/2005     Problem list name updated by automated process. Provider to review     Other chronic pain      Other diseases of lung, not elsewhere classified 07/93    Interstitial lung process     Pneumonia, organism unspecified(486)      Urticaria, unspecified 07/93    Diffuse severe (hospitalized).  (+) skin biopsy by Dr. Figueroa       Past Surgical History   I have reviewed this patient's surgical history and updated it with pertinent information if needed.  Past Surgical History:   Procedure Laterality Date     BIOPSY       C NONSPECIFIC PROCEDURE      Extensive oral (dental) surgery     C NONSPECIFIC PROCEDURE      Remote T&A     C NONSPECIFIC PROCEDURE  1/03    LAP CHOLY     C NONSPECIFIC PROCEDURE      L hip I + D x 2  4/05     C NONSPECIFIC PROCEDURE  2/05    colonoscopy     C NONSPECIFIC PROCEDURE      L hip I + D several other times      CATARACT IOL, RT/LT Right 2012     CHOLECYSTECTOMY       ENT SURGERY       ESOPHAGOSCOPY, GASTROSCOPY, DUODENOSCOPY (EGD), COMBINED N/A 11/9/2017    Procedure: COMBINED ESOPHAGOSCOPY, GASTROSCOPY, DUODENOSCOPY (EGD);  ESOPHAGOSCOPY, GASTROSCOPY, DUODENOSCOPY (EGD) (fv)  ;  Surgeon: Buck Ribeiro MD;  Location: [facility] OR     PHACOEMULSIFICATION CLEAR CORNEA WITH STANDARD INTRAOCULAR LENS IMPLANT Left 3/10/2016    Procedure: PHACOEMULSIFICATION CLEAR CORNEA WITH STANDARD INTRAOCULAR LENS IMPLANT;  Surgeon: Dwight Metcalf MD;  Location: [facility]       Prior to Admission Medications   Prior to Admission Medications   Prescriptions Last Dose Informant Patient Reported? Taking?   CEPHALEXIN PO   Yes No   Sig: Take 500 mg by mouth 3 times daily as needed cellulitis   Multiple Vitamins-Minerals (CENTRUM SILVER) per tablet 5/15/2019 at am  Yes Yes   Sig: Take 1 tablet by  mouth daily   buPROPion (WELLBUTRIN XL) 300 MG 24 hr tablet 5/15/2019 at am  No Yes   Sig: TAKE 1 TABLET EVERY MORNING   diltiazem ER COATED BEADS (CARTIA XT) 120 MG 24 hr capsule 5/15/2019 at supper  No Yes   Sig: Take 1 capsule (120 mg) by mouth daily   ferrous sulfate (FEROSUL) 325 (65 Fe) MG tablet 5/15/2019 at am  No Yes   Sig: Start taking one tablet daily and increase to one tablet twice daily after two weeks if not consipated.   Patient taking differently: Take 325 mg by mouth 2 times daily Start taking one tablet daily and increase to one tablet twice daily after two weeks if not consipated.   furosemide (LASIX) 20 MG tablet 5/15/2019 at am  No Yes   Sig: Take 1 tablet (20 mg) by mouth daily   gabapentin (NEURONTIN) 600 MG tablet 5/15/2019 at Unknown time  No Yes   Sig: Take 2 tablets (1,200 mg) by mouth 3 times daily   levothyroxine (SYNTHROID/LEVOTHROID) 75 MCG tablet 5/15/2019 at am  No Yes   Sig: Take 1 tablet (75 mcg) by mouth daily   lidocaine (LIDODERM) 5 % Patch prn  Yes No   Sig: Place 3 patches onto the skin daily as needed for moderate pain (Apply up to 3 patches to painful area at once for up to 12 h within a 24 h period.  Remove after 12 hours.)   metoprolol succinate ER (TOPROL-XL) 50 MG 24 hr tablet 5/15/2019 at dinner  No Yes   Sig: Take 1 tablet (50 mg) by mouth daily   minocycline (MINOCIN/DYNACIN) 50 MG capsule 5/15/2019 at Unknown time  No Yes   Sig: TAKE 1 CAPSULE TWICE A DAY   nortriptyline (PAMELOR) 25 MG capsule 5/15/2019 at Unknown time  No Yes   Sig: TAKE 2 CAPSULES AT BEDTIME   omeprazole (PRILOSEC) 40 MG DR capsule 5/15/2019 at Unknown time  No Yes   Sig: TAKE 1 CAPSULE DAILY 30 TO 60 MINUTES BEFORE A MEAL   order for DME   No No   Sig: Equipment being ordered: padded transfer belt   pravastatin (PRAVACHOL) 10 MG tablet 5/15/2019 at Unknown time  No Yes   Sig: TAKE 1 TABLET AT BEDTIME (NEED LABS BEFORE NEXT REFILL)   pregabalin (LYRICA) 150 MG capsule 5/15/2019 at Unknown time   No Yes   Sig: Take 1 capsule (150 mg) by mouth 2 times daily   venlafaxine (EFFEXOR-XR) 150 MG 24 hr capsule 5/15/2019 at Unknown time  No Yes   Sig: TAKE 1 CAPSULE EVERY EVENING   warfarin (COUMADIN) 2.5 MG tablet   No Yes   Sig: Take 1 tablet (2.5 mg) by mouth on Monday (uses 5 mg tabs on all other days) or as directed by INR Clinic   warfarin (COUMADIN) 5 MG tablet 5/15/2019 at Unknown time  No Yes   Sig: Take 1 tablet (5 mg) by mouth on TWTFSS (uses 2.5 mg tabs on Mon) or as directed by INR Clinic      Facility-Administered Medications: None     Allergies   Allergies   Allergen Reactions     Ceftriaxone Anaphylaxis and Rash     Rocephin given. Developed rash to back and abd, awaiting to see if it improves with d/c of rocephin.        Nafcillin Rash     diffuse severe rash in hospital 2/05     Penicillins Anaphylaxis     Vancomycin Anaphylaxis and Rash     Codeine Fatigue     Sleeps continuously     Clindamycin Rash     received information from patient     Levaquin [Levofloxacin] Hives     Oral medication      Zithromax [Azithromycin] Rash       Social History   I have updated and reviewed the following Social History Narrative:   Social History     Social History Narrative     Not on file      Living situation: home with spouse.  Family system: spouse, sister  Functional status (needs help with ADLs or IADLs): needs assistance with all ADLs.  Employment/education: unknown.  Use of community resources: home health care when  is not available.    Activities/interests: dogs, cat.  History of substance use/abuse: none.  Zoroastrian affiliation: unsure.  Involvement in iftikhar community: unsure    Family History   I have reviewed this patient's family history and updated it with pertinent information if needed.   Family History   Problem Relation Age of Onset     Myocardial Infarction Father 63       Review of Systems   Review of systems not obtained due to patient factors - confusion    Palliative Symptom Review  (0=no symptom/no concern, 1=mild, 2=moderate, 3=severe):      Pain: 0-none      Fatigue: 0-none      Nausea: 0-none      Constipation: 0-none      Diarrhea: 0-none      Depressive Symptoms: 0-none      Anxiety: 0-none      Drowsiness: 1-mild      Poor Appetite: 1-mild      Shortness of Breath: 1-mild      Insomnia: 0-none      Overall (0 good/no concerns, 3 very poor):  1 mild concerns due to medical complexity, high risk for deterioration and discomfort.    Physical Exam   Temp:  [98.7  F (37.1  C)-102.3  F (39.1  C)] 98.7  F (37.1  C)  Pulse:  [103-121] 114  Heart Rate:  [102-128] 117  Resp:  [9-29] 12  BP: ()/() 105/68  SpO2:  [84 %-99 %] 96 %  333 lbs 6.4 oz  GEN:  Lethargic, oriented x self, appears comfortable, NAD.  HEENT:  Normocephalic/atraumatic, no scleral icterus, no nasal discharge, mouth moist.  CV:  RRR, S1, S2; no murmurs or other irregularities noted.  +3 DP/PT pulses bilatererally; no edema BLE.  RESP:  Coarse to auscultation with rhonchi throughout, diminished.  Symmetric chest rise on inhalation noted.  Normal respiratory effort.  Weak cough effort.  ABD:  Rounded, soft, obese, non-tender to palpation.  +BS  EXT:  Edema & pulses as noted above.  CMS intact x 4.     M/S:   No tenderness to palpation.    SKIN:  Dry to touch, no exanthems noted in the visualized areas.    NEURO: Symmetric strength +5/5.  Sensation to touch intact all extremities.   There is no area of allodynia or hyperesthesia.  Psych:  Normal affect.  Calm, cooperative, conversant appropriately.     Delirium Screen/CAM:  Delirium = (#1 and #2 = YES) + (#3 and/or #4)   1) Acute onset and fluctuating course:   YES   (acute change in mental status from baseline over last 24 hours)  2) Inattention:   YES   (difficulty focusing, distractible, can't follow conversation)  3) Disorganized thinking:   YES   (score only if #1 and #2 are YES)  (rambling/irrelevant conversation, unclear/illogical thoughts, inconsistency)  4)  Altered level of consciousness:   YES   (score only if #1 and #2 are YES)  (other than alert, calm, cooperative)    Delirium/CAM score: 4/4  Interpretation:  1)  Delirium:  Present  2)  Type:  hypoactive  3)  Severity:  moderate    Data   Last Comprehensive Metabolic Panel:  Sodium   Date Value Ref Range Status   05/20/2019 136 133 - 144 mmol/L Final     Potassium   Date Value Ref Range Status   05/20/2019 4.4 3.4 - 5.3 mmol/L Final     Comment:     Reviewed, acceptable     Chloride   Date Value Ref Range Status   05/20/2019 105 94 - 109 mmol/L Final     Carbon Dioxide   Date Value Ref Range Status   05/20/2019 23 20 - 32 mmol/L Final     Anion Gap   Date Value Ref Range Status   05/20/2019 8 3 - 14 mmol/L Final     Glucose   Date Value Ref Range Status   05/20/2019 113 (H) 70 - 99 mg/dL Final     Urea Nitrogen   Date Value Ref Range Status   05/20/2019 33 (H) 7 - 30 mg/dL Final     Creatinine   Date Value Ref Range Status   05/20/2019 3.64 (H) 0.52 - 1.04 mg/dL Final     GFR Estimate   Date Value Ref Range Status   05/20/2019 11 (L) >60 mL/min/[1.73_m2] Final     Comment:     Non  GFR Calc  Starting 12/18/2018, serum creatinine based estimated GFR (eGFR) will be   calculated using the Chronic Kidney Disease Epidemiology Collaboration   (CKD-EPI) equation.       Calcium   Date Value Ref Range Status   05/20/2019 7.6 (L) 8.5 - 10.1 mg/dL Final     CBC RESULTS:   Recent Labs   Lab Test 05/20/19  0630   WBC 27.6*   RBC 4.27   HGB 13.2   HCT 40.7   MCV 95   MCH 30.9   MCHC 32.4   RDW 14.6

## 2019-05-20 NOTE — PLAN OF CARE
"Patient hospitalized for pneumonia on 5/16. IV zyvox and merrem.   Pertinent assessments: Lethargic, disoriented to place. Patient started to have hallucinations this evening mentioning \"the man over there\" and \"my dog was just here and now I cannot find him\". Lung sounds diminished. Bowel sounds hypoactive, no BM this shift. Regular diet, only ate a few bites of banana bread. Patient had one incontinent episode with minimal urine on chux. Parks placed to monitor output, 100 ml's of maycol cloudy urine. Wound care completed. Tmax 102.3, tylenol given. Tachycardic. Blood pressures 80's/40's, no improvement after 1 L bolus. Lactic acid 4.8. Discussed with on-call physician that patient should transfer to ICU for pressors.  Ricardo notified of patients status change and plan. Mechanical lift of 2-3. Report given to ICU nurse, patient transferred to ICU at 0015.      "

## 2019-05-20 NOTE — PROGRESS NOTES
[facility]    Hospitalist Progress Note  Provider : Zaynab Stephenson MD  Date of Service (when I saw the patient): 05/20/2019    Assessment & Plan    Berenice Chaudhari is a 79-year-old female with history of A.flutter on warfarin, CKD stage III, hypertension, GERD, hyperlipidemia, diastolic CHF, hypothyroidism, anxiety, obesity, chronic pain syndrome, prior history of pneumonia, UTI, cellulitis, multiple antibiotic allergies, was brought to emergency room on 5/16 for evaluation for confusion and fevers.  Patient had fever of 101  F at home. Patient is on 4 L of oxygen at baseline. She does have chronic cough but denies chest pain.  In the ER, she was noted to have a fever of 101.8.  Pulse 76, oxygen saturation in mid 80s on room air and 98% on 4 L. Lab work-up showed  WBC 15.2, creatinine 1.42, BNP 2568, TSH 1.98, lactic acid 1. Urinalysis showed positive nitrite, moderate leukocyte esterase, 18 WBC/hpf.  Chest x-ray showed bilateral opacities right greater than left.  Blood culture and urine culture obtained.  She was started on meropenem and Zyvox due to her multiple antibiotic allergies.  ID consulted and recommended to continue meropenem and zyvox.  Blood culture from left arm 5/16 grew staph epidermidis,likely contaminant.Blood culture from the right arm negative.  Urine culture growing E.coli. Currently she is on meropenem and Zyvox.Patient hypotensive despite IV fluid resuscitation. She was transferred to ICU last night and started on Levophed.     Urine culture growing E. coli.    1. Sepsis, suspect pneumonia, UTI: Presented with confusion and fevers of 101.8.  She was also hypoxic on presentation  --Chest x-ray on admission showed bilateral opacities right greater than left, suspected to be secondary to pneumonia.  She was started on IV meropenem with Zyvox due to her multiple drug allergies, history of MRSA infection.  ID consulted and recommended to continue IV meropenem and Zyvox,day#  4.   --Does have history of pneumonia, UTI, sialitis, and MRSA hip joint infection. Chronically on minocycline.Minocycline on hold for now(on Zyvox).  --Urine culture growing E.coli. 1 set of BC growing Staph epidermidis.   --Extensive allergy list for antibiotics including anaphylaxis with cephalosporin, vancomycin, and penicillin. Also rash with clindamycin, levofloxacin, and azithromycin. Tolerating current antibiotics.  --WBC up from 9.5 on 5/17 to 27K today. Lactic acid elevated but trending down today(2.2).    --She was given multiple fluid boluses. Currently on NS at 125 ml/hr. BP still running low. Transferred to ICU last night and started on Levophed due to hypotension despite IV fluid resuscitation.   --I discussed the case with  from ID on 5/19 and he recommended to continue current antibiotics.  --She was transferred to ICU last night and started on Levophed.     2. Lactic acidosis secondary to sepsis  --lactic acid down from 4.8 to 2.2 today.  --Will continue aggressive fluid resuscitation and IV antibiotics. Will monitor lactic acid level    3. Acute hypoxemic respiratory failure: secondary to sepsis/pneumonia  --On presentation she was hypoxic to the low to mid 80s on room air. Does have history of diastolic CHF and is on Lasix at baseline. Lasix on hold due to sepsis, worsening renal function.   --Continuous pulse ox and supplemental oxygen     4. Acute septic encephalopathy suspected due to acute infection.  --Her mental status is much better today. She is alert, awake, oriented x3 today  --Fall precautions     5.  Itching after antibiotic administration.  Will give Benadryl prior to antibiotic administration.  She has multiple antibiotic allergies.  Will closely monitor for any severe reaction.  Discussed with RN.     6. Chronic diastolic CHF  --on lasix 20 mg at home. Lasix on hold due to sepsis/hypotension. Needs IV fluid resuscitation. Will closely monitor for fluid overload.    7.   HTN  --Now hypotensive and requiring IV fluids and Levophed.   --PTA medications include diltiazem  mg at bedtime, Lasix 20 mg daily,metoprolol 50 mg daily.   --Will hold BP meds for hypotension.     8. Hyperlipidemia  --Continue pravastatin.      9. Atrial flutter, RBBB: History of atrial flutter.   --Chronically on warfarin along with diltiazem  mg at bedtime and metoprolol succinate 50 mg daily.   --Coumadin dosing per pharmacy  --On metoprolol, diltiazem. Hold BP meds for hypotension     10.  Acute kidney injury on CKD stage III: Creatinine baseline of 1.3-1.4.    --Creatinine up to 3.64 today. Continue IV fluid. FeNA 0.4-likely prerenal JAMI  --Will continue IV fluid resuscitation   --Will monitor BMP, daily weight, input/output  --Nephrology consulted for recommendations     11. Anxiety: PTA on venlafaxine 150 mg at bedtime, nortriptyline 50 mg at bedtime, and Wellbutrin 300 mg a.m.  -Unfortunately these medications need to be on hold while receiving linezolid due to risk of serotonin syndrome. Hopefully if clinically improving rapidly may be able to stop linezolid and get these medications back on board.  --Will resume once off Zyvox     12. History of MRSA infection: History of infected hip joint.   --Chronically on minocycline 50 mg twice daily, can hold while receiving linezolid. Minocycline on hold while on Zyvox.     13. Chronic pain syndrome: PTA on gabapentin 1200 mg 3 times daily in addition to pregabalin 150 mg twice daily.   -Acetaminophen  --Decreased gabapentin to 300 mg tid due to worsening renal function     14. GERD: Continue omeprazole.     15. Hypothyroidism: Continue levothyroxine. TSH normal     16. Chronic anemia, mild acute thrombocytopenia:   --Baseline hemoglobin usually anywhere from 9-10 range.   --Platelets normal today    17. Obesity     DVT Prophylaxis: Warfarin  Code Status: DNR/DNI, discussed with patient in presence of spouse     Code Status: DNR/DNI    Disposition:  Expected discharge: Anticipate more than 2 nights of hospital course until her symptoms improve and labs normalized. Continue ICU care    Zaynab Stephenson MD    Interval History   Patient seen and examined. She stated that she feels better today. She denies fever. She has no new skin rash. Denies shortness of breath. Cough improving. No nausea or vomiting. She has no dysuria, urgency or frequency.     -Data reviewed today: I reviewed all new labs and imaging results over the last 24 hours. I personally reviewed     Physical Exam   Temp: 99  F (37.2  C) Temp src: Oral BP: 101/84 Pulse: 112 Heart Rate: 114 Resp: 18 SpO2: 94 % O2 Device: Nasal cannula Oxygen Delivery: 4 LPM  Vitals:    05/16/19 2258 05/18/19 0606   Weight: 149.6 kg (329 lb 11.2 oz) (!) 151.2 kg (333 lb 6.4 oz)     Vital Signs with Ranges  Temp:  [98.7  F (37.1  C)-102.3  F (39.1  C)] 99  F (37.2  C)  Pulse:  [103-121] 112  Heart Rate:  [] 114  Resp:  [9-29] 18  BP: ()/() 101/84  SpO2:  [84 %-100 %] 94 %  I/O last 3 completed shifts:  In: 4865.35 [P.O.:240; I.V.:4125.35; IV Piggyback:500]  Out: 150 [Urine:150]    GEN:  Alert, oriented x 3, appears comfortable, NAD.  HEENT:  Normocephalic/atraumatic, no scleral icterus, no nasal discharge, mouth moist.  CV:  Regular rate and rhythm, no murmur or JVD.  S1 + S2 noted, no S3 or S4.  LUNGS:  Clear to auscultation bilaterally without rales/rhonchi/wheezing/retractions.  Symmetric chest rise on inhalation noted.  ABD:  Active bowel sounds, soft, non-tender/non-distended.  No rebound/guarding/rigidity.  EXT:  No edema or cyanosis.  Hands/feet warm to touch with good signs of peripheral perfusion.  No joint synovitis noted.  SKIN:  Dry to touch, no exanthems noted in the visualized areas.  NEURO:  Symmetric muscle strength, sensation to touch grossly intact.  No new focal deficits appreciated.    Medications     norepinephrine 0.08 mcg/kg/min (05/20/19 0915)     - MEDICATION INSTRUCTIONS -        sodium chloride 125 mL/hr at 05/20/19 0803     Warfarin Therapy Reminder         gabapentin  300 mg Oral TID     insulin aspart  1-7 Units Subcutaneous TID AC     insulin aspart  1-5 Units Subcutaneous At Bedtime     levothyroxine  75 mcg Oral Daily     lidocaine  1 patch Transdermal Q24h    And     lidocaine   Transdermal Q24H    And     lidocaine   Transdermal Q8H     linezolid  600 mg Intravenous Q12H     meropenem  500 mg Intravenous Q6H     metoprolol succinate ER  50 mg Oral Daily     omeprazole  40 mg Oral QAM     pravastatin  10 mg Oral At Bedtime     pregabalin  75 mg Oral BID     sodium chloride (PF)  3 mL Intravenous Q8H       Data   Recent Labs   Lab 05/20/19  0630 05/19/19  2217 05/19/19  0728 05/18/19  0748 05/17/19  0511 05/16/19  1914   WBC 27.6*  --  20.8*  --  9.5 15.2*   HGB 13.2  --  14.4  --  11.4* 12.6   MCV 95  --  97  --  97 96     --  171  --  107* 148*   INR 5.23*  --  2.72* 2.39* 3.09* 3.01*    136 135 137 139 139   POTASSIUM 4.4  --  4.4 4.2 3.9 4.5   CHLORIDE 105  --  103 105 105 104   CO2 23  --  25 26 29 30   BUN 33*  --  26 19 20 22   CR 3.64* 3.52* 2.90* 1.54* 1.28* 1.42*   ANIONGAP 8  --  7 6 5 5   SHILOH 7.6*  --  8.2* 8.3* 8.3* 8.7   *  --  104* 119* 91 112*   ALBUMIN  --   --   --   --   --  3.2*   PROTTOTAL  --   --   --   --   --  7.7   BILITOTAL  --   --   --   --   --  0.7   ALKPHOS  --   --   --   --   --  100   ALT  --   --   --   --   --  49   AST  --   --   --   --   --  47*   TROPI  --   --   --   --   --  <0.015       Recent Results (from the past 24 hour(s))   XR Chest Port 1 View    Narrative    CHEST SINGLE VIEW PORTABLE   5/20/2019 3:00 AM     HISTORY: Line placement.    COMPARISON: 5/16/2019.    FINDINGS: Interval placement of a right upper extremity peripherally  inserted central catheter with distal catheter tip in the right  atrium, approximately 2.5 cm distal to the junction of the superior  vena cava and right atrium. The lungs are clear.  Possible  cardiomegaly.      Impression    IMPRESSION: Interval placement of a right upper extremity peripherally  inserted central catheter with distal catheter tip in the right  atrium.    DANNIELLE DAWKINS MD   XR Chest Port 1 View    Narrative    CHEST SINGLE VIEW PORTABLE   5/20/2019 4:03 AM     HISTORY: Peripherally inserted central catheter placement.    COMPARISON: 5/20/2019 at 0251 hours.      Impression    IMPRESSION:   1. No significant interval change since 5/20/2019 at 0251 hours.  2. A right upper extremity peripherally inserted central catheter is  again present with distal catheter tip in the right atrium,  approximately 2.5 cm distal to the junction of the superior vena cava  and right atrium.    DANNIELLE DAWKINS MD   XR Chest Port 1 View    Narrative    CHEST ONE VIEW PORTABLE May 20, 2019 8:02 AM     HISTORY: PICC line retracted.    COMPARISON: Chest radiograph performed earlier today at 3:56 AM.      Impression    IMPRESSION: Right PICC has been pulled back, with tip now in good  position near the SVC/RA junction. No other significant interval  change. No pneumothorax.

## 2019-05-20 NOTE — PROGRESS NOTES
[facility]  Infectious Disease Progress Note          Assessment and Plan:   IMPRESSIONS:   1.  A 79-year-old female, very well known to me from numerous prior admissions, admitted with another episode of apparent acute sepsis, first episode in more than a year.  Has fever, malaise, confusion.  Slight infiltrate seen on imaging and some cough acutely, so pneumonia seems likely.  Historically when these episodes occur leg cellulitis becomes evident while treating, her thigh has some mild erythema present, conceivable early cellulitis.   2.  History of numerous prior episodes of sepsis, generally leg cellulitis, but also pneumonia, UTI and unexplained sepsis historically.   3.  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  I felt for the last several years it probably was not a chronic infection present but Dr. Judge had on the long-term treatment program and that hip has done well, so we have continued.   4.  Prior history of methicillin-resistant Staphylococcus aureus colonization, but no infections with this organism in a long period of time.   5.  Allergies to PENICILLIN, CEPHALOSPORINS, ZITHROMAX, CLINDAMYCIN, VANCOMYCIN.  She has been able to tolerate carbapenems, linezolid, daptomycin historically.  I think when needed vancomycin, likely okay despite the allergy as it has been mostly a red man type syndrome.  Her current list also now includes LEVAQUIN, not clear from history where that occurred.  She actually took LEVAQUIN as recently as 1 year ago.   6.  History of chronic mild renal insufficiency. Now rapid worsening  7.  Chronic obesity.   8.  Chronic postherpetic neuralgia.   9.  Morbid obesity.   10.  Frequent abnormal urinalyses without clearcut true UTIs.   11 + BC, CN staph, likely contaminant  12 Rash, eosinophilia , likely allergic rxn frank     RECOMMENDATIONS:   1.   "Overall worsening in ICU, lactic up , BC likely contaminant UC ? As main issue, also acute renal failure  2 discontinue frank assume allergic rxn, doubt we need zyvox but not clear and is worse , continue for now, levaquin now on allergy list ??, very limited options, we have given this wo issues recently to that now, benadryl for now               Interval History:   no complaints but worse in ICU, generalized rash, eos up, renal failure, resp insuff, BC likely contaminant              Medications:       gabapentin  300 mg Oral TID     insulin aspart  1-7 Units Subcutaneous TID AC     insulin aspart  1-5 Units Subcutaneous At Bedtime     levothyroxine  75 mcg Oral Daily     lidocaine  1 patch Transdermal Q24h    And     lidocaine   Transdermal Q24H    And     lidocaine   Transdermal Q8H     linezolid  600 mg Intravenous Q12H     meropenem  500 mg Intravenous Q6H     metoprolol succinate ER  50 mg Oral Daily     omeprazole  40 mg Oral QAM     pravastatin  10 mg Oral At Bedtime     pregabalin  75 mg Oral BID     sodium chloride (PF)  3 mL Intravenous Q8H     warfarin-No DOSE today  1 each Does not apply no dose today (warfarin)                  Physical Exam:   Blood pressure 105/68, pulse 114, temperature 98.7  F (37.1  C), temperature source Oral, resp. rate 12, height 1.626 m (5' 4\"), weight (!) 151.2 kg (333 lb 6.4 oz), SpO2 96 %, not currently breastfeeding.  Wt Readings from Last 2 Encounters:   05/18/19 (!) 151.2 kg (333 lb 6.4 oz)   05/04/18 (!) 157.4 kg (347 lb)     Vital Signs with Ranges  Temp:  [98.7  F (37.1  C)-102.3  F (39.1  C)] 98.7  F (37.1  C)  Pulse:  [103-121] 114  Heart Rate:  [102-128] 117  Resp:  [9-29] 12  BP: ()/() 105/68  SpO2:  [84 %-99 %] 96 %    Constitutional: Sleepy confused, mild apparent distress   Lungs: Congestion to auscultation bilaterally, few crackles or wheezing   Cardiovascular: Regular rate and rhythm, normal S1 and S2, and no murmur noted   Abdomen: Normal bowel " sounds, soft, non-distended, non-tender   Skin: sig edema generalized rash   Other:           Data:   All microbiology laboratory data reviewed.  Recent Labs   Lab Test 05/20/19  0630 05/19/19  0728 05/17/19  0511   WBC 27.6* 20.8* 9.5   HGB 13.2 14.4 11.4*   HCT 40.7 45.5 36.4   MCV 95 97 97    171 107*     Recent Labs   Lab Test 05/20/19  0630 05/19/19  2217 05/19/19  0728   CR 3.64* 3.52* 2.90*     Recent Labs   Lab Test 10/04/18  1348   SED 67*     Recent Labs   Lab Test 05/18/19  0600 05/16/19  2117 05/16/19  1940 05/16/19  1914 10/01/18  1532 04/09/18  2303 04/09/18  2256 04/09/18  2240 03/12/18  1100   CULT Light growth  Normal yvette   >100,000 colonies/mL  Escherichia coli  * No growth after 4 days Cultured on the 1st day of incubation:  Staphylococcus epidermidis  *  Critical Value/Significant Value, preliminary result only, called to and read back by  Aminta Severino RN at 2148 5/17/19 SRQ    (Note)  POSITIVE for STAPHYLOCOCCUS EPIDERMIDIS and POSITIVE for the mecA  gene (resistant to methicillin) by What's Trendingigene multiplex nucleic acid  test. Final identification and antimicrobial susceptibility testing  will be verified by standard methods.    Specimen tested with Verigene multiplex, gram-positive blood culture  nucleic acid test for the following targets: Staph aureus, Staph  epidermidis, Staph lugdunensis, other Staph species, Enterococcus  faecalis, Enterococcus faecium, Streptococcus species, S. agalactiae,  S. anginosus grp., S. pneumoniae, S. pyogenes, Listeria sp., mecA  (methicillin resistance) and [place]/B (vancomycin resistance).      Critical Value/Significant Value called to and read back by Josselyn Del Toro RN at 0036 on 5.18.19 by .       <10,000 colonies/mL  urogenital yvette  Susceptibility testing not routinely done   No growth No growth No growth Canceled, Test credited  >10 Squamous epithelial cells/low power field indicates oral contamination. Please   recollect.  *   Notification of test cancellation was given to  Meghna Moe RN on 3.12.18 at 9403. bw

## 2019-05-20 NOTE — PROGRESS NOTES
Palliative care Brief Progress note:    Patient's  and sister arrived at beside this afternoon.  Discussed multiple complex medical issues going on and asked if palliative care consult was to assist in End of Life care planning.  Mentioned to them that end of life is one component of end of life.  Reflected on patient's advanced care planning worksheets, health care directive and POLST forms.   stated that he believes it is time to make her comfortable and take away and medications that will lead to things like itching, or suffering without a hope to get her back home to a functional status that she would value.  Sister reiterated her understanding of the medical condition of Berenice at this time and reflected the same.      Educated on Hospice and end of life comfort care plan.   verbalized understanding of the discontinuation of all medications including antibiotics, blood pressure augmenting medications, as well as monitors and devices that will not promote comfort.  We agreed to keep PICC line at this time as it is a route for comfort focused medications, but that it may need to be discontinued prior to discharge pending plan of care.       and sister asked about 'how much time' they might expect until patient's death, emphasized that it will be uncertain and at the time of discontinuation of restorative medications it may result in rapid decline in health or may not change for days to weeks.  Family verbalized understanding.    Plan:   - discontinue any medications that do not promote comfort.    - DNR/DNI code status confirmed.    - VM left for social work to initiate hospice consult and family will expect to hear from social work tomorrow at some time to discuss options for discharge, medical stability, etc.    - Palliative care will continue to follow.    Adrianna Abdullahi, NASIRP  Pain Management and Palliative Care  [facility]  Pgr: [phone]

## 2019-05-20 NOTE — PLAN OF CARE
ICU End of Shift Summary.  For vital signs and complete assessments, please see documentation flowsheets.     Pertinent assessments: Patient alert and oriented to person only; initially calm and increasingly agitated over shift; pulling lines/tubes/clothing off; reoriented without success. Tele ST with BBB. Tmax 99.7. B soft requiring levophed; titrated to keep MAP > 65. Minimal UOP; tele hub MD aware and 500 mL bolus ordered without improvement. LS coarse posteriorly; infrequent non-productive cough. Initially on RA but requiring O2 per NC at 4LPM to keep sats > 92%.  Major Shift Events: Minimal UOP. Levophed gtt infusing. Patient hallucinating about cats in room, men grabbing her, her sister on the ceiling, reaching into air, pulling lines and clothing off.   Plan (Upcoming Events): Wean levophed as able. Continue plan of care.    Discharge/Transfer Needs: TBD    Bedside Shift Report Completed :   Bedside Safety Check Completed:

## 2019-05-20 NOTE — PROGRESS NOTES
Unable to reach patient's  by phone with multiple attempts for PICC consent. Dr. Gordillo paged for consent.

## 2019-05-20 NOTE — PROVIDER NOTIFICATION
19:34 - MD on call web based paged regarding: BP 85/34, , Temp down to 101.5 after Tylenol, 92% RA. Last lactic acid 3.2. WBC up 20.8. No urine output despite previous boluses.

## 2019-05-20 NOTE — PROGRESS NOTES
X-cover  Called re: pt unable to sign consent for PICC and staff unable to contact family.  Pt transferred to ICU tonight for hypotension and concern for sepsis.  Pressors started.  I give consent for PICC line placement.  D/w nurse

## 2019-05-20 NOTE — PROGRESS NOTES
Picc line placed. Family unable to get a hold of from concent. Patient confused at times. 2 Xrays taken. Recommendation to pull back 2.5 cm from 0300 chest ray. I pulled back 2.5 cm prior to 2nd xray *dressing changed. Xray pending at the time I left. I will call [facility] in 45min to make sure picc line is in correct placement.   Bessie Hodges RN on 5/20/2019 at 3:50 AM   piccstat

## 2019-05-20 NOTE — CONSULTS
See dictation.    Oliguric ARF in setting of presumed sepsis with hypotension and lactic acidosis.  Baseline CKD stage 3.  Agree with pressor Rx and volume expansion.  There may be an element of JAMI, probably due to ATN.  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  Note palliative care involvement and DNR/DNI status.    Thanks.    Buck Lutz MD  Nephrology; [employer]  [phone]

## 2019-05-21 NOTE — PLAN OF CARE
Patient now DNR\I and comfort care, continues to have swelling and redness throughout body d/t allergic reaction this weekend, all antibiotics have been stopped, mtz patent but no output this shift, only taking in liquids no appetite, arouses to voice and was coherent at beginning of shift but has become more lethargic and conversation more illogical this afternoon, repositioned x2 and good wound care to all skin folds today and antifungal powder applied, upper and lower dentures in place, palliative following and ordered new cream for itching and also received benadryl/atarax this shift as well as dilaudid x1 for neck pain

## 2019-05-21 NOTE — PROGRESS NOTES
[facility]    Hospitalist Progress Note  Provider : Mirna Gordillo  Date of Service (when I saw the patient): 05/21/2019    Assessment & Plan    Berenice Chaudhari is a 79-year-old female with history of A.flutter on warfarin, CKD stage III, hypertension, GERD, hyperlipidemia, diastolic CHF, hypothyroidism, anxiety, obesity, chronic pain syndrome, prior history of pneumonia, UTI, cellulitis, multiple antibiotic allergies, was brought to emergency room on 5/16 for evaluation for confusion and fevers.  Patient had fever of 101  F at home. Patient is on 4 L of oxygen at baseline. She does have chronic cough but denies chest pain.  In the ER, she was noted to have a fever of 101.8.  Pulse 76, oxygen saturation in mid 80s on room air and 98% on 4 L. Lab work-up showed  WBC 15.2, creatinine 1.42, BNP 2568, TSH 1.98, lactic acid 1. Urinalysis showed positive nitrite, moderate leukocyte esterase, 18 WBC/hpf.  Chest x-ray showed bilateral opacities right greater than left.  Blood culture and urine culture obtained.  She was started on meropenem and Zyvox due to her multiple antibiotic allergies.  ID consulted and recommended to continue meropenem and zyvox.  Blood culture from left arm 5/16 grew staph epidermidis,likely contaminant.Blood culture from the right arm negative.  Urine culture growing E.coli.She was transferred to ICU  5/19/19  and started on Levophed.     During ICU stay patient and family discussed comfort cares.  She and her  both clearly state that they want her to be comfortable and would like to go home with home hospice.  Patient was transferred out of ICU and is now comfort cares.  Hospice consult is pending    1. Sepsis, suspect pneumonia, UTI: Presented with confusion and fevers of 101.8.  She was also hypoxic on presentation  --Chest x-ray on admission showed bilateral opacities right greater than left, suspected to be secondary to pneumonia.  She was started on IV meropenem with  Zyvox due to her multiple drug allergies, history of MRSA infection.  ID consulted and recommended  IV meropenem and Zyvox.  Meropenem was discontinued on 5/20/2019.  --Does have history of pneumonia, UTI, sialitis, and MRSA hip joint infection. Chronically on minocycline.Minocycline on hold for now(on Zyvox).  --Urine culture growing E.coli. 1 set of BC growing Staph epidermidis.  Likely contaminant  --Extensive allergy list for antibiotics including anaphylaxis with cephalosporin, vancomycin, and penicillin. Also rash with clindamycin, levofloxacin, and azithromycin. Tolerating current antibiotics.  --WBC up from 9.5 on 5/17 to 27K today. Lactic acid elevated but trending down today(2.2).    --She was given multiple fluid boluses. Currently on NS at 125 ml/hr. BP still running low. Transferred to ICU last night and started on Levophed due to hypotension despite IV fluid resuscitation on 5/19/2019..   --She was transferred to ICU  started on Levophed and subsequently moved to floor once decision was made that she would like to be comfort cares.  --Antibiotics adjusted by infectious disease meropenem was discontinued.  --Family does not want any life-prolonging.  Will defer antibiotics to ID  --She remains hypotensive.      2. Lactic acidosis secondary to sepsis  --lactic acid down from 4.8 to 2.2  --Patient is now comfort care with no aggressive issues      3. Acute hypoxemic respiratory failure: secondary to sepsis/pneumonia  --On presentation she was hypoxic to the low to mid 80s on room air. Does have history of diastolic CHF and is on Lasix at baseline. Lasix on hold due to sepsis, worsening renal function.   --Continuous pulse ox and supplemental oxygen for comfort.     4. Acute septic encephalopathy suspected due to acute infection.  --Her mental status is much better today. She is alert, awake, oriented x3 today  --Fall precautions     5.  Itching after antibiotic administration.    --On as needed  Benadryl.     6. Chronic diastolic CHF  --on lasix 20 mg at home. Lasix on hold due to sepsis/hypotension.    7.  HTN  --Now hypotensive and requiring IV fluids and Levophed.   --PTA medications include diltiazem  mg at bedtime, Lasix 20 mg daily,metoprolol 50 mg daily.   --Will hold BP meds for hypotension.     8. Hyperlipidemia  --Continue pravastatin.      9. Atrial flutter, RBBB: History of atrial flutter.   --Chronically on warfarin along with diltiazem  mg at bedtime and metoprolol succinate 50 mg daily.   --On metoprolol, diltiazem. Hold BP meds for hypotension     10.  Acute kidney injury on CKD stage III: Creatinine baseline of 1.3-1.4.    --Creatinine up to 3.64 today. Continue IV fluid. FeNA 0.4-likely prerenal JAMI  --Will continue IV fluids for now.  Awaiting hospice meeting to clarify goals of care and interventions to continue  --Discussed with nephrology given the patient is now comfort cares will not proceed with aggressive treatments     11. Anxiety: PTA on venlafaxine 150 mg at bedtime, nortriptyline 50 mg at bedtime, and Wellbutrin 300 mg a.m.  -Hospice care consult pending  -Palliative team helping with symptom management     12. History of MRSA infection: History of infected hip joint.   --Chronically on minocycline 50 mg twice daily, can hold while receiving linezolid. Minocycline on hold while on Zyvox.     13. Chronic pain syndrome: PTA on gabapentin 1200 mg 3 times daily in addition to pregabalin 150 mg twice daily.   -Acetaminophen  --Decreased gabapentin to 300 mg tid due to worsening renal function  --Palliative team following     14. GERD: Continue omeprazole.     15. Hypothyroidism: Continue levothyroxine. TSH normal     16. Chronic anemia, mild acute thrombocytopenia:   --Baseline hemoglobin usually anywhere from 9-10 range.   --Platelets normal today    17. Obesity     DVT Prophylaxis: Warfarin  Code Status:  Comfort cares discussed with patient in presence of  spouse     Code Status: DNR/DNI comfort cares    Disposition: Hospice consult pending today.  Patient would like to go home on hospice      Interval History   Assumed care reviewed chart.  Patient explained that she would like to go home.  They are awaiting hospice consult.  She remains febrile she remains hypotensive but does not want to have any life prolonging treatment.  Given her prognosis she and family has opted to go comfort cares.      -Data reviewed today: I reviewed all new labs and imaging results over the last 24 hours. I personally reviewed     Physical Exam   Temp: 102.6  F (39.2  C) Temp src: Axillary BP: (!) 73/43 Pulse: 113 Heart Rate: 113 Resp: 20 SpO2: 97 % O2 Device: Nasal cannula Oxygen Delivery: 2 LPM  Vitals:    05/16/19 2258 05/18/19 0606   Weight: 149.6 kg (329 lb 11.2 oz) (!) 151.2 kg (333 lb 6.4 oz)     Vital Signs with Ranges  Temp:  [102.6  F (39.2  C)] 102.6  F (39.2  C)  Pulse:  [113-129] 113  Heart Rate:  [108-122] 113  Resp:  [8-31] 20  BP: ()/(24-74) 73/43  SpO2:  [95 %-99 %] 97 %  I/O last 3 completed shifts:  In: 1757.64 [I.V.:1657.64]  Out: 230 [Urine:230]    GEN:  Alert, oriented x 3, appears comfortable, NAD.  HEENT:  Normocephalic/atraumatic, no scleral icterus, no nasal discharge, mouth moist.  CV:  Regular rate and rhythm, no murmur or JVD.  S1 + S2 noted, no S3 or S4.  LUNGS:  Clear to auscultation bilaterally without rales/rhonchi/wheezing/retractions.  Symmetric chest rise on inhalation noted.  ABD:  Active bowel sounds, soft, non-tender/non-distended.  No rebound/guarding/rigidity.  EXT:  No edema or cyanosis.  Hands/feet warm to touch with good signs of peripheral perfusion.  No joint synovitis noted.  SKIN:  Dry to touch, no exanthems noted in the visualized areas.  NEURO:  Symmetric muscle strength, sensation to touch grossly intact.  No new focal deficits appreciated.    Medications     - MEDICATION INSTRUCTIONS -       sodium chloride 10 mL/hr at 05/21/19  0136       diphenhydrAMINE  50 mg Intravenous Q6H     gabapentin  300 mg Oral TID     lidocaine  1 patch Transdermal Q24h    And     lidocaine   Transdermal Q24H    And     lidocaine   Transdermal Q8H     omeprazole  40 mg Oral QAM     pregabalin  75 mg Oral BID       Data   Recent Labs   Lab 05/20/19  1547 05/20/19  0630 05/19/19  2217 05/19/19  0728 05/18/19  0748 05/17/19  0511 05/16/19  1914   WBC  --  27.6*  --  20.8*  --  9.5 15.2*   HGB  --  13.2  --  14.4  --  11.4* 12.6   MCV  --  95  --  97  --  97 96   PLT  --  170  --  171  --  107* 148*   INR  --  5.23*  --  2.72* 2.39* 3.09* 3.01*   NA  --  136 136 135 137 139 139   POTASSIUM 4.6 4.4  --  4.4 4.2 3.9 4.5   CHLORIDE  --  105  --  103 105 105 104   CO2  --  23  --  25 26 29 30   BUN  --  33*  --  26 19 20 22   CR  --  3.64* 3.52* 2.90* 1.54* 1.28* 1.42*   ANIONGAP  --  8  --  7 6 5 5   SHILOH  --  7.6*  --  8.2* 8.3* 8.3* 8.7   GLC  --  113*  --  104* 119* 91 112*   ALBUMIN  --   --   --   --   --   --  3.2*   PROTTOTAL  --   --   --   --   --   --  7.7   BILITOTAL  --   --   --   --   --   --  0.7   ALKPHOS  --   --   --   --   --   --  100   ALT  --   --   --   --   --   --  49   AST  --   --   --   --   --   --  47*   TROPI  --   --   --   --   --   --  <0.015       No results found for this or any previous visit (from the past 24 hour(s)).

## 2019-05-21 NOTE — PROGRESS NOTES
Patient is itching arms and benadryl has been ineffective. PRN benadryl being given now. Hospitalist paged for PRN hydrocortisone for comfort. Awaiting orders.

## 2019-05-21 NOTE — PROGRESS NOTES
Pt is now on Comfort Care.  I have spoken with Dr. Gordillo.  Our group will see pt further, if requested.  Signing off at this time.  Thanks.    Buck Lutz MD  Nephrology; [employer]  [phone]

## 2019-05-21 NOTE — PLAN OF CARE
Pt remains hospitalized for UTI  Parks in place, no urine output this shift  Pt now on comfort cares  Currently on 2L for comfort   Pt alert to self only, difficulty to understand at times  Denied any pain  PICC TKO, scheduled IV Benadryl given  Around 0630 Paged MD pts ears extremely swollen, pt denies any difficulty swallowing at this time waiting for response    Repositioned q 2 hrs, assist x 2

## 2019-05-21 NOTE — CONSULTS
Consult Date:  05/20/2019      REQUESTING PHYSICIAN:  Zaynab Stephenson MD       PRIMARY CARE PHYSICIAN:  Nessa Pearl MD       Berenice Chaudhari is a 79-year-old woman whom we are asked to see to assist with management of oliguric acute on chronic renal failure.  She was admitted 4 days ago with a fever and confusion.  She was thought of have an acute pneumonia.  Other vital signs on admission showed a satisfactory blood pressure and initially satisfactory oxygenation.  Her condition gradually deteriorated over the course of the next 48 hours, however, and she developed hypotension and worsening respiratory insufficiency.  She continued febrile despite antibiotic therapy for her suspected pneumonia.  She was transferred to the Intensive Care Unit and hypotension has been treated with a generous volume of IV fluids along with norepinephrine.  Her admission labs included a BUN and creatinine of 22 and 1.42, similar to her baseline.  By yesterday morning, her initially nonoliguric state had deteriorated, and urine output was no more than 10 to 20 mL per hour.  Her initial labs did not include a significant lactic acidosis, but by yesterday lactic acid was rising and metabolic acidosis was worsening.  She has developed rapidly worsening leukocytosis as well.  Admission white blood cell count was 15,200 and climbed to greater than 20,000 yesterday, then 27,600 today.  BUN and creatinine yesterday melodie rapidly from the day before to 26 and 2.9.  Today's BUN and creatinine are 33 and 3.64.  Her electrolytes remain normal.  Her most recent lactate is slightly lower than it was before at 2.2.  Urine sodium yesterday was 40, urine creatinine was 240, fractional excretion of sodium was 0.4%.  Her urine has grown greater than 100,000 E. coli.  She is on appropriate antibiotic therapy for this organism.  She has had no other significant positive cultures at this point.  Blood cultures were obtained on admission; 1 of 2  bottles grew Staphylococcus epidermidis of questionable significance.      The patient's family is been concerned that she has recently had deteriorating quality of life, and they feel that limitation of aggressive care might be appropriate in her rapidly worsening condition.  She has been made DNR/DNI.  She currently needs only a low level of nasal oxygen support with satisfactory oxygen saturation.  She has had persistent hypotension despite norepinephrine infusion.  Urine output has remained no better than 10 to 20 mL per hour for about the last 36-48 hours.  She has been seen by Infectious Disease.  She has a generalized erythroderma that is felt perhaps to be on the basis of a cellulitis.  In general, there are doubts as to the etiology of her febrile illness and presumed sepsis syndrome.  It is noteworthy that her past history includes transient acute renal failure several years ago that was on the basis of an acute hypersensitivity interstitial nephropathy.  HER ALLERGY LIST INCLUDES NUMEROUS ANTIBIOTICS.      PHYSICAL EXAMINATION:   GENERAL:  Ms. Chaudhari is a lethargic but arousable, elderly woman.   VITAL SIGNS:  Current vital signs include a temperature of 102.6, pulse is about 115, sinus tachycardia.  Blood pressure is about 77/46.  Respirations are about 20.  Oxygen saturation remains in the high 90s.   SKIN:  Shows satisfactory turgor.  There is a generalized erythroderma though it is somewhat asymmetric, being more prominent on the right side of her body.   HEENT AND NECK:  Exams reveal no findings of concern.   NECK:  There is no JVD.   LUNGS:  Seem clear.   HEART:  Aside from the regular tachycardia, is normal, without murmur, gallop or rub.   ABDOMEN:  Soft, bowel sounds are diminished.  No tenderness, masses, or organomegaly are noted.   EXTREMITIES:  Demonstrate 2 to 3+ dependent edema.  There are no signs of joint inflammation.  Peripheral pulses are present but diminished throughout.   NEUROLOGIC:   Nonfocal.      LABORATORY DATA:  Other diagnostic tests of note include a CBC which aside from the marked leukocytosis is essentially normal.  Her urinalysis shows a concentrated urine with low-grade proteinuria.  The sediment shows only a few white and red cells per high-power field.      ASSESSMENT:  Ms. Monteiro's rapidly worsening acute oliguric renal failure is presumably on the basis of acute kidney injury with a sepsis syndrome.  There is no reason to believe that she has developed another episode of hypersensitivity interstitial nephropathy.  It is my understanding that the patient's family has been in discussion with the Palliative Care Team and a move to comfort care status might be arrived at in the near future.  I would agree for the moment with the continued pressor support and volume expansion with normal saline in hopes that there is a prerenal element to her acute renal failure that might reverse.  I agree as well with the current antibiotic therapy for her presumed bacterial infection at this time.  The patient is not a dialysis candidate at present because of her marked hypotensive state.  We will discuss further measures for worsening renal failure as appropriate, both with the rest of the physician team as well as with the patient and her family.      Thank you for the opportunity to assist in Berenice Monteiro's care.  Our group will follow as appropriate during the remainder of her hospital stay.         TAIWO WEEMS MD             D: 2019   T: 2019   MT: SIMONE      Name:     BERENICE MONTEIRO   MRN:      [id number]        Account:       [id number]   :      1940           Consult Date:  2019      Document: [id number]       cc: Nelly Pearl MD

## 2019-05-21 NOTE — PROGRESS NOTES
[facility]  Infectious Disease Progress Note          Assessment and Plan:   IMPRESSIONS:   1.  A 79-year-old female, very well known to me from numerous prior admissions, admitted with another episode of apparent acute sepsis, first episode in more than a year.  Has fever, malaise, confusion.  Slight infiltrate seen on imaging and some cough acutely, so pneumonia seems likely.  Historically when these episodes occur leg cellulitis becomes evident while treating, her thigh has some mild erythema present, conceivable early cellulitis.   2.  History of numerous prior episodes of sepsis, generally leg cellulitis, but also pneumonia, UTI and unexplained sepsis historically.   3.  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  I felt for the last several years it probably was not a chronic infection present but Dr. Judge had on the long-term treatment program and that hip has done well, so we have continued.   4.  Prior history of methicillin-resistant Staphylococcus aureus colonization, but no infections with this organism in a long period of time.   5.  Allergies to PENICILLIN, CEPHALOSPORINS, ZITHROMAX, CLINDAMYCIN, VANCOMYCIN.  She has been able to tolerate carbapenems, linezolid, daptomycin historically.  I think when needed vancomycin, likely okay despite the allergy as it has been mostly a red man type syndrome.  Her current list also now includes LEVAQUIN, not clear from history where that occurred.  She actually took LEVAQUIN as recently as 1 year ago.   6.  History of chronic mild renal insufficiency. Now rapid worsening  7.  Chronic obesity.   8.  Chronic postherpetic neuralgia.   9.  Morbid obesity.   10.  Frequent abnormal urinalyses without clearcut true UTIs.   11 + BC, CN staph, likely contaminant  12 Rash, eosinophilia , likely allergic rxn frank     RECOMMENDATIONS:   1.   "comfort care plan agreed with discussed with , will sign off call if issues            Interval History:   Out of ICU comfort care              Medications:       camphor-menthol   Topical Q6H     diphenhydrAMINE  50 mg Intravenous Q6H     gabapentin  300 mg Oral TID     hydrocortisone   Topical 4x Daily     lidocaine  1 patch Transdermal Q24h    And     lidocaine   Transdermal Q24H    And     lidocaine   Transdermal Q8H     melatonin  6 mg Oral Q24H     pregabalin  75 mg Oral BID     QUEtiapine  25 mg Oral At Bedtime     ranitidine  75 mg Oral BID                  Physical Exam:   Blood pressure (!) 73/43, pulse 113, temperature 102.6  F (39.2  C), temperature source Axillary, resp. rate 20, height 1.626 m (5' 4\"), weight (!) 151.2 kg (333 lb 6.4 oz), SpO2 97 %, not currently breastfeeding.  Wt Readings from Last 2 Encounters:   05/18/19 (!) 151.2 kg (333 lb 6.4 oz)   05/04/18 (!) 157.4 kg (347 lb)     Vital Signs with Ranges  Pulse:  [113-129] 113  Heart Rate:  [108-121] 113  Resp:  [8-31] 20  BP: ()/(24-66) 73/43  SpO2:  [96 %-98 %] 97 %    Constitutional: Sleepy confused, mild apparent distress   Lungs: Congestion to auscultation bilaterally, few crackles or wheezing   Cardiovascular: Regular rate and rhythm, normal S1 and S2, and no murmur noted   Abdomen: Normal bowel sounds, soft, non-distended, non-tender   Skin: sig edema generalized rash   Other:           Data:   All microbiology laboratory data reviewed.  Recent Labs   Lab Test 05/20/19  0630 05/19/19  0728 05/17/19  0511   WBC 27.6* 20.8* 9.5   HGB 13.2 14.4 11.4*   HCT 40.7 45.5 36.4   MCV 95 97 97    171 107*     Recent Labs   Lab Test 05/20/19  0630 05/19/19  2217 05/19/19  0728   CR 3.64* 3.52* 2.90*     Recent Labs   Lab Test 10/04/18  1348   SED 67*     Recent Labs   Lab Test 05/18/19  0600 05/16/19  2117 05/16/19  1940 05/16/19  1914 10/01/18  1532 04/09/18  2303 04/09/18  2256 04/09/18  2240 03/12/18  1100   CULT Light " growth  Normal yvette   >100,000 colonies/mL  Escherichia coli  * No growth after 5 days Cultured on the 1st day of incubation:  Staphylococcus epidermidis  *  Critical Value/Significant Value, preliminary result only, called to and read back by  Aminta Severino RN at 2148 5/17/19 SRQ    (Note)  POSITIVE for STAPHYLOCOCCUS EPIDERMIDIS and POSITIVE for the mecA  gene (resistant to methicillin) by Flowity multiplex nucleic acid  test. Final identification and antimicrobial susceptibility testing  will be verified by standard methods.    Specimen tested with DropThoughtigene multiplex, gram-positive blood culture  nucleic acid test for the following targets: Staph aureus, Staph  epidermidis, Staph lugdunensis, other Staph species, Enterococcus  faecalis, Enterococcus faecium, Streptococcus species, S. agalactiae,  S. anginosus grp., S. pneumoniae, S. pyogenes, Listeria sp., mecA  (methicillin resistance) and Adair/B (vancomycin resistance).      Critical Value/Significant Value called to and read back by Josselyn Del Toro RN at 0036 on 5.18.19 by SS.       <10,000 colonies/mL  urogenital yvette  Susceptibility testing not routinely done   No growth No growth No growth Canceled, Test credited  >10 Squamous epithelial cells/low power field indicates oral contamination. Please   recollect.  *  Notification of test cancellation was given to  Meghna Moe RN on 3.12.18 at 0248. bw

## 2019-05-21 NOTE — PROGRESS NOTES
[facility]  Palliative Care Progress Note  Text Page     Assessment & Plan   Berenice Chaudhari is a 79 year old female who was admitted on 5/16/2019. I was asked to see the patient for goals of care.     Recommendations:  1.Decisional Capacity -  Unreliable. Patient has an advance directive dated 12/26/2017.  If she becomes unable to demonstrate decisional capacity, Ricardo Chaudhari (spouse) is her primary Health Care Agent.  Alternates are Berenice Ney (sister).     2. Pain- No acute complaints of pain.    3. Spiritual Care- Oriented to Spiritual Health and Social Work Services as part of Palliative Care team.    4. Care Planning- Patient with decline in her health which prompted hospitalization.  DNR/DNI order placed prior to hospitalization and POLST on file reflecting that code status.  Patient has multiple co-morbid states and requires goals of care conversation during this hospitalization.    5. Itching - significant itching, likely secondary to medication side effects.  - scheduled benedryl  - prn benedryl   - prn atarax  - cortisone cream four times daily to arms and chest  - sarna cream q 6 hours  - zantac for H2 blockade in likely histamine reaction  - monitor for cholinergic reaction due to regimen.    6. Insomnia - mixed etiology, likely delirium and disruption of sleep due to itching.  - scheduled melatonin 6mg at 1800  - prn lorazepam for anxiety or agitation.  - scheduled seroquel 25mg Q HS to promote sleep and comfort from delirious agitation.     Goal of Care: Comfort measures, family discussion later yesterday afternoon resulted in  and patient's sister agreeing that comfort and discharge to hospice would be in line with patient's values.  Family wished to focus on symptom management at this time and orders changed yesterday to reflect that wish.    SW involved, appreciate their support and guidance to the family for hospice placement.     Disease Process/es & Symptoms:  Berenice Chaudhari  is a 79 year old patient admitted with symptoms of confusion and fever. she has been treated for urinary tract infection/sepsis with empiric antibiotics.    She had worsening hypoxia and has required more supplemental oxygen than her baseline 4L via nasal cannula.  She has baseline CKD with new JAMI and requires nephrology consult at this time to consider next steps of renal therapy.         This is in the setting of chronic CHF with normal EF%, CKD stage III, diabetes, hypertension, hyperlipidemia, chronic pain, left hip infection with joint degeneration, anxiety and recurrent pneumonia.  She has been functionally stable prior to this hospitalization, however requires a health assistant with ADLs/IADLs.  Patient has had multiple falls noted in her chart over the past 6 months, none resulting in significant trauma, but seems to have difficulty with navigating current living situation.    Patient with significant itching, possibly secondary to antibiotic regimen which has been discontinued.  Denies pain or anxiety, but is extremely bothered by itching and skin irritation.  Patient with intermittent episodes of lucid state, conversational and oriented.  Other periods of confusion and lethargy.       Findings & plan of care discussed with: Dr. Goode  Follow-up plan from palliative team: will follow for family support and symptom management.  Thank you for involving us in the patient's care.      Adrianna DELEON, CNP  Pain Management and Palliative Care  [facility]  Pgr: [phone]    Time Spent on this Encounter   I spent 30 minutes in assessment of the patient, counseling and discussion with the patient and family as documented in sections below. Another 30 minutes in review of chart, documentation, coordination of care and discussion with the health care team.    Interval History   Worsening redness of chest and upper extremities and worsening complaints of itching.  PRN atarax and benedryl  given, along with scheduled benedryl dose, without significant improvement.  Patient with some reports of insomnia, unable to settle down both due to itching and because she is 'flipped upside-down' with her day-night schedule.    Course of Hospitalization Discussions Data   Family present yesterday afternoon and discussed the need to shift the focus of cares toward comfort.  In depth discussion regarding the patient's recurrent hospitalizations for infections and the requirement of temporary dialysis multiple times held with patient's  and sister.  They stated that she would not get back to how she was prior to each hospitalization and that she had become more isolated and unable to participate in daily activities or socialization which she valued.   stated that he was not able to always be as available to meet her needs, but that their  care-taker had been an important resource for them in the past few months especially.       reflected that Berenice's health had not been going in the right direction for some time and that he had seen her suffer many times during recurrent infections.  He stated that it was time to follow her advanced directive documents and focus on comfort.  She would not be able to get back to a state of health that she would value and she should not suffer further with treatment plans that would not fix the underlying health problems that kept her immobile and unhappy.    Symptom management focus and organization of orders completed on 5/20 in the late afternoon, patient transitioned out of ICU that evening.  SW discussed hospice options with family and also obtaining power of  documents for completion today.  Plan pending.       Review of Systems    CONSTITUTIONAL: NEGATIVE for fever, chills, change in weight, positive for itching of extremities and chest.  ENT/MOUTH: NEGATIVE for ear, mouth and throat problems  RESP: NEGATIVE for significant cough or  SOB  CV: NEGATIVE for chest pain, palpitations or peripheral edema    Palliative Symptom Review (0=no symptom/no concern, 1=mild, 2=moderate, 3=severe):      Pain: 0-none      Fatigue: 1-mild      Nausea: 0-none      Constipation: 0-none      Diarrhea: 0-none      Depressive Symptoms: 0-none      Anxiety: 1-mild      Drowsiness: 1-mild      Poor Appetite: 1-mild      Shortness of Breath: 1-mild      Insomnia: 1-mild      Other:itching 3-severe      Overall (0 good/no concerns, 3 very poor):  2    Physical Exam   Temp:  [98.7  F (37.1  C)-102.6  F (39.2  C)] 102.6  F (39.2  C)  Pulse:  [110-129] 113  Heart Rate:  [108-122] 113  Resp:  [8-31] 20  BP: ()/(24-88) 73/43  SpO2:  [84 %-99 %] 97 %  333 lbs 6.4 oz  GEN:  Lethargic, oriented x self and location, appears comfortable, NAD.  HEENT:  Normocephalic/atraumatic, no scleral icterus, no nasal discharge, mouth moist.  CV:  RRR, S1, S2; no murmurs or other irregularities noted.  +3 DP/PT pulses bilatererally; non-pitting edema BLE.  RESP:  Coarse to auscultation with rhonchi throughout, diminished.  Symmetric chest rise on inhalation noted.  Normal respiratory effort.  Weak cough effort.  ABD:  Rounded, soft, obese, non-tender to palpation.  +BS  EXT:  Edema & pulses as noted above.  CMS intact x 4.     M/S:   No tenderness to palpation.    SKIN:  Dry to touch, inflamed/reddened areas  of skin in BUE and on chest/shoulders.   NEURO:unable to assess complete Neuro exam.  CN intact, moves all extremities antigravity. Did not assess reflexes.  Psych:  Normal affect.  Calm, cooperative.       Medications     - MEDICATION INSTRUCTIONS -       sodium chloride 10 mL/hr at 05/21/19 0136       diphenhydrAMINE  50 mg Intravenous Q6H     gabapentin  300 mg Oral TID     lidocaine  1 patch Transdermal Q24h    And     lidocaine   Transdermal Q24H    And     lidocaine   Transdermal Q8H     omeprazole  40 mg Oral QAM     pregabalin  75 mg Oral BID       Data   N/a  No new labs  due to comfort care plan.

## 2019-05-21 NOTE — CONSULTS
Care Transition Initial Assessment - SW     Met with: Family    Active Problems:    Sepsis due to pneumonia (H)       DATA  Lives With: spouse   Living Arrangements: house  Quality of Family Relationships: involved, stressful, supportive  Due to new level of care needs  Identified issues/concerns regarding health management: Pt admitted for PNA< ended up intubate in the ICU with active infection   Now comfort care   @   Resources List: Hospice,   Skilled Nursing Facility- looking for LTC bed vs Residential Hospice support   Quality of Family Relationships: involved, stressful, supportive     ASSESSMENT  Cognitive Status:  Agitated, pt not able to participate in conversation   Concerns to be addressed: Met with spouse and family. At this time due to change in condition spouse does not feel he and his niece would be able to provide the 24 hour support pt will need to be home on Hospice. Spoke with family about placement options. They would like to have SW look at Residential Hospice and LTC,. Prefer private room if in LTC.  Family are aware of the private pay cost and the amount of deposit that would be required once pt arrive.   [facility] has not set up Hospice meeting as we are unclear of location and when SW will find a bed for pt  Pt per medical team could be possible ready to d.c tomorrow, unfortunately [facility] not confident this can be arranged.  [facility]  .     PLAN  Referrals are out for LTC facility and assessments will be send to both [facility] and the [facility] in [place]

## 2019-05-21 NOTE — PLAN OF CARE
Patient to floor around 2230  Patient unable to answer questions  Moves arms and twitches at times  Respirations of 20  PICC tko  Scheduled IV benedryl given  Hyrdortisone cream at bedside  No itching noticed  Continue with plan of care

## 2019-05-22 NOTE — PROGRESS NOTES
Discharge Planner   Discharge Plans in progress: PT has Hospice meeting at 9:30 with plan to d.c at 1500 if pt is Medically stable   Barriers to discharge plan: None. Family are aware of the $600 cost per day and need for one week deposit   Follow up plan:      05/22/19 1100   [facility] [phone], Fax: [phone]  ([facility] )   will follow for support     H/E stretcher transport set up 1530 for Thursday 05/23       Entered by: Corinne C. White 05/22/2019 11:22 AM

## 2019-05-22 NOTE — PLAN OF CARE
Ambulatory Status:  Pt up with a lift  Resp: LS diminished/coarse, on RA  GI:  regular diet.  BS hypoactive.  Passing flatus.  Last BM 5/17.  :  mtz with low output  Skin:  edema, redness/rash, scratches from itching  Tx:  benadryl, camphor, comfort measures  Consults:  palliative, WOC and ID  Disposition: to discharge on hospice.

## 2019-05-22 NOTE — PROGRESS NOTES
[facility]  Palliative Care Progress Note  Text Page     Assessment & Plan   Berenice Chaudhari is a 79 year old female who was admitted on 5/16/2019. I was asked to see the patient for goals of care.     Recommendations:  1.Decisional Capacity -  Unreliable. Patient has an advance directive dated 12/26/2017.  If she becomes unable to demonstrate decisional capacity, Ricardolatoya Chaudhari (spouse) is her primary Health Care Agent.  Alternates are Berenice Brewster (sister).     2. Pain- significant discomfort and symptom of itching, likely secondary to drug reaction.  Patient with broken areas of skin, likely source of pain though patient is not able to verbalize symptom.  - oxycodone SL (roxicodone intensol) - scheduled 5 mg - 7.5mg q 4 hours to address discomfort of itching/inflammatory process.    3. Spiritual Care- Oriented to Spiritual Health and Social Work Services as part of Palliative Care team.    4. Care Planning- Patient with decline in her health which prompted hospitalization.  DNR/DNI order placed prior to hospitalization and POLST on file reflecting that code status.  Patient has multiple co-morbid states and requires goals of care conversation during this hospitalization.    5. Itching - significant itching, likely secondary to medication side effects.  - scheduled benedryl IV  - prn benedryl IV  - prn atarax   - cortisone cream four times daily to arms and chest  - sarna cream q 6 hours  - zantac discontinued as patient not able to take PO today.  - monitor for cholinergic reaction due to regimen.    6. Insomnia - mixed etiology, likely delirium and disruption of sleep due to itching.  - discontinued PO medications due to patient's mental status.   - lorazepam SL 1mg q 8 hours for symptom management.  - scheduled seroquel 25mg Q HS if able to take medication.     Goal of Care: Comfort measures, family discussion later yesterday afternoon resulted in  and patient's sister agreeing that comfort and  discharge to hospice would be in line with patient's values.   SW involved, appreciate their support and guidance to the family for hospice placement.  Patient no longer able to take PO, will transition to SL doses of anxiolytic and pain medications today.     Disease Process/es & Symptoms:  Berenice Chaudhari is a 79 year old patient admitted with symptoms of confusion and fever. she has been treated for urinary tract infection/sepsis with empiric antibiotics.    She had worsening hypoxia and has required more supplemental oxygen than her baseline 4L via nasal cannula.  She has baseline CKD with new JAMI and requires nephrology consult at this time to consider next steps of renal therapy.         This is in the setting of chronic CHF with normal EF%, CKD stage III, diabetes, hypertension, hyperlipidemia, chronic pain, left hip infection with joint degeneration, anxiety and recurrent pneumonia.  She has been functionally stable prior to this hospitalization, however requires a health assistant with ADLs/IADLs.  Patient has had multiple falls noted in her chart over the past 6 months, none resulting in significant trauma, but seems to have difficulty with navigating current living situation.    Patient with significant itching, possibly secondary to antibiotic regimen which has been discontinued.  Denies pain or anxiety, but is extremely bothered by itching and skin irritation.  Patient with intermittent episodes of lucid state, conversational and oriented.  Other periods of confusion and lethargy.       Findings & plan of care discussed with: Dr. Goode  Follow-up plan from palliative team: will follow for family support and symptom management.  Thank you for involving us in the patient's care.      Adrianna DELEON, CNP  Pain Management and Palliative Care  [facility]  Pgr: [phone]    Time Spent on this Encounter   I spent 30 minutes in assessment of the patient, counseling and discussion with  the patient and family as documented in sections below. Another 30 minutes in review of chart, documentation, coordination of care and discussion with the health care team.    Interval History   Worsening redness of chest and upper extremities and worsening complaints of itching.  PRN atarax and benedryl given, along with scheduled benedryl dose, without significant improvement.  Patient with some reports of insomnia, unable to settle down both due to itching and because she is 'flipped upside-down' with her day-night schedule.    Course of Hospitalization Discussions Data   Family present yesterday afternoon and discussed the need to shift the focus of cares toward comfort.  In depth discussion regarding the patient's recurrent hospitalizations for infections and the requirement of temporary dialysis multiple times held with patient's  and sister.  They stated that she would not get back to how she was prior to each hospitalization and that she had become more isolated and unable to participate in daily activities or socialization which she valued.   stated that he was not able to always be as available to meet her needs, but that their  care-taker had been an important resource for them in the past few months especially.       reflected that Berenice's health had not been going in the right direction for some time and that he had seen her suffer many times during recurrent infections.  He stated that it was time to follow her advanced directive documents and focus on comfort.  She would not be able to get back to a state of health that she would value and she should not suffer further with treatment plans that would not fix the underlying health problems that kept her immobile and unhappy.    Symptom management focus and organization of orders completed on 5/20 in the late afternoon, patient transitioned out of ICU that evening.  SW discussed hospice options with family and also  obtaining power of  documents for completion today.  Plan pending.  5/22 patient with significant discomfort due to itching, given that goals are comfort, had discussion with  Ricardo regarding increasing medications that may sedate patient in order to achieve peaceful state without the agitation or itching that she has been experiencing.  He verbalized understanding of plan of care and wants her comfortable stating 'she doesn't need to be awake and suffering'.       Review of Systems   LUCAS.      Palliative Symptom Review (0=no symptom/no concern, 1=mild, 2=moderate, 3=severe):      Pain: 0-none      Fatigue: 1-mild      Nausea: 0-none      Constipation: 0-none      Diarrhea: 0-none      Depressive Symptoms: 0-none      Anxiety: 1-mild      Drowsiness: 1-mild      Poor Appetite: 1-mild      Shortness of Breath: 1-mild      Insomnia: 1-mild      Other:itching 3-severe      Overall (0 good/no concerns, 3 very poor):  2    Physical Exam      333 lbs 6.4 oz  GEN:  Lethargic, nonverbal, appears comfortable, NAD.  HEENT:  Normocephalic/atraumatic, no scleral icterus, no nasal discharge, mouth moist.  CV:  RRR, S1, S2; no murmurs or other irregularities noted.  +3 DP/PT pulses bilatererally; non-pitting edema BLE.  RESP:  Coarse to auscultation with rhonchi throughout, diminished.  Symmetric chest rise on inhalation noted.  Normal respiratory effort.  Weak cough effort.  ABD:  Rounded, soft, obese, non-tender to palpation.  +BS  EXT:  Edema & pulses as noted above.  CMS intact x 4.     M/S:   No tenderness to palpation.    SKIN:  Dry to touch, inflamed/reddened areas of skin in BUE and on chest/shoulders.   NEURO:unable to assess complete Neuro exam.  CN intact, moves all extremities antigravity. Did not assess reflexes.  Psych:  Calm.       Medications     - MEDICATION INSTRUCTIONS -       sodium chloride 10 mL/hr at 05/22/19 0108       camphor-menthol   Topical Q6H     diphenhydrAMINE  50 mg Intravenous Q6H      hydrocortisone   Topical 4x Daily     lidocaine  1 patch Transdermal Q24h    And     lidocaine   Transdermal Q24H    And     lidocaine   Transdermal Q8H     LORazepam  1 mg Oral Q8H     oxyCODONE  5-7.6 mg Sublingual Q4H     QUEtiapine  25 mg Oral At Bedtime     ranitidine  75 mg Oral BID       Data   N/a  No new labs due to comfort care plan.

## 2019-05-22 NOTE — PROGRESS NOTES
[facility]    Hospitalist Progress Note  Provider : Brandyn Grimes  Date of Service (when I saw the patient): 05/22/2019    Assessment & Plan    Berenice Chaudhari is a 79-year-old female with history of A.flutter on warfarin, CKD stage III, hypertension, GERD, hyperlipidemia, diastolic CHF, hypothyroidism, anxiety, obesity, chronic pain syndrome, prior history of pneumonia, UTI, cellulitis, multiple antibiotic allergies, was brought to emergency room on 5/16 for evaluation for confusion and fevers.  Patient had fever of 101  F at home. Patient is on 4 L of oxygen at baseline. She does have chronic cough but denies chest pain.  In the ER, she was noted to have a fever of 101.8.  Pulse 76, oxygen saturation in mid 80s on room air and 98% on 4 L. Lab work-up showed  WBC 15.2, creatinine 1.42, BNP 2568, TSH 1.98, lactic acid 1. Urinalysis showed positive nitrite, moderate leukocyte esterase, 18 WBC/hpf.  Chest x-ray showed bilateral opacities right greater than left.  Blood culture and urine culture obtained.  She was started on meropenem and Zyvox due to her multiple antibiotic allergies.  ID consulted and recommended to continue meropenem and zyvox.  Blood culture from left arm 5/16 grew staph epidermidis,likely contaminant.Blood culture from the right arm negative.  Urine culture growing E.coli.She was transferred to ICU  5/19/19  and started on Levophed.     During ICU stay patient and family discussed comfort cares.  She and her  both clearly state that they want her to be comfortable and would like to go home with home hospice.  Patient was transferred out of ICU and is now comfort cares.  Hospice consult is pending    1. Sepsis, suspect pneumonia, UTI: Presented with confusion and fevers of 101.8.  She was also hypoxic on presentation  --Chest x-ray on admission showed bilateral opacities right greater than left, suspected to be secondary to pneumonia.  She was started on IV meropenem with  Zyvox due to her multiple drug allergies, history of MRSA infection.  ID consulted and recommended  IV meropenem and Zyvox.  Meropenem was discontinued on 5/20/2019.  --Does have history of pneumonia, UTI, sialitis, and MRSA hip joint infection. Chronically on minocycline.Minocycline on hold for now(on Zyvox).  --Urine culture growing E.coli. 1 set of BC growing Staph epidermidis.  Likely contaminant  --Extensive allergy list for antibiotics including anaphylaxis with cephalosporin, vancomycin, and penicillin. Also rash with clindamycin, levofloxacin, and azithromycin. Tolerating current antibiotics.  --WBC up from 9.5 on 5/17 to 27KLactic acid elevated but trending down   --She was transferred to ICU  started on Levophed and subsequently moved to floor once decision was made that she would like to be comfort cares.  --Antibiotics adjusted by infectious disease meropenem was discontinued.  --Family does not want any life-prolonging.        2. Lactic acidosis secondary to sepsis  --Patient is now comfort care with no aggressive issues      3. Acute hypoxemic respiratory failure: secondary to sepsis/pneumonia  --On presentation she was hypoxic to the low to mid 80s on room air. Does have history of diastolic CHF and is on Lasix at baseline. Lasix on hold due to sepsis, worsening renal function. .     4. Acute septic encephalopathy suspected due to acute infection.     5.  Itching after antibiotic administration.    --On as needed Benadryl.  --Ativan also for comfort     6. Chronic diastolic CHF     7.  Acute kidney injury on CKD stage III: Creatinine baseline of 1.3-1.4.    --Creatinine up to 3.64 today. Continue IV fluid. FeNA 0.4-likely prerenal JAMI  --Will continue IV fluids for now.  Awaiting hospice meeting to clarify goals of care and interventions to continue  --Discussed with nephrology given the patient is now comfort cares will not proceed with aggressive treatments     8. Anxiety: PTA on venlafaxine 150 mg  at bedtime, nortriptyline 50 mg at bedtime, and Wellbutrin 300 mg a.m.  -Hospice care consult pending  -Palliative team helping with symptom management     9. History of MRSA infection: History of infected hip joint.   --Chronically on minocycline 50 mg twice daily, can hold while receiving linezolid. Minocycline on hold while on Zyvox.     10. Chronic pain syndrome: PTA on gabapentin 1200 mg 3 times daily in addition to pregabalin 150 mg twice daily.   -Acetaminophen  --Palliative team following      11. Morbid Obesity, Body mass index is 57.23 kg/m .      DVT Prophylaxis: Warfarin  Code Status:  Comfort cares discussed with patient in presence of spouse     Code Status: DNR/DNI comfort cares    Disposition: Hospice consult pending today.  Plan discharge to Hospice at facility once bed found.       Interval History   Assumed care reviewed chart.  Patient not verbalizing.  Per family is more drowsy and lethargic and continues to itch quite a bit.  Really no longer taking anything by mouth.  Refused ice cream last night which is very unusual for patient    -Data reviewed today: I reviewed all new labs and imaging results over the last 24 hours. I personally reviewed     Physical Exam                     Vitals:    05/16/19 2258 05/18/19 0606   Weight: 149.6 kg (329 lb 11.2 oz) (!) 151.2 kg (333 lb 6.4 oz)     Vital Signs with Ranges     I/O last 3 completed shifts:  In: 100 [P.O.:100]  Out: 50 [Urine:50]    GEN: appears comfortable, NAD.  Will occasionally open eyes  HEENT:  Normocephalic/atraumatic, no scleral icterus, no nasal discharge,   Patient's skin is diffusely reddened    Medications     - MEDICATION INSTRUCTIONS -       sodium chloride 10 mL/hr at 05/22/19 0108       camphor-menthol   Topical Q6H     diphenhydrAMINE  50 mg Intravenous Q6H     hydrocortisone   Topical 4x Daily     lidocaine  1 patch Transdermal Q24h    And     lidocaine   Transdermal Q24H    And     lidocaine   Transdermal Q8H     LORazepam   1 mg Oral Q8H     oxyCODONE  5-7.6 mg Sublingual Q4H     QUEtiapine  25 mg Oral At Bedtime     ranitidine  75 mg Oral BID       Data   Recent Labs   Lab 05/20/19  1547 05/20/19  0630 05/19/19  2217 05/19/19  0728 05/18/19  0748 05/17/19  0511 05/16/19  1914   WBC  --  27.6*  --  20.8*  --  9.5 15.2*   HGB  --  13.2  --  14.4  --  11.4* 12.6   MCV  --  95  --  97  --  97 96   PLT  --  170  --  171  --  107* 148*   INR  --  5.23*  --  2.72* 2.39* 3.09* 3.01*   NA  --  136 136 135 137 139 139   POTASSIUM 4.6 4.4  --  4.4 4.2 3.9 4.5   CHLORIDE  --  105  --  103 105 105 104   CO2  --  23  --  25 26 29 30   BUN  --  33*  --  26 19 20 22   CR  --  3.64* 3.52* 2.90* 1.54* 1.28* 1.42*   ANIONGAP  --  8  --  7 6 5 5   SHILOH  --  7.6*  --  8.2* 8.3* 8.3* 8.7   GLC  --  113*  --  104* 119* 91 112*   ALBUMIN  --   --   --   --   --   --  3.2*   PROTTOTAL  --   --   --   --   --   --  7.7   BILITOTAL  --   --   --   --   --   --  0.7   ALKPHOS  --   --   --   --   --   --  100   ALT  --   --   --   --   --   --  49   AST  --   --   --   --   --   --  47*   TROPI  --   --   --   --   --   --  <0.015       No results found for this or any previous visit (from the past 24 hour(s)).

## 2019-05-22 NOTE — PLAN OF CARE
Ambulatory Status:  Pt A-3 for repositioning.  GI: No BM this shift.  :  Parks in place, low output.  Skin:  Red, scratches, miconazole in folds.  Edematous.  Tx:  Benadryl scheduled, Dermasarra and Cortaid scheduled.  Consults:  Paliative.  Disposition:  Plan to discharge either to LTC  or hospice facility.  Nursing will continue to monitor.

## 2019-05-22 NOTE — PROGRESS NOTES
Patient disoriented, lethargic arouses to voice or gentle touch.  Picks at skin and restless at times.  New orders for oxycodone and ativan.  PRN iv ativan x1 prn iv dilaudid x2 this shift.  LS diminished with expiratory wheezes.  Bowel sounds hypoactive.  Regular diet however patient current orientation status does not allow for proper oral intake.  Oral cares and repositioned Q2 hours. Catheter care done.  Barrier cream applied to excoriated area to buttocks.  PICC site wnl.  Plan is for discharge to hospice facility tomorrow at 3:30 PM.  Will continue to monitor.

## 2019-05-23 NOTE — PROGRESS NOTES
I contacted [facility] admissions and notified them that we are canceling discharge planning as pt is not doing well.  Nicole OWEN CTS [id number]

## 2019-05-23 NOTE — TELEPHONE ENCOUNTER
LVM levy Dempsey requesting call back to confirm continued need, as appointment this am with the family is already completed.  Dennise Ram RN May 23, 2019 1:11 PM

## 2019-05-23 NOTE — PROGRESS NOTES
[facility]  Palliative Care Progress Note  Text Page     Assessment & Plan   Berenice Chaudhari is a 79 year old female who was admitted on 5/16/2019. I was asked to see the patient for goals of care.     Recommendations:  1.Decisional Capacity -  Unreliable. Patient has an advance directive dated 12/26/2017.  If she becomes unable to demonstrate decisional capacity, Ricardolatoya Chaudhari (spouse) is her primary Health Care Agent.  Alternates are Berenice Ney (sister).     2. Pain- significant discomfort and symptom of itching, likely secondary to drug reaction.  Patient with broken areas of skin, likely source of pain though patient is not able to verbalize symptom.  - transitioned to dilaudid SL concentrated solution, discharge plan: dilaudid 3mg q2 hours (known increased dose secondary to patient's needs today)) and prn dilaudid SL 2mg Q2 hours  to address discomfort of itching/inflammatory process.    3. Spiritual Care- Oriented to Spiritual Health and Social Work Services as part of Palliative Care team.    4. Care Planning- Patient with decline in her health which prompted hospitalization.  DNR/DNI order placed prior to hospitalization and POLST on file reflecting that code status.  Patient has multiple co-morbid states and requires goals of care conversation during this hospitalization.  - discharge to hospice today.  Ride at 1530  - plan subject to change based on patient's stability.    5. Itching - significant itching, likely secondary to medication side effects.  - prn atarax rectal.  - cortisone cream four times daily to arms and chest  - sarna cream q 6 hours    6. Insomnia - mixed etiology, likely delirium and disruption of sleep due to itching.  - discontinued PO medications due to patient's mental status.   - lorazepam SL 1mg q 8 hours for symptom management.  - lorazepam SL 0.5 - 1mg q1 hour prn for symptom management.    7. Oral secretions,  - prn atropine gtts. Utilized overnight without  significant aid to guttural sounds with respirations.   - prn glycopyrolate today for additional assistance with oral secretions.  - scopolamine patch as needed for secretion management     Goal of Care: Comfort measures, family discussion later yesterday afternoon resulted in  and patient's sister agreeing that comfort and discharge to hospice would be in line with patient's values.   Discharge to hospice today at 1530.  Appreciate work of SW with placement/enrollment needs.     Disease Process/es & Symptoms:  Berenice Chaudhari is a 79 year old patient admitted with symptoms of confusion and fever. she has been treated for urinary tract infection/sepsis with empiric antibiotics.    She had worsening hypoxia and has required more supplemental oxygen than her baseline 4L via nasal cannula.  She has baseline CKD with new JAMI and requires nephrology consult at this time to consider next steps of renal therapy.         This is in the setting of chronic CHF with normal EF%, CKD stage III, diabetes, hypertension, hyperlipidemia, chronic pain, left hip infection with joint degeneration, anxiety and recurrent pneumonia.  She has been functionally stable prior to this hospitalization, however requires a health assistant with ADLs/IADLs.  Patient has had multiple falls noted in her chart over the past 6 months, none resulting in significant trauma, but seems to have difficulty with navigating current living situation.    Patient has been more comfortable and somnolent with the administration of scheduled medications for symptom management.  Patient's  verbalized appreciation that she appears more comfortable.  Plan for discharge on medications as above, pending patient's status.     Findings & plan of care discussed with: Dr. Grimes  Follow-up plan from palliative team: will follow for family support and symptom management.  Thank you for involving us in the patient's care.      Adrianna Abdullahi APRN, CNP  Pain  Management and Palliative Care  [facility]  Pgr: [phone]    Time Spent on this Encounter   I spent 30 minutes in assessment of the patient, counseling and discussion with the patient and family as documented in sections below. Another 15 minutes in review of chart, documentation, coordination of care and discussion with the health care team.    Interval History   Stable overnight with improved symptoms on scheduled SL oxycodone and ativan.  Some gurgling sounds with respirations.  Hospice planning today with representative for discharge planning.    Course of Hospitalization Discussions Data   Family present yesterday afternoon and discussed the need to shift the focus of cares toward comfort.  In depth discussion regarding the patient's recurrent hospitalizations for infections and the requirement of temporary dialysis multiple times held with patient's  and sister.  They stated that she would not get back to how she was prior to each hospitalization and that she had become more isolated and unable to participate in daily activities or socialization which she valued.   stated that he was not able to always be as available to meet her needs, but that their  care-taker had been an important resource for them in the past few months especially.       reflected that Berenice's health had not been going in the right direction for some time and that he had seen her suffer many times during recurrent infections.  He stated that it was time to follow her advanced directive documents and focus on comfort.  She would not be able to get back to a state of health that she would value and she should not suffer further with treatment plans that would not fix the underlying health problems that kept her immobile and unhappy.    Symptom management focus and organization of orders completed on 5/20 in the late afternoon, patient transitioned out of ICU that evening.  SW discussed  hospice options with family and also obtaining power of  documents for completion today.  Plan pending.  5/22 patient with significant discomfort due to itching, given that goals are comfort, had discussion with  Ricardo regarding increasing medications that may sedate patient in order to achieve peaceful state without the agitation or itching that she has been experiencing.  He verbalized understanding of plan of care and wants her comfortable stating 'she doesn't need to be awake and suffering'.  5/23 patient set to discharge, may be showing signs of progression, Dr. Molina to assess and hold discharge if warranted.  Had improvement of symptoms with scheduled SL oxy and ativan.  Transitioned to SL dilaudid concentrated solution, will monitor for continued symptom management.  PICC line removed        Review of Systems   LUCAS.      Palliative Symptom Review (0=no symptom/no concern, 1=mild, 2=moderate, 3=severe):      Pain: 0-none      Fatigue: 1-mild      Nausea: 0-none      Constipation: 0-none      Diarrhea: 0-none      Depressive Symptoms: 0-none      Anxiety: 1-mild      Drowsiness: 1-mild      Poor Appetite: 1-mild      Shortness of Breath: 1-mild      Insomnia: 1-mild      Other:itching 3-severe      Overall (0 good/no concerns, 3 very poor):  2    Physical Exam   Resp:  [16] 16  333 lbs 6.4 oz  GEN:  somnolent, nonverbal, appears comfortable, NAD.  HEENT:  Normocephalic/atraumatic, no scleral icterus, no nasal discharge, mouth moist.  CV:  RRR, S1, S2; no murmurs or other irregularities noted.  +3 DP/PT pulses bilatererally; non-pitting edema BLE.  RESP:  Coarse to auscultation with rhonchi throughout, diminished.  Symmetric chest rise on inhalation noted.  Normal respiratory effort.  Weak cough effort.  ABD:  Rounded, soft, obese, non-tender to palpation.  +BS  EXT:  Edema & pulses as noted above.  CMS intact x 4.     M/S:   No tenderness to palpation.    SKIN:  Dry to touch, inflamed/reddened  areas of skin in BUE and on chest/shoulders.   NEURO: unable to assess complete Neuro exam.  CN intact, moves all extremities antigravity. Did not assess reflexes.  Psych:  Calm.       Medications     - MEDICATION INSTRUCTIONS -       sodium chloride 10 mL/hr at 05/22/19 0108       camphor-menthol   Topical Q6H     diphenhydrAMINE  50 mg Intravenous Q6H     hydrocortisone   Topical 4x Daily     lidocaine  1 patch Transdermal Q24h    And     lidocaine   Transdermal Q24H    And     lidocaine   Transdermal Q8H     LORazepam  1 mg Oral Q8H     oxyCODONE  5-7.6 mg Sublingual Q3H       Data   N/a  No new labs due to comfort care plan.

## 2019-05-23 NOTE — PLAN OF CARE
Pt admitted with UTI, comfort care now.  Pt lethargic, not verbally responding.  Starting to have some secretions with breathing, atropine given x1.  Receiving oxycodone, benadryl, and ativan.  Creams applied to rash.  Repositioned Q2H. Parks in place.  Plan for discharge to hospice facility tomorrow.

## 2019-05-23 NOTE — DISCHARGE SUMMARY
[facility]    Discharge Summary  Hospitalist    Date of Admission:  5/16/2019  Date of Discharge:  5/23/2019  Discharging Provider: Brandyn Grimes MD  Date of Service (when I saw the patient): 05/23/19    Discharge Diagnoses   Severe sepsis  Pneumonia, complicated  Urinary tract infection  Acute infectious encephalopathy  Acute hypoxemic respiratory failure secondary to sepsis and pneumonia  Severe pruritus  Acute kidney injury on CKD  Comfort cares, DNR/DNI    History of Present Illness  &Hospital Course     Berenice Chaudhari is a 79-year-old female with history of A.flutter on warfarin, CKD stage III, hypertension, GERD, hyperlipidemia, diastolic CHF, hypothyroidism, anxiety, obesity, chronic pain syndrome, prior history of pneumonia, UTI, cellulitis, multiple antibiotic allergies, was brought to emergency room on 5/16 for evaluation for confusion and fevers.  Patient had fever of 101  F at home. Patient is on 4 L of oxygen at baseline. She does have chronic cough but denies chest pain.   Pulse 76, oxygen saturation in mid 80s on room air and 98% on 4 L. Lab work-up showed  WBC 15.2, creatinine 1.42, BNP 2568, TSH 1.98, lactic acid 1. Urinalysis showed positive nitrite, moderate leukocyte esterase, 18 WBC/hpf.  Chest x-ray showed bilateral opacities right greater than left.  Blood culture and urine culture obtained.  She was started on meropenem and Zyvox due to her multiple antibiotic allergies.  ID consulted and recommended to continue meropenem and zyvox.  Blood culture from left arm 5/16 grew staph epidermidis,likely contaminant.Blood culture from the right arm negative.  Urine culture growing E.coli.She was transferred to ICU  5/19/19  and started on Levophed.  Further discussion was had with family and it was decided to focus on patient's comfort and she was subsequently made DNR/DNI, comfort cares.  Palliative care assisted with management.  Patient is now transitioning to care facility for  hospice cares.        Brandyn Grimes MD    Significant Results and Procedures   See above and below      Pending Results   These results will be followed up by Nelly [facility] Ordered in the Past 30 Days of this Admission     No orders found from 3/17/2019 to 5/17/2019.          Code Status   Comfort Care, DNR/DNI       Primary Care Physician   Nelly Peral    Physical Exam   Temp: 98.6  F (37  C) Temp src: Axillary    Heart Rate: 130 Resp: 20        Vitals:    05/16/19 2258 05/18/19 0606   Weight: 149.6 kg (329 lb 11.2 oz) (!) 151.2 kg (333 lb 6.4 oz)     Vital Signs with Ranges  Temp:  [98.6  F (37  C)] 98.6  F (37  C)  Heart Rate:  [130] 130  Resp:  [16-20] 20  I/O last 3 completed shifts:  In: 542 [I.V.:542]  Out: 25 [Urine:25]    Patient is somnolent and not very responsive.  She has some gurgling respirations.  She does not appear to be in any distress.  Her breathing is not labored.  She has a diffuse reddened skin rash with some scattered excoriations.  PICC line is in place.      Discharge Disposition   Admited to hospice care.   Discharged to TCU  Condition at discharge: Terminal    Consultations This Hospital Stay   PHARMACY TO DOSE WARFARIN  INFECTIOUS DISEASES IP CONSULT  WOUND OSTOMY CONTINENCE NURSE  IP CONSULT  CARE COORDINATOR IP CONSULT  VASCULAR ACCESS ADULT IP CONSULT  NEPHROLOGY IP CONSULT  PALLIATIVE CARE ADULT IP CONSULT  SOCIAL WORK IP CONSULT    Time Spent on this Encounter   I, Brandyn Grimes, personally saw the patient today and spent less than or equal to 30 minutes discharging this patient.    Discharge Orders      General info for SNF    Length of Stay Estimate: Short Term Care: Estimated # of Days <30  Condition at Discharge: Declining  Level of care:skilled   Rehabilitation Potential: Poor  Admission H&P remains valid and up-to-date: Yes  Recent Chemotherapy: N/A  Use Nursing Home Standing Orders: Yes     Reason for your hospital stay    Urinary tract  infection.  Transition to hospice at time of discharge.     General info for SNF    Length of Stay Estimate: Short Term Care: Estimated # of Days <30  Condition at Discharge: Terminal  Level of care:skilled   Rehabilitation Potential: Poor  Admission H&P remains valid and up-to-date: Yes  Recent Chemotherapy: N/A  Use Nursing Home Standing Orders: N/A     Mantoux instructions    Give two-step Mantoux (PPD) Per Facility Policy No (if no explain) comfort care     Reason for your hospital stay    Severe sepsis due to pneumonia, urinary tract infection with associated acute kidney injury on chronic kidney disease     IV access    PICC.  Routine PEG care and flushes     Activity - Up ad prakash     DNR/DNI    Comfort care     Oxygen - Nasal cannula    1-2 Lpm by nasal cannula for comfort only if family requests     Advance Diet as Tolerated    Follow this diet upon discharge: Orders Placed This Encounter      Combination Diet Regular Diet Adult.  May offer food if patient awake     Discharge Medications   Current Discharge Medication List      START taking these medications    Details   artificial saliva (BIOTENE MT) SOLN solution Take 2 mLs (2 sprays) by mouth every hour as needed for dry mouth  Qty: 44.3 mL, Refills: 0    Comments: Please label inpatient bottle to use at facility.  Associated Diagnoses: Stage 3 chronic kidney disease (H)      atropine 1 % ophthalmic solution Place 1-2 drops under the tongue every hour as needed for other (secretions)  Qty: 2 mL, Refills: 0    Comments: Please label inpatient dispensed bottle for use if needed  Associated Diagnoses: Stage 3 chronic kidney disease (H)      camphor-menthol (DERMASARRA) 0.5-0.5 % external lotion Apply 1 mL topically every 6 hours  Qty: 59 mL, Refills: 0    Comments: Please label inpatient dispensed tube for use at facility.  Associated Diagnoses: Stage 3 chronic kidney disease (H)      hydrocortisone (CORTAID) 1 % external ointment Apply topically 4 times  daily  Qty: 25 g, Refills: 0    Comments: Please label inpatient dispensed tube for use at facility.  Associated Diagnoses: Stage 3 chronic kidney disease (H)      HYDROmorphone (DILAUDID) 2 MG tablet Take 1 tablet (2 mg) by mouth every 2 hours as needed for moderate to severe pain (comfort/dyspnea)  Qty: 30 tablet, Refills: 0    Comments: Administer rectally if patient unable to take PO, hospice/comfort care medication.  Associated Diagnoses: CKD (chronic kidney disease) stage 3, GFR 30-59 ml/min (H)      !! HYDROmorphone (DILAUDID) 4 mg/0.4 mL (HIGH CONC) oral solution Place 0.3 mLs (3 mg) under the tongue every 2 hours as needed for moderate to severe pain or other (dyspnea.  Comfort measures.)  Qty: 0.4 mL, Refills: 0    Associated Diagnoses: CKD (chronic kidney disease) stage 3, GFR 30-59 ml/min (H)      !! HYDROmorphone (DILAUDID) 4 mg/0.4 mL (HIGH CONC) oral solution Place 0.2 mLs (2 mg) under the tongue every 2 hours  Qty: 0.4 mL, Refills: 0    Associated Diagnoses: CKD (chronic kidney disease) stage 3, GFR 30-59 ml/min (H)      hydrOXYzine (ATARAX) 25 MG tablet Take 1 tablet (25 mg) by mouth 3 times daily as needed for itching  Qty: 30 tablet, Refills: 0    Comments: Rectal administration for comfort needs.  Associated Diagnoses: Stage 3 chronic kidney disease (H)      hypromellose-dextran (ARTIFICAL TEARS) 0.1-0.3 % ophthalmic solution Place 1-2 drops into both eyes every 8 hours as needed for dry eyes  Qty: 15 mL, Refills: 0    Comments: Please label inpatient dispensed bottle for use at facility  Associated Diagnoses: Stage 3 chronic kidney disease (H)      LORazepam (LORAZEPAM INTENSOL) 2 MG/ML (HIGH CONC) solution Take 0.5 mLs (1 mg) by mouth every 8 hours  Qty: 30 mL, Refills: 0    Associated Diagnoses: Stage 3 chronic kidney disease (H)       !! - Potential duplicate medications found. Please discuss with provider.      CONTINUE these medications which have NOT CHANGED    Details   order for DME  Equipment being ordered: padded transfer belt  Qty: 1 Units, Refills: 0    Associated Diagnoses: Severe obesity (BMI >= 40) (H); Congestive heart failure, unspecified congestive heart failure chronicity, unspecified congestive heart failure type         STOP taking these medications       buPROPion (WELLBUTRIN XL) 300 MG 24 hr tablet Comments:   Reason for Stopping:         CEPHALEXIN PO Comments:   Reason for Stopping:         diltiazem ER COATED BEADS (CARTIA XT) 120 MG 24 hr capsule Comments:   Reason for Stopping:         ferrous sulfate (FEROSUL) 325 (65 Fe) MG tablet Comments:   Reason for Stopping:         furosemide (LASIX) 20 MG tablet Comments:   Reason for Stopping:         gabapentin (NEURONTIN) 600 MG tablet Comments:   Reason for Stopping:         levothyroxine (SYNTHROID/LEVOTHROID) 75 MCG tablet Comments:   Reason for Stopping:         lidocaine (LIDODERM) 5 % Patch Comments:   Reason for Stopping:         metoprolol succinate ER (TOPROL-XL) 50 MG 24 hr tablet Comments:   Reason for Stopping:         minocycline (MINOCIN/DYNACIN) 50 MG capsule Comments:   Reason for Stopping:         Multiple Vitamins-Minerals (CENTRUM SILVER) per tablet Comments:   Reason for Stopping:         nortriptyline (PAMELOR) 25 MG capsule Comments:   Reason for Stopping:         omeprazole (PRILOSEC) 40 MG DR capsule Comments:   Reason for Stopping:         pravastatin (PRAVACHOL) 10 MG tablet Comments:   Reason for Stopping:         pregabalin (LYRICA) 150 MG capsule Comments:   Reason for Stopping:         venlafaxine (EFFEXOR-XR) 150 MG 24 hr capsule Comments:   Reason for Stopping:         warfarin (COUMADIN) 2.5 MG tablet Comments:   Reason for Stopping:         warfarin (COUMADIN) 5 MG tablet Comments:   Reason for Stopping:             Allergies   Allergies   Allergen Reactions     Ceftriaxone Anaphylaxis and Rash     Rocephin given. Developed rash to back and abd, awaiting to see if it improves with d/c of rocephin.         Nafcillin Rash     diffuse severe rash in hospital 2/05     Penicillins Anaphylaxis     Vancomycin Anaphylaxis and Rash     Codeine Fatigue     Sleeps continuously     Clindamycin Rash     received information from patient     Levaquin [Levofloxacin] Hives     Oral medication      Zithromax [Azithromycin] Rash     Data   Most Recent 3 CBC's:  Recent Labs   Lab Test 05/20/19  0630 05/19/19  0728 05/17/19  0511   WBC 27.6* 20.8* 9.5   HGB 13.2 14.4 11.4*   MCV 95 97 97    171 107*      Most Recent 3 BMP's:  Recent Labs   Lab Test 05/20/19  1547 05/20/19  0630 05/19/19  2217 05/19/19  0728 05/18/19  0748   NA  --  136 136 135 137   POTASSIUM 4.6 4.4  --  4.4 4.2   CHLORIDE  --  105  --  103 105   CO2  --  23  --  25 26   BUN  --  33*  --  26 19   CR  --  3.64* 3.52* 2.90* 1.54*   ANIONGAP  --  8  --  7 6   SHLIOH  --  7.6*  --  8.2* 8.3*   GLC  --  113*  --  104* 119*     Most Recent 2 LFT's:  Recent Labs   Lab Test 05/16/19 1914 11/28/18  0914   AST 47* 42   ALT 49 35   ALKPHOS 100 109   BILITOTAL 0.7 0.4     Most Recent INR's and Anticoagulation Dosing History:  Anticoagulation Dose History     Recent Dosing and Labs Latest Ref Rng & Units 5/2/2019 5/9/2019 5/16/2019 5/17/2019 5/18/2019 5/19/2019 5/20/2019    Warfarin 1 mg - - - - 1 mg - - -    Warfarin 4 mg - - - - - - 4 mg -    Warfarin 5 mg - - - - - 5 mg - -    INR 0.86 - 1.14 3.4(A) 2.7(A) 3.01(H) 3.09(H) 2.39(H) 2.72(H) 5.23(HH)    INR 0.86 - 1.14 - - - - - - -        Most Recent 3 Troponin's:  Recent Labs   Lab Test 05/16/19  1914 03/10/18  1718 11/22/17  0153   TROPI <0.015 0.016 <0.015     Most Recent Cholesterol Panel:  Recent Labs   Lab Test 11/28/18  0914   CHOL 129   LDL 58   HDL 47*   TRIG 121     Most Recent 6 Bacteria Isolates From Any Culture (See EPIC Reports for Culture Details):  Recent Labs   Lab Test 05/18/19  0600 05/16/19  2117 05/16/19  1940 05/16/19  1914 10/01/18  1532 04/09/18  2303   CULT Light growth  Normal yvette   >100,000  colonies/mL  Escherichia coli  * No growth Cultured on the 1st day of incubation:  Staphylococcus epidermidis  *  Critical Value/Significant Value, preliminary result only, called to and read back by  Aminta Severino RN at 2148 5/17/19 SRQ    (Note)  POSITIVE for STAPHYLOCOCCUS EPIDERMIDIS and POSITIVE for the mecA  gene (resistant to methicillin) by Grandex Incigene multiplex nucleic acid  test. Final identification and antimicrobial susceptibility testing  will be verified by standard methods.    Specimen tested with Verigene multiplex, gram-positive blood culture  nucleic acid test for the following targets: Staph aureus, Staph  epidermidis, Staph lugdunensis, other Staph species, Enterococcus  faecalis, Enterococcus faecium, Streptococcus species, S. agalactiae,  S. anginosus grp., S. pneumoniae, S. pyogenes, Listeria sp., mecA  (methicillin resistance) and Adair/B (vancomycin resistance).      Critical Value/Significant Value called to and read back by Josselyn Del Toro RN at 0036 on 5.18.19 by SS.       <10,000 colonies/mL  urogenital yvette  Susceptibility testing not routinely done   No growth     Most Recent TSH, T4 and A1c Labs:  Recent Labs   Lab Test 05/16/19  1914  10/18/18  0833  08/11/14  1455   TSH 1.98   < > 4.28*   < >  --    T4  --   --  1.21   < >  --    A1C  --   --   --   --  5.8    < > = values in this interval not displayed.     Results for orders placed or performed during the hospital encounter of 05/16/19   XR Chest 2 Views    Narrative    CHEST TWO VIEWS      5/16/2019 7:52 PM     HISTORY: AMS, fever.    COMPARISON: X-rays 10/1/2018, 4/10/2018.      Impression    IMPRESSION: There are subtle left greater than right lung opacities  which are more conspicuous compared to the prior exam, suspicious for  edema or infection (similar in appearance compared to 4/10/2018). No  evidence of pleural effusion. Cardiac silhouette is mildly enlarged,  similar compared to the prior exam.      INOCENCIO BURROWS  MD HARJIT   POC US ECHO LIMITED    Impression    Limited Bedside Cardiac Ultrasound, performed and interpreted by me.   Indication: Shortness of Breath.  Parasternal long axis, subcostal and IVC views were acquired.   Image quality was satisfactory.    Findings:    Global left ventricular function appears intact.  Chambers do not appear dilated.  There is no evidence of free fluid within the pericardium.  IVC without variability.  No B lines.    IMPRESSION: Grossly normal left ventricular function and chamber size.  No pericardial effusion.      XR Chest Port 1 View    Narrative    CHEST SINGLE VIEW PORTABLE   5/20/2019 3:00 AM     HISTORY: Line placement.    COMPARISON: 5/16/2019.    FINDINGS: Interval placement of a right upper extremity peripherally  inserted central catheter with distal catheter tip in the right  atrium, approximately 2.5 cm distal to the junction of the superior  vena cava and right atrium. The lungs are clear. Possible  cardiomegaly.      Impression    IMPRESSION: Interval placement of a right upper extremity peripherally  inserted central catheter with distal catheter tip in the right  atrium.    DANNIELLE DAWKINS MD   XR Chest Port 1 View    Narrative    CHEST SINGLE VIEW PORTABLE   5/20/2019 4:03 AM     HISTORY: Peripherally inserted central catheter placement.    COMPARISON: 5/20/2019 at 0251 hours.      Impression    IMPRESSION:   1. No significant interval change since 5/20/2019 at 0251 hours.  2. A right upper extremity peripherally inserted central catheter is  again present with distal catheter tip in the right atrium,  approximately 2.5 cm distal to the junction of the superior vena cava  and right atrium.    DANNIELLE DAWKINS MD   XR Chest Port 1 View    Narrative    CHEST ONE VIEW PORTABLE May 20, 2019 8:02 AM     HISTORY: PICC line retracted.    COMPARISON: Chest radiograph performed earlier today at 3:56 AM.      Impression    IMPRESSION: Right PICC has been pulled back, with tip  now in good  position near the SVC/RA junction. No other significant interval  change. No pneumothorax.    SETH FRANCISCO MD     *Note: Due to a large number of results and/or encounters for the requested time period, some results have not been displayed. A complete set of results can be found in Results Review.

## 2019-05-23 NOTE — PHARMACY-ANTICOAGULATION SERVICE
Clinical Pharmacy- Warfarin Discharge Note    Warfarin PTA Regimen: warfarin 5mg [id number], 2.5mg on M      Anticoagulation Dose History     Recent Dosing and Labs Latest Ref Rng & Units 5/2/2019 5/9/2019 5/16/2019 5/17/2019 5/18/2019 5/19/2019 5/20/2019    Warfarin 1 mg - - - - 1 mg - - -    Warfarin 4 mg - - - - - - 4 mg -    Warfarin 5 mg - - - - - 5 mg - -    INR 0.86 - 1.14 3.4(A) 2.7(A) 3.01(H) 3.09(H) 2.39(H) 2.72(H) 5.23(HH)    INR 0.86 - 1.14 - - - - - - -        Patient admitted with warfarin as pta med.    Patient is discharging with hospice with comfort care plan.  Not planning on continuing warfarin    The patient should have an INR checked not needed as not continuing.

## 2019-05-23 NOTE — PROGRESS NOTES
Skin Assessment (location):   Gluteal cleft and all skin folds  History:  History of incontinence due to obesity, pt reports she has been using creams on all skin folds with no improvement       Skin folds under Breast/ Pannus and groin noted to have red rash and superficial denuded, erosion of epidermis, erythema,  and Gluteal cleft erosion 3 x 0.2 x 0.1cm moist pink dermis,      Drainage:  Scant bloody drainage due to erosion under skin friction    Odor: mild related to fungal infection    Pain:  minimal , tender    focus: skin care    D; Per MD note: 79-year-old female with history of A.flutter on warfarin, CKD stage III, hypertension, GERD, hyperlipidemia, diastolic CHF, hypothyroidism, anxiety, obesity, chronic pain syndrome, prior history of pneumonia, UTI, cellulitis, multiple antibiotic allergies, was brought to emergency room on 5/16 for evaluation for confusion and fevers.      WOC consulted for multiple skin fold issues and erosion to gluteal cleft due to moisture and friction.   Pt is now transitioned to Comfort Cares and hospice.        Intervention:     Patient's chart evaluated.      Cares performed: per POC with RN    Orders  reviewd    Supplies  in room     Discussed plan of care with nurses          Assessment:      Morbid obese deep skin folds with incontinence and superficial erosion of skin and fungal rash is improved with current POC.   Gluteal cleft superficial erosion due to moisture and not pressure.   Posterior buttocks, BLE and feet appear to be mottling, venous congestion to buttocks.          Plan:   Nursing to notify the Provider(s) and re-consult the WO Nurse if skin deteriorate(s).    Skin care plan for skin folds and Buttocks: PRN  1. Gently wipe skin with wipe and Flores, no rinse  2. Lightly dust the skin with Antifungal powder and swipe across to cover the folds deeply and all crevices  3. Apply light layer of Critic aid paste to all erosion and open skin areas.  4. Air Bed    Will  sign off.

## 2019-05-23 NOTE — PLAN OF CARE
[id number]:  VS: Routine vitals discontinued due to comfort focus. But does remain tachycardic. Temp max of 99 axillary. PRN tylenol available if febrile.   Pain: Changed to sublingual dilaudid scheduled with PRN for breakthrough pain/dyspnea. Pt tolerating change without issue.   Skin: Remains flushed/red throughout body. Around noon today, pt noted to have cool/pale/mottled feet and hands that were cooler than AM. More pronounced periorbital redness, facial pallor.   GI: BS rare, passing some flatus. No overt nausea or emesis. Nothing PO this shift. Oral cares as pt allows.   LS: Coarse lung sounds, wet/gurgly upper airway. Atropine PRN. More apneic as day has progressed with now periods of 15+ seconds of apnea.  : Parks with no urine output this shift.   Neuro: Unresponsive.   Mobility: Bedrest, repositioning with lift.   Pt's family at bedside.

## 2019-05-23 NOTE — PROGRESS NOTES
Rn writer met with pts spouse Ricardo and sister Heather to review hospice philosophy of care, available services and medicare benefit.  All consents were signed by Ricardo including updated POLST.  Plan is for pt to discharge via stretcher transport at 330 pm to [facility] for EOL cares if stable.  Coordinated care with floor nurse Adrianna Gregory NP and Corinne SW.  Ricardo acknowledges transport is tyypically a private pay expense and [facility] is private pay.  Elected services of [facility].  I will leave POLST on facility chart for Adrianna to sign and I will order O2 concentrator from [facility] and  hydromorphone concentrate from [facility] to be sent directly to [facility].  Please remove PICC prior to discharge, sign POLST and give copy to pt for transport and fill the following comfort meds at discharge to travel with pt.    Hydromorphone 10 mg/mL  Give 2 mg/0.2 mL PO/SL every 2 hours as needed pain/SOB    Lorazepam 2 mg/mL  Give 0.25 mg/0.125 mL PO/SL every 4 hours as needed anxiety/restlessness    Haloperidol 2 mg/mL  Give 0.5 mg/0.25 mL PO/SL every 6 hours as needed nausea/agitation    Atropine sulfate 1% drops.  Give 2 gtts SL every 2 hours as needed oral secretions    Bisacodyl 10 mg supp.  Give supp NM BID prn constipation #2    Tylenol 650 mg supp.  Give supp NM Q4H prn pain/fever #2    Thank you for the referral    Roselyn Christine RN  Admissions Clinician   [facility]  [phone]  HPI      ROS      Physical Exam

## 2019-05-23 NOTE — TELEPHONE ENCOUNTER
Roselyn called back this afternoon reporting that the patient is not being discharged from the hospital and is close to the end of her life. Therefore patient is not electing hospice effective today.

## 2019-05-23 NOTE — TELEPHONE ENCOUNTER
Roselyn from [facility] called stating she has an appt with the family today at 930am. Patient is being discharged from the hospital. Roselyn would like to know if Dr. Pearl will sign orders to say it's okay to admit the patient to hospice and that Dr. Pearl will sign the patient's death certificate. Please call back ASAP, since Roselyn is meeting w/ family today, [phone].

## 2019-05-23 NOTE — PROGRESS NOTES
Hospitalist update  Patient is now having more apneic spells and is starting to have more rubor and mottling.  Patient appears to be more rapidly progressing and is actively dying.  Will cancel transfer today.      Brandyn Grimes

## 2019-05-23 NOTE — PLAN OF CARE
Pt resting comfortably without any evidence of pain or discomfort, not responsive to stimuli overnight, cleaned and fresh linen along with interdry placed to  folds and powder, continues scheduled oxy\ativan\benadryl and help PO meds since not alert, PRN atropine drops x3 without  much improvement to gurgling,  dentures removed and oral cares through overnight, on airflow mattress and pillows repositioned frequently, PICC infusing TKO, mtz with  25ml dark output only. Expected to discharge on hospice today with transport set for 1530.

## 2019-05-23 NOTE — PROGRESS NOTES
BRIEF NUTRITION NOTE    Reason for note:  Berenice Chaudhari is a 79 year old female seen by Registered Dietitian for LOS    Pt has transferred to [facility], nutrition will not be following. Please re-consult if a Registered Dietitian is needed in the future.       Kaylah Ellis, RD, LD  Clinical Dietitian

## 2019-05-24 NOTE — DEATH PRONOUNCEMENT
MD DEATH PRONOUNCEMENT    Called to pronounce Berenice Chaudhari dead.    Physical Exam: Unresponsive to noxious stimuli, Spontaneous respirations absent, Breath sounds absent, Carotid pulse absent, Heart sounds absent and Pupillary light reflex absent    Time of death: 20:40 on 5/23/19.    Preliminary Cause of Death:   Severe sepsis.  Pneumonia.  Urinary tract infection.    Active Problems:    Sepsis due to pneumonia (H)

## 2019-06-28 DIAGNOSIS — A41.01 METHICILLIN SUSCEPTIBLE STAPHYLOCOCCUS AUREUS SEPTICEMIA (H): ICD-10-CM

## 2019-06-28 RX ORDER — MINOCYCLINE HYDROCHLORIDE 50 MG/1
CAPSULE ORAL
Qty: 180 CAPSULE | Refills: 0 | OUTPATIENT
Start: 2019-06-28

## 2019-07-30 NOTE — MR AVS SNAPSHOT
Berenice RAJIV Chaudhari   8/31/2017 2:15 PM   Anticoagulation Therapy Visit    Description:  77 year old female   Provider:   ANTICOAGULATION CLINIC   Department:   Nurse           INR as of 8/31/2017     Today's INR 1.6!      Anticoagulation Summary as of 8/31/2017     INR goal 2.0-3.0   Today's INR 1.6!   Full instructions 2.5 mg on Mon, Wed, Fri; 5 mg all other days   Next INR check 9/7/2017    Indications   Atrial flutter (H) [I48.92]  Atrial flutter with rapid ventricular response (H) [I48.92]  Long-term (current) use of anticoagulants [Z79.01] [Z79.01]         Your next Anticoagulation Clinic appointment(s)     Sep 07, 2017  4:00 PM CDT   Anticoagulation Visit with  ANTICOAGULATION CLINIC   [facility] ([facility])    [address] [place] [address]  [address]  [facility] [address] [phone]              Contact Numbers     [facility]  Please call  [phone] to cancel and/or reschedule your appointment   Please call  [phone] with any problems or questions regarding your therapy.        August 2017 Details    Sun Mon Tue Wed Thu Fri Sat       1               2               3               4               5                 6               7               8               9               10               11               12                 13               14               15               16               17               18               19                 20               21               22               23               24               25               26                 27               28               29               30               31      5 mg   See details         Date Details   08/31 This INR check               How to take your warfarin dose     To take:  5 mg Take 1 of the 5 mg tablets.           September 2017 Details    Sun Mon Tue Wed Thu Fri Sat          1      2.5 mg         2      5 mg           3      5 mg         4      2.5 mg         5      5 mg     Principal Discharge DX:	Cardiac arrest      6      2.5 mg         7            8               9                 10               11               12               13               14               15               16                 17               18               19               20               21               22               23                 24               25               26               27               28               29               30                Date Details   No additional details    Date of next INR:  9/7/2017         How to take your warfarin dose     To take:  2.5 mg Take 0.5 of a 5 mg tablet.    To take:  5 mg Take 1 of the 5 mg tablets.

## 2019-10-10 NOTE — PATIENT INSTRUCTIONS
Leg swelling - compression stockings or ace wraps for both legs.    Retest your kidneys in a week (BMP) - can be done with home nurse or here.    Follow up in 3-4 weeks to consider an increase in the lasix or other medication to help the fluid.  Also follow up on the heart failure.    INR check today   Procedures   Patient returns for Hudson removal  Hudson removed without difficulty, patient tolerated procedure well  Urine draining, clear slightly orange in color  Denies fever or urinary symptoms  Patient instructed to increase fluids and limit caffeine intake  To return to office if unable to void within 4-6 hours, or has increased discomfort

## 2020-02-05 NOTE — IP AVS SNAPSHOT
[facility]    [address] [facility] [address]    Phone:  [phone]    Fax:  [phone]                                       After Visit Summary   6/20/2017    Berenice Chaudhari    MRN: [id number]           After Visit Summary Signature Page     I have received my discharge instructions, and my questions have been answered. I have discussed any challenges I see with this plan with the nurse or doctor.    ..........................................................................................................................................  Patient/Patient Representative Signature      ..........................................................................................................................................  Patient Representative Print Name and Relationship to Patient    ..................................................               ................................................  Date                                            Time    ..........................................................................................................................................  Reviewed by Signature/Title    ...................................................              ..............................................  Date                                                            Time           23

## 2020-09-23 NOTE — MR AVS SNAPSHOT
Berenice Chaudhari   9/28/2017 2:45 PM   Anticoagulation Therapy Visit    Description:  77 year old female   Provider:  [facility]   Department:  [facility]           INR as of 9/28/2017     Today's INR 4.0!      Anticoagulation Summary as of 9/28/2017     INR goal 2.0-3.0   Today's INR 4.0!   Full instructions 9/28: Hold; Otherwise 5 mg every day   Next INR check 10/5/2017    Indications   Atrial flutter (H) [I48.92]  Atrial flutter with rapid ventricular response (H) [I48.92]  Long-term (current) use of anticoagulants [Z79.01] [Z79.01]         Contact Numbers     [facility]  Please call  [phone] to cancel and/or reschedule your appointment   Please call  [phone] with any problems or questions regarding your therapy.        September 2017 Details    Sun Mon Tue Wed Thu Fri Sat          1               2                 3               4               5               6               7               8               9                 10               11               12               13               14               15               16                 17               18               19               20               21               22               23                 24               25               26               27               28      Hold   See details      29      5 mg         30      5 mg          Date Details   09/28 This INR check               How to take your warfarin dose     To take:  5 mg Take 1 of the 5 mg tablets.    Hold Do not take your warfarin dose. See the Details table to the right for additional instructions.                October 2017 Details    Sun Mon Tue Wed Thu Fri Sat     1      5 mg         2      5 mg         3      5 mg         4      5 mg         5            6               7                 8               9               10               11               12               13               14                 15               16               17        Addended by: MARY RAINEY on: 9/23/2020 01:29 PM     Modules accepted: Orders             18               19               20               21                 22               23               24               25               26               27               28                 29               30               31                    Date Details   No additional details    Date of next INR:  10/5/2017         How to take your warfarin dose     To take:  5 mg Take 1 of the 5 mg tablets.

## 2021-01-22 NOTE — TELEPHONE ENCOUNTER
MTM referral from: Transitions of Care (recent hospital discharge or ED visit)    MTM referral outreach attempt #1 on April 13, 2018 at 10:52 AM      Outcome: Spoke with patient's , he stated that she didn't need the service because she has been on her medications long term    Isak Franklin, MTM Coordinator       
no

## 2021-12-16 NOTE — Clinical Note
16-Dec-2021 21:14 Completed POLST dated 7/24/17 received. Please clarify that pending code status is appropriate and sign.

## 2022-03-23 NOTE — PROGRESS NOTES
"[facility]     Date: 4/10/18  Admission Dx: Pneumonia  Pulmonary History: Possible undiagnosed COPD, smoking hx  Home Nebulizer/MDI Use: none  Home Oxygen: none  Acuity Level (RCAT flow sheet): level 3  Aerosol Therapy initiated: Duoneb QID and prn      Pulmonary Hygiene initiated: n/a      Volume Expansion initiated: incentive spirometer      Current Oxygen Requirements: room air  Current SpO2: 92%    Re-evaluation date: 4/13/18    Patient Education: indications for nebulizers      See \"RT Assessments\" flow sheet for patient assessment scoring and Acuity Level Details.             " Protopic Counseling: Patient may experience a mild burning sensation during topical application. Protopic is not approved in children less than 2 years of age. There have been case reports of hematologic and skin malignancies in patients using topical calcineurin inhibitors although causality is questionable.

## 2022-07-06 ENCOUNTER — DOCUMENTATION ONLY (OUTPATIENT)
Dept: ANTICOAGULATION | Facility: CLINIC | Age: 82
End: 2022-07-06

## 2022-07-06 DIAGNOSIS — I48.92 ATRIAL FLUTTER WITH RAPID VENTRICULAR RESPONSE (H): ICD-10-CM

## 2022-07-06 DIAGNOSIS — Z79.01 LONG TERM CURRENT USE OF ANTICOAGULANT THERAPY: Primary | ICD-10-CM

## 2022-07-06 NOTE — PROGRESS NOTES
ANTICOAGULATION  MANAGEMENT    Berenice Chaudhari is being discharged from the [facility] ([facility]).    Reason for discharge:     Anticoagulation episode resolved, ACC referral closed and Standing order discontinued        Amy Mulligan RN

## 2023-06-19 NOTE — MR AVS SNAPSHOT
Berenice VANCE Chaudhari   1/11/2018 1:15 PM   Anticoagulation Therapy Visit    Description:  77 year old female   Provider:   ANTICOAGULATION CLINIC   Department:   Nurse           INR as of 1/11/2018     Today's INR 1.7!      Anticoagulation Summary as of 1/11/2018     INR goal 2.0-3.0   Today's INR 1.7!   Full instructions 1/11: 5 mg; Otherwise 5 mg on Fri; 2.5 mg all other days   Next INR check 1/18/2018    Indications   Atrial flutter (H) [I48.92]  Atrial flutter with rapid ventricular response (H) [I48.92]  Long-term (current) use of anticoagulants [Z79.01] [Z79.01]         Your next Anticoagulation Clinic appointment(s)     Jan 11, 2018  1:15 PM CST   Anticoagulation Visit with  ANTICOAGULATION CLINIC   [facility] ([facility])    [address] [facility]  [address]  [facility] [id number] [phone]              Contact Numbers     [facility]  Please call  [phone] to cancel and/or reschedule your appointment   Please call  [phone] with any problems or questions regarding your therapy.        January 2018 Details    Sun Mon Tue Wed Thu Fri Sat      1               2               3               4               5               6                 7               8               9               10               11      5 mg   See details      12      5 mg         13      2.5 mg           14      2.5 mg         15      2.5 mg         16      2.5 mg         17      2.5 mg         18            19               20                 21               22               23               24               25               26               27                 28               29               30               31                   Date Details   01/11 This INR check       Date of next INR:  1/18/2018         How to take your warfarin dose     To take:  2.5 mg Take 0.5 of a 5 mg tablet.    To take:  5 mg Take 1 of the 5 mg tablets.            Normal vision: sees adequately in most situations; can see medication labels, newsprint

## 2023-06-19 NOTE — PHARMACY-ANTICOAGULATION SERVICE
Clinical Pharmacy- Warfarin Discharge Note  This patient is currently on warfarin for the treatment of Atrial fibrillation.  INR Goal= 2-3  Expected length of therapy undetermined.  Prior to admit dose: 5mg Mon, 2.5mg all other days      Anticoagulation Dose History     Recent Dosing and Labs Latest Ref Rng & Units 11/9/2017 11/16/2017 11/20/2017 11/21/2017 11/22/2017 11/23/2017 11/24/2017    Warfarin 2.5 mg - - - - - - 2.5 mg -    Warfarin 3 mg - - - - - 3 mg - -    Warfarin 5 mg - - - 5 mg 5 mg - - -    INR 0.86 - 1.14 1.07 1.8 1.57(H) 1.57(H) 1.84(H) 2.27(H) 2.17(H)          Vitamin K doses administered during the last 7 days: none  FFP administered during the last 7 days: none  Dose held prior to admission for endoscopy  New drug interactions: Levaquin x6 more days      INR slightly low during hospitalization d/t holding previously for endoscopy. Pt got a few increased doses, now with therapeutic INR. Pt to continue levaquin x6 more days which may increase INR. Agree with discharge orders to resume prior to admit dose of 5mg on Mon & 2.5mg all other days and check INR in 5 days.    19-Jun-2023 07:15

## 2024-11-22 NOTE — PATIENT INSTRUCTIONS
Danish Barrera  PT came in asking about the referrals Kristi gave you to give to PT. I informed PT that I would send this message to you so that when you came in tomorrow you could call PT and provide the information.    I hope this message is of help to you...   With next episodes - try taking pulse ox and blood pressure at home.    Recheck labs.    Keep track of episodes - cary related to activity, food and sleep.    For urination - try small frequent urination.  In the future we can consider seeing urology.

## 2025-04-30 NOTE — PLAN OF CARE
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   Outcome: Improving  VSS tele SR BBB, HX of Afib, on Po dilt, metoprolol and Coumadin. INR 1.84. Cr 1.85, Dc'd IV Lasix for today, continue to monitor. Pt is [facility] transfer. Needs encouragement to get out of bed. Encouraging IS. WOCN following. On IV Levaquin for LLE Cellulitis.

## (undated) DEVICE — SOL WATER IRRIG 500ML BOTTLE 2F7113

## (undated) DEVICE — BAG CLEAR TRASH 1.3M 39X33" P4040C

## (undated) DEVICE — KIT PROCEDURE W/CLEAN-A-SCOPE LINERS V2 200800

## (undated) DEVICE — GOWN XLG DISP 9545

## (undated) DEVICE — TUBING SUCTION 12"X1/4" N612

## (undated) DEVICE — SUCTION CANISTER MEDIVAC LINER 3000ML W/LID 65651-530

## (undated) RX ORDER — FENTANYL CITRATE 50 UG/ML
INJECTION, SOLUTION INTRAMUSCULAR; INTRAVENOUS
Status: DISPENSED
Start: 2017-11-09

## (undated) RX ORDER — KETAMINE HYDROCHLORIDE 10 MG/ML
INJECTION, SOLUTION INTRAMUSCULAR; INTRAVENOUS
Status: DISPENSED
Start: 2017-11-09